# Patient Record
Sex: FEMALE | Race: WHITE | NOT HISPANIC OR LATINO | Employment: OTHER | URBAN - METROPOLITAN AREA
[De-identification: names, ages, dates, MRNs, and addresses within clinical notes are randomized per-mention and may not be internally consistent; named-entity substitution may affect disease eponyms.]

---

## 2017-12-18 ENCOUNTER — HOSPITAL ENCOUNTER (OUTPATIENT)
Facility: MEDICAL CENTER | Age: 67
End: 2017-12-18
Payer: COMMERCIAL

## 2017-12-19 NOTE — PROGRESS NOTES
Discussed with Dr. Cross after he spoke w/ Dr. Castillo at [facility]. He is not accepting the patient for a direct admit tonight. At this point the patient does not have a clear need for higher level of care. She needs an oncology consult that may be able to be done on an outpt basis. She is getting IV fluids and PRBCs today. Dr. Cross would like [facility] to repeat her labs in the AM, update her records and fax all of the updated info to us tomorrow. They will then need to consult w/the oncologist on call to discuss inpt vs outpt workup. Deirdre, the house supervisor at [facility] has been updated and she will f/u in the AM.

## 2018-01-30 ENCOUNTER — HOSPITAL ENCOUNTER (OUTPATIENT)
Facility: MEDICAL CENTER | Age: 68
End: 2018-01-30
Attending: INTERNAL MEDICINE
Payer: COMMERCIAL

## 2018-01-30 LAB
ALBUMIN SERPL BCP-MCNC: 2.8 G/DL (ref 3.2–4.9)
ALBUMIN/GLOB SERPL: 0.5 G/DL
ALP SERPL-CCNC: 117 U/L (ref 30–99)
ALT SERPL-CCNC: 7 U/L (ref 2–50)
ANION GAP SERPL CALC-SCNC: 10 MMOL/L (ref 0–11.9)
AST SERPL-CCNC: 15 U/L (ref 12–45)
BILIRUB SERPL-MCNC: 0.3 MG/DL (ref 0.1–1.5)
BUN SERPL-MCNC: 8 MG/DL (ref 8–22)
CALCIUM SERPL-MCNC: 8.9 MG/DL (ref 8.5–10.5)
CHLORIDE SERPL-SCNC: 100 MMOL/L (ref 96–112)
CO2 SERPL-SCNC: 24 MMOL/L (ref 20–33)
CREAT SERPL-MCNC: 0.51 MG/DL (ref 0.5–1.4)
GLOBULIN SER CALC-MCNC: 5.7 G/DL (ref 1.9–3.5)
GLUCOSE SERPL-MCNC: 111 MG/DL (ref 65–99)
IRON SATN MFR SERPL: 28 % (ref 15–55)
IRON SERPL-MCNC: 86 UG/DL (ref 40–170)
POTASSIUM SERPL-SCNC: 4 MMOL/L (ref 3.6–5.5)
PROT SERPL-MCNC: 8.5 G/DL (ref 6–8.2)
SODIUM SERPL-SCNC: 134 MMOL/L (ref 135–145)
TIBC SERPL-MCNC: 304 UG/DL (ref 250–450)

## 2018-01-30 PROCEDURE — 84165 PROTEIN E-PHORESIS SERUM: CPT

## 2018-01-30 PROCEDURE — 82746 ASSAY OF FOLIC ACID SERUM: CPT

## 2018-01-30 PROCEDURE — 83540 ASSAY OF IRON: CPT

## 2018-01-30 PROCEDURE — 82232 ASSAY OF BETA-2 PROTEIN: CPT

## 2018-01-30 PROCEDURE — 82784 ASSAY IGA/IGD/IGG/IGM EACH: CPT

## 2018-01-30 PROCEDURE — 83883 ASSAY NEPHELOMETRY NOT SPEC: CPT | Mod: 91

## 2018-01-30 PROCEDURE — 82728 ASSAY OF FERRITIN: CPT

## 2018-01-30 PROCEDURE — 83550 IRON BINDING TEST: CPT

## 2018-01-30 PROCEDURE — 84160 ASSAY OF PROTEIN ANY SOURCE: CPT

## 2018-01-30 PROCEDURE — 80053 COMPREHEN METABOLIC PANEL: CPT

## 2018-01-30 PROCEDURE — 82607 VITAMIN B-12: CPT

## 2018-01-31 LAB
FERRITIN SERPL-MCNC: 91.4 NG/ML (ref 10–291)
FOLATE SERPL-MCNC: 9.8 NG/ML
VIT B12 SERPL-MCNC: 141 PG/ML (ref 211–911)

## 2018-02-01 LAB
B2 MICROGLOB SERPL-MCNC: 9.9 MG/L (ref 1.1–2.4)
IGA SERPL-MCNC: 30 MG/DL (ref 68–408)
IGG SERPL-MCNC: 4730 MG/DL (ref 768–1632)
IGM SERPL-MCNC: 12 MG/DL (ref 35–263)
KAPPA LC FREE SER-MCNC: 8.26 MG/DL (ref 0.33–1.94)
KAPPA LC FREE/LAMBDA FREE SER NEPH: 22.94 {RATIO} (ref 0.26–1.65)
LAMBDA LC FREE SERPL-MCNC: 0.36 MG/DL (ref 0.57–2.63)

## 2018-02-02 ENCOUNTER — HOSPITAL ENCOUNTER (OUTPATIENT)
Facility: MEDICAL CENTER | Age: 68
End: 2018-02-02
Attending: HOSPITALIST | Admitting: HOSPITALIST
Payer: COMMERCIAL

## 2018-02-02 LAB
ALBUMIN SERPL-MCNC: 3.39 G/DL (ref 3.75–5.01)
ALPHA1 GLOB SERPL ELPH-MCNC: 0.45 G/DL (ref 0.19–0.46)
ALPHA2 GLOB SERPL ELPH-MCNC: 0.91 G/DL (ref 0.48–1.05)
B-GLOBULIN SERPL ELPH-MCNC: 0.75 G/DL (ref 0.48–1.1)
EER PROT ELECT SER Q1092: ABNORMAL
GAMMA GLOB SERPL ELPH-MCNC: 3.8 G/DL (ref 0.62–1.51)
INTERPRETATION SERPL IFE-IMP: ABNORMAL
PROT SERPL-MCNC: 9.3 G/DL (ref 6–8.3)

## 2018-03-06 ENCOUNTER — HOSPITAL ENCOUNTER (OUTPATIENT)
Facility: MEDICAL CENTER | Age: 68
End: 2018-03-06
Attending: INTERNAL MEDICINE
Payer: MEDICARE

## 2018-03-06 LAB
ALBUMIN SERPL BCP-MCNC: 2.7 G/DL (ref 3.2–4.9)
ALBUMIN/GLOB SERPL: 0.5 G/DL
ALP SERPL-CCNC: 90 U/L (ref 30–99)
ALT SERPL-CCNC: 9 U/L (ref 2–50)
ANION GAP SERPL CALC-SCNC: 10 MMOL/L (ref 0–11.9)
AST SERPL-CCNC: 12 U/L (ref 12–45)
BILIRUB SERPL-MCNC: 0.3 MG/DL (ref 0.1–1.5)
BUN SERPL-MCNC: 8 MG/DL (ref 8–22)
CALCIUM SERPL-MCNC: 8.6 MG/DL (ref 8.5–10.5)
CHLORIDE SERPL-SCNC: 103 MMOL/L (ref 96–112)
CO2 SERPL-SCNC: 21 MMOL/L (ref 20–33)
CREAT SERPL-MCNC: 0.69 MG/DL (ref 0.5–1.4)
GLOBULIN SER CALC-MCNC: 5.2 G/DL (ref 1.9–3.5)
GLUCOSE SERPL-MCNC: 203 MG/DL (ref 65–99)
HAV IGM SERPL QL IA: NEGATIVE
HBV CORE IGM SER QL: NEGATIVE
HBV SURFACE AG SER QL: NEGATIVE
HCV AB SER QL: NEGATIVE
POTASSIUM SERPL-SCNC: 3.9 MMOL/L (ref 3.6–5.5)
PROT SERPL-MCNC: 7.9 G/DL (ref 6–8.2)
SODIUM SERPL-SCNC: 134 MMOL/L (ref 135–145)

## 2018-03-06 PROCEDURE — 86255 FLUORESCENT ANTIBODY SCREEN: CPT

## 2018-03-06 PROCEDURE — 86645 CMV ANTIBODY IGM: CPT

## 2018-03-06 PROCEDURE — 86644 CMV ANTIBODY: CPT

## 2018-03-06 PROCEDURE — 86340 INTRINSIC FACTOR ANTIBODY: CPT

## 2018-03-06 PROCEDURE — 80074 ACUTE HEPATITIS PANEL: CPT

## 2018-03-06 PROCEDURE — 80053 COMPREHEN METABOLIC PANEL: CPT

## 2018-03-08 LAB
CMV IGG SERPL IA-ACNC: >10 U/ML
CMV IGM SERPL IA-ACNC: <8 AU/ML

## 2018-03-09 LAB — PCA IGG SER-ACNC: 1.6 UNITS (ref 0–24.9)

## 2018-03-10 LAB — IF BLOCK AB SER QL RIA: NEGATIVE

## 2018-03-12 ENCOUNTER — APPOINTMENT (OUTPATIENT)
Dept: ADMISSIONS | Facility: MEDICAL CENTER | Age: 68
End: 2018-03-12
Attending: ORTHOPAEDIC SURGERY
Payer: MEDICARE

## 2018-03-12 RX ORDER — ASPIRIN 325 MG
325 TABLET ORAL
Status: ON HOLD | COMMUNITY
End: 2018-05-26

## 2018-03-12 RX ORDER — ECHINACEA PURPUREA EXTRACT 125 MG
2 TABLET ORAL 3 TIMES DAILY PRN
COMMUNITY
End: 2018-04-10

## 2018-03-12 RX ORDER — CALCIUM CARBONATE 500 MG/1
500 TABLET, CHEWABLE ORAL PRN
COMMUNITY
End: 2018-04-10

## 2018-03-12 NOTE — OR NURSING
Pre-admit appointment completed. Pt instructed to continue regularly prescribed medications through the day before surgery. Pt instructed to take the following medications the day of surgery with a sip of water, per anesthesia protocol; metoprolol.    Pt instructed to notify her MD who manages insulin of procedure and NPO status.     Dr Gilmore notified of procedure due to pts estimate weight of 373 lbs giving her an approx BMI of 66.07, and other co morbidities.

## 2018-03-12 NOTE — OR NURSING
Dr Gilmore reviewed patients medical history. Based upon information available, Dr Gilmore indicates that the anesthesiologist will evaluate pt and determine if pt is appropriate to proceed with the procedure.

## 2018-03-13 ENCOUNTER — APPOINTMENT (OUTPATIENT)
Dept: RADIOLOGY | Facility: MEDICAL CENTER | Age: 68
End: 2018-03-13
Attending: ORTHOPAEDIC SURGERY
Payer: MEDICARE

## 2018-03-13 ENCOUNTER — HOSPITAL ENCOUNTER (OUTPATIENT)
Facility: MEDICAL CENTER | Age: 68
End: 2018-03-13
Attending: ORTHOPAEDIC SURGERY | Admitting: ORTHOPAEDIC SURGERY
Payer: MEDICARE

## 2018-03-13 VITALS
WEIGHT: 293 LBS | HEART RATE: 79 BPM | OXYGEN SATURATION: 95 % | TEMPERATURE: 97.7 F | BODY MASS INDEX: 64.87 KG/M2 | DIASTOLIC BLOOD PRESSURE: 65 MMHG | RESPIRATION RATE: 18 BRPM | SYSTOLIC BLOOD PRESSURE: 127 MMHG

## 2018-03-13 DIAGNOSIS — M84.522D PATHOLOGICAL FRACTURE OF LEFT HUMERUS DUE TO NEOPLASTIC DISEASE WITH ROUTINE HEALING, SUBSEQUENT ENCOUNTER: ICD-10-CM

## 2018-03-13 PROBLEM — M84.40XA PATHOLOGIC FRACTURE: Status: ACTIVE | Noted: 2018-03-13

## 2018-03-13 LAB
BASOPHILS # BLD AUTO: 0.4 % (ref 0–1.8)
BASOPHILS # BLD: 0.02 K/UL (ref 0–0.12)
EOSINOPHIL # BLD AUTO: 0.03 K/UL (ref 0–0.51)
EOSINOPHIL NFR BLD: 0.6 % (ref 0–6.9)
ERYTHROCYTE [DISTWIDTH] IN BLOOD BY AUTOMATED COUNT: 62.5 FL (ref 35.9–50)
EST. AVERAGE GLUCOSE BLD GHB EST-MCNC: 183 MG/DL
GLUCOSE BLD-MCNC: 196 MG/DL (ref 65–99)
HBA1C MFR BLD: 8 % (ref 0–5.6)
HCT VFR BLD AUTO: 24.6 % (ref 37–47)
HGB BLD-MCNC: 8 G/DL (ref 12–16)
IMM GRANULOCYTES # BLD AUTO: 0.02 K/UL (ref 0–0.11)
IMM GRANULOCYTES NFR BLD AUTO: 0.4 % (ref 0–0.9)
LYMPHOCYTES # BLD AUTO: 1.62 K/UL (ref 1–4.8)
LYMPHOCYTES NFR BLD: 31.5 % (ref 22–41)
MCH RBC QN AUTO: 31.7 PG (ref 27–33)
MCHC RBC AUTO-ENTMCNC: 32.5 G/DL (ref 33.6–35)
MCV RBC AUTO: 97.6 FL (ref 81.4–97.8)
MONOCYTES # BLD AUTO: 0.54 K/UL (ref 0–0.85)
MONOCYTES NFR BLD AUTO: 10.5 % (ref 0–13.4)
NEUTROPHILS # BLD AUTO: 2.91 K/UL (ref 2–7.15)
NEUTROPHILS NFR BLD: 56.6 % (ref 44–72)
NRBC # BLD AUTO: 0 K/UL
NRBC BLD-RTO: 0 /100 WBC
PLATELET # BLD AUTO: 252 K/UL (ref 164–446)
PMV BLD AUTO: 9.6 FL (ref 9–12.9)
RBC # BLD AUTO: 2.52 M/UL (ref 4.2–5.4)
WBC # BLD AUTO: 5.1 K/UL (ref 4.8–10.8)

## 2018-03-13 PROCEDURE — 501480 HCHG STOCKINETTE: Performed by: ORTHOPAEDIC SURGERY

## 2018-03-13 PROCEDURE — 160036 HCHG PACU - EA ADDL 30 MINS PHASE I: Performed by: ORTHOPAEDIC SURGERY

## 2018-03-13 PROCEDURE — 88237 TISSUE CULTURE BONE MARROW: CPT

## 2018-03-13 PROCEDURE — 88360 TUMOR IMMUNOHISTOCHEM/MANUAL: CPT

## 2018-03-13 PROCEDURE — 88342 IMHCHEM/IMCYTCHM 1ST ANTB: CPT | Mod: XU

## 2018-03-13 PROCEDURE — 160047 HCHG PACU  - EA ADDL 30 MINS PHASE II: Performed by: ORTHOPAEDIC SURGERY

## 2018-03-13 PROCEDURE — 93010 ELECTROCARDIOGRAM REPORT: CPT | Performed by: INTERNAL MEDICINE

## 2018-03-13 PROCEDURE — 700111 HCHG RX REV CODE 636 W/ 250 OVERRIDE (IP)

## 2018-03-13 PROCEDURE — 85025 COMPLETE CBC W/AUTO DIFF WBC: CPT

## 2018-03-13 PROCEDURE — 160022 HCHG BLOCK: Performed by: ORTHOPAEDIC SURGERY

## 2018-03-13 PROCEDURE — 93005 ELECTROCARDIOGRAM TRACING: CPT | Performed by: ORTHOPAEDIC SURGERY

## 2018-03-13 PROCEDURE — 82962 GLUCOSE BLOOD TEST: CPT

## 2018-03-13 PROCEDURE — 88305 TISSUE EXAM BY PATHOLOGIST: CPT

## 2018-03-13 PROCEDURE — 88313 SPECIAL STAINS GROUP 2: CPT

## 2018-03-13 PROCEDURE — 700101 HCHG RX REV CODE 250

## 2018-03-13 PROCEDURE — 160046 HCHG PACU - 1ST 60 MINS PHASE II: Performed by: ORTHOPAEDIC SURGERY

## 2018-03-13 PROCEDURE — 88264 CHROMOSOME ANALYSIS 20-25: CPT

## 2018-03-13 PROCEDURE — 700105 HCHG RX REV CODE 258: Performed by: ORTHOPAEDIC SURGERY

## 2018-03-13 PROCEDURE — A6454 SELF-ADHER BAND W>=3" <5"/YD: HCPCS | Performed by: ORTHOPAEDIC SURGERY

## 2018-03-13 PROCEDURE — 83036 HEMOGLOBIN GLYCOSYLATED A1C: CPT

## 2018-03-13 PROCEDURE — 160002 HCHG RECOVERY MINUTES (STAT): Performed by: ORTHOPAEDIC SURGERY

## 2018-03-13 PROCEDURE — 88311 DECALCIFY TISSUE: CPT

## 2018-03-13 PROCEDURE — 160009 HCHG ANES TIME/MIN: Performed by: ORTHOPAEDIC SURGERY

## 2018-03-13 PROCEDURE — 160035 HCHG PACU - 1ST 60 MINS PHASE I: Performed by: ORTHOPAEDIC SURGERY

## 2018-03-13 PROCEDURE — 73560 X-RAY EXAM OF KNEE 1 OR 2: CPT | Mod: RT

## 2018-03-13 PROCEDURE — 88185 FLOWCYTOMETRY/TC ADD-ON: CPT | Mod: 91

## 2018-03-13 PROCEDURE — 88184 FLOWCYTOMETRY/ TC 1 MARKER: CPT

## 2018-03-13 PROCEDURE — 160027 HCHG SURGERY MINUTES - 1ST 30 MINS LEVEL 2: Performed by: ORTHOPAEDIC SURGERY

## 2018-03-13 PROCEDURE — 501838 HCHG SUTURE GENERAL: Performed by: ORTHOPAEDIC SURGERY

## 2018-03-13 PROCEDURE — 500864 HCHG NEEDLE, SPINAL 18G: Performed by: ORTHOPAEDIC SURGERY

## 2018-03-13 PROCEDURE — 160025 RECOVERY II MINUTES (STATS): Performed by: ORTHOPAEDIC SURGERY

## 2018-03-13 PROCEDURE — 160038 HCHG SURGERY MINUTES - EA ADDL 1 MIN LEVEL 2: Performed by: ORTHOPAEDIC SURGERY

## 2018-03-13 PROCEDURE — 160048 HCHG OR STATISTICAL LEVEL 1-5: Performed by: ORTHOPAEDIC SURGERY

## 2018-03-13 RX ORDER — LIDOCAINE HYDROCHLORIDE 10 MG/ML
INJECTION, SOLUTION EPIDURAL; INFILTRATION; INTRACAUDAL; PERINEURAL
Status: DISCONTINUED | OUTPATIENT
Start: 2018-03-13 | End: 2018-03-13 | Stop reason: HOSPADM

## 2018-03-13 RX ORDER — ROPIVACAINE HYDROCHLORIDE 5 MG/ML
INJECTION, SOLUTION EPIDURAL; INFILTRATION; PERINEURAL
Status: DISCONTINUED
Start: 2018-03-13 | End: 2018-03-13 | Stop reason: HOSPADM

## 2018-03-13 RX ORDER — LIDOCAINE HYDROCHLORIDE 10 MG/ML
INJECTION, SOLUTION INFILTRATION; PERINEURAL
Status: COMPLETED
Start: 2018-03-13 | End: 2018-03-13

## 2018-03-13 RX ORDER — METHYLPREDNISOLONE ACETATE 40 MG/ML
INJECTION, SUSPENSION INTRA-ARTICULAR; INTRALESIONAL; INTRAMUSCULAR; SOFT TISSUE
Status: DISCONTINUED | OUTPATIENT
Start: 2018-03-13 | End: 2018-03-13 | Stop reason: HOSPADM

## 2018-03-13 RX ORDER — LIDOCAINE HYDROCHLORIDE 20 MG/ML
INJECTION, SOLUTION INFILTRATION; PERINEURAL
Status: DISCONTINUED
Start: 2018-03-13 | End: 2018-03-13 | Stop reason: HOSPADM

## 2018-03-13 RX ORDER — SODIUM CHLORIDE 9 MG/ML
1000 INJECTION, SOLUTION INTRAVENOUS
Status: DISCONTINUED | OUTPATIENT
Start: 2018-03-13 | End: 2018-03-13 | Stop reason: HOSPADM

## 2018-03-13 RX ORDER — OXYCODONE HYDROCHLORIDE AND ACETAMINOPHEN 5; 325 MG/1; MG/1
1 TABLET ORAL EVERY 6 HOURS PRN
Qty: 20 TAB | Refills: 0 | Status: SHIPPED | OUTPATIENT
Start: 2018-03-13 | End: 2018-03-20

## 2018-03-13 RX ORDER — IPRATROPIUM BROMIDE AND ALBUTEROL SULFATE 2.5; .5 MG/3ML; MG/3ML
SOLUTION RESPIRATORY (INHALATION)
Status: COMPLETED
Start: 2018-03-13 | End: 2018-03-13

## 2018-03-13 RX ADMIN — LIDOCAINE HYDROCHLORIDE 0.2 ML: 10 INJECTION, SOLUTION INFILTRATION; PERINEURAL at 07:25

## 2018-03-13 RX ADMIN — SODIUM CHLORIDE 1000 ML: 900 INJECTION, SOLUTION INTRAVENOUS at 07:25

## 2018-03-13 RX ADMIN — IPRATROPIUM BROMIDE AND ALBUTEROL SULFATE 3 ML: .5; 3 SOLUTION RESPIRATORY (INHALATION) at 09:55

## 2018-03-13 ASSESSMENT — PAIN SCALES - GENERAL
PAINLEVEL_OUTOF10: 0
PAINLEVEL_OUTOF10: 6
PAINLEVEL_OUTOF10: 0

## 2018-03-13 NOTE — OR NURSING
0625 PT TO PRE OP VIA W/C TO ASSUME CARE.  0630 SPO2 88% WITH AMBULATION 0635 SPO2 92% RA AT REST.  0638 SPO2 DROPS TO 90% PT PLACED ON 1L N/C FOR SPO2 94%. 0738 Patient allergies and NPO status verified, home medication reconciliation completed and belongings secured. Patient verbalizes understanding of pain scale, expected course of stay and plan of care. Surgical site verified with patient. IV access established.

## 2018-03-13 NOTE — OR NURSING
0826 Received report from Jada SWEET. Pts VSS. Plan to keep pt in PACU for full hour per [facility] protocol. Pt ok to be discharged at 88% or above on room air per anesthesia as that is where pt came in.   0845 trial  pt on room air at this time. VSS stable  0850 Pt unable to maintain oxygen saturation above 88% on room air.   0853 Instructed pt on use of IS. Pt goal is 1900, pt able to reach 500  0900 No change   0910 Transferring  pt to chair. Pt tolerating transfer.   0925 Pt dressed and in personal wheelchair for upright positioning. VSS   0928 Trial on room air at this time   0935 Pt unable to maintain oxygen saturation above 88% at this time. Pt desaturates into low 80's on room air   0950 Spoke with Dr. Arechiga regarding pts oxygen saturation. New orders received. See MAR   1010 Pt receiving neb tx at this time. VSS. Pt denies pain and nausea  1030 Spoke with Dr. Forbes regarding pts oxygen saturation. Pt not improving after breathing tx. Pt desaturates into low 80s on room air. Will contact [facility] in case management  to find oxygen com  1053 Tx pt to stage two at this time. VSS. Report given to KEE Limon

## 2018-03-13 NOTE — OR NURSING
0815 Pt to PACU from OR via gurtaisha, respirations spontaneous and non-labored via 6L mask. Left arm and right leg surgical sites clean, dry and intact, pt awake with no complaints of pain or nausea, VSS. Verbal order recived from Dr. Geni bush to D/C home at baseline spo2 sat 88% or higher.   0890 Report to KEE Mccray.

## 2018-03-13 NOTE — OR NURSING
I attempted patient on room air at 1110 after settling into PACU 2, pt very sleepy and sats into low-mid  80s. Back on 2 liters.  1210  OOB to BR to void QS. OOB tolerated well. Walked from bathroom on O2 at 2 L, sat remains high 90s upon checking, oxygen off.   Ate lunch.  Patient talkative, sats on room air 91- 95 percent. Dozed for ten minutes, sats not below 91 per cent.  1300- Denies pain and nausea.  Band-aid to left arm and right knee remain clean,dry,intact.  Patient and patient's sister verbalize good understanding of discharge instructions.  Patient using incentive spirometer to 1750 cc. 1315 Patient OOB to car.  Tolerated getting into her car well, mucous mocosa pink, patient only mildly short of breath. Discharged to sister and brother-in-law.

## 2018-03-13 NOTE — OP REPORT
Pre-operative Dx: Hematologic malignancy with left pathologic humerus fracture, Right knee symptomatic osteoarthritis, morbid obesity.  Post-operative Dx: same   Procedure:  Biopsy of left proximal humerus malignant bone lesion, Bone marrow biopsy left proximal humerus, Injection right knee with fluoro guidance  Surgeon:  Dr. Hany Forbes MD  Anesthesia:  Regional block  EBL: Minimal  Complications:  None    She is a 67 year old female with a pathologic left humerus fracture, felt to be multiple myeloma.  She's followed by Dr. Sim for hematology.  They were unable to perform standard biopsies due to her body habitus, so Dr. Sim asked if I'd be willing to assist.    She was brought back to the OR and a timeout was performed and site marking and consent confirmed.  She received a regional block.  The shoulder was prepped and draped.  A trocar was inserted through a percutaneous incision.  Bone marrow aspirate was aspirated and sent.  Additional marrow was aspirated into a heparin syringe and sent.  The trocar was then used to bore out a sample of bone tissue using multiple passes and sent.  Pathlogy tech was present for the procedure to assist with specimen prep and she was happy with the quality of the tissue.  A 3-0 nylon was placed in the procedure site and a bandaid applied.  I then turned my attention to the right knee.  It was very large due to body habitus, requiring fluoroscopic guidance to get the needle into the knee.  Once imaging confirmed correct placement, I injected 4cc of lidocaine and 80mg of Depomedrol.      She tolerated the procedure well.  F/U with Dr. Sim is scheduled for next week.

## 2018-03-13 NOTE — DISCHARGE INSTRUCTIONS
ACTIVITY: Rest and take it easy for the first 24 hours.  A responsible adult is recommended to remain with you during that time.  It is normal to feel sleepy.  We encourage you to not do anything that requires balance, judgment or coordination.    MILD FLU-LIKE SYMPTOMS ARE NORMAL. YOU MAY EXPERIENCE GENERALIZED MUSCLE ACHES, THROAT IRRITATION, HEADACHE AND/OR SOME NAUSEA.    FOR 24 HOURS DO NOT:  Drive, operate machinery or run household appliances.  Drink beer or alcoholic beverages.   Make important decisions or sign legal documents.    SPECIAL INSTRUCTIONS: Follow up with Dr. Sim     DIET: To avoid nausea, slowly advance diet as tolerated, avoiding spicy or greasy foods for the first day.  Add more substantial food to your diet according to your physician's instructions.  Babies can be fed formula or breast milk as soon as they are hungry.  INCREASE FLUIDS AND FIBER TO AVOID CONSTIPATION.      FOLLOW-UP APPOINTMENT:  A follow-up appointment should be arranged with your doctor ; call to schedule.    You should CALL YOUR PHYSICIAN if you develop:  Fever greater than 101 degrees F.  Pain not relieved by medication, or persistent nausea or vomiting.  Excessive bleeding (blood soaking through dressing) or unexpected drainage from the wound.  Extreme redness or swelling around the incision site, drainage of pus or foul smelling drainage.  Inability to urinate or empty your bladder within 8 hours.  Problems with breathing or chest pain.3    You should call 911 if you develop problems with breathing or chest pain.  If you are unable to contact your doctor or surgical center, you should go to the nearest emergency room or urgent care center.  Physician's telephone #: Dr. Forbes [phone]    If any questions arise, call your doctor.  If your doctor is not available, please feel free to call the Surgical Center at [phone].  The Center is open Monday through Friday from 7AM to 7PM.  You can also call the HEALTH  HOTLINE open 24 hours/day, 7 days/week and speak to a nurse at [phone], or toll free at [phone].    A registered nurse may call you a few days after your surgery to see how you are doing after your procedure.    MEDICATIONS: Resume taking daily medication.  Take prescribed pain medication with food.  If no medication is prescribed, you may take non-aspirin pain medication if needed.  PAIN MEDICATION CAN BE VERY CONSTIPATING.  Take a stool softener or laxative such as senokot, pericolace, or milk of magnesia if needed.    Prescription given for Percocet.  Last pain medication given at NA .    If your physician has prescribed pain medication that includes Acetaminophen (Tylenol), do not take additional Acetaminophen (Tylenol) while taking the prescribed medication.    Depression / Suicide Risk    As you are discharged from this [facility] facility, it is important to learn how to keep safe from harming yourself.    Recognize the warning signs:  · Abrupt changes in personality, positive or negative- including increase in energy   · Giving away possessions  · Change in eating patterns- significant weight changes-  positive or negative  · Change in sleeping patterns- unable to sleep or sleeping all the time   · Unwillingness or inability to communicate  · Depression  · Unusual sadness, discouragement and loneliness  · Talk of wanting to die  · Neglect of personal appearance   · Rebelliousness- reckless behavior  · Withdrawal from people/activities they love  · Confusion- inability to concentrate     If you or a loved one observes any of these behaviors or has concerns about self-harm, here's what you can do:  · Talk about it- your feelings and reasons for harming yourself  · Remove any means that you might use to hurt yourself (examples: pills, rope, extension cords, firearm)  · Get professional help from the community (Mental Health, Substance Abuse, psychological counseling)  · Do not be alone:Call  your Safe Contact- someone whom you trust who will be there for you.  · Call your local CRISIS HOTLINE 256-0519 or 023-191-5164  · Call your local Children's Mobile Crisis Response Team [place] (922) 225-1528 or www.BuzzFeed  · Call the toll free National Suicide Prevention Hotlines   · National Suicide Prevention Lifeline 334-669-XPYJ (1613)  [place] [facility] Group Line Network 800-SUICIDE (546-0597)    Peripheral Nerve Block Discharge Instructions from Same Day Surgery and Inpatient :    What to Expect - Lower Extremity  · The block may cause you to experience numbness and weakness in your hip and thigh, thigh and knee or calf and foot on the same side as your surgery  · Numbness, tingling and / or weakness are all normal. For some people, this may be an unpleasant sensation  · These issues will be resolved when the local anesthetic wears off   · You may experience numbness and tingling in your thigh on the same side as your surgery if the block medicine was injected at your groin area  · Numbness will make it difficult to walk  · You may have problems with balance and walking so be very careful   · Follow your surgeon's direction regarding weight bearing on your surgical limb  · Be very careful with your numb limb  Precautions  · The numbness may affect your balance  · Be careful when walking or moving around  · Your leg may be weak: be very careful putting weight on it  · If your surgeon did not specify a time, you should not bear weight for 24 hours  · Be sure to ask for help when you need it  · It is better to have help than to fall and hurt yourself  ·   Prevent Injury  · Protect the limb like a baby  · Beware of exposing your limb to extreme heat or cold or trauma  · The limb may be injured without you noticing because it is numb  · Keep the limb elevated whenever possible  · Do not sleep on the limb  · Change the position of the limb regularly  · Avoid putting pressure on your surgical limb  Pain  "Control  · The initial block on the day of surgery will make your extremity feel \"numb\"  · Any consecutive injection including prior to discharge from the hospital will make your extremity feel \"numb\"  · You may feel an aching or burning when the local anesthesia starts to wear off  · Take pain pills as prescribed by your surgeon  · Call your surgeon or anesthesiologist if you do not have adequate pain control  ·   "

## 2018-03-14 LAB — EKG IMPRESSION: NORMAL

## 2018-04-10 ENCOUNTER — APPOINTMENT (OUTPATIENT)
Dept: RADIOLOGY | Facility: MEDICAL CENTER | Age: 68
DRG: 840 | End: 2018-04-10
Attending: INTERNAL MEDICINE
Payer: MEDICARE

## 2018-04-10 ENCOUNTER — RESOLUTE PROFESSIONAL BILLING HOSPITAL PROF FEE (OUTPATIENT)
Dept: HOSPITALIST | Facility: MEDICAL CENTER | Age: 68
End: 2018-04-10
Payer: MEDICARE

## 2018-04-10 ENCOUNTER — HOSPITAL ENCOUNTER (INPATIENT)
Facility: MEDICAL CENTER | Age: 68
LOS: 46 days | DRG: 840 | End: 2018-05-26
Attending: EMERGENCY MEDICINE | Admitting: INTERNAL MEDICINE
Payer: MEDICARE

## 2018-04-10 DIAGNOSIS — M79.604 PAIN OF RIGHT LOWER EXTREMITY: ICD-10-CM

## 2018-04-10 DIAGNOSIS — M84.522D PATHOLOGICAL FRACTURE OF LEFT HUMERUS DUE TO NEOPLASTIC DISEASE WITH ROUTINE HEALING, SUBSEQUENT ENCOUNTER: ICD-10-CM

## 2018-04-10 DIAGNOSIS — C90.00 MULTIPLE MYELOMA NOT HAVING ACHIEVED REMISSION (HCC): ICD-10-CM

## 2018-04-10 DIAGNOSIS — C90.00 MULTIPLE MYELOMA, REMISSION STATUS UNSPECIFIED (HCC): ICD-10-CM

## 2018-04-10 DIAGNOSIS — F41.9 ANXIETY: ICD-10-CM

## 2018-04-10 PROBLEM — D64.9 ANEMIA: Status: ACTIVE | Noted: 2018-04-10

## 2018-04-10 PROBLEM — I87.2 CHRONIC VENOUS STASIS DERMATITIS OF BOTH LOWER EXTREMITIES: Status: ACTIVE | Noted: 2018-04-10

## 2018-04-10 PROBLEM — E87.1 HYPONATREMIA: Status: ACTIVE | Noted: 2018-04-10

## 2018-04-10 LAB
ALBUMIN SERPL BCP-MCNC: 3.2 G/DL (ref 3.2–4.9)
ALBUMIN/GLOB SERPL: 0.8 G/DL
ALP SERPL-CCNC: 87 U/L (ref 30–99)
ALT SERPL-CCNC: 8 U/L (ref 2–50)
ANION GAP SERPL CALC-SCNC: 6 MMOL/L (ref 0–11.9)
AST SERPL-CCNC: 8 U/L (ref 12–45)
BASOPHILS # BLD AUTO: 0.5 % (ref 0–1.8)
BASOPHILS # BLD: 0.03 K/UL (ref 0–0.12)
BILIRUB SERPL-MCNC: 0.6 MG/DL (ref 0.1–1.5)
BUN SERPL-MCNC: 21 MG/DL (ref 8–22)
CALCIUM SERPL-MCNC: 9.1 MG/DL (ref 8.5–10.5)
CHLORIDE SERPL-SCNC: 99 MMOL/L (ref 96–112)
CO2 SERPL-SCNC: 28 MMOL/L (ref 20–33)
CREAT SERPL-MCNC: 1.23 MG/DL (ref 0.5–1.4)
EOSINOPHIL # BLD AUTO: 0.08 K/UL (ref 0–0.51)
EOSINOPHIL NFR BLD: 1.2 % (ref 0–6.9)
ERYTHROCYTE [DISTWIDTH] IN BLOOD BY AUTOMATED COUNT: 53.9 FL (ref 35.9–50)
EST. AVERAGE GLUCOSE BLD GHB EST-MCNC: 197 MG/DL
GLOBULIN SER CALC-MCNC: 4.1 G/DL (ref 1.9–3.5)
GLUCOSE BLD-MCNC: 288 MG/DL (ref 65–99)
GLUCOSE SERPL-MCNC: 270 MG/DL (ref 65–99)
HBA1C MFR BLD: 8.5 % (ref 0–5.6)
HCT VFR BLD AUTO: 32.3 % (ref 37–47)
HGB BLD-MCNC: 10.6 G/DL (ref 12–16)
IMM GRANULOCYTES # BLD AUTO: 0.04 K/UL (ref 0–0.11)
IMM GRANULOCYTES NFR BLD AUTO: 0.6 % (ref 0–0.9)
LYMPHOCYTES # BLD AUTO: 1.21 K/UL (ref 1–4.8)
LYMPHOCYTES NFR BLD: 18.8 % (ref 22–41)
MCH RBC QN AUTO: 30.8 PG (ref 27–33)
MCHC RBC AUTO-ENTMCNC: 32.8 G/DL (ref 33.6–35)
MCV RBC AUTO: 93.9 FL (ref 81.4–97.8)
MONOCYTES # BLD AUTO: 0.48 K/UL (ref 0–0.85)
MONOCYTES NFR BLD AUTO: 7.5 % (ref 0–13.4)
NEUTROPHILS # BLD AUTO: 4.6 K/UL (ref 2–7.15)
NEUTROPHILS NFR BLD: 71.4 % (ref 44–72)
NRBC # BLD AUTO: 0 K/UL
NRBC BLD-RTO: 0 /100 WBC
PLATELET # BLD AUTO: 254 K/UL (ref 164–446)
PMV BLD AUTO: 10.6 FL (ref 9–12.9)
POTASSIUM SERPL-SCNC: 3.7 MMOL/L (ref 3.6–5.5)
PROT SERPL-MCNC: 7.3 G/DL (ref 6–8.2)
RBC # BLD AUTO: 3.44 M/UL (ref 4.2–5.4)
SODIUM SERPL-SCNC: 133 MMOL/L (ref 135–145)
WBC # BLD AUTO: 6.4 K/UL (ref 4.8–10.8)

## 2018-04-10 PROCEDURE — 94760 N-INVAS EAR/PLS OXIMETRY 1: CPT

## 2018-04-10 PROCEDURE — 99285 EMERGENCY DEPT VISIT HI MDM: CPT

## 2018-04-10 PROCEDURE — 303105 HCHG CATHETER EXTRA

## 2018-04-10 PROCEDURE — 99223 1ST HOSP IP/OBS HIGH 75: CPT | Mod: AI | Performed by: INTERNAL MEDICINE

## 2018-04-10 PROCEDURE — 96374 THER/PROPH/DIAG INJ IV PUSH: CPT | Mod: XU

## 2018-04-10 PROCEDURE — A9270 NON-COVERED ITEM OR SERVICE: HCPCS | Performed by: INTERNAL MEDICINE

## 2018-04-10 PROCEDURE — 82962 GLUCOSE BLOOD TEST: CPT

## 2018-04-10 PROCEDURE — 700102 HCHG RX REV CODE 250 W/ 637 OVERRIDE(OP): Performed by: INTERNAL MEDICINE

## 2018-04-10 PROCEDURE — 770004 HCHG ROOM/CARE - ONCOLOGY PRIVATE *

## 2018-04-10 PROCEDURE — 770009 HCHG ROOM/CARE - ONCOLOGY SEMI PRI*

## 2018-04-10 PROCEDURE — 73060 X-RAY EXAM OF HUMERUS: CPT | Mod: LT

## 2018-04-10 PROCEDURE — 96372 THER/PROPH/DIAG INJ SC/IM: CPT | Mod: XU

## 2018-04-10 PROCEDURE — 51702 INSERT TEMP BLADDER CATH: CPT

## 2018-04-10 PROCEDURE — 85025 COMPLETE CBC W/AUTO DIFF WBC: CPT

## 2018-04-10 PROCEDURE — 700105 HCHG RX REV CODE 258: Performed by: INTERNAL MEDICINE

## 2018-04-10 PROCEDURE — 80053 COMPREHEN METABOLIC PANEL: CPT

## 2018-04-10 PROCEDURE — 700102 HCHG RX REV CODE 250 W/ 637 OVERRIDE(OP): Performed by: EMERGENCY MEDICINE

## 2018-04-10 PROCEDURE — 83036 HEMOGLOBIN GLYCOSYLATED A1C: CPT

## 2018-04-10 PROCEDURE — 700111 HCHG RX REV CODE 636 W/ 250 OVERRIDE (IP): Performed by: INTERNAL MEDICINE

## 2018-04-10 PROCEDURE — A9270 NON-COVERED ITEM OR SERVICE: HCPCS | Performed by: EMERGENCY MEDICINE

## 2018-04-10 RX ORDER — POLYETHYLENE GLYCOL 3350 17 G/17G
1 POWDER, FOR SOLUTION ORAL
Status: DISCONTINUED | OUTPATIENT
Start: 2018-04-10 | End: 2018-05-10

## 2018-04-10 RX ORDER — OXYCODONE HYDROCHLORIDE AND ACETAMINOPHEN 5; 325 MG/1; MG/1
2 TABLET ORAL ONCE
Status: COMPLETED | OUTPATIENT
Start: 2018-04-10 | End: 2018-04-10

## 2018-04-10 RX ORDER — MORPHINE SULFATE 4 MG/ML
1 INJECTION, SOLUTION INTRAMUSCULAR; INTRAVENOUS EVERY 4 HOURS PRN
Status: DISCONTINUED | OUTPATIENT
Start: 2018-04-10 | End: 2018-05-10

## 2018-04-10 RX ORDER — PRAVASTATIN SODIUM 20 MG
40 TABLET ORAL
Status: DISCONTINUED | OUTPATIENT
Start: 2018-04-11 | End: 2018-05-10

## 2018-04-10 RX ORDER — DEXAMETHASONE SODIUM PHOSPHATE 4 MG/ML
4 INJECTION, SOLUTION INTRA-ARTICULAR; INTRALESIONAL; INTRAMUSCULAR; INTRAVENOUS; SOFT TISSUE EVERY 6 HOURS
Status: DISCONTINUED | OUTPATIENT
Start: 2018-04-10 | End: 2018-04-13

## 2018-04-10 RX ORDER — OXYCODONE HCL 10 MG/1
10 TABLET, FILM COATED, EXTENDED RELEASE ORAL EVERY 12 HOURS
Status: DISCONTINUED | OUTPATIENT
Start: 2018-04-10 | End: 2018-05-10

## 2018-04-10 RX ORDER — ONDANSETRON 2 MG/ML
4 INJECTION INTRAMUSCULAR; INTRAVENOUS EVERY 4 HOURS PRN
Status: DISCONTINUED | OUTPATIENT
Start: 2018-04-10 | End: 2018-05-10

## 2018-04-10 RX ORDER — AMOXICILLIN 250 MG
2 CAPSULE ORAL 2 TIMES DAILY
Status: DISCONTINUED | OUTPATIENT
Start: 2018-04-10 | End: 2018-05-07

## 2018-04-10 RX ORDER — OXYCODONE HYDROCHLORIDE 5 MG/1
5 TABLET ORAL EVERY 4 HOURS PRN
Status: DISCONTINUED | OUTPATIENT
Start: 2018-04-10 | End: 2018-05-10

## 2018-04-10 RX ORDER — TERAZOSIN 5 MG/1
10 CAPSULE ORAL
Status: DISCONTINUED | OUTPATIENT
Start: 2018-04-10 | End: 2018-05-02

## 2018-04-10 RX ORDER — DEXTROSE MONOHYDRATE 25 G/50ML
25 INJECTION, SOLUTION INTRAVENOUS
Status: DISCONTINUED | OUTPATIENT
Start: 2018-04-10 | End: 2018-04-19

## 2018-04-10 RX ORDER — CLONIDINE HYDROCHLORIDE 0.1 MG/1
0.2 TABLET ORAL 2 TIMES DAILY
Status: DISCONTINUED | OUTPATIENT
Start: 2018-04-10 | End: 2018-04-16

## 2018-04-10 RX ORDER — HEPARIN SODIUM 5000 [USP'U]/ML
5000 INJECTION, SOLUTION INTRAVENOUS; SUBCUTANEOUS EVERY 8 HOURS
Status: DISCONTINUED | OUTPATIENT
Start: 2018-04-10 | End: 2018-05-10

## 2018-04-10 RX ORDER — ACETAMINOPHEN 500 MG
1000 TABLET ORAL EVERY 6 HOURS
Status: DISCONTINUED | OUTPATIENT
Start: 2018-04-10 | End: 2018-04-22

## 2018-04-10 RX ORDER — BISACODYL 10 MG
10 SUPPOSITORY, RECTAL RECTAL
Status: DISCONTINUED | OUTPATIENT
Start: 2018-04-10 | End: 2018-05-10

## 2018-04-10 RX ORDER — INSULIN GLARGINE 100 [IU]/ML
40 INJECTION, SOLUTION SUBCUTANEOUS
Status: DISCONTINUED | OUTPATIENT
Start: 2018-04-11 | End: 2018-04-12

## 2018-04-10 RX ORDER — ONDANSETRON 4 MG/1
4 TABLET, ORALLY DISINTEGRATING ORAL EVERY 4 HOURS PRN
Status: DISCONTINUED | OUTPATIENT
Start: 2018-04-10 | End: 2018-05-10

## 2018-04-10 RX ORDER — METOPROLOL SUCCINATE 100 MG/1
200 TABLET, EXTENDED RELEASE ORAL
Status: DISCONTINUED | OUTPATIENT
Start: 2018-04-10 | End: 2018-04-14

## 2018-04-10 RX ORDER — CLONIDINE HYDROCHLORIDE 0.2 MG/1
0.2 TABLET ORAL EVERY EVENING
Status: ON HOLD | COMMUNITY
End: 2018-05-26

## 2018-04-10 RX ORDER — SODIUM CHLORIDE 9 MG/ML
INJECTION, SOLUTION INTRAVENOUS CONTINUOUS
Status: ACTIVE | OUTPATIENT
Start: 2018-04-10 | End: 2018-04-11

## 2018-04-10 RX ADMIN — ACETAMINOPHEN 1000 MG: 500 TABLET ORAL at 19:47

## 2018-04-10 RX ADMIN — INSULIN HUMAN 5 UNITS: 100 INJECTION, SOLUTION PARENTERAL at 22:14

## 2018-04-10 RX ADMIN — DEXAMETHASONE SODIUM PHOSPHATE 4 MG: 4 INJECTION, SOLUTION INTRAMUSCULAR; INTRAVENOUS at 19:47

## 2018-04-10 RX ADMIN — OXYCODONE HYDROCHLORIDE AND ACETAMINOPHEN 2 TABLET: 5; 325 TABLET ORAL at 17:51

## 2018-04-10 RX ADMIN — HEPARIN SODIUM 5000 UNITS: 5000 INJECTION, SOLUTION INTRAVENOUS; SUBCUTANEOUS at 22:11

## 2018-04-10 RX ADMIN — SODIUM CHLORIDE: 9 INJECTION, SOLUTION INTRAVENOUS at 19:47

## 2018-04-10 RX ADMIN — CLONIDINE HYDROCHLORIDE 0.2 MG: 0.1 TABLET ORAL at 22:11

## 2018-04-10 ASSESSMENT — ENCOUNTER SYMPTOMS
MYALGIAS: 1
NECK PAIN: 0
ROS GI COMMENTS: POOR APPETITE
COUGH: 0
WEAKNESS: 1
SHORTNESS OF BREATH: 0
DEPRESSION: 0
NAUSEA: 0
DIZZINESS: 0
FOCAL WEAKNESS: 0
PALPITATIONS: 0
FALLS: 1
LOSS OF CONSCIOUSNESS: 0
BACK PAIN: 1
FEVER: 0
HEADACHES: 0
VOMITING: 0
CHILLS: 0
ABDOMINAL PAIN: 0
BLURRED VISION: 0

## 2018-04-10 ASSESSMENT — COPD QUESTIONNAIRES
COPD SCREENING SCORE: 2
DO YOU EVER COUGH UP ANY MUCUS OR PHLEGM?: NO/ONLY WITH OCCASIONAL COLDS OR INFECTIONS
HAVE YOU SMOKED AT LEAST 100 CIGARETTES IN YOUR ENTIRE LIFE: NO/DON'T KNOW
DURING THE PAST 4 WEEKS HOW MUCH DID YOU FEEL SHORT OF BREATH: NONE/LITTLE OF THE TIME

## 2018-04-10 ASSESSMENT — LIFESTYLE VARIABLES
SUBSTANCE_ABUSE: 0
EVER_SMOKED: NEVER

## 2018-04-10 NOTE — ED TRIAGE NOTES
".  Chief Complaint   Patient presents with   • Sent by MD     Sent by Dr. Sim, Oncology, for admission for chemo and radiation     .BP (!) 163/63   Pulse 69   Temp 37.2 °C (99 °F)   Resp 18   Ht 1.6 m (5' 3\")   Wt (!) 164.7 kg (363 lb)   SpO2 94%   BMI 64.30 kg/m²     BIB EMS from Dr. Sim office, initially came from [facility].    "

## 2018-04-10 NOTE — ED PROVIDER NOTES
ED Provider Note    Scribed for Jada Matta M.D. by Felice Upton. 4/10/2018, 4:35 PM.    Primary care provider: Charli Worthington D.O.  Means of arrival: EMS  History obtained from: Patient  History limited by: None    CHIEF COMPLAINT  Chief Complaint   Patient presents with   • Sent by MD     Sent by Dr. Sim, Oncology, for admission for chemo and radiation       HPI  Aleida Pagan is a 67 y.o. female who presents to the Emergency Department sent by Dr. Sim (oncology) secondary to her multiple myeloma who recommends admission. The patient states her Myeloma is very aggressive and needs to start chemotherapy and radiation to her left arm and right leg. Aleida confirms she was at the hospital in [place] and received antibiotics there for cellulitis.  The patient presents complaints of bilateral arm and leg pain. She confirms she was given Percocet for pain management. The patient denies chest pain, shortness of breath, and headache.    The patient has a history of Monoclonal gammopathy and Hypertension. Additionally, the patient is a diabetic. She reports her blood sugar levels have been inconsistent recently.      REVIEW OF SYSTEMS  Review of Systems   Constitutional: Negative for chills and fever.   Respiratory: Negative for shortness of breath.    Cardiovascular: Negative for chest pain.   Genitourinary: Negative for dysuria.   Musculoskeletal:        Positive for bilateral arm and leg pain.   Neurological: Negative for headaches.   All other systems reviewed and are negative.    C.    PAST MEDICAL HISTORY   has a past medical history of Anemia (02/2018); Arthritis (03/12/2018); Bowel habit changes (03/12/2018); Bronchitis (12/21/17-3/6/18); Cancer (CMS-[facility]) (12/12/2017); Cataract (2017); Cellulitis (02/18/2018); Diabetes (CMS-[facility]) (03/12/2018); Essential hypertension, benign (7/26/2013); Heart burn (03/12/2018); High cholesterol; Other and unspecified hyperlipidemia (7/26/2013); Pain  "(03/12/2018); Pneumonia (2000); Urinary bladder disorder (12/2017-1/2017); and UTI (urinary tract infection) (02/2018).    SURGICAL HISTORY   has a past surgical history that includes cataract phaco with iol (Bilateral, 2017); colonoscopy (1990'S); bone biopsy (Left, 3/13/2018); and joint injection diagnostic (Right, 3/13/2018).    SOCIAL HISTORY  Social History   Substance Use Topics   • Smoking status: Never Smoker   • Smokeless tobacco: Never Used   • Alcohol use No      History   Drug Use No       FAMILY HISTORY  None Noted.    CURRENT MEDICATIONS  Home Medications    See med rec         ALLERGIES  Allergies   Allergen Reactions   • Actos [Pioglitazone Hydrochloride] Hives   • Advil [Ibuprofen Micronized] Hives   • Cephalosporins Hives   • Other Food Hives     WALNUTS       PHYSICAL EXAM  VITAL SIGNS: BP (!) 163/63   Pulse 69   Temp 37.2 °C (99 °F)   Resp 18   Ht 1.6 m (5' 3\")   Wt (!) 164.7 kg (363 lb)   SpO2 94%   BMI 64.30 kg/m²   Vitals reviewed by myself.  Physical Exam    Nursing note and vitals reviewed.  Constitutional: Obese female  HENT: Head is normocephalic and atraumatic.  Eyes: extra-ocular movements intact  Cardiovascular: Regular rate and regular rhythm. No murmur heard.  Pulmonary/Chest: Breath sounds normal. No wheezes or rales.   Abdominal: Soft and non-tender. No distention.    Musculoskeletal: Extremities exhibit normal range of motion. Patient is tender to palpation of her right shin  Neurological: Awake and alert  Skin: Skin is warm and dry. No rash.       DIAGNOSTIC STUDIES /  LABS  Labs Reviewed   CBC WITH DIFFERENTIAL - Abnormal; Notable for the following:        Result Value    RBC 3.44 (*)     Hemoglobin 10.6 (*)     Hematocrit 32.3 (*)     MCHC 32.8 (*)     RDW 53.9 (*)     Lymphocytes 18.80 (*)     All other components within normal limits   COMP METABOLIC PANEL - Abnormal; Notable for the following:     Sodium 133 (*)     Glucose 270 (*)     AST(SGOT) 8 (*)     Globulin 4.1 " (*)     All other components within normal limits   ESTIMATED GFR - Abnormal; Notable for the following:     GFR If  53 (*)     GFR If Non  44 (*)     All other components within normal limits   HEMOGLOBIN A1C - Abnormal; Notable for the following:     Glycohemoglobin 8.5 (*)     All other components within normal limits   ACCU-CHEK GLUCOSE - Abnormal; Notable for the following:     Glucose - Accu-Ck 288 (*)     All other components within normal limits   CBC WITH DIFFERENTIAL   BASIC METABOLIC PANEL   MAGNESIUM      All labs reviewed by me.    REASSESSMENT    4:44 PM The patient was evaluated at bedside. Patient will be treated with Percocet 325 mg. We discussed ordering lab work to further evaluate patients symptoms. The patient gave consent and agrees to plan of care.    5:10 PM I discussed the patient's case and the above findings with Dr. Sim (Oncology) who states Dr. Rob is aware of the patient's case. He requested me to admit the patient to the hospitalist on the oncology floor.    5:32 PM I discussed the patient's case and the above findings with Dr. Ramirez (Hospitalist) who agrees to admit the patient.      COURSE & MEDICAL DECISION MAKING  Nursing notes, VS, PMSFHx reviewed in chart.    Patient is a 67-year-old female who comes in for admission for chemo and radiation. Differential diagnosis includes malignancy, pathologic fracture, cellulitis. Diagnostic work up includes baseline labs.    On initial assessment patient is well-appearing with vitals are normal limits, except for slight hypertension. I treated her pain with Percocet after which she feels improved. I discussed the case with oncologist Dr. Sim advised patient to be admitted to start chemo and radiation. He has already discussed the case with on-call oncologist. I discussed the case with hospitalist who will admit the patient for further management. At time of admission patient is in stable  condition.    DISPOSITION:  Patient will be admitted to Dr. Ramirez in gaurded condition.      FINAL IMPRESSION  1. Multiple myeloma, remission status unspecified (CMS-HCC)    2. Pain of right lower extremity          IFelice (Scribe), am scribing for, and in the presence of, Jada Matta M.D..    Electronically signed by: Felice Upton (Scribe), 4/10/2018    IJada M.D. personally performed the services described in this documentation, as scribed by Felice Upton in my presence, and it is both accurate and complete.    The note accurately reflects work and decisions made by me.  Jada Matta  4/11/2018  1:23 AM

## 2018-04-11 LAB
ANION GAP SERPL CALC-SCNC: 4 MMOL/L (ref 0–11.9)
BASOPHILS # BLD AUTO: 0.3 % (ref 0–1.8)
BASOPHILS # BLD: 0.02 K/UL (ref 0–0.12)
BUN SERPL-MCNC: 19 MG/DL (ref 8–22)
CALCIUM SERPL-MCNC: 8.4 MG/DL (ref 8.5–10.5)
CHLORIDE SERPL-SCNC: 101 MMOL/L (ref 96–112)
CO2 SERPL-SCNC: 28 MMOL/L (ref 20–33)
CREAT SERPL-MCNC: 1.03 MG/DL (ref 0.5–1.4)
EOSINOPHIL # BLD AUTO: 0 K/UL (ref 0–0.51)
EOSINOPHIL NFR BLD: 0 % (ref 0–6.9)
ERYTHROCYTE [DISTWIDTH] IN BLOOD BY AUTOMATED COUNT: 55.9 FL (ref 35.9–50)
GLUCOSE BLD-MCNC: 326 MG/DL (ref 65–99)
GLUCOSE BLD-MCNC: 340 MG/DL (ref 65–99)
GLUCOSE BLD-MCNC: 341 MG/DL (ref 65–99)
GLUCOSE BLD-MCNC: 378 MG/DL (ref 65–99)
GLUCOSE SERPL-MCNC: 346 MG/DL (ref 65–99)
HCT VFR BLD AUTO: 29.8 % (ref 37–47)
HGB BLD-MCNC: 9.5 G/DL (ref 12–16)
IMM GRANULOCYTES # BLD AUTO: 0.14 K/UL (ref 0–0.11)
IMM GRANULOCYTES NFR BLD AUTO: 2.2 % (ref 0–0.9)
LYMPHOCYTES # BLD AUTO: 1.19 K/UL (ref 1–4.8)
LYMPHOCYTES NFR BLD: 18.5 % (ref 22–41)
MAGNESIUM SERPL-MCNC: 1.2 MG/DL (ref 1.5–2.5)
MCH RBC QN AUTO: 30.4 PG (ref 27–33)
MCHC RBC AUTO-ENTMCNC: 31.9 G/DL (ref 33.6–35)
MCV RBC AUTO: 95.2 FL (ref 81.4–97.8)
MONOCYTES # BLD AUTO: 0.21 K/UL (ref 0–0.85)
MONOCYTES NFR BLD AUTO: 3.3 % (ref 0–13.4)
NEUTROPHILS # BLD AUTO: 4.87 K/UL (ref 2–7.15)
NEUTROPHILS NFR BLD: 75.7 % (ref 44–72)
NRBC # BLD AUTO: 0 K/UL
NRBC BLD-RTO: 0 /100 WBC
PLATELET # BLD AUTO: 220 K/UL (ref 164–446)
PMV BLD AUTO: 10.2 FL (ref 9–12.9)
POTASSIUM SERPL-SCNC: 4.3 MMOL/L (ref 3.6–5.5)
RBC # BLD AUTO: 3.13 M/UL (ref 4.2–5.4)
SODIUM SERPL-SCNC: 133 MMOL/L (ref 135–145)
WBC # BLD AUTO: 6.4 K/UL (ref 4.8–10.8)

## 2018-04-11 PROCEDURE — 82962 GLUCOSE BLOOD TEST: CPT | Mod: 91

## 2018-04-11 PROCEDURE — 97163 PT EVAL HIGH COMPLEX 45 MIN: CPT

## 2018-04-11 PROCEDURE — 83735 ASSAY OF MAGNESIUM: CPT

## 2018-04-11 PROCEDURE — G8979 MOBILITY GOAL STATUS: HCPCS | Mod: CK

## 2018-04-11 PROCEDURE — 700111 HCHG RX REV CODE 636 W/ 250 OVERRIDE (IP): Performed by: INTERNAL MEDICINE

## 2018-04-11 PROCEDURE — G8978 MOBILITY CURRENT STATUS: HCPCS | Mod: CL

## 2018-04-11 PROCEDURE — 99232 SBSQ HOSP IP/OBS MODERATE 35: CPT | Performed by: INTERNAL MEDICINE

## 2018-04-11 PROCEDURE — 85025 COMPLETE CBC W/AUTO DIFF WBC: CPT

## 2018-04-11 PROCEDURE — 80048 BASIC METABOLIC PNL TOTAL CA: CPT

## 2018-04-11 PROCEDURE — A9270 NON-COVERED ITEM OR SERVICE: HCPCS | Performed by: INTERNAL MEDICINE

## 2018-04-11 PROCEDURE — 99223 1ST HOSP IP/OBS HIGH 75: CPT | Performed by: RADIOLOGY

## 2018-04-11 PROCEDURE — 700102 HCHG RX REV CODE 250 W/ 637 OVERRIDE(OP): Performed by: INTERNAL MEDICINE

## 2018-04-11 PROCEDURE — 770004 HCHG ROOM/CARE - ONCOLOGY PRIVATE *

## 2018-04-11 RX ADMIN — DEXAMETHASONE SODIUM PHOSPHATE 4 MG: 4 INJECTION, SOLUTION INTRAMUSCULAR; INTRAVENOUS at 13:20

## 2018-04-11 RX ADMIN — CLONIDINE HYDROCHLORIDE 0.2 MG: 0.1 TABLET ORAL at 20:13

## 2018-04-11 RX ADMIN — INSULIN GLARGINE 40 UNITS: 100 INJECTION, SOLUTION SUBCUTANEOUS at 06:45

## 2018-04-11 RX ADMIN — HEPARIN SODIUM 5000 UNITS: 5000 INJECTION, SOLUTION INTRAVENOUS; SUBCUTANEOUS at 06:30

## 2018-04-11 RX ADMIN — DEXAMETHASONE SODIUM PHOSPHATE 4 MG: 4 INJECTION, SOLUTION INTRAMUSCULAR; INTRAVENOUS at 18:24

## 2018-04-11 RX ADMIN — HEPARIN SODIUM 5000 UNITS: 5000 INJECTION, SOLUTION INTRAVENOUS; SUBCUTANEOUS at 23:07

## 2018-04-11 RX ADMIN — TERAZOSIN HYDROCHLORIDE ANHYDROUS 10 MG: 5 CAPSULE ORAL at 20:14

## 2018-04-11 RX ADMIN — PRAVASTATIN SODIUM 40 MG: 20 TABLET ORAL at 20:14

## 2018-04-11 RX ADMIN — DEXAMETHASONE SODIUM PHOSPHATE 4 MG: 4 INJECTION, SOLUTION INTRAMUSCULAR; INTRAVENOUS at 01:14

## 2018-04-11 RX ADMIN — HEPARIN SODIUM 5000 UNITS: 5000 INJECTION, SOLUTION INTRAVENOUS; SUBCUTANEOUS at 13:23

## 2018-04-11 RX ADMIN — OXYCODONE HYDROCHLORIDE 10 MG: 10 TABLET, FILM COATED, EXTENDED RELEASE ORAL at 20:13

## 2018-04-11 RX ADMIN — CLONIDINE HYDROCHLORIDE 0.2 MG: 0.1 TABLET ORAL at 08:11

## 2018-04-11 RX ADMIN — INSULIN HUMAN 6 UNITS: 100 INJECTION, SOLUTION PARENTERAL at 14:03

## 2018-04-11 RX ADMIN — OXYCODONE HYDROCHLORIDE 5 MG: 5 TABLET ORAL at 13:34

## 2018-04-11 RX ADMIN — INSULIN HUMAN 6 UNITS: 100 INJECTION, SOLUTION PARENTERAL at 06:44

## 2018-04-11 RX ADMIN — DEXAMETHASONE SODIUM PHOSPHATE 4 MG: 4 INJECTION, SOLUTION INTRAMUSCULAR; INTRAVENOUS at 23:08

## 2018-04-11 RX ADMIN — TERAZOSIN HYDROCHLORIDE ANHYDROUS 10 MG: 5 CAPSULE ORAL at 06:28

## 2018-04-11 RX ADMIN — ACETAMINOPHEN 1000 MG: 500 TABLET ORAL at 01:09

## 2018-04-11 RX ADMIN — ONDANSETRON 4 MG: 4 TABLET, ORALLY DISINTEGRATING ORAL at 19:51

## 2018-04-11 RX ADMIN — ACETAMINOPHEN 1000 MG: 500 TABLET ORAL at 23:08

## 2018-04-11 RX ADMIN — ACETAMINOPHEN 1000 MG: 500 TABLET ORAL at 06:29

## 2018-04-11 RX ADMIN — INSULIN HUMAN 8 UNITS: 100 INJECTION, SOLUTION PARENTERAL at 20:23

## 2018-04-11 RX ADMIN — DEXAMETHASONE SODIUM PHOSPHATE 4 MG: 4 INJECTION, SOLUTION INTRAMUSCULAR; INTRAVENOUS at 06:33

## 2018-04-11 RX ADMIN — OXYCODONE HYDROCHLORIDE 10 MG: 10 TABLET, FILM COATED, EXTENDED RELEASE ORAL at 08:11

## 2018-04-11 RX ADMIN — INSULIN HUMAN 6 UNITS: 100 INJECTION, SOLUTION PARENTERAL at 18:34

## 2018-04-11 RX ADMIN — METOPROLOL SUCCINATE 200 MG: 100 TABLET, EXTENDED RELEASE ORAL at 23:08

## 2018-04-11 RX ADMIN — ACETAMINOPHEN 1000 MG: 500 TABLET ORAL at 18:24

## 2018-04-11 ASSESSMENT — GAIT ASSESSMENTS
DEVIATION: BRADYKINETIC
ASSISTIVE DEVICE: FRONT WHEEL WALKER
GAIT LEVEL OF ASSIST: MINIMAL ASSIST

## 2018-04-11 ASSESSMENT — ENCOUNTER SYMPTOMS
HEARTBURN: 0
FEVER: 0
DEPRESSION: 0
PHOTOPHOBIA: 0
BACK PAIN: 1
SPEECH CHANGE: 0
WEAKNESS: 1
VOMITING: 0
SENSORY CHANGE: 0
CHILLS: 0
CONSTIPATION: 0
DIARRHEA: 0
HEADACHES: 0
NERVOUS/ANXIOUS: 0
CLAUDICATION: 0
COUGH: 0
ABDOMINAL PAIN: 0
MYALGIAS: 1
BLURRED VISION: 0
INSOMNIA: 0
SHORTNESS OF BREATH: 0
DIZZINESS: 0

## 2018-04-11 ASSESSMENT — PATIENT HEALTH QUESTIONNAIRE - PHQ9
4. FEELING TIRED OR HAVING LITTLE ENERGY: SEVERAL DAYS
2. FEELING DOWN, DEPRESSED, IRRITABLE, OR HOPELESS: NOT AT ALL
SUM OF ALL RESPONSES TO PHQ9 QUESTIONS 1 AND 2: 1
6. FEELING BAD ABOUT YOURSELF - OR THAT YOU ARE A FAILURE OR HAVE LET YOURSELF OR YOUR FAMILY DOWN: SEVERAL DAYS
5. POOR APPETITE OR OVEREATING: SEVERAL DAYS
3. TROUBLE FALLING OR STAYING ASLEEP OR SLEEPING TOO MUCH: NOT AT ALL
8. MOVING OR SPEAKING SO SLOWLY THAT OTHER PEOPLE COULD HAVE NOTICED. OR THE OPPOSITE, BEING SO FIGETY OR RESTLESS THAT YOU HAVE BEEN MOVING AROUND A LOT MORE THAN USUAL: NOT AT ALL
1. LITTLE INTEREST OR PLEASURE IN DOING THINGS: SEVERAL DAYS
SUM OF ALL RESPONSES TO PHQ QUESTIONS 1-9: 5
9. THOUGHTS THAT YOU WOULD BE BETTER OFF DEAD, OR OF HURTING YOURSELF: NOT AT ALL
7. TROUBLE CONCENTRATING ON THINGS, SUCH AS READING THE NEWSPAPER OR WATCHING TELEVISION: SEVERAL DAYS

## 2018-04-11 ASSESSMENT — COPD QUESTIONNAIRES
DURING THE PAST 4 WEEKS HOW MUCH DID YOU FEEL SHORT OF BREATH: NONE/LITTLE OF THE TIME
HAVE YOU SMOKED AT LEAST 100 CIGARETTES IN YOUR ENTIRE LIFE: NO/DON'T KNOW
COPD SCREENING SCORE: 2
DO YOU EVER COUGH UP ANY MUCUS OR PHLEGM?: NO/ONLY WITH OCCASIONAL COLDS OR INFECTIONS

## 2018-04-11 ASSESSMENT — COGNITIVE AND FUNCTIONAL STATUS - GENERAL
SUGGESTED CMS G CODE MODIFIER MOBILITY: CL
MOVING FROM LYING ON BACK TO SITTING ON SIDE OF FLAT BED: A LITTLE
CLIMB 3 TO 5 STEPS WITH RAILING: A LOT
STANDING UP FROM CHAIR USING ARMS: A LOT
WALKING IN HOSPITAL ROOM: A LOT
TURNING FROM BACK TO SIDE WHILE IN FLAT BAD: UNABLE
MOBILITY SCORE: 11
MOVING TO AND FROM BED TO CHAIR: UNABLE

## 2018-04-11 ASSESSMENT — PAIN SCALES - GENERAL
PAINLEVEL_OUTOF10: 4
PAINLEVEL_OUTOF10: 4

## 2018-04-11 ASSESSMENT — LIFESTYLE VARIABLES
DO YOU DRINK ALCOHOL: NO
EVER_SMOKED: NEVER
ALCOHOL_USE: NO

## 2018-04-11 NOTE — ASSESSMENT & PLAN NOTE
CyBorD chemo - cycle 1 started 4/19/18; cycle 2 started on 5/10 (5/17, due 5/24 but deferred to 5/25 d/t completion of antibiotics)  -recent diagnosis, appears aggressive, now with innumerable lytic lesions  -Oncology consulted  - IV dilaudid PRN, oxy prn. DC maintenance steroids as these were causing confusion  -oxycontin CR 10 mg BID  -skeletal survey done - spine obscured by body habitus, but multiple other lesions found  XRT to right and left femurs completed  -Palliative care consulted for advanced care planning  Coordination for oral chemo regimen underway through Dr Sim office for treatment after cycle 2.

## 2018-04-11 NOTE — PROGRESS NOTES
Assumed patient care @ 2330. Patient is A&Ox4 with VSS. Patient was admitted to ER with complaints of pain r/t multiple myeloma dx, but denying pain at this time. Pt transferred to larger bed and administered medications per MAR. Lab called to confirm draw. Call light and personal belongings at bedside, hourly rounding in place.

## 2018-04-11 NOTE — ED NOTES
Pt medicated per MAR. FSBS checked, resulted at 288. Pt given 5 units insulin, dual RN verification and signatures obtained.

## 2018-04-11 NOTE — CARE PLAN
Problem: Safety  Goal: Will remain free from injury    Intervention: Provide assistance with mobility  Patient educated to call for assistance when mobilization required    Goal: Will remain free from falls  Fall precautions in place.

## 2018-04-11 NOTE — ED NOTES
Med rec updated and complete.  Allergies reviewed.  Met with pt at bedside and dicussed   Her current home medications.  Last dose of home medications was on 03/22/18.  Pt has been inpt at [facility] since that time.  While inpt at [facility] pt received vanco 1 gm BID from 03/23/18  Thru 04/02/18  Zosyn 3.375 Q6h  From 03/23/18 -04/03/18  Metronidazole  500 mg BID (tablet) 03/23/18 thru 4/03/18

## 2018-04-11 NOTE — ED NOTES
Pt report called in to Mauri SWEET, questions and comments addressed. Pt now awaiting transport to [facility].

## 2018-04-11 NOTE — CONSULTS
RADIATION ONCOLOGY CONSULT    DATE OF SERVICE: 4/11/2018    IDENTIFICATION: A 67 y.o. female with newly diagnosed multiple myeloma and pathologic fracture involving the left humerus causing pain.  She is here at the kind request of Dr. Chen for palliative radiotherapy.    HISTORY OF PRESENT ILLNESS: Patient is a morbidly obese woman that lives by herself in [facility]. She states sustaining a fall and December 2017 and fracturing her left humerus. Imaging performed at that time showed an addition to pathologic fracture she had numerous lytic lesions throughout the visualized osseous structures. Raising the possibility of multiple myeloma, lymphoma or metastasis. She was also noted be profoundly anemic with hemoglobin of 6.5 and a hematocrit of 21. Myeloma workup was started she eventually underwent a bone marrow biopsy consistent with myeloma. Her serum electrophoresis showed a monoclonal spike of 3.1 in the gamma region.    She presented from  [place] to Dr. Sim office to discuss starting therapy. However, she was transferred to [facility] secondary to multiple medical problems including pain in her left humerus, cellulitis in her right foot and generalized failure to thrive.    She describes her pain as being approximately 7 on a scale of 0-10. It's fairly constant. There is no relief except through narcotics. There appears to be no radiation of pain.    PAST MEDICAL HISTORY:   Past Medical History:   Diagnosis Date   • Anemia 02/2018   • Arthritis 03/12/2018    To fingers and right knee.   • Bowel habit changes 03/12/2018    diarrhea related to diet.   • Bronchitis 12/21/17-3/6/18    x3. 3/12/18-States has productive cough that has been improving. CXR done @ [facility].  HX of chronic bronchitis.    • Cancer (CMS-[facility]) 12/12/2017    Myeloma in bone marrow after lesions found in right knee.   • Cataract 2017    Bilateral phaco with IOL   • Cellulitis 02/18/2018    Bilateral lower extremities   • Diabetes  (CMS-[facility]) 03/12/2018    States not good at checking glucose. Avg AM glucose .   • Essential hypertension, benign 7/26/2013   • Heart burn 03/12/2018    Related to diet. Tums PRN.   • High cholesterol    • Other and unspecified hyperlipidemia 7/26/2013   • Pain 03/12/2018    Right knee and shin. Left arm.   • Pneumonia 2000   • Urinary bladder disorder 12/2017-1/2017    Had nava catheter    • UTI (urinary tract infection) 02/2018    Took biaxin.       PAST SURGICAL HISTORY:  Past Surgical History:   Procedure Laterality Date   • BONE BIOPSY Left 3/13/2018    Procedure: BONE BIOPSY - PROXIMAL HUMERUS;  Surgeon: Hany Forbes M.D.;  Location: SURGERY [facility];  Service: Orthopedics   • JOINT INJECTION DIAGNOSTIC Right 3/13/2018    Procedure: JOINT INJECTION DIAGNOSTIC - STEROID, KNEE INTRA-ARTICULAR;  Surgeon: Hany Forbes M.D.;  Location: SURGERY [facility];  Service: Orthopedics   • CATARACT PHACO WITH IOL Bilateral 2017   • COLONOSCOPY  1990'S       CURRENT MEDICATIONS:  Current Facility-Administered Medications   Medication Dose Route Frequency Provider Last Rate Last Dose   • cloNIDine (CATAPRES) tablet 0.2 mg  0.2 mg Oral BID Armaan Ramirez M.D.   0.2 mg at 04/11/18 0811   • metoprolol SR (TOPROL XL) tablet 200 mg  200 mg Oral QDAY Armaan Ramirez M.D.       • pravastatin (PRAVACHOL) tablet 40 mg  40 mg Oral QHS Armaan Ramirez M.D.       • terazosin (HYTRIN) capsule 10 mg  10 mg Oral QHS Armaan Ramirez M.D.   10 mg at 04/11/18 0628   • senna-docusate (PERICOLACE or SENOKOT S) 8.6-50 MG per tablet 2 Tab  2 Tab Oral BID Armaan Ramirez M.D.        And   • polyethylene glycol/lytes (MIRALAX) PACKET 1 Packet  1 Packet Oral QDAY PRN Armaan Ramirez M.D.        And   • magnesium hydroxide (MILK OF MAGNESIA) suspension 30 mL  30 mL Oral QDAY LIZZY Ramirez M.D.        And   • bisacodyl (DULCOLAX) suppository 10 mg  10 mg Rectal QDAY PRN Armaan Ramirez M.D.       •  Respiratory Care per Protocol   Nebulization Continuous RT Armaan Ramirez M.D.       • NS infusion   Intravenous Continuous Armaan Ramirez M.D. 75 mL/hr at 04/10/18 1947     • heparin injection 5,000 Units  5,000 Units Subcutaneous Q8HRS Armaan Ramirez M.D.   5,000 Units at 04/11/18 1323   • ondansetron (ZOFRAN) syringe/vial injection 4 mg  4 mg Intravenous Q4HRS LIZZY Ramirez M.D.       • ondansetron (ZOFRAN ODT) dispertab 4 mg  4 mg Oral Q4HRS PREMILY Ramirez M.D.       • insulin regular (HUMULIN R) injection 2-9 Units  2-9 Units Subcutaneous 4X/DAY ACHSHAVONNE Ramirez M.D.   6 Units at 04/11/18 1403    And   • glucose 4 g chewable tablet 16 g  16 g Oral Q15 MIN PREMILY Ramirez M.D.        And   • dextrose 50% (D50W) injection 25 mL  25 mL Intravenous Q15 MIN LIZZY Ramirez M.D.       • dexamethasone (DECADRON) injection 4 mg  4 mg Intravenous Q6HRS Armaan Ramirez M.D.   4 mg at 04/11/18 1320   • morphine (pf) 4 mg/ml injection 1 mg  1 mg Intravenous Q4HRS PREMILY Ramirez M.D.       • oxyCODONE immediate-release (ROXICODONE) tablet 5 mg  5 mg Oral Q4HRS LIZZY Ramirez M.D.   5 mg at 04/11/18 1334   • oxyCODONE CR (OXYCONTIN) tablet 10 mg  10 mg Oral Q12HRS Armaan Ramirez M.D.   10 mg at 04/11/18 0811   • acetaminophen (TYLENOL) tablet 1,000 mg  1,000 mg Oral Q6HRS Armaan Ramirez M.D.   Stopped at 04/11/18 1200   • insulin glargine (LANTUS) injection 40 Units  40 Units Subcutaneous QAM INSULIN Armaan Ramirez M.D.   40 Units at 04/11/18 0645       ALLERGIES:    Actos [pioglitazone hydrochloride]; Advil [ibuprofen micronized]; Cephalosporins; and Lynch    FAMILY HISTORY:    family history is not on file.    SOCIAL HISTORY:     reports that she has never smoked. She has never used smokeless tobacco. She reports that she does not drink alcohol or use drugs.     PHYSICAL EXAM:    ECOG PERFORMANCE STATUS:  3 = Capable of only limited self care, confined to  "bed or chair more than 50% of waking hours  /64   Pulse 64   Temp 36.9 °C (98.4 °F)   Resp 17   Ht 1.6 m (5' 2.99\")   Wt (!) 164.7 kg (363 lb)   LMP 04/11/1994   SpO2 95%   Breastfeeding? No   BMI 64.32 kg/m²   GENERAL: Morbidly obese alert oriented in no acute distress.  HEENT:  Pupils are equal, round, and reactive to light.  Extraocular muscles   are intact. Sclerae nonicteric.  Conjunctivae pink.  Oral cavity, tongue   protrudes midline.   NODES:  No peripheral adenopathy of the neck, supraclavicular fossa or axillae   bilaterally.  LUNGS:  Clear to ascultation and resonant to percussion.  HEART:  Regular rate and rhythm.  No murmur appreciated  ABDOMEN: Obese, soft, no tenderness  EXTREMITIES: Bilateral lower extremity edema. Right lower extremity bandaged. Left upper extremity tender to palpation mid shaft of the humerus.  NEUROLOGIC:  Cranial nerves II through XII intact. No focal deficits.    LABORATORY DATA:   Lab Results   Component Value Date/Time    SODIUM 133 (L) 04/11/2018 03:44 AM    POTASSIUM 4.3 04/11/2018 03:44 AM    CHLORIDE 101 04/11/2018 03:44 AM    CO2 28 04/11/2018 03:44 AM    GLUCOSE 346 (H) 04/11/2018 03:44 AM    BUN 19 04/11/2018 03:44 AM    CREATININE 1.03 04/11/2018 03:44 AM    CREATININE 1.10 08/12/2014     Lab Results   Component Value Date/Time    ALKPHOSPHAT 87 04/10/2018 04:32 PM    ASTSGOT 8 (L) 04/10/2018 04:32 PM    ALTSGPT 8 04/10/2018 04:32 PM    TBILIRUBIN 0.6 04/10/2018 04:32 PM      Lab Results   Component Value Date/Time    WBC 6.4 04/11/2018 03:44 AM    RBC 3.13 (L) 04/11/2018 03:44 AM    HEMOGLOBIN 9.5 (L) 04/11/2018 03:44 AM    HEMATOCRIT 29.8 (L) 04/11/2018 03:44 AM    MCV 95.2 04/11/2018 03:44 AM    MCH 30.4 04/11/2018 03:44 AM    MCHC 31.9 (L) 04/11/2018 03:44 AM    MPV 10.2 04/11/2018 03:44 AM    NEUTSPOLYS 75.70 (H) 04/11/2018 03:44 AM    LYMPHOCYTES 18.50 (L) 04/11/2018 03:44 AM    MONOCYTES 3.30 04/11/2018 03:44 AM    EOSINOPHILS 0.00 04/11/2018 " 03:44 AM    BASOPHILS 0.30 04/11/2018 03:44 AM        RADIOLOGY DATA:  Dx-humerus 2+ Left    Result Date: 4/10/2018  4/10/2018 6:25 PM HISTORY/REASON FOR EXAM: Left arm pain. TECHNIQUE/EXAM DESCRIPTION AND NUMBER OF VIEWS: 2 views of the LEFT humerus. COMPARISON: FINDINGS: Bone mineralization is severely osteopenic. There are extensive multifocal lytic lesions seen involving the entire humerus, radius and ulna as well as the visualized portions of the scapula and clavicle. There is an old healed mid humeral diaphyseal fracture.     1.  Diffuse lytic lesions involving all of the visualized osseous structures to include the humerus, radius, ulna, scapula and clavicle. 2.  Old healed fracture of the left humerus.    Dx-knee 2- Right    Result Date: 3/13/2018  3/13/2018 7:45 AM HISTORY/REASON FOR EXAM: Right knee pain. TECHNIQUE/EXAM DESCRIPTION AND NUMBER OF VIEWS: 1 views of the RIGHT knee. COMPARISON: None FINDINGS: Intraoperative fluoroscopy spot images were obtained by MINDY FORTUNE. Cecal view of the right knee demonstrates a needle directed into the joint. The performing clinician used a total of 7 seconds fluoroscopy time.         IMPRESSION:    A 67 y.o. with newly diagnosed multiple myeloma and pathologic fracture involving the left humerus.    RECOMMENDATIONS:   Patient is symptomatic and did recommend palliative radiotherapy consisting of 2000 cGy in 5 fractions to the left humerus for pain control. I don't anticipate any significant adverse effects from radiotherapy to the site. The technical aspects benefits risks associated with therapy were reviewed and she understands and would like to proceed. She will undergo simulation tomorrow with treatment anticipated to start the following day.    Thank you for the opportunity to participate in her care.  If any questions or comments, please do not hesitate in calling.    60 was spent with patient in the hospital and more than half of that time was spent  counseling patient or coordinating care as described above.    Arun BENNETT M.D.  Electronically signed by: Arun Gandhi V, 4/11/2018 4:18 PM  [phone]

## 2018-04-11 NOTE — PROGRESS NOTES
Assumed care of pt at 0650. Rc'd report from KEE Dotson. Pt is Aox4. Pt is tearful at times. Pt is a two person assist to use bedpan.  C/o L arm pain, medicated per MAR with scheduled pain medication. Denies nausea.  PIV infiltrated, removed. Will replace. PT working with patient at this time. Singh in place to DD.  Hourly rounding in place. Call light within reach. Bed in lowest, locked position.

## 2018-04-11 NOTE — PROGRESS NOTES
2 RN skin-check performed. Pt exhibited no new signs of skin breakdown. Previously diagnosed cellulitis observed on RLE.

## 2018-04-11 NOTE — DIETARY
"Nutrition services:   Day 1 of admit.  Aleida Pagan is a 67 y.o. female with admitting DX of pathological fracture. Poor PO per MD notes; no other nutrition trigger per admit screen. High BMI noted.     Pt stated her appetite has been low for a couple of weeks prior to hospitalization; she believes the previous rehab center where she was treated lowered her PO since food wasn't appealing. Pt reports she felt hungry this morning and ate ~50-75% of breakfast. Pt states her UBW was around 370 lb and believes she experienced weight loss at the Rehab Center prior to current hospitalization but the weight bed scales of that place said otherwise. From current body weight, Pt didn't know exacty how many lb she lost/gained during that time. Nutrition Rep seen pt and snacks added three times daily. Pt with no further questions/concerns.     Assessment:  Height: 160 cm (5' 2.99\")  Weight: (!) 164.7 kg (363 lb)  Body mass index is 64.32 kg/m².  Diet: Diabetic.     Evaluation:   1. Pt with BMI >40. Weight loss counseling not appropriate in acute care setting.   2. FSBS: 288-326 (4/10-4/11), pt with hx of DM and on Lantus and SSI. Pt on decadron as well.       Recommendations/Plan:  1. Referral to outpatient nutrition services for weight management after D/C.  2. Encourage PO intake.  3. Document intake of all PO  as % taken in ADL's to provide interdisciplinary communication across all shifts.   4. Monitor weight.  5. Nutrition rep will continue to see patient for ongoing meal and snack preferences.   6. Adjust insulin PRN for tight glucose control.     RD following.           "

## 2018-04-11 NOTE — DISCHARGE PLANNING
Clinical Note per Chart Review:   CC: Intense pain throughout her body  PMH: Recently diagnosed multiple myeloma, followed by Dr. Sim,   Dm, HTN, Cellulitis    Decadron 4 mg IVP every 6 hrs  Morphine 1 mg IVP every 4 hrs PRN  IVF @ 75 ml/hr    Consults:  Wound Care  Heme/Onc    Discharge Plan: Pt recently discharged from [facility] on 2/20/18.    SW to continue with discharge plan as appropriate.

## 2018-04-11 NOTE — H&P
HOSPITAL MEDICINE HISTORY/ PHYSICAL    Date of Service:  4/10/2018   9:10 PM       Patient ID:   Name: Aleida Pagan. YOB: 1950. Age: 67 y.o. female. MRN: [id number]    Admitting Attending:  Armaan Ramirez     PCP : Charli Worthington D.O.          Chief Complaint:       Intense pain throughout her body    History of Present Illness:    Ej is a 67 y.o. female w/h/o recently diagnosed multiple myeloma who presents with above CC. Patient states she has been struggling immensely with her pain as she lives in [place]. She was recently in the [facility] as well as the hospital in [facility] where she received IV antibiotics over the Easter weekend for right lower extremity cellulitis which has apparently healed. Patient has been taking only APAP recently for her pain, which is especially prominent in the left arm. She then started taking percocet, up to 2 tablets daily and this somewhat relieved the pain, however it is still 8/10, sharp stabbing pain and radiates down her L arm as well. She can barely walk and feels that she has a very poor appetite as well. She was going to see Dr Sim, her oncologist in his office today, but when she got there she was told to the come to the ER to get admitted for expedited chemotherapy and radiation.     Review of Systems:    Has Review of Systems   Constitutional: Positive for malaise/fatigue. Negative for chills and fever.   HENT: Negative for hearing loss and tinnitus.    Eyes: Negative for blurred vision.   Respiratory: Negative for cough and shortness of breath.    Cardiovascular: Negative for chest pain, palpitations and leg swelling.   Gastrointestinal: Negative for abdominal pain, nausea and vomiting.        Poor appetite   Genitourinary: Negative for dysuria and urgency.   Musculoskeletal: Positive for back pain, falls, joint pain and myalgias. Negative for neck pain.   Skin: Negative for itching.   Neurological: Positive  for weakness. Negative for dizziness, focal weakness, loss of consciousness and headaches.   Psychiatric/Behavioral: Negative for depression and substance abuse.   All other systems reviewed and are negative.    Please see HPI, all other systems were reviewed and are negative (AMA/CMS criteria)              Past Medical/ Family / Social history (PFSH):   Past Medical History:   Diagnosis Date   • Diabetes (CMS-[facility]) 03/12/2018    States not good at checking glucose. Avg AM glucose .   • Arthritis 03/12/2018    To fingers and right knee.   • Bowel habit changes 03/12/2018    diarrhea related to diet.   • Heart burn 03/12/2018    Related to diet. Tums PRN.   • Pain 03/12/2018    Right knee and shin. Left arm.   • Cellulitis 02/18/2018    Bilateral lower extremities   • UTI (urinary tract infection) 02/2018    Took biaxin.   • Anemia 02/2018   • Cancer (CMS-[facility]) 12/12/2017    Myeloma in bone marrow after lesions found in right knee.   • Cataract 2017    Bilateral phaco with IOL   • Essential hypertension, benign 7/26/2013   • Other and unspecified hyperlipidemia 7/26/2013   • Pneumonia 2000   • Bronchitis 12/21/17-3/6/18    x3. 3/12/18-States has productive cough that has been improving. CXR done @ [facility].  HX of chronic bronchitis.    • High cholesterol    • Urinary bladder disorder 12/2017-1/2017    Had nava catheter        Past Surgical History:   Procedure Laterality Date   • BONE BIOPSY Left 3/13/2018    Procedure: BONE BIOPSY - PROXIMAL HUMERUS;  Surgeon: Hany Forbes M.D.;  Location: SURGERY [facility];  Service: Orthopedics   • JOINT INJECTION DIAGNOSTIC Right 3/13/2018    Procedure: JOINT INJECTION DIAGNOSTIC - STEROID, KNEE INTRA-ARTICULAR;  Surgeon: Hany Forbes M.D.;  Location: SURGERY [facility];  Service: Orthopedics   • CATARACT PHACO WITH IOL Bilateral 2017   • COLONOSCOPY  1990'S       Current Outpatient Medications:  No current facility-administered medications on file prior  "to encounter.      Current Outpatient Prescriptions on File Prior to Encounter   Medication Sig Dispense Refill   • Insulin Degludec (TRESIBA FLEXTOUCH) 200 UNIT/ML Solution Pen-injector Inject 52-56 Units as instructed every morning. 110-149 = 52 units  150 and >  56 units     • aspirin (ASA) 325 MG Tab Take 325 mg by mouth every 48 hours.     • terazosin (HYTRIN) 10 MG capsule Take 1 Cap by mouth every bedtime. 90 Cap 2   • metoprolol (TOPROL-XL) 200 MG XL tablet Take 1 Tab by mouth every day. 90 Tab 2   • Magnesium 250 MG TABS Take 250 Tabs by mouth every day.     • pravastatin (PRAVACHOL) 40 MG tablet Take 1 Tab by mouth every day. 90 Tab 3   • glyburide-metformin (GLUCOVANCE) 5-500 MG TABS Take 2 Tabs by mouth 2 times a day, with meals. 360 Tab 3   • amlodipine-benazepril (LOTREL) 10-40 MG per capsule Take 1 Cap by mouth every day. 90 Cap 3   • vitamin D (CHOLECALCIFEROL) 1000 UNIT TABS Take 2,000 Units by mouth every day. Unknown dose         Medication Allergy/Sensitivities:  Allergies   Allergen Reactions   • Actos [Pioglitazone Hydrochloride] Hives   • Advil [Ibuprofen Micronized] Hives   • Cephalosporins Hives   • Other Food Hives     WALNUTS       Family History:  Denies any family history of medical issues     Social History:  Social History   Substance Use Topics   • Smoking status: Never Smoker   • Smokeless tobacco: Never Used   • Alcohol use No     #################################################################  Physical Exam:   Vitals/ General Appearance:   Weight/BMI: Body mass index is 64.3 kg/m².  Blood pressure (!) 163/63, pulse 63, temperature 37.2 °C (99 °F), resp. rate 16, height 1.6 m (5' 3\"), weight (!) 164.7 kg (363 lb), SpO2 98 %.   Vitals:    04/10/18 1901 04/10/18 1931 04/10/18 2034 04/10/18 2101   BP:       Pulse: 65 62 65 63   Resp:       Temp:       SpO2: 97% 97% 98% 98%   Weight:       Height:        Oxygen Therapy:  Pulse Oximetry: 98 %, O2 (LPM): 2, O2 Delivery: Nasal " Cannula    Constitutional:  well developed, obese Pale appearing  HENMT: Normocephalic, atraumatic, b/l ears normal, nose normal  Eyes:  EOMI, conjunctiva normal, no discharge  Neck: no tracheal deviation, supple  Cardiovascular: normal heart rate, normal rhythm, no murmurs, no rubs or gallops; no cyanosis, clubbing or edema  Lungs: Respiratory effort is normal, normal breath sounds, breath sounds clear to auscultation b/l, no rales, rhonchi or wheezing  Abdomen: soft, non-tender, no guarding or rebound  Skin: warm, dry, no erythema, no rash, e/o chronic venous stasis B/L LE's. Worse on the R which is wrapped currently  MSK-pain in the L forearm and LUE, B/L LE's as well  Neurologic: Alert and oriented, strength 5/5 throughout, no focal deficits, CN II-XII normal  Psychiatric: Some anxiety or depression    #################################################################  Lab Data Review:    Objective   Recent Results (from the past 24 hour(s))   CBC WITH DIFFERENTIAL    Collection Time: 04/10/18  4:32 PM   Result Value Ref Range    WBC 6.4 4.8 - 10.8 K/uL    RBC 3.44 (L) 4.20 - 5.40 M/uL    Hemoglobin 10.6 (L) 12.0 - 16.0 g/dL    Hematocrit 32.3 (L) 37.0 - 47.0 %    MCV 93.9 81.4 - 97.8 fL    MCH 30.8 27.0 - 33.0 pg    MCHC 32.8 (L) 33.6 - 35.0 g/dL    RDW 53.9 (H) 35.9 - 50.0 fL    Platelet Count 254 164 - 446 K/uL    MPV 10.6 9.0 - 12.9 fL    Neutrophils-Polys 71.40 44.00 - 72.00 %    Lymphocytes 18.80 (L) 22.00 - 41.00 %    Monocytes 7.50 0.00 - 13.40 %    Eosinophils 1.20 0.00 - 6.90 %    Basophils 0.50 0.00 - 1.80 %    Immature Granulocytes 0.60 0.00 - 0.90 %    Nucleated RBC 0.00 /100 WBC    Neutrophils (Absolute) 4.60 2.00 - 7.15 K/uL    Lymphs (Absolute) 1.21 1.00 - 4.80 K/uL    Monos (Absolute) 0.48 0.00 - 0.85 K/uL    Eos (Absolute) 0.08 0.00 - 0.51 K/uL    Baso (Absolute) 0.03 0.00 - 0.12 K/uL    Immature Granulocytes (abs) 0.04 0.00 - 0.11 K/uL    NRBC (Absolute) 0.00 K/uL   COMP METABOLIC PANEL     Collection Time: 04/10/18  4:32 PM   Result Value Ref Range    Sodium 133 (L) 135 - 145 mmol/L    Potassium 3.7 3.6 - 5.5 mmol/L    Chloride 99 96 - 112 mmol/L    Co2 28 20 - 33 mmol/L    Anion Gap 6.0 0.0 - 11.9    Glucose 270 (H) 65 - 99 mg/dL    Bun 21 8 - 22 mg/dL    Creatinine 1.23 0.50 - 1.40 mg/dL    Calcium 9.1 8.5 - 10.5 mg/dL    AST(SGOT) 8 (L) 12 - 45 U/L    ALT(SGPT) 8 2 - 50 U/L    Alkaline Phosphatase 87 30 - 99 U/L    Total Bilirubin 0.6 0.1 - 1.5 mg/dL    Albumin 3.2 3.2 - 4.9 g/dL    Total Protein 7.3 6.0 - 8.2 g/dL    Globulin 4.1 (H) 1.9 - 3.5 g/dL    A-G Ratio 0.8 g/dL   ESTIMATED GFR    Collection Time: 04/10/18  4:32 PM   Result Value Ref Range    GFR If  53 (A) >60 mL/min/1.73 m 2    GFR If Non  44 (A) >60 mL/min/1.73 m 2       (click the triangle to expand results)    My interpretation of lab results:     Imaging/Procedures Review:    DX-HUMERUS 2+ LEFT   Final Result      1.  Diffuse lytic lesions involving all of the visualized osseous structures to include the humerus, radius, ulna, scapula and clavicle.      2.  Old healed fracture of the left humerus.          EKG:   per my independent read:  None performed    Assessment and Plan:      * Pathologic fracture- (present on admission)   Assessment & Plan    -widespread  -already received BM bx, confirms diagnosis  -will need radiation and chemotherapy  -pain control as outlined above        Chronic venous stasis dermatitis of both lower extremities- (present on admission)   Assessment & Plan    -wound care, no clear e/o active infection at this time, defer abx's        Multiple myeloma (CMS-HCC)- (present on admission)   Assessment & Plan    -recent diagnosis, appears aggressive, now with innumerable lytic lesions  -admit to the oncology floor  -ONCOLOGY will need to be consulted in the AM  -IV steroids, IV dilaudid PRN, scheduled APAP, multi-modal pain therapy, consider PCA  -start oxycontin CR 10 mg BID         Anemia- (present on admission)   Assessment & Plan    -related to her MM, appears near her baseline, no e/o overt bleeding at this time, monitor closely        Hyponatremia- (present on admission)   Assessment & Plan    -mild, 2/2 hypovolemia, gentle IVF's with NS, check levels in the AM        Morbid obesity with BMI of 60.0-69.9, adult (CMS-HCC)- (present on admission)   Assessment & Plan    -encourage weight loss        DM type 2 (diabetes mellitus, type 2) (CMS-HCC)- (present on admission)   Assessment & Plan    -continue outpatient insulin, ISS, adjust PRN to keep blood sugars below 140        Essential hypertension, benign- (present on admission)   Assessment & Plan    -somewhat high, continue outpatient medications, adjust PRN              1. Prophylaxis: sc heparin  2. Code: Full code per patient with family present  3. Dispo: She will be admitted to inpatient for management that is expected to take greater than 2 midnights

## 2018-04-12 ENCOUNTER — APPOINTMENT (OUTPATIENT)
Dept: RADIOLOGY | Facility: MEDICAL CENTER | Age: 68
DRG: 840 | End: 2018-04-12
Attending: INTERNAL MEDICINE
Payer: MEDICARE

## 2018-04-12 LAB
ALBUMIN SERPL BCP-MCNC: 2.7 G/DL (ref 3.2–4.9)
ALBUMIN/GLOB SERPL: 0.7 G/DL
ALP SERPL-CCNC: 73 U/L (ref 30–99)
ALT SERPL-CCNC: 6 U/L (ref 2–50)
ANION GAP SERPL CALC-SCNC: 5 MMOL/L (ref 0–11.9)
AST SERPL-CCNC: 5 U/L (ref 12–45)
BASOPHILS # BLD AUTO: 0 % (ref 0–1.8)
BASOPHILS # BLD: 0 K/UL (ref 0–0.12)
BILIRUB SERPL-MCNC: 0.3 MG/DL (ref 0.1–1.5)
BUN SERPL-MCNC: 19 MG/DL (ref 8–22)
CALCIUM SERPL-MCNC: 8.2 MG/DL (ref 8.5–10.5)
CHLORIDE SERPL-SCNC: 100 MMOL/L (ref 96–112)
CO2 SERPL-SCNC: 27 MMOL/L (ref 20–33)
CREAT SERPL-MCNC: 1.02 MG/DL (ref 0.5–1.4)
EOSINOPHIL # BLD AUTO: 0 K/UL (ref 0–0.51)
EOSINOPHIL NFR BLD: 0 % (ref 0–6.9)
ERYTHROCYTE [DISTWIDTH] IN BLOOD BY AUTOMATED COUNT: 53.9 FL (ref 35.9–50)
FOLATE SERPL-MCNC: 8.2 NG/ML
GLOBULIN SER CALC-MCNC: 3.8 G/DL (ref 1.9–3.5)
GLUCOSE BLD-MCNC: 324 MG/DL (ref 65–99)
GLUCOSE BLD-MCNC: 349 MG/DL (ref 65–99)
GLUCOSE BLD-MCNC: 371 MG/DL (ref 65–99)
GLUCOSE BLD-MCNC: 382 MG/DL (ref 65–99)
GLUCOSE SERPL-MCNC: 343 MG/DL (ref 65–99)
HCT VFR BLD AUTO: 27.3 % (ref 37–47)
HGB BLD-MCNC: 8.8 G/DL (ref 12–16)
IMM GRANULOCYTES # BLD AUTO: 0.07 K/UL (ref 0–0.11)
IMM GRANULOCYTES NFR BLD AUTO: 1.3 % (ref 0–0.9)
LDH SERPL L TO P-CCNC: 156 U/L (ref 107–266)
LYMPHOCYTES # BLD AUTO: 0.93 K/UL (ref 1–4.8)
LYMPHOCYTES NFR BLD: 17.2 % (ref 22–41)
MCH RBC QN AUTO: 30.2 PG (ref 27–33)
MCHC RBC AUTO-ENTMCNC: 32.2 G/DL (ref 33.6–35)
MCV RBC AUTO: 93.8 FL (ref 81.4–97.8)
MONOCYTES # BLD AUTO: 0.34 K/UL (ref 0–0.85)
MONOCYTES NFR BLD AUTO: 6.3 % (ref 0–13.4)
NEUTROPHILS # BLD AUTO: 4.07 K/UL (ref 2–7.15)
NEUTROPHILS NFR BLD: 75.2 % (ref 44–72)
NRBC # BLD AUTO: 0 K/UL
NRBC BLD-RTO: 0 /100 WBC
PLATELET # BLD AUTO: 205 K/UL (ref 164–446)
PMV BLD AUTO: 10.5 FL (ref 9–12.9)
POTASSIUM SERPL-SCNC: 4 MMOL/L (ref 3.6–5.5)
PROT SERPL-MCNC: 6.5 G/DL (ref 6–8.2)
RBC # BLD AUTO: 2.91 M/UL (ref 4.2–5.4)
SODIUM SERPL-SCNC: 132 MMOL/L (ref 135–145)
URATE SERPL-MCNC: 5.7 MG/DL (ref 1.9–8.2)
VIT B12 SERPL-MCNC: 240 PG/ML (ref 211–911)
WBC # BLD AUTO: 5.4 K/UL (ref 4.8–10.8)

## 2018-04-12 PROCEDURE — 82232 ASSAY OF BETA-2 PROTEIN: CPT

## 2018-04-12 PROCEDURE — 77263 THER RADIOLOGY TX PLNG CPLX: CPT | Performed by: RADIOLOGY

## 2018-04-12 PROCEDURE — 86334 IMMUNOFIX E-PHORESIS SERUM: CPT

## 2018-04-12 PROCEDURE — 85025 COMPLETE CBC W/AUTO DIFF WBC: CPT

## 2018-04-12 PROCEDURE — 77290 THER RAD SIMULAJ FIELD CPLX: CPT | Mod: 26 | Performed by: RADIOLOGY

## 2018-04-12 PROCEDURE — 82784 ASSAY IGA/IGD/IGG/IGM EACH: CPT

## 2018-04-12 PROCEDURE — 36415 COLL VENOUS BLD VENIPUNCTURE: CPT

## 2018-04-12 PROCEDURE — 99233 SBSQ HOSP IP/OBS HIGH 50: CPT | Performed by: INTERNAL MEDICINE

## 2018-04-12 PROCEDURE — 77290 THER RAD SIMULAJ FIELD CPLX: CPT | Performed by: RADIOLOGY

## 2018-04-12 PROCEDURE — DPY67ZZ CONTACT RADIATION OF HUMERUS: ICD-10-PCS | Performed by: RADIOLOGY

## 2018-04-12 PROCEDURE — 77334 RADIATION TREATMENT AID(S): CPT | Performed by: RADIOLOGY

## 2018-04-12 PROCEDURE — 83615 LACTATE (LD) (LDH) ENZYME: CPT

## 2018-04-12 PROCEDURE — 77334 RADIATION TREATMENT AID(S): CPT | Mod: 26 | Performed by: RADIOLOGY

## 2018-04-12 PROCEDURE — 700102 HCHG RX REV CODE 250 W/ 637 OVERRIDE(OP): Performed by: INTERNAL MEDICINE

## 2018-04-12 PROCEDURE — A9270 NON-COVERED ITEM OR SERVICE: HCPCS | Performed by: INTERNAL MEDICINE

## 2018-04-12 PROCEDURE — 82962 GLUCOSE BLOOD TEST: CPT | Mod: 91

## 2018-04-12 PROCEDURE — 700111 HCHG RX REV CODE 636 W/ 250 OVERRIDE (IP): Performed by: STUDENT IN AN ORGANIZED HEALTH CARE EDUCATION/TRAINING PROGRAM

## 2018-04-12 PROCEDURE — 700102 HCHG RX REV CODE 250 W/ 637 OVERRIDE(OP)

## 2018-04-12 PROCEDURE — 84550 ASSAY OF BLOOD/URIC ACID: CPT

## 2018-04-12 PROCEDURE — 84160 ASSAY OF PROTEIN ANY SOURCE: CPT

## 2018-04-12 PROCEDURE — A9270 NON-COVERED ITEM OR SERVICE: HCPCS

## 2018-04-12 PROCEDURE — 84165 PROTEIN E-PHORESIS SERUM: CPT

## 2018-04-12 PROCEDURE — 77285 THER RAD SIMULAJ FIELD INTRM: CPT | Performed by: RADIOLOGY

## 2018-04-12 PROCEDURE — 82607 VITAMIN B-12: CPT

## 2018-04-12 PROCEDURE — 77074 RADEX OSSEOUS SURVEY LMTD: CPT

## 2018-04-12 PROCEDURE — 700111 HCHG RX REV CODE 636 W/ 250 OVERRIDE (IP): Performed by: INTERNAL MEDICINE

## 2018-04-12 PROCEDURE — 80053 COMPREHEN METABOLIC PANEL: CPT

## 2018-04-12 PROCEDURE — 770004 HCHG ROOM/CARE - ONCOLOGY PRIVATE *

## 2018-04-12 PROCEDURE — 82746 ASSAY OF FOLIC ACID SERUM: CPT

## 2018-04-12 RX ORDER — AMMONIUM LACTATE 12 G/100G
LOTION TOPICAL 2 TIMES DAILY
Status: DISCONTINUED | OUTPATIENT
Start: 2018-04-12 | End: 2018-05-10

## 2018-04-12 RX ORDER — INSULIN GLARGINE 100 [IU]/ML
40 INJECTION, SOLUTION SUBCUTANEOUS
Status: DISCONTINUED | OUTPATIENT
Start: 2018-04-12 | End: 2018-04-16

## 2018-04-12 RX ORDER — HYDRALAZINE HYDROCHLORIDE 20 MG/ML
10 INJECTION INTRAMUSCULAR; INTRAVENOUS EVERY 4 HOURS PRN
Status: DISCONTINUED | OUTPATIENT
Start: 2018-04-12 | End: 2018-05-10

## 2018-04-12 RX ADMIN — INSULIN HUMAN 6 UNITS: 100 INJECTION, SOLUTION PARENTERAL at 17:07

## 2018-04-12 RX ADMIN — ACETAMINOPHEN 1000 MG: 500 TABLET ORAL at 05:45

## 2018-04-12 RX ADMIN — PRAVASTATIN SODIUM 40 MG: 20 TABLET ORAL at 21:10

## 2018-04-12 RX ADMIN — ACETAMINOPHEN 1000 MG: 500 TABLET ORAL at 13:26

## 2018-04-12 RX ADMIN — HYDRALAZINE HYDROCHLORIDE 10 MG: 20 INJECTION INTRAMUSCULAR; INTRAVENOUS at 07:34

## 2018-04-12 RX ADMIN — DEXAMETHASONE SODIUM PHOSPHATE 4 MG: 4 INJECTION, SOLUTION INTRAMUSCULAR; INTRAVENOUS at 17:04

## 2018-04-12 RX ADMIN — HEPARIN SODIUM 5000 UNITS: 5000 INJECTION, SOLUTION INTRAVENOUS; SUBCUTANEOUS at 13:26

## 2018-04-12 RX ADMIN — INSULIN HUMAN 8 UNITS: 100 INJECTION, SOLUTION PARENTERAL at 11:13

## 2018-04-12 RX ADMIN — HEPARIN SODIUM 5000 UNITS: 5000 INJECTION, SOLUTION INTRAVENOUS; SUBCUTANEOUS at 05:46

## 2018-04-12 RX ADMIN — CLONIDINE HYDROCHLORIDE 0.2 MG: 0.1 TABLET ORAL at 10:09

## 2018-04-12 RX ADMIN — INSULIN GLARGINE 40 UNITS: 100 INJECTION, SOLUTION SUBCUTANEOUS at 17:07

## 2018-04-12 RX ADMIN — OXYCODONE HYDROCHLORIDE 5 MG: 5 TABLET ORAL at 05:45

## 2018-04-12 RX ADMIN — INSULIN HUMAN 6 UNITS: 100 INJECTION, SOLUTION PARENTERAL at 07:45

## 2018-04-12 RX ADMIN — TERAZOSIN HYDROCHLORIDE ANHYDROUS 10 MG: 5 CAPSULE ORAL at 21:12

## 2018-04-12 RX ADMIN — CLONIDINE HYDROCHLORIDE 0.2 MG: 0.1 TABLET ORAL at 21:09

## 2018-04-12 RX ADMIN — DEXAMETHASONE SODIUM PHOSPHATE 4 MG: 4 INJECTION, SOLUTION INTRAMUSCULAR; INTRAVENOUS at 13:27

## 2018-04-12 RX ADMIN — INSULIN HUMAN 8 UNITS: 100 INJECTION, SOLUTION PARENTERAL at 21:28

## 2018-04-12 RX ADMIN — HYDRALAZINE HYDROCHLORIDE 10 MG: 20 INJECTION INTRAMUSCULAR; INTRAVENOUS at 23:04

## 2018-04-12 RX ADMIN — HEPARIN SODIUM 5000 UNITS: 5000 INJECTION, SOLUTION INTRAVENOUS; SUBCUTANEOUS at 21:08

## 2018-04-12 RX ADMIN — OXYCODONE HYDROCHLORIDE 10 MG: 10 TABLET, FILM COATED, EXTENDED RELEASE ORAL at 21:09

## 2018-04-12 RX ADMIN — OXYCODONE HYDROCHLORIDE 10 MG: 10 TABLET, FILM COATED, EXTENDED RELEASE ORAL at 10:09

## 2018-04-12 RX ADMIN — DEXAMETHASONE SODIUM PHOSPHATE 4 MG: 4 INJECTION, SOLUTION INTRAMUSCULAR; INTRAVENOUS at 05:46

## 2018-04-12 RX ADMIN — Medication: at 21:10

## 2018-04-12 RX ADMIN — INSULIN GLARGINE 40 UNITS: 100 INJECTION, SOLUTION SUBCUTANEOUS at 07:44

## 2018-04-12 ASSESSMENT — PATIENT HEALTH QUESTIONNAIRE - PHQ9
SUM OF ALL RESPONSES TO PHQ QUESTIONS 1-9: 5
8. MOVING OR SPEAKING SO SLOWLY THAT OTHER PEOPLE COULD HAVE NOTICED. OR THE OPPOSITE, BEING SO FIGETY OR RESTLESS THAT YOU HAVE BEEN MOVING AROUND A LOT MORE THAN USUAL: NOT AT ALL
4. FEELING TIRED OR HAVING LITTLE ENERGY: SEVERAL DAYS
SUM OF ALL RESPONSES TO PHQ9 QUESTIONS 1 AND 2: 1
6. FEELING BAD ABOUT YOURSELF - OR THAT YOU ARE A FAILURE OR HAVE LET YOURSELF OR YOUR FAMILY DOWN: SEVERAL DAYS
9. THOUGHTS THAT YOU WOULD BE BETTER OFF DEAD, OR OF HURTING YOURSELF: NOT AT ALL
SUM OF ALL RESPONSES TO PHQ QUESTIONS 1-9: 5
7. TROUBLE CONCENTRATING ON THINGS, SUCH AS READING THE NEWSPAPER OR WATCHING TELEVISION: SEVERAL DAYS
5. POOR APPETITE OR OVEREATING: SEVERAL DAYS
6. FEELING BAD ABOUT YOURSELF - OR THAT YOU ARE A FAILURE OR HAVE LET YOURSELF OR YOUR FAMILY DOWN: SEVERAL DAYS
7. TROUBLE CONCENTRATING ON THINGS, SUCH AS READING THE NEWSPAPER OR WATCHING TELEVISION: SEVERAL DAYS
1. LITTLE INTEREST OR PLEASURE IN DOING THINGS: SEVERAL DAYS
5. POOR APPETITE OR OVEREATING: SEVERAL DAYS
SUM OF ALL RESPONSES TO PHQ9 QUESTIONS 1 AND 2: 1
2. FEELING DOWN, DEPRESSED, IRRITABLE, OR HOPELESS: NOT AT ALL
9. THOUGHTS THAT YOU WOULD BE BETTER OFF DEAD, OR OF HURTING YOURSELF: NOT AT ALL
8. MOVING OR SPEAKING SO SLOWLY THAT OTHER PEOPLE COULD HAVE NOTICED. OR THE OPPOSITE, BEING SO FIGETY OR RESTLESS THAT YOU HAVE BEEN MOVING AROUND A LOT MORE THAN USUAL: NOT AT ALL
3. TROUBLE FALLING OR STAYING ASLEEP OR SLEEPING TOO MUCH: NOT AT ALL
2. FEELING DOWN, DEPRESSED, IRRITABLE, OR HOPELESS: NOT AT ALL
3. TROUBLE FALLING OR STAYING ASLEEP OR SLEEPING TOO MUCH: NOT AT ALL
4. FEELING TIRED OR HAVING LITTLE ENERGY: SEVERAL DAYS
1. LITTLE INTEREST OR PLEASURE IN DOING THINGS: SEVERAL DAYS

## 2018-04-12 ASSESSMENT — LIFESTYLE VARIABLES
DO YOU DRINK ALCOHOL: NO

## 2018-04-12 ASSESSMENT — ENCOUNTER SYMPTOMS
CONSTIPATION: 0
PHOTOPHOBIA: 0
COUGH: 0
SPEECH CHANGE: 0
INSOMNIA: 0
DEPRESSION: 0
HEADACHES: 0
CHILLS: 0
WEAKNESS: 1
BACK PAIN: 1
HEARTBURN: 0
ABDOMINAL PAIN: 0
BLURRED VISION: 0
VOMITING: 0
NERVOUS/ANXIOUS: 0
SENSORY CHANGE: 0
CLAUDICATION: 0
SHORTNESS OF BREATH: 0
MYALGIAS: 1
DIARRHEA: 0
FEVER: 0
DIZZINESS: 0

## 2018-04-12 ASSESSMENT — COPD QUESTIONNAIRES
DURING THE PAST 4 WEEKS HOW MUCH DID YOU FEEL SHORT OF BREATH: NONE/LITTLE OF THE TIME
COPD SCREENING SCORE: 2
DURING THE PAST 4 WEEKS HOW MUCH DID YOU FEEL SHORT OF BREATH: NONE/LITTLE OF THE TIME
HAVE YOU SMOKED AT LEAST 100 CIGARETTES IN YOUR ENTIRE LIFE: NO/DON'T KNOW
COPD SCREENING SCORE: 2
HAVE YOU SMOKED AT LEAST 100 CIGARETTES IN YOUR ENTIRE LIFE: NO/DON'T KNOW
DO YOU EVER COUGH UP ANY MUCUS OR PHLEGM?: NO/ONLY WITH OCCASIONAL COLDS OR INFECTIONS
DO YOU EVER COUGH UP ANY MUCUS OR PHLEGM?: NO/ONLY WITH OCCASIONAL COLDS OR INFECTIONS

## 2018-04-12 ASSESSMENT — PAIN SCALES - GENERAL
PAINLEVEL_OUTOF10: 4
PAINLEVEL_OUTOF10: 3
PAINLEVEL_OUTOF10: 3

## 2018-04-12 NOTE — WOUND TEAM
"[facility]  Inpatient Services  Initial Wound and Skin Care Evaluation    Admission Date:   04/10/2018  HPI, PMH, SH: Reviewed  Unit where seen by Wound Team: R3    WOUND CONSULT RELATED TO: Right shin.    SUBJECTIVE:  \"It hurts so bad that I can't walk.\"    Self Report / Pain Level: 6/10    OBJECTIVE: Agreeable.    WOUND TYPE, LOCATION, CHARACTERISTICS (Pressure ulcers: location, stage, POA or date identified)    Location and type of wound: right lower shin: Cellulitis with resolving bulla.        Periwound:     Erythema.  Drainage:     None.  Tissue Type and %:    100% red/pink.  Wound Edges:    Attached.  Odor:      None.  Exposed structure(s):  None.  S&S of Infection:   Erythema.      Measurements:   04/12/2018  Length:     1.3 cm  Width:      0.8 cm  Depth:     0 cm      INTERVENTIONS BY WOUND TEAM: Dressing removed from right shin. Patient states that the cellulitis is looking better. Dressed it with an adhesive silicone foam. Patient mentioned the skin on the left leg, so assessed this. Very dry with thick flakes of skin. Ordered ammonium lactate lotion.      Interdisciplinary consultation: Patient and nursing.    EVALUATION: adhesive silicone foam for protection and absorption of drainage if the last bulla ruptures.    Factors affecting wound healing: Cancer, infection.  Goals: Cellulitis will resolve in 1 week.    NURSING PLAN OF CARE ORDERS (X):    Dressing changes: See Dressing Maintenance orders: X  Skin care: See Skin Care orders:   Rectal tube care: See Rectal Tube Care orders:   Other orders:    RSKIN: CURRENT (X) ORDERED (O)  Q shift Mike:  X  Q shift pressure point assessments:  X  Pressure redistribution mattress        ALESSANDRO      Bariatric ALESSANDRO      Bariatric foam      X  Heel float boots       Heels floated on pillows      Barrier wipes      Barrier Cream      Barrier paste      Sacral silicone dressing      Silicone O2 tubing   X   Anchorfast      Trach with Optifoam split foam   "     Waffle cushion      Rectal tube or BMS      Antifungal tx    Turn q 2 hours   X  Up to chair     Ambulate   PT/OT     Dietician      PO  X   TF   TPN     PVN    NPO   # days   Other       WOUND TEAM PLAN OF CARE (X):   NPWT change 3 x week:        Dressing changes by wound team:       Follow up as needed:    X   Other (explain):    Anticipated discharge plans (X): to be determined.  SNF:           Home Care:           Outpatient Wound Center:            Self Care:            Other:

## 2018-04-12 NOTE — CARE PLAN
Problem: Safety  Goal: Will remain free from injury    Intervention: Provide assistance with mobility  Patient educated of calling for assistance when wishing to mobilize.      Problem: Pain Management  Goal: Pain level will decrease to patient's comfort goal    Intervention: Follow pain managment plan developed in collaboration with patient and Interdisciplinary Team  Patient medicated per MAR for pain.

## 2018-04-12 NOTE — PROGRESS NOTES
[facility]ist Progress Note    Date of Service: 2018    Chief Complaint  67 y.o. female admitted 4/10/2018 with intractible pain secondary to multiple myeloma, known fracture left humerus    Interval Problem Update  Patient feeling much less pain since admission and starting medications, she is still having significant pain and swelling left humerus fracture site and likely had pathologic fracture.  Dr Núñez and Oksana consulted for assistance.  She already has significant calculus surrounding the fracture site but also pain.      Consultants/Specialty  onc - [place]  XRT - Oksana      Disposition  Tbd, lives in [place]        Review of Systems   Constitutional: Positive for malaise/fatigue. Negative for chills and fever.   HENT: Negative for congestion.    Eyes: Negative for blurred vision and photophobia.   Respiratory: Negative for cough and shortness of breath.    Cardiovascular: Negative for chest pain, claudication and leg swelling.   Gastrointestinal: Negative for abdominal pain, constipation, diarrhea, heartburn and vomiting.   Genitourinary: Negative for dysuria and hematuria.   Musculoskeletal: Positive for back pain, joint pain and myalgias.   Skin: Negative for itching and rash.   Neurological: Positive for weakness. Negative for dizziness, sensory change, speech change and headaches.   Psychiatric/Behavioral: Negative for depression. The patient is not nervous/anxious and does not have insomnia.       Physical Exam  Laboratory/Imaging   Hemodynamics  Temp (24hrs), Av.8 °C (98.2 °F), Min:36.5 °C (97.7 °F), Max:37.1 °C (98.7 °F)   Temperature: 36.7 °C (98.1 °F)  Pulse  Av.4  Min: 62  Max: 69    Blood Pressure : (!) 176/80      Respiratory      Respiration: 18, Pulse Oximetry: 96 %     Work Of Breathing / Effort: Mild  RUL Breath Sounds: Clear, RML Breath Sounds: Clear, RLL Breath Sounds: Clear, CHARLOTTE Breath Sounds: Clear, LLL Breath Sounds: Clear    Fluids    Intake/Output Summary (Last  24 hours) at 04/11/18 2152  Last data filed at 04/11/18 1230   Gross per 24 hour   Intake                0 ml   Output             3100 ml   Net            -3100 ml       Nutrition  Orders Placed This Encounter   Procedures   • Diet Order     Standing Status:   Standing     Number of Occurrences:   1     Order Specific Question:   Diet:     Answer:   Diabetic [3]     Physical Exam   Constitutional: She is oriented to person, place, and time. She appears well-developed and well-nourished. No distress.   HENT:   Head: Normocephalic and atraumatic.   Eyes: Conjunctivae are normal. No scleral icterus.   Neck: Neck supple. No JVD present.   Cardiovascular: Normal rate, regular rhythm and normal heart sounds.  Exam reveals no gallop and no friction rub.    No murmur heard.  Pulmonary/Chest: Effort normal and breath sounds normal. No respiratory distress. She exhibits no tenderness.   Abdominal: Soft. Bowel sounds are normal. She exhibits no distension. There is no guarding.   Musculoskeletal: She exhibits no edema or tenderness.   Neurological: She is alert and oriented to person, place, and time. No cranial nerve deficit.   Skin: Skin is warm and dry. She is not diaphoretic. No erythema. No pallor.   icthyosis lle, healing cellulitis rle     Psychiatric: She has a normal mood and affect. Her behavior is normal.   Nursing note and vitals reviewed.      Recent Labs      04/10/18   1632  04/11/18   0344   WBC  6.4  6.4   RBC  3.44*  3.13*   HEMOGLOBIN  10.6*  9.5*   HEMATOCRIT  32.3*  29.8*   MCV  93.9  95.2   MCH  30.8  30.4   MCHC  32.8*  31.9*   RDW  53.9*  55.9*   PLATELETCT  254  220   MPV  10.6  10.2     Recent Labs      04/10/18   1632  04/11/18   0344   SODIUM  133*  133*   POTASSIUM  3.7  4.3   CHLORIDE  99  101   CO2  28  28   GLUCOSE  270*  346*   BUN  21  19   CREATININE  1.23  1.03   CALCIUM  9.1  8.4*                      Assessment/Plan     * Pathologic fracture- (present on admission)   Assessment & Plan     -widespread  -already received BM bx, confirms diagnosis  -will need radiation and chemotherapy  -pain control as outlined above  -Dr Gandhi to perform 5 fractions to left humerus fracture  -Had been seen by Dr Forbes in the past. - ortho        Chronic venous stasis dermatitis of both lower extremities- (present on admission)   Assessment & Plan    -wound care, no clear e/o active infection at this time, defer abx's        Multiple myeloma (CMS-HCC)- (present on admission)   Assessment & Plan    -recent diagnosis, appears aggressive, now with innumerable lytic lesions  -admit to the oncology floor  -Dr powers and Oksana consulted for assistance in management  -IV steroids, IV dilaudid PRN, oxy prn  -start oxycontin CR 10 mg BID        Anemia- (present on admission)   Assessment & Plan    -related to her MM, appears near her baseline, no e/o overt bleeding at this time, monitor closely        Hyponatremia- (present on admission)   Assessment & Plan    -mild, 2/2 hypovolemia, gentle IVF's with NS, check levels in the AM        Morbid obesity with BMI of 60.0-69.9, adult (CMS-HCC)- (present on admission)   Assessment & Plan    -encourage weight loss        DM type 2 (diabetes mellitus, type 2) (CMS-HCC)- (present on admission)   Assessment & Plan    -continue outpatient insulin, ISS        Essential hypertension, benign- (present on admission)   Assessment & Plan    -somewhat high, continue outpatient medications, adjust PRN          Quality-Core Measures   Reviewed items::  Labs reviewed, Medications reviewed and Radiology images reviewed  Singh catheter::  No Singh  DVT prophylaxis pharmacological::  Heparin

## 2018-04-12 NOTE — PROGRESS NOTES
Bedside report received from night shift RN. Assumed care. Pt is A&Ox4. Pt is in bed. Pt denies pain at this time. Pt was updated on the plan of care for the day. All questions answered.   Pt has call light within reach and bed is in lowest position.  All fall precautions in place. Pt had no other needs at this time, hourly rounding in place.

## 2018-04-12 NOTE — CONSULTS
DATE OF SERVICE:  04/11/2018    REASON FOR CONSULTATION:  Multiple myeloma.    HISTORY OF PRESENT ILLNESS:  This is a 67-year-old female who was sent to the   hospital by Dr. Lavell Sim, her primary oncologist for admission.  She is 67   years old from [place].  History is such that she had fallen, had a fracture   of this left humerus.  She had x-rays at [place].  This revealed a pathologic   fracture.  She also had some pain in her right knee and other areas and x-rays   were concerning for possibly other disease.  She also had some blood work   done, which showed some abnormalities and concern for monoclonal protein.  She   was eventually found to have a monoclonal spike of 3.5 in the gamma region.    She was referred for further evaluation.  She was seen by Dr. Sim on   04/10/2018.  They had reviewed a lot of her testing.  She was found to have a   deletion 1p and elevated light chains and a beta-2 microglobulin was 9.9   reportedly.  She just came to the office essentially in a wheelchair.  Prior   to all this a year ago, she was walking.  She tells me more recently she has   also had complications of cellulitis in both legs.  She still has some   bandages on the right leg.  She had just finished some antibiotics.    She is here in the room with her sister and her .  She is in no other   acute distress, no real changes since she saw Dr. Sim on the 10th.    PAST MEDICAL HISTORY:  Significant for hypertension, morbid obesity,   hyperlipidemia, osteoarthritis, diabetes.    SOCIAL HISTORY:  Nonsmoker, nondrinker.  She is single.  She lives alone.  She   has no children.    FAMILY HISTORY:  No other family history that is significant except for heart   disease.    REVIEW OF SYSTEMS:  Really, she has got fatigue, generalized weakness.  She   has no fevers or chills.  She has got no headaches.  She has got no hesitancy   or frequency.  No enlarged lymph nodes.  She does have pain in that left    shoulder, knee and hip.  Otherwise, review of systems negative.    PHYSICAL EXAMINATION:  GENERAL:  She is alert and oriented x3.  NEUROLOGIC:  She is intact.  Her ECOG performance status is at least a 3 at   this point.  LUNGS:  Clear bilaterally.  CARDIOVASCULAR:  Regular rate and rhythm.  Normal S1 and S2.  ABDOMEN:  Soft, nontender.  EXTREMITIES:  She has got swelling symmetrical in both legs with bandages on   the right leg, which she has had cellulitis.  The left leg has got a lot of   dry skin and healed area.  Her left arm was minimally swollen in the upper   part.    IMPRESSION AND PLAN:  1.  A 67-year-old, ECOG performance status of 3, on 03/14/2018 had a left   humerus pathologic fracture showing plasma cells and FISH studies on that   showing 1p.  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  She has had no systemic therapy   as of yet.  She clearly understands that the 1p deletion is considered a   high-risk prognostic marker with adverse prognosis and shorter survival.  Her   international staging system, she had an beta-2 microglobulin 9.9, an albumin   about 2.7 back in March, should be considered a stage III just based on that   and meeting obvious overall survival associated with this stage disease   sometimes 29 months.    I discussed all this with her and her family that treatment would be to   hopefully prolong survival.  We talked about Velcade based therapy.  There was   discussion as an outpatient for VRD, but because she is inpatient, we   discussed even potentially CyBorD as Dr. Sim has discussed as well.    We discussed that we probably need to get radiation oncology to see the   patient and get [facility] back to see the patient.  I reviewed the   x-rays with her.  It does not seem like she has had any surgical management in   that left shoulder and it looks like there is some fusion starting of the   bones, so I am not sure there is more that can be done, but we can consider   the role  of radiation.  I told her she will need a skeletal survey to look at   rest of her long bones and she may need palliative radiation to her femurs.    We will go ahead and repeat all her myeloma laboratories as well.  We also   talked about bisphosphonate therapy.  We will go ahead and repeat her B12   levels because they were low back in January.  She was worked up and had   negative parietal cell antibody, intrinsic factor antibody.  She did get some   B12 and she has been on it now, but she did not get the loading doses in the   beginning.  So, we will go ahead and recheck some of this as well.  Extensive   discussion with her and her family, guarded prognosis.  We will continue to   follow along.  I will relate everything to the primary hospitalist service as   well, Dr. Chen.  Greater than an hour discussing everything with her reviewing   her imaging and the discussions with consultants.       ____________________________________     Vodur MD XAVI Hansen / DALIA    DD:  04/11/2018 23:03:44  DT:  04/11/2018 23:49:45    D#:  [id number]  Job#:  [id number]    cc: Jada Chen DO, JOON SILVERIO MD, LAINA CLAUDIO MD, [facility]

## 2018-04-12 NOTE — PROGRESS NOTES
Assumed care of pt at 0650. Rc'd report from KEE Mcgraw. Pt is Aox4. Pt is tearful at times. Pt is a two person assist to use bedpan.  C/o L arm pain, medicated per MAR with scheduled pain medication. Patient is C/O nausea, Zofran administered per MAR. Will replace. Singh in place to DD.  Hourly rounding in place. Call light within reach. Bed in lowest, locked position.

## 2018-04-13 PROBLEM — F41.9 ANXIETY: Status: ACTIVE | Noted: 2018-04-13

## 2018-04-13 LAB
B2 MICROGLOB SERPL-MCNC: 7.2 MG/L (ref 1.1–2.4)
GLUCOSE BLD-MCNC: 184 MG/DL (ref 65–99)
GLUCOSE BLD-MCNC: 221 MG/DL (ref 65–99)
GLUCOSE BLD-MCNC: 310 MG/DL (ref 65–99)
GLUCOSE BLD-MCNC: 318 MG/DL (ref 65–99)

## 2018-04-13 PROCEDURE — 97535 SELF CARE MNGMENT TRAINING: CPT

## 2018-04-13 PROCEDURE — 700102 HCHG RX REV CODE 250 W/ 637 OVERRIDE(OP): Performed by: INTERNAL MEDICINE

## 2018-04-13 PROCEDURE — 77334 RADIATION TREATMENT AID(S): CPT | Mod: 26 | Performed by: RADIOLOGY

## 2018-04-13 PROCEDURE — 77280 THER RAD SIMULAJ FIELD SMPL: CPT | Performed by: RADIOLOGY

## 2018-04-13 PROCEDURE — 77307 TELETHX ISODOSE PLAN CPLX: CPT | Mod: 26 | Performed by: RADIOLOGY

## 2018-04-13 PROCEDURE — G8988 SELF CARE GOAL STATUS: HCPCS | Mod: CI

## 2018-04-13 PROCEDURE — 77412 RADIATION TX DELIVERY LVL 3: CPT | Performed by: RADIOLOGY

## 2018-04-13 PROCEDURE — G8987 SELF CARE CURRENT STATUS: HCPCS | Mod: CL

## 2018-04-13 PROCEDURE — 770004 HCHG ROOM/CARE - ONCOLOGY PRIVATE *

## 2018-04-13 PROCEDURE — 77307 TELETHX ISODOSE PLAN CPLX: CPT | Performed by: RADIOLOGY

## 2018-04-13 PROCEDURE — 99232 SBSQ HOSP IP/OBS MODERATE 35: CPT | Performed by: INTERNAL MEDICINE

## 2018-04-13 PROCEDURE — 77334 RADIATION TREATMENT AID(S): CPT | Performed by: RADIOLOGY

## 2018-04-13 PROCEDURE — 82962 GLUCOSE BLOOD TEST: CPT | Mod: 91

## 2018-04-13 PROCEDURE — A9270 NON-COVERED ITEM OR SERVICE: HCPCS | Performed by: INTERNAL MEDICINE

## 2018-04-13 PROCEDURE — 700111 HCHG RX REV CODE 636 W/ 250 OVERRIDE (IP): Performed by: INTERNAL MEDICINE

## 2018-04-13 PROCEDURE — 97166 OT EVAL MOD COMPLEX 45 MIN: CPT

## 2018-04-13 RX ORDER — DEXAMETHASONE 1 MG
2 TABLET ORAL 2 TIMES DAILY
Status: DISCONTINUED | OUTPATIENT
Start: 2018-04-13 | End: 2018-04-19

## 2018-04-13 RX ORDER — ALPRAZOLAM 0.5 MG/1
0.5 TABLET ORAL EVERY 6 HOURS PRN
Status: DISCONTINUED | OUTPATIENT
Start: 2018-04-13 | End: 2018-05-10

## 2018-04-13 RX ADMIN — TERAZOSIN HYDROCHLORIDE ANHYDROUS 10 MG: 5 CAPSULE ORAL at 22:22

## 2018-04-13 RX ADMIN — INSULIN HUMAN 6 UNITS: 100 INJECTION, SOLUTION PARENTERAL at 06:42

## 2018-04-13 RX ADMIN — OXYCODONE HYDROCHLORIDE 10 MG: 10 TABLET, FILM COATED, EXTENDED RELEASE ORAL at 22:22

## 2018-04-13 RX ADMIN — ONDANSETRON 4 MG: 4 TABLET, ORALLY DISINTEGRATING ORAL at 05:29

## 2018-04-13 RX ADMIN — INSULIN GLARGINE 40 UNITS: 100 INJECTION, SOLUTION SUBCUTANEOUS at 17:55

## 2018-04-13 RX ADMIN — INSULIN HUMAN 6 UNITS: 100 INJECTION, SOLUTION PARENTERAL at 11:05

## 2018-04-13 RX ADMIN — Medication: at 22:42

## 2018-04-13 RX ADMIN — DEXAMETHASONE SODIUM PHOSPHATE 4 MG: 4 INJECTION, SOLUTION INTRAMUSCULAR; INTRAVENOUS at 13:23

## 2018-04-13 RX ADMIN — INSULIN GLARGINE 40 UNITS: 100 INJECTION, SOLUTION SUBCUTANEOUS at 06:44

## 2018-04-13 RX ADMIN — OXYCODONE HYDROCHLORIDE 10 MG: 10 TABLET, FILM COATED, EXTENDED RELEASE ORAL at 11:01

## 2018-04-13 RX ADMIN — METOPROLOL SUCCINATE 200 MG: 100 TABLET, EXTENDED RELEASE ORAL at 22:34

## 2018-04-13 RX ADMIN — HEPARIN SODIUM 5000 UNITS: 5000 INJECTION, SOLUTION INTRAVENOUS; SUBCUTANEOUS at 22:23

## 2018-04-13 RX ADMIN — METOPROLOL SUCCINATE 200 MG: 100 TABLET, EXTENDED RELEASE ORAL at 00:12

## 2018-04-13 RX ADMIN — INSULIN HUMAN 2 UNITS: 100 INJECTION, SOLUTION PARENTERAL at 22:36

## 2018-04-13 RX ADMIN — ACETAMINOPHEN 1000 MG: 500 TABLET ORAL at 00:12

## 2018-04-13 RX ADMIN — DEXAMETHASONE SODIUM PHOSPHATE 4 MG: 4 INJECTION, SOLUTION INTRAMUSCULAR; INTRAVENOUS at 05:29

## 2018-04-13 RX ADMIN — Medication: at 11:04

## 2018-04-13 RX ADMIN — ACETAMINOPHEN 1000 MG: 500 TABLET ORAL at 05:28

## 2018-04-13 RX ADMIN — PRAVASTATIN SODIUM 40 MG: 20 TABLET ORAL at 22:22

## 2018-04-13 RX ADMIN — DEXAMETHASONE SODIUM PHOSPHATE 4 MG: 4 INJECTION, SOLUTION INTRAMUSCULAR; INTRAVENOUS at 00:12

## 2018-04-13 RX ADMIN — ACETAMINOPHEN 1000 MG: 500 TABLET ORAL at 13:25

## 2018-04-13 RX ADMIN — HEPARIN SODIUM 5000 UNITS: 5000 INJECTION, SOLUTION INTRAVENOUS; SUBCUTANEOUS at 13:23

## 2018-04-13 RX ADMIN — DEXAMETHASONE 2 MG: 1 TABLET ORAL at 23:58

## 2018-04-13 RX ADMIN — CLONIDINE HYDROCHLORIDE 0.2 MG: 0.1 TABLET ORAL at 11:03

## 2018-04-13 RX ADMIN — CLONIDINE HYDROCHLORIDE 0.2 MG: 0.1 TABLET ORAL at 22:22

## 2018-04-13 RX ADMIN — INSULIN HUMAN 3 UNITS: 100 INJECTION, SOLUTION PARENTERAL at 17:54

## 2018-04-13 RX ADMIN — ACETAMINOPHEN 1000 MG: 500 TABLET ORAL at 17:50

## 2018-04-13 RX ADMIN — HEPARIN SODIUM 5000 UNITS: 5000 INJECTION, SOLUTION INTRAVENOUS; SUBCUTANEOUS at 05:29

## 2018-04-13 RX ADMIN — ACETAMINOPHEN 1000 MG: 500 TABLET ORAL at 23:58

## 2018-04-13 ASSESSMENT — COGNITIVE AND FUNCTIONAL STATUS - GENERAL
PERSONAL GROOMING: A LITTLE
DRESSING REGULAR UPPER BODY CLOTHING: A LITTLE
SUGGESTED CMS G CODE MODIFIER DAILY ACTIVITY: CK
HELP NEEDED FOR BATHING: A LOT
DAILY ACTIVITIY SCORE: 16
TOILETING: A LOT
DRESSING REGULAR LOWER BODY CLOTHING: A LOT

## 2018-04-13 ASSESSMENT — ENCOUNTER SYMPTOMS
BACK PAIN: 1
HEARTBURN: 0
DIARRHEA: 0
INSOMNIA: 0
ABDOMINAL PAIN: 0
CLAUDICATION: 0
WEAKNESS: 1
FEVER: 0
CARDIOVASCULAR NEGATIVE: 1
CHILLS: 0
SENSORY CHANGE: 0
RESPIRATORY NEGATIVE: 1
EYES NEGATIVE: 1
CONSTIPATION: 0
DEPRESSION: 0
VOMITING: 0
MYALGIAS: 1
COUGH: 0
SHORTNESS OF BREATH: 0
SPEECH CHANGE: 0
PHOTOPHOBIA: 0
GASTROINTESTINAL NEGATIVE: 1
NERVOUS/ANXIOUS: 1
HEADACHES: 0
BLURRED VISION: 0
DIZZINESS: 0

## 2018-04-13 ASSESSMENT — LIFESTYLE VARIABLES: DO YOU DRINK ALCOHOL: NO

## 2018-04-13 ASSESSMENT — PAIN SCALES - GENERAL
PAINLEVEL_OUTOF10: 3
PAINLEVEL_OUTOF10: 3

## 2018-04-13 ASSESSMENT — ACTIVITIES OF DAILY LIVING (ADL): TOILETING: INDEPENDENT

## 2018-04-13 NOTE — THERAPY
"Occupational Therapy Evaluation completed.   Functional Status:  Pt presents to skilled OT services following intractible pain 2/2 multiple myeloma and L humerus fx(Dec), per pt, on last f/u in March was told, no active resistive exercises yet. Pt demonstrates significant decline with ADLs at this time due to LUE restrictions, c/o intermittent N/T of L hand, decreased  strength of L hand compare to Rt. Performed bed mobility with mod a for BLE's positioning, STS from eob with cga/min a for FWW stabilization, eob->w/c with cga/min a using FWW, h/g seated with supervision. Pt pleasant, motivated to participate in therapy, at times can be tangential and requires cues to re-direct to task on hand. Will benefit from acute  Skilled services while in house and possibly may need post acute therapy prior to d/c home pending progress with therapy and support at home.   Plan of Care: Will benefit from Occupational Therapy 3 times per week  Discharge Recommendations:  Equipment: Will Continue to Assess for Equipment Needs.    See \"Rehab Therapy-Acute\" Patient Summary Report for complete documentation.    "

## 2018-04-13 NOTE — PROGRESS NOTES
[facility] Hospitalist Progress Note    Date of Service: 2018    Chief Complaint  67 y.o. female admitted 4/10/2018 with intractible pain secondary to multiple myeloma, known fracture left humerus    Interval Problem Update   Patient feeling much less pain since admission and starting medications, she is still having significant pain and swelling left humerus fracture site and likely had pathologic fracture.  Dr Núñez and Oksana consulted for assistance.  She already has significant calculus surrounding the fracture site but also pain.     Patient had mapping done on humerus today and bony survey.  Symptoms doing about same, mediation helping.  Plan is to medically maximize patient in preparation to start chemotherapy on Monday.    Consultants/Specialty  onc - [place]  XRT - Oksana      Disposition  Tbd, lives in [place]        Review of Systems   Constitutional: Positive for malaise/fatigue. Negative for chills and fever.   HENT: Negative for congestion.    Eyes: Negative for blurred vision and photophobia.   Respiratory: Negative for cough and shortness of breath.    Cardiovascular: Negative for chest pain, claudication and leg swelling.   Gastrointestinal: Negative for abdominal pain, constipation, diarrhea, heartburn and vomiting.   Genitourinary: Negative for dysuria and hematuria.   Musculoskeletal: Positive for back pain, joint pain and myalgias.   Skin: Negative for itching and rash.   Neurological: Positive for weakness. Negative for dizziness, sensory change, speech change and headaches.   Psychiatric/Behavioral: Negative for depression. The patient is not nervous/anxious and does not have insomnia.       Physical Exam  Laboratory/Imaging   Hemodynamics  Temp (24hrs), Av.7 °C (98.1 °F), Min:36.4 °C (97.5 °F), Max:37 °C (98.6 °F)   Temperature: 36.4 °C (97.5 °F)  Pulse  Av.3  Min: 51  Max: 74    Blood Pressure : (!) 186/75      Respiratory      Respiration: 18, Pulse Oximetry: 97 %      Work Of Breathing / Effort: Moderate  RUL Breath Sounds: Clear, RML Breath Sounds: Clear, RLL Breath Sounds: Clear, CHARLOTTE Breath Sounds: Clear, LLL Breath Sounds: Clear    Fluids    Intake/Output Summary (Last 24 hours) at 04/12/18 2007  Last data filed at 04/12/18 1837   Gross per 24 hour   Intake              760 ml   Output             2000 ml   Net            -1240 ml       Nutrition  Orders Placed This Encounter   Procedures   • Diet Order     Standing Status:   Standing     Number of Occurrences:   1     Order Specific Question:   Diet:     Answer:   Diabetic [3]     Physical Exam   Constitutional: She is oriented to person, place, and time. She appears well-developed and well-nourished. No distress.   HENT:   Head: Normocephalic and atraumatic.   Eyes: Conjunctivae are normal. No scleral icterus.   Neck: Neck supple. No JVD present.   Cardiovascular: Normal rate, regular rhythm and normal heart sounds.  Exam reveals no gallop and no friction rub.    No murmur heard.  Pulmonary/Chest: Effort normal and breath sounds normal. No respiratory distress. She exhibits no tenderness.   Abdominal: Soft. Bowel sounds are normal. She exhibits no distension. There is no guarding.   Musculoskeletal: She exhibits no edema or tenderness.   Neurological: She is alert and oriented to person, place, and time. No cranial nerve deficit.   Skin: Skin is warm and dry. She is not diaphoretic. No erythema. No pallor.   icthyosis lle, healing cellulitis rle     Psychiatric: She has a normal mood and affect. Her behavior is normal.   Nursing note and vitals reviewed.      Recent Labs      04/10/18   1632  04/11/18   0344  04/12/18   0045   WBC  6.4  6.4  5.4   RBC  3.44*  3.13*  2.91*   HEMOGLOBIN  10.6*  9.5*  8.8*   HEMATOCRIT  32.3*  29.8*  27.3*   MCV  93.9  95.2  93.8   MCH  30.8  30.4  30.2   MCHC  32.8*  31.9*  32.2*   RDW  53.9*  55.9*  53.9*   PLATELETCT  254  220  205   MPV  10.6  10.2  10.5     Recent Labs      04/10/18    1632  04/11/18   0344  04/12/18   0045   SODIUM  133*  133*  132*   POTASSIUM  3.7  4.3  4.0   CHLORIDE  99  101  100   CO2  28  28  27   GLUCOSE  270*  346*  343*   BUN  21  19  19   CREATININE  1.23  1.03  1.02   CALCIUM  9.1  8.4*  8.2*                      Assessment/Plan     * Pathologic fracture- (present on admission)   Assessment & Plan    -widespread  -already received BM bx, confirms diagnosis  -will need radiation and chemotherapy  -pain control as outlined above  -Dr Gandhi to perform 5 fractions to left humerus fracture  -Had been seen by Dr Forbes in the past. - ortho        Chronic venous stasis dermatitis of both lower extremities- (present on admission)   Assessment & Plan    -wound care, no clear e/o active infection at this time, defer abx's        Multiple myeloma (CMS-HCC)- (present on admission)   Assessment & Plan    -recent diagnosis, appears aggressive, now with innumerable lytic lesions  -admit to the oncology floor  -Dr powers and Oksana consulted for assistance in management  -IV steroids, IV dilaudid PRN, oxy prn  -start oxycontin CR 10 mg BID  -skeletal survey done - spine obscured by body habitus        Anemia- (present on admission)   Assessment & Plan    -related to her MM, appears near her baseline, no e/o overt bleeding at this time, monitor closely        Hyponatremia- (present on admission)   Assessment & Plan    -mild, 2/2 hypovolemia, gentle IVF's with NS, check levels in the AM        Morbid obesity with BMI of 60.0-69.9, adult (CMS-HCC)- (present on admission)   Assessment & Plan    -encourage weight loss        DM type 2 (diabetes mellitus, type 2) (CMS-HCC)- (present on admission)   Assessment & Plan    -continue outpatient insulin, ISS        Essential hypertension, benign- (present on admission)   Assessment & Plan    -somewhat high, continue outpatient medications, adjust PRN          Quality-Core Measures   Reviewed items::  Labs reviewed, Medications reviewed and  Radiology images reviewed  Singh catheter::  No Singh  DVT prophylaxis pharmacological::  Heparin

## 2018-04-13 NOTE — CARE PLAN
Problem: Safety  Goal: Will remain free from falls    Intervention: Implement fall precautions  Fall precautions in place.      Problem: Skin Integrity  Goal: Risk for impaired skin integrity will decrease    Intervention: Assess and monitor skin integrity, appearance and/or temperature  Skin integrity accessed and treated per physicians oreders

## 2018-04-13 NOTE — PROGRESS NOTES
Assumed care of pt at 0650. Rc'd report from KEE Pardo. Pt is Aox4. Patient had MRI and full body xray today and is tired.  Pt is tearful at times. Pt is a two person assist to use bedpan.  C/o L arm pain, medicated per MAR with scheduled pain medication. Singh in place to DD.  Hourly rounding in place. Call light within reach. Bed in lowest, locked position.

## 2018-04-13 NOTE — PROGRESS NOTES
Oncology/Hematology Progress Note               Author: Darron Núñez Date & Time created: 4/13/2018  11:24 AM     CC:   Multiple myeloma    Interval History:  She is doing okay. They were able to try to get her to the bathroom assisted today. She still has pain in the shoulder on the left. She still has a very poor performance status. Her legs are better. She is getting her radiation.    Review of Systems:  Review of Systems   Constitutional: Positive for malaise/fatigue. Negative for chills and fever.   HENT: Negative.    Eyes: Negative.    Respiratory: Negative.    Cardiovascular: Negative.    Gastrointestinal: Negative.    Genitourinary: Negative.    Musculoskeletal: Positive for joint pain and myalgias.   Skin: Negative.    Neurological: Positive for weakness.   Endo/Heme/Allergies: Negative.    Psychiatric/Behavioral: The patient is nervous/anxious.        Physical Exam:  Physical Exam   Constitutional: She is oriented to person, place, and time.   Morbidly obese   Eyes: Conjunctivae are normal. Pupils are equal, round, and reactive to light.   Cardiovascular: Normal rate and regular rhythm.    Pulmonary/Chest: Effort normal and breath sounds normal.   Abdominal: Soft. Bowel sounds are normal. She exhibits no distension.   Musculoskeletal: She exhibits edema.   She's had chronic edema symmetrical, cellulitis in both legs which is resolved with extreme dry skin. She has a bandage on the right lower extremity.   Neurological: She is alert and oriented to person, place, and time.   Psychiatric: She has a normal mood and affect. Her behavior is normal. Judgment normal.       Labs:        Invalid input(s): [id number]      Recent Labs      04/10/18   1632  04/11/18   0344  04/12/18   0045   SODIUM  133*  133*  132*   POTASSIUM  3.7  4.3  4.0   CHLORIDE  99  101  100   CO2  28  28  27   BUN  21  19  19   CREATININE  1.23  1.03  1.02   MAGNESIUM   --   1.2*   --    CALCIUM  9.1  8.4*  8.2*     Recent Labs       04/10/18   16318   0344  18   0045   ALTSGPT  8   --   6   ASTSGOT  8*   --   5*   ALKPHOSPHAT  87   --   73   TBILIRUBIN  0.6   --   0.3   GLUCOSE  270*  346*  343*     Recent Labs      04/10/18   1632  04/11/18   0344  18   0045   RBC  3.44*  3.13*  2.91*   HEMOGLOBIN  10.6*  9.5*  8.8*   HEMATOCRIT  32.3*  29.8*  27.3*   PLATELETCT  254  220  205     Recent Labs      04/10/18   1632  04/11/18   0344  18   0045   WBC  6.4  6.4  5.4   NEUTSPOLYS  71.40  75.70*  75.20*   LYMPHOCYTES  18.80*  18.50*  17.20*   MONOCYTES  7.50  3.30  6.30   EOSINOPHILS  1.20  0.00  0.00   BASOPHILS  0.50  0.30  0.00   ASTSGOT  8*   --   5*   ALTSGPT  8   --   6   ALKPHOSPHAT  87   --   73   TBILIRUBIN  0.6   --   0.3     Recent Labs      04/10/18   1632  04/11/18   0344  18   0045   SODIUM  133*  133*  132*   POTASSIUM  3.7  4.3  4.0   CHLORIDE  99  101  100   CO2  28  28  27   GLUCOSE  270*  346*  343*   BUN  21  19  19   CREATININE  1.23  1.03  1.02   CALCIUM  9.1  8.4*  8.2*     Hemodynamics:  Temp (24hrs), Av.5 °C (97.7 °F), Min:36.4 °C (97.5 °F), Max:36.7 °C (98.1 °F)  Temperature: 36.6 °C (97.8 °F)  Pulse  Av.6  Min: 50  Max: 74   Blood Pressure : (!) 190/84     Respiratory:    Respiration: 18, Pulse Oximetry: 99 %     Work Of Breathing / Effort: Moderate  RUL Breath Sounds: Clear, RML Breath Sounds: Clear, RLL Breath Sounds: Clear, CHARLOTTE Breath Sounds: Clear, LLL Breath Sounds: Clear  Fluids:    Intake/Output Summary (Last 24 hours) at 18 1124  Last data filed at 18 0300   Gross per 24 hour   Intake                0 ml   Output             1000 ml   Net            -1000 ml       GI/Nutrition:  Orders Placed This Encounter   Procedures   • Diet Order     Standing Status:   Standing     Number of Occurrences:   1     Order Specific Question:   Diet:     Answer:   Diabetic [3]     Medical Decision Making, by Problem:  Active Hospital Problems    Diagnosis   • *Pathologic fracture  [M84.40XA]   • Hyponatremia [E87.1]   • Anemia [D64.9]   • Multiple myeloma (CMS-HCC) [C90.00]   • Chronic venous stasis dermatitis of both lower extremities [I87.2]   • Morbid obesity with BMI of 60.0-69.9, adult (CMS-HCC) [E66.01, Z68.44]   • DM type 2 (diabetes mellitus, type 2) (CMS-HCC) [E11.9]   • Essential hypertension, benign [I10]     Past medical history, past surgical history, past social history unchanged reviewed    Plan:  1.  Multiple myeloma: 1P deletion intermediate risk, stage III based on ISS staging (beta 2 9.9 and albumin 2.7 before): She is currently on Decadron by the hospitalist. She understands the Decadron is a treatment that we use in most combination therapies for myeloma. I think it's reasonable to continue for now while she is getting radiation. She just needs to be on GI prophylaxis. We discussed that Dr. Sim will be coming on Monday and most likely transitioning her to treatment with Velcade based therapy in combination with Cytoxan and Decadron i.e. CyborD since inpatient.    I have ordered all her myeloma laboratories again to get a better assessment. We have done a skeletal survey that we'll need to be reviewed as well to make sure she does not need any other prophylactic radiation to her weightbearing areas.    Will also consider bisphosphonate therapy along with her chemotherapy.    2.  Anemia: Her hemoglobin is stable at 8.8. We'll does need to follow this. I think this is most likely related to her myeloma.    3.  History of B12 deficiency: Her levels look good. She can provide continue with once monthly B12 injections.    4.  Extremities: She's had chronic lower extremity edema. Seems like her cellulitis has resolved. We'll just need to be followed closely. If she has not had a prior Doppler ultrasound of her lower extremities may be reasonable to get a baseline    Moderate complexity/reviewed with primary team  Quality-Core Measures

## 2018-04-14 LAB
ALBUMIN SERPL-MCNC: 2.79 G/DL (ref 3.75–5.01)
ALPHA1 GLOB SERPL ELPH-MCNC: 0.41 G/DL (ref 0.19–0.46)
ALPHA2 GLOB SERPL ELPH-MCNC: 0.91 G/DL (ref 0.48–1.05)
B-GLOBULIN SERPL ELPH-MCNC: 0.61 G/DL (ref 0.48–1.1)
GAMMA GLOB SERPL ELPH-MCNC: 2.17 G/DL (ref 0.62–1.51)
GLUCOSE BLD-MCNC: 137 MG/DL (ref 65–99)
GLUCOSE BLD-MCNC: 201 MG/DL (ref 65–99)
GLUCOSE BLD-MCNC: 203 MG/DL (ref 65–99)
GLUCOSE BLD-MCNC: 240 MG/DL (ref 65–99)
IGA SERPL-MCNC: 28 MG/DL (ref 68–408)
IGG SERPL-MCNC: 2640 MG/DL (ref 768–1632)
IGM SERPL-MCNC: 12 MG/DL (ref 35–263)
INTERPRETATION SERPL IFE-IMP: ABNORMAL
MONOCLON BAND OBS SERPL: ABNORMAL
PATHOLOGY STUDY: ABNORMAL
PROT SERPL-MCNC: 6.9 G/DL (ref 6–8.3)

## 2018-04-14 PROCEDURE — 700111 HCHG RX REV CODE 636 W/ 250 OVERRIDE (IP): Performed by: STUDENT IN AN ORGANIZED HEALTH CARE EDUCATION/TRAINING PROGRAM

## 2018-04-14 PROCEDURE — 82962 GLUCOSE BLOOD TEST: CPT | Mod: 91

## 2018-04-14 PROCEDURE — 700111 HCHG RX REV CODE 636 W/ 250 OVERRIDE (IP): Performed by: INTERNAL MEDICINE

## 2018-04-14 PROCEDURE — 700102 HCHG RX REV CODE 250 W/ 637 OVERRIDE(OP): Performed by: INTERNAL MEDICINE

## 2018-04-14 PROCEDURE — A9270 NON-COVERED ITEM OR SERVICE: HCPCS | Performed by: INTERNAL MEDICINE

## 2018-04-14 PROCEDURE — 770004 HCHG ROOM/CARE - ONCOLOGY PRIVATE *

## 2018-04-14 PROCEDURE — 99232 SBSQ HOSP IP/OBS MODERATE 35: CPT | Performed by: INTERNAL MEDICINE

## 2018-04-14 RX ORDER — HYDRALAZINE HYDROCHLORIDE 10 MG/1
25 TABLET, FILM COATED ORAL EVERY 8 HOURS
Status: DISCONTINUED | OUTPATIENT
Start: 2018-04-14 | End: 2018-04-15

## 2018-04-14 RX ORDER — METOPROLOL SUCCINATE 50 MG/1
100 TABLET, EXTENDED RELEASE ORAL
Status: DISCONTINUED | OUTPATIENT
Start: 2018-04-15 | End: 2018-04-24

## 2018-04-14 RX ORDER — ACYCLOVIR 200 MG/1
400 CAPSULE ORAL 2 TIMES DAILY
Status: DISCONTINUED | OUTPATIENT
Start: 2018-04-14 | End: 2018-05-10

## 2018-04-14 RX ADMIN — ACETAMINOPHEN 1000 MG: 500 TABLET ORAL at 06:01

## 2018-04-14 RX ADMIN — ACETAMINOPHEN 1000 MG: 500 TABLET ORAL at 18:24

## 2018-04-14 RX ADMIN — INSULIN GLARGINE 40 UNITS: 100 INJECTION, SOLUTION SUBCUTANEOUS at 09:26

## 2018-04-14 RX ADMIN — INSULIN HUMAN 3 UNITS: 100 INJECTION, SOLUTION PARENTERAL at 20:46

## 2018-04-14 RX ADMIN — INSULIN GLARGINE 40 UNITS: 100 INJECTION, SOLUTION SUBCUTANEOUS at 18:25

## 2018-04-14 RX ADMIN — HYDRALAZINE HYDROCHLORIDE 25 MG: 10 TABLET, FILM COATED ORAL at 20:51

## 2018-04-14 RX ADMIN — TERAZOSIN HYDROCHLORIDE ANHYDROUS 10 MG: 5 CAPSULE ORAL at 20:42

## 2018-04-14 RX ADMIN — HYDRALAZINE HYDROCHLORIDE 10 MG: 20 INJECTION INTRAMUSCULAR; INTRAVENOUS at 04:26

## 2018-04-14 RX ADMIN — OXYCODONE HYDROCHLORIDE 10 MG: 10 TABLET, FILM COATED, EXTENDED RELEASE ORAL at 20:40

## 2018-04-14 RX ADMIN — HEPARIN SODIUM 5000 UNITS: 5000 INJECTION, SOLUTION INTRAVENOUS; SUBCUTANEOUS at 13:02

## 2018-04-14 RX ADMIN — HEPARIN SODIUM 5000 UNITS: 5000 INJECTION, SOLUTION INTRAVENOUS; SUBCUTANEOUS at 20:52

## 2018-04-14 RX ADMIN — PRAVASTATIN SODIUM 40 MG: 20 TABLET ORAL at 20:40

## 2018-04-14 RX ADMIN — ACETAMINOPHEN 1000 MG: 500 TABLET ORAL at 13:01

## 2018-04-14 RX ADMIN — HYDRALAZINE HYDROCHLORIDE 25 MG: 10 TABLET, FILM COATED ORAL at 13:01

## 2018-04-14 RX ADMIN — OXYCODONE HYDROCHLORIDE 10 MG: 10 TABLET, FILM COATED, EXTENDED RELEASE ORAL at 09:25

## 2018-04-14 RX ADMIN — CLONIDINE HYDROCHLORIDE 0.2 MG: 0.1 TABLET ORAL at 09:25

## 2018-04-14 RX ADMIN — Medication: at 20:50

## 2018-04-14 RX ADMIN — HEPARIN SODIUM 5000 UNITS: 5000 INJECTION, SOLUTION INTRAVENOUS; SUBCUTANEOUS at 06:01

## 2018-04-14 RX ADMIN — ACYCLOVIR 400 MG: 200 CAPSULE ORAL at 20:42

## 2018-04-14 RX ADMIN — INSULIN HUMAN 3 UNITS: 100 INJECTION, SOLUTION PARENTERAL at 13:02

## 2018-04-14 RX ADMIN — DEXAMETHASONE 2 MG: 1 TABLET ORAL at 20:40

## 2018-04-14 RX ADMIN — CLONIDINE HYDROCHLORIDE 0.2 MG: 0.1 TABLET ORAL at 20:42

## 2018-04-14 RX ADMIN — Medication: at 09:25

## 2018-04-14 RX ADMIN — ONDANSETRON 4 MG: 4 TABLET, ORALLY DISINTEGRATING ORAL at 19:55

## 2018-04-14 RX ADMIN — DEXAMETHASONE 2 MG: 1 TABLET ORAL at 09:25

## 2018-04-14 RX ADMIN — INSULIN HUMAN 3 UNITS: 100 INJECTION, SOLUTION PARENTERAL at 18:24

## 2018-04-14 ASSESSMENT — ENCOUNTER SYMPTOMS
CHILLS: 0
HEADACHES: 0
ABDOMINAL PAIN: 0
DIZZINESS: 0
HEARTBURN: 0
COUGH: 0
WEAKNESS: 1
VOMITING: 0
PHOTOPHOBIA: 0
SPEECH CHANGE: 0
FEVER: 0
CLAUDICATION: 0
MYALGIAS: 1
DIARRHEA: 0
NERVOUS/ANXIOUS: 1
SHORTNESS OF BREATH: 0
SENSORY CHANGE: 0
CONSTIPATION: 0
BLURRED VISION: 0
INSOMNIA: 0
DEPRESSION: 0
BACK PAIN: 1

## 2018-04-14 ASSESSMENT — PAIN SCALES - GENERAL
PAINLEVEL_OUTOF10: 6
PAINLEVEL_OUTOF10: 6
PAINLEVEL_OUTOF10: 4
PAINLEVEL_OUTOF10: 7

## 2018-04-14 ASSESSMENT — LIFESTYLE VARIABLES: DO YOU DRINK ALCOHOL: NO

## 2018-04-14 NOTE — PROGRESS NOTES
[facility]    Date of Service: 2018    Chief Complaint  67 y.o. female admitted 4/10/2018 with intractible pain secondary to multiple myeloma, known fracture left humerus    Interval Problem Update   Patient feeling much less pain since admission and starting medications, she is still having significant pain and swelling left humerus fracture site and likely had pathologic fracture.  Dr Núñez and Oksana consulted for assistance.  She already has significant calculus surrounding the fracture site but also pain.     Patient had mapping done on humerus today and bony survey.  Symptoms doing about same, mediation helping.  Plan is to medically maximize patient in preparation to start chemotherapy on Monday.   Patient feeling okay but having significant anxiety - states normally she is calm but with this situation she is having difficulty.  Xanax low dose ordered PRN.  Radiation to resume Monday, first dose given today.    Consultants/Specialty  onc - [place]  XRT - Oksana      Disposition  Tbd, lives in [place]        Review of Systems   Constitutional: Positive for malaise/fatigue. Negative for chills and fever.   HENT: Negative for congestion.    Eyes: Negative for blurred vision and photophobia.   Respiratory: Negative for cough and shortness of breath.    Cardiovascular: Negative for chest pain, claudication and leg swelling.   Gastrointestinal: Negative for abdominal pain, constipation, diarrhea, heartburn and vomiting.   Genitourinary: Negative for dysuria and hematuria.   Musculoskeletal: Positive for back pain, joint pain and myalgias.   Skin: Negative for itching and rash.   Neurological: Positive for weakness. Negative for dizziness, sensory change, speech change and headaches.   Psychiatric/Behavioral: Negative for depression. The patient is nervous/anxious. The patient does not have insomnia.       Physical Exam  Laboratory/Imaging   Hemodynamics  Temp (24hrs), Av.4  °C (97.5 °F), Min:36.1 °C (96.9 °F), Max:36.7 °C (98.1 °F)   Temperature: 36.1 °C (96.9 °F)  Pulse  Av.4  Min: 48  Max: 74    Blood Pressure : (!) 190/84      Respiratory      Respiration: 20, Pulse Oximetry: 97 %     Work Of Breathing / Effort: Moderate  RUL Breath Sounds: Clear, RML Breath Sounds: Clear, RLL Breath Sounds: Clear, CHARLOTTE Breath Sounds: Clear, LLL Breath Sounds: Clear    Fluids    Intake/Output Summary (Last 24 hours) at 18  Last data filed at 18 1837   Gross per 24 hour   Intake                0 ml   Output             1575 ml   Net            -1575 ml       Nutrition  Orders Placed This Encounter   Procedures   • Diet Order     Standing Status:   Standing     Number of Occurrences:   1     Order Specific Question:   Diet:     Answer:   Diabetic [3]     Physical Exam   Constitutional: She is oriented to person, place, and time. She appears well-developed and well-nourished. No distress.   HENT:   Head: Normocephalic and atraumatic.   Eyes: Conjunctivae are normal. No scleral icterus.   Neck: Neck supple. No JVD present.   Cardiovascular: Normal rate, regular rhythm and normal heart sounds.  Exam reveals no gallop and no friction rub.    No murmur heard.  Pulmonary/Chest: Effort normal and breath sounds normal. No respiratory distress. She exhibits no tenderness.   Abdominal: Soft. Bowel sounds are normal. She exhibits no distension. There is no guarding.   Musculoskeletal: She exhibits no edema or tenderness.   Neurological: She is alert and oriented to person, place, and time. No cranial nerve deficit.   Skin: Skin is warm and dry. She is not diaphoretic. No erythema. No pallor.   icthyosis lle, healing cellulitis rle     Psychiatric: She has a normal mood and affect. Her behavior is normal.   Nursing note and vitals reviewed.      Recent Labs      18   0344  18   0045   WBC  6.4  5.4   RBC  3.13*  2.91*   HEMOGLOBIN  9.5*  8.8*   HEMATOCRIT  29.8*  27.3*   MCV  95.2   93.8   MCH  30.4  30.2   MCHC  31.9*  32.2*   RDW  55.9*  53.9*   PLATELETCT  220  205   MPV  10.2  10.5     Recent Labs      04/11/18   0344  04/12/18   0045   SODIUM  133*  132*   POTASSIUM  4.3  4.0   CHLORIDE  101  100   CO2  28  27   GLUCOSE  346*  343*   BUN  19  19   CREATININE  1.03  1.02   CALCIUM  8.4*  8.2*                      Assessment/Plan     * Pathologic fracture- (present on admission)   Assessment & Plan    -widespread  -already received BM bx, confirms diagnosis  -will need radiation and chemotherapy  -pain control as outlined above  -Dr Gandhi to perform 5 fractions to left humerus fracture  -Had been seen by Dr Forbes in the past. - no surgical intervention          Anxiety   Assessment & Plan    Exacerbated by situation and steroids  Decrease steroid dose  Xanax prn          Chronic venous stasis dermatitis of both lower extremities- (present on admission)   Assessment & Plan    -wound care, no clear e/o active infection at this time, defer abx's        Multiple myeloma (CMS-HCC)- (present on admission)   Assessment & Plan    -recent diagnosis, appears aggressive, now with innumerable lytic lesions  -admit to the oncology floor  -Dr powers and Oksana consulted for assistance in management  -IV steroids, IV dilaudid PRN, oxy prn  -start oxycontin CR 10 mg BID  -skeletal survey done - spine obscured by body habitus        Anemia- (present on admission)   Assessment & Plan    -related to her MM, appears near her baseline, no e/o overt bleeding at this time, monitor closely        Hyponatremia- (present on admission)   Assessment & Plan    -mild, 2/2 hypovolemia, gentle IVF's with NS, check levels in the AM        Morbid obesity with BMI of 60.0-69.9, adult (CMS-HCC)- (present on admission)   Assessment & Plan    -encourage weight loss        DM type 2 (diabetes mellitus, type 2) (CMS-HCC)- (present on admission)   Assessment & Plan    -continue outpatient insulin, ISS        Essential  hypertension, benign- (present on admission)   Assessment & Plan    -somewhat high, continue outpatient medications, adjust PRN          Quality-Core Measures   Reviewed items::  Labs reviewed, Medications reviewed and Radiology images reviewed  Singh catheter::  No Singh  DVT prophylaxis pharmacological::  Heparin

## 2018-04-14 NOTE — PROGRESS NOTES
Pt HR 37-39 while sleeping, asymptomatic, all other VSS. Paged Dr. Bourne. Will continue to monitor.

## 2018-04-14 NOTE — PROGRESS NOTES
Assumed care of pt at 1900. Bedside report received. POC discussed, call light in reach, pt calls for assistance, all needs met at this time.

## 2018-04-14 NOTE — PROGRESS NOTES
"/58   Pulse (!) 52   Temp 36.6 °C (97.9 °F)   Resp 20   Ht 1.6 m (5' 2.99\")   Wt (!) 164.7 kg (363 lb)   LMP 04/11/1994   SpO2 98%   Breastfeeding? No   BMI 64.32 kg/m²   Pt is AOx4. Sitting up in bed. Generalized anterior 6/10 pain. Denies nausea.   "

## 2018-04-14 NOTE — PROGRESS NOTES
[facility]    Date of Service: 4/14/2018    Chief Complaint  67 y.o. female admitted 4/10/2018 with intractible pain secondary to multiple myeloma, known fracture left humerus    Interval Problem Update  4/11 Patient feeling much less pain since admission and starting medications, she is still having significant pain and swelling left humerus fracture site and likely had pathologic fracture.  Dr Núñez and Oksana consulted for assistance.  She already has significant calculus surrounding the fracture site but also pain.    4/12 Patient had mapping done on humerus today and bony survey.  Symptoms doing about same, mediation helping.  Plan is to medically maximize patient in preparation to start chemotherapy on Monday.  4/13 Patient feeling okay but having significant anxiety - states normally she is calm but with this situation she is having difficulty.  Xanax low dose ordered PRN.  Radiation to resume Monday, first dose given today.  4/14 Patient feeling well, nava placed in facility prior to transfer d/t urinary incontinence and body habitus, she is a high risk for skin breakdown and nava to remain in place at this time.  No acute complaints.  BP high and heart rate low, reponded well to IV hydralazine overnight - scheduled PO today, decrease toprol xl to 100 mg daily.    Consultants/Specialty  onc - [place]  XRT - Oksana      Disposition  Tbd, lives in [place]        Review of Systems   Constitutional: Positive for malaise/fatigue. Negative for chills and fever.   HENT: Negative for congestion.    Eyes: Negative for blurred vision and photophobia.   Respiratory: Negative for cough and shortness of breath.    Cardiovascular: Negative for chest pain, claudication and leg swelling.   Gastrointestinal: Negative for abdominal pain, constipation, diarrhea, heartburn and vomiting.   Genitourinary: Negative for dysuria and hematuria.   Musculoskeletal: Positive for back pain, joint pain and  myalgias.   Skin: Negative for itching and rash.   Neurological: Positive for weakness. Negative for dizziness, sensory change, speech change and headaches.   Psychiatric/Behavioral: Negative for depression. The patient is nervous/anxious. The patient does not have insomnia.       Physical Exam  Laboratory/Imaging   Hemodynamics  Temp (24hrs), Av.4 °C (97.6 °F), Min:36.1 °C (96.9 °F), Max:36.8 °C (98.2 °F)   Temperature: 36.6 °C (97.8 °F)  Pulse  Av  Min: 38  Max: 74    Blood Pressure : (!) 161/52      Respiratory      Respiration: 20, Pulse Oximetry: 97 %     Work Of Breathing / Effort: Moderate  RUL Breath Sounds: Diminished, RML Breath Sounds: Diminished, RLL Breath Sounds: Diminished, CHARLOTTE Breath Sounds: Diminished, LLL Breath Sounds: Diminished    Fluids    Intake/Output Summary (Last 24 hours) at 18 1638  Last data filed at 18 0400   Gross per 24 hour   Intake              240 ml   Output             1850 ml   Net            -1610 ml       Nutrition  Orders Placed This Encounter   Procedures   • Diet Order     Standing Status:   Standing     Number of Occurrences:   1     Order Specific Question:   Diet:     Answer:   Diabetic [3]     Physical Exam   Constitutional: She is oriented to person, place, and time. She appears well-developed and well-nourished. No distress.   HENT:   Head: Normocephalic and atraumatic.   Eyes: Conjunctivae are normal. No scleral icterus.   Neck: Neck supple. No JVD present.   Cardiovascular: Normal rate, regular rhythm and normal heart sounds.  Exam reveals no gallop and no friction rub.    No murmur heard.  Pulmonary/Chest: Effort normal and breath sounds normal. No respiratory distress. She exhibits no tenderness.   Abdominal: Soft. Bowel sounds are normal. She exhibits no distension. There is no guarding.   Musculoskeletal: She exhibits no edema or tenderness.   Neurological: She is alert and oriented to person, place, and time. No cranial nerve deficit.    Skin: Skin is warm and dry. She is not diaphoretic. No erythema. No pallor.   icthyosis lle, healing cellulitis rle     Psychiatric: She has a normal mood and affect. Her behavior is normal.   Nursing note and vitals reviewed.      Recent Labs      04/12/18   0045   WBC  5.4   RBC  2.91*   HEMOGLOBIN  8.8*   HEMATOCRIT  27.3*   MCV  93.8   MCH  30.2   MCHC  32.2*   RDW  53.9*   PLATELETCT  205   MPV  10.5     Recent Labs      04/12/18   0045   SODIUM  132*   POTASSIUM  4.0   CHLORIDE  100   CO2  27   GLUCOSE  343*   BUN  19   CREATININE  1.02   CALCIUM  8.2*                      Assessment/Plan     * Pathologic fracture- (present on admission)   Assessment & Plan    -widespread  -already received BM bx, confirms diagnosis  -will need radiation and chemotherapy  -pain control as outlined above  -Dr Gandhi to perform 5 fractions to left humerus fracture (started tx 4/13/18)  -Had been seen by Dr Forbes in the past. - no surgical intervention          Anxiety   Assessment & Plan    Exacerbated by situation and steroids  Decrease steroid dose  Xanax prn          Chronic venous stasis dermatitis of both lower extremities- (present on admission)   Assessment & Plan    -wound care, no clear e/o active infection at this time, defer abx's        Multiple myeloma (CMS-HCC)- (present on admission)   Assessment & Plan    -recent diagnosis, appears aggressive, now with innumerable lytic lesions  -admit to the oncology floor  -Dr powers and Oksana consulted for assistance in management  -IV steroids, IV dilaudid PRN, oxy prn  -start oxycontin CR 10 mg BID  -skeletal survey done - spine obscured by body habitus        Anemia- (present on admission)   Assessment & Plan    -related to her MM, appears near her baseline, no e/o overt bleeding at this time, monitor closely        Hyponatremia- (present on admission)   Assessment & Plan    -mild, 2/2 hypovolemia, gentle IVF's with NS, check levels in the AM        Morbid obesity with  BMI of 60.0-69.9, adult (CMS-HCC)- (present on admission)   Assessment & Plan    -encourage weight loss        DM type 2 (diabetes mellitus, type 2) (CMS-HCC)- (present on admission)   Assessment & Plan    -continue outpatient insulin, ISS        Essential hypertension, benign- (present on admission)   Assessment & Plan    High with bradycardia  Schedule po hydralazine, decrease toprol xl to 100mg daily  Clonidine bid  Terazosin qhs          Quality-Core Measures   Reviewed items::  Labs reviewed, Medications reviewed and Radiology images reviewed  Singh catheter::  Urinary Tract Retention or Urinary Tract Obstruction  DVT prophylaxis pharmacological::  Heparin

## 2018-04-14 NOTE — PROGRESS NOTES
Patient sat up in chair for longer period of time today. Mood seems depressed and cries with friends who visit. Feels helpless. Xanax offered for patient and on MAR as prn.

## 2018-04-15 LAB
GLUCOSE BLD-MCNC: 117 MG/DL (ref 65–99)
GLUCOSE BLD-MCNC: 136 MG/DL (ref 65–99)
GLUCOSE BLD-MCNC: 148 MG/DL (ref 65–99)
GLUCOSE BLD-MCNC: 81 MG/DL (ref 65–99)

## 2018-04-15 PROCEDURE — 99232 SBSQ HOSP IP/OBS MODERATE 35: CPT | Performed by: INTERNAL MEDICINE

## 2018-04-15 PROCEDURE — 700102 HCHG RX REV CODE 250 W/ 637 OVERRIDE(OP): Performed by: INTERNAL MEDICINE

## 2018-04-15 PROCEDURE — 700111 HCHG RX REV CODE 636 W/ 250 OVERRIDE (IP): Performed by: INTERNAL MEDICINE

## 2018-04-15 PROCEDURE — 82962 GLUCOSE BLOOD TEST: CPT | Mod: 91

## 2018-04-15 PROCEDURE — 770004 HCHG ROOM/CARE - ONCOLOGY PRIVATE *

## 2018-04-15 PROCEDURE — 700111 HCHG RX REV CODE 636 W/ 250 OVERRIDE (IP): Performed by: STUDENT IN AN ORGANIZED HEALTH CARE EDUCATION/TRAINING PROGRAM

## 2018-04-15 PROCEDURE — A9270 NON-COVERED ITEM OR SERVICE: HCPCS | Performed by: INTERNAL MEDICINE

## 2018-04-15 RX ORDER — HYDRALAZINE HYDROCHLORIDE 10 MG/1
50 TABLET, FILM COATED ORAL EVERY 8 HOURS
Status: DISCONTINUED | OUTPATIENT
Start: 2018-04-15 | End: 2018-05-02

## 2018-04-15 RX ADMIN — ACETAMINOPHEN 1000 MG: 500 TABLET ORAL at 06:11

## 2018-04-15 RX ADMIN — HYDRALAZINE HYDROCHLORIDE 50 MG: 10 TABLET, FILM COATED ORAL at 13:10

## 2018-04-15 RX ADMIN — ACYCLOVIR 400 MG: 200 CAPSULE ORAL at 21:40

## 2018-04-15 RX ADMIN — ACETAMINOPHEN 1000 MG: 500 TABLET ORAL at 13:10

## 2018-04-15 RX ADMIN — CLONIDINE HYDROCHLORIDE 0.2 MG: 0.1 TABLET ORAL at 21:22

## 2018-04-15 RX ADMIN — HEPARIN SODIUM 5000 UNITS: 5000 INJECTION, SOLUTION INTRAVENOUS; SUBCUTANEOUS at 06:11

## 2018-04-15 RX ADMIN — OXYCODONE HYDROCHLORIDE 10 MG: 10 TABLET, FILM COATED, EXTENDED RELEASE ORAL at 21:40

## 2018-04-15 RX ADMIN — INSULIN GLARGINE 40 UNITS: 100 INJECTION, SOLUTION SUBCUTANEOUS at 18:27

## 2018-04-15 RX ADMIN — TERAZOSIN HYDROCHLORIDE ANHYDROUS 10 MG: 5 CAPSULE ORAL at 21:42

## 2018-04-15 RX ADMIN — HEPARIN SODIUM 5000 UNITS: 5000 INJECTION, SOLUTION INTRAVENOUS; SUBCUTANEOUS at 13:09

## 2018-04-15 RX ADMIN — METOPROLOL SUCCINATE 100 MG: 50 TABLET, EXTENDED RELEASE ORAL at 09:01

## 2018-04-15 RX ADMIN — HEPARIN SODIUM 5000 UNITS: 5000 INJECTION, SOLUTION INTRAVENOUS; SUBCUTANEOUS at 21:39

## 2018-04-15 RX ADMIN — ACETAMINOPHEN 1000 MG: 500 TABLET ORAL at 21:39

## 2018-04-15 RX ADMIN — Medication: at 21:41

## 2018-04-15 RX ADMIN — DEXAMETHASONE 2 MG: 1 TABLET ORAL at 21:42

## 2018-04-15 RX ADMIN — HYDRALAZINE HYDROCHLORIDE 50 MG: 10 TABLET, FILM COATED ORAL at 21:22

## 2018-04-15 RX ADMIN — INSULIN GLARGINE 40 UNITS: 100 INJECTION, SOLUTION SUBCUTANEOUS at 09:02

## 2018-04-15 RX ADMIN — DEXAMETHASONE 2 MG: 1 TABLET ORAL at 09:00

## 2018-04-15 RX ADMIN — OXYCODONE HYDROCHLORIDE 10 MG: 10 TABLET, FILM COATED, EXTENDED RELEASE ORAL at 09:01

## 2018-04-15 RX ADMIN — ACYCLOVIR 400 MG: 200 CAPSULE ORAL at 09:00

## 2018-04-15 RX ADMIN — HYDRALAZINE HYDROCHLORIDE 10 MG: 20 INJECTION INTRAMUSCULAR; INTRAVENOUS at 21:30

## 2018-04-15 RX ADMIN — Medication: at 08:59

## 2018-04-15 RX ADMIN — ONDANSETRON 4 MG: 4 TABLET, ORALLY DISINTEGRATING ORAL at 06:05

## 2018-04-15 RX ADMIN — ONDANSETRON 4 MG: 4 TABLET, ORALLY DISINTEGRATING ORAL at 21:16

## 2018-04-15 RX ADMIN — CLONIDINE HYDROCHLORIDE 0.2 MG: 0.1 TABLET ORAL at 09:00

## 2018-04-15 RX ADMIN — PRAVASTATIN SODIUM 40 MG: 20 TABLET ORAL at 21:39

## 2018-04-15 ASSESSMENT — ENCOUNTER SYMPTOMS
ABDOMINAL PAIN: 0
HEADACHES: 0
BLURRED VISION: 0
NERVOUS/ANXIOUS: 1
CHILLS: 0
MYALGIAS: 1
VOMITING: 0
WEAKNESS: 1
RESPIRATORY NEGATIVE: 1
BACK PAIN: 1
GASTROINTESTINAL NEGATIVE: 1
PHOTOPHOBIA: 0
COUGH: 0
CONSTIPATION: 0
CLAUDICATION: 0
SENSORY CHANGE: 0
DIZZINESS: 0
DIARRHEA: 0
CARDIOVASCULAR NEGATIVE: 1
SPEECH CHANGE: 0
DEPRESSION: 0
SHORTNESS OF BREATH: 0
HEARTBURN: 0
EYES NEGATIVE: 1
FEVER: 0
INSOMNIA: 0

## 2018-04-15 ASSESSMENT — LIFESTYLE VARIABLES
DO YOU DRINK ALCOHOL: NO
DO YOU DRINK ALCOHOL: NO

## 2018-04-15 ASSESSMENT — PAIN SCALES - GENERAL
PAINLEVEL_OUTOF10: 6
PAINLEVEL_OUTOF10: 6
PAINLEVEL_OUTOF10: 0
PAINLEVEL_OUTOF10: 7

## 2018-04-15 NOTE — PROGRESS NOTES
Received report from day RN, assumed care of PT at 1900. PT A&Ox 4, sitting up in wheelchair eating dinner, nava to gravity,  on, NC @2L discussed plan of care for this shift.  Pain managed with meds per MAR. PT up Max x2-3, safety precautions in place. Call light and personal possessions within reach.

## 2018-04-15 NOTE — CARE PLAN
Problem: Infection  Goal: Will remain free from infection  Outcome: PROGRESSING AS EXPECTED  Assess pt for signs and symptoms of infection. Patient has nava in place.     Problem: Skin Integrity  Goal: Risk for impaired skin integrity will decrease  Outcome: PROGRESSING AS EXPECTED  Pt has difficulty ambulating. Lower extremities are dry, swollen, and flaky. Ammonium lactate lotion is administered per MAR.

## 2018-04-15 NOTE — PROGRESS NOTES
Oncology/Hematology Progress Note               Author: Darron Núñez Date & Time created: 4/15/2018  2:19 PM     CC:   Multiple myeloma    Interval History:      She does report anything new overnight. She is doing okay. She tells me they had to re-map her left arm for radiation.    Review of Systems:  Review of Systems   Constitutional: Positive for malaise/fatigue. Negative for chills and fever.   HENT: Negative.    Eyes: Negative.    Respiratory: Negative.    Cardiovascular: Negative.    Gastrointestinal: Negative.    Genitourinary: Negative.    Musculoskeletal: Positive for joint pain and myalgias.   Skin: Negative.    Neurological: Positive for weakness.   Endo/Heme/Allergies: Negative.        Physical Exam:  Physical Exam   Constitutional: She is oriented to person, place, and time.   Morbidly obese   HENT:   Head: Normocephalic.   Eyes: Conjunctivae are normal. Pupils are equal, round, and reactive to light.   Cardiovascular: Normal rate, regular rhythm and normal heart sounds.    Pulmonary/Chest: Effort normal and breath sounds normal.   Abdominal: Soft. Bowel sounds are normal.   Musculoskeletal: She exhibits edema.   She's had chronic edema symmetrical   Neurological: She is alert and oriented to person, place, and time.   Psychiatric: She has a normal mood and affect. Her behavior is normal. Judgment and thought content normal.       Labs:        Invalid input(s): [id number]      No results for input(s): SODIUM, POTASSIUM, CHLORIDE, CO2, BUN, CREATININE, MAGNESIUM, PHOSPHORUS, CALCIUM in the last 72 hours.  No results for input(s): ALTSGPT, ASTSGOT, ALKPHOSPHAT, TBILIRUBIN, DBILIRUBIN, GAMMAGT, AMYLASE, LIPASE, ALB, PREALBUMIN, GLUCOSE in the last 72 hours.  No results for input(s): RBC, HEMOGLOBIN, HEMATOCRIT, PLATELETCT, PROTHROMBTM, APTT, INR, IRON, FERRITIN, TOTIRONBC in the last 72 hours.          Hemodynamics:  Temp (24hrs), Av.7 °C (98 °F), Min:36.6 °C (97.8 °F), Max:36.9 °C (98.5  °F)  Temperature: 36.9 °C (98.5 °F)  Pulse  Av.7  Min: 38  Max: 74   Blood Pressure : (!) 169/63     Respiratory:    Respiration: 16, Pulse Oximetry: 98 %     Work Of Breathing / Effort: Moderate  RUL Breath Sounds: Diminished, RML Breath Sounds: Diminished, RLL Breath Sounds: Diminished, CHARLOTTE Breath Sounds: Diminished, LLL Breath Sounds: Diminished  Fluids:    Intake/Output Summary (Last 24 hours) at 18 1124  Last data filed at 18 0300   Gross per 24 hour   Intake                0 ml   Output             1000 ml   Net            -1000 ml       GI/Nutrition:  Orders Placed This Encounter   Procedures   • Diet Order     Standing Status:   Standing     Number of Occurrences:   1     Order Specific Question:   Diet:     Answer:   Diabetic [3]     Medical Decision Making, by Problem:  Active Hospital Problems    Diagnosis   • *Pathologic fracture [M84.40XA]   • Hyponatremia [E87.1]   • Anemia [D64.9]   • Multiple myeloma (CMS-HCC) [C90.00]   • Chronic venous stasis dermatitis of both lower extremities [I87.2]   • Morbid obesity with BMI of 60.0-69.9, adult (CMS-HCC) [E66.01, Z68.44]   • DM type 2 (diabetes mellitus, type 2) (CMS-HCC) [E11.9]   • Essential hypertension, benign [I10]     Past medical history, past surgical history, past social history unchanged reviewed    Plan:  1.  Multiple myeloma: 1P deletion intermediate risk, stage III based on ISS staging (beta 2 9.9 and albumin 2.7 before):     I discussed with her that some of her repeat myeloma lab work so far that's been coming in is better. Serum protein shows a M spike a little over 2 g and it was 3.6 g in January. She's been on Decadron. I told her definitely Decadron can treat myeloma with response rates of single agent. I told her overall think she is stable. She understands that Dr. Sim will be coming on Monday and can formalize her final treatment plan which most likely will include Velcade based therapy when he feels comfortable  since she is on radiation right now.    We went ahead and started on acyclovir prophylaxis in anticipation for Velcade based therapy. I discussed all this with her today. She also understands that she will get bisphosphonate therapy while she is here in the hospital. She understands that we typically would do Zometa or XGEVA as an outpatient. She understands in the hospital Dr. Sim chooses then we would do Aredia if she states her long enough.    We discussed her skeletal survey as well. She understands that  in radiation oncology will most likely give her prophylactic radiation to her femurs as well. I told her he most likely will go over this with her next week.    2.  Anemia:  She should have a follow-up CBC. She had a hemoglobin 8.8 back on the 12.    3.  History of B12 deficiency: Her levels look good. She can provide continue with once monthly B12 injections.    4.  Extremities: She's had chronic lower extremity edema. Seems like her cellulitis has resolved. We'll just need to be followed closely.       High complexity/scheduled with her primary team/radiation oncology  Quality-Core Measures

## 2018-04-15 NOTE — PROGRESS NOTES
"BP (!) 169/63   Pulse (!) 58   Temp 36.9 °C (98.5 °F)   Resp 16   Ht 1.6 m (5' 2.99\")   Wt (!) 164.7 kg (363 lb)   LMP 04/11/1994   SpO2 98%   Breastfeeding? No   BMI 64.32 kg/m² Pt is AOx4. Sitting up in bed. Denies pain. Denies nausea. Family is at bedside.  "

## 2018-04-15 NOTE — CARE PLAN
"Problem: Pain Management  Goal: Pain level will decrease to patient's comfort goal  Outcome: PROGRESSING AS EXPECTED  Pt states pain in right shin and left arm. Medications administered per MAR.    Problem: Psychosocial Needs:  Goal: Level of anxiety will decrease  Outcome: PROGRESSING AS EXPECTED  Pt states, \"I feel weird today.\" She states, \"I used to the helper, not the one being helped.\" One- on one discussion. Family is visiting and blinds were open to help with anxiety/ depressions. Medications are administered per MAR.      "

## 2018-04-15 NOTE — CARE PLAN
Problem: Safety  Goal: Will remain free from injury  Outcome: PROGRESSING AS EXPECTED  Educated PT on use of call light   PT oriented to room, all possessions within reach, call light within reach   Slip resistant socks on PT (yellow if fall risk)        Problem: Mobility  Goal: Risk for activity intolerance will decrease  Outcome: PROGRESSING SLOWER THAN EXPECTED  Pt ambulated back to bed requiring maximum assistance  Educated pt on how to assist with ambulation

## 2018-04-16 ENCOUNTER — HOSPITAL ENCOUNTER (OUTPATIENT)
Dept: RADIATION ONCOLOGY | Facility: MEDICAL CENTER | Age: 68
End: 2018-04-16

## 2018-04-16 LAB
ALBUMIN SERPL BCP-MCNC: 2.5 G/DL (ref 3.2–4.9)
ALBUMIN/GLOB SERPL: 0.7 G/DL
ALP SERPL-CCNC: 73 U/L (ref 30–99)
ALT SERPL-CCNC: 8 U/L (ref 2–50)
ANION GAP SERPL CALC-SCNC: 4 MMOL/L (ref 0–11.9)
AST SERPL-CCNC: 9 U/L (ref 12–45)
BASOPHILS # BLD AUTO: 0.1 % (ref 0–1.8)
BASOPHILS # BLD: 0.01 K/UL (ref 0–0.12)
BILIRUB SERPL-MCNC: 0.5 MG/DL (ref 0.1–1.5)
BUN SERPL-MCNC: 27 MG/DL (ref 8–22)
CALCIUM SERPL-MCNC: 7.4 MG/DL (ref 8.5–10.5)
CHLORIDE SERPL-SCNC: 100 MMOL/L (ref 96–112)
CO2 SERPL-SCNC: 32 MMOL/L (ref 20–33)
CREAT SERPL-MCNC: 1 MG/DL (ref 0.5–1.4)
EOSINOPHIL # BLD AUTO: 0.02 K/UL (ref 0–0.51)
EOSINOPHIL NFR BLD: 0.3 % (ref 0–6.9)
ERYTHROCYTE [DISTWIDTH] IN BLOOD BY AUTOMATED COUNT: 55.1 FL (ref 35.9–50)
GLOBULIN SER CALC-MCNC: 3.5 G/DL (ref 1.9–3.5)
GLUCOSE BLD-MCNC: 152 MG/DL (ref 65–99)
GLUCOSE BLD-MCNC: 196 MG/DL (ref 65–99)
GLUCOSE BLD-MCNC: 209 MG/DL (ref 65–99)
GLUCOSE BLD-MCNC: 60 MG/DL (ref 65–99)
GLUCOSE BLD-MCNC: 99 MG/DL (ref 65–99)
GLUCOSE SERPL-MCNC: 90 MG/DL (ref 65–99)
HCT VFR BLD AUTO: 29.5 % (ref 37–47)
HGB BLD-MCNC: 9.6 G/DL (ref 12–16)
IMM GRANULOCYTES # BLD AUTO: 0.04 K/UL (ref 0–0.11)
IMM GRANULOCYTES NFR BLD AUTO: 0.6 % (ref 0–0.9)
LYMPHOCYTES # BLD AUTO: 1.07 K/UL (ref 1–4.8)
LYMPHOCYTES NFR BLD: 15.4 % (ref 22–41)
MCH RBC QN AUTO: 31.1 PG (ref 27–33)
MCHC RBC AUTO-ENTMCNC: 32.5 G/DL (ref 33.6–35)
MCV RBC AUTO: 95.5 FL (ref 81.4–97.8)
MONOCYTES # BLD AUTO: 0.54 K/UL (ref 0–0.85)
MONOCYTES NFR BLD AUTO: 7.8 % (ref 0–13.4)
NEUTROPHILS # BLD AUTO: 5.28 K/UL (ref 2–7.15)
NEUTROPHILS NFR BLD: 75.8 % (ref 44–72)
NRBC # BLD AUTO: 0 K/UL
NRBC BLD-RTO: 0 /100 WBC
PLATELET # BLD AUTO: 171 K/UL (ref 164–446)
PMV BLD AUTO: 10.6 FL (ref 9–12.9)
POTASSIUM SERPL-SCNC: 3.4 MMOL/L (ref 3.6–5.5)
PROT SERPL-MCNC: 6 G/DL (ref 6–8.2)
RBC # BLD AUTO: 3.09 M/UL (ref 4.2–5.4)
SODIUM SERPL-SCNC: 136 MMOL/L (ref 135–145)
WBC # BLD AUTO: 7 K/UL (ref 4.8–10.8)

## 2018-04-16 PROCEDURE — A9270 NON-COVERED ITEM OR SERVICE: HCPCS | Performed by: INTERNAL MEDICINE

## 2018-04-16 PROCEDURE — 77412 RADIATION TX DELIVERY LVL 3: CPT | Performed by: RADIOLOGY

## 2018-04-16 PROCEDURE — 77417 THER RADIOLOGY PORT IMAGE(S): CPT | Performed by: RADIOLOGY

## 2018-04-16 PROCEDURE — 700102 HCHG RX REV CODE 250 W/ 637 OVERRIDE(OP): Performed by: INTERNAL MEDICINE

## 2018-04-16 PROCEDURE — 85025 COMPLETE CBC W/AUTO DIFF WBC: CPT

## 2018-04-16 PROCEDURE — 82962 GLUCOSE BLOOD TEST: CPT | Mod: 91

## 2018-04-16 PROCEDURE — 80053 COMPREHEN METABOLIC PANEL: CPT

## 2018-04-16 PROCEDURE — 36415 COLL VENOUS BLD VENIPUNCTURE: CPT

## 2018-04-16 PROCEDURE — 770004 HCHG ROOM/CARE - ONCOLOGY PRIVATE *

## 2018-04-16 PROCEDURE — 700111 HCHG RX REV CODE 636 W/ 250 OVERRIDE (IP): Performed by: INTERNAL MEDICINE

## 2018-04-16 PROCEDURE — 99232 SBSQ HOSP IP/OBS MODERATE 35: CPT | Performed by: INTERNAL MEDICINE

## 2018-04-16 RX ORDER — CLONIDINE HYDROCHLORIDE 0.1 MG/1
0.2 TABLET ORAL 3 TIMES DAILY
Status: DISCONTINUED | OUTPATIENT
Start: 2018-04-17 | End: 2018-05-02

## 2018-04-16 RX ORDER — INSULIN GLARGINE 100 [IU]/ML
40 INJECTION, SOLUTION SUBCUTANEOUS EVERY EVENING
Status: DISCONTINUED | OUTPATIENT
Start: 2018-04-16 | End: 2018-04-19

## 2018-04-16 RX ORDER — LISINOPRIL 20 MG/1
20 TABLET ORAL
Status: DISCONTINUED | OUTPATIENT
Start: 2018-04-17 | End: 2018-05-02

## 2018-04-16 RX ADMIN — INSULIN HUMAN 2 UNITS: 100 INJECTION, SOLUTION PARENTERAL at 22:34

## 2018-04-16 RX ADMIN — ONDANSETRON 4 MG: 2 INJECTION INTRAMUSCULAR; INTRAVENOUS at 14:11

## 2018-04-16 RX ADMIN — OXYCODONE HYDROCHLORIDE 10 MG: 10 TABLET, FILM COATED, EXTENDED RELEASE ORAL at 22:28

## 2018-04-16 RX ADMIN — HYDRALAZINE HYDROCHLORIDE 50 MG: 10 TABLET, FILM COATED ORAL at 14:05

## 2018-04-16 RX ADMIN — ONDANSETRON 4 MG: 4 TABLET, ORALLY DISINTEGRATING ORAL at 06:05

## 2018-04-16 RX ADMIN — INSULIN GLARGINE 40 UNITS: 100 INJECTION, SOLUTION SUBCUTANEOUS at 22:37

## 2018-04-16 RX ADMIN — PRAVASTATIN SODIUM 40 MG: 20 TABLET ORAL at 22:27

## 2018-04-16 RX ADMIN — ACYCLOVIR 400 MG: 200 CAPSULE ORAL at 22:29

## 2018-04-16 RX ADMIN — HEPARIN SODIUM 5000 UNITS: 5000 INJECTION, SOLUTION INTRAVENOUS; SUBCUTANEOUS at 22:28

## 2018-04-16 RX ADMIN — METOPROLOL SUCCINATE 100 MG: 50 TABLET, EXTENDED RELEASE ORAL at 08:33

## 2018-04-16 RX ADMIN — ACETAMINOPHEN 1000 MG: 500 TABLET ORAL at 12:42

## 2018-04-16 RX ADMIN — HYDRALAZINE HYDROCHLORIDE 50 MG: 10 TABLET, FILM COATED ORAL at 23:51

## 2018-04-16 RX ADMIN — HYDRALAZINE HYDROCHLORIDE 50 MG: 10 TABLET, FILM COATED ORAL at 06:14

## 2018-04-16 RX ADMIN — INSULIN HUMAN 2 UNITS: 100 INJECTION, SOLUTION PARENTERAL at 12:40

## 2018-04-16 RX ADMIN — Medication: at 22:28

## 2018-04-16 RX ADMIN — OXYCODONE HYDROCHLORIDE 10 MG: 10 TABLET, FILM COATED, EXTENDED RELEASE ORAL at 08:25

## 2018-04-16 RX ADMIN — ACETAMINOPHEN 1000 MG: 500 TABLET ORAL at 06:14

## 2018-04-16 RX ADMIN — CLONIDINE HYDROCHLORIDE 0.2 MG: 0.1 TABLET ORAL at 08:25

## 2018-04-16 RX ADMIN — TERAZOSIN HYDROCHLORIDE ANHYDROUS 10 MG: 5 CAPSULE ORAL at 22:26

## 2018-04-16 RX ADMIN — DEXAMETHASONE 2 MG: 1 TABLET ORAL at 22:26

## 2018-04-16 RX ADMIN — HEPARIN SODIUM 5000 UNITS: 5000 INJECTION, SOLUTION INTRAVENOUS; SUBCUTANEOUS at 06:16

## 2018-04-16 RX ADMIN — Medication: at 08:24

## 2018-04-16 RX ADMIN — HEPARIN SODIUM 5000 UNITS: 5000 INJECTION, SOLUTION INTRAVENOUS; SUBCUTANEOUS at 14:05

## 2018-04-16 RX ADMIN — INSULIN HUMAN 3 UNITS: 100 INJECTION, SOLUTION PARENTERAL at 17:58

## 2018-04-16 RX ADMIN — DEXAMETHASONE 2 MG: 1 TABLET ORAL at 08:25

## 2018-04-16 RX ADMIN — ACYCLOVIR 400 MG: 200 CAPSULE ORAL at 08:25

## 2018-04-16 RX ADMIN — ACETAMINOPHEN 1000 MG: 500 TABLET ORAL at 23:51

## 2018-04-16 RX ADMIN — ACETAMINOPHEN 1000 MG: 500 TABLET ORAL at 17:57

## 2018-04-16 ASSESSMENT — ENCOUNTER SYMPTOMS
WEAKNESS: 1
ABDOMINAL PAIN: 0
CONSTIPATION: 0
SPEECH CHANGE: 0
HEADACHES: 0
FEVER: 0
BLURRED VISION: 0
COUGH: 0
PHOTOPHOBIA: 0
CLAUDICATION: 0
SHORTNESS OF BREATH: 0
DIZZINESS: 0
BACK PAIN: 0
SENSORY CHANGE: 0
DIARRHEA: 0
DEPRESSION: 0
MYALGIAS: 1
CHILLS: 0
VOMITING: 0
NERVOUS/ANXIOUS: 1
INSOMNIA: 0
HEARTBURN: 0

## 2018-04-16 ASSESSMENT — LIFESTYLE VARIABLES: DO YOU DRINK ALCOHOL: NO

## 2018-04-16 ASSESSMENT — PAIN SCALES - GENERAL
PAINLEVEL_OUTOF10: 2
PAINLEVEL_OUTOF10: 1
PAINLEVEL_OUTOF10: 5
PAINLEVEL_OUTOF10: 2
PAINLEVEL_OUTOF10: 3

## 2018-04-16 NOTE — CARE PLAN
Problem: Urinary Elimination:  Goal: Ability to reestablish a normal urinary elimination pattern will improve  Outcome: NOT MET  Nava in place  Educated pt on infection risk of nava and the benefits of having it removed.

## 2018-04-16 NOTE — PROGRESS NOTES
"Received report from day RN, assumed care of PT at 1900. PT is on the phone and tearful, I told her I would return when she got off the phone.     Once off the phone pt is A&Ox 4, sitting up in bed working on her dinner, still a bit tearful but we were able to have a conversation about why she feels \"sad\". She says she feels very out of control with her current situation and it's frustrating to her. Offered medication for anxiety, she declined at this time. Pt was able to be re-directed into self guided activity and her mood improved. Further discussed plan of care for this shift.  Pain managed with meds per MAR. PT up Max x2-3, safety precautions in place. Call light and personal possessions within reach hourly rounding in place.    "

## 2018-04-16 NOTE — CARE PLAN
Problem: Pain Management  Goal: Pain level will decrease to patient's comfort goal  Outcome: PROGRESSING AS EXPECTED  Pt complains about pain. Med per MAR.     Problem: Skin Integrity  Goal: Risk for impaired skin integrity will decrease  Outcome: PROGRESSING AS EXPECTED  Bariatric bed in place. Towel under pannus and between legs. Pt refuses intradry.    Problem: Urinary Elimination:  Goal: Ability to reestablish a normal urinary elimination pattern will improve  Outcome: PROGRESSING AS EXPECTED  Singh in place draining to gravity.

## 2018-04-16 NOTE — PROGRESS NOTES
Assumed care of pt @0700. Bedside report received. Pt AOX 4. Pt complains about pain. Scheduled Oxycodone CR given. Singh in place draining to gravity. Towel under pannus. Pt refuses intradry. Pt 3-4 pt assist. Radiation this afternoon.   Fall precaution in place. POC discussed with pt, all questions answered at this time. Pt makes needs known, call light within reach, hourly rounding in place.

## 2018-04-17 ENCOUNTER — HOSPITAL ENCOUNTER (OUTPATIENT)
Dept: RADIATION ONCOLOGY | Facility: MEDICAL CENTER | Age: 68
End: 2018-04-17

## 2018-04-17 LAB
ALBUMIN SERPL BCP-MCNC: 2.3 G/DL (ref 3.2–4.9)
ALBUMIN/GLOB SERPL: 0.7 G/DL
ALP SERPL-CCNC: 61 U/L (ref 30–99)
ALT SERPL-CCNC: 8 U/L (ref 2–50)
ANION GAP SERPL CALC-SCNC: 5 MMOL/L (ref 0–11.9)
AST SERPL-CCNC: 8 U/L (ref 12–45)
BASOPHILS # BLD AUTO: 0 % (ref 0–1.8)
BASOPHILS # BLD: 0 K/UL (ref 0–0.12)
BILIRUB SERPL-MCNC: 0.4 MG/DL (ref 0.1–1.5)
BUN SERPL-MCNC: 24 MG/DL (ref 8–22)
CALCIUM SERPL-MCNC: 7.4 MG/DL (ref 8.5–10.5)
CHLORIDE SERPL-SCNC: 100 MMOL/L (ref 96–112)
CO2 SERPL-SCNC: 33 MMOL/L (ref 20–33)
CREAT SERPL-MCNC: 1.06 MG/DL (ref 0.5–1.4)
EOSINOPHIL # BLD AUTO: 0.03 K/UL (ref 0–0.51)
EOSINOPHIL NFR BLD: 0.5 % (ref 0–6.9)
ERYTHROCYTE [DISTWIDTH] IN BLOOD BY AUTOMATED COUNT: 55.1 FL (ref 35.9–50)
GLOBULIN SER CALC-MCNC: 3.5 G/DL (ref 1.9–3.5)
GLUCOSE BLD-MCNC: 102 MG/DL (ref 65–99)
GLUCOSE BLD-MCNC: 230 MG/DL (ref 65–99)
GLUCOSE BLD-MCNC: 231 MG/DL (ref 65–99)
GLUCOSE BLD-MCNC: 259 MG/DL (ref 65–99)
GLUCOSE SERPL-MCNC: 131 MG/DL (ref 65–99)
HCT VFR BLD AUTO: 29 % (ref 37–47)
HGB BLD-MCNC: 9.3 G/DL (ref 12–16)
IMM GRANULOCYTES # BLD AUTO: 0.03 K/UL (ref 0–0.11)
IMM GRANULOCYTES NFR BLD AUTO: 0.5 % (ref 0–0.9)
LYMPHOCYTES # BLD AUTO: 1.04 K/UL (ref 1–4.8)
LYMPHOCYTES NFR BLD: 18.5 % (ref 22–41)
MCH RBC QN AUTO: 30.7 PG (ref 27–33)
MCHC RBC AUTO-ENTMCNC: 32.1 G/DL (ref 33.6–35)
MCV RBC AUTO: 95.7 FL (ref 81.4–97.8)
MONOCYTES # BLD AUTO: 0.42 K/UL (ref 0–0.85)
MONOCYTES NFR BLD AUTO: 7.5 % (ref 0–13.4)
NEUTROPHILS # BLD AUTO: 4.11 K/UL (ref 2–7.15)
NEUTROPHILS NFR BLD: 73 % (ref 44–72)
NRBC # BLD AUTO: 0 K/UL
NRBC BLD-RTO: 0 /100 WBC
PLATELET # BLD AUTO: 163 K/UL (ref 164–446)
PMV BLD AUTO: 10.5 FL (ref 9–12.9)
POTASSIUM SERPL-SCNC: 3.5 MMOL/L (ref 3.6–5.5)
PROT SERPL-MCNC: 5.8 G/DL (ref 6–8.2)
RBC # BLD AUTO: 3.03 M/UL (ref 4.2–5.4)
SODIUM SERPL-SCNC: 138 MMOL/L (ref 135–145)
WBC # BLD AUTO: 5.6 K/UL (ref 4.8–10.8)

## 2018-04-17 PROCEDURE — A9270 NON-COVERED ITEM OR SERVICE: HCPCS | Performed by: HOSPITALIST

## 2018-04-17 PROCEDURE — 82962 GLUCOSE BLOOD TEST: CPT

## 2018-04-17 PROCEDURE — 77334 RADIATION TREATMENT AID(S): CPT | Performed by: RADIOLOGY

## 2018-04-17 PROCEDURE — 700111 HCHG RX REV CODE 636 W/ 250 OVERRIDE (IP): Performed by: INTERNAL MEDICINE

## 2018-04-17 PROCEDURE — 77412 RADIATION TX DELIVERY LVL 3: CPT | Performed by: RADIOLOGY

## 2018-04-17 PROCEDURE — 97530 THERAPEUTIC ACTIVITIES: CPT

## 2018-04-17 PROCEDURE — 36415 COLL VENOUS BLD VENIPUNCTURE: CPT

## 2018-04-17 PROCEDURE — 700102 HCHG RX REV CODE 250 W/ 637 OVERRIDE(OP): Performed by: INTERNAL MEDICINE

## 2018-04-17 PROCEDURE — A9270 NON-COVERED ITEM OR SERVICE: HCPCS | Performed by: INTERNAL MEDICINE

## 2018-04-17 PROCEDURE — 77290 THER RAD SIMULAJ FIELD CPLX: CPT | Mod: 26 | Performed by: RADIOLOGY

## 2018-04-17 PROCEDURE — 80053 COMPREHEN METABOLIC PANEL: CPT

## 2018-04-17 PROCEDURE — 77417 THER RADIOLOGY PORT IMAGE(S): CPT | Performed by: RADIOLOGY

## 2018-04-17 PROCEDURE — 770004 HCHG ROOM/CARE - ONCOLOGY PRIVATE *

## 2018-04-17 PROCEDURE — 77336 RADIATION PHYSICS CONSULT: CPT | Performed by: RADIOLOGY

## 2018-04-17 PROCEDURE — 700105 HCHG RX REV CODE 258: Performed by: INTERNAL MEDICINE

## 2018-04-17 PROCEDURE — DPY97ZZ CONTACT RADIATION OF FEMUR: ICD-10-PCS | Performed by: RADIOLOGY

## 2018-04-17 PROCEDURE — 77290 THER RAD SIMULAJ FIELD CPLX: CPT | Performed by: RADIOLOGY

## 2018-04-17 PROCEDURE — 85025 COMPLETE CBC W/AUTO DIFF WBC: CPT

## 2018-04-17 PROCEDURE — 77334 RADIATION TREATMENT AID(S): CPT | Mod: 26 | Performed by: RADIOLOGY

## 2018-04-17 PROCEDURE — 700102 HCHG RX REV CODE 250 W/ 637 OVERRIDE(OP): Performed by: HOSPITALIST

## 2018-04-17 PROCEDURE — 97535 SELF CARE MNGMENT TRAINING: CPT | Mod: XE

## 2018-04-17 PROCEDURE — 99232 SBSQ HOSP IP/OBS MODERATE 35: CPT | Performed by: HOSPITALIST

## 2018-04-17 RX ORDER — IBUPROFEN 200 MG
950 CAPSULE ORAL DAILY
Status: DISCONTINUED | OUTPATIENT
Start: 2018-04-17 | End: 2018-04-25

## 2018-04-17 RX ORDER — POTASSIUM CHLORIDE 20 MEQ/1
40 TABLET, EXTENDED RELEASE ORAL ONCE
Status: DISCONTINUED | OUTPATIENT
Start: 2018-04-17 | End: 2018-04-17

## 2018-04-17 RX ORDER — POTASSIUM CHLORIDE 20 MEQ/1
40 TABLET, EXTENDED RELEASE ORAL ONCE
Status: COMPLETED | OUTPATIENT
Start: 2018-04-17 | End: 2018-04-17

## 2018-04-17 RX ADMIN — POTASSIUM CHLORIDE 40 MEQ: 1500 TABLET, EXTENDED RELEASE ORAL at 15:36

## 2018-04-17 RX ADMIN — CALCIUM CITRATE 200 MG (950 MG) TABLET 950 MG: at 18:32

## 2018-04-17 RX ADMIN — Medication: at 08:49

## 2018-04-17 RX ADMIN — ACYCLOVIR 400 MG: 200 CAPSULE ORAL at 20:46

## 2018-04-17 RX ADMIN — METOPROLOL SUCCINATE 100 MG: 50 TABLET, EXTENDED RELEASE ORAL at 08:44

## 2018-04-17 RX ADMIN — ACYCLOVIR 400 MG: 200 CAPSULE ORAL at 08:44

## 2018-04-17 RX ADMIN — HEPARIN SODIUM 5000 UNITS: 5000 INJECTION, SOLUTION INTRAVENOUS; SUBCUTANEOUS at 05:39

## 2018-04-17 RX ADMIN — ACETAMINOPHEN 1000 MG: 500 TABLET ORAL at 11:38

## 2018-04-17 RX ADMIN — INSULIN HUMAN 3 UNITS: 100 INJECTION, SOLUTION PARENTERAL at 15:44

## 2018-04-17 RX ADMIN — HYDRALAZINE HYDROCHLORIDE 50 MG: 10 TABLET, FILM COATED ORAL at 15:36

## 2018-04-17 RX ADMIN — ALPRAZOLAM 0.5 MG: 0.5 TABLET ORAL at 11:39

## 2018-04-17 RX ADMIN — INSULIN HUMAN 3 UNITS: 100 INJECTION, SOLUTION PARENTERAL at 18:22

## 2018-04-17 RX ADMIN — HEPARIN SODIUM 5000 UNITS: 5000 INJECTION, SOLUTION INTRAVENOUS; SUBCUTANEOUS at 20:47

## 2018-04-17 RX ADMIN — INSULIN HUMAN 5 UNITS: 100 INJECTION, SOLUTION PARENTERAL at 20:48

## 2018-04-17 RX ADMIN — ACETAMINOPHEN 1000 MG: 500 TABLET ORAL at 05:40

## 2018-04-17 RX ADMIN — DEXAMETHASONE 2 MG: 1 TABLET ORAL at 08:49

## 2018-04-17 RX ADMIN — ACETAMINOPHEN 1000 MG: 500 TABLET ORAL at 23:50

## 2018-04-17 RX ADMIN — TERAZOSIN HYDROCHLORIDE ANHYDROUS 10 MG: 5 CAPSULE ORAL at 20:55

## 2018-04-17 RX ADMIN — INSULIN GLARGINE 40 UNITS: 100 INJECTION, SOLUTION SUBCUTANEOUS at 20:48

## 2018-04-17 RX ADMIN — LISINOPRIL 20 MG: 20 TABLET ORAL at 08:44

## 2018-04-17 RX ADMIN — Medication: at 20:53

## 2018-04-17 RX ADMIN — PAMIDRONATE DISODIUM 60 MG: 9 INJECTION, SOLUTION INTRAVENOUS at 18:34

## 2018-04-17 RX ADMIN — CLONIDINE HYDROCHLORIDE 0.2 MG: 0.1 TABLET ORAL at 08:44

## 2018-04-17 RX ADMIN — CLONIDINE HYDROCHLORIDE 0.2 MG: 0.1 TABLET ORAL at 15:36

## 2018-04-17 RX ADMIN — HYDRALAZINE HYDROCHLORIDE 50 MG: 10 TABLET, FILM COATED ORAL at 05:40

## 2018-04-17 RX ADMIN — HYDRALAZINE HYDROCHLORIDE 50 MG: 10 TABLET, FILM COATED ORAL at 20:47

## 2018-04-17 RX ADMIN — PRAVASTATIN SODIUM 40 MG: 20 TABLET ORAL at 20:47

## 2018-04-17 RX ADMIN — CLONIDINE HYDROCHLORIDE 0.2 MG: 0.1 TABLET ORAL at 20:46

## 2018-04-17 RX ADMIN — OXYCODONE HYDROCHLORIDE 10 MG: 10 TABLET, FILM COATED, EXTENDED RELEASE ORAL at 08:44

## 2018-04-17 RX ADMIN — DEXAMETHASONE 2 MG: 1 TABLET ORAL at 20:54

## 2018-04-17 RX ADMIN — HEPARIN SODIUM 5000 UNITS: 5000 INJECTION, SOLUTION INTRAVENOUS; SUBCUTANEOUS at 15:35

## 2018-04-17 RX ADMIN — ACETAMINOPHEN 1000 MG: 500 TABLET ORAL at 19:12

## 2018-04-17 RX ADMIN — OXYCODONE HYDROCHLORIDE 10 MG: 10 TABLET, FILM COATED, EXTENDED RELEASE ORAL at 20:46

## 2018-04-17 ASSESSMENT — ENCOUNTER SYMPTOMS
GASTROINTESTINAL NEGATIVE: 1
WEAKNESS: 1
CHILLS: 0
PHOTOPHOBIA: 0
DIZZINESS: 0
SPEECH CHANGE: 0
SHORTNESS OF BREATH: 1
INSOMNIA: 0
SENSORY CHANGE: 0
BACK PAIN: 0
HEARTBURN: 0
CONSTIPATION: 0
MYALGIAS: 1
BLURRED VISION: 0
DIARRHEA: 0
DEPRESSION: 0
EYES NEGATIVE: 1
NERVOUS/ANXIOUS: 1
FEVER: 0
VOMITING: 0
ABDOMINAL PAIN: 0
CARDIOVASCULAR NEGATIVE: 1
COUGH: 0
RESPIRATORY NEGATIVE: 1
HEADACHES: 0

## 2018-04-17 ASSESSMENT — GAIT ASSESSMENTS
GAIT LEVEL OF ASSIST: MINIMAL ASSIST
ASSISTIVE DEVICE: FRONT WHEEL WALKER
DEVIATION: BRADYKINETIC;SHUFFLED GAIT
DISTANCE (FEET): 2

## 2018-04-17 ASSESSMENT — COGNITIVE AND FUNCTIONAL STATUS - GENERAL
HELP NEEDED FOR BATHING: A LOT
TURNING FROM BACK TO SIDE WHILE IN FLAT BAD: UNABLE
MOBILITY SCORE: 10
SUGGESTED CMS G CODE MODIFIER DAILY ACTIVITY: CK
TOILETING: A LOT
SUGGESTED CMS G CODE MODIFIER MOBILITY: CL
WALKING IN HOSPITAL ROOM: A LOT
PERSONAL GROOMING: A LITTLE
MOVING TO AND FROM BED TO CHAIR: UNABLE
DAILY ACTIVITIY SCORE: 16
DRESSING REGULAR UPPER BODY CLOTHING: A LITTLE
STANDING UP FROM CHAIR USING ARMS: A LITTLE
CLIMB 3 TO 5 STEPS WITH RAILING: TOTAL
DRESSING REGULAR LOWER BODY CLOTHING: A LOT
MOVING FROM LYING ON BACK TO SITTING ON SIDE OF FLAT BED: A LOT

## 2018-04-17 ASSESSMENT — PAIN SCALES - GENERAL: PAINLEVEL_OUTOF10: 0

## 2018-04-17 NOTE — PROGRESS NOTES
Oncology/Hematology Progress Note               Author: Darron Núñez Date & Time created: 4/17/2018  4:25 PM     CC:   Multiple myeloma    Interval History:    Starting radiation therapy today to left humerus      Review of Systems:  Review of Systems   Constitutional: Positive for malaise/fatigue. Negative for chills and fever.   HENT: Negative.    Eyes: Negative.    Respiratory: Negative.    Cardiovascular: Negative.    Gastrointestinal: Negative.    Genitourinary: Negative.    Musculoskeletal: Positive for joint pain and myalgias.   Skin: Negative.    Neurological: Positive for weakness.   Endo/Heme/Allergies: Negative.        Physical Exam:  Physical Exam   Constitutional: She is oriented to person, place, and time.   Morbidly obese   HENT:   Head: Normocephalic.   Eyes: Conjunctivae are normal. Pupils are equal, round, and reactive to light.   Cardiovascular: Normal rate, regular rhythm and normal heart sounds.    Pulmonary/Chest: Effort normal and breath sounds normal.   Abdominal: Soft. Bowel sounds are normal.   Musculoskeletal: She exhibits edema.   She's had chronic edema symmetrical   Neurological: She is alert and oriented to person, place, and time.   Psychiatric: She has a normal mood and affect. Her behavior is normal. Judgment and thought content normal.       Labs:        Invalid input(s): [id number]      Recent Labs      04/16/18   0011  04/17/18   0004   SODIUM  136  138   POTASSIUM  3.4*  3.5*   CHLORIDE  100  100   CO2  32  33   BUN  27*  24*   CREATININE  1.00  1.06   CALCIUM  7.4*  7.4*     Recent Labs      04/16/18   0011  04/17/18   0004   ALTSGPT  8  8   ASTSGOT  9*  8*   ALKPHOSPHAT  73  61   TBILIRUBIN  0.5  0.4   GLUCOSE  90  131*     Recent Labs      04/16/18   0011  04/17/18   0004   RBC  3.09*  3.03*   HEMOGLOBIN  9.6*  9.3*   HEMATOCRIT  29.5*  29.0*   PLATELETCT  171  163*     Recent Labs      04/16/18   0011  04/17/18   0004   WBC  7.0  5.6   NEUTSPOLYS  75.80*  73.00*    LYMPHOCYTES  15.40*  18.50*   MONOCYTES  7.80  7.50   EOSINOPHILS  0.30  0.50   BASOPHILS  0.10  0.00   ASTSGOT  9*  8*   ALTSGPT  8  8   ALKPHOSPHAT  73  61   TBILIRUBIN  0.5  0.4     Recent Labs      18   0011  18   0004   SODIUM  136  138   POTASSIUM  3.4*  3.5*   CHLORIDE  100  100   CO2  32  33   GLUCOSE  90  131*   BUN  27*  24*   CREATININE  1.00  1.06   CALCIUM  7.4*  7.4*     Hemodynamics:  Temp (24hrs), Av.6 °C (97.9 °F), Min:36.1 °C (97 °F), Max:37 °C (98.6 °F)  Temperature: 36.1 °C (97 °F)  Pulse  Av.3  Min: 38  Max: 74   Blood Pressure : 153/66     Respiratory:    Respiration: 18, Pulse Oximetry: 95 %     Work Of Breathing / Effort: Moderate  RUL Breath Sounds: Diminished, RML Breath Sounds: Diminished, RLL Breath Sounds: Diminished, CHARLOTTE Breath Sounds: Diminished, LLL Breath Sounds: Diminished  Fluids:    Intake/Output Summary (Last 24 hours) at 18 1124  Last data filed at 18 0300   Gross per 24 hour   Intake                0 ml   Output             1000 ml   Net            -1000 ml     Weight: (!) 172.5 kg (380 lb 4.7 oz)  GI/Nutrition:  Orders Placed This Encounter   Procedures   • Diet Order     Standing Status:   Standing     Number of Occurrences:   1     Order Specific Question:   Diet:     Answer:   Diabetic [3]     Medical Decision Making, by Problem:  Active Hospital Problems    Diagnosis   • *Pathologic fracture [M84.40XA]   • Hyponatremia [E87.1]   • Anemia [D64.9]   • Multiple myeloma (CMS-[facility]) [C90.00]   • Chronic venous stasis dermatitis of both lower extremities [I87.2]   • Morbid obesity with BMI of 60.0-69.9, adult (CMS-HCC) [E66.01, Z68.44]   • DM type 2 (diabetes mellitus, type 2) (CMS-HCC) [E11.9]   • Essential hypertension, benign [I10]     Past medical history, past surgical history, past social history unchanged reviewed    Plan:  1.  Multiple myeloma: 1P deletion intermediate risk, stage III based on ISS staging (beta 2 9.9 and albumin 2.7  before):     I discussed with her that some of her repeat myeloma lab work so far that's been coming in is better. Serum protein shows a M spike a little over 2 g and it was 3.6 g in January. She's been on Decadron. She will start radiation therapy today. She certainly needs systemic treatment and CyBorD weekly would be a good option while she is in the hospital. This could certainly be started during her radiation. The difficulty will be one option to treat her with when she goes home to [place].    We went ahead and started on acyclovir prophylaxis in anticipation for Velcade based therapy. I discussed all this with her today. She also understands that she will get bisphosphonate therapy while she is here in the hospital. She understands that we typically would do Zometa or XGEVA as an outpatient. She should be started with Aredia since this is what is available in the hospital. We will check her ionized calcium since she has a low serum calcium 7.4 also a low albumin of 2.3    We discussed her skeletal survey as well. She understands that  in radiation oncology will most likely give her prophylactic radiation to her femurs as well. I told her he most likely will go over this with her next week.    2.  Anemia:  She should have a follow-up CBC. She had a hemoglobin 8.8 back on the 12.    3.  History of B12 deficiency: Her levels look good. She can provide continue with once monthly B12 injections.    4.  Extremities: She's had chronic lower extremity edema. Seems like her cellulitis has resolved. We'll just need to be followed closely.       High complexity/scheduled with her primary team/radiation oncology  Quality-Core Measures

## 2018-04-17 NOTE — PROGRESS NOTES
[facility] Hospitalist Progress Note    Date of Service: 4/16/2018    Chief Complaint  67 y.o. female admitted 4/10/2018 with intractible pain secondary to multiple myeloma, known fracture left humerus    Interval Problem Update  4/11 Patient feeling much less pain since admission and starting medications, she is still having significant pain and swelling left humerus fracture site and likely had pathologic fracture.  Dr Núñez and Oksana consulted for assistance.  She already has significant calculus surrounding the fracture site but also pain.    4/12 Patient had mapping done on humerus today and bony survey.  Symptoms doing about same, mediation helping.  Plan is to medically maximize patient in preparation to start chemotherapy on Monday.  4/13 Patient feeling okay but having significant anxiety - states normally she is calm but with this situation she is having difficulty.  Xanax low dose ordered PRN.  Radiation to resume Monday, first dose given today.  4/14 Patient feeling well, nava placed in facility prior to transfer d/t urinary incontinence and body habitus, she is a high risk for skin breakdown and nava to remain in place at this time.  No acute complaints.  BP high and heart rate low, reponded well to IV hydralazine overnight - scheduled PO today, decrease toprol xl to 100 mg daily.  4/15 Patient with friends visiting today, states she is feeling a bit tearful today but otherwise is feeling physically okay.  Her legs are healing well from recent cellulitis and she will resume radiation therapy tomorrow.  4/16 Patient doing about same, blood pressure has improved slightly with changes but still significantly elevated, will change her baseline clonidine to tid and add lisinopril qday.  Dr Sim to see patient today and decide when to proceed with chemo.    Consultants/Specialty  onc - [facility]    XRT - Oksana      Disposition  Tbd, lives in [place]        Review of Systems   Constitutional: Positive  for malaise/fatigue. Negative for chills and fever.   HENT: Negative for congestion.    Eyes: Negative for blurred vision and photophobia.   Respiratory: Negative for cough and shortness of breath.    Cardiovascular: Negative for chest pain, claudication and leg swelling.   Gastrointestinal: Negative for abdominal pain, constipation, diarrhea, heartburn and vomiting.   Genitourinary: Negative for dysuria and hematuria.   Musculoskeletal: Positive for myalgias. Negative for back pain and joint pain.   Skin: Negative for itching and rash.   Neurological: Positive for weakness. Negative for dizziness, sensory change, speech change and headaches.   Psychiatric/Behavioral: Negative for depression (tearful today). The patient is nervous/anxious. The patient does not have insomnia.       Physical Exam  Laboratory/Imaging   Hemodynamics  Temp (24hrs), Av.7 °C (98 °F), Min:36.3 °C (97.4 °F), Max:37 °C (98.6 °F)   Temperature: 37 °C (98.6 °F)  Pulse  Av.3  Min: 38  Max: 74    Blood Pressure : 159/63      Respiratory      Respiration: 18, Pulse Oximetry: 98 %     Work Of Breathing / Effort: Moderate  RUL Breath Sounds: Diminished, RML Breath Sounds: Diminished, RLL Breath Sounds: Diminished, CHARLOTTE Breath Sounds: Diminished, LLL Breath Sounds: Diminished    Fluids    Intake/Output Summary (Last 24 hours) at 18 2147  Last data filed at 18 1400   Gross per 24 hour   Intake             1180 ml   Output             1750 ml   Net             -570 ml       Nutrition  Orders Placed This Encounter   Procedures   • Diet Order     Standing Status:   Standing     Number of Occurrences:   1     Order Specific Question:   Diet:     Answer:   Diabetic [3]     Physical Exam   Constitutional: She is oriented to person, place, and time. She appears well-developed and well-nourished. No distress.   HENT:   Head: Normocephalic and atraumatic.   Eyes: Conjunctivae are normal. No scleral icterus.   Neck: Neck supple. No JVD  present.   Cardiovascular: Normal rate, regular rhythm and normal heart sounds.  Exam reveals no gallop and no friction rub.    No murmur heard.  Pulmonary/Chest: Effort normal and breath sounds normal. No respiratory distress. She exhibits no tenderness.   Abdominal: Soft. Bowel sounds are normal. She exhibits no distension. There is no guarding.   Musculoskeletal: She exhibits no edema or tenderness.   Neurological: She is alert and oriented to person, place, and time. No cranial nerve deficit.   Skin: Skin is warm and dry. She is not diaphoretic. No erythema. No pallor.   icthyosis lle, healing cellulitis rle     Psychiatric: She has a normal mood and affect. Her behavior is normal.   Nursing note and vitals reviewed.      Recent Labs      04/16/18   0011   WBC  7.0   RBC  3.09*   HEMOGLOBIN  9.6*   HEMATOCRIT  29.5*   MCV  95.5   MCH  31.1   MCHC  32.5*   RDW  55.1*   PLATELETCT  171   MPV  10.6     Recent Labs      04/16/18   0011   SODIUM  136   POTASSIUM  3.4*   CHLORIDE  100   CO2  32   GLUCOSE  90   BUN  27*   CREATININE  1.00   CALCIUM  7.4*                      Assessment/Plan     * Pathologic fracture- (present on admission)   Assessment & Plan    -widespread lytic disease  -already received BM bx, confirms diagnosis  -will need radiation and chemotherapy  -pain control as outlined above  -Dr Gandhi to perform 5 fractions to left humerus fracture (started tx 4/13/18)  -Had been seen by Dr Forbes in the past. - no surgical intervention          Anxiety   Assessment & Plan    Exacerbated by situation and steroids  Decrease steroid dose  Xanax prn          Chronic venous stasis dermatitis of both lower extremities- (present on admission)   Assessment & Plan    -wound care, no clear e/o active infection at this time, defer abx's        Multiple myeloma (CMS-[facility])- (present on admission)   Assessment & Plan    -recent diagnosis, appears aggressive, now with innumerable lytic lesions  -admit to the oncology  floor  -Dr Sim and Oksana consulted for assistance in management  -IV steroids, IV dilaudid PRN, oxy prn  -start oxycontin CR 10 mg BID  -skeletal survey done - spine obscured by body habitus        Anemia- (present on admission)   Assessment & Plan    -related to her MM, appears near her baseline, no e/o overt bleeding at this time, monitor closely        Hyponatremia- (present on admission)   Assessment & Plan    -mild, 2/2 hypovolemia, gentle IVF's with NS, check levels in the AM        Morbid obesity with BMI of 60.0-69.9, adult (CMS-HCC)- (present on admission)   Assessment & Plan    -encourage weight loss        DM type 2 (diabetes mellitus, type 2) (CMS-HCC)- (present on admission)   Assessment & Plan    -continue outpatient insulin, ISS        Essential hypertension, benign- (present on admission)   Assessment & Plan    High with bradycardia  Increase dose po hydralazine, decreased toprol xl to 100mg daily  Clonidine bid to tid, add lisinopril 20 q day  Terazosin qhs          Quality-Core Measures   Reviewed items::  Labs reviewed, Medications reviewed and Radiology images reviewed  Singh catheter::  Urinary Tract Retention or Urinary Tract Obstruction (high risk for skin breakdown given body habitus and urinary incontinence )  DVT prophylaxis pharmacological::  Heparin

## 2018-04-17 NOTE — PROGRESS NOTES
Assumed care of pt at 1845. Received report from KEE Mays. Pt A/O x 4, sitting up in bed, watching TV.  PIV in place - NS locked, site is [facility]. Singh in place. Discussed POC with pt at bedside. No complaints of pain or nausea. Pt up 3-4 assist. Pt is getting radiation, received treatment 2 of 5 today. Tolerated well. Bed in lowest, locked position. Call light and personal belongings within reach. Pt has no further questions or concerns at this time.

## 2018-04-17 NOTE — THERAPY
"Pt seen for PT tx to address bed mobility, strength, activity tolerance, and ambulation. Pt continues to be very motivated to work with PT. Pt requires Mod - Max A x2 people for all bed mobility, Min A to stabilize FWW to stand and CGA to take small steps from EOB -> chair. Unable to tolerate considerable ambulation due to pain and fatigue. Pt will continue to benefit from acute PT interventions. Recommend post acute placement prior to D/C home to optimize return of function, endurance, and overall safety.     Physical Therapy Treatment completed.   Bed Mobility:  Supine to Sit: Moderate Assist (x2 people)  Transfers: Sit to Stand: Minimal Assist  Gait: Level Of Assist: Minimal Assist with Front-Wheel Walker x2-3 ft    Plan of Care: Will benefit from Physical Therapy 3 times per week  Discharge Recommendations: Equipment: Will Continue to Assess for Equipment Needs. Post-acute therapy Discharge to a transitional care facility for continued therapy services.     See \"Rehab Therapy-Acute\" Patient Summary Report for complete documentation.       "

## 2018-04-17 NOTE — PROGRESS NOTES
Patient was transported to our department for radiation therapy treatment number 2 of 5 to her lt humerus. We will transport her again tomorrow.

## 2018-04-17 NOTE — THERAPY
"Occupational Therapy Treatment completed with focus on ADLs, ADL transfers and patient education.  Functional Status:  Pt was seen for Occupational Therapy treatment today, see Therapy Kardex for details.  Treatment included education in breath control with activity and at rest, self pacing techs and energy conservation for pain management. Educated pt in safety awareness techs as well. Reviewed fall prevention techs with pt prior to activity. Pt on 5L of O2, O2 sats WNL .Pt required 3 person Max A for supine to sit at EOB with HOB elevated. Pt has edema throughout and pt's large girth and legs are \"very heavy for me to move; I need help\". Pt demo Mod A X2 for sit to stand with FWW, Min/Mod A for ADL transfers to BS with FWW and extended time. Pt had a small BM soft and formed. RN informed. Pt required Total A for full toilet hygiene standing. Pt unable to reach around girth to clean buttocks. Oral hygiene and grooming done seated base post set up with supervision and occasional SBA.  Pt demonstrated Min A for UB dressing, Max A for LB dressing seated base with use of AE. Pt's legs too swollen for soc kaid to work appropriately; pt stated, \"It's cutting into my leg and it hurts\". Unable to complete sock donning with use of AE. Pt Total A for sock don and doff. Max A for clothing management up over hips in a standing position with FWW for support. Pt also demonstrated Min A for Ambulating ADL's taking 5-7 steps this OT session  with FWW. Pt was assisted BTB 3 person assist due to heavy legs and for bed mobility, call light in reach bedside table in front of her and nursing is aware. RN/MD/SW/CM informed of OT treatment findings and recommendations . Pt gave good effort and is motivated in therapy. Continue Occupational Therapy services as per plan.    Plan of Care: Will benefit from Occupational Therapy 3 times per week  Discharge Recommendations:  Equipment Will Continue to Assess for Equipment Needs. Post-acute " "therapy Discharge to a transitional care facility for continued skilled therapy services.    See \"Rehab Therapy-Acute\" Patient Summary Report for complete documentation.   "

## 2018-04-18 ENCOUNTER — HOSPITAL ENCOUNTER (OUTPATIENT)
Dept: RADIATION ONCOLOGY | Facility: MEDICAL CENTER | Age: 68
End: 2018-04-18

## 2018-04-18 PROBLEM — E83.42 HYPOMAGNESEMIA: Status: ACTIVE | Noted: 2018-04-18

## 2018-04-18 PROBLEM — E83.51 HYPOCALCEMIA: Status: ACTIVE | Noted: 2018-04-18

## 2018-04-18 LAB
ALBUMIN SERPL BCP-MCNC: 2.4 G/DL (ref 3.2–4.9)
ANION GAP SERPL CALC-SCNC: 4 MMOL/L (ref 0–11.9)
BASOPHILS # BLD AUTO: 0 % (ref 0–1.8)
BASOPHILS # BLD: 0 K/UL (ref 0–0.12)
BUN SERPL-MCNC: 25 MG/DL (ref 8–22)
CA-I SERPL-SCNC: 1 MMOL/L (ref 1.1–1.3)
CALCIUM SERPL-MCNC: 7.5 MG/DL (ref 8.5–10.5)
CHLORIDE SERPL-SCNC: 102 MMOL/L (ref 96–112)
CO2 SERPL-SCNC: 32 MMOL/L (ref 20–33)
CREAT SERPL-MCNC: 1 MG/DL (ref 0.5–1.4)
EOSINOPHIL # BLD AUTO: 0.03 K/UL (ref 0–0.51)
EOSINOPHIL NFR BLD: 0.5 % (ref 0–6.9)
ERYTHROCYTE [DISTWIDTH] IN BLOOD BY AUTOMATED COUNT: 55.9 FL (ref 35.9–50)
GLUCOSE BLD-MCNC: 176 MG/DL (ref 65–99)
GLUCOSE BLD-MCNC: 251 MG/DL (ref 65–99)
GLUCOSE BLD-MCNC: 251 MG/DL (ref 65–99)
GLUCOSE BLD-MCNC: 272 MG/DL (ref 65–99)
GLUCOSE SERPL-MCNC: 213 MG/DL (ref 65–99)
HCT VFR BLD AUTO: 27.5 % (ref 37–47)
HGB BLD-MCNC: 8.7 G/DL (ref 12–16)
IMM GRANULOCYTES # BLD AUTO: 0.03 K/UL (ref 0–0.11)
IMM GRANULOCYTES NFR BLD AUTO: 0.5 % (ref 0–0.9)
LYMPHOCYTES # BLD AUTO: 1.13 K/UL (ref 1–4.8)
LYMPHOCYTES NFR BLD: 17.9 % (ref 22–41)
MAGNESIUM SERPL-MCNC: 1.3 MG/DL (ref 1.5–2.5)
MCH RBC QN AUTO: 30.7 PG (ref 27–33)
MCHC RBC AUTO-ENTMCNC: 31.6 G/DL (ref 33.6–35)
MCV RBC AUTO: 97.2 FL (ref 81.4–97.8)
MONOCYTES # BLD AUTO: 0.4 K/UL (ref 0–0.85)
MONOCYTES NFR BLD AUTO: 6.3 % (ref 0–13.4)
NEUTROPHILS # BLD AUTO: 4.72 K/UL (ref 2–7.15)
NEUTROPHILS NFR BLD: 74.8 % (ref 44–72)
NRBC # BLD AUTO: 0 K/UL
NRBC BLD-RTO: 0 /100 WBC
PHOSPHATE SERPL-MCNC: 3.3 MG/DL (ref 2.5–4.5)
PLATELET # BLD AUTO: 148 K/UL (ref 164–446)
PMV BLD AUTO: 10.8 FL (ref 9–12.9)
POTASSIUM SERPL-SCNC: 4.1 MMOL/L (ref 3.6–5.5)
RBC # BLD AUTO: 2.83 M/UL (ref 4.2–5.4)
SODIUM SERPL-SCNC: 138 MMOL/L (ref 135–145)
WBC # BLD AUTO: 6.3 K/UL (ref 4.8–10.8)

## 2018-04-18 PROCEDURE — 77417 THER RADIOLOGY PORT IMAGE(S): CPT | Performed by: RADIOLOGY

## 2018-04-18 PROCEDURE — 700105 HCHG RX REV CODE 258: Performed by: HOSPITALIST

## 2018-04-18 PROCEDURE — 85025 COMPLETE CBC W/AUTO DIFF WBC: CPT

## 2018-04-18 PROCEDURE — 36415 COLL VENOUS BLD VENIPUNCTURE: CPT

## 2018-04-18 PROCEDURE — 80069 RENAL FUNCTION PANEL: CPT

## 2018-04-18 PROCEDURE — 700111 HCHG RX REV CODE 636 W/ 250 OVERRIDE (IP): Performed by: HOSPITALIST

## 2018-04-18 PROCEDURE — A9270 NON-COVERED ITEM OR SERVICE: HCPCS | Performed by: HOSPITALIST

## 2018-04-18 PROCEDURE — 700102 HCHG RX REV CODE 250 W/ 637 OVERRIDE(OP): Performed by: INTERNAL MEDICINE

## 2018-04-18 PROCEDURE — 82962 GLUCOSE BLOOD TEST: CPT

## 2018-04-18 PROCEDURE — 83735 ASSAY OF MAGNESIUM: CPT

## 2018-04-18 PROCEDURE — 82330 ASSAY OF CALCIUM: CPT

## 2018-04-18 PROCEDURE — A9270 NON-COVERED ITEM OR SERVICE: HCPCS | Performed by: INTERNAL MEDICINE

## 2018-04-18 PROCEDURE — 99232 SBSQ HOSP IP/OBS MODERATE 35: CPT | Performed by: HOSPITALIST

## 2018-04-18 PROCEDURE — 700111 HCHG RX REV CODE 636 W/ 250 OVERRIDE (IP): Performed by: INTERNAL MEDICINE

## 2018-04-18 PROCEDURE — 700102 HCHG RX REV CODE 250 W/ 637 OVERRIDE(OP): Performed by: HOSPITALIST

## 2018-04-18 PROCEDURE — 770004 HCHG ROOM/CARE - ONCOLOGY PRIVATE *

## 2018-04-18 PROCEDURE — 77412 RADIATION TX DELIVERY LVL 3: CPT | Performed by: RADIOLOGY

## 2018-04-18 RX ORDER — MAGNESIUM SULFATE 1 G/100ML
1 INJECTION INTRAVENOUS ONCE
Status: COMPLETED | OUTPATIENT
Start: 2018-04-18 | End: 2018-04-18

## 2018-04-18 RX ORDER — MAGNESIUM SULFATE HEPTAHYDRATE 40 MG/ML
2 INJECTION, SOLUTION INTRAVENOUS ONCE
Status: COMPLETED | OUTPATIENT
Start: 2018-04-18 | End: 2018-04-18

## 2018-04-18 RX ADMIN — CALCIUM GLUCONATE 1 G: 94 INJECTION, SOLUTION INTRAVENOUS at 11:08

## 2018-04-18 RX ADMIN — CLONIDINE HYDROCHLORIDE 0.2 MG: 0.1 TABLET ORAL at 08:04

## 2018-04-18 RX ADMIN — ACETAMINOPHEN 1000 MG: 500 TABLET ORAL at 17:31

## 2018-04-18 RX ADMIN — ACETAMINOPHEN 1000 MG: 500 TABLET ORAL at 23:54

## 2018-04-18 RX ADMIN — HEPARIN SODIUM 5000 UNITS: 5000 INJECTION, SOLUTION INTRAVENOUS; SUBCUTANEOUS at 21:37

## 2018-04-18 RX ADMIN — HEPARIN SODIUM 5000 UNITS: 5000 INJECTION, SOLUTION INTRAVENOUS; SUBCUTANEOUS at 15:06

## 2018-04-18 RX ADMIN — OXYCODONE HYDROCHLORIDE 10 MG: 10 TABLET, FILM COATED, EXTENDED RELEASE ORAL at 08:04

## 2018-04-18 RX ADMIN — INSULIN HUMAN 2 UNITS: 100 INJECTION, SOLUTION PARENTERAL at 08:09

## 2018-04-18 RX ADMIN — HEPARIN SODIUM 5000 UNITS: 5000 INJECTION, SOLUTION INTRAVENOUS; SUBCUTANEOUS at 06:10

## 2018-04-18 RX ADMIN — INSULIN HUMAN 5 UNITS: 100 INJECTION, SOLUTION PARENTERAL at 21:41

## 2018-04-18 RX ADMIN — MAGNESIUM SULFATE IN WATER 2 G: 40 INJECTION, SOLUTION INTRAVENOUS at 13:59

## 2018-04-18 RX ADMIN — CALCIUM CITRATE 200 MG (950 MG) TABLET 950 MG: at 08:04

## 2018-04-18 RX ADMIN — MAGNESIUM SULFATE IN DEXTROSE 1 G: 10 INJECTION, SOLUTION INTRAVENOUS at 11:07

## 2018-04-18 RX ADMIN — INSULIN HUMAN 5 UNITS: 100 INJECTION, SOLUTION PARENTERAL at 17:24

## 2018-04-18 RX ADMIN — PRAVASTATIN SODIUM 40 MG: 20 TABLET ORAL at 21:37

## 2018-04-18 RX ADMIN — OXYCODONE HYDROCHLORIDE 10 MG: 10 TABLET, FILM COATED, EXTENDED RELEASE ORAL at 21:37

## 2018-04-18 RX ADMIN — ACYCLOVIR 400 MG: 200 CAPSULE ORAL at 08:04

## 2018-04-18 RX ADMIN — ACETAMINOPHEN 1000 MG: 500 TABLET ORAL at 06:11

## 2018-04-18 RX ADMIN — ONDANSETRON 4 MG: 4 TABLET, ORALLY DISINTEGRATING ORAL at 19:23

## 2018-04-18 RX ADMIN — INSULIN GLARGINE 40 UNITS: 100 INJECTION, SOLUTION SUBCUTANEOUS at 21:40

## 2018-04-18 RX ADMIN — HYDRALAZINE HYDROCHLORIDE 50 MG: 10 TABLET, FILM COATED ORAL at 06:11

## 2018-04-18 RX ADMIN — DEXAMETHASONE 2 MG: 1 TABLET ORAL at 08:05

## 2018-04-18 RX ADMIN — Medication: at 21:37

## 2018-04-18 RX ADMIN — TERAZOSIN HYDROCHLORIDE ANHYDROUS 10 MG: 5 CAPSULE ORAL at 21:37

## 2018-04-18 RX ADMIN — ACETAMINOPHEN 1000 MG: 500 TABLET ORAL at 11:40

## 2018-04-18 RX ADMIN — LISINOPRIL 20 MG: 20 TABLET ORAL at 08:04

## 2018-04-18 RX ADMIN — ACYCLOVIR 400 MG: 200 CAPSULE ORAL at 21:36

## 2018-04-18 RX ADMIN — DEXAMETHASONE 2 MG: 1 TABLET ORAL at 21:36

## 2018-04-18 RX ADMIN — Medication: at 08:12

## 2018-04-18 RX ADMIN — ALPRAZOLAM 0.5 MG: 0.5 TABLET ORAL at 11:40

## 2018-04-18 RX ADMIN — HYDRALAZINE HYDROCHLORIDE 50 MG: 10 TABLET, FILM COATED ORAL at 15:04

## 2018-04-18 RX ADMIN — HYDRALAZINE HYDROCHLORIDE 50 MG: 10 TABLET, FILM COATED ORAL at 21:36

## 2018-04-18 RX ADMIN — INSULIN HUMAN 5 UNITS: 100 INJECTION, SOLUTION PARENTERAL at 13:07

## 2018-04-18 ASSESSMENT — ENCOUNTER SYMPTOMS
RESPIRATORY NEGATIVE: 1
MEMORY LOSS: 1
HEARTBURN: 0
DEPRESSION: 0
SHORTNESS OF BREATH: 1
FEVER: 0
ABDOMINAL PAIN: 0
WEAKNESS: 1
EYES NEGATIVE: 1
NERVOUS/ANXIOUS: 1
GASTROINTESTINAL NEGATIVE: 1
INSOMNIA: 0
CHILLS: 0
CARDIOVASCULAR NEGATIVE: 1
COUGH: 0
DIZZINESS: 0
CONSTIPATION: 0
DIARRHEA: 0
MYALGIAS: 1
HEADACHES: 0
VOMITING: 0
BACK PAIN: 0

## 2018-04-18 ASSESSMENT — PAIN SCALES - GENERAL
PAINLEVEL_OUTOF10: 6
PAINLEVEL_OUTOF10: 6

## 2018-04-18 NOTE — PROGRESS NOTES
Assumed care of pt at 1845. Received report from KEE Haddad. Pt A/O x 4, sitting up in bed, watching TV.  PIV in place - NS locked, site is [facility]. Singh in place. Discussed POC with pt at bedside. No complaints of pain or nausea at this time. Pt up 3-4 assist. Pt is getting radiation, received treatment 3 of 5 today. Tolerated well. Bed in lowest, locked position. Call light and personal belongings within reach. Pt has no further questions or concerns at this time.

## 2018-04-18 NOTE — CARE PLAN
Problem: Mobility  Goal: Risk for activity intolerance will decrease    Intervention: Encourage patient to increase activity level in collaboration with Interdisciplinary Team  Patient up with 3 person assist physical therapy involved in patients care      Problem: Pain Management  Goal: Pain level will decrease to patient's comfort goal    Intervention: Follow pain managment plan developed in collaboration with patient and Interdisciplinary Team  Patient has pain with movement patient on scheduled tylenol .

## 2018-04-18 NOTE — CARE PLAN
Problem: Mobility  Goal: Risk for activity intolerance will decrease    Intervention: Assess and monitor signs of activity intolerance  Patient is non weight bearing at this time because of strength, weight and radiation therapy      Problem: Psychosocial Needs:  Goal: Level of anxiety will decrease    Intervention: Identify and develop with patient strategies to cope with anxiety triggers  Emotional support provided encourage patient to talk about her feelings

## 2018-04-18 NOTE — PROGRESS NOTES
Oncology/Hematology Progress Note               Author: Darron Núñez Date & Time created: 4/18/2018  1:02 PM     CC:   Multiple myeloma    Interval History:    Completing radiation to her left humerus  Received Aredia yesterday      Review of Systems:  Review of Systems   Constitutional: Positive for malaise/fatigue. Negative for chills and fever.   HENT: Negative.    Eyes: Negative.    Respiratory: Negative.    Cardiovascular: Negative.    Gastrointestinal: Negative.    Genitourinary: Negative.    Musculoskeletal: Positive for joint pain and myalgias.   Skin: Negative.    Neurological: Positive for weakness.   Endo/Heme/Allergies: Negative.        Physical Exam:  Physical Exam   Constitutional: She is oriented to person, place, and time.   Morbidly obese   HENT:   Head: Normocephalic.   Eyes: Conjunctivae are normal. Pupils are equal, round, and reactive to light.   Cardiovascular: Normal rate, regular rhythm and normal heart sounds.    Pulmonary/Chest: Effort normal and breath sounds normal.   Abdominal: Soft. Bowel sounds are normal.   Musculoskeletal: She exhibits edema.   She's had chronic edema symmetrical   Neurological: She is alert and oriented to person, place, and time.   Psychiatric: She has a normal mood and affect. Her behavior is normal. Judgment and thought content normal.       Labs:        Invalid input(s): [id number]      Recent Labs      04/16/18   0011  04/17/18   0004  04/18/18   0033   SODIUM  136  138  138   POTASSIUM  3.4*  3.5*  4.1   CHLORIDE  100  100  102   CO2  32  33  32   BUN  27*  24*  25*   CREATININE  1.00  1.06  1.00   MAGNESIUM   --    --   1.3*   PHOSPHORUS   --    --   3.3   CALCIUM  7.4*  7.4*  7.5*     Recent Labs      04/16/18   0011  04/17/18   0004  04/18/18   0033   ALTSGPT  8  8   --    ASTSGOT  9*  8*   --    ALKPHOSPHAT  73  61   --    TBILIRUBIN  0.5  0.4   --    GLUCOSE  90  131*  213*     Recent Labs      04/16/18   0011  04/17/18   0004  04/18/18   0033    RBC  3.09*  3.03*  2.83*   HEMOGLOBIN  9.6*  9.3*  8.7*   HEMATOCRIT  29.5*  29.0*  27.5*   PLATELETCT  171  163*  148*     Recent Labs      18   0011  18   0004  18   0033   WBC  7.0  5.6  6.3   NEUTSPOLYS  75.80*  73.00*  74.80*   LYMPHOCYTES  15.40*  18.50*  17.90*   MONOCYTES  7.80  7.50  6.30   EOSINOPHILS  0.30  0.50  0.50   BASOPHILS  0.10  0.00  0.00   ASTSGOT  9*  8*   --    ALTSGPT  8  8   --    ALKPHOSPHAT  73  61   --    TBILIRUBIN  0.5  0.4   --      Recent Labs      18   0011  18   0004  18   0033   SODIUM  136  138  138   POTASSIUM  3.4*  3.5*  4.1   CHLORIDE  100  100  102   CO2  32  33  32   GLUCOSE  90  131*  213*   BUN  27*  24*  25*   CREATININE  1.00  1.06  1.00   CALCIUM  7.4*  7.4*  7.5*     Hemodynamics:  Temp (24hrs), Av.6 °C (97.8 °F), Min:36.1 °C (97 °F), Max:36.8 °C (98.2 °F)  Temperature: 36.5 °C (97.7 °F)  Pulse  Av.3  Min: 38  Max: 74   Blood Pressure : 151/82     Respiratory:    Respiration: 20, Pulse Oximetry: 99 %     Work Of Breathing / Effort: Moderate  RUL Breath Sounds: Diminished, RML Breath Sounds: Diminished, RLL Breath Sounds: Diminished, CHAR[place]OTTE Breath Sounds: Diminished, LLL Breath Sounds: Diminished  Fluids:    Intake/Output Summary (Last 24 hours) at 18 1124  Last data filed at 18 0300   Gross per 24 hour   Intake                0 ml   Output             1000 ml   Net            -1000 ml       GI/Nutrition:  Orders Placed This Encounter   Procedures   • Diet Order     Standing Status:   Standing     Number of Occurrences:   1     Order Specific Question:   Diet:     Answer:   Diabetic [3]     Medical Decision Making, by Problem:  Active Hospital Problems    Diagnosis   • *Pathologic fracture [M84.40XA]   • Hyponatremia [E87.1]   • Anemia [D64.9]   • Multiple myeloma (CMS-HCC) [C90.00]   • Chronic venous stasis dermatitis of both lower extremities [I87.2]   • Morbid obesity with BMI of 60.0-69.9, adult (CMS-HCC)  [E66.01, Z68.44]   • DM type 2 (diabetes mellitus, type 2) (CMS-HCC) [E11.9]   • Essential hypertension, benign [I10]     Past medical history, past surgical history, past social history unchanged reviewed    Plan:  1.  Multiple myeloma: 1P deletion intermediate risk, stage III based on ISS staging (beta 2 9.9 and albumin 2.7 before):      -She will completed radiation to her left humerus today and then there is plans for radiation to her right hip region since her bone survey shows extensive lytic lesions throughout especially the bilateral acetabula. She did receive Aredia without any adverse side effects but is mildly hypocalcemic and hypomagnesemia which will be replaced. She is to start on systemic therapy with CyBorD which will be planned for tomorrow and hopefully her calcium and magnesium have normalized         2.  Anemia:  Hemoglobin stable at 8.7    3.  History of B12 deficiency: Her levels look good. She can provide continue with once monthly B12 injections.    4.  Extremities: She's had chronic lower extremity edema. Seems like her cellulitis has resolved. We'll just need to be followed closely.       High complexity/scheduled with her primary team/radiation oncology  Quality-Core Measures

## 2018-04-18 NOTE — PROGRESS NOTES
[facility] Hospitalist Progress Note    Date of Service: 4/17/2018    Chief Complaint  67 y.o. female admitted 4/10/2018 with intractible pain secondary to multiple myeloma, known fracture left humerus    Interval Problem Update  4/11 Patient feeling much less pain since admission and starting medications, she is still having significant pain and swelling left humerus fracture site and likely had pathologic fracture.  Dr Núñez and Oksana consulted for assistance.  She already has significant calculus surrounding the fracture site but also pain.    4/12 Patient had mapping done on humerus today and bony survey.  Symptoms doing about same, mediation helping.  Plan is to medically maximize patient in preparation to start chemotherapy on Monday.  4/13 Patient feeling okay but having significant anxiety - states normally she is calm but with this situation she is having difficulty.  Xanax low dose ordered PRN.  Radiation to resume Monday, first dose given today.  4/14 Patient feeling well, nava placed in facility prior to transfer d/t urinary incontinence and body habitus, she is a high risk for skin breakdown and nava to remain in place at this time.  No acute complaints.  BP high and heart rate low, reponded well to IV hydralazine overnight - scheduled PO today, decrease toprol xl to 100 mg daily.  4/15 Patient with friends visiting today, states she is feeling a bit tearful today but otherwise is feeling physically okay.  Her legs are healing well from recent cellulitis and she will resume radiation therapy tomorrow.  4/16 Patient doing about same, blood pressure has improved slightly with changes but still significantly elevated, will change her baseline clonidine to tid and add lisinopril qday.  Dr Sim to see patient today and decide when to proceed with chemo.  4/17 started radiation to left humerus today. Referred to SNF. Onc thinking about weekly Jeni    Consultants/Specialty  onc - [place]/[facility]    XRT -  [facility]      Disposition  Tbd, lives in [place]        Review of Systems   Constitutional: Positive for malaise/fatigue. Negative for chills and fever.   HENT: Negative for congestion.    Eyes: Negative for blurred vision and photophobia.   Respiratory: Positive for shortness of breath (orthopnea). Negative for cough.    Cardiovascular: Negative for chest pain and leg swelling.   Gastrointestinal: Negative for abdominal pain, constipation, diarrhea, heartburn and vomiting.   Genitourinary: Negative for dysuria and hematuria.   Musculoskeletal: Positive for myalgias. Negative for back pain and joint pain.   Skin: Negative for itching and rash.   Neurological: Positive for weakness. Negative for dizziness, sensory change, speech change and headaches.   Psychiatric/Behavioral: Negative for depression (tearful today). The patient is nervous/anxious. The patient does not have insomnia.       Physical Exam  Laboratory/Imaging   Hemodynamics  Temp (24hrs), Av.6 °C (97.9 °F), Min:36.1 °C (97 °F), Max:37 °C (98.6 °F)   Temperature: 36.1 °C (97 °F)  Pulse  Av.3  Min: 38  Max: 74    Blood Pressure : 153/66      Respiratory      Respiration: 18, Pulse Oximetry: 95 %     Work Of Breathing / Effort: Moderate  RUL Breath Sounds: Diminished, RML Breath Sounds: Diminished, RLL Breath Sounds: Diminished, [place]TE Breath Sounds: Diminished, LLL Breath Sounds: Diminished    Fluids    Intake/Output Summary (Last 24 hours) at 18 1718  Last data filed at 18 1000   Gross per 24 hour   Intake                0 ml   Output             2000 ml   Net            -2000 ml       Nutrition  Orders Placed This Encounter   Procedures   • Diet Order     Standing Status:   Standing     Number of Occurrences:   1     Order Specific Question:   Diet:     Answer:   Diabetic [3]     Physical Exam   Constitutional: She is oriented to person, place, and time. She appears well-developed and well-nourished. No distress.   Morbidly obese   HENT:    Head: Normocephalic and atraumatic.   Eyes: Conjunctivae are normal. No scleral icterus.   Neck: Neck supple. No JVD present.   Cardiovascular: Normal rate and regular rhythm.  Exam reveals no gallop and no friction rub.    Murmur (Systolic) heard.  Pulmonary/Chest: Effort normal and breath sounds normal. No respiratory distress. She exhibits no tenderness.   Abdominal: Soft. Bowel sounds are normal. She exhibits no distension. There is no guarding.   Musculoskeletal: She exhibits no edema or tenderness.   Neurological: She is alert and oriented to person, place, and time. No cranial nerve deficit.   Skin: Skin is warm and dry. She is not diaphoretic. No erythema. No pallor.   icthyosis lle, healing cellulitis rle     Psychiatric: She exhibits abnormal recent memory.   Nursing note and vitals reviewed.      Recent Labs      04/16/18   0011  04/17/18   0004   WBC  7.0  5.6   RBC  3.09*  3.03*   HEMOGLOBIN  9.6*  9.3*   HEMATOCRIT  29.5*  29.0*   MCV  95.5  95.7   MCH  31.1  30.7   MCHC  32.5*  32.1*   RDW  55.1*  55.1*   PLATELETCT  171  163*   MPV  10.6  10.5     Recent Labs      04/16/18   0011  04/17/18   0004   SODIUM  136  138   POTASSIUM  3.4*  3.5*   CHLORIDE  100  100   CO2  32  33   GLUCOSE  90  131*   BUN  27*  24*   CREATININE  1.00  1.06   CALCIUM  7.4*  7.4*                      Assessment/Plan     * Pathologic fracture- (present on admission)   Assessment & Plan    -widespread lytic disease  -already received BM bx, confirms diagnosis  -will need radiation and chemotherapy  -pain control as outlined above  -Dr Gandhi coordinating radiation left humerus fracture  -Had been seen by Dr Forbes in the past. - no surgical intervention          Anxiety   Assessment & Plan    Exacerbated by situation and steroids  Decrease steroid dose  Xanax prn          Chronic venous stasis dermatitis of both lower extremities- (present on admission)   Assessment & Plan    -wound care, no clear e/o active infection at this  time, defer abx's        Multiple myeloma (CMS-HCC)- (present on admission)   Assessment & Plan    -recent diagnosis, appears aggressive, now with innumerable lytic lesions  -admit to the oncology floor  -Dr Sim and Oksana consulted for assistance in management  -IV steroids, IV dilaudid PRN, oxy prn  -start oxycontin CR 10 mg BID  -skeletal survey done - spine obscured by body habitus        Anemia- (present on admission)   Assessment & Plan    -related to her MM, appears near her baseline, no e/o overt bleeding at this time, monitor closely        Hyponatremia- (present on admission)   Assessment & Plan    -mild, 2/2 hypovolemia, gentle IVF's with NS, check levels in the AM        Morbid obesity with BMI of 60.0-69.9, adult (CMS-HCC)- (present on admission)   Assessment & Plan    -encourage weight loss  Body mass index is 67.38 kg/m².        DM type 2 (diabetes mellitus, type 2) (CMS-HCC)- (present on admission)   Assessment & Plan    -continue outpatient insulin, ISS        Essential hypertension, benign- (present on admission)   Assessment & Plan    High with bradycardia  Increase dose po hydralazine, decreased toprol xl to 100mg daily  Clonidine bid to tid, add lisinopril 20 q day  Terazosin qhs          Quality-Core Measures   Reviewed items::  Labs reviewed and Medications reviewed  Singh catheter::  Urinary Tract Retention or Urinary Tract Obstruction (high risk for skin breakdown given body habitus and urinary incontinence )  DVT prophylaxis pharmacological::  Heparin

## 2018-04-18 NOTE — PROGRESS NOTES
Patient brought down to radiation oncology and received treatment 4 of 5. Patient will return tomorrow for further treatment.

## 2018-04-18 NOTE — PROGRESS NOTES
Patient alert and oriented. Patient is non weight bearing. Patient did have 5/5 radiation therapy today. Patient is eating well. patient denies pain but she is uncomfortable at times. Reviewed POC with patient call light within reach hourly rounding in practice.

## 2018-04-18 NOTE — PROGRESS NOTES
Patient alert and oriented up with 3 person max assist. Patient did have day 3 of radiation therapy today and mapping of hip area for radiation therapy. Reviewed POC with patient call light within reach hourly rounding in practice.

## 2018-04-19 ENCOUNTER — HOSPITAL ENCOUNTER (OUTPATIENT)
Dept: RADIATION ONCOLOGY | Facility: MEDICAL CENTER | Age: 68
End: 2018-04-19

## 2018-04-19 LAB
ALBUMIN SERPL BCP-MCNC: 2.4 G/DL (ref 3.2–4.9)
BASOPHILS # BLD AUTO: 0 % (ref 0–1.8)
BASOPHILS # BLD: 0 K/UL (ref 0–0.12)
BUN SERPL-MCNC: 26 MG/DL (ref 8–22)
CALCIUM SERPL-MCNC: 7.6 MG/DL (ref 8.5–10.5)
CHLORIDE SERPL-SCNC: 100 MMOL/L (ref 96–112)
CO2 SERPL-SCNC: 31 MMOL/L (ref 20–33)
CREAT SERPL-MCNC: 0.93 MG/DL (ref 0.5–1.4)
EOSINOPHIL # BLD AUTO: 0.03 K/UL (ref 0–0.51)
EOSINOPHIL NFR BLD: 0.6 % (ref 0–6.9)
ERYTHROCYTE [DISTWIDTH] IN BLOOD BY AUTOMATED COUNT: 54.6 FL (ref 35.9–50)
GLUCOSE BLD-MCNC: 190 MG/DL (ref 65–99)
GLUCOSE BLD-MCNC: 229 MG/DL (ref 65–99)
GLUCOSE BLD-MCNC: 309 MG/DL (ref 65–99)
GLUCOSE BLD-MCNC: 333 MG/DL (ref 65–99)
GLUCOSE SERPL-MCNC: 271 MG/DL (ref 65–99)
HCT VFR BLD AUTO: 27.7 % (ref 37–47)
HGB BLD-MCNC: 8.8 G/DL (ref 12–16)
IMM GRANULOCYTES # BLD AUTO: 0.02 K/UL (ref 0–0.11)
IMM GRANULOCYTES NFR BLD AUTO: 0.4 % (ref 0–0.9)
LYMPHOCYTES # BLD AUTO: 0.55 K/UL (ref 1–4.8)
LYMPHOCYTES NFR BLD: 10.3 % (ref 22–41)
MAGNESIUM SERPL-MCNC: 1.6 MG/DL (ref 1.5–2.5)
MCH RBC QN AUTO: 30.7 PG (ref 27–33)
MCHC RBC AUTO-ENTMCNC: 31.8 G/DL (ref 33.6–35)
MCV RBC AUTO: 96.5 FL (ref 81.4–97.8)
MONOCYTES # BLD AUTO: 0.3 K/UL (ref 0–0.85)
MONOCYTES NFR BLD AUTO: 5.6 % (ref 0–13.4)
NEUTROPHILS # BLD AUTO: 4.43 K/UL (ref 2–7.15)
NEUTROPHILS NFR BLD: 83.1 % (ref 44–72)
NRBC # BLD AUTO: 0 K/UL
NRBC BLD-RTO: 0 /100 WBC
PHOSPHATE SERPL-MCNC: 3 MG/DL (ref 2.5–4.5)
PLATELET # BLD AUTO: 152 K/UL (ref 164–446)
PMV BLD AUTO: 10.9 FL (ref 9–12.9)
POTASSIUM SERPL-SCNC: 3.8 MMOL/L (ref 3.6–5.5)
RBC # BLD AUTO: 2.87 M/UL (ref 4.2–5.4)
SODIUM SERPL-SCNC: 135 MMOL/L (ref 135–145)
WBC # BLD AUTO: 5.3 K/UL (ref 4.8–10.8)

## 2018-04-19 PROCEDURE — A9270 NON-COVERED ITEM OR SERVICE: HCPCS | Performed by: INTERNAL MEDICINE

## 2018-04-19 PROCEDURE — 700105 HCHG RX REV CODE 258: Performed by: HOSPITALIST

## 2018-04-19 PROCEDURE — 700105 HCHG RX REV CODE 258: Performed by: INTERNAL MEDICINE

## 2018-04-19 PROCEDURE — 77412 RADIATION TX DELIVERY LVL 3: CPT | Performed by: RADIOLOGY

## 2018-04-19 PROCEDURE — 82962 GLUCOSE BLOOD TEST: CPT | Mod: 91

## 2018-04-19 PROCEDURE — 700111 HCHG RX REV CODE 636 W/ 250 OVERRIDE (IP): Performed by: INTERNAL MEDICINE

## 2018-04-19 PROCEDURE — 700102 HCHG RX REV CODE 250 W/ 637 OVERRIDE(OP): Performed by: HOSPITALIST

## 2018-04-19 PROCEDURE — 700102 HCHG RX REV CODE 250 W/ 637 OVERRIDE(OP): Performed by: INTERNAL MEDICINE

## 2018-04-19 PROCEDURE — 83735 ASSAY OF MAGNESIUM: CPT

## 2018-04-19 PROCEDURE — 770004 HCHG ROOM/CARE - ONCOLOGY PRIVATE *

## 2018-04-19 PROCEDURE — 77427 RADIATION TX MANAGEMENT X5: CPT | Performed by: RADIOLOGY

## 2018-04-19 PROCEDURE — 36415 COLL VENOUS BLD VENIPUNCTURE: CPT

## 2018-04-19 PROCEDURE — A9270 NON-COVERED ITEM OR SERVICE: HCPCS | Performed by: HOSPITALIST

## 2018-04-19 PROCEDURE — 77417 THER RADIOLOGY PORT IMAGE(S): CPT | Performed by: RADIOLOGY

## 2018-04-19 PROCEDURE — 700111 HCHG RX REV CODE 636 W/ 250 OVERRIDE (IP): Performed by: HOSPITALIST

## 2018-04-19 PROCEDURE — 3E03305 INTRODUCTION OF OTHER ANTINEOPLASTIC INTO PERIPHERAL VEIN, PERCUTANEOUS APPROACH: ICD-10-PCS | Performed by: INTERNAL MEDICINE

## 2018-04-19 PROCEDURE — 80069 RENAL FUNCTION PANEL: CPT

## 2018-04-19 PROCEDURE — 85025 COMPLETE CBC W/AUTO DIFF WBC: CPT

## 2018-04-19 PROCEDURE — 99232 SBSQ HOSP IP/OBS MODERATE 35: CPT | Performed by: HOSPITALIST

## 2018-04-19 RX ORDER — 0.9 % SODIUM CHLORIDE 0.9 %
VIAL (ML) INJECTION PRN
Status: CANCELLED | OUTPATIENT
Start: 2018-04-19

## 2018-04-19 RX ORDER — DEXAMETHASONE 4 MG/1
40 TABLET ORAL DAILY
Status: CANCELLED | OUTPATIENT
Start: 2018-04-26

## 2018-04-19 RX ORDER — PROCHLORPERAZINE MALEATE 10 MG
10 TABLET ORAL EVERY 6 HOURS PRN
Status: CANCELLED | OUTPATIENT
Start: 2018-04-19

## 2018-04-19 RX ORDER — ONDANSETRON 8 MG/1
8 TABLET, ORALLY DISINTEGRATING ORAL
Status: CANCELLED | OUTPATIENT
Start: 2018-04-19

## 2018-04-19 RX ORDER — 0.9 % SODIUM CHLORIDE 0.9 %
VIAL (ML) INJECTION PRN
Status: CANCELLED | OUTPATIENT
Start: 2018-04-29

## 2018-04-19 RX ORDER — DEXAMETHASONE 4 MG/1
40 TABLET ORAL DAILY
Status: CANCELLED | OUTPATIENT
Start: 2018-04-29

## 2018-04-19 RX ORDER — 0.9 % SODIUM CHLORIDE 0.9 %
VIAL (ML) INJECTION PRN
Status: CANCELLED | OUTPATIENT
Start: 2018-04-22

## 2018-04-19 RX ORDER — 0.9 % SODIUM CHLORIDE 0.9 %
5 VIAL (ML) INJECTION PRN
Status: CANCELLED | OUTPATIENT
Start: 2018-04-26

## 2018-04-19 RX ORDER — ONDANSETRON 2 MG/ML
4 INJECTION INTRAMUSCULAR; INTRAVENOUS
Status: CANCELLED | OUTPATIENT
Start: 2018-04-19

## 2018-04-19 RX ORDER — 0.9 % SODIUM CHLORIDE 0.9 %
VIAL (ML) INJECTION PRN
Status: CANCELLED | OUTPATIENT
Start: 2018-04-26

## 2018-04-19 RX ORDER — 0.9 % SODIUM CHLORIDE 0.9 %
5 VIAL (ML) INJECTION PRN
Status: CANCELLED | OUTPATIENT
Start: 2018-04-22

## 2018-04-19 RX ORDER — MAGNESIUM SULFATE HEPTAHYDRATE 40 MG/ML
2 INJECTION, SOLUTION INTRAVENOUS ONCE
Status: COMPLETED | OUTPATIENT
Start: 2018-04-19 | End: 2018-04-19

## 2018-04-19 RX ORDER — PROCHLORPERAZINE MALEATE 10 MG
10 TABLET ORAL EVERY 6 HOURS PRN
Status: CANCELLED | OUTPATIENT
Start: 2018-04-22

## 2018-04-19 RX ORDER — DEXAMETHASONE 4 MG/1
40 TABLET ORAL DAILY
Status: COMPLETED | OUTPATIENT
Start: 2018-04-19 | End: 2018-04-20

## 2018-04-19 RX ORDER — 0.9 % SODIUM CHLORIDE 0.9 %
5 VIAL (ML) INJECTION PRN
Status: CANCELLED | OUTPATIENT
Start: 2018-04-19

## 2018-04-19 RX ORDER — ONDANSETRON 8 MG/1
8 TABLET, ORALLY DISINTEGRATING ORAL
Status: CANCELLED | OUTPATIENT
Start: 2018-04-29

## 2018-04-19 RX ORDER — ONDANSETRON 8 MG/1
8 TABLET, ORALLY DISINTEGRATING ORAL
Status: CANCELLED | OUTPATIENT
Start: 2018-04-22

## 2018-04-19 RX ORDER — ONDANSETRON 2 MG/ML
4 INJECTION INTRAMUSCULAR; INTRAVENOUS
Status: CANCELLED | OUTPATIENT
Start: 2018-04-26

## 2018-04-19 RX ORDER — ONDANSETRON 2 MG/ML
4 INJECTION INTRAMUSCULAR; INTRAVENOUS
Status: CANCELLED | OUTPATIENT
Start: 2018-04-22

## 2018-04-19 RX ORDER — 0.9 % SODIUM CHLORIDE 0.9 %
5 VIAL (ML) INJECTION PRN
Status: CANCELLED | OUTPATIENT
Start: 2018-04-29

## 2018-04-19 RX ORDER — SODIUM CHLORIDE 9 MG/ML
INJECTION, SOLUTION INTRAVENOUS CONTINUOUS
Status: CANCELLED | OUTPATIENT
Start: 2018-04-19

## 2018-04-19 RX ORDER — SODIUM CHLORIDE 9 MG/ML
INJECTION, SOLUTION INTRAVENOUS CONTINUOUS
Status: CANCELLED | OUTPATIENT
Start: 2018-04-29

## 2018-04-19 RX ORDER — SODIUM CHLORIDE 9 MG/ML
INJECTION, SOLUTION INTRAVENOUS CONTINUOUS
Status: DISCONTINUED | OUTPATIENT
Start: 2018-04-19 | End: 2018-04-21

## 2018-04-19 RX ORDER — SODIUM CHLORIDE 9 MG/ML
INJECTION, SOLUTION INTRAVENOUS CONTINUOUS
Status: CANCELLED | OUTPATIENT
Start: 2018-04-26

## 2018-04-19 RX ORDER — ONDANSETRON 8 MG/1
8 TABLET, ORALLY DISINTEGRATING ORAL
Status: CANCELLED | OUTPATIENT
Start: 2018-04-26

## 2018-04-19 RX ORDER — PROCHLORPERAZINE MALEATE 10 MG
10 TABLET ORAL EVERY 6 HOURS PRN
Status: CANCELLED | OUTPATIENT
Start: 2018-04-29

## 2018-04-19 RX ORDER — PROCHLORPERAZINE MALEATE 10 MG
10 TABLET ORAL EVERY 6 HOURS PRN
Status: CANCELLED | OUTPATIENT
Start: 2018-04-26

## 2018-04-19 RX ORDER — ONDANSETRON 2 MG/ML
4 INJECTION INTRAMUSCULAR; INTRAVENOUS
Status: CANCELLED | OUTPATIENT
Start: 2018-04-29

## 2018-04-19 RX ORDER — INSULIN GLARGINE 100 [IU]/ML
60 INJECTION, SOLUTION SUBCUTANEOUS EVERY EVENING
Status: DISCONTINUED | OUTPATIENT
Start: 2018-04-19 | End: 2018-04-20

## 2018-04-19 RX ORDER — DEXTROSE MONOHYDRATE 25 G/50ML
25 INJECTION, SOLUTION INTRAVENOUS
Status: DISCONTINUED | OUTPATIENT
Start: 2018-04-19 | End: 2018-05-10

## 2018-04-19 RX ORDER — SODIUM CHLORIDE 9 MG/ML
INJECTION, SOLUTION INTRAVENOUS CONTINUOUS
Status: CANCELLED | OUTPATIENT
Start: 2018-04-22

## 2018-04-19 RX ADMIN — OXYCODONE HYDROCHLORIDE 10 MG: 10 TABLET, FILM COATED, EXTENDED RELEASE ORAL at 20:55

## 2018-04-19 RX ADMIN — DEXAMETHASONE 40 MG: 4 TABLET ORAL at 15:09

## 2018-04-19 RX ADMIN — OXYCODONE HYDROCHLORIDE 5 MG: 5 TABLET ORAL at 04:58

## 2018-04-19 RX ADMIN — ACYCLOVIR 400 MG: 200 CAPSULE ORAL at 20:55

## 2018-04-19 RX ADMIN — ONDANSETRON 4 MG: 4 TABLET, ORALLY DISINTEGRATING ORAL at 20:55

## 2018-04-19 RX ADMIN — INSULIN HUMAN 3 UNITS: 100 INJECTION, SOLUTION PARENTERAL at 13:38

## 2018-04-19 RX ADMIN — ACETAMINOPHEN 1000 MG: 500 TABLET ORAL at 11:34

## 2018-04-19 RX ADMIN — CALCIUM GLUCONATE 1 G: 94 INJECTION, SOLUTION INTRAVENOUS at 10:43

## 2018-04-19 RX ADMIN — Medication: at 20:58

## 2018-04-19 RX ADMIN — SODIUM CHLORIDE: 9 INJECTION, SOLUTION INTRAVENOUS at 14:39

## 2018-04-19 RX ADMIN — INSULIN HUMAN 2 UNITS: 100 INJECTION, SOLUTION PARENTERAL at 09:10

## 2018-04-19 RX ADMIN — HEPARIN SODIUM 5000 UNITS: 5000 INJECTION, SOLUTION INTRAVENOUS; SUBCUTANEOUS at 13:46

## 2018-04-19 RX ADMIN — BORTEZOMIB 2.6 MG: 3.5 INJECTION, POWDER, LYOPHILIZED, FOR SOLUTION INTRAVENOUS; SUBCUTANEOUS at 16:03

## 2018-04-19 RX ADMIN — ACETAMINOPHEN 1000 MG: 500 TABLET ORAL at 06:31

## 2018-04-19 RX ADMIN — HEPARIN SODIUM 5000 UNITS: 5000 INJECTION, SOLUTION INTRAVENOUS; SUBCUTANEOUS at 04:59

## 2018-04-19 RX ADMIN — INSULIN GLARGINE 60 UNITS: 100 INJECTION, SOLUTION SUBCUTANEOUS at 21:00

## 2018-04-19 RX ADMIN — OXYCODONE HYDROCHLORIDE 10 MG: 10 TABLET, FILM COATED, EXTENDED RELEASE ORAL at 09:04

## 2018-04-19 RX ADMIN — METOPROLOL SUCCINATE 100 MG: 50 TABLET, EXTENDED RELEASE ORAL at 09:05

## 2018-04-19 RX ADMIN — DEXAMETHASONE 2 MG: 1 TABLET ORAL at 09:07

## 2018-04-19 RX ADMIN — CALCIUM CITRATE 200 MG (950 MG) TABLET 950 MG: at 09:06

## 2018-04-19 RX ADMIN — ALPRAZOLAM 0.5 MG: 0.5 TABLET ORAL at 11:34

## 2018-04-19 RX ADMIN — ACETAMINOPHEN 1000 MG: 500 TABLET ORAL at 23:09

## 2018-04-19 RX ADMIN — HYDRALAZINE HYDROCHLORIDE 50 MG: 10 TABLET, FILM COATED ORAL at 13:46

## 2018-04-19 RX ADMIN — ONDANSETRON HYDROCHLORIDE 16 MG: 2 INJECTION, SOLUTION INTRAMUSCULAR; INTRAVENOUS at 15:22

## 2018-04-19 RX ADMIN — INSULIN HUMAN 10 UNITS: 100 INJECTION, SOLUTION PARENTERAL at 17:54

## 2018-04-19 RX ADMIN — TERAZOSIN HYDROCHLORIDE ANHYDROUS 10 MG: 5 CAPSULE ORAL at 20:55

## 2018-04-19 RX ADMIN — ACETAMINOPHEN 1000 MG: 500 TABLET ORAL at 17:51

## 2018-04-19 RX ADMIN — INSULIN HUMAN 10 UNITS: 100 INJECTION, SOLUTION PARENTERAL at 20:59

## 2018-04-19 RX ADMIN — CYCLOPHOSPHAMIDE 1800 MG: 2 INJECTION, POWDER, FOR SOLUTION INTRAVENOUS; ORAL at 16:03

## 2018-04-19 RX ADMIN — LISINOPRIL 20 MG: 20 TABLET ORAL at 09:05

## 2018-04-19 RX ADMIN — HEPARIN SODIUM 5000 UNITS: 5000 INJECTION, SOLUTION INTRAVENOUS; SUBCUTANEOUS at 20:56

## 2018-04-19 RX ADMIN — Medication: at 09:15

## 2018-04-19 RX ADMIN — ACYCLOVIR 400 MG: 200 CAPSULE ORAL at 09:04

## 2018-04-19 RX ADMIN — ALPRAZOLAM 0.5 MG: 0.5 TABLET ORAL at 23:17

## 2018-04-19 RX ADMIN — PRAVASTATIN SODIUM 40 MG: 20 TABLET ORAL at 20:55

## 2018-04-19 RX ADMIN — MAGNESIUM SULFATE IN WATER 2 G: 40 INJECTION, SOLUTION INTRAVENOUS at 10:43

## 2018-04-19 ASSESSMENT — ENCOUNTER SYMPTOMS
MYALGIAS: 1
CHILLS: 0
DIARRHEA: 0
NERVOUS/ANXIOUS: 0
CARDIOVASCULAR NEGATIVE: 1
BACK PAIN: 0
RESPIRATORY NEGATIVE: 1
VOMITING: 0
ABDOMINAL PAIN: 0
DIZZINESS: 0
COUGH: 0
FEVER: 0
GASTROINTESTINAL NEGATIVE: 1
CONSTIPATION: 0
SHORTNESS OF BREATH: 1
EYES NEGATIVE: 1
DEPRESSION: 0
WEAKNESS: 1

## 2018-04-19 ASSESSMENT — PAIN SCALES - GENERAL
PAINLEVEL_OUTOF10: 0
PAINLEVEL_OUTOF10: 7
PAINLEVEL_OUTOF10: 8

## 2018-04-19 NOTE — PROGRESS NOTES
Chemotherapy Verification - PRIMARY RN  Cycle 1 Day 1    Height = 160 cm  Weight = 172 kg  BSA = 2.7 m2, Order BSA 2.0 m2       Medication: Velcade  Dose: 1.3 mg / m2  Calculated Dose: 2.6 mg, Order dose 2.6 mg                             (In mg/m2, AUC, mg/kg)     Medication: Cytoxan  Dose: 900 mg / m2  Calculated Dose: 1800 mg, Order dose 1800 mg                             (In mg/m2, AUC, mg/kg)                             I confirm this process was performed independently with the BSA and all final chemotherapy dosing calculations congruent.  Any discrepancies of 5% or greater have been addressed with the chemotherapy pharmacist. The resolution of the discrepancy has been documented in the EPIC progress notes.

## 2018-04-19 NOTE — PROGRESS NOTES
[facility] Hospitalist Progress Note    Date of Service: 4/19/2018    Chief Complaint  67 y.o. female admitted 4/10/2018 with intractible pain secondary to multiple myeloma, known fracture left humerus    Interval Problem Update  4/11 Patient feeling much less pain since admission and starting medications, she is still having significant pain and swelling left humerus fracture site and likely had pathologic fracture.  Dr Núñez and Oksana consulted for assistance.  She already has significant calculus surrounding the fracture site but also pain.    4/12 Patient had mapping done on humerus today and bony survey.  Symptoms doing about same, mediation helping.  Plan is to medically maximize patient in preparation to start chemotherapy on Monday.  4/13 Patient feeling okay but having significant anxiety - states normally she is calm but with this situation she is having difficulty.  Xanax low dose ordered PRN.  Radiation to resume Monday, first dose given today.  4/14 Patient feeling well, nava placed in facility prior to transfer d/t urinary incontinence and body habitus, she is a high risk for skin breakdown and nava to remain in place at this time.  No acute complaints.  BP high and heart rate low, reponded well to IV hydralazine overnight - scheduled PO today, decrease toprol xl to 100 mg daily.  4/15 Patient with friends visiting today, states she is feeling a bit tearful today but otherwise is feeling physically okay.  Her legs are healing well from recent cellulitis and she will resume radiation therapy tomorrow.  4/16 Patient doing about same, blood pressure has improved slightly with changes but still significantly elevated, will change her baseline clonidine to tid and add lisinopril qday.  Dr Sim to see patient today and decide when to proceed with chemo.  4/17 started radiation to left humerus today. Referred to SNF. Onc thinking about weekly CyBorD  4/18 repleting magnesium and calcium. Likely starting  CyBorD tomorrow   DC dexamethasone two and increased sliding scale insulin and Lantus. Repleting magnesium and calcium. Started CyBorD today.    Consultants/Specialty  onc - [place]/Shields  XRT - Peddada      Disposition  Tbd, lives in [place]        Review of Systems   Constitutional: Positive for malaise/fatigue. Negative for chills and fever.   HENT: Negative for congestion.    Respiratory: Positive for shortness of breath (orthopnea). Negative for cough.    Cardiovascular: Negative for chest pain and leg swelling.   Gastrointestinal: Negative for abdominal pain, constipation, diarrhea and vomiting.   Genitourinary: Negative for dysuria.   Musculoskeletal: Positive for myalgias. Negative for back pain and joint pain.   Skin: Negative for itching and rash.   Neurological: Positive for weakness. Negative for dizziness.   Psychiatric/Behavioral: Negative for depression. The patient is not nervous/anxious.       Physical Exam  Laboratory/Imaging   Hemodynamics  Temp (24hrs), Av.6 °C (97.8 °F), Min:36.3 °C (97.4 °F), Max:37.2 °C (98.9 °F)   Temperature: 36.4 °C (97.6 °F)  Pulse  Av.9  Min: 38  Max: 74    Blood Pressure : 128/65      Respiratory      Respiration: 16, Pulse Oximetry: 97 %     Work Of Breathing / Effort: Moderate  RUL Breath Sounds: Diminished, RML Breath Sounds: Diminished, RLL Breath Sounds: Diminished, CHARLOTTE Breath Sounds: Diminished, LLL Breath Sounds: Diminished    Fluids    Intake/Output Summary (Last 24 hours) at 18 1445  Last data filed at 18 0740   Gross per 24 hour   Intake                0 ml   Output             2250 ml   Net            -2250 ml       Nutrition  Orders Placed This Encounter   Procedures   • Diet Order     Standing Status:   Standing     Number of Occurrences:   1     Order Specific Question:   Diet:     Answer:   Diabetic [3]     Physical Exam   Constitutional: She is oriented to person, place, and time. She appears well-developed and well-nourished. No  distress.   Morbidly obese   HENT:   Head: Normocephalic and atraumatic.   Eyes: Conjunctivae are normal.   Cardiovascular: Normal rate and regular rhythm.  Exam reveals no gallop and no friction rub.    Murmur (Systolic) heard.  Pulmonary/Chest: Effort normal and breath sounds normal. No respiratory distress.   Abdominal: Soft. Bowel sounds are normal. She exhibits no distension. There is no tenderness. There is no guarding.   Musculoskeletal: She exhibits no edema.   Neurological: She is alert and oriented to person, place, and time. No cranial nerve deficit.   Skin: Skin is warm and dry. She is not diaphoretic. No erythema. No pallor.   icthyosis lle, healing cellulitis rle     Psychiatric: She exhibits abnormal recent memory.   Nursing note and vitals reviewed.      Recent Labs      04/17/18   0004  04/18/18   0033  04/19/18   0000   WBC  5.6  6.3  5.3   RBC  3.03*  2.83*  2.87*   HEMOGLOBIN  9.3*  8.7*  8.8*   HEMATOCRIT  29.0*  27.5*  27.7*   MCV  95.7  97.2  96.5   MCH  30.7  30.7  30.7   MCHC  32.1*  31.6*  31.8*   RDW  55.1*  55.9*  54.6*   PLATELETCT  163*  148*  152*   MPV  10.5  10.8  10.9     Recent Labs      04/17/18   0004  04/18/18   0033  04/19/18   0000   SODIUM  138  138  135   POTASSIUM  3.5*  4.1  3.8   CHLORIDE  100  102  100   CO2  33  32  31   GLUCOSE  131*  213*  271*   BUN  24*  25*  26*   CREATININE  1.06  1.00  0.93   CALCIUM  7.4*  7.5*  7.6*                      Assessment/Plan     * Pathologic fracture- (present on admission)   Assessment & Plan    -widespread lytic disease  -already received BM bx, confirms diagnosis  -will need radiation and chemotherapy  -pain control as outlined above  -Dr Gandhi coordinating radiation left humerus fracture  -Had been seen by Dr Forbes in the past. - no surgical intervention          Hypocalcemia   Assessment & Plan    - Repleting as needed        Hypomagnesemia   Assessment & Plan    - Repleting as needed        Anxiety   Assessment & Plan     Exacerbated by situation and steroids  Use steroids only for chemo regimen  Xanax prn          Chronic venous stasis dermatitis of both lower extremities- (present on admission)   Assessment & Plan    -wound care, no clear e/o active infection at this time, defer abx's        Multiple myeloma (CMS-HCC)- (present on admission)   Assessment & Plan    -recent diagnosis, appears aggressive, now with innumerable lytic lesions  -admit to the oncology floor  -Dr Sim and Oksana consulted for assistance in management  - IV dilaudid PRN, oxy prn. DC maintenance steroids as these were causing confusion  -start oxycontin CR 10 mg BID  -skeletal survey done - spine obscured by body habitus, but multiple other lesions found        Anemia- (present on admission)   Assessment & Plan    -related to her MM, appears near her baseline, no e/o overt bleeding at this time, monitor closely        Hyponatremia- (present on admission)   Assessment & Plan    -mild, 2/2 hypovolemia, gentle IVF's with NS, check levels in the AM        Morbid obesity with BMI of 60.0-69.9, adult (CMS-HCC)- (present on admission)   Assessment & Plan    -encourage weight loss  Body mass index is 67.38 kg/m².        DM type 2 (diabetes mellitus, type 2) (CMS-HCC)- (present on admission)   Assessment & Plan    - With hyperglycemia  - Decreased Lantus from outpatient dosing of 40 twice a day to 60 daily for now but will continue to adjust        Essential hypertension, benign- (present on admission)   Assessment & Plan    High with bradycardia  Increase dose po hydralazine, decreased toprol xl to 100mg daily  Clonidine bid to tid, add lisinopril 20 q day  Terazosin qhs          Quality-Core Measures   Reviewed items::  Labs reviewed and Medications reviewed  Singh catheter::  Urinary Tract Retention or Urinary Tract Obstruction (high risk for skin breakdown given body habitus and urinary incontinence )  DVT prophylaxis pharmacological::  Heparin

## 2018-04-19 NOTE — DISCHARGE PLANNING
Medical Social Work    Pt discussed during IDT rounds. Pt started on chemo medication today.     Met with pt at bedside to see if pt would be agreeable to PFA screening to assist with coverage for post acute needs. Pt stated he income is $2400/month, which makes her over income for Medicaid. Pt states her goal is to return home with assistance from sister. Pt says she was at [facility] and was doing well at home after her d/c. Pt states she got cellulitis from facility which contributed to the weakness she was experiencing. Pt states she is determined to d/c home. States she is trying to figure out transportation to and from appointments and says her sister is assisting with finding a van to make is easier for pt to get into vehicle.

## 2018-04-19 NOTE — PROGRESS NOTES
"Pharmacy Chemotherapy Note    Second Check    Patient Name: YESSY SINGER  MRN: [id number]    Oncologist: Chiquis    Protocol: CyBorD       Bortezomib 1.3 mg/m2 IV push over 3-5 seconds or SubQ on days 1, 4, 8, and 11  Cyclophosphamide 900 mg/m2 IV over 30 min on day 1  MD dosing over 60 min  Dexamethasone 40 mg PO Daily on days 1-2, 4-5, 8-9, and 11-12     21 day cycle for 3-4 cycles  (transplant) or maximal response, disease progression or unacceptable toxicity (previously untreated or non-transplant candidates)    *Dosing Reference*  NCCN Guidelines for Multiple Myeloma V.3.2017  EMANUEL Russell et al. Cyclophosphamide, bortezomib and dexamethasone (CyBorD) induction for newly diagnosed multiple myeloma: High response rates in a phase II clinical trial. Leukemia. 2009 ; 23(7): 3341-4227.  Drew CASTELLANOS, et al. Once- versus twice-weekly bortezomib induction therapy with CyBorD in newly diagnosed multiple myeloma. BLOOD, 22APRIL 2010VOLUME 115, NUMBER 16  Juan S et al. Randomized, multicenter, phase 2 study (EVOLUTION) of combinations of bortezomib, dexamethasone, cyclophosphamide, and lenalidomide in previously untreated multiple myeloma. BLOOD, 2012 119: 8658-0655      PREVIOUS CHEMO:  NONE    SIGNIFICANT EVENTS:  Admitted for CC of pain 4/10/2018-present    Dx: Multiple Myeloma         /65   Pulse 62   Temp 36.4 °C (97.6 °F)   Resp 16   Ht 1.6 m (5' 2.99\")   Wt (!) 172.5 kg (380 lb 4.7 oz)   LMP 1994   SpO2 97%   Breastfeeding? No   BMI 67.38 kg/m²  Body surface area is 2.77 meters squared.    **Per MD max BSA of 2.0 for morbid obesity**    LABS 2018:   ANC: 4430      WBC: 5.3     Plt: 152k   Hgb/Hct: 8.8/27.7     SCr: 0.93mg/dL CrCl: >125 mL/min    Ma.6 PHOS: 3.0   K: 3.8  Na: 135          Glu: 271 Ca: 7.6  ALB: 2.4  CorrCa: 8.88    LABS 2018:  LFT's: 61 TBili = 0.4      Drug Order   (Drug name, dose, route, IV Fluid & volume, frequency, number of doses) Cycle: 1 Day 1    "   Previous treatment: No previous treatment (radiation)     Medication = Cyclophosphamide  Base Dose= 900 mg/m2  Calc Dose: Base Dose x 2 m2 = 1800 mg  Final Dose = 1800 mg  Route = IV  Fluid & Volume =  mL  Admin Duration = Over 60 min          <5% difference, OK to treat with final dose    Medication = Bortezomib (Velcade)  Base Dose = 1.3 mg/m²  Calc Dose: Base Dose x 2 m² = 2.6 mg  Final Dose = 2.6 mg  Route = subcutaneous  Fluid & Volume = 1.04 mL in syringe  Conc = 2.5 mg/mL  Admin Duration = to be given subcutaneously          <5% difference, OK to treat with final dose      By my signature below, I confirm this process was performed independently with the BSA and all final chemotherapy dosing calculations congruent. I have reviewed the above chemotherapy order and that my calculation of the final dose and BSA (when applicable) corroborate those calculations of the  pharmacist. Discrepancies of 5% or greater in the written dose have been addressed and documented within the EPIC Progress notes.    LESLIE Lugo, PharmD

## 2018-04-19 NOTE — PROGRESS NOTES
Received shift report from day shift RN. Pt is AO4 and reports pain in L arm, rest, repositioned, and medicated per MAR. Discussed POC. Assessed and administered evening medications. Bed in lowest position, call light and personal belongings within reach, educated to call if in need of assistance, non skid socks, room near nurses station, all needs met at this time.

## 2018-04-19 NOTE — DISCHARGE PLANNING
Medical Social Work    Received phone call from Selene with [facility]  stating pt is out of SNF days. Pt was recently at [facility], and was approved for an extra 20 days after running out of days. Unsure if insurance with authorize additional days at this time. Per Selene, it appears pt's sister is unable to provide care for her. Pt may possibly be more appropriate for long term care.     Per previous SW note, pt wishes to return home with support of sister and community, however prior to admission pt was having difficulty caring for herself. PT/OT recommending SNF placement.

## 2018-04-19 NOTE — CARE PLAN
Problem: Safety  Goal: Will remain free from falls    Intervention: Assess risk factors for falls  Patient is non weight bearing at this time. Patient having radiation therapy today for bone lesion.      Problem: Pain Management  Goal: Pain level will decrease to patient's comfort goal    Intervention: Follow pain managment plan developed in collaboration with patient and Interdisciplinary Team  Pain medication given as needed will continue to monitor

## 2018-04-19 NOTE — PROGRESS NOTES
Patient transported to radiation oncology and received treatment 5 of 5. Patient will return Monday for bilateral femur/pelvis treatment.

## 2018-04-19 NOTE — PROGRESS NOTES
Patient alert and oriented up with max assist. Patient did go to radiation therapy this am,  tolerated well. Reviewed POC with patient. PICC line ordered for chemo therapy. Waiting for PICC placement. Call light within reach hourly rounding in practice.

## 2018-04-19 NOTE — PROGRESS NOTES
[facility] Hospitalist Progress Note    Date of Service: 4/18/2018    Chief Complaint  67 y.o. female admitted 4/10/2018 with intractible pain secondary to multiple myeloma, known fracture left humerus    Interval Problem Update  4/11 Patient feeling much less pain since admission and starting medications, she is still having significant pain and swelling left humerus fracture site and likely had pathologic fracture.  Dr Núñez and Oksana consulted for assistance.  She already has significant calculus surrounding the fracture site but also pain.    4/12 Patient had mapping done on humerus today and bony survey.  Symptoms doing about same, mediation helping.  Plan is to medically maximize patient in preparation to start chemotherapy on Monday.  4/13 Patient feeling okay but having significant anxiety - states normally she is calm but with this situation she is having difficulty.  Xanax low dose ordered PRN.  Radiation to resume Monday, first dose given today.  4/14 Patient feeling well, nava placed in facility prior to transfer d/t urinary incontinence and body habitus, she is a high risk for skin breakdown and nava to remain in place at this time.  No acute complaints.  BP high and heart rate low, reponded well to IV hydralazine overnight - scheduled PO today, decrease toprol xl to 100 mg daily.  4/15 Patient with friends visiting today, states she is feeling a bit tearful today but otherwise is feeling physically okay.  Her legs are healing well from recent cellulitis and she will resume radiation therapy tomorrow.  4/16 Patient doing about same, blood pressure has improved slightly with changes but still significantly elevated, will change her baseline clonidine to tid and add lisinopril qday.  Dr Sim to see patient today and decide when to proceed with chemo.  4/17 started radiation to left humerus today. Referred to SNF. Onc thinking about weekly CyBorD  4/18 repleting magnesium and calcium. Likely starting  Jeni tomorrow    Consultants/Specialty  onc - Núñez/Shields    XRT - Peddada      Disposition  Tbd, lives in [place]        Review of Systems   Constitutional: Positive for malaise/fatigue. Negative for chills and fever.   HENT: Negative for congestion.    Respiratory: Positive for shortness of breath (orthopnea). Negative for cough.    Cardiovascular: Negative for chest pain and leg swelling.   Gastrointestinal: Negative for abdominal pain, constipation, diarrhea, heartburn and vomiting.   Genitourinary: Negative for dysuria and hematuria.   Musculoskeletal: Positive for myalgias. Negative for back pain and joint pain.   Skin: Negative for itching and rash.   Neurological: Positive for weakness. Negative for dizziness and headaches.   Psychiatric/Behavioral: Positive for memory loss. Negative for depression (tearful today). The patient is nervous/anxious. The patient does not have insomnia.       Physical Exam  Laboratory/Imaging   Hemodynamics  Temp (24hrs), Av.8 °C (98.2 °F), Min:36.5 °C (97.7 °F), Max:37.2 °C (98.9 °F)   Temperature: 37.2 °C (98.9 °F)  Pulse  Av.2  Min: 38  Max: 74    Blood Pressure : 159/57      Respiratory      Respiration: 20, Pulse Oximetry: 97 %     Work Of Breathing / Effort: Moderate  RUL Breath Sounds: Diminished, RML Breath Sounds: Diminished, RLL Breath Sounds: Diminished, [place]TE Breath Sounds: Diminished, LLL Breath Sounds: Diminished    Fluids    Intake/Output Summary (Last 24 hours) at 18 1736  Last data filed at 18 0612   Gross per 24 hour   Intake                0 ml   Output             1600 ml   Net            -1600 ml       Nutrition  Orders Placed This Encounter   Procedures   • Diet Order     Standing Status:   Standing     Number of Occurrences:   1     Order Specific Question:   Diet:     Answer:   Diabetic [3]     Physical Exam   Constitutional: She is oriented to person, place, and time. She appears well-developed and well-nourished. No distress.    Morbidly obese   HENT:   Head: Normocephalic and atraumatic.   Eyes: Conjunctivae are normal.   Cardiovascular: Normal rate and regular rhythm.  Exam reveals no gallop and no friction rub.    Murmur (Systolic) heard.  Pulmonary/Chest: Effort normal and breath sounds normal. No respiratory distress.   Abdominal: Soft. Bowel sounds are normal. She exhibits no distension. There is no guarding.   Musculoskeletal: She exhibits no edema.   Neurological: She is alert and oriented to person, place, and time. No cranial nerve deficit.   Skin: Skin is warm and dry. She is not diaphoretic. No erythema. No pallor.   icthyosis lle, healing cellulitis rle     Psychiatric: She exhibits abnormal recent memory.   Nursing note and vitals reviewed.      Recent Labs      04/16/18   0011  04/17/18   0004  04/18/18   0033   WBC  7.0  5.6  6.3   RBC  3.09*  3.03*  2.83*   HEMOGLOBIN  9.6*  9.3*  8.7*   HEMATOCRIT  29.5*  29.0*  27.5*   MCV  95.5  95.7  97.2   MCH  31.1  30.7  30.7   MCHC  32.5*  32.1*  31.6*   RDW  55.1*  55.1*  55.9*   PLATELETCT  171  163*  148*   MPV  10.6  10.5  10.8     Recent Labs      04/16/18   0011  04/17/18   0004  04/18/18   0033   SODIUM  136  138  138   POTASSIUM  3.4*  3.5*  4.1   CHLORIDE  100  100  102   CO2  32  33  32   GLUCOSE  90  131*  213*   BUN  27*  24*  25*   CREATININE  1.00  1.06  1.00   CALCIUM  7.4*  7.4*  7.5*                      Assessment/Plan     * Pathologic fracture- (present on admission)   Assessment & Plan    -widespread lytic disease  -already received BM bx, confirms diagnosis  -will need radiation and chemotherapy  -pain control as outlined above  -Dr Gandhi coordinating radiation left humerus fracture  -Had been seen by Dr Forbes in the past. - no surgical intervention          Hypocalcemia   Assessment & Plan    - Repleting as needed        Hypomagnesemia   Assessment & Plan    - Repleting as needed        Anxiety   Assessment & Plan    Exacerbated by situation and  steroids  Decrease steroid dose  Xanax prn          Chronic venous stasis dermatitis of both lower extremities- (present on admission)   Assessment & Plan    -wound care, no clear e/o active infection at this time, defer abx's        Multiple myeloma (CMS-HCC)- (present on admission)   Assessment & Plan    -recent diagnosis, appears aggressive, now with innumerable lytic lesions  -admit to the oncology floor  -Dr Sim and Oksana consulted for assistance in management  -IV steroids, IV dilaudid PRN, oxy prn  -start oxycontin CR 10 mg BID  -skeletal survey done - spine obscured by body habitus        Anemia- (present on admission)   Assessment & Plan    -related to her MM, appears near her baseline, no e/o overt bleeding at this time, monitor closely        Hyponatremia- (present on admission)   Assessment & Plan    -mild, 2/2 hypovolemia, gentle IVF's with NS, check levels in the AM        Morbid obesity with BMI of 60.0-69.9, adult (CMS-HCC)- (present on admission)   Assessment & Plan    -encourage weight loss  Body mass index is 67.38 kg/m².        DM type 2 (diabetes mellitus, type 2) (CMS-HCC)- (present on admission)   Assessment & Plan    -continue outpatient insulin, ISS        Essential hypertension, benign- (present on admission)   Assessment & Plan    High with bradycardia  Increase dose po hydralazine, decreased toprol xl to 100mg daily  Clonidine bid to tid, add lisinopril 20 q day  Terazosin qhs          Quality-Core Measures   Reviewed items::  Labs reviewed and Medications reviewed  Singh catheter::  Urinary Tract Retention or Urinary Tract Obstruction (high risk for skin breakdown given body habitus and urinary incontinence )  DVT prophylaxis pharmacological::  Heparin

## 2018-04-19 NOTE — PROGRESS NOTES
Chemotherapy Verification - SECONDARY RN   Cycle 1 day 1      Height = 160 cm  Weight = 172.5 kg   BSA = 2 m2       Medication: bortezomib VELCADE  Dose: 1.3 mg/m2  Calculated Dose: 2.6 mg (ordered dose= 2.6 mg, <5% difference)                              (In mg/m2, AUC, mg/kg)     Medication: cyclophosphamide  Dose: 900 mg/m2  Calculated Dose: 1800 mg (ordered dose= 1800 mg, <5% difference)                             (In mg/m2, AUC, mg/kg)        I confirm that this process was performed independently.

## 2018-04-19 NOTE — CARE PLAN
Problem: Communication  Goal: The ability to communicate needs accurately and effectively will improve  Outcome: PROGRESSING AS EXPECTED  Pt involved in POC. Addressed questions/concerns    Problem: Venous Thromboembolism (VTW)/Deep Vein Thrombosis (DVT) Prevention:  Goal: Patient will participate in Venous Thrombosis (VTE)/Deep Vein Thrombosis (DVT)Prevention Measures  Outcome: PROGRESSING AS EXPECTED  Medicated per MAR

## 2018-04-19 NOTE — PROGRESS NOTES
Oncology/Hematology Progress Note               Author: Darron Núñez Date & Time created: 4/19/2018  1:05 PM     CC:   Multiple myeloma    Interval History:    Completed radiation therapy to the left humerus  Start chemotherapy with CyBorD - D1      Review of Systems:  Review of Systems   Constitutional: Positive for malaise/fatigue. Negative for chills and fever.   HENT: Negative.    Eyes: Negative.    Respiratory: Negative.    Cardiovascular: Negative.    Gastrointestinal: Negative.    Genitourinary: Negative.    Musculoskeletal: Positive for joint pain and myalgias.   Skin: Negative.    Neurological: Positive for weakness.   Endo/Heme/Allergies: Negative.        Physical Exam:  Physical Exam   Constitutional: She is oriented to person, place, and time.   Morbidly obese   HENT:   Head: Normocephalic.   Eyes: Conjunctivae are normal. Pupils are equal, round, and reactive to light.   Cardiovascular: Normal rate, regular rhythm and normal heart sounds.    Pulmonary/Chest: Effort normal and breath sounds normal.   Abdominal: Soft. Bowel sounds are normal.   Musculoskeletal: She exhibits edema.   She's had chronic edema symmetrical   Neurological: She is alert and oriented to person, place, and time.   Psychiatric: She has a normal mood and affect. Her behavior is normal. Judgment and thought content normal.       Labs:        Invalid input(s): [id number]      Recent Labs      04/17/18   0004  04/18/18   0033  04/19/18   0000   SODIUM  138  138  135   POTASSIUM  3.5*  4.1  3.8   CHLORIDE  100  102  100   CO2  33  32  31   BUN  24*  25*  26*   CREATININE  1.06  1.00  0.93   MAGNESIUM   --   1.3*  1.6   PHOSPHORUS   --   3.3  3.0   CALCIUM  7.4*  7.5*  7.6*     Recent Labs      04/17/18   0004  04/18/18   0033  04/19/18   0000   ALTSGPT  8   --    --    ASTSGOT  8*   --    --    ALKPHOSPHAT  61   --    --    TBILIRUBIN  0.4   --    --    GLUCOSE  131*  213*  271*     Recent Labs      04/17/18   0004  04/18/18    0033  18   0000   RBC  3.03*  2.83*  2.87*   HEMOGLOBIN  9.3*  8.7*  8.8*   HEMATOCRIT  29.0*  27.5*  27.7*   PLATELETCT  163*  148*  152*     Recent Labs      18   0004  18   0033  18   0000   WBC  5.6  6.3  5.3   NEUTSPOLYS  73.00*  74.80*  83.10*   LYMPHOCYTES  18.50*  17.90*  10.30*   MONOCYTES  7.50  6.30  5.60   EOSINOPHILS  0.50  0.50  0.60   BASOPHILS  0.00  0.00  0.00   ASTSGOT  8*   --    --    ALTSGPT  8   --    --    ALKPHOSPHAT  61   --    --    TBILIRUBIN  0.4   --    --      Recent Labs      18   0004  18   0033  18   0000   SODIUM  138  138  135   POTASSIUM  3.5*  4.1  3.8   CHLORIDE  100  102  100   CO2  33  32  31   GLUCOSE  131*  213*  271*   BUN  24*  25*  26*   CREATININE  1.06  1.00  0.93   CALCIUM  7.4*  7.5*  7.6*     Hemodynamics:  Temp (24hrs), Av.6 °C (97.8 °F), Min:36.3 °C (97.4 °F), Max:37.2 °C (98.9 °F)  Temperature: 36.4 °C (97.6 °F)  Pulse  Av.9  Min: 38  Max: 74   Blood Pressure : 128/65     Respiratory:    Respiration: 16, Pulse Oximetry: 97 %     Work Of Breathing / Effort: Moderate  RML Breath Sounds: Diminished, RLL Breath Sounds: Diminished, LLL Breath Sounds: Diminished  Fluids:    Intake/Output Summary (Last 24 hours) at 18 1124  Last data filed at 18 0300   Gross per 24 hour   Intake                0 ml   Output             1000 ml   Net            -1000 ml       GI/Nutrition:  Orders Placed This Encounter   Procedures   • Diet Order     Standing Status:   Standing     Number of Occurrences:   1     Order Specific Question:   Diet:     Answer:   Diabetic [3]     Medical Decision Making, by Problem:  Active Hospital Problems    Diagnosis   • *Pathologic fracture [M84.40XA]   • Hyponatremia [E87.1]   • Anemia [D64.9]   • Multiple myeloma (CMS-HCC) [C90.00]   • Chronic venous stasis dermatitis of both lower extremities [I87.2]   • Morbid obesity with BMI of 60.0-69.9, adult (CMS-HCC) [E66.01, Z68.44]   • DM type 2  (diabetes mellitus, type 2) (CMS-HCC) [E11.9]   • Essential hypertension, benign [I10]     Past medical history, past surgical history, past social history unchanged reviewed    Plan:  1.  Multiple myeloma: 1P deletion intermediate risk, stage III based on ISS staging (beta 2 9.9 and albumin 2.7 before):      -She has completed radiation to her left humerus and will not start radiation to the right hip area until 4 more days next week. She will start on chemotherapy with CyBorD today. Her dose is adjusted for more ideal weight which turns out to be close to 2.0 BSA. Her dose will still be quite elevated for Velcade which is given subcutaneously and this will be repeated on day 4, 8, and 11. She will receive Cytoxan today at a dose of 1800 mg over one hour. She will be well hydrated before and after the infusion along with receiving appropriate antiemetics. The Cytoxan does not repeat for 2 weeks. She'll also be placed on oral dexamethasone 40 mg today and tomorrow which will also be repeated on day 4, 8, and 11         2.  Anemia:  Hemoglobin stable at 8.7    3.  History of B12 deficiency: Her levels look good. She can provide continue with once monthly B12 injections.    4.  Extremities: She's had chronic lower extremity edema. Seems like her cellulitis has resolved. We'll just need to be followed closely.       High complexity/scheduled with her primary team/radiation oncology  Quality-Core Measures

## 2018-04-19 NOTE — PROGRESS NOTES
"Pharmacy Chemotherapy calculation:    DX: Multiple Myeloma  Cycle 1    Day 1, 4, 8, 11  Previous treatment = radiation     Regimen: CyBorD  Bortezomib 1.3 mg/m2 IVP or subQ on Days 1, 4, 8, and 11  Cyclophosphamide 900 mg/m2 IV over 30 min on Day 1  -MD dosing over 60 min   Dexamethasone 40 mg po no days 1-2, 4-5, 8-9, and 11-12  Repeat every 21 days for 3-4 cycles (transplant candidates) OR until maximal response, disease progression or unacceptable toxicity (previously treated or non-transplant candidates)   NCCN Guidelines for Multiple Myeloma V.3.2017.  Yina H, et al. Blood. 2009; 114(22): abstr 131     Allergies:  Actos [pioglitazone hydrochloride]; Advil [ibuprofen micronized]; Cephalosporins; and [place]    /65   Pulse 62   Temp 36.4 °C (97.6 °F)   Resp 16   Ht 1.6 m (5' 2.99\")   Wt (!) 172.5 kg (380 lb 4.7 oz)   LMP 04/11/1994   SpO2 97%   Breastfeeding? No   BMI 67.38 kg/m²   Body surface area is 2.77 meters squared.   **MAX BSA = 2 m² per Dr. Sim for morbid obesity     Labs:  4/19/18:  ANC~ 4430 Plt = 152k   Hgb = 8.8 (w/in parameters)   SCr = 0.93mg/dL CrCl > 125 mL/min   Mag = 1.6 (Mag 2g IV x1) K+ = 3.8  4/17/18:  AST/ALT/AP = 8/8/61 TBili = 0.4         Bortezomib 1.3 mg/m2 x 2 m² = 2.6 mg    <5% difference, ok to treat with final dose = 2.6 mg subQ on Days 1, 4, 8, and 11    Cyclophosphamide 900 mg/m2 x 2 m² = 1800 mg    <5% difference, ok to treat with final dose =  1800 mg IV on Day 1    Bere Lawson, PharmD, BCOP     "

## 2018-04-20 ENCOUNTER — APPOINTMENT (OUTPATIENT)
Dept: RADIOLOGY | Facility: MEDICAL CENTER | Age: 68
DRG: 840 | End: 2018-04-20
Attending: INTERNAL MEDICINE
Payer: MEDICARE

## 2018-04-20 LAB
ALBUMIN SERPL BCP-MCNC: 2.5 G/DL (ref 3.2–4.9)
BASOPHILS # BLD AUTO: 0 % (ref 0–1.8)
BASOPHILS # BLD: 0 K/UL (ref 0–0.12)
BUN SERPL-MCNC: 25 MG/DL (ref 8–22)
CALCIUM SERPL-MCNC: 6.8 MG/DL (ref 8.5–10.5)
CHLORIDE SERPL-SCNC: 100 MMOL/L (ref 96–112)
CO2 SERPL-SCNC: 28 MMOL/L (ref 20–33)
CREAT SERPL-MCNC: 0.91 MG/DL (ref 0.5–1.4)
EOSINOPHIL # BLD AUTO: 0 K/UL (ref 0–0.51)
EOSINOPHIL NFR BLD: 0 % (ref 0–6.9)
ERYTHROCYTE [DISTWIDTH] IN BLOOD BY AUTOMATED COUNT: 54.5 FL (ref 35.9–50)
GLUCOSE BLD-MCNC: 302 MG/DL (ref 65–99)
GLUCOSE BLD-MCNC: 313 MG/DL (ref 65–99)
GLUCOSE BLD-MCNC: 328 MG/DL (ref 65–99)
GLUCOSE BLD-MCNC: 331 MG/DL (ref 65–99)
GLUCOSE SERPL-MCNC: 358 MG/DL (ref 65–99)
HCT VFR BLD AUTO: 26.4 % (ref 37–47)
HGB BLD-MCNC: 8.3 G/DL (ref 12–16)
IMM GRANULOCYTES # BLD AUTO: 0.02 K/UL (ref 0–0.11)
IMM GRANULOCYTES NFR BLD AUTO: 0.5 % (ref 0–0.9)
LYMPHOCYTES # BLD AUTO: 0.6 K/UL (ref 1–4.8)
LYMPHOCYTES NFR BLD: 16.4 % (ref 22–41)
MAGNESIUM SERPL-MCNC: 1.8 MG/DL (ref 1.5–2.5)
MCH RBC QN AUTO: 30.3 PG (ref 27–33)
MCHC RBC AUTO-ENTMCNC: 31.4 G/DL (ref 33.6–35)
MCV RBC AUTO: 96.4 FL (ref 81.4–97.8)
MONOCYTES # BLD AUTO: 0.05 K/UL (ref 0–0.85)
MONOCYTES NFR BLD AUTO: 1.4 % (ref 0–13.4)
NEUTROPHILS # BLD AUTO: 2.99 K/UL (ref 2–7.15)
NEUTROPHILS NFR BLD: 81.7 % (ref 44–72)
NRBC # BLD AUTO: 0 K/UL
NRBC BLD-RTO: 0 /100 WBC
PHOSPHATE SERPL-MCNC: 2.9 MG/DL (ref 2.5–4.5)
PLATELET # BLD AUTO: 136 K/UL (ref 164–446)
PMV BLD AUTO: 10.7 FL (ref 9–12.9)
POTASSIUM SERPL-SCNC: 4.6 MMOL/L (ref 3.6–5.5)
RBC # BLD AUTO: 2.74 M/UL (ref 4.2–5.4)
SODIUM SERPL-SCNC: 133 MMOL/L (ref 135–145)
WBC # BLD AUTO: 3.7 K/UL (ref 4.8–10.8)

## 2018-04-20 PROCEDURE — A9270 NON-COVERED ITEM OR SERVICE: HCPCS | Performed by: INTERNAL MEDICINE

## 2018-04-20 PROCEDURE — 36415 COLL VENOUS BLD VENIPUNCTURE: CPT

## 2018-04-20 PROCEDURE — 77295 3-D RADIOTHERAPY PLAN: CPT | Performed by: RADIOLOGY

## 2018-04-20 PROCEDURE — 85025 COMPLETE CBC W/AUTO DIFF WBC: CPT

## 2018-04-20 PROCEDURE — 77334 RADIATION TREATMENT AID(S): CPT | Performed by: RADIOLOGY

## 2018-04-20 PROCEDURE — 83735 ASSAY OF MAGNESIUM: CPT

## 2018-04-20 PROCEDURE — 700102 HCHG RX REV CODE 250 W/ 637 OVERRIDE(OP): Performed by: HOSPITALIST

## 2018-04-20 PROCEDURE — 700111 HCHG RX REV CODE 636 W/ 250 OVERRIDE (IP): Performed by: HOSPITALIST

## 2018-04-20 PROCEDURE — 80069 RENAL FUNCTION PANEL: CPT

## 2018-04-20 PROCEDURE — 700105 HCHG RX REV CODE 258: Performed by: HOSPITALIST

## 2018-04-20 PROCEDURE — A9270 NON-COVERED ITEM OR SERVICE: HCPCS | Performed by: HOSPITALIST

## 2018-04-20 PROCEDURE — 77295 3-D RADIOTHERAPY PLAN: CPT | Mod: 26 | Performed by: RADIOLOGY

## 2018-04-20 PROCEDURE — 36569 INSJ PICC 5 YR+ W/O IMAGING: CPT

## 2018-04-20 PROCEDURE — 700102 HCHG RX REV CODE 250 W/ 637 OVERRIDE(OP): Performed by: INTERNAL MEDICINE

## 2018-04-20 PROCEDURE — 77334 RADIATION TREATMENT AID(S): CPT | Mod: 26 | Performed by: RADIOLOGY

## 2018-04-20 PROCEDURE — 02HV33Z INSERTION OF INFUSION DEVICE INTO SUPERIOR VENA CAVA, PERCUTANEOUS APPROACH: ICD-10-PCS | Performed by: INTERNAL MEDICINE

## 2018-04-20 PROCEDURE — 770004 HCHG ROOM/CARE - ONCOLOGY PRIVATE *

## 2018-04-20 PROCEDURE — 82962 GLUCOSE BLOOD TEST: CPT | Mod: 91

## 2018-04-20 PROCEDURE — 77300 RADIATION THERAPY DOSE PLAN: CPT | Performed by: RADIOLOGY

## 2018-04-20 PROCEDURE — 99232 SBSQ HOSP IP/OBS MODERATE 35: CPT | Performed by: HOSPITALIST

## 2018-04-20 PROCEDURE — 77300 RADIATION THERAPY DOSE PLAN: CPT | Mod: 26 | Performed by: RADIOLOGY

## 2018-04-20 PROCEDURE — 700105 HCHG RX REV CODE 258: Performed by: INTERNAL MEDICINE

## 2018-04-20 PROCEDURE — 700111 HCHG RX REV CODE 636 W/ 250 OVERRIDE (IP): Performed by: INTERNAL MEDICINE

## 2018-04-20 PROCEDURE — B548ZZA ULTRASONOGRAPHY OF SUPERIOR VENA CAVA, GUIDANCE: ICD-10-PCS | Performed by: INTERNAL MEDICINE

## 2018-04-20 RX ORDER — INSULIN GLARGINE 100 [IU]/ML
80 INJECTION, SOLUTION SUBCUTANEOUS EVERY EVENING
Status: COMPLETED | OUTPATIENT
Start: 2018-04-20 | End: 2018-04-20

## 2018-04-20 RX ORDER — INSULIN GLARGINE 100 [IU]/ML
60 INJECTION, SOLUTION SUBCUTANEOUS EVERY EVENING
Status: DISCONTINUED | OUTPATIENT
Start: 2018-04-21 | End: 2018-04-20

## 2018-04-20 RX ORDER — INSULIN GLARGINE 100 [IU]/ML
60 INJECTION, SOLUTION SUBCUTANEOUS EVERY EVENING
Status: DISCONTINUED | OUTPATIENT
Start: 2018-04-21 | End: 2018-04-22

## 2018-04-20 RX ORDER — MAGNESIUM SULFATE 1 G/100ML
1 INJECTION INTRAVENOUS ONCE
Status: COMPLETED | OUTPATIENT
Start: 2018-04-20 | End: 2018-04-20

## 2018-04-20 RX ADMIN — HYDRALAZINE HYDROCHLORIDE 50 MG: 10 TABLET, FILM COATED ORAL at 21:21

## 2018-04-20 RX ADMIN — SENNOSIDES AND DOCUSATE SODIUM 2 TABLET: 8.6; 5 TABLET ORAL at 21:21

## 2018-04-20 RX ADMIN — CALCIUM CITRATE 200 MG (950 MG) TABLET 950 MG: at 10:00

## 2018-04-20 RX ADMIN — INSULIN HUMAN 10 UNITS: 100 INJECTION, SOLUTION PARENTERAL at 12:44

## 2018-04-20 RX ADMIN — ACETAMINOPHEN 1000 MG: 500 TABLET ORAL at 11:57

## 2018-04-20 RX ADMIN — LISINOPRIL 20 MG: 20 TABLET ORAL at 10:00

## 2018-04-20 RX ADMIN — INSULIN HUMAN 10 UNITS: 100 INJECTION, SOLUTION PARENTERAL at 21:22

## 2018-04-20 RX ADMIN — ACYCLOVIR 400 MG: 200 CAPSULE ORAL at 10:01

## 2018-04-20 RX ADMIN — METOPROLOL SUCCINATE 100 MG: 50 TABLET, EXTENDED RELEASE ORAL at 10:00

## 2018-04-20 RX ADMIN — DEXAMETHASONE 40 MG: 4 TABLET ORAL at 10:47

## 2018-04-20 RX ADMIN — SODIUM CHLORIDE: 9 INJECTION, SOLUTION INTRAVENOUS at 10:52

## 2018-04-20 RX ADMIN — INSULIN HUMAN 10 UNITS: 100 INJECTION, SOLUTION PARENTERAL at 17:57

## 2018-04-20 RX ADMIN — PRAVASTATIN SODIUM 40 MG: 20 TABLET ORAL at 21:21

## 2018-04-20 RX ADMIN — CLONIDINE HYDROCHLORIDE 0.2 MG: 0.1 TABLET ORAL at 10:00

## 2018-04-20 RX ADMIN — TERAZOSIN HYDROCHLORIDE ANHYDROUS 10 MG: 5 CAPSULE ORAL at 21:21

## 2018-04-20 RX ADMIN — CALCIUM GLUCONATE 3 G: 94 INJECTION, SOLUTION INTRAVENOUS at 10:51

## 2018-04-20 RX ADMIN — HEPARIN SODIUM 5000 UNITS: 5000 INJECTION, SOLUTION INTRAVENOUS; SUBCUTANEOUS at 14:34

## 2018-04-20 RX ADMIN — INSULIN HUMAN 10 UNITS: 100 INJECTION, SOLUTION PARENTERAL at 10:01

## 2018-04-20 RX ADMIN — CLONIDINE HYDROCHLORIDE 0.2 MG: 0.1 TABLET ORAL at 14:35

## 2018-04-20 RX ADMIN — MAGNESIUM SULFATE IN DEXTROSE 1 G: 10 INJECTION, SOLUTION INTRAVENOUS at 10:51

## 2018-04-20 RX ADMIN — HYDRALAZINE HYDROCHLORIDE 50 MG: 10 TABLET, FILM COATED ORAL at 14:35

## 2018-04-20 RX ADMIN — HEPARIN SODIUM 5000 UNITS: 5000 INJECTION, SOLUTION INTRAVENOUS; SUBCUTANEOUS at 06:15

## 2018-04-20 RX ADMIN — OXYCODONE HYDROCHLORIDE 10 MG: 10 TABLET, FILM COATED, EXTENDED RELEASE ORAL at 21:21

## 2018-04-20 RX ADMIN — HEPARIN SODIUM 5000 UNITS: 5000 INJECTION, SOLUTION INTRAVENOUS; SUBCUTANEOUS at 21:21

## 2018-04-20 RX ADMIN — Medication: at 21:00

## 2018-04-20 RX ADMIN — INSULIN GLARGINE 80 UNITS: 100 INJECTION, SOLUTION SUBCUTANEOUS at 21:20

## 2018-04-20 RX ADMIN — ACETAMINOPHEN 1000 MG: 500 TABLET ORAL at 06:15

## 2018-04-20 RX ADMIN — CLONIDINE HYDROCHLORIDE 0.2 MG: 0.1 TABLET ORAL at 21:21

## 2018-04-20 RX ADMIN — Medication: at 10:04

## 2018-04-20 RX ADMIN — ACYCLOVIR 400 MG: 200 CAPSULE ORAL at 21:21

## 2018-04-20 RX ADMIN — ACETAMINOPHEN 1000 MG: 500 TABLET ORAL at 17:55

## 2018-04-20 RX ADMIN — OXYCODONE HYDROCHLORIDE 10 MG: 10 TABLET, FILM COATED, EXTENDED RELEASE ORAL at 10:00

## 2018-04-20 ASSESSMENT — ENCOUNTER SYMPTOMS
MYALGIAS: 1
COUGH: 0
VOMITING: 0
WEAKNESS: 1
EYES NEGATIVE: 1
DEPRESSION: 0
NERVOUS/ANXIOUS: 0
BACK PAIN: 0
FEVER: 0
CONSTIPATION: 0
CARDIOVASCULAR NEGATIVE: 1
ABDOMINAL PAIN: 0
DIARRHEA: 0
CHILLS: 0
SHORTNESS OF BREATH: 1
RESPIRATORY NEGATIVE: 1
GASTROINTESTINAL NEGATIVE: 1
DIZZINESS: 0

## 2018-04-20 ASSESSMENT — PAIN SCALES - GENERAL
PAINLEVEL_OUTOF10: 0
PAINLEVEL_OUTOF10: 0
PAINLEVEL_OUTOF10: 6

## 2018-04-20 ASSESSMENT — LIFESTYLE VARIABLES: DO YOU DRINK ALCOHOL: NO

## 2018-04-20 NOTE — PROCEDURES
PICC Insertion Procedure Note    Consents confirmed, vessel patency confirmed with ultrasound, real time ultrasound image printed and placed in patient chart. Risks and benefits of procedure explained to patient/family and education regarding central line associated bloodstream infections provided. Questions answered.     PICC placed in RUE per MD order with ultrasound guidance. 5 Fr, double lumen PICC placed in basilic vein after 1 attempt(s). 4 cc's of 1% lidocaine injected intradermally, 21 gauge microintroducer needle and modified Seldinger technique used. 49 cm total catheter length, 0 cm external measurement inserted with good blood return. Each lumen flushed without resistance with 10 mL 0.9% normal saline. PICC line secured with Biopatch and Tegaderm.    PICC tip location in the SVC confirmed by ECG technology. Pt tolerated procedure well.  Patient condition relayed to unit RN or ordering physician via this post procedure note in the EMR.     [employer] Power PICC ref # [id number], Lot # [id number]

## 2018-04-20 NOTE — PROGRESS NOTES
"BP (!) 170/90   Pulse 65   Temp 35.9 °C (96.7 °F)   Resp 20   Ht 1.6 m (5' 2.99\")   Wt (!) 172.5 kg (380 lb 4.7 oz)   LMP 04/11/1994   SpO2 98%   Breastfeeding? No   BMI 67.38 kg/m²   Pt is AOx4. Denies pain. Denies nausea.   "

## 2018-04-20 NOTE — CARE PLAN
Problem: Communication  Goal: The ability to communicate needs accurately and effectively will improve  Outcome: PROGRESSING AS EXPECTED  Pt involved in POC. Addressed questions/concerns    Problem: Urinary Elimination:  Goal: Ability to reestablish a normal urinary elimination pattern will improve  Outcome: PROGRESSING AS EXPECTED  Mointoring I/Os through nava

## 2018-04-20 NOTE — PROGRESS NOTES
[facility] Hospitalist Progress Note    Date of Service: 4/20/2018    Chief Complaint  67 y.o. female admitted 4/10/2018 with intractible pain secondary to multiple myeloma, known fracture left humerus    Interval Problem Update  4/11 Patient feeling much less pain since admission and starting medications, she is still having significant pain and swelling left humerus fracture site and likely had pathologic fracture.  Dr Núñez and Oksana consulted for assistance.  She already has significant calculus surrounding the fracture site but also pain.    4/12 Patient had mapping done on humerus today and bony survey.  Symptoms doing about same, mediation helping.  Plan is to medically maximize patient in preparation to start chemotherapy on Monday.  4/13 Patient feeling okay but having significant anxiety - states normally she is calm but with this situation she is having difficulty.  Xanax low dose ordered PRN.  Radiation to resume Monday, first dose given today.  4/14 Patient feeling well, nava placed in facility prior to transfer d/t urinary incontinence and body habitus, she is a high risk for skin breakdown and nava to remain in place at this time.  No acute complaints.  BP high and heart rate low, reponded well to IV hydralazine overnight - scheduled PO today, decrease toprol xl to 100 mg daily.  4/15 Patient with friends visiting today, states she is feeling a bit tearful today but otherwise is feeling physically okay.  Her legs are healing well from recent cellulitis and she will resume radiation therapy tomorrow.  4/16 Patient doing about same, blood pressure has improved slightly with changes but still significantly elevated, will change her baseline clonidine to tid and add lisinopril qday.  Dr Sim to see patient today and decide when to proceed with chemo.  4/17 started radiation to left humerus today. Referred to SNF. Onc thinking about weekly CyBorD  4/18 repleting magnesium and calcium. Likely starting  CyBorD tomorrow   DC dexamethasone two and increased sliding scale insulin and Lantus. Repleting magnesium and calcium. Started CyBorD today.   repleting magnesium and calcium. Increased Lantus for tonight for one day given elevated dose of prednisone. PICC placed.    Consultants/Specialty  onc - [place]/[facility]  XRT - Peddada      Disposition  Tbd, lives in [place],   (out of SNF days and does not want to go SNF anyhow)      Review of Systems   Constitutional: Positive for malaise/fatigue. Negative for chills and fever.   HENT: Negative for congestion.    Respiratory: Positive for shortness of breath (orthopnea). Negative for cough.    Cardiovascular: Negative for chest pain.   Gastrointestinal: Negative for abdominal pain, constipation, diarrhea and vomiting.   Genitourinary: Negative for dysuria.   Musculoskeletal: Positive for myalgias. Negative for back pain and joint pain.   Skin: Negative for rash.   Neurological: Positive for weakness. Negative for dizziness.   Psychiatric/Behavioral: Negative for depression. The patient is not nervous/anxious.       Physical Exam  Laboratory/Imaging   Hemodynamics  Temp (24hrs), Av.6 °C (97.8 °F), Min:35.9 °C (96.7 °F), Max:37.1 °C (98.8 °F)   Temperature: 36.7 °C (98.1 °F)  Pulse  Av.2  Min: 38  Max: 82    Blood Pressure : 151/54      Respiratory      Respiration: 20, Pulse Oximetry: 95 %     Work Of Breathing / Effort: Mild  RUL Breath Sounds: Clear, RML Breath Sounds: Diminished, RLL Breath Sounds: Diminished, [place] Breath Sounds: Clear, LLL Breath Sounds: Diminished    Fluids    Intake/Output Summary (Last 24 hours) at 18 1532  Last data filed at 18 0500   Gross per 24 hour   Intake                0 ml   Output             1650 ml   Net            -1650 ml       Nutrition  Orders Placed This Encounter   Procedures   • Diet Order     Standing Status:   Standing     Number of Occurrences:   1     Order Specific Question:   Diet:     Answer:    Diabetic [3]     Physical Exam   Constitutional: She is oriented to person, place, and time. She appears well-developed and well-nourished. No distress.   Morbidly obese   HENT:   Head: Normocephalic.   Eyes: Conjunctivae are normal.   Cardiovascular: Normal rate and regular rhythm.  Exam reveals no gallop and no friction rub.    Murmur (Systolic) heard.  Pulmonary/Chest: Effort normal and breath sounds normal. No respiratory distress. She has no wheezes. She has no rales.   Abdominal: Soft. Bowel sounds are normal. She exhibits no distension. There is no tenderness. There is no guarding.   Musculoskeletal: She exhibits no edema.   Neurological: She is alert and oriented to person, place, and time.   Skin: Skin is warm and dry. She is not diaphoretic. No erythema. No pallor.   icthyosis lle, healing cellulitis rle     Psychiatric: She exhibits abnormal recent memory.   Nursing note and vitals reviewed.      Recent Labs      04/18/18   0033  04/19/18   0000  04/20/18   0043   WBC  6.3  5.3  3.7*   RBC  2.83*  2.87*  2.74*   HEMOGLOBIN  8.7*  8.8*  8.3*   HEMATOCRIT  27.5*  27.7*  26.4*   MCV  97.2  96.5  96.4   MCH  30.7  30.7  30.3   MCHC  31.6*  31.8*  31.4*   RDW  55.9*  54.6*  54.5*   PLATELETCT  148*  152*  136*   MPV  10.8  10.9  10.7     Recent Labs      04/18/18   0033  04/19/18   0000  04/20/18   0043   SODIUM  138  135  133*   POTASSIUM  4.1  3.8  4.6   CHLORIDE  102  100  100   CO2  32  31  28   GLUCOSE  213*  271*  358*   BUN  25*  26*  25*   CREATININE  1.00  0.93  0.91   CALCIUM  7.5*  7.6*  6.8*                      Assessment/Plan     * Pathologic fracture- (present on admission)   Assessment & Plan    -widespread lytic disease  -already received BM bx, confirms diagnosis  -will need radiation and chemotherapy  -pain control as outlined above  -Dr Gandhi coordinating radiation left humerus fracture  -Had been seen by Dr Forbes in the past. - no surgical intervention          Hypocalcemia   Assessment  & Plan    - Repleting as needed        Hypomagnesemia   Assessment & Plan    - Repleting as needed        Anxiety   Assessment & Plan    Exacerbated by situation and steroids  Use steroids only for chemo regimen  Xanax prn          Chronic venous stasis dermatitis of both lower extremities- (present on admission)   Assessment & Plan    -wound care, no clear e/o active infection at this time, defer abx's        Multiple myeloma (CMS-HCC)- (present on admission)   Assessment & Plan    -recent diagnosis, appears aggressive, now with innumerable lytic lesions  -admit to the oncology floor  -Dr Sim and Oksana consulted for assistance in management  - IV dilaudid PRN, oxy prn. DC maintenance steroids as these were causing confusion  -start oxycontin CR 10 mg BID  -skeletal survey done - spine obscured by body habitus, but multiple other lesions found        Anemia- (present on admission)   Assessment & Plan    -related to her MM, appears near her baseline, no e/o overt bleeding at this time, monitor closely        Hyponatremia- (present on admission)   Assessment & Plan    -mild, 2/2 hypovolemia  - Continue to monitor        Morbid obesity with BMI of 60.0-69.9, adult (CMS-HCC)- (present on admission)   Assessment & Plan    -encourage weight loss  Body mass index is 67.38 kg/m².        DM type 2 (diabetes mellitus, type 2) (CMS-HCC)- (present on admission)   Assessment & Plan    - With hyperglycemia  - Decreased Lantus from outpatient dosing of 40 twice a day to 60 daily for now but will continue to adjust        Essential hypertension, benign- (present on admission)   Assessment & Plan    High with bradycardia  Increase dose po hydralazine, decreased toprol xl to 100mg daily  Clonidine bid to tid, add lisinopril 20 q day  Terazosin qhs          Quality-Core Measures   Reviewed items::  Labs reviewed and Medications reviewed  Singh catheter::  Urinary Tract Retention or Urinary Tract Obstruction (high risk for skin  breakdown given body habitus and urinary incontinence )  DVT prophylaxis pharmacological::  Heparin

## 2018-04-20 NOTE — PROGRESS NOTES
Oncology/Hematology Progress Note               Author: Darron Núñez Date & Time created: 4/20/2018  11:55 AM     CC:   Multiple myeloma    Interval History:    Completed radiation therapy to the left humerus  4/19/18- chemotherapy with CyBorD - D1  4/20/18-no adverse side effects, dexamethasone 40 mg today      Review of Systems:  Review of Systems   Constitutional: Positive for malaise/fatigue. Negative for chills and fever.   HENT: Negative.    Eyes: Negative.    Respiratory: Negative.    Cardiovascular: Negative.    Gastrointestinal: Negative.    Genitourinary: Negative.    Musculoskeletal: Positive for joint pain and myalgias.   Skin: Negative.    Neurological: Positive for weakness.   Endo/Heme/Allergies: Negative.        Physical Exam:  Physical Exam   Constitutional: She is oriented to person, place, and time.   Morbidly obese   HENT:   Head: Normocephalic.   Eyes: Conjunctivae are normal. Pupils are equal, round, and reactive to light.   Cardiovascular: Normal rate, regular rhythm and normal heart sounds.    Pulmonary/Chest: Effort normal and breath sounds normal.   Abdominal: Soft. Bowel sounds are normal.   Musculoskeletal: She exhibits edema.   She's had chronic edema symmetrical   Neurological: She is alert and oriented to person, place, and time.   Psychiatric: She has a normal mood and affect. Her behavior is normal. Judgment and thought content normal.       Labs:        Invalid input(s): [id number]      Recent Labs      04/18/18   0033  04/19/18   0000  04/20/18   0043   SODIUM  138  135  133*   POTASSIUM  4.1  3.8  4.6   CHLORIDE  102  100  100   CO2  32  31  28   BUN  25*  26*  25*   CREATININE  1.00  0.93  0.91   MAGNESIUM  1.3*  1.6  1.8   PHOSPHORUS  3.3  3.0  2.9   CALCIUM  7.5*  7.6*  6.8*     Recent Labs      04/18/18   0033  04/19/18   0000  04/20/18   0043   GLUCOSE  213*  271*  358*     Recent Labs      04/18/18   0033  04/19/18   0000  04/20/18   0043   RBC  2.83*  2.87*  2.74*    HEMOGLOBIN  8.7*  8.8*  8.3*   HEMATOCRIT  27.5*  27.7*  26.4*   PLATELETCT  148*  152*  136*     Recent Labs      18   0033  18   0000  18   0043   WBC  6.3  5.3  3.7*   NEUTSPOLYS  74.80*  83.10*  81.70*   LYMPHOCYTES  17.90*  10.30*  16.40*   MONOCYTES  6.30  5.60  1.40   EOSINOPHILS  0.50  0.60  0.00   BASOPHILS  0.00  0.00  0.00     Recent Labs      18   0033  18   0000  18   0043   SODIUM  138  135  133*   POTASSIUM  4.1  3.8  4.6   CHLORIDE  102  100  100   CO2  32  31  28   GLUCOSE  213*  271*  358*   BUN  25*  26*  25*   CREATININE  1.00  0.93  0.91   CALCIUM  7.5*  7.6*  6.8*     Hemodynamics:  Temp (24hrs), Av.5 °C (97.7 °F), Min:35.9 °C (96.7 °F), Max:37.1 °C (98.8 °F)  Temperature: 35.9 °C (96.7 °F)  Pulse  Av.1  Min: 38  Max: 82   Blood Pressure : (!) 170/90     Respiratory:    Respiration: 20, Pulse Oximetry: 98 %     Work Of Breathing / Effort: Moderate  RUL Breath Sounds: Clear, RML Breath Sounds: Diminished, RLL Breath Sounds: Diminished, [place] Breath Sounds: Clear, LLL Breath Sounds: Diminished  Fluids:    Intake/Output Summary (Last 24 hours) at 18 1124  Last data filed at 18 0300   Gross per 24 hour   Intake                0 ml   Output             1000 ml   Net            -1000 ml       GI/Nutrition:  Orders Placed This Encounter   Procedures   • Diet Order     Standing Status:   Standing     Number of Occurrences:   1     Order Specific Question:   Diet:     Answer:   Diabetic [3]     Medical Decision Making, by Problem:  Active Hospital Problems    Diagnosis   • *Pathologic fracture [M84.40XA]   • Hyponatremia [E87.1]   • Anemia [D64.9]   • Multiple myeloma (CMS-HCC) [C90.00]   • Chronic venous stasis dermatitis of both lower extremities [I87.2]   • Morbid obesity with BMI of 60.0-69.9, adult (CMS-HCC) [E66.01, Z68.44]   • DM type 2 (diabetes mellitus, type 2) (CMS-HCC) [E11.9]   • Essential hypertension, benign [I10]     Past medical  history, past surgical history, past social history unchanged reviewed    Plan:  1.  Multiple myeloma: 1P deletion intermediate risk, stage III based on ISS staging (beta 2 9.9 and albumin 2.7 before):      -She has completed radiation to her left humerus and will not start radiation to the right hip area until 4 more days next week. She will start on chemotherapy with CyBorD today. Her dose is adjusted for more ideal weight which turns out to be close to 2.0 BSA. Her dose will still be quite elevated for Velcade which is given subcutaneously and this will be repeated on day 4, 8, and 11. She will receive Cytoxan today at a dose of 1800 mg over one hour. She will be well hydrated before and after the infusion along with receiving appropriate antiemetics. The Cytoxan does not repeat for 2 weeks. She'll also be placed on oral dexamethasone 40 mg today and tomorrow which will also be repeated on day 4, 8, and 11  -4/19/18- CyBorD - C1D1 -no adverse side effects  -4/20/18-dexamethasone 40 mg, ANC 2.99, platelets 136,000, creatinine 0.91 calcium 6.8, magnesium 1.8, albumin 2.5       2.  Anemia:  Hemoglobin stable 8.3    3.  History of B12 deficiency: Her levels look good. She can provide continue with once monthly B12 injections.    4.  Extremities: She's had chronic lower extremity edema. Seems like her cellulitis has resolved. We'll just need to be followed closely.       High complexity/scheduled with her primary team/radiation oncology  Quality-Core Measures

## 2018-04-20 NOTE — PROGRESS NOTES
Edmond from Lab called with critical result of Ca 6.8 at 0119. Critical lab result read back to EDMOND.   This critical lab result is within parameters established by [facility] policy for this patient    Albumin 2.5  Corrected Ca 8.0

## 2018-04-20 NOTE — PROGRESS NOTES
Rec'd report & assumed care of pt at 1900. Assessment completed. Pt is A&Ox4, reports fatigue, numbness in left leg, pain is at her baseline. Medications provided per MAR. Plan of care discussed & questions answered. Bed locked and in lowest position, call light within reach, non-skid socks in place, hourly rounding. Pt reports no further needs at this time.

## 2018-04-21 PROBLEM — R60.0 LEG EDEMA: Status: ACTIVE | Noted: 2018-04-21

## 2018-04-21 LAB
ALBUMIN SERPL BCP-MCNC: 2.5 G/DL (ref 3.2–4.9)
BASOPHILS # BLD AUTO: 0 % (ref 0–1.8)
BASOPHILS # BLD: 0 K/UL (ref 0–0.12)
BUN SERPL-MCNC: 24 MG/DL (ref 8–22)
CA-I SERPL-SCNC: 0.9 MMOL/L (ref 1.1–1.3)
CALCIUM SERPL-MCNC: 7 MG/DL (ref 8.5–10.5)
CHLORIDE SERPL-SCNC: 100 MMOL/L (ref 96–112)
CO2 SERPL-SCNC: 29 MMOL/L (ref 20–33)
CREAT SERPL-MCNC: 0.66 MG/DL (ref 0.5–1.4)
EOSINOPHIL # BLD AUTO: 0 K/UL (ref 0–0.51)
EOSINOPHIL NFR BLD: 0 % (ref 0–6.9)
ERYTHROCYTE [DISTWIDTH] IN BLOOD BY AUTOMATED COUNT: 55.7 FL (ref 35.9–50)
GLUCOSE BLD-MCNC: 123 MG/DL (ref 65–99)
GLUCOSE BLD-MCNC: 137 MG/DL (ref 65–99)
GLUCOSE BLD-MCNC: 157 MG/DL (ref 65–99)
GLUCOSE BLD-MCNC: 176 MG/DL (ref 65–99)
GLUCOSE SERPL-MCNC: 231 MG/DL (ref 65–99)
HCT VFR BLD AUTO: 26.2 % (ref 37–47)
HGB BLD-MCNC: 8.3 G/DL (ref 12–16)
IMM GRANULOCYTES # BLD AUTO: 0.02 K/UL (ref 0–0.11)
IMM GRANULOCYTES NFR BLD AUTO: 0.5 % (ref 0–0.9)
LYMPHOCYTES # BLD AUTO: 0.51 K/UL (ref 1–4.8)
LYMPHOCYTES NFR BLD: 13.8 % (ref 22–41)
MAGNESIUM SERPL-MCNC: 1.8 MG/DL (ref 1.5–2.5)
MCH RBC QN AUTO: 30.7 PG (ref 27–33)
MCHC RBC AUTO-ENTMCNC: 31.7 G/DL (ref 33.6–35)
MCV RBC AUTO: 97 FL (ref 81.4–97.8)
MONOCYTES # BLD AUTO: 0.39 K/UL (ref 0–0.85)
MONOCYTES NFR BLD AUTO: 10.5 % (ref 0–13.4)
NEUTROPHILS # BLD AUTO: 2.78 K/UL (ref 2–7.15)
NEUTROPHILS NFR BLD: 75.2 % (ref 44–72)
NRBC # BLD AUTO: 0 K/UL
NRBC BLD-RTO: 0 /100 WBC
PHOSPHATE SERPL-MCNC: 2.3 MG/DL (ref 2.5–4.5)
PLATELET # BLD AUTO: 126 K/UL (ref 164–446)
PMV BLD AUTO: 10.9 FL (ref 9–12.9)
POTASSIUM SERPL-SCNC: 4.3 MMOL/L (ref 3.6–5.5)
RBC # BLD AUTO: 2.7 M/UL (ref 4.2–5.4)
SODIUM SERPL-SCNC: 134 MMOL/L (ref 135–145)
WBC # BLD AUTO: 3.7 K/UL (ref 4.8–10.8)

## 2018-04-21 PROCEDURE — 36415 COLL VENOUS BLD VENIPUNCTURE: CPT

## 2018-04-21 PROCEDURE — 80069 RENAL FUNCTION PANEL: CPT

## 2018-04-21 PROCEDURE — 700102 HCHG RX REV CODE 250 W/ 637 OVERRIDE(OP): Performed by: INTERNAL MEDICINE

## 2018-04-21 PROCEDURE — A9270 NON-COVERED ITEM OR SERVICE: HCPCS | Performed by: INTERNAL MEDICINE

## 2018-04-21 PROCEDURE — A9270 NON-COVERED ITEM OR SERVICE: HCPCS | Performed by: HOSPITALIST

## 2018-04-21 PROCEDURE — 770004 HCHG ROOM/CARE - ONCOLOGY PRIVATE *

## 2018-04-21 PROCEDURE — 85025 COMPLETE CBC W/AUTO DIFF WBC: CPT

## 2018-04-21 PROCEDURE — 700105 HCHG RX REV CODE 258: Performed by: HOSPITALIST

## 2018-04-21 PROCEDURE — 82962 GLUCOSE BLOOD TEST: CPT | Mod: 91

## 2018-04-21 PROCEDURE — 83735 ASSAY OF MAGNESIUM: CPT

## 2018-04-21 PROCEDURE — 700111 HCHG RX REV CODE 636 W/ 250 OVERRIDE (IP): Performed by: HOSPITALIST

## 2018-04-21 PROCEDURE — 82330 ASSAY OF CALCIUM: CPT

## 2018-04-21 PROCEDURE — 700111 HCHG RX REV CODE 636 W/ 250 OVERRIDE (IP): Performed by: INTERNAL MEDICINE

## 2018-04-21 PROCEDURE — 700102 HCHG RX REV CODE 250 W/ 637 OVERRIDE(OP): Performed by: HOSPITALIST

## 2018-04-21 PROCEDURE — 99233 SBSQ HOSP IP/OBS HIGH 50: CPT | Performed by: HOSPITALIST

## 2018-04-21 RX ORDER — MAGNESIUM SULFATE 1 G/100ML
1 INJECTION INTRAVENOUS ONCE
Status: COMPLETED | OUTPATIENT
Start: 2018-04-21 | End: 2018-04-21

## 2018-04-21 RX ORDER — FUROSEMIDE 10 MG/ML
20 INJECTION INTRAMUSCULAR; INTRAVENOUS
Status: DISCONTINUED | OUTPATIENT
Start: 2018-04-21 | End: 2018-04-23

## 2018-04-21 RX ADMIN — HEPARIN SODIUM 5000 UNITS: 5000 INJECTION, SOLUTION INTRAVENOUS; SUBCUTANEOUS at 13:42

## 2018-04-21 RX ADMIN — CALCIUM CITRATE 200 MG (950 MG) TABLET 950 MG: at 08:32

## 2018-04-21 RX ADMIN — INSULIN GLARGINE 60 UNITS: 100 INJECTION, SOLUTION SUBCUTANEOUS at 22:05

## 2018-04-21 RX ADMIN — ACETAMINOPHEN 1000 MG: 500 TABLET ORAL at 06:18

## 2018-04-21 RX ADMIN — MAGNESIUM SULFATE IN DEXTROSE 1 G: 10 INJECTION, SOLUTION INTRAVENOUS at 10:50

## 2018-04-21 RX ADMIN — FUROSEMIDE 20 MG: 10 INJECTION, SOLUTION INTRAMUSCULAR; INTRAVENOUS at 13:43

## 2018-04-21 RX ADMIN — CLONIDINE HYDROCHLORIDE 0.2 MG: 0.1 TABLET ORAL at 13:42

## 2018-04-21 RX ADMIN — HYDRALAZINE HYDROCHLORIDE 50 MG: 10 TABLET, FILM COATED ORAL at 13:42

## 2018-04-21 RX ADMIN — ACYCLOVIR 400 MG: 200 CAPSULE ORAL at 08:32

## 2018-04-21 RX ADMIN — SENNOSIDES AND DOCUSATE SODIUM 2 TABLET: 8.6; 5 TABLET ORAL at 22:04

## 2018-04-21 RX ADMIN — ONDANSETRON 4 MG: 4 TABLET, ORALLY DISINTEGRATING ORAL at 02:09

## 2018-04-21 RX ADMIN — OXYCODONE HYDROCHLORIDE 10 MG: 10 TABLET, FILM COATED, EXTENDED RELEASE ORAL at 22:04

## 2018-04-21 RX ADMIN — TERAZOSIN HYDROCHLORIDE ANHYDROUS 10 MG: 5 CAPSULE ORAL at 22:04

## 2018-04-21 RX ADMIN — OXYCODONE HYDROCHLORIDE 10 MG: 10 TABLET, FILM COATED, EXTENDED RELEASE ORAL at 08:33

## 2018-04-21 RX ADMIN — ACETAMINOPHEN 1000 MG: 500 TABLET ORAL at 00:50

## 2018-04-21 RX ADMIN — PRAVASTATIN SODIUM 40 MG: 20 TABLET ORAL at 22:04

## 2018-04-21 RX ADMIN — CLONIDINE HYDROCHLORIDE 0.2 MG: 0.1 TABLET ORAL at 08:32

## 2018-04-21 RX ADMIN — ACYCLOVIR 400 MG: 200 CAPSULE ORAL at 22:05

## 2018-04-21 RX ADMIN — INSULIN HUMAN 3 UNITS: 100 INJECTION, SOLUTION PARENTERAL at 08:59

## 2018-04-21 RX ADMIN — ACETAMINOPHEN 1000 MG: 500 TABLET ORAL at 13:42

## 2018-04-21 RX ADMIN — HYDRALAZINE HYDROCHLORIDE 50 MG: 10 TABLET, FILM COATED ORAL at 06:18

## 2018-04-21 RX ADMIN — CALCIUM GLUCONATE 3 G: 94 INJECTION, SOLUTION INTRAVENOUS at 10:50

## 2018-04-21 RX ADMIN — HEPARIN SODIUM 5000 UNITS: 5000 INJECTION, SOLUTION INTRAVENOUS; SUBCUTANEOUS at 06:19

## 2018-04-21 RX ADMIN — CLONIDINE HYDROCHLORIDE 0.2 MG: 0.1 TABLET ORAL at 22:04

## 2018-04-21 RX ADMIN — METOPROLOL SUCCINATE 100 MG: 50 TABLET, EXTENDED RELEASE ORAL at 08:33

## 2018-04-21 RX ADMIN — HYDRALAZINE HYDROCHLORIDE 50 MG: 10 TABLET, FILM COATED ORAL at 22:04

## 2018-04-21 RX ADMIN — Medication: at 08:34

## 2018-04-21 RX ADMIN — LISINOPRIL 20 MG: 20 TABLET ORAL at 08:33

## 2018-04-21 RX ADMIN — ACETAMINOPHEN 1000 MG: 500 TABLET ORAL at 17:34

## 2018-04-21 RX ADMIN — INSULIN HUMAN 3 UNITS: 100 INJECTION, SOLUTION PARENTERAL at 13:44

## 2018-04-21 RX ADMIN — HEPARIN SODIUM 5000 UNITS: 5000 INJECTION, SOLUTION INTRAVENOUS; SUBCUTANEOUS at 22:05

## 2018-04-21 RX ADMIN — Medication: at 22:05

## 2018-04-21 ASSESSMENT — ENCOUNTER SYMPTOMS
NAUSEA: 1
DIARRHEA: 0
MYALGIAS: 1
DEPRESSION: 0
NERVOUS/ANXIOUS: 0
WEAKNESS: 1
SHORTNESS OF BREATH: 1
CONSTIPATION: 0
BACK PAIN: 0
DIZZINESS: 0
VOMITING: 0
ABDOMINAL PAIN: 0
COUGH: 0
FEVER: 0
CHILLS: 0

## 2018-04-21 ASSESSMENT — LIFESTYLE VARIABLES: DO YOU DRINK ALCOHOL: NO

## 2018-04-21 ASSESSMENT — PAIN SCALES - GENERAL
PAINLEVEL_OUTOF10: 7
PAINLEVEL_OUTOF10: 0
PAINLEVEL_OUTOF10: 7

## 2018-04-21 NOTE — PROGRESS NOTES
[facility] Hospitalist Progress Note    Date of Service: 4/21/2018    Chief Complaint  67 y.o. female admitted 4/10/2018 with intractible pain secondary to multiple myeloma, known fracture left humerus    Interval Problem Update  4/11 Patient feeling much less pain since admission and starting medications, she is still having significant pain and swelling left humerus fracture site and likely had pathologic fracture.  Dr Núñez and Oksana consulted for assistance.  She already has significant calculus surrounding the fracture site but also pain.    4/12 Patient had mapping done on humerus today and bony survey.  Symptoms doing about same, mediation helping.  Plan is to medically maximize patient in preparation to start chemotherapy on Monday.  4/13 Patient feeling okay but having significant anxiety - states normally she is calm but with this situation she is having difficulty.  Xanax low dose ordered PRN.  Radiation to resume Monday, first dose given today.  4/14 Patient feeling well, nava placed in facility prior to transfer d/t urinary incontinence and body habitus, she is a high risk for skin breakdown and nava to remain in place at this time.  No acute complaints.  BP high and heart rate low, reponded well to IV hydralazine overnight - scheduled PO today, decrease toprol xl to 100 mg daily.  4/15 Patient with friends visiting today, states she is feeling a bit tearful today but otherwise is feeling physically okay.  Her legs are healing well from recent cellulitis and she will resume radiation therapy tomorrow.  4/16 Patient doing about same, blood pressure has improved slightly with changes but still significantly elevated, will change her baseline clonidine to tid and add lisinopril qday.  Dr Sim to see patient today and decide when to proceed with chemo.  4/17 started radiation to left humerus today. Referred to SNF. Onc thinking about weekly CyBorD  4/18 repleting magnesium and calcium. Likely starting  CyBorD tomorrow   DC dexamethasone two and increased sliding scale insulin and Lantus. Repleting magnesium and calcium. Started CyBorD today.   repleting magnesium and calcium. Increased Lantus for tonight for one day given elevated dose of prednisone. PICC placed.   DC IV fluids and started on low-dose Lasix. Repleting magnesium and calcium.    Consultants/Specialty  onc - [facility]/Shields  XRT - Peddada      Disposition  Tbd, lives in [place],   (out of SNF days and does not want to go SNF anyhow)      Review of Systems   Constitutional: Positive for malaise/fatigue. Negative for chills and fever.   HENT: Negative for congestion.    Respiratory: Positive for shortness of breath (orthopnea). Negative for cough.    Cardiovascular: Positive for leg swelling. Negative for chest pain.   Gastrointestinal: Positive for nausea. Negative for abdominal pain, constipation, diarrhea and vomiting.   Genitourinary: Negative for dysuria.   Musculoskeletal: Positive for myalgias. Negative for back pain and joint pain.   Skin: Negative for rash.   Neurological: Positive for weakness. Negative for dizziness.   Psychiatric/Behavioral: Negative for depression. The patient is not nervous/anxious.       Physical Exam  Laboratory/Imaging   Hemodynamics  Temp (24hrs), Av.4 °C (97.5 °F), Min:36.1 °C (97 °F), Max:36.8 °C (98.3 °F)   Temperature: 36.1 °C (97 °F)  Pulse  Av.3  Min: 38  Max: 82    Blood Pressure : 152/58      Respiratory      Respiration: 20, Pulse Oximetry: 98 %     Work Of Breathing / Effort: Mild  RUL Breath Sounds: Clear, RML Breath Sounds: Diminished, RLL Breath Sounds: Diminished, CHARLOTTE Breath Sounds: Clear, LLL Breath Sounds: Diminished    Fluids    Intake/Output Summary (Last 24 hours) at 18 1516  Last data filed at 18 0408   Gross per 24 hour   Intake             5185 ml   Output             1700 ml   Net             3485 ml       Nutrition  Orders Placed This Encounter   Procedures   •  Diet Order     Standing Status:   Standing     Number of Occurrences:   1     Order Specific Question:   Diet:     Answer:   Diabetic [3]     Physical Exam   Constitutional: She is oriented to person, place, and time. She appears well-developed and well-nourished. No distress.   Morbidly obese   HENT:   Head: Normocephalic.   Eyes: Conjunctivae are normal.   Cardiovascular: Normal rate and regular rhythm.  Exam reveals no gallop and no friction rub.    Murmur (Systolic) heard.  Pulmonary/Chest: Effort normal and breath sounds normal. No respiratory distress. She has no wheezes. She has no rales.   Abdominal: Soft. Bowel sounds are normal. She exhibits no distension. There is no tenderness. There is no rebound and no guarding.   Musculoskeletal: She exhibits edema.   Neurological: She is alert and oriented to person, place, and time.   Skin: Skin is warm and dry. She is not diaphoretic. No erythema.   icthyosis lle, healing cellulitis rle     Psychiatric: She exhibits abnormal recent memory.   Nursing note and vitals reviewed.      Recent Labs      04/19/18   0000  04/20/18   0043  04/21/18   0401   WBC  5.3  3.7*  3.7*   RBC  2.87*  2.74*  2.70*   HEMOGLOBIN  8.8*  8.3*  8.3*   HEMATOCRIT  27.7*  26.4*  26.2*   MCV  96.5  96.4  97.0   MCH  30.7  30.3  30.7   MCHC  31.8*  31.4*  31.7*   RDW  54.6*  54.5*  55.7*   PLATELETCT  152*  136*  126*   MPV  10.9  10.7  10.9     Recent Labs      04/19/18   0000  04/20/18   0043  04/21/18   0401   SODIUM  135  133*  134*   POTASSIUM  3.8  4.6  4.3   CHLORIDE  100  100  100   CO2  31  28  29   GLUCOSE  271*  358*  231*   BUN  26*  25*  24*   CREATININE  0.93  0.91  0.66   CALCIUM  7.6*  6.8*  7.0*                      Assessment/Plan     * Pathologic fracture- (present on admission)   Assessment & Plan    -widespread lytic disease  -already received BM bx, confirms diagnosis  -will need radiation and chemotherapy  -pain control as outlined above  -Dr Gandhi coordinating  radiation left humerus fracture  -Had been seen by Dr Forbes in the past. - no surgical intervention          Leg edema   Assessment & Plan    - Left greater than right  - DC IV fluids  - Low-dose Lasix scheduled        Hypocalcemia   Assessment & Plan    - Repleting as needed        Hypomagnesemia   Assessment & Plan    - Repleting as needed        Anxiety   Assessment & Plan    Exacerbated by situation and steroids  Use steroids only for chemo regimen  Xanax prn          Chronic venous stasis dermatitis of both lower extremities- (present on admission)   Assessment & Plan    -wound care, no clear e/o active infection at this time, defer abx's        Multiple myeloma (CMS-HCC)- (present on admission)   Assessment & Plan    -recent diagnosis, appears aggressive, now with innumerable lytic lesions  -admit to the oncology floor  -Dr Sim and Oksana consulted for assistance in management  - IV dilaudid PRN, oxy prn. DC maintenance steroids as these were causing confusion  -start oxycontin CR 10 mg BID  -skeletal survey done - spine obscured by body habitus, but multiple other lesions found        Anemia- (present on admission)   Assessment & Plan    -related to her MM, appears near her baseline, no e/o overt bleeding at this time, monitor closely        Hyponatremia- (present on admission)   Assessment & Plan    -mild        Morbid obesity with BMI of 60.0-69.9, adult (CMS-HCC)- (present on admission)   Assessment & Plan    -encourage weight loss  Body mass index is 67.38 kg/m².        DM type 2 (diabetes mellitus, type 2) (CMS-HCC)- (present on admission)   Assessment & Plan    - With hyperglycemia  - Decreased Lantus from outpatient dosing of 40 twice a day to 60 daily for now but will continue to adjust        Essential hypertension, benign- (present on admission)   Assessment & Plan    High with bradycardia  Increase dose po hydralazine, decreased toprol xl to 100mg daily  Clonidine bid to tid, add lisinopril 20  q day  Terazosin qhs          Quality-Core Measures   Reviewed items::  Labs reviewed and Medications reviewed  Singh catheter::  Urinary Tract Retention or Urinary Tract Obstruction (high risk for skin breakdown given body habitus and urinary incontinence )  DVT prophylaxis pharmacological::  Heparin

## 2018-04-21 NOTE — CARE PLAN
Problem: Infection  Goal: Will remain free from infection  Outcome: PROGRESSING AS EXPECTED  Pt has nava in place. Assessed for signs and symptoms of infection.    Problem: Mobility  Goal: Risk for activity intolerance will decrease  Outcome: PROGRESSING SLOWER THAN EXPECTED  Pt has not been out of bed for 2 days. Discussed pt to get up to chair today.

## 2018-04-21 NOTE — CARE PLAN
Problem: Safety  Goal: Will remain free from injury  Hourly rounding in effect, pt instructed to call for assistance, bed locked and in lowest position. Bed alarm off, with Suri as second RN. Pt calls appropriately for assistance.      Problem: Knowledge Deficit  Goal: Knowledge of disease process/condition, treatment plan, diagnostic tests, and medications will improve  Pt updated and educated on nursing interventions, medications and POC

## 2018-04-21 NOTE — PROGRESS NOTES
"/58   Pulse 62   Temp 36.1 °C (97 °F)   Resp 20   Ht 1.6 m (5' 2.99\")   Wt (!) 172.5 kg (380 lb 4.7 oz)   LMP 04/11/1994   SpO2 98%   Breastfeeding? No   BMI 67.38 kg/m²   Pt is Aox4. Pain is 6/10. Scheduled pain medication administered per MAR. Denies nausea.   "

## 2018-04-21 NOTE — CARE PLAN
Problem: Skin Integrity  Goal: Risk for impaired skin integrity will decrease    Intervention: Implement precautions to protect skin integrity in collaboration with the interdisciplinary team  Pt has redness in sacral area. Pt encouraged to turn q 2 hours. Pt verbalized understanding.

## 2018-04-21 NOTE — PROGRESS NOTES
Received report and assumed patient care at change of shift. Patient is resting in bed, A&O x4. Patient reports 5/10 pain at this time, medications provided per MAR.     Plan of care discussed, questions answered. Bed is in the lowest position and locked, call light within reach, non-skid socks in place, hourly rounding. Patient reports no further needs and this time.

## 2018-04-22 LAB
ALBUMIN SERPL BCP-MCNC: 2.5 G/DL (ref 3.2–4.9)
BASOPHILS # BLD AUTO: 0.3 % (ref 0–1.8)
BASOPHILS # BLD: 0.01 K/UL (ref 0–0.12)
BUN SERPL-MCNC: 24 MG/DL (ref 8–22)
CA-I SERPL-SCNC: 1 MMOL/L (ref 1.1–1.3)
CALCIUM SERPL-MCNC: 6.8 MG/DL (ref 8.5–10.5)
CHLORIDE SERPL-SCNC: 104 MMOL/L (ref 96–112)
CO2 SERPL-SCNC: 28 MMOL/L (ref 20–33)
CREAT SERPL-MCNC: 0.89 MG/DL (ref 0.5–1.4)
EOSINOPHIL # BLD AUTO: 0.01 K/UL (ref 0–0.51)
EOSINOPHIL NFR BLD: 0.3 % (ref 0–6.9)
ERYTHROCYTE [DISTWIDTH] IN BLOOD BY AUTOMATED COUNT: 56.4 FL (ref 35.9–50)
GLUCOSE BLD-MCNC: 117 MG/DL (ref 65–99)
GLUCOSE BLD-MCNC: 128 MG/DL (ref 65–99)
GLUCOSE BLD-MCNC: 222 MG/DL (ref 65–99)
GLUCOSE BLD-MCNC: 42 MG/DL (ref 65–99)
GLUCOSE BLD-MCNC: 67 MG/DL (ref 65–99)
GLUCOSE BLD-MCNC: 71 MG/DL (ref 65–99)
GLUCOSE BLD-MCNC: 87 MG/DL (ref 65–99)
GLUCOSE BLD-MCNC: 92 MG/DL (ref 65–99)
GLUCOSE SERPL-MCNC: 55 MG/DL (ref 65–99)
HCT VFR BLD AUTO: 29.2 % (ref 37–47)
HGB BLD-MCNC: 9 G/DL (ref 12–16)
IMM GRANULOCYTES # BLD AUTO: 0.01 K/UL (ref 0–0.11)
IMM GRANULOCYTES NFR BLD AUTO: 0.3 % (ref 0–0.9)
LYMPHOCYTES # BLD AUTO: 0.51 K/UL (ref 1–4.8)
LYMPHOCYTES NFR BLD: 17.8 % (ref 22–41)
MAGNESIUM SERPL-MCNC: 1.8 MG/DL (ref 1.5–2.5)
MCH RBC QN AUTO: 30.1 PG (ref 27–33)
MCHC RBC AUTO-ENTMCNC: 30.8 G/DL (ref 33.6–35)
MCV RBC AUTO: 97.7 FL (ref 81.4–97.8)
MONOCYTES # BLD AUTO: 0.16 K/UL (ref 0–0.85)
MONOCYTES NFR BLD AUTO: 5.6 % (ref 0–13.4)
NEUTROPHILS # BLD AUTO: 2.17 K/UL (ref 2–7.15)
NEUTROPHILS NFR BLD: 75.7 % (ref 44–72)
NRBC # BLD AUTO: 0 K/UL
NRBC BLD-RTO: 0 /100 WBC
PHOSPHATE SERPL-MCNC: 2.1 MG/DL (ref 2.5–4.5)
PLATELET # BLD AUTO: 120 K/UL (ref 164–446)
PMV BLD AUTO: 11 FL (ref 9–12.9)
POTASSIUM SERPL-SCNC: 4.1 MMOL/L (ref 3.6–5.5)
RBC # BLD AUTO: 2.99 M/UL (ref 4.2–5.4)
SODIUM SERPL-SCNC: 136 MMOL/L (ref 135–145)
WBC # BLD AUTO: 2.9 K/UL (ref 4.8–10.8)

## 2018-04-22 PROCEDURE — 700111 HCHG RX REV CODE 636 W/ 250 OVERRIDE (IP): Performed by: INTERNAL MEDICINE

## 2018-04-22 PROCEDURE — 80069 RENAL FUNCTION PANEL: CPT

## 2018-04-22 PROCEDURE — 700101 HCHG RX REV CODE 250: Performed by: HOSPITALIST

## 2018-04-22 PROCEDURE — 700102 HCHG RX REV CODE 250 W/ 637 OVERRIDE(OP): Performed by: HOSPITALIST

## 2018-04-22 PROCEDURE — A9270 NON-COVERED ITEM OR SERVICE: HCPCS | Performed by: INTERNAL MEDICINE

## 2018-04-22 PROCEDURE — 700102 HCHG RX REV CODE 250 W/ 637 OVERRIDE(OP): Performed by: INTERNAL MEDICINE

## 2018-04-22 PROCEDURE — 99291 CRITICAL CARE FIRST HOUR: CPT | Performed by: HOSPITALIST

## 2018-04-22 PROCEDURE — 82962 GLUCOSE BLOOD TEST: CPT | Mod: 91

## 2018-04-22 PROCEDURE — A9270 NON-COVERED ITEM OR SERVICE: HCPCS | Performed by: HOSPITALIST

## 2018-04-22 PROCEDURE — 700111 HCHG RX REV CODE 636 W/ 250 OVERRIDE (IP): Mod: JG | Performed by: INTERNAL MEDICINE

## 2018-04-22 PROCEDURE — 83735 ASSAY OF MAGNESIUM: CPT

## 2018-04-22 PROCEDURE — 700105 HCHG RX REV CODE 258: Performed by: HOSPITALIST

## 2018-04-22 PROCEDURE — 700111 HCHG RX REV CODE 636 W/ 250 OVERRIDE (IP): Performed by: HOSPITALIST

## 2018-04-22 PROCEDURE — 85025 COMPLETE CBC W/AUTO DIFF WBC: CPT

## 2018-04-22 PROCEDURE — 770004 HCHG ROOM/CARE - ONCOLOGY PRIVATE *

## 2018-04-22 PROCEDURE — 82330 ASSAY OF CALCIUM: CPT

## 2018-04-22 RX ORDER — MAGNESIUM SULFATE 1 G/100ML
1 INJECTION INTRAVENOUS ONCE
Status: COMPLETED | OUTPATIENT
Start: 2018-04-22 | End: 2018-04-22

## 2018-04-22 RX ORDER — DEXTROSE MONOHYDRATE 25 G/50ML
50 INJECTION, SOLUTION INTRAVENOUS ONCE
Status: DISPENSED | OUTPATIENT
Start: 2018-04-22 | End: 2018-04-23

## 2018-04-22 RX ORDER — DEXAMETHASONE 4 MG/1
40 TABLET ORAL DAILY
Status: COMPLETED | OUTPATIENT
Start: 2018-04-22 | End: 2018-04-23

## 2018-04-22 RX ORDER — ACETAMINOPHEN 160 MG/5ML
1000 SUSPENSION ORAL EVERY 6 HOURS
Status: DISCONTINUED | OUTPATIENT
Start: 2018-04-22 | End: 2018-04-23

## 2018-04-22 RX ORDER — INSULIN GLARGINE 100 [IU]/ML
40 INJECTION, SOLUTION SUBCUTANEOUS EVERY EVENING
Status: DISCONTINUED | OUTPATIENT
Start: 2018-04-22 | End: 2018-04-24

## 2018-04-22 RX ORDER — DEXTROSE MONOHYDRATE 25 G/50ML
50 INJECTION, SOLUTION INTRAVENOUS ONCE
Status: COMPLETED | OUTPATIENT
Start: 2018-04-22 | End: 2018-04-22

## 2018-04-22 RX ADMIN — FUROSEMIDE 20 MG: 10 INJECTION, SOLUTION INTRAMUSCULAR; INTRAVENOUS at 10:37

## 2018-04-22 RX ADMIN — HYDRALAZINE HYDROCHLORIDE 50 MG: 10 TABLET, FILM COATED ORAL at 14:52

## 2018-04-22 RX ADMIN — METOPROLOL SUCCINATE 100 MG: 50 TABLET, EXTENDED RELEASE ORAL at 10:37

## 2018-04-22 RX ADMIN — DEXTROSE MONOHYDRATE 50 ML: 25 INJECTION, SOLUTION INTRAVENOUS at 09:35

## 2018-04-22 RX ADMIN — HYDRALAZINE HYDROCHLORIDE 50 MG: 10 TABLET, FILM COATED ORAL at 20:24

## 2018-04-22 RX ADMIN — DEXTROSE MONOHYDRATE 50 ML: 25 INJECTION, SOLUTION INTRAVENOUS at 10:40

## 2018-04-22 RX ADMIN — CLONIDINE HYDROCHLORIDE 0.2 MG: 0.1 TABLET ORAL at 10:37

## 2018-04-22 RX ADMIN — OXYCODONE HYDROCHLORIDE 5 MG: 5 TABLET ORAL at 18:29

## 2018-04-22 RX ADMIN — ACETAMINOPHEN 1000 MG: 160 SUSPENSION ORAL at 12:00

## 2018-04-22 RX ADMIN — OXYCODONE HYDROCHLORIDE 10 MG: 10 TABLET, FILM COATED, EXTENDED RELEASE ORAL at 20:25

## 2018-04-22 RX ADMIN — MAGNESIUM SULFATE IN DEXTROSE 1 G: 10 INJECTION, SOLUTION INTRAVENOUS at 10:44

## 2018-04-22 RX ADMIN — CALCIUM CITRATE 200 MG (950 MG) TABLET 950 MG: at 10:39

## 2018-04-22 RX ADMIN — BORTEZOMIB 2.6 MG: 3.5 INJECTION, POWDER, LYOPHILIZED, FOR SOLUTION INTRAVENOUS; SUBCUTANEOUS at 17:06

## 2018-04-22 RX ADMIN — ACETAMINOPHEN 1000 MG: 160 SUSPENSION ORAL at 06:11

## 2018-04-22 RX ADMIN — INSULIN HUMAN 4 UNITS: 100 INJECTION, SOLUTION PARENTERAL at 20:39

## 2018-04-22 RX ADMIN — ACYCLOVIR 400 MG: 200 CAPSULE ORAL at 20:25

## 2018-04-22 RX ADMIN — HYDRALAZINE HYDROCHLORIDE 50 MG: 10 TABLET, FILM COATED ORAL at 06:10

## 2018-04-22 RX ADMIN — CALCIUM GLUCONATE 3 G: 94 INJECTION, SOLUTION INTRAVENOUS at 10:44

## 2018-04-22 RX ADMIN — DEXAMETHASONE 40 MG: 4 TABLET ORAL at 14:52

## 2018-04-22 RX ADMIN — OXYCODONE HYDROCHLORIDE 10 MG: 10 TABLET, FILM COATED, EXTENDED RELEASE ORAL at 10:37

## 2018-04-22 RX ADMIN — Medication: at 10:39

## 2018-04-22 RX ADMIN — HEPARIN SODIUM 5000 UNITS: 5000 INJECTION, SOLUTION INTRAVENOUS; SUBCUTANEOUS at 06:10

## 2018-04-22 RX ADMIN — PRAVASTATIN SODIUM 40 MG: 20 TABLET ORAL at 20:25

## 2018-04-22 RX ADMIN — Medication: at 20:25

## 2018-04-22 RX ADMIN — CLONIDINE HYDROCHLORIDE 0.2 MG: 0.1 TABLET ORAL at 14:52

## 2018-04-22 RX ADMIN — HEPARIN SODIUM 5000 UNITS: 5000 INJECTION, SOLUTION INTRAVENOUS; SUBCUTANEOUS at 20:25

## 2018-04-22 RX ADMIN — SENNOSIDES AND DOCUSATE SODIUM 2 TABLET: 8.6; 5 TABLET ORAL at 10:37

## 2018-04-22 RX ADMIN — HEPARIN SODIUM 5000 UNITS: 5000 INJECTION, SOLUTION INTRAVENOUS; SUBCUTANEOUS at 14:52

## 2018-04-22 RX ADMIN — TERAZOSIN HYDROCHLORIDE ANHYDROUS 10 MG: 5 CAPSULE ORAL at 20:31

## 2018-04-22 RX ADMIN — LISINOPRIL 20 MG: 20 TABLET ORAL at 10:37

## 2018-04-22 RX ADMIN — CLONIDINE HYDROCHLORIDE 0.2 MG: 0.1 TABLET ORAL at 20:25

## 2018-04-22 RX ADMIN — ACYCLOVIR 400 MG: 200 CAPSULE ORAL at 10:37

## 2018-04-22 RX ADMIN — INSULIN GLARGINE 40 UNITS: 100 INJECTION, SOLUTION SUBCUTANEOUS at 20:40

## 2018-04-22 ASSESSMENT — PATIENT HEALTH QUESTIONNAIRE - PHQ9
7. TROUBLE CONCENTRATING ON THINGS, SUCH AS READING THE NEWSPAPER OR WATCHING TELEVISION: SEVERAL DAYS
1. LITTLE INTEREST OR PLEASURE IN DOING THINGS: SEVERAL DAYS
4. FEELING TIRED OR HAVING LITTLE ENERGY: SEVERAL DAYS
2. FEELING DOWN, DEPRESSED, IRRITABLE, OR HOPELESS: NOT AT ALL
8. MOVING OR SPEAKING SO SLOWLY THAT OTHER PEOPLE COULD HAVE NOTICED. OR THE OPPOSITE, BEING SO FIGETY OR RESTLESS THAT YOU HAVE BEEN MOVING AROUND A LOT MORE THAN USUAL: NOT AT ALL
6. FEELING BAD ABOUT YOURSELF - OR THAT YOU ARE A FAILURE OR HAVE LET YOURSELF OR YOUR FAMILY DOWN: SEVERAL DAYS
SUM OF ALL RESPONSES TO PHQ9 QUESTIONS 1 AND 2: 1
SUM OF ALL RESPONSES TO PHQ9 QUESTIONS 1 AND 2: 1
SUM OF ALL RESPONSES TO PHQ QUESTIONS 1-9: 5
3. TROUBLE FALLING OR STAYING ASLEEP OR SLEEPING TOO MUCH: NOT AT ALL
5. POOR APPETITE OR OVEREATING: SEVERAL DAYS
1. LITTLE INTEREST OR PLEASURE IN DOING THINGS: SEVERAL DAYS
9. THOUGHTS THAT YOU WOULD BE BETTER OFF DEAD, OR OF HURTING YOURSELF: NOT AT ALL
2. FEELING DOWN, DEPRESSED, IRRITABLE, OR HOPELESS: NOT AT ALL

## 2018-04-22 ASSESSMENT — COPD QUESTIONNAIRES
DO YOU EVER COUGH UP ANY MUCUS OR PHLEGM?: NO/ONLY WITH OCCASIONAL COLDS OR INFECTIONS
DURING THE PAST 4 WEEKS HOW MUCH DID YOU FEEL SHORT OF BREATH: NONE/LITTLE OF THE TIME
HAVE YOU SMOKED AT LEAST 100 CIGARETTES IN YOUR ENTIRE LIFE: NO/DON'T KNOW
COPD SCREENING SCORE: 2

## 2018-04-22 ASSESSMENT — ENCOUNTER SYMPTOMS
CARDIOVASCULAR NEGATIVE: 1
SHORTNESS OF BREATH: 1
RESPIRATORY NEGATIVE: 1
NAUSEA: 1
MYALGIAS: 1
DIARRHEA: 0
VOMITING: 0
WEAKNESS: 1
CONSTIPATION: 0
NERVOUS/ANXIOUS: 1
DIZZINESS: 0
CHILLS: 0
CHILLS: 1
ABDOMINAL PAIN: 0
GASTROINTESTINAL NEGATIVE: 1
DEPRESSION: 0
FEVER: 0
EYES NEGATIVE: 1
COUGH: 0

## 2018-04-22 ASSESSMENT — PAIN SCALES - GENERAL
PAINLEVEL_OUTOF10: 10
PAINLEVEL_OUTOF10: 10

## 2018-04-22 ASSESSMENT — LIFESTYLE VARIABLES
DO YOU DRINK ALCOHOL: NO
DO YOU DRINK ALCOHOL: NO

## 2018-04-22 NOTE — PROGRESS NOTES
"Pharmacy Chemotherapy Note    Patient Name: YESSY SINGER  Dx: Multiple Myeloma    Protocol: CyBorD     Bortezomib 1.3 mg/m2 IV push over 3-5 seconds or SubQ on days 1, 4, 8, and 11  Cyclophosphamide 900 mg/m2 IV over 30 min on day 1  Dexamethasone 40 mg PO Daily on days 1-2, 4-5, 8-9, and 11-12   21 day cycle for 3-4 cycles  (transplant) or maximal response, disease progression or unacceptable toxicity (previously untreated or non-transplant candidates)  NCCN Guidelines for Multiple Myeloma V.3.2017  EMANUEL Russell et al. Cyclophosphamide, bortezomib and dexamethasone (CyBorD) induction for newly diagnosed multiple myeloma: High response rates in a phase II clinical trial. Leukemia. 2009 July ; 23(7): 7073-6106.  Drew CASTELLANOS, et al. Once- versus twice-weekly bortezomib induction therapy with CyBorD in newly diagnosed multiple myeloma. BLOOD, 22APRIL 2010VOLUME 115, NUMBER 16  Juan S et al. Randomized, multicenter, phase 2 study (EVOLUTION) of combinations of bortezomib, dexamethasone, cyclophosphamide, and lenalidomide in previously untreated multiple myeloma. BLOOD, 2012 119: 8807-6574    /54   Pulse 100   Temp 37.1 °C (98.7 °F)   Resp 20   Ht 1.6 m (5' 2.99\")   Wt (!) 172.5 kg (380 lb 4.7 oz)   LMP 04/11/1994   SpO2 90%   Breastfeeding? No   BMI 67.38 kg/m²  Body surface area is 2.77 meters squared.  **Per MD max BSA of 2.0 for morbid obesity**    4/22/18: ANC~ 2170 Plt = 120k   Hgb = 9   SCr = 0.89mg/dL CrCl >125 mL/min   4/17/18: LFT's = 8/8/61 TBili = 0.4      Drug Order   (Drug name, dose, route, IV Fluid & volume, frequency, number of doses) Cycle: 1 Day 4      Previous treatment: C1D1 on 4/19/18     Medication = Bortezomib (Velcade)  Base Dose = 1.3 mg/m²  Calc Dose: Base Dose x 2 m² = 2.6 mg  Final Dose = 2.6 mg  Route = subcutaneous  Fluid & Volume = 1.04 mL in syringe  Conc = 2.5 mg/mL  Admin Duration = to be given subcutaneously          <5% difference, OK to treat with final dose  "     By my signature below, I confirm this process was performed independently with the BSA and all final chemotherapy dosing calculations congruent. I have reviewed the above chemotherapy order and that my calculation of the final dose and BSA (when applicable) corroborate those calculations of the  pharmacist. Discrepancies of 5% or greater in the written dose have been addressed and documented within the EPIC Progress notes.    Malorie Santoro, SandraD

## 2018-04-22 NOTE — PROGRESS NOTES
Maame from Lab called with critical result of Ca 6.8 at 0544.  Albumin is 2.5. After calculation corrected Ca is 8.0. Critical lab result read back to Maame.   This critical lab result is within parameters established by Dr.[facility] policy for this patient

## 2018-04-22 NOTE — PROGRESS NOTES
[facility] Hospitalist Progress Note    Date of Service: 4/22/2018    Chief Complaint  67 y.o. female admitted 4/10/2018 with intractible pain secondary to multiple myeloma, known fracture left humerus    Interval Problem Update  4/11 Patient feeling much less pain since admission and starting medications, she is still having significant pain and swelling left humerus fracture site and likely had pathologic fracture.  Dr Núñez and Oksana consulted for assistance.  She already has significant calculus surrounding the fracture site but also pain.    4/12 Patient had mapping done on humerus today and bony survey.  Symptoms doing about same, mediation helping.  Plan is to medically maximize patient in preparation to start chemotherapy on Monday.  4/13 Patient feeling okay but having significant anxiety - states normally she is calm but with this situation she is having difficulty.  Xanax low dose ordered PRN.  Radiation to resume Monday, first dose given today.  4/14 Patient feeling well, nava placed in facility prior to transfer d/t urinary incontinence and body habitus, she is a high risk for skin breakdown and nava to remain in place at this time.  No acute complaints.  BP high and heart rate low, reponded well to IV hydralazine overnight - scheduled PO today, decrease toprol xl to 100 mg daily.  4/15 Patient with friends visiting today, states she is feeling a bit tearful today but otherwise is feeling physically okay.  Her legs are healing well from recent cellulitis and she will resume radiation therapy tomorrow.  4/16 Patient doing about same, blood pressure has improved slightly with changes but still significantly elevated, will change her baseline clonidine to tid and add lisinopril qday.  Dr Sim to see patient today and decide when to proceed with chemo.  4/17 started radiation to left humerus today. Referred to SNF. Onc thinking about weekly CyBorD  4/18 repleting magnesium and calcium. Likely starting  CyBorD tomorrow   DC dexamethasone two and increased sliding scale insulin and Lantus. Repleting magnesium and calcium. Started CyBorD today.   repleting magnesium and calcium. Increased Lantus for tonight for one day given elevated dose of prednisone. PICC placed.   DC IV fluids and started on low-dose Lasix. Repleting magnesium and calcium.   became hypoglycemic today with 60 Lantus overnight without steroids. Required 2 A of D50. Decreasing Lantus for tonight and also getting dexamethasone with Velcade. Repleting magnesium and calcium.    Patient is critically ill.   The patient continues to have: Hypoglycemia, severe hypocalcemia  The vital organ system that is affected is the: Brain, circulation  If untreated there is a high chance of deterioration into: shock  And eventually death.   The critical care that I am providing today is: Exam, medication  The critical that has been undertaken is medically complex.   There has been no overlap in critical care time.   Critical Care Time not including procedures: 44 mins      Consultants/Specialty  onc - [facility]/Shields  XRT - Peddada      Disposition  Tbd, lives in [place],   (out of SNF days and does not want to go SNF anyhow)      Review of Systems   Constitutional: Positive for chills and malaise/fatigue. Negative for fever.   HENT: Negative for congestion.    Respiratory: Positive for shortness of breath (orthopnea). Negative for cough.    Cardiovascular: Positive for leg swelling. Negative for chest pain.   Gastrointestinal: Positive for nausea. Negative for abdominal pain, constipation, diarrhea and vomiting.   Genitourinary: Negative for dysuria.   Musculoskeletal: Positive for myalgias.   Skin: Negative for rash.   Neurological: Positive for weakness. Negative for dizziness.   Psychiatric/Behavioral: Negative for depression. The patient is nervous/anxious.       Physical Exam  Laboratory/Imaging   Hemodynamics  Temp (24hrs), Av.7 °C (98 °F),  Min:36.3 °C (97.4 °F), Max:37.1 °C (98.7 °F)   Temperature: 37.1 °C (98.7 °F)  Pulse  Av  Min: 38  Max: 100    Blood Pressure : 143/54      Respiratory      Respiration: 20, Pulse Oximetry: 90 %     Work Of Breathing / Effort: Mild  RUL Breath Sounds: Clear, RML Breath Sounds: Diminished, RLL Breath Sounds: Diminished, [place]LOTTE Breath Sounds: Clear, LLL Breath Sounds: Diminished    Fluids    Intake/Output Summary (Last 24 hours) at 18 1415  Last data filed at 18 0615   Gross per 24 hour   Intake             1367 ml   Output             1800 ml   Net             -433 ml       Nutrition  Orders Placed This Encounter   Procedures   • Diet Order     Standing Status:   Standing     Number of Occurrences:   1     Order Specific Question:   Diet:     Answer:   Diabetic [3]     Physical Exam   Constitutional: She appears well-developed. She appears distressed.   Morbidly obese   HENT:   Head: Normocephalic.   Eyes: Conjunctivae are normal.   Cardiovascular: Normal rate and regular rhythm.  Exam reveals no gallop and no friction rub.    Murmur (Systolic) heard.  Pulmonary/Chest: Effort normal and breath sounds normal. No respiratory distress. She has no wheezes. She has no rales.   Abdominal: Soft. Bowel sounds are normal. She exhibits no distension. There is no tenderness. There is no rebound and no guarding.   Musculoskeletal: She exhibits edema.   Neurological: She is alert.   Skin: Skin is warm. She is diaphoretic. No erythema.   icthyosis lle, healing cellulitis rle     Psychiatric: She exhibits abnormal recent memory.   Nursing note and vitals reviewed.      Recent Labs      18   0043  18   0401  18   0456   WBC  3.7*  3.7*  2.9*   RBC  2.74*  2.70*  2.99*   HEMOGLOBIN  8.3*  8.3*  9.0*   HEMATOCRIT  26.4*  26.2*  29.2*   MCV  96.4  97.0  97.7   MCH  30.3  30.7  30.1   MCHC  31.4*  31.7*  30.8*   RDW  54.5*  55.7*  56.4*   PLATELETCT  136*  126*  120*   MPV  10.7  10.9  11.0     Recent  Labs      04/20/18   0043  04/21/18   0401  04/22/18   0456   SODIUM  133*  134*  136   POTASSIUM  4.6  4.3  4.1   CHLORIDE  100  100  104   CO2  28  29  28   GLUCOSE  358*  231*  55*   BUN  25*  24*  24*   CREATININE  0.91  0.66  0.89   CALCIUM  6.8*  7.0*  6.8*                      Assessment/Plan     * Pathologic fracture- (present on admission)   Assessment & Plan    -widespread lytic disease  -already received BM bx, confirms diagnosis  -will need radiation and chemotherapy  -pain control as outlined above  -Dr Gandhi coordinating radiation left humerus fracture  -Had been seen by Dr Forbes in the past. - no surgical intervention          Leg edema   Assessment & Plan    - Left greater than right  - DC IV fluids  - Low-dose Lasix scheduled        Hypocalcemia   Assessment & Plan    - Repleting as needed        Hypomagnesemia   Assessment & Plan    - Repleting as needed        Anxiety   Assessment & Plan    Exacerbated by situation and steroids  Use steroids only for chemo regimen  Xanax prn          Chronic venous stasis dermatitis of both lower extremities- (present on admission)   Assessment & Plan    -wound care, no clear e/o active infection at this time, defer abx's        Multiple myeloma (CMS-HCC)- (present on admission)   Assessment & Plan    -recent diagnosis, appears aggressive, now with innumerable lytic lesions  -admit to the oncology floor  -Dr Sim and Oksana consulted for assistance in management  - IV dilaudid PRN, oxy prn. DC maintenance steroids as these were causing confusion  -start oxycontin CR 10 mg BID  -skeletal survey done - spine obscured by body habitus, but multiple other lesions found        Anemia- (present on admission)   Assessment & Plan    -related to her MM, appears near her baseline, no e/o overt bleeding at this time, monitor closely        Hyponatremia- (present on admission)   Assessment & Plan    -mild        Morbid obesity with BMI of 60.0-69.9, adult (CMS-HCC)-  (present on admission)   Assessment & Plan    -encourage weight loss  Body mass index is 67.38 kg/m².        DM type 2 (diabetes mellitus, type 2) (CMS-HCC)- (present on admission)   Assessment & Plan    - With hyperglycemia  - Home dose of Lantus is 40 twice a day  - However this was causing some hypoglycemia  - Decreased to 40 daily but this caused some hyperglycemia  - Intermittent steroids for Velcade treatment also complicates glucose control  - Continue adjusting Lantus dosing        Essential hypertension, benign- (present on admission)   Assessment & Plan    High with bradycardia  Increase dose po hydralazine, decreased toprol xl to 100mg daily  Clonidine bid to tid, add lisinopril 20 q day  Terazosin qhs          Quality-Core Measures   Reviewed items::  Labs reviewed and Medications reviewed  Singh catheter::  Urinary Tract Retention or Urinary Tract Obstruction (high risk for skin breakdown given body habitus and urinary incontinence )  DVT prophylaxis pharmacological::  Heparin

## 2018-04-22 NOTE — PROGRESS NOTES
Chemotherapy Verification - PRIMARY RN      Height = 1.6m  Weight = 172.5kg  BSA = used 2m2       Medication: Velcade  Dose: 1.3mg/m2  Calculated Dose: 2.6mg = ordered dose                             (In mg/m2, AUC, mg/kg)           I confirm this process was performed independently with the BSA and all final chemotherapy dosing calculations congruent.  Any discrepancies of 5% or greater have been addressed with the chemotherapy pharmacist. The resolution of the discrepancy has been documented in the EPIC progress notes.

## 2018-04-22 NOTE — PROGRESS NOTES
"Pharmacy Chemotherapy calculation:    DX: Multiple Myeloma  Cycle 1    Day 4  Previous treatment = C1D1 = 4/19/18    Regimen: CyBorD  Bortezomib 1.3 mg/m2 IVP or subQ on Days 1, 4, 8, and 11  Cyclophosphamide 900 mg/m2 IV over 30 min on Day 1  -MD dosing over 60 min   Dexamethasone 40 mg po no days 1-2, 4-5, 8-9, and 11-12  Repeat every 21 days for 3-4 cycles (transplant candidates) OR until maximal response, disease progression or unacceptable toxicity (previously treated or non-transplant candidates)   NCCN Guidelines for Multiple Myeloma V.3.2017.  Yina H, et al. Blood. 2009; 114(22): abstr 131     Allergies:  Actos [pioglitazone hydrochloride]; Advil [ibuprofen micronized]; Cephalosporins; and Hodgen    /54   Pulse 100   Temp 37.1 °C (98.7 °F)   Resp 20   Ht 1.6 m (5' 2.99\")   Wt (!) 172.5 kg (380 lb 4.7 oz)   LMP 04/11/1994   SpO2 90%   Breastfeeding? No   BMI 67.38 kg/m²   Body surface area is 2.77 meters squared.   **MAX BSA = 2 m² per Dr. Sim for morbid obesity     4/22/18-  ANC~ 2170 Plt = 120k   Hgb = 9     SCr = 0.89mg/dL CrCl  >125mL/min     4/17/18:    AST/ALT/AP = 8/8/61 TBili = 0.4         Bortezomib 1.3 mg/m2 x 2 m² = 2.6 mg    <5% difference, ok to treat with final dose = 2.6 mg subQ    MC Pabon, Pharm.D.      "

## 2018-04-22 NOTE — PROGRESS NOTES
Oncology/Hematology Progress Note               Author: Darron Núñez Date & Time created: 4/22/2018  11:43 AM     CC:   Multiple myeloma    Interval History:    Completed radiation therapy to the left humerus  4/19/18- chemotherapy with CyBorD - D1  4/20/18-no adverse side effects, dexamethasone 40 mg today  4/22/18- hypoglycemia episode      Review of Systems:  Review of Systems   Constitutional: Positive for malaise/fatigue. Negative for chills and fever.   HENT: Negative.    Eyes: Negative.    Respiratory: Negative.    Cardiovascular: Negative.    Gastrointestinal: Negative.    Genitourinary: Negative.    Musculoskeletal: Positive for joint pain and myalgias.   Skin: Negative.    Neurological: Positive for weakness.   Endo/Heme/Allergies: Negative.        Physical Exam:  Physical Exam   Constitutional: She is oriented to person, place, and time.   Morbidly obese   HENT:   Head: Normocephalic.   Eyes: Conjunctivae are normal. Pupils are equal, round, and reactive to light.   Cardiovascular: Normal rate, regular rhythm and normal heart sounds.    Pulmonary/Chest: Effort normal and breath sounds normal.   Abdominal: Soft. Bowel sounds are normal.   Musculoskeletal: She exhibits edema.   She's had chronic edema symmetrical   Neurological: She is alert and oriented to person, place, and time.   Psychiatric: She has a normal mood and affect. Her behavior is normal. Judgment and thought content normal.       Labs:        Invalid input(s): [id number]      Recent Labs      04/20/18   0043  04/21/18   0401  04/22/18   0456   SODIUM  133*  134*  136   POTASSIUM  4.6  4.3  4.1   CHLORIDE  100  100  104   CO2  28  29  28   BUN  25*  24*  24*   CREATININE  0.91  0.66  0.89   MAGNESIUM  1.8  1.8  1.8   PHOSPHORUS  2.9  2.3*  2.1*   CALCIUM  6.8*  7.0*  6.8*     Recent Labs      04/20/18   0043  04/21/18   0401  04/22/18   0456   GLUCOSE  358*  231*  55*     Recent Labs      04/20/18   0043  04/21/18   0401  04/22/18    0456   RBC  2.74*  2.70*  2.99*   HEMOGLOBIN  8.3*  8.3*  9.0*   HEMATOCRIT  26.4*  26.2*  29.2*   PLATELETCT  136*  126*  120*     Recent Labs      18   0043  18   0401  18   0456   WBC  3.7*  3.7*  2.9*   NEUTSPOLYS  81.70*  75.20*  75.70*   LYMPHOCYTES  16.40*  13.80*  17.80*   MONOCYTES  1.40  10.50  5.60   EOSINOPHILS  0.00  0.00  0.30   BASOPHILS  0.00  0.00  0.30     Recent Labs      18   0043  18   0401  18   0456   SODIUM  133*  134*  136   POTASSIUM  4.6  4.3  4.1   CHLORIDE  100  100  104   CO2  28  29  28   GLUCOSE  358*  231*  55*   BUN  25*  24*  24*   CREATININE  0.91  0.66  0.89   CALCIUM  6.8*  7.0*  6.8*     Hemodynamics:  Temp (24hrs), Av.7 °C (98 °F), Min:36.3 °C (97.4 °F), Max:37.1 °C (98.7 °F)  Temperature: 37.1 °C (98.7 °F)  Pulse  Av  Min: 38  Max: 100   Blood Pressure : 143/54     Respiratory:    Respiration: 20, Pulse Oximetry: 90 %     Work Of Breathing / Effort: Mild  RUL Breath Sounds: Clear, RML Breath Sounds: Diminished, RLL Breath Sounds: Diminished, CHARLOTTE Breath Sounds: Clear, LLL Breath Sounds: Diminished  Fluids:    Intake/Output Summary (Last 24 hours) at 18 1124  Last data filed at 18 0300   Gross per 24 hour   Intake                0 ml   Output             1000 ml   Net            -1000 ml       GI/Nutrition:  Orders Placed This Encounter   Procedures   • Diet Order     Standing Status:   Standing     Number of Occurrences:   1     Order Specific Question:   Diet:     Answer:   Diabetic [3]     Medical Decision Making, by Problem:  Active Hospital Problems    Diagnosis   • *Pathologic fracture [M84.40XA]   • Hyponatremia [E87.1]   • Anemia [D64.9]   • Multiple myeloma (CMS-HCC) [C90.00]   • Chronic venous stasis dermatitis of both lower extremities [I87.2]   • Morbid obesity with BMI of 60.0-69.9, adult (CMS-[facility]) [E66.01, Z68.44]   • DM type 2 (diabetes mellitus, type 2) (CMS-[facility]) [E11.9]   • Essential hypertension, benign  [I10]     Past medical history, past surgical history, past social history unchanged reviewed    Plan:  1.  Multiple myeloma: 1P deletion intermediate risk, stage III based on ISS staging (beta 2 9.9 and albumin 2.7 before):      -She has completed radiation to her left humerus and will not start radiation to the right hip area until 4 more days next week. She will start on chemotherapy with CyBorD today. Her dose is adjusted for more ideal weight which turns out to be close to 2.0 BSA. Her dose will still be quite elevated for Velcade which is given subcutaneously and this will be repeated on day 4, 8, and 11. She will receive Cytoxan today at a dose of 1800 mg over one hour. She will be well hydrated before and after the infusion along with receiving appropriate antiemetics. The Cytoxan does not repeat for 2 weeks. She'll also be placed on oral dexamethasone 40 mg today and tomorrow which will also be repeated on day 4, 8, and 11  -4/19/18- CyBorD - C1D1 -no adverse side effects  -4/20/18-dexamethasone 40 mg, ANC 2.99, platelets 136,000, creatinine 0.91 calcium 6.8, magnesium 1.8, albumin 2.5  -4/22/18-D4 Velcade and dexamethasone 40 mg daily ×2 days     2.  Anemia:  Hemoglobin stable 9.0    3.  History of B12 deficiency: Her levels look good. She can provide continue with once monthly B12 injections.    4.  Extremities: She's had chronic lower extremity edema. Seems like her cellulitis has resolved. We'll just need to be followed closely.     5. Type 2 diabetes mellitus-has problems with hypoglycemia related to steroids and insulin which will be managed by the hospitalist    Complex/complicating medical diseases-discussed with hospitalist      High complexity/scheduled with her primary team/radiation oncology  Quality-Core Measures

## 2018-04-22 NOTE — CARE PLAN
Problem: Infection  Goal: Will remain free from infection    Intervention: Implement standard precautions and perform hand washing before and after patient contact  Hand hygiene performed before and after pt care. Pt educated on hand hygiene. Pt verbalized understanding.       Problem: Skin Integrity  Goal: Risk for impaired skin integrity will decrease    Intervention: Implement precautions to protect skin integrity in collaboration with the interdisciplinary team  Pt has not ambulated. Educated pt on turning and using pillows for support.

## 2018-04-22 NOTE — PROGRESS NOTES
Received report and assumed patient care at change of shift. Patient is resting in bed, A&Ox4. Patient reports no pain at this time.       Plan of care discussed, questions answered. Bed is in the lowest position and locked, call light within reach, non-skid socks in place, hourly rounding. Patient reports no further needs and this time. Family at bedside.

## 2018-04-22 NOTE — PROGRESS NOTES
Chemotherapy Verification - SECONDARY RN       Height = 160 cm  Weight = 172.5 kg  BSA = 2.77m2 (dosing BSA of 2 m2 per pharmacy and Dr. Sim)      Medication: Bortezomib  Dose: 1.3 mg/m2  Calculated Dose: 2.6 mg (ordered dose: 2.6 mg)                             (In mg/m2, AUC, mg/kg)         I confirm that this process was performed independently.

## 2018-04-23 LAB
ALBUMIN SERPL BCP-MCNC: 2.3 G/DL (ref 3.2–4.9)
BASOPHILS # BLD AUTO: 0 % (ref 0–1.8)
BASOPHILS # BLD: 0 K/UL (ref 0–0.12)
BUN SERPL-MCNC: 28 MG/DL (ref 8–22)
CA-I SERPL-SCNC: 1 MMOL/L (ref 1.1–1.3)
CALCIUM SERPL-MCNC: 6.8 MG/DL (ref 8.5–10.5)
CHLORIDE SERPL-SCNC: 103 MMOL/L (ref 96–112)
CO2 SERPL-SCNC: 28 MMOL/L (ref 20–33)
CREAT SERPL-MCNC: 0.86 MG/DL (ref 0.5–1.4)
EOSINOPHIL # BLD AUTO: 0 K/UL (ref 0–0.51)
EOSINOPHIL NFR BLD: 0 % (ref 0–6.9)
ERYTHROCYTE [DISTWIDTH] IN BLOOD BY AUTOMATED COUNT: 56.6 FL (ref 35.9–50)
GLUCOSE BLD-MCNC: 235 MG/DL (ref 65–99)
GLUCOSE BLD-MCNC: 285 MG/DL (ref 65–99)
GLUCOSE BLD-MCNC: 293 MG/DL (ref 65–99)
GLUCOSE BLD-MCNC: 353 MG/DL (ref 65–99)
GLUCOSE BLD-MCNC: 422 MG/DL (ref 65–99)
GLUCOSE SERPL-MCNC: 232 MG/DL (ref 65–99)
HCT VFR BLD AUTO: 24.3 % (ref 37–47)
HGB BLD-MCNC: 7.7 G/DL (ref 12–16)
IMM GRANULOCYTES # BLD AUTO: 0.01 K/UL (ref 0–0.11)
IMM GRANULOCYTES NFR BLD AUTO: 0.4 % (ref 0–0.9)
LYMPHOCYTES # BLD AUTO: 0.51 K/UL (ref 1–4.8)
LYMPHOCYTES NFR BLD: 19.2 % (ref 22–41)
MAGNESIUM SERPL-MCNC: 1.9 MG/DL (ref 1.5–2.5)
MCH RBC QN AUTO: 30.9 PG (ref 27–33)
MCHC RBC AUTO-ENTMCNC: 31.7 G/DL (ref 33.6–35)
MCV RBC AUTO: 97.6 FL (ref 81.4–97.8)
MONOCYTES # BLD AUTO: 0.06 K/UL (ref 0–0.85)
MONOCYTES NFR BLD AUTO: 2.3 % (ref 0–13.4)
NEUTROPHILS # BLD AUTO: 2.08 K/UL (ref 2–7.15)
NEUTROPHILS NFR BLD: 78.1 % (ref 44–72)
NRBC # BLD AUTO: 0 K/UL
NRBC BLD-RTO: 0 /100 WBC
PHOSPHATE SERPL-MCNC: 3.1 MG/DL (ref 2.5–4.5)
PLATELET # BLD AUTO: 102 K/UL (ref 164–446)
PMV BLD AUTO: 11.1 FL (ref 9–12.9)
POTASSIUM SERPL-SCNC: 4.9 MMOL/L (ref 3.6–5.5)
RBC # BLD AUTO: 2.49 M/UL (ref 4.2–5.4)
SODIUM SERPL-SCNC: 134 MMOL/L (ref 135–145)
WBC # BLD AUTO: 2.7 K/UL (ref 4.8–10.8)

## 2018-04-23 PROCEDURE — A9270 NON-COVERED ITEM OR SERVICE: HCPCS | Performed by: HOSPITALIST

## 2018-04-23 PROCEDURE — 700102 HCHG RX REV CODE 250 W/ 637 OVERRIDE(OP): Performed by: INTERNAL MEDICINE

## 2018-04-23 PROCEDURE — 85025 COMPLETE CBC W/AUTO DIFF WBC: CPT

## 2018-04-23 PROCEDURE — 700105 HCHG RX REV CODE 258: Performed by: HOSPITALIST

## 2018-04-23 PROCEDURE — DPY97ZZ CONTACT RADIATION OF FEMUR: ICD-10-PCS | Performed by: RADIOLOGY

## 2018-04-23 PROCEDURE — 77280 THER RAD SIMULAJ FIELD SMPL: CPT | Performed by: RADIOLOGY

## 2018-04-23 PROCEDURE — 99233 SBSQ HOSP IP/OBS HIGH 50: CPT | Performed by: HOSPITALIST

## 2018-04-23 PROCEDURE — 83735 ASSAY OF MAGNESIUM: CPT

## 2018-04-23 PROCEDURE — 700111 HCHG RX REV CODE 636 W/ 250 OVERRIDE (IP): Performed by: HOSPITALIST

## 2018-04-23 PROCEDURE — 700111 HCHG RX REV CODE 636 W/ 250 OVERRIDE (IP): Performed by: INTERNAL MEDICINE

## 2018-04-23 PROCEDURE — 770004 HCHG ROOM/CARE - ONCOLOGY PRIVATE *

## 2018-04-23 PROCEDURE — 77412 RADIATION TX DELIVERY LVL 3: CPT | Performed by: RADIOLOGY

## 2018-04-23 PROCEDURE — 82330 ASSAY OF CALCIUM: CPT

## 2018-04-23 PROCEDURE — 77280 THER RAD SIMULAJ FIELD SMPL: CPT | Mod: 26 | Performed by: RADIOLOGY

## 2018-04-23 PROCEDURE — A9270 NON-COVERED ITEM OR SERVICE: HCPCS | Performed by: INTERNAL MEDICINE

## 2018-04-23 PROCEDURE — 700102 HCHG RX REV CODE 250 W/ 637 OVERRIDE(OP): Performed by: HOSPITALIST

## 2018-04-23 PROCEDURE — 82962 GLUCOSE BLOOD TEST: CPT | Mod: 91

## 2018-04-23 PROCEDURE — 80069 RENAL FUNCTION PANEL: CPT

## 2018-04-23 RX ORDER — ACETAMINOPHEN 500 MG
1000 TABLET ORAL EVERY 6 HOURS
Status: DISCONTINUED | OUTPATIENT
Start: 2018-04-23 | End: 2018-04-24

## 2018-04-23 RX ORDER — LORAZEPAM 2 MG/ML
1 INJECTION INTRAMUSCULAR EVERY 4 HOURS PRN
Status: DISCONTINUED | OUTPATIENT
Start: 2018-04-23 | End: 2018-05-10

## 2018-04-23 RX ADMIN — OXYCODONE HYDROCHLORIDE 10 MG: 10 TABLET, FILM COATED, EXTENDED RELEASE ORAL at 21:29

## 2018-04-23 RX ADMIN — CLONIDINE HYDROCHLORIDE 0.2 MG: 0.1 TABLET ORAL at 07:59

## 2018-04-23 RX ADMIN — INSULIN HUMAN 12 UNITS: 100 INJECTION, SOLUTION PARENTERAL at 21:35

## 2018-04-23 RX ADMIN — HYDRALAZINE HYDROCHLORIDE 50 MG: 10 TABLET, FILM COATED ORAL at 06:22

## 2018-04-23 RX ADMIN — LISINOPRIL 20 MG: 20 TABLET ORAL at 07:58

## 2018-04-23 RX ADMIN — INSULIN HUMAN 7 UNITS: 100 INJECTION, SOLUTION PARENTERAL at 13:47

## 2018-04-23 RX ADMIN — HEPARIN SODIUM 5000 UNITS: 5000 INJECTION, SOLUTION INTRAVENOUS; SUBCUTANEOUS at 13:58

## 2018-04-23 RX ADMIN — METOPROLOL SUCCINATE 100 MG: 50 TABLET, EXTENDED RELEASE ORAL at 08:00

## 2018-04-23 RX ADMIN — CALCIUM CITRATE 200 MG (950 MG) TABLET 950 MG: at 07:58

## 2018-04-23 RX ADMIN — ACETAMINOPHEN 1000 MG: 160 SUSPENSION ORAL at 06:23

## 2018-04-23 RX ADMIN — Medication: at 21:30

## 2018-04-23 RX ADMIN — CALCIUM GLUCONATE 3 G: 94 INJECTION, SOLUTION INTRAVENOUS at 09:45

## 2018-04-23 RX ADMIN — HEPARIN SODIUM 5000 UNITS: 5000 INJECTION, SOLUTION INTRAVENOUS; SUBCUTANEOUS at 21:30

## 2018-04-23 RX ADMIN — DEXAMETHASONE 40 MG: 4 TABLET ORAL at 13:55

## 2018-04-23 RX ADMIN — ACYCLOVIR 400 MG: 200 CAPSULE ORAL at 07:58

## 2018-04-23 RX ADMIN — ACETAMINOPHEN 1000 MG: 160 SUSPENSION ORAL at 00:20

## 2018-04-23 RX ADMIN — ALPRAZOLAM 0.5 MG: 0.5 TABLET ORAL at 10:41

## 2018-04-23 RX ADMIN — ACETAMINOPHEN 1000 MG: 500 TABLET ORAL at 18:07

## 2018-04-23 RX ADMIN — HYDRALAZINE HYDROCHLORIDE 50 MG: 10 TABLET, FILM COATED ORAL at 13:57

## 2018-04-23 RX ADMIN — INSULIN HUMAN 14 UNITS: 100 INJECTION, SOLUTION PARENTERAL at 18:08

## 2018-04-23 RX ADMIN — FUROSEMIDE 20 MG: 10 INJECTION, SOLUTION INTRAMUSCULAR; INTRAVENOUS at 08:00

## 2018-04-23 RX ADMIN — Medication: at 07:59

## 2018-04-23 RX ADMIN — OXYCODONE HYDROCHLORIDE 10 MG: 10 TABLET, FILM COATED, EXTENDED RELEASE ORAL at 07:59

## 2018-04-23 RX ADMIN — ACYCLOVIR 400 MG: 200 CAPSULE ORAL at 21:29

## 2018-04-23 RX ADMIN — INSULIN HUMAN 7 UNITS: 100 INJECTION, SOLUTION PARENTERAL at 08:06

## 2018-04-23 RX ADMIN — CLONIDINE HYDROCHLORIDE 0.2 MG: 0.1 TABLET ORAL at 21:29

## 2018-04-23 RX ADMIN — TERAZOSIN HYDROCHLORIDE ANHYDROUS 10 MG: 5 CAPSULE ORAL at 21:30

## 2018-04-23 RX ADMIN — ACETAMINOPHEN 1000 MG: 160 SUSPENSION ORAL at 13:58

## 2018-04-23 RX ADMIN — CLONIDINE HYDROCHLORIDE 0.2 MG: 0.1 TABLET ORAL at 13:57

## 2018-04-23 RX ADMIN — HYDRALAZINE HYDROCHLORIDE 50 MG: 10 TABLET, FILM COATED ORAL at 21:29

## 2018-04-23 RX ADMIN — PRAVASTATIN SODIUM 40 MG: 20 TABLET ORAL at 21:29

## 2018-04-23 RX ADMIN — HEPARIN SODIUM 5000 UNITS: 5000 INJECTION, SOLUTION INTRAVENOUS; SUBCUTANEOUS at 06:22

## 2018-04-23 RX ADMIN — INSULIN GLARGINE 40 UNITS: 100 INJECTION, SOLUTION SUBCUTANEOUS at 21:36

## 2018-04-23 ASSESSMENT — PAIN SCALES - GENERAL
PAINLEVEL_OUTOF10: 4
PAINLEVEL_OUTOF10: 3
PAINLEVEL_OUTOF10: 2
PAINLEVEL_OUTOF10: 3
PAINLEVEL_OUTOF10: 2
PAINLEVEL_OUTOF10: 3
PAINLEVEL_OUTOF10: 2
PAINLEVEL_OUTOF10: 4

## 2018-04-23 ASSESSMENT — ENCOUNTER SYMPTOMS
NERVOUS/ANXIOUS: 1
CARDIOVASCULAR NEGATIVE: 1
COUGH: 0
MYALGIAS: 1
EYES NEGATIVE: 1
VOMITING: 0
DEPRESSION: 0
ABDOMINAL PAIN: 0
DIZZINESS: 0
GASTROINTESTINAL NEGATIVE: 1
DIARRHEA: 0
SHORTNESS OF BREATH: 1
CONSTIPATION: 0
RESPIRATORY NEGATIVE: 1
CHILLS: 0
WEAKNESS: 1
NAUSEA: 0
FEVER: 0

## 2018-04-23 NOTE — CARE PLAN
Problem: Communication  Goal: The ability to communicate needs accurately and effectively will improve  Outcome: PROGRESSING AS EXPECTED  Pt updated about POC- radiation today at 11:30.     Problem: Pain Management  Goal: Pain level will decrease to patient's comfort goal  Outcome: PROGRESSING AS EXPECTED  Pt complains about pain. Med per MAR.     Problem: Urinary Elimination:  Goal: Ability to reestablish a normal urinary elimination pattern will improve  Outcome: PROGRESSING AS EXPECTED  Singh draining to gravity.

## 2018-04-23 NOTE — PROGRESS NOTES
[facility] Hospitalist Progress Note    Date of Service: 4/23/2018    Chief Complaint  67 y.o. female admitted 4/10/2018 with intractible pain secondary to multiple myeloma, known fracture left humerus    Interval Problem Update  4/11 Patient feeling much less pain since admission and starting medications, she is still having significant pain and swelling left humerus fracture site and likely had pathologic fracture.  Dr Núñez and Oksana consulted for assistance.  She already has significant calculus surrounding the fracture site but also pain.    4/12 Patient had mapping done on humerus today and bony survey.  Symptoms doing about same, mediation helping.  Plan is to medically maximize patient in preparation to start chemotherapy on Monday.  4/13 Patient feeling okay but having significant anxiety - states normally she is calm but with this situation she is having difficulty.  Xanax low dose ordered PRN.  Radiation to resume Monday, first dose given today.  4/14 Patient feeling well, nava placed in facility prior to transfer d/t urinary incontinence and body habitus, she is a high risk for skin breakdown and nava to remain in place at this time.  No acute complaints.  BP high and heart rate low, reponded well to IV hydralazine overnight - scheduled PO today, decrease toprol xl to 100 mg daily.  4/15 Patient with friends visiting today, states she is feeling a bit tearful today but otherwise is feeling physically okay.  Her legs are healing well from recent cellulitis and she will resume radiation therapy tomorrow.  4/16 Patient doing about same, blood pressure has improved slightly with changes but still significantly elevated, will change her baseline clonidine to tid and add lisinopril qday.  Dr Sim to see patient today and decide when to proceed with chemo.  4/17 started radiation to left humerus today. Referred to SNF. Onc thinking about weekly CyBorD  4/18 repleting magnesium and calcium. Likely starting  CyBorD tomorrow   DC dexamethasone two and increased sliding scale insulin and Lantus. Repleting magnesium and calcium. Started CyBorD today.   repleting magnesium and calcium. Increased Lantus for tonight for one day given elevated dose of prednisone. PICC placed.   DC IV fluids and started on low-dose Lasix. Repleting magnesium and calcium.   became hypoglycemic today with 60 Lantus overnight without steroids. Required 2 A of D50. Decreasing Lantus for tonight and also getting dexamethasone with Velcade. Repleting magnesium and calcium.   no further hypoglycemia. Repleting calcium. Added when necessary Xanax prior to radiation for anxiety. DC Lasix for now.      Consultants/Specialty  onc - Núñez/Shields  XRT - Peddada      Disposition  Tbd, lives in [place],   (out of SNF days and does not want to go SNF anyhow)      Review of Systems   Constitutional: Negative for chills, fever and malaise/fatigue.   HENT: Negative for congestion.    Respiratory: Positive for shortness of breath (orthopnea). Negative for cough.    Cardiovascular: Positive for leg swelling. Negative for chest pain.   Gastrointestinal: Negative for abdominal pain, constipation, diarrhea, nausea and vomiting.   Genitourinary: Negative for dysuria.   Musculoskeletal: Positive for myalgias.   Skin: Negative for rash.   Neurological: Positive for weakness. Negative for dizziness.   Psychiatric/Behavioral: Negative for depression. The patient is nervous/anxious.       Physical Exam  Laboratory/Imaging   Hemodynamics  Temp (24hrs), Av.7 °C (98 °F), Min:36.4 °C (97.5 °F), Max:36.9 °C (98.5 °F)   Temperature: 36.4 °C (97.5 °F)  Pulse  Av.5  Min: 38  Max: 100    Blood Pressure : 150/71      Respiratory      Respiration: 20, Pulse Oximetry: 99 %     Work Of Breathing / Effort: Mild  RUL Breath Sounds: Clear, RML Breath Sounds: Diminished, RLL Breath Sounds: Diminished, CHARLOTTE Breath Sounds: Clear, LLL Breath Sounds:  Diminished    Fluids    Intake/Output Summary (Last 24 hours) at 04/23/18 1422  Last data filed at 04/23/18 1000   Gross per 24 hour   Intake             1382 ml   Output             2600 ml   Net            -1218 ml       Nutrition  Orders Placed This Encounter   Procedures   • Diet Order     Standing Status:   Standing     Number of Occurrences:   1     Order Specific Question:   Diet:     Answer:   Diabetic [3]     Physical Exam   Constitutional: She appears well-developed.   Morbidly obese   HENT:   Head: Normocephalic.   Cardiovascular: Normal rate and regular rhythm.  Exam reveals no gallop and no friction rub.    Murmur (Systolic) heard.  Pulmonary/Chest: Effort normal and breath sounds normal. No respiratory distress. She has no wheezes. She has no rales.   Abdominal: Soft. Bowel sounds are normal. She exhibits no distension. There is no tenderness. There is no rebound and no guarding.   Musculoskeletal: She exhibits edema.   Neurological: She is alert.   Skin: Skin is warm. She is not diaphoretic. No erythema.   icthyosis lle, healing cellulitis rle     Psychiatric: She exhibits abnormal recent memory.   Nursing note and vitals reviewed.      Recent Labs      04/21/18   0401  04/22/18   0456  04/23/18   0132   WBC  3.7*  2.9*  2.7*   RBC  2.70*  2.99*  2.49*   HEMOGLOBIN  8.3*  9.0*  7.7*   HEMATOCRIT  26.2*  29.2*  24.3*   MCV  97.0  97.7  97.6   MCH  30.7  30.1  30.9   MCHC  31.7*  30.8*  31.7*   RDW  55.7*  56.4*  56.6*   PLATELETCT  126*  120*  102*   MPV  10.9  11.0  11.1     Recent Labs      04/21/18   0401  04/22/18   0456  04/23/18   0132   SODIUM  134*  136  134*   POTASSIUM  4.3  4.1  4.9   CHLORIDE  100  104  103   CO2  29  28  28   GLUCOSE  231*  55*  232*   BUN  24*  24*  28*   CREATININE  0.66  0.89  0.86   CALCIUM  7.0*  6.8*  6.8*                      Assessment/Plan     * Pathologic fracture- (present on admission)   Assessment & Plan    -widespread lytic disease  -already received BM bx,  confirms diagnosis  -will need radiation and chemotherapy  -pain control as outlined above  -Dr Gandhi coordinating radiation left humerus fracture  -Had been seen by Dr Forbes in the past. - no surgical intervention          Leg edema   Assessment & Plan    - Left greater than right  - DC IV fluids  - Low-dose Lasix scheduled        Hypocalcemia   Assessment & Plan    - Repleting as needed        Hypomagnesemia   Assessment & Plan    - Repleting as needed        Anxiety   Assessment & Plan    Exacerbated by situation and steroids  Use steroids only for chemo regimen  Xanax prn, IV Ativan prior to radiation if needed        Chronic venous stasis dermatitis of both lower extremities- (present on admission)   Assessment & Plan    -wound care, no clear e/o active infection at this time, defer abx's        Multiple myeloma (CMS-HCC)- (present on admission)   Assessment & Plan    -recent diagnosis, appears aggressive, now with innumerable lytic lesions  -admit to the oncology floor  -Dr Sim and Oksana consulted for assistance in management  - IV dilaudid PRN, oxy prn. DC maintenance steroids as these were causing confusion  -start oxycontin CR 10 mg BID  -skeletal survey done - spine obscured by body habitus, but multiple other lesions found        Anemia- (present on admission)   Assessment & Plan    -related to her MM, appears near her baseline, no e/o overt bleeding at this time, monitor closely        Hyponatremia- (present on admission)   Assessment & Plan    -mild        Morbid obesity with BMI of 60.0-69.9, adult (CMS-HCC)- (present on admission)   Assessment & Plan    -encourage weight loss  Body mass index is 67.38 kg/m².        DM type 2 (diabetes mellitus, type 2) (CMS-HCC)- (present on admission)   Assessment & Plan    - With hyperglycemia  - Home dose of Lantus is 40 twice a day  - However this was causing some hypoglycemia  - Decreased to 40 daily but this caused some hyperglycemia  - Intermittent  steroids for Velcade treatment also complicates glucose control  - Continue adjusting Lantus dosing        Essential hypertension, benign- (present on admission)   Assessment & Plan    High with bradycardia  Increase dose po hydralazine, decreased toprol xl to 100mg daily  Clonidine bid to tid, add lisinopril 20 q day  Terazosin qhs          Quality-Core Measures   Reviewed items::  Labs reviewed and Medications reviewed  Singh catheter::  Urinary Tract Retention or Urinary Tract Obstruction (high risk for skin breakdown given body habitus and urinary incontinence )  DVT prophylaxis pharmacological::  Heparin

## 2018-04-23 NOTE — PROGRESS NOTES
Maame from Lab called with critical result of Calcium at 02:30. Critical lab result read back to Maame.   This critical lab result is within parameters established by [facility] for this patient    Calcium 6.8, Albumin 2.3 = corrected calcium 8.16

## 2018-04-23 NOTE — PROGRESS NOTES
Assumed care of pt @0700. Bedside report received. Pt AOX 4. Pt complains about pain. Scheduled Oxycodone CR 10 mg given. Radiation today. Premedicated with Xanax. PICC patent with positive blood return. Singh in place draining to gravity. Last BM 04/21. Edema to lower extremities. Pt up with 3-4 p assist. Fall precaution in place. POC discussed with pt, all questions answered at this time. Pt makes needs known, call light within reach, hourly rounding in place.

## 2018-04-23 NOTE — PROGRESS NOTES
Assumed care from AM shift. AAOx4. Patient 2 person turn in bed. Unable to ambulate.  PICC flushes with + blood return.  States left LE more swollen and painful.  Medicated per MAR.  , administered Lantus and will recheck BGM at midnight due to previous hypoglycemic episode on day shift.  No further needs at this time.  Will continue to monitor.

## 2018-04-23 NOTE — PROGRESS NOTES
Oncology/Hematology Progress Note               Author: Emre Barraza Date & Time created: 4/23/2018  7:42 AM     CC:   Multiple myeloma    Interval History:    Completed radiation therapy to the left humerus  4/19/18- chemotherapy with CyBorD - D1  4/20/18-no adverse side effects, dexamethasone 40 mg today  4/22/18- hypoglycemia episode  4/23- feels about the same. Morbidly obese. Joint pain, stable      Review of Systems:  Review of Systems   Constitutional: Positive for malaise/fatigue. Negative for chills and fever.   HENT: Negative.    Eyes: Negative.    Respiratory: Negative.    Cardiovascular: Negative.    Gastrointestinal: Negative.    Genitourinary: Negative.    Musculoskeletal: Positive for joint pain and myalgias.   Skin: Negative.    Neurological: Positive for weakness.   Endo/Heme/Allergies: Negative.        Physical Exam:  Physical Exam   Constitutional: She is oriented to person, place, and time.   Morbidly obese   HENT:   Head: Normocephalic.   Eyes: Conjunctivae are normal. Pupils are equal, round, and reactive to light.   Cardiovascular: Normal rate, regular rhythm and normal heart sounds.    Pulmonary/Chest: Effort normal and breath sounds normal.   Abdominal: Soft. Bowel sounds are normal.   Musculoskeletal: She exhibits edema.   She's had chronic edema symmetrical   Neurological: She is alert and oriented to person, place, and time.   Psychiatric: She has a normal mood and affect. Her behavior is normal. Judgment and thought content normal.       Labs:        Invalid input(s): [id number]      Recent Labs      04/21/18   0401  04/22/18   0456  04/23/18   0132   SODIUM  134*  136  134*   POTASSIUM  4.3  4.1  4.9   CHLORIDE  100  104  103   CO2  29  28  28   BUN  24*  24*  28*   CREATININE  0.66  0.89  0.86   MAGNESIUM  1.8  1.8  1.9   PHOSPHORUS  2.3*  2.1*  3.1   CALCIUM  7.0*  6.8*  6.8*     Recent Labs      04/21/18   0401  04/22/18   0456  04/23/18   0132   GLUCOSE  231*  55*  232*      Recent Labs      18   0401  18   0456  18   0132   RBC  2.70*  2.99*  2.49*   HEMOGLOBIN  8.3*  9.0*  7.7*   HEMATOCRIT  26.2*  29.2*  24.3*   PLATELETCT  126*  120*  102*     Recent Labs      18   0401  18   0456  18   0132   WBC  3.7*  2.9*  2.7*   NEUTSPOLYS  75.20*  75.70*  78.10*   LYMPHOCYTES  13.80*  17.80*  19.20*   MONOCYTES  10.50  5.60  2.30   EOSINOPHILS  0.00  0.30  0.00   BASOPHILS  0.00  0.30  0.00     Recent Labs      18   04018   0456  18   0132   SODIUM  134*  136  134*   POTASSIUM  4.3  4.1  4.9   CHLORIDE  100  104  103   CO2  29  28  28   GLUCOSE  231*  55*  232*   BUN  24*  24*  28*   CREATININE  0.66  0.89  0.86   CALCIUM  7.0*  6.8*  6.8*     Hemodynamics:  Temp (24hrs), Av.8 °C (98.2 °F), Min:36.4 °C (97.6 °F), Max:36.9 °C (98.5 °F)  Temperature: 36.4 °C (97.6 °F)  Pulse  Av.6  Min: 38  Max: 100   Blood Pressure : 132/60     Respiratory:    Respiration: 18, Pulse Oximetry: 95 %     Work Of Breathing / Effort: Mild  RUL Breath Sounds: Clear, RML Breath Sounds: Diminished, RLL Breath Sounds: Diminished, [place] Breath Sounds: Clear, LLL Breath Sounds: Diminished  Fluids:    Intake/Output Summary (Last 24 hours) at 18 1124  Last data filed at 18 0300   Gross per 24 hour   Intake                0 ml   Output             1000 ml   Net            -1000 ml       GI/Nutrition:  Orders Placed This Encounter   Procedures   • Diet Order     Standing Status:   Standing     Number of Occurrences:   1     Order Specific Question:   Diet:     Answer:   Diabetic [3]     Medical Decision Making, by Problem:  Active Hospital Problems    Diagnosis   • *Pathologic fracture [M84.40XA]   • Hyponatremia [E87.1]   • Anemia [D64.9]   • Multiple myeloma (CMS-HCC) [C90.00]   • Chronic venous stasis dermatitis of both lower extremities [I87.2]   • Morbid obesity with BMI of 60.0-69.9, adult (CMS-HCC) [E66.01, Z68.44]   • DM type 2 (diabetes  mellitus, type 2) (CMS-HCC) [E11.9]   • Essential hypertension, benign [I10]     Past medical history, past surgical history, past social history unchanged reviewed    Plan:  1.  Multiple myeloma: 1P deletion intermediate risk, stage III based on ISS staging (beta 2 9.9 and albumin 2.7 before):      -, Status post radiation left humerus and will not start radiation to the right hip area until 4 more days next week.     -4/19/18- CyBorD - C1D1 -no adverse side effects  -4/20/18-dexamethasone 40 mg, ANC 2.99, platelets 136,000, creatinine 0.91 calcium 6.8, magnesium 1.8, albumin 2.5  -4/22/18-D4 Velcade and dexamethasone 40 mg daily ×2 days  -4/23- no new events. She continues to have trouble with hyper/hypoglycemia     2.  Anemia:  Hemoglobin trending down. Transfuse if less than 7      4.  Extremities: She's had chronic lower extremity edema.  Cellulitis, resolved    5. Type 2 diabetes mellitus-has problems with early morning hypoglycemia related to steroids and insulin which will be managed by the hospitalist    Complex/complicating medical diseases-discussed with hospitalist      High complexity/scheduled with her primary team/radiation oncology  Quality-Core Measures

## 2018-04-23 NOTE — PROGRESS NOTES
Patient brought down to Radiation Therapy and received treatment 1 out of 10 to rt femur and 0 of 10 to left femur. Patient tolerated treatment to right femur only and we did not get to treat the left due to patient being in pain.

## 2018-04-24 ENCOUNTER — HOSPITAL ENCOUNTER (OUTPATIENT)
Dept: RADIATION ONCOLOGY | Facility: MEDICAL CENTER | Age: 68
End: 2018-04-24

## 2018-04-24 LAB
ALBUMIN SERPL BCP-MCNC: 2.6 G/DL (ref 3.2–4.9)
BASOPHILS # BLD AUTO: 0 % (ref 0–1.8)
BASOPHILS # BLD: 0 K/UL (ref 0–0.12)
BUN SERPL-MCNC: 29 MG/DL (ref 8–22)
CA-I SERPL-SCNC: 1 MMOL/L (ref 1.1–1.3)
CALCIUM SERPL-MCNC: 7 MG/DL (ref 8.5–10.5)
CHLORIDE SERPL-SCNC: 100 MMOL/L (ref 96–112)
CO2 SERPL-SCNC: 27 MMOL/L (ref 20–33)
CREAT SERPL-MCNC: 0.86 MG/DL (ref 0.5–1.4)
EOSINOPHIL # BLD AUTO: 0 K/UL (ref 0–0.51)
EOSINOPHIL NFR BLD: 0 % (ref 0–6.9)
ERYTHROCYTE [DISTWIDTH] IN BLOOD BY AUTOMATED COUNT: 55.8 FL (ref 35.9–50)
GLUCOSE BLD-MCNC: 273 MG/DL (ref 65–99)
GLUCOSE BLD-MCNC: 284 MG/DL (ref 65–99)
GLUCOSE BLD-MCNC: 310 MG/DL (ref 65–99)
GLUCOSE BLD-MCNC: 310 MG/DL (ref 65–99)
GLUCOSE BLD-MCNC: 326 MG/DL (ref 65–99)
GLUCOSE SERPL-MCNC: 318 MG/DL (ref 65–99)
HCT VFR BLD AUTO: 24.5 % (ref 37–47)
HGB BLD-MCNC: 7.8 G/DL (ref 12–16)
IMM GRANULOCYTES # BLD AUTO: 0 K/UL (ref 0–0.11)
IMM GRANULOCYTES NFR BLD AUTO: 0 % (ref 0–0.9)
LYMPHOCYTES # BLD AUTO: 0.29 K/UL (ref 1–4.8)
LYMPHOCYTES NFR BLD: 12.4 % (ref 22–41)
MCH RBC QN AUTO: 30.8 PG (ref 27–33)
MCHC RBC AUTO-ENTMCNC: 31.8 G/DL (ref 33.6–35)
MCV RBC AUTO: 96.8 FL (ref 81.4–97.8)
MONOCYTES # BLD AUTO: 0.07 K/UL (ref 0–0.85)
MONOCYTES NFR BLD AUTO: 3 % (ref 0–13.4)
NEUTROPHILS # BLD AUTO: 1.98 K/UL (ref 2–7.15)
NEUTROPHILS NFR BLD: 84.6 % (ref 44–72)
NRBC # BLD AUTO: 0 K/UL
NRBC BLD-RTO: 0 /100 WBC
PHOSPHATE SERPL-MCNC: 3 MG/DL (ref 2.5–4.5)
PLATELET # BLD AUTO: 97 K/UL (ref 164–446)
PMV BLD AUTO: 11.3 FL (ref 9–12.9)
POTASSIUM SERPL-SCNC: 4.8 MMOL/L (ref 3.6–5.5)
RBC # BLD AUTO: 2.53 M/UL (ref 4.2–5.4)
SODIUM SERPL-SCNC: 132 MMOL/L (ref 135–145)
WBC # BLD AUTO: 2.3 K/UL (ref 4.8–10.8)

## 2018-04-24 PROCEDURE — 77417 THER RADIOLOGY PORT IMAGE(S): CPT | Performed by: RADIOLOGY

## 2018-04-24 PROCEDURE — A9270 NON-COVERED ITEM OR SERVICE: HCPCS | Performed by: HOSPITALIST

## 2018-04-24 PROCEDURE — 700111 HCHG RX REV CODE 636 W/ 250 OVERRIDE (IP): Performed by: HOSPITALIST

## 2018-04-24 PROCEDURE — 700102 HCHG RX REV CODE 250 W/ 637 OVERRIDE(OP): Performed by: HOSPITALIST

## 2018-04-24 PROCEDURE — 97530 THERAPEUTIC ACTIVITIES: CPT

## 2018-04-24 PROCEDURE — 700111 HCHG RX REV CODE 636 W/ 250 OVERRIDE (IP): Performed by: INTERNAL MEDICINE

## 2018-04-24 PROCEDURE — 700102 HCHG RX REV CODE 250 W/ 637 OVERRIDE(OP): Performed by: INTERNAL MEDICINE

## 2018-04-24 PROCEDURE — A9270 NON-COVERED ITEM OR SERVICE: HCPCS | Performed by: INTERNAL MEDICINE

## 2018-04-24 PROCEDURE — 80069 RENAL FUNCTION PANEL: CPT

## 2018-04-24 PROCEDURE — 770004 HCHG ROOM/CARE - ONCOLOGY PRIVATE *

## 2018-04-24 PROCEDURE — 97535 SELF CARE MNGMENT TRAINING: CPT | Mod: XE

## 2018-04-24 PROCEDURE — 77412 RADIATION TX DELIVERY LVL 3: CPT | Performed by: RADIOLOGY

## 2018-04-24 PROCEDURE — 85025 COMPLETE CBC W/AUTO DIFF WBC: CPT

## 2018-04-24 PROCEDURE — 82330 ASSAY OF CALCIUM: CPT

## 2018-04-24 PROCEDURE — 82962 GLUCOSE BLOOD TEST: CPT | Mod: 91

## 2018-04-24 PROCEDURE — 99233 SBSQ HOSP IP/OBS HIGH 50: CPT | Performed by: INTERNAL MEDICINE

## 2018-04-24 PROCEDURE — 97112 NEUROMUSCULAR REEDUCATION: CPT

## 2018-04-24 RX ORDER — FUROSEMIDE 20 MG/1
20 TABLET ORAL
Status: DISCONTINUED | OUTPATIENT
Start: 2018-04-24 | End: 2018-04-25

## 2018-04-24 RX ORDER — ACETAMINOPHEN 325 MG/1
975 TABLET ORAL EVERY 6 HOURS PRN
Status: DISCONTINUED | OUTPATIENT
Start: 2018-04-24 | End: 2018-05-10

## 2018-04-24 RX ORDER — INSULIN GLARGINE 100 [IU]/ML
50 INJECTION, SOLUTION SUBCUTANEOUS EVERY EVENING
Status: DISCONTINUED | OUTPATIENT
Start: 2018-04-24 | End: 2018-04-25

## 2018-04-24 RX ADMIN — ACYCLOVIR 400 MG: 200 CAPSULE ORAL at 21:04

## 2018-04-24 RX ADMIN — FUROSEMIDE 20 MG: 20 TABLET ORAL at 21:04

## 2018-04-24 RX ADMIN — ACYCLOVIR 400 MG: 200 CAPSULE ORAL at 09:53

## 2018-04-24 RX ADMIN — OXYCODONE HYDROCHLORIDE 10 MG: 10 TABLET, FILM COATED, EXTENDED RELEASE ORAL at 21:04

## 2018-04-24 RX ADMIN — CLONIDINE HYDROCHLORIDE 0.2 MG: 0.1 TABLET ORAL at 09:52

## 2018-04-24 RX ADMIN — LORAZEPAM 1 MG: 2 INJECTION INTRAMUSCULAR; INTRAVENOUS at 07:06

## 2018-04-24 RX ADMIN — TERAZOSIN HYDROCHLORIDE ANHYDROUS 10 MG: 5 CAPSULE ORAL at 21:03

## 2018-04-24 RX ADMIN — OXYCODONE HYDROCHLORIDE 5 MG: 5 TABLET ORAL at 15:20

## 2018-04-24 RX ADMIN — CLONIDINE HYDROCHLORIDE 0.2 MG: 0.1 TABLET ORAL at 15:15

## 2018-04-24 RX ADMIN — Medication: at 10:01

## 2018-04-24 RX ADMIN — OXYCODONE HYDROCHLORIDE 5 MG: 5 TABLET ORAL at 19:31

## 2018-04-24 RX ADMIN — INSULIN HUMAN 10 UNITS: 100 INJECTION, SOLUTION PARENTERAL at 20:59

## 2018-04-24 RX ADMIN — HYDRALAZINE HYDROCHLORIDE 50 MG: 10 TABLET, FILM COATED ORAL at 15:16

## 2018-04-24 RX ADMIN — HYDRALAZINE HYDROCHLORIDE 50 MG: 10 TABLET, FILM COATED ORAL at 04:51

## 2018-04-24 RX ADMIN — ACETAMINOPHEN 1000 MG: 500 TABLET ORAL at 06:00

## 2018-04-24 RX ADMIN — PRAVASTATIN SODIUM 40 MG: 20 TABLET ORAL at 21:04

## 2018-04-24 RX ADMIN — INSULIN HUMAN 7 UNITS: 100 INJECTION, SOLUTION PARENTERAL at 18:36

## 2018-04-24 RX ADMIN — Medication: at 21:05

## 2018-04-24 RX ADMIN — HEPARIN SODIUM 5000 UNITS: 5000 INJECTION, SOLUTION INTRAVENOUS; SUBCUTANEOUS at 21:04

## 2018-04-24 RX ADMIN — OXYCODONE HYDROCHLORIDE 10 MG: 10 TABLET, FILM COATED, EXTENDED RELEASE ORAL at 09:52

## 2018-04-24 RX ADMIN — CALCIUM CITRATE 200 MG (950 MG) TABLET 950 MG: at 09:52

## 2018-04-24 RX ADMIN — INSULIN GLARGINE 50 UNITS: 100 INJECTION, SOLUTION SUBCUTANEOUS at 21:02

## 2018-04-24 RX ADMIN — HEPARIN SODIUM 5000 UNITS: 5000 INJECTION, SOLUTION INTRAVENOUS; SUBCUTANEOUS at 04:52

## 2018-04-24 RX ADMIN — CLONIDINE HYDROCHLORIDE 0.2 MG: 0.1 TABLET ORAL at 21:04

## 2018-04-24 RX ADMIN — HEPARIN SODIUM 5000 UNITS: 5000 INJECTION, SOLUTION INTRAVENOUS; SUBCUTANEOUS at 15:16

## 2018-04-24 RX ADMIN — INSULIN HUMAN 7 UNITS: 100 INJECTION, SOLUTION PARENTERAL at 10:01

## 2018-04-24 RX ADMIN — HYDRALAZINE HYDROCHLORIDE 50 MG: 10 TABLET, FILM COATED ORAL at 21:03

## 2018-04-24 RX ADMIN — LISINOPRIL 20 MG: 20 TABLET ORAL at 09:52

## 2018-04-24 RX ADMIN — INSULIN HUMAN 10 UNITS: 100 INJECTION, SOLUTION PARENTERAL at 12:35

## 2018-04-24 ASSESSMENT — ENCOUNTER SYMPTOMS
HEADACHES: 0
HEARTBURN: 0
WEAKNESS: 1
DIARRHEA: 0
FOCAL WEAKNESS: 0
COUGH: 0
DEPRESSION: 0
BLURRED VISION: 0
ABDOMINAL PAIN: 0
FEVER: 0
DIZZINESS: 0
INSOMNIA: 0
NERVOUS/ANXIOUS: 1
VOMITING: 0
SHORTNESS OF BREATH: 0
CONSTIPATION: 0
SPEECH CHANGE: 0
MYALGIAS: 1
PHOTOPHOBIA: 0
SENSORY CHANGE: 0
CHILLS: 0
CLAUDICATION: 0

## 2018-04-24 ASSESSMENT — COGNITIVE AND FUNCTIONAL STATUS - GENERAL
TOILETING: TOTAL
DRESSING REGULAR UPPER BODY CLOTHING: A LITTLE
STANDING UP FROM CHAIR USING ARMS: A LITTLE
PERSONAL GROOMING: A LITTLE
MOBILITY SCORE: 9
DAILY ACTIVITIY SCORE: 14
SUGGESTED CMS G CODE MODIFIER DAILY ACTIVITY: CK
MOVING TO AND FROM BED TO CHAIR: UNABLE
DRESSING REGULAR LOWER BODY CLOTHING: TOTAL
SUGGESTED CMS G CODE MODIFIER MOBILITY: CM
CLIMB 3 TO 5 STEPS WITH RAILING: TOTAL
MOVING FROM LYING ON BACK TO SITTING ON SIDE OF FLAT BED: UNABLE
WALKING IN HOSPITAL ROOM: A LOT
TURNING FROM BACK TO SIDE WHILE IN FLAT BAD: UNABLE
HELP NEEDED FOR BATHING: A LOT

## 2018-04-24 ASSESSMENT — PAIN SCALES - GENERAL
PAINLEVEL_OUTOF10: 4
PAINLEVEL_OUTOF10: 7
PAINLEVEL_OUTOF10: 0
PAINLEVEL_OUTOF10: 0
PAINLEVEL_OUTOF10: 7
PAINLEVEL_OUTOF10: 8
PAINLEVEL_OUTOF10: 0

## 2018-04-24 ASSESSMENT — PATIENT HEALTH QUESTIONNAIRE - PHQ9
SUM OF ALL RESPONSES TO PHQ9 QUESTIONS 1 AND 2: 1
1. LITTLE INTEREST OR PLEASURE IN DOING THINGS: SEVERAL DAYS
2. FEELING DOWN, DEPRESSED, IRRITABLE, OR HOPELESS: NOT AT ALL

## 2018-04-24 ASSESSMENT — LIFESTYLE VARIABLES
DO YOU DRINK ALCOHOL: NO
DO YOU DRINK ALCOHOL: NO

## 2018-04-24 ASSESSMENT — GAIT ASSESSMENTS
DISTANCE (FEET): 1
GAIT LEVEL OF ASSIST: MINIMAL ASSIST
ASSISTIVE DEVICE: FRONT WHEEL WALKER
DEVIATION: BRADYKINETIC;SHUFFLED GAIT

## 2018-04-24 NOTE — DISCHARGE PLANNING
Updated Clinical Chart Review-    Consults:  Heme/Onc-4/19/18 chemotherapy with CyBorD- Day 1   Radiation Onc- 2 of 5 radiation treatments completed to Right Femur and 1 of 5 to Left Femur.      04/23/18- Calcium gluconate 3 gm IVPB Once, to replete Ca-6.8    Updated Discharge Planning:  PT recommending SNF at discharge.  Pt continues to deny this LOC.  Family at bedside, pt reports needed support to safely discharge back to her home in [place]. SW/CM will continue to monitor for safe discharge plan.

## 2018-04-24 NOTE — THERAPY
"Pt more emotional today. Feeling much weaker and deconditioned. Pt more fearful of falling today and reported B LE felt \"numb\" which is a new sensation for her. Pt recently started B LE radiation to femurs. Pt required greater assist for all activity and greater encouragement / reassurance today. Pt will continue to benefit from acute PT interventions. Consider use of cardiac chair or leigh to W/C or BSC for greater time OOB as possible.     Physical Therapy Treatment completed.   Bed Mobility:  Supine to Sit: Maximal Assist  Transfers: Sit to Stand: Minimal Assist (x2 people for safety)  Gait: Level Of Assist: Minimal Assist with Front-Wheel Walker       Plan of Care: Will benefit from Physical Therapy 3 times per week  Discharge Recommendations: Equipment: Will Continue to Assess for Equipment Needs. Post-acute therapy:   Continue to recommend placement prior to D/C home as pt will not be able to manage home alone    See \"Rehab Therapy-Acute\" Patient Summary Report for complete documentation.       "

## 2018-04-24 NOTE — PROGRESS NOTES
WARREN from Lab called with critical result of WBC of 2.3 at 0226. Critical lab result read back to WARREN.   This critical lab result is within parameters established by [facility] for this patient

## 2018-04-24 NOTE — PROGRESS NOTES
Patient back from radiation, transferred into bed with 5 RN assist, sitting up in bed comfortably. Assessment completed, meal tray delivered, personal belongings in reach, no further needs at this time.

## 2018-04-24 NOTE — PROGRESS NOTES
Assumed care of patient at 0700, rounded with night shift RN for report. Patient up with 3-4 assist, diabetic diet, labs trending down due to chem and radiation. Given in report blood glucose in 300's. Patient doesn't have further needs at this time.

## 2018-04-24 NOTE — PROGRESS NOTES
Pt was transported to the department for radiation treatment 2 of 5 to RT Femur and treatment 1 of 5 to LT femur. Pt will be transported back to her room after treatment,

## 2018-04-24 NOTE — CARE PLAN
Problem: Pain Management  Goal: Pain level will decrease to patient's comfort goal  Outcome: PROGRESSING AS EXPECTED  Pt complains about pain. Med per MAR.     Problem: Urinary Elimination:  Goal: Ability to reestablish a normal urinary elimination pattern will improve  Outcome: PROGRESSING AS EXPECTED  Snigh in place draining to gravity.

## 2018-04-24 NOTE — PROGRESS NOTES
Assumed care from AM shift. AAOx4. Up 2-3 person assist with a fww.  PICC flushes with + blood return.  Denies pain and nausea currently. Right his still leaking from previous blister. No further needs at this time.  Will continue to monitor.

## 2018-04-24 NOTE — PROGRESS NOTES
Assumed care of pt @0700. Bedside report received. Pt AOX 4. Pt complains about pain. Med per MAR. Singh in place draining to gravity. PICC patent with positive blood return. R side of pannus leaking from blister. MD aware. Fall precaution in place. POC discussed with pt, all questions answered at this time. Pt makes needs known, call light within reach, hourly rounding in place.

## 2018-04-25 ENCOUNTER — HOSPITAL ENCOUNTER (OUTPATIENT)
Dept: RADIATION ONCOLOGY | Facility: MEDICAL CENTER | Age: 68
End: 2018-04-25

## 2018-04-25 LAB
ALBUMIN SERPL BCP-MCNC: 2.5 G/DL (ref 3.2–4.9)
BASOPHILS # BLD AUTO: 0 % (ref 0–1.8)
BASOPHILS # BLD: 0 K/UL (ref 0–0.12)
BUN SERPL-MCNC: 28 MG/DL (ref 8–22)
CALCIUM SERPL-MCNC: 6.8 MG/DL (ref 8.5–10.5)
CHLORIDE SERPL-SCNC: 101 MMOL/L (ref 96–112)
CO2 SERPL-SCNC: 29 MMOL/L (ref 20–33)
CREAT SERPL-MCNC: 0.83 MG/DL (ref 0.5–1.4)
EOSINOPHIL # BLD AUTO: 0.01 K/UL (ref 0–0.51)
EOSINOPHIL NFR BLD: 0.4 % (ref 0–6.9)
ERYTHROCYTE [DISTWIDTH] IN BLOOD BY AUTOMATED COUNT: 54.8 FL (ref 35.9–50)
GLUCOSE BLD-MCNC: 110 MG/DL (ref 65–99)
GLUCOSE BLD-MCNC: 126 MG/DL (ref 65–99)
GLUCOSE BLD-MCNC: 65 MG/DL (ref 65–99)
GLUCOSE BLD-MCNC: 66 MG/DL (ref 65–99)
GLUCOSE BLD-MCNC: 81 MG/DL (ref 65–99)
GLUCOSE BLD-MCNC: 87 MG/DL (ref 65–99)
GLUCOSE SERPL-MCNC: 197 MG/DL (ref 65–99)
HCT VFR BLD AUTO: 24.5 % (ref 37–47)
HGB BLD-MCNC: 7.6 G/DL (ref 12–16)
IMM GRANULOCYTES # BLD AUTO: 0.01 K/UL (ref 0–0.11)
IMM GRANULOCYTES NFR BLD AUTO: 0.4 % (ref 0–0.9)
LYMPHOCYTES # BLD AUTO: 0.27 K/UL (ref 1–4.8)
LYMPHOCYTES NFR BLD: 9.9 % (ref 22–41)
MCH RBC QN AUTO: 30.2 PG (ref 27–33)
MCHC RBC AUTO-ENTMCNC: 31 G/DL (ref 33.6–35)
MCV RBC AUTO: 97.2 FL (ref 81.4–97.8)
MONOCYTES # BLD AUTO: 0.13 K/UL (ref 0–0.85)
MONOCYTES NFR BLD AUTO: 4.7 % (ref 0–13.4)
NEUTROPHILS # BLD AUTO: 2.32 K/UL (ref 2–7.15)
NEUTROPHILS NFR BLD: 84.6 % (ref 44–72)
NRBC # BLD AUTO: 0 K/UL
NRBC BLD-RTO: 0 /100 WBC
PHOSPHATE SERPL-MCNC: 2.3 MG/DL (ref 2.5–4.5)
PLATELET # BLD AUTO: 103 K/UL (ref 164–446)
PMV BLD AUTO: 11.7 FL (ref 9–12.9)
POTASSIUM SERPL-SCNC: 4.2 MMOL/L (ref 3.6–5.5)
RBC # BLD AUTO: 2.52 M/UL (ref 4.2–5.4)
SODIUM SERPL-SCNC: 133 MMOL/L (ref 135–145)
WBC # BLD AUTO: 2.7 K/UL (ref 4.8–10.8)

## 2018-04-25 PROCEDURE — A9270 NON-COVERED ITEM OR SERVICE: HCPCS | Performed by: INTERNAL MEDICINE

## 2018-04-25 PROCEDURE — 82962 GLUCOSE BLOOD TEST: CPT | Mod: 91

## 2018-04-25 PROCEDURE — 700102 HCHG RX REV CODE 250 W/ 637 OVERRIDE(OP): Performed by: INTERNAL MEDICINE

## 2018-04-25 PROCEDURE — 770004 HCHG ROOM/CARE - ONCOLOGY PRIVATE *

## 2018-04-25 PROCEDURE — 99232 SBSQ HOSP IP/OBS MODERATE 35: CPT | Performed by: INTERNAL MEDICINE

## 2018-04-25 PROCEDURE — 77412 RADIATION TX DELIVERY LVL 3: CPT | Performed by: RADIOLOGY

## 2018-04-25 PROCEDURE — 77417 THER RADIOLOGY PORT IMAGE(S): CPT | Performed by: RADIOLOGY

## 2018-04-25 PROCEDURE — A6250 SKIN SEAL PROTECT MOISTURIZR: HCPCS | Performed by: INTERNAL MEDICINE

## 2018-04-25 PROCEDURE — 700111 HCHG RX REV CODE 636 W/ 250 OVERRIDE (IP): Performed by: HOSPITALIST

## 2018-04-25 PROCEDURE — 700111 HCHG RX REV CODE 636 W/ 250 OVERRIDE (IP): Performed by: INTERNAL MEDICINE

## 2018-04-25 PROCEDURE — 77336 RADIATION PHYSICS CONSULT: CPT | Performed by: RADIOLOGY

## 2018-04-25 PROCEDURE — 80069 RENAL FUNCTION PANEL: CPT

## 2018-04-25 PROCEDURE — 85025 COMPLETE CBC W/AUTO DIFF WBC: CPT

## 2018-04-25 RX ORDER — INSULIN GLARGINE 100 [IU]/ML
40 INJECTION, SOLUTION SUBCUTANEOUS EVERY EVENING
Status: DISCONTINUED | OUTPATIENT
Start: 2018-04-25 | End: 2018-04-28

## 2018-04-25 RX ORDER — FUROSEMIDE 40 MG/1
40 TABLET ORAL EVERY 8 HOURS
Status: DISCONTINUED | OUTPATIENT
Start: 2018-04-25 | End: 2018-05-02

## 2018-04-25 RX ORDER — IBUPROFEN 200 MG
1900 CAPSULE ORAL DAILY
Status: DISCONTINUED | OUTPATIENT
Start: 2018-04-26 | End: 2018-05-10

## 2018-04-25 RX ADMIN — OXYCODONE HYDROCHLORIDE 10 MG: 10 TABLET, FILM COATED, EXTENDED RELEASE ORAL at 11:37

## 2018-04-25 RX ADMIN — CLONIDINE HYDROCHLORIDE 0.2 MG: 0.1 TABLET ORAL at 21:28

## 2018-04-25 RX ADMIN — HEPARIN SODIUM 5000 UNITS: 5000 INJECTION, SOLUTION INTRAVENOUS; SUBCUTANEOUS at 15:17

## 2018-04-25 RX ADMIN — PRAVASTATIN SODIUM 40 MG: 20 TABLET ORAL at 21:27

## 2018-04-25 RX ADMIN — HEPARIN SODIUM 5000 UNITS: 5000 INJECTION, SOLUTION INTRAVENOUS; SUBCUTANEOUS at 05:38

## 2018-04-25 RX ADMIN — LORAZEPAM 1 MG: 2 INJECTION INTRAMUSCULAR; INTRAVENOUS at 07:26

## 2018-04-25 RX ADMIN — Medication: at 21:31

## 2018-04-25 RX ADMIN — CLONIDINE HYDROCHLORIDE 0.2 MG: 0.1 TABLET ORAL at 15:17

## 2018-04-25 RX ADMIN — LISINOPRIL 20 MG: 20 TABLET ORAL at 11:37

## 2018-04-25 RX ADMIN — Medication: at 11:36

## 2018-04-25 RX ADMIN — HEPARIN SODIUM 5000 UNITS: 5000 INJECTION, SOLUTION INTRAVENOUS; SUBCUTANEOUS at 21:27

## 2018-04-25 RX ADMIN — OXYCODONE HYDROCHLORIDE 10 MG: 10 TABLET, FILM COATED, EXTENDED RELEASE ORAL at 21:28

## 2018-04-25 RX ADMIN — CLONIDINE HYDROCHLORIDE 0.2 MG: 0.1 TABLET ORAL at 11:37

## 2018-04-25 RX ADMIN — HYDRALAZINE HYDROCHLORIDE 50 MG: 10 TABLET, FILM COATED ORAL at 21:27

## 2018-04-25 RX ADMIN — FUROSEMIDE 40 MG: 40 TABLET ORAL at 21:28

## 2018-04-25 RX ADMIN — FUROSEMIDE 40 MG: 40 TABLET ORAL at 15:17

## 2018-04-25 RX ADMIN — FUROSEMIDE 20 MG: 20 TABLET ORAL at 05:38

## 2018-04-25 RX ADMIN — ACYCLOVIR 400 MG: 200 CAPSULE ORAL at 11:36

## 2018-04-25 RX ADMIN — TERAZOSIN HYDROCHLORIDE ANHYDROUS 10 MG: 5 CAPSULE ORAL at 21:27

## 2018-04-25 RX ADMIN — ACYCLOVIR 400 MG: 200 CAPSULE ORAL at 21:26

## 2018-04-25 RX ADMIN — HYDRALAZINE HYDROCHLORIDE 50 MG: 10 TABLET, FILM COATED ORAL at 15:16

## 2018-04-25 RX ADMIN — HYDRALAZINE HYDROCHLORIDE 50 MG: 10 TABLET, FILM COATED ORAL at 05:42

## 2018-04-25 RX ADMIN — DIBASIC SODIUM PHOSPHATE, MONOBASIC POTASSIUM PHOSPHATE AND MONOBASIC SODIUM PHOSPHATE 2 TABLET: 852; 155; 130 TABLET ORAL at 21:27

## 2018-04-25 RX ADMIN — SENNOSIDES AND DOCUSATE SODIUM 2 TABLET: 8.6; 5 TABLET ORAL at 21:28

## 2018-04-25 RX ADMIN — ONDANSETRON 4 MG: 2 INJECTION INTRAMUSCULAR; INTRAVENOUS at 10:33

## 2018-04-25 RX ADMIN — DIBASIC SODIUM PHOSPHATE, MONOBASIC POTASSIUM PHOSPHATE AND MONOBASIC SODIUM PHOSPHATE 2 TABLET: 852; 155; 130 TABLET ORAL at 11:38

## 2018-04-25 ASSESSMENT — PATIENT HEALTH QUESTIONNAIRE - PHQ9
7. TROUBLE CONCENTRATING ON THINGS, SUCH AS READING THE NEWSPAPER OR WATCHING TELEVISION: SEVERAL DAYS
SUM OF ALL RESPONSES TO PHQ9 QUESTIONS 1 AND 2: 2
5. POOR APPETITE OR OVEREATING: SEVERAL DAYS
8. MOVING OR SPEAKING SO SLOWLY THAT OTHER PEOPLE COULD HAVE NOTICED. OR THE OPPOSITE, BEING SO FIGETY OR RESTLESS THAT YOU HAVE BEEN MOVING AROUND A LOT MORE THAN USUAL: NOT AT ALL
3. TROUBLE FALLING OR STAYING ASLEEP OR SLEEPING TOO MUCH: NOT AT ALL
1. LITTLE INTEREST OR PLEASURE IN DOING THINGS: SEVERAL DAYS
6. FEELING BAD ABOUT YOURSELF - OR THAT YOU ARE A FAILURE OR HAVE LET YOURSELF OR YOUR FAMILY DOWN: SEVERAL DAYS
SUM OF ALL RESPONSES TO PHQ QUESTIONS 1-9: 6
4. FEELING TIRED OR HAVING LITTLE ENERGY: SEVERAL DAYS
1. LITTLE INTEREST OR PLEASURE IN DOING THINGS: SEVERAL DAYS
2. FEELING DOWN, DEPRESSED, IRRITABLE, OR HOPELESS: SEVERAL DAYS
9. THOUGHTS THAT YOU WOULD BE BETTER OFF DEAD, OR OF HURTING YOURSELF: NOT AT ALL
SUM OF ALL RESPONSES TO PHQ9 QUESTIONS 1 AND 2: 2
2. FEELING DOWN, DEPRESSED, IRRITABLE, OR HOPELESS: SEVERAL DAYS

## 2018-04-25 ASSESSMENT — ENCOUNTER SYMPTOMS
MYALGIAS: 1
INSOMNIA: 0
COUGH: 0
SPEECH CHANGE: 0
PHOTOPHOBIA: 0
VOMITING: 0
CLAUDICATION: 0
DIZZINESS: 0
DEPRESSION: 0
SENSORY CHANGE: 0
HEADACHES: 0
CONSTIPATION: 0
FEVER: 0
FOCAL WEAKNESS: 0
DIARRHEA: 0
HEARTBURN: 0
ABDOMINAL PAIN: 0
WEAKNESS: 1
NERVOUS/ANXIOUS: 1
SHORTNESS OF BREATH: 0
CHILLS: 0
BLURRED VISION: 0

## 2018-04-25 ASSESSMENT — PAIN SCALES - GENERAL
PAINLEVEL_OUTOF10: 5
PAINLEVEL_OUTOF10: 7
PAINLEVEL_OUTOF10: 5

## 2018-04-25 NOTE — THERAPY
"Occupational Therapy Treatment completed with focus on ADLs and ADL transfers.  Functional Status:  Pt emotional & tearful today but motivated to work.  Pt required Max A for supine to sit EOB.  Pt transferred to [facility] with Min A x 2 for safety.  Pt reported that her legs felt numb today.  Pt transferred back to bed with Max A x 2.  Pt very anxious prior to being scooted up into bed due to feeling SOB.  Plan of Care: Will benefit from Occupational Therapy 3 times per week  Discharge Recommendations:  Equipment Will Continue to Assess for Equipment Needs. Post-acute therapy Discharge to a transitional care facility for continued skilled therapy services.    See \"Rehab Therapy-Acute\" Patient Summary Report for complete documentation.   "

## 2018-04-25 NOTE — PROGRESS NOTES
Pt was transported to the department for radiation treatment 3 of 5 to Rt Femur and 2 of 5 to Lt Femur. Pt will visit with  then will be transported back to her room.

## 2018-04-25 NOTE — DIETARY
Nutrition Services: Pt seen for weekly rescreen. Pt sleeping, so no interview done today. RN reports PO intake variable, pt usually does well with dinner meals (eats ~ 80% of dinner) but decreased this am s/p radiation treatment today. Per ADL, pt ate >50% of four out of six documented meals. Diet= Diabetic with snack TID. Pt with slightly low Phos this am, phos repletion added today. Pt with hx of DM, FSBS:  (4/24-4/25), on SSI and Lantus. Per wound care (4/12), pt with cellulitis with resolving bulla to right lower shin, no pressure ulcers noted.     Plan/Rec:   1) RD to monitor for consistently adequate intake.   2)  Nutrition Representative to see pt daily for snacks/meal preferences as appropriate with diet order.   3) Adjusted snacks to have more protein-Nutrition Representative to follow up to review with pt.   4) Continue to monitor and replete Phos PRN.  5) Adjust insulin PRN for tight glucose control.     RD following.

## 2018-04-25 NOTE — PROGRESS NOTES
[facility] Hospitalist Progress Note    Date of Service: 2018    Chief Complaint  67 y.o. female admitted 4/10/2018 with multiple myeloma, slow healing fracture left humerus and recent BLE cellulitis.  Patient has since completed radiation L humerus, started on radiation B femurs and had single cycle of chemotherapy.    Interval Problem Update  Patient states anxiety has improved with stopping of steroids, she is still nervous but it is under good control. She could only tolerate one femur treated yesterday but was able to tolerate both today.  Lantus dose decreased with steroids but patient still with fasting glucose >300, will increase lantus dose to 50 units qhs.  Heart rate still <60, dc toprol.    Consultants/Specialty  Oncology - Christi  XRT - Peddada    Disposition  Tbd, patient does not want to dc to SNF, feels she has enough support at home.        Review of Systems   Constitutional: Negative for chills and fever.   HENT: Negative for congestion.    Eyes: Negative for blurred vision and photophobia.   Respiratory: Negative for cough and shortness of breath.    Cardiovascular: Positive for leg swelling. Negative for chest pain and claudication.   Gastrointestinal: Negative for abdominal pain, constipation, diarrhea, heartburn and vomiting.   Genitourinary: Negative for dysuria and hematuria.   Musculoskeletal: Positive for myalgias. Negative for joint pain.   Skin: Negative for itching and rash.   Neurological: Positive for weakness. Negative for dizziness, sensory change, speech change, focal weakness and headaches.   Psychiatric/Behavioral: Negative for depression. The patient is nervous/anxious. The patient does not have insomnia.       Physical Exam  Laboratory/Imaging   Hemodynamics  Temp (24hrs), Av.6 °C (97.8 °F), Min:36.5 °C (97.7 °F), Max:36.7 °C (98 °F)   Temperature: 36.7 °C (98 °F)  Pulse  Av.3  Min: 38  Max: 100    Blood Pressure : 140/58      Respiratory      Respiration: 18, Pulse  Oximetry: 91 %     Work Of Breathing / Effort: Mild  RUL Breath Sounds: Clear, RML Breath Sounds: Diminished, RLL Breath Sounds: Diminished, CHARLOTTE Breath Sounds: Clear, LLL Breath Sounds: Diminished    Fluids    Intake/Output Summary (Last 24 hours) at 04/24/18 2030  Last data filed at 04/24/18 1900   Gross per 24 hour   Intake              880 ml   Output             1900 ml   Net            -1020 ml       Nutrition  Orders Placed This Encounter   Procedures   • Diet Order     Standing Status:   Standing     Number of Occurrences:   1     Order Specific Question:   Diet:     Answer:   Diabetic [3]     Physical Exam   Constitutional: She is oriented to person, place, and time. She appears well-developed and well-nourished. No distress.   HENT:   Head: Normocephalic and atraumatic.   Eyes: Conjunctivae are normal. No scleral icterus.   Neck: Neck supple. No JVD present.   Cardiovascular: Normal rate, regular rhythm and normal heart sounds.  Exam reveals no gallop and no friction rub.    No murmur heard.  Pulmonary/Chest: Effort normal and breath sounds normal. No respiratory distress. She has no wheezes. She exhibits no tenderness.   Abdominal: Soft. Bowel sounds are normal. She exhibits no distension. There is no tenderness. There is no guarding.   Weeping from abdominal pannus d/t edema/anasarca   Musculoskeletal: She exhibits edema (BLE). She exhibits no tenderness.   Neurological: She is alert and oriented to person, place, and time. No cranial nerve deficit.   Skin: Skin is warm and dry. She is not diaphoretic. No erythema. No pallor.   Psychiatric: She has a normal mood and affect. Her behavior is normal.   Nursing note and vitals reviewed.      Recent Labs      04/22/18   0456  04/23/18   0132  04/24/18   0210   WBC  2.9*  2.7*  2.3*   RBC  2.99*  2.49*  2.53*   HEMOGLOBIN  9.0*  7.7*  7.8*   HEMATOCRIT  29.2*  24.3*  24.5*   MCV  97.7  97.6  96.8   MCH  30.1  30.9  30.8   MCHC  30.8*  31.7*  31.8*   RDW  56.4*   56.6*  55.8*   PLATELETCT  120*  102*  97*   MPV  11.0  11.1  11.3     Recent Labs      04/22/18   0456  04/23/18   0132  04/24/18   0210   SODIUM  136  134*  132*   POTASSIUM  4.1  4.9  4.8   CHLORIDE  104  103  100   CO2  28  28  27   GLUCOSE  55*  232*  318*   BUN  24*  28*  29*   CREATININE  0.89  0.86  0.86   CALCIUM  6.8*  6.8*  7.0*                      Assessment/Plan     * Pathologic fracture- (present on admission)   Assessment & Plan    -Left humerus  -widespread lytic disease  -completed radiation to humerus  -pain control             Leg edema   Assessment & Plan    - Left greater than right  - DC IV fluids  -PO Lasix scheduled bid  -Abdominal anasarca also          Hypocalcemia   Assessment & Plan    - Repleting as needed        Hypomagnesemia   Assessment & Plan    - Repleting as needed        Anxiety   Assessment & Plan    Exacerbated by situation and steroids  Use steroids only for chemo regimen  Xanax prn, IV Ativan prior to radiation if needed        Chronic venous stasis dermatitis of both lower extremities- (present on admission)   Assessment & Plan    -wound care, no clear e/o active infection at this time, defer abx's        Multiple myeloma (CMS-HCC)- (present on admission)   Assessment & Plan    -recent diagnosis, appears aggressive, now with innumerable lytic lesions  -Dr Sim and Oksana consulted for assistance in management  - IV dilaudid PRN, oxy prn. DC maintenance steroids as these were causing confusion  -start oxycontin CR 10 mg BID  -skeletal survey done - spine obscured by body habitus, but multiple other lesions found  XRT to right and left femurs          Anemia- (present on admission)   Assessment & Plan    -related to her MM, appears near her baseline, no e/o overt bleeding at this time, monitor closely        Hyponatremia- (present on admission)   Assessment & Plan    -mild        Morbid obesity with BMI of 60.0-69.9, adult (CMS-HCC)- (present on admission)   Assessment &  Plan    -encourage weight loss  Body mass index is 67.38 kg/m².        DM type 2 (diabetes mellitus, type 2) (CMS-HCC)- (present on admission)   Assessment & Plan    - With hyperglycemia  - Lantus increase from 40 to 50 units qhs  - Intermittent steroids for Velcade treatment also complicates glucose control  - Continue adjusting Lantus dosing        Essential hypertension, benign- (present on admission)   Assessment & Plan    High with bradycardia  Increase dose po hydralazine  Clonidine bid to tid, add lisinopril 20 q day  Terazosin qhs  DC Toprol d/t bradycardia          Quality-Core Measures   Reviewed items::  Medications reviewed, Labs reviewed and Radiology images reviewed  Singh catheter::  Urinary Tract Retention or Urinary Tract Obstruction (high risk skin breakdown d/t urinary incontinence and body habitus.)  DVT prophylaxis pharmacological::  Heparin  DVT prophylaxis - mechanical:  SCDs  Assessed for rehabilitation services:  Patient was assess for and/or received rehabilitation services during this hospitalization

## 2018-04-25 NOTE — PROGRESS NOTES
Assumed care of patient at 0700. Received bedside report from night shift RN. Patient medicated prior to radiation per MAR by night RN for anxiety prior to leaving the floor. R side weeping and bruised MD aware. No further needs at this time. Continue to monitor.

## 2018-04-25 NOTE — PROGRESS NOTES
Assumed care from AM shift. AAOx4. Up 2-3 person assist. PICC flushes with + blood return.  Patient right side still weeping and bruised, MD aware.  No further needs at this time.  Will continue to monitor.

## 2018-04-25 NOTE — CARE PLAN
Problem: Pain Management  Goal: Pain level will decrease to patient's comfort goal    Intervention: Follow pain managment plan developed in collaboration with patient and Interdisciplinary Team  Pain medcation as scheduled and as needed will continue to monitor      Problem: Skin Integrity  Goal: Risk for impaired skin integrity will decrease    Intervention: Assess risk factors for impaired skin integrity and/or pressure ulcers  Patient has weeping skin on her abdomen right side. Pad in place

## 2018-04-26 ENCOUNTER — HOSPITAL ENCOUNTER (OUTPATIENT)
Dept: RADIATION ONCOLOGY | Facility: MEDICAL CENTER | Age: 68
End: 2018-04-26

## 2018-04-26 LAB
ALBUMIN SERPL BCP-MCNC: 2.4 G/DL (ref 3.2–4.9)
BASOPHILS # BLD AUTO: 0 % (ref 0–1.8)
BASOPHILS # BLD: 0 K/UL (ref 0–0.12)
BUN SERPL-MCNC: 25 MG/DL (ref 8–22)
CALCIUM SERPL-MCNC: 6.4 MG/DL (ref 8.5–10.5)
CHLORIDE SERPL-SCNC: 103 MMOL/L (ref 96–112)
CO2 SERPL-SCNC: 32 MMOL/L (ref 20–33)
CREAT SERPL-MCNC: 0.97 MG/DL (ref 0.5–1.4)
EOSINOPHIL # BLD AUTO: 0.04 K/UL (ref 0–0.51)
EOSINOPHIL NFR BLD: 1.8 % (ref 0–6.9)
ERYTHROCYTE [DISTWIDTH] IN BLOOD BY AUTOMATED COUNT: 55.6 FL (ref 35.9–50)
GLUCOSE BLD-MCNC: 116 MG/DL (ref 65–99)
GLUCOSE BLD-MCNC: 126 MG/DL (ref 65–99)
GLUCOSE BLD-MCNC: 130 MG/DL (ref 65–99)
GLUCOSE BLD-MCNC: 155 MG/DL (ref 65–99)
GLUCOSE SERPL-MCNC: 79 MG/DL (ref 65–99)
HCT VFR BLD AUTO: 26.3 % (ref 37–47)
HGB BLD-MCNC: 8.1 G/DL (ref 12–16)
IMM GRANULOCYTES # BLD AUTO: 0.01 K/UL (ref 0–0.11)
IMM GRANULOCYTES NFR BLD AUTO: 0.4 % (ref 0–0.9)
LYMPHOCYTES # BLD AUTO: 0.22 K/UL (ref 1–4.8)
LYMPHOCYTES NFR BLD: 9.7 % (ref 22–41)
MCH RBC QN AUTO: 30.1 PG (ref 27–33)
MCHC RBC AUTO-ENTMCNC: 30.8 G/DL (ref 33.6–35)
MCV RBC AUTO: 97.8 FL (ref 81.4–97.8)
MONOCYTES # BLD AUTO: 0.1 K/UL (ref 0–0.85)
MONOCYTES NFR BLD AUTO: 4.4 % (ref 0–13.4)
NEUTROPHILS # BLD AUTO: 1.89 K/UL (ref 2–7.15)
NEUTROPHILS NFR BLD: 83.7 % (ref 44–72)
NRBC # BLD AUTO: 0 K/UL
NRBC BLD-RTO: 0 /100 WBC
PHOSPHATE SERPL-MCNC: 3.2 MG/DL (ref 2.5–4.5)
PLATELET # BLD AUTO: 109 K/UL (ref 164–446)
PMV BLD AUTO: 11.2 FL (ref 9–12.9)
POTASSIUM SERPL-SCNC: 4.4 MMOL/L (ref 3.6–5.5)
RBC # BLD AUTO: 2.69 M/UL (ref 4.2–5.4)
SODIUM SERPL-SCNC: 139 MMOL/L (ref 135–145)
WBC # BLD AUTO: 2.4 K/UL (ref 4.8–10.8)

## 2018-04-26 PROCEDURE — 77417 THER RADIOLOGY PORT IMAGE(S): CPT | Performed by: RADIOLOGY

## 2018-04-26 PROCEDURE — 770004 HCHG ROOM/CARE - ONCOLOGY PRIVATE *

## 2018-04-26 PROCEDURE — 700111 HCHG RX REV CODE 636 W/ 250 OVERRIDE (IP): Performed by: INTERNAL MEDICINE

## 2018-04-26 PROCEDURE — 700105 HCHG RX REV CODE 258: Performed by: INTERNAL MEDICINE

## 2018-04-26 PROCEDURE — 700102 HCHG RX REV CODE 250 W/ 637 OVERRIDE(OP): Performed by: INTERNAL MEDICINE

## 2018-04-26 PROCEDURE — A9270 NON-COVERED ITEM OR SERVICE: HCPCS | Performed by: INTERNAL MEDICINE

## 2018-04-26 PROCEDURE — 700111 HCHG RX REV CODE 636 W/ 250 OVERRIDE (IP): Performed by: HOSPITALIST

## 2018-04-26 PROCEDURE — 82962 GLUCOSE BLOOD TEST: CPT | Mod: 91

## 2018-04-26 PROCEDURE — 85025 COMPLETE CBC W/AUTO DIFF WBC: CPT

## 2018-04-26 PROCEDURE — 99232 SBSQ HOSP IP/OBS MODERATE 35: CPT | Performed by: INTERNAL MEDICINE

## 2018-04-26 PROCEDURE — 77412 RADIATION TX DELIVERY LVL 3: CPT | Performed by: RADIOLOGY

## 2018-04-26 PROCEDURE — 700102 HCHG RX REV CODE 250 W/ 637 OVERRIDE(OP): Performed by: HOSPITALIST

## 2018-04-26 PROCEDURE — 80069 RENAL FUNCTION PANEL: CPT

## 2018-04-26 RX ADMIN — Medication: at 20:39

## 2018-04-26 RX ADMIN — ACYCLOVIR 400 MG: 200 CAPSULE ORAL at 10:15

## 2018-04-26 RX ADMIN — HYDRALAZINE HYDROCHLORIDE 50 MG: 10 TABLET, FILM COATED ORAL at 15:19

## 2018-04-26 RX ADMIN — INSULIN HUMAN 3 UNITS: 100 INJECTION, SOLUTION PARENTERAL at 12:52

## 2018-04-26 RX ADMIN — PRAVASTATIN SODIUM 40 MG: 20 TABLET ORAL at 20:26

## 2018-04-26 RX ADMIN — HEPARIN SODIUM 5000 UNITS: 5000 INJECTION, SOLUTION INTRAVENOUS; SUBCUTANEOUS at 22:17

## 2018-04-26 RX ADMIN — BORTEZOMIB 2.6 MG: 3.5 INJECTION, POWDER, LYOPHILIZED, FOR SOLUTION INTRAVENOUS; SUBCUTANEOUS at 16:38

## 2018-04-26 RX ADMIN — ACYCLOVIR 400 MG: 200 CAPSULE ORAL at 20:26

## 2018-04-26 RX ADMIN — OXYCODONE HYDROCHLORIDE 10 MG: 10 TABLET, FILM COATED, EXTENDED RELEASE ORAL at 10:16

## 2018-04-26 RX ADMIN — FUROSEMIDE 40 MG: 40 TABLET ORAL at 15:19

## 2018-04-26 RX ADMIN — CLONIDINE HYDROCHLORIDE 0.2 MG: 0.1 TABLET ORAL at 15:19

## 2018-04-26 RX ADMIN — DIBASIC SODIUM PHOSPHATE, MONOBASIC POTASSIUM PHOSPHATE AND MONOBASIC SODIUM PHOSPHATE 2 TABLET: 852; 155; 130 TABLET ORAL at 20:44

## 2018-04-26 RX ADMIN — HEPARIN SODIUM 5000 UNITS: 5000 INJECTION, SOLUTION INTRAVENOUS; SUBCUTANEOUS at 15:19

## 2018-04-26 RX ADMIN — OXYCODONE HYDROCHLORIDE 10 MG: 10 TABLET, FILM COATED, EXTENDED RELEASE ORAL at 20:27

## 2018-04-26 RX ADMIN — TERAZOSIN HYDROCHLORIDE ANHYDROUS 10 MG: 5 CAPSULE ORAL at 22:18

## 2018-04-26 RX ADMIN — HEPARIN SODIUM 5000 UNITS: 5000 INJECTION, SOLUTION INTRAVENOUS; SUBCUTANEOUS at 06:32

## 2018-04-26 RX ADMIN — CALCIUM GLUCONATE 2 G: 94 INJECTION, SOLUTION INTRAVENOUS at 11:49

## 2018-04-26 RX ADMIN — HYDRALAZINE HYDROCHLORIDE 50 MG: 10 TABLET, FILM COATED ORAL at 22:17

## 2018-04-26 RX ADMIN — FUROSEMIDE 40 MG: 40 TABLET ORAL at 06:33

## 2018-04-26 RX ADMIN — CLONIDINE HYDROCHLORIDE 0.2 MG: 0.1 TABLET ORAL at 20:26

## 2018-04-26 RX ADMIN — CALCIUM CITRATE 200 MG (950 MG) TABLET 1900 MG: at 10:17

## 2018-04-26 RX ADMIN — INSULIN GLARGINE 40 UNITS: 100 INJECTION, SOLUTION SUBCUTANEOUS at 20:37

## 2018-04-26 RX ADMIN — LORAZEPAM 1 MG: 2 INJECTION INTRAMUSCULAR; INTRAVENOUS at 07:27

## 2018-04-26 RX ADMIN — LISINOPRIL 20 MG: 20 TABLET ORAL at 10:16

## 2018-04-26 RX ADMIN — Medication: at 10:17

## 2018-04-26 RX ADMIN — FUROSEMIDE 40 MG: 40 TABLET ORAL at 22:21

## 2018-04-26 RX ADMIN — HYDRALAZINE HYDROCHLORIDE 50 MG: 10 TABLET, FILM COATED ORAL at 06:33

## 2018-04-26 RX ADMIN — CLONIDINE HYDROCHLORIDE 0.2 MG: 0.1 TABLET ORAL at 10:16

## 2018-04-26 RX ADMIN — SENNOSIDES AND DOCUSATE SODIUM 2 TABLET: 8.6; 5 TABLET ORAL at 10:16

## 2018-04-26 RX ADMIN — DIBASIC SODIUM PHOSPHATE, MONOBASIC POTASSIUM PHOSPHATE AND MONOBASIC SODIUM PHOSPHATE 2 TABLET: 852; 155; 130 TABLET ORAL at 10:17

## 2018-04-26 ASSESSMENT — ENCOUNTER SYMPTOMS
INSOMNIA: 0
DIZZINESS: 0
NAUSEA: 0
HEARTBURN: 0
GASTROINTESTINAL NEGATIVE: 1
VOMITING: 0
FEVER: 0
DIARRHEA: 0
FOCAL WEAKNESS: 0
WEAKNESS: 1
PHOTOPHOBIA: 0
SPEECH CHANGE: 0
CONSTIPATION: 0
RESPIRATORY NEGATIVE: 1
DEPRESSION: 1
HEADACHES: 0
CHILLS: 0
CARDIOVASCULAR NEGATIVE: 1
ABDOMINAL PAIN: 0
COUGH: 0
SHORTNESS OF BREATH: 0
CLAUDICATION: 0
BLURRED VISION: 0
MYALGIAS: 1
EYES NEGATIVE: 1
SENSORY CHANGE: 0
NERVOUS/ANXIOUS: 1

## 2018-04-26 ASSESSMENT — PATIENT HEALTH QUESTIONNAIRE - PHQ9
2. FEELING DOWN, DEPRESSED, IRRITABLE, OR HOPELESS: SEVERAL DAYS
9. THOUGHTS THAT YOU WOULD BE BETTER OFF DEAD, OR OF HURTING YOURSELF: NOT AT ALL
6. FEELING BAD ABOUT YOURSELF - OR THAT YOU ARE A FAILURE OR HAVE LET YOURSELF OR YOUR FAMILY DOWN: SEVERAL DAYS
8. MOVING OR SPEAKING SO SLOWLY THAT OTHER PEOPLE COULD HAVE NOTICED. OR THE OPPOSITE, BEING SO FIGETY OR RESTLESS THAT YOU HAVE BEEN MOVING AROUND A LOT MORE THAN USUAL: NOT AT ALL
4. FEELING TIRED OR HAVING LITTLE ENERGY: SEVERAL DAYS
5. POOR APPETITE OR OVEREATING: SEVERAL DAYS
3. TROUBLE FALLING OR STAYING ASLEEP OR SLEEPING TOO MUCH: NOT AT ALL
SUM OF ALL RESPONSES TO PHQ QUESTIONS 1-9: 6
SUM OF ALL RESPONSES TO PHQ9 QUESTIONS 1 AND 2: 2
1. LITTLE INTEREST OR PLEASURE IN DOING THINGS: SEVERAL DAYS
7. TROUBLE CONCENTRATING ON THINGS, SUCH AS READING THE NEWSPAPER OR WATCHING TELEVISION: SEVERAL DAYS

## 2018-04-26 ASSESSMENT — PAIN SCALES - GENERAL
PAINLEVEL_OUTOF10: 3
PAINLEVEL_OUTOF10: 7
PAINLEVEL_OUTOF10: 7
PAINLEVEL_OUTOF10: 5

## 2018-04-26 NOTE — PROGRESS NOTES
Oncology/Hematology Progress Note               Author: Emre Barraza Date & Time created: 4/26/2018  4:29 PM     CC:   Multiple myeloma    Interval History:    Completed radiation therapy to the left humerus  4/19/18- chemotherapy with CyBorD - D1  4/20/18-no adverse side effects, dexamethasone 40 mg today  4/22/18- hypoglycemia episode  4/23- feels about the same. Morbidly obese. Joint pain, stable  4/26- getting xrt . Generally frustrated about her predicament     Review of Systems:  Review of Systems   Constitutional: Positive for malaise/fatigue. Negative for chills and fever.   HENT: Negative.    Eyes: Negative.    Respiratory: Negative.    Cardiovascular: Negative.    Gastrointestinal: Negative.    Genitourinary: Negative.    Musculoskeletal: Positive for joint pain and myalgias.   Skin: Negative.    Neurological: Positive for weakness.   Endo/Heme/Allergies: Negative.        Physical Exam:  Physical Exam   Constitutional: She is oriented to person, place, and time.   Morbidly obese   HENT:   Head: Normocephalic.   Eyes: Conjunctivae are normal. Pupils are equal, round, and reactive to light.   Cardiovascular: Normal rate, regular rhythm and normal heart sounds.    Pulmonary/Chest: Effort normal and breath sounds normal.   Abdominal: Soft. Bowel sounds are normal.   Musculoskeletal: She exhibits edema.   She's had chronic edema symmetrical   Neurological: She is alert and oriented to person, place, and time.   Psychiatric: She has a normal mood and affect. Her behavior is normal. Judgment and thought content normal.       Labs:        Invalid input(s): [id number]      Recent Labs      04/24/18   0210  04/25/18   0044  04/26/18   0133   SODIUM  132*  133*  139   POTASSIUM  4.8  4.2  4.4   CHLORIDE  100  101  103   CO2  27  29  32   BUN  29*  28*  25*   CREATININE  0.86  0.83  0.97   PHOSPHORUS  3.0  2.3*  3.2   CALCIUM  7.0*  6.8*  6.4*     Recent Labs      04/24/18   0210  04/25/18   0044  04/26/18    0133   GLUCOSE  318*  197*  79     Recent Labs      18   0210  18   0044  18   0133   RBC  2.53*  2.52*  2.69*   HEMOGLOBIN  7.8*  7.6*  8.1*   HEMATOCRIT  24.5*  24.5*  26.3*   PLATELETCT  97*  103*  109*     Recent Labs      18   0210  18   0044  18   0133   WBC  2.3*  2.7*  2.4*   NEUTSPOLYS  84.60*  84.60*  83.70*   LYMPHOCYTES  12.40*  9.90*  9.70*   MONOCYTES  3.00  4.70  4.40   EOSINOPHILS  0.00  0.40  1.80   BASOPHILS  0.00  0.00  0.00     Recent Labs      18   02118   0044  18   0133   SODIUM  132*  133*  139   POTASSIUM  4.8  4.2  4.4   CHLORIDE  100  101  103   CO2  27  29  32   GLUCOSE  318*  197*  79   BUN  29*  28*  25*   CREATININE  0.86  0.83  0.97   CALCIUM  7.0*  6.8*  6.4*     Hemodynamics:  Temp (24hrs), Av.5 °C (97.7 °F), Min:36.2 °C (97.2 °F), Max:36.7 °C (98 °F)  Temperature: 36.7 °C (98 °F)  Pulse  Av.9  Min: 38  Max: 100   Blood Pressure : 138/54     Respiratory:    Respiration: 18, Pulse Oximetry: 94 %     Work Of Breathing / Effort: Mild  RUL Breath Sounds: Clear, RML Breath Sounds: Diminished, RLL Breath Sounds: Diminished, CHARLOTTE Breath Sounds: Clear, LLL Breath Sounds: Diminished  Fluids:    Intake/Output Summary (Last 24 hours) at 18 1124  Last data filed at 18 0300   Gross per 24 hour   Intake                0 ml   Output             1000 ml   Net            -1000 ml       GI/Nutrition:  Orders Placed This Encounter   Procedures   • Diet Order     Standing Status:   Standing     Number of Occurrences:   1     Order Specific Question:   Diet:     Answer:   Diabetic [3]     Medical Decision Making, by Problem:  Active Hospital Problems    Diagnosis   • *Pathologic fracture [M84.40XA]   • Hyponatremia [E87.1]   • Anemia [D64.9]   • Multiple myeloma (CMS-HCC) [C90.00]   • Chronic venous stasis dermatitis of both lower extremities [I87.2]   • Morbid obesity with BMI of 60.0-69.9, adult (CMS-HCC) [E66.01, Z68.44]   •  DM type 2 (diabetes mellitus, type 2) (CMS-HCC) [E11.9]   • Essential hypertension, benign [I10]     Past medical history, past surgical history, past social history unchanged reviewed    Plan:  1.  Multiple myeloma: 1P deletion intermediate risk, stage III based on ISS staging (beta 2 9.9 and albumin 2.7 before):      -, Status post radiation left humerus and will not start radiation to the right hip area until 4 more days next week.     -4/19/18- CyBorD - C1D1 -no adverse side effects  -4/20/18-dexamethasone 40 mg, ANC 2.99, platelets 136,000, creatinine 0.91 calcium 6.8, magnesium 1.8, albumin 2.5  -4/22/18-D4 Velcade and dexamethasone 40 mg daily ×2 days  -4/23- no new events. She continues to have trouble with hyper/hypoglycemia  -4/26- she will receive day #8 of Velcade. I will omit Decadron from her regimen given her labile blood sugars and significant fluid retention.     2.  Anemia/ mild thrombocytopenia :  Overall stable  Transfuse if less than 7      4.  Extremities: She's had chronic lower extremity edema.  Cellulitis, resolved, she is sleeping secondary to fluid overload    5. Type 2 diabetes mellitus-has problems with early morning hypoglycemia related to steroids and insulin which will be managed by the hospitalist    Complex/complicating medical diseases-      High complexity/scheduled with her primary team/radiation oncology  Quality-Core Measures

## 2018-04-26 NOTE — CARE PLAN
Problem: Pain Management  Goal: Pain level will decrease to patient's comfort goal    Intervention: Follow pain managment plan developed in collaboration with patient and Interdisciplinary Team  Patient stated she had pain this am but declined pain medication will continue to monitor      Problem: Psychosocial Needs:  Goal: Level of anxiety will decrease    Intervention: Identify and develop with patient strategies to cope with anxiety triggers  Emotional support provided with listening and talking with the patient.

## 2018-04-26 NOTE — PROGRESS NOTES
Chemotherapy Verification - PRIMARY RN      Height = 160 cm  Weight =172.5 kg   BSA = 2.76 m2, ( dosing BSA is 2.0 m2       Medication: Bortezomib ( Velcade)  Dose:1.3 mg / m2  Calculated Dose: 2.6 mg, Order dose 2.6 mg                             (In mg/m2, AUC, mg/kg)                I confirm this process was performed independently with the BSA and all final chemotherapy dosing calculations congruent.  Any discrepancies of 5% or greater have been addressed with the chemotherapy pharmacist. The resolution of the discrepancy has been documented in the EPIC progress notes.

## 2018-04-26 NOTE — PROGRESS NOTES
Chemotherapy Verification - SECONDARY RN   Cycle 8 day 1      Height = 160 cm  Weight = 172.5 kg  BSA = 2.77 m2  (BSA used for treatment= 2 m2)      Medication: bortezomib (VELCADE)  Dose: 1.3 mg/m2  Calculated Dose: 2.6 mg (ordered dose= 2.6 mg, <5% difference)                             (In mg/m2, AUC, mg/kg)       I confirm that this process was performed independently.

## 2018-04-26 NOTE — PROGRESS NOTES
[facility]ist Progress Note    Date of Service: 4/25/2018    Chief Complaint  67 y.o. female admitted 4/10/2018 with multiple myeloma, slow healing fracture left humerus and recent BLE cellulitis.  Patient has since completed radiation L humerus, started on radiation B femurs and had single cycle of chemotherapy.    Interval Problem Update  4/24 Patient states anxiety has improved with stopping of steroids, she is still nervous but it is under good control. She could only tolerate one femur treated yesterday but was able to tolerate both today.  Lantus dose decreased with steroids but patient still with fasting glucose >300, will increase lantus dose to 50 units qhs.  Heart rate still <60, dc toprol.  4/25 Patient hypoglycemic again this am prior to breakfast, resolved with PO intake. Will change lantus back to 40 units qhs.  She is still having significant swelling from pannus and legs with weeping.  Will start more aggressive diuresis 40 tid PO.  Renal function doing well.  She is tolerating both femurs for radiation well.      Consultants/Specialty  Oncology - Christi  XRT - Peddada    Disposition  Tbd, patient does not want to dc to SNF, feels she has enough support at home.        Review of Systems   Constitutional: Negative for chills and fever.   HENT: Negative for congestion.    Eyes: Negative for blurred vision and photophobia.   Respiratory: Negative for cough and shortness of breath.    Cardiovascular: Positive for leg swelling. Negative for chest pain and claudication.   Gastrointestinal: Negative for abdominal pain, constipation, diarrhea, heartburn and vomiting.   Genitourinary: Negative for dysuria and hematuria.   Musculoskeletal: Positive for myalgias. Negative for joint pain.   Skin: Negative for itching and rash.   Neurological: Positive for weakness. Negative for dizziness, sensory change, speech change, focal weakness and headaches.   Psychiatric/Behavioral: Negative for depression. The  patient is nervous/anxious. The patient does not have insomnia.       Physical Exam  Laboratory/Imaging   Hemodynamics  Temp (24hrs), Av.5 °C (97.7 °F), Min:36.4 °C (97.6 °F), Max:36.6 °C (97.9 °F)   Temperature: 36.6 °C (97.8 °F)  Pulse  Av.4  Min: 38  Max: 100    Blood Pressure : 134/57      Respiratory      Respiration: 18, Pulse Oximetry: 95 %     Work Of Breathing / Effort: Mild  RUL Breath Sounds: Clear, RML Breath Sounds: Diminished, RLL Breath Sounds: Diminished, CHARLOTTE Breath Sounds: Clear, LLL Breath Sounds: Diminished    Fluids    Intake/Output Summary (Last 24 hours) at 18  Last data filed at 18 1855   Gross per 24 hour   Intake                0 ml   Output             3530 ml   Net            -3530 ml       Nutrition  Orders Placed This Encounter   Procedures   • Diet Order     Standing Status:   Standing     Number of Occurrences:   1     Order Specific Question:   Diet:     Answer:   Diabetic [3]     Physical Exam   Constitutional: She is oriented to person, place, and time. She appears well-developed and well-nourished. No distress.   HENT:   Head: Normocephalic and atraumatic.   Eyes: Conjunctivae are normal. No scleral icterus.   Neck: Neck supple. No JVD present.   Cardiovascular: Normal rate, regular rhythm and normal heart sounds.  Exam reveals no gallop and no friction rub.    No murmur heard.  Pulmonary/Chest: Effort normal and breath sounds normal. No respiratory distress. She has no wheezes. She exhibits no tenderness.   Abdominal: Soft. Bowel sounds are normal. She exhibits no distension. There is no tenderness. There is no guarding.   Weeping from abdominal pannus d/t edema/anasarca   Musculoskeletal: She exhibits edema (BLE). She exhibits no tenderness.   Neurological: She is alert and oriented to person, place, and time. No cranial nerve deficit.   Skin: Skin is warm and dry. She is not diaphoretic. No erythema. No pallor.   Psychiatric: She has a normal mood and  affect. Her behavior is normal.   Nursing note and vitals reviewed.      Recent Labs      04/23/18   0132  04/24/18 0210  04/25/18   0044   WBC  2.7*  2.3*  2.7*   RBC  2.49*  2.53*  2.52*   HEMOGLOBIN  7.7*  7.8*  7.6*   HEMATOCRIT  24.3*  24.5*  24.5*   MCV  97.6  96.8  97.2   MCH  30.9  30.8  30.2   MCHC  31.7*  31.8*  31.0*   RDW  56.6*  55.8*  54.8*   PLATELETCT  102*  97*  103*   MPV  11.1  11.3  11.7     Recent Labs      04/23/18 0132 04/24/18 0210 04/25/18   0044   SODIUM  134*  132*  133*   POTASSIUM  4.9  4.8  4.2   CHLORIDE  103  100  101   CO2  28  27  29   GLUCOSE  232*  318*  197*   BUN  28*  29*  28*   CREATININE  0.86  0.86  0.83   CALCIUM  6.8*  7.0*  6.8*                      Assessment/Plan     * Pathologic fracture- (present on admission)   Assessment & Plan    -Left humerus  -widespread lytic disease  -completed radiation to humerus  -pain control             Leg edema   Assessment & Plan    - Left greater than right  - DC IV fluids  -PO Lasix scheduled bid  -Abdominal anasarca also          Hypocalcemia   Assessment & Plan    - Repleting as needed        Hypomagnesemia   Assessment & Plan    - Repleting as needed        Anxiety   Assessment & Plan    Exacerbated by situation and steroids  Use steroids only for chemo regimen  Xanax prn, IV Ativan prior to radiation if needed        Chronic venous stasis dermatitis of both lower extremities- (present on admission)   Assessment & Plan    -wound care, no clear e/o active infection at this time, defer abx's        Multiple myeloma (CMS-HCC)- (present on admission)   Assessment & Plan    -recent diagnosis, appears aggressive, now with innumerable lytic lesions  -Dr Sim and Oksana consulted for assistance in management  - IV dilaudid PRN, oxy prn. DC maintenance steroids as these were causing confusion  -start oxycontin CR 10 mg BID  -skeletal survey done - spine obscured by body habitus, but multiple other lesions found  XRT to right and  left femurs          Anemia- (present on admission)   Assessment & Plan    -related to her MM, appears near her baseline, no e/o overt bleeding at this time, monitor closely        Hyponatremia- (present on admission)   Assessment & Plan    -mild        Morbid obesity with BMI of 60.0-69.9, adult (CMS-HCC)- (present on admission)   Assessment & Plan    -encourage weight loss  Body mass index is 67.38 kg/m².        DM type 2 (diabetes mellitus, type 2) (CMS-HCC)- (present on admission)   Assessment & Plan    - With hyperglycemia  - Lantus increase from 40 to 50 units qhs and hypoglycemic again prior to breakfast - change back to 40 and leave it there, will adjust with short acting only  - Intermittent steroids for Velcade treatment also complicates glucose control        Essential hypertension, benign- (present on admission)   Assessment & Plan    High with bradycardia  Increase dose po hydralazine  Clonidine bid to tid, add lisinopril 20 q day  Terazosin qhs  DC Toprol d/t bradycardia          Quality-Core Measures   Reviewed items::  Medications reviewed, Labs reviewed and Radiology images reviewed  Singh catheter::  Urinary Tract Retention or Urinary Tract Obstruction (high risk skin breakdown d/t urinary incontinence and body habitus.)  DVT prophylaxis pharmacological::  Heparin  DVT prophylaxis - mechanical:  SCDs  Assessed for rehabilitation services:  Patient was assess for and/or received rehabilitation services during this hospitalization

## 2018-04-26 NOTE — PROGRESS NOTES
"Pharmacy Chemotherapy calculation:    DX: Multiple Myeloma  Cycle 1    Day 8  Previous treatment = C1D4 = 4/22/18    Regimen: CyBorD  Bortezomib 1.3 mg/m2 IVP or subQ on Days 1, 4, 8, and 11  Cyclophosphamide 900 mg/m2 IV over 30 min on Day 1  -MD dosing over 60 min   Dexamethasone 40 mg po no days 1-2, 4-5, 8-9, and 11-12--Held cycle 1 day 8 and 11 per Dr. Barraza for elevated BS.  Repeat every 21 days for 3-4 cycles (transplant candidates) OR until maximal response, disease progression or unacceptable toxicity (previously treated or non-transplant candidates)   NCCN Guidelines for Multiple Myeloma V.3.2017.  Einsele H, et al. Blood. 2009; 114(22): abstr 131     Allergies:  Actos [pioglitazone hydrochloride]; Advil [ibuprofen micronized]; Cephalosporins; and Coello    /55   Pulse 70   Temp 36.2 °C (97.2 °F)   Resp 18   Ht 1.6 m (5' 2.99\")   Wt (!) 172.5 kg (380 lb 4.7 oz)   LMP 04/11/1994   SpO2 94%   Breastfeeding? No   BMI 67.38 kg/m²   Body surface area is 2.77 meters squared.   **MAX BSA = 2 m² per Dr. Sim for morbid obesity     4/26/18-  ANC~ 1890 Plt = 109 k   Hgb = 8.1     SCr = 0.97 mg/dL CrCl  >125mL/min     4/17/18:    AST/ALT/AP = 8/8/61 TBili = 0.4         Bortezomib 1.3 mg/m2 x 2 m² = 2.6 mg    <5% difference, ok to treat with final dose = 2.6 mg subQ    Kemi Julian, PharmD      "

## 2018-04-26 NOTE — PROGRESS NOTES
"Pharmacy Chemotherapy Note    Patient Name: YESSY SINGER  Dx: Multiple Myeloma    Protocol: CyBorD     Bortezomib 1.3 mg/m2 IV push over 3-5 seconds or SubQ on days 1, 4, 8, and 11  Cyclophosphamide 900 mg/m2 IV over 30 min on day 1  Dexamethasone 40 mg PO Daily on days 1-2, 4-5, 8-9, and 11-12 Held cycle 1 day 8 and 11 per Dr. Barraza for elevated BS  21 day cycle for 3-4 cycles  (transplant) or maximal response, disease progression or unacceptable toxicity (previously untreated or non-transplant candidates)  NCCN Guidelines for Multiple Myeloma V.3.2017  EMANUEL Russell et al. Cyclophosphamide, bortezomib and dexamethasone (CyBorD) induction for newly diagnosed multiple myeloma: High response rates in a phase II clinical trial. Leukemia. 2009 July ; 23(7): 2660-7243.  Drew CASTELLANOS, et al. Once- versus twice-weekly bortezomib induction therapy with CyBorD in newly diagnosed multiple myeloma. BLOOD, 22APRIL 2010VOLUME 115, NUMBER 16  Juan S et al. Randomized, multicenter, phase 2 study (EVOLUTION) of combinations of bortezomib, dexamethasone, cyclophosphamide, and lenalidomide in previously untreated multiple myeloma. BLOOD, 2012 119: 9078-3181    /55   Pulse 70   Temp 36.2 °C (97.2 °F)   Resp 18   Ht 1.6 m (5' 2.99\")   Wt (!) 172.5 kg (380 lb 4.7 oz)   LMP 04/11/1994   SpO2 94%   Breastfeeding? No   BMI 67.38 kg/m²  Body surface area is 2.77 meters squared.  **Per MD max BSA of 2.0 for morbid obesity**    LABS 4/26/2018:  ANC~ 1890 Plt = 109k   Hgb/Hct = 8.1/26.3   SCr = 0.97mg/dL CrCl >125 mL/min     LABS 4/17/2018:  LFT's = 8/8/61 TBili = 0.4      Drug Order   (Drug name, dose, route, IV Fluid & volume, frequency, number of doses) Cycle: 1 Day 8      Previous treatment: C1D4 on 4/22/18     Medication = Bortezomib (Velcade)  Base Dose = 1.3 mg/m²  Calc Dose: Base Dose x 2 m² = 2.6 mg  Final Dose = 2.6 mg  Route = subcutaneous  Fluid & Volume = 1.04 mL in syringe  Conc = 2.5 mg/mL  Admin Duration = " to be given subcutaneously          <5% difference, OK to treat with final dose      By my signature below, I confirm this process was performed independently with the BSA and all final chemotherapy dosing calculations congruent. I have reviewed the above chemotherapy order and that my calculation of the final dose and BSA (when applicable) corroborate those calculations of the  pharmacist. Discrepancies of 5% or greater in the written dose have been addressed and documented within the EPIC Progress notes.    LESLIE Lugo, PharmD

## 2018-04-26 NOTE — PROGRESS NOTES
Assumed care from AM shift. AAOx4. Assist of 2-4. PICC flushes with + blood return.  Denies pain and nausea. Right side continues to weep.  No further needs at this time.  Will continue to monitor.

## 2018-04-26 NOTE — PROGRESS NOTES
Pt was transported to the department for radiation treatment 4 of 5 to Rt Femur and 3 of 5 to Lt Femur.

## 2018-04-26 NOTE — PROGRESS NOTES
Patient is alert and oriented up with max assist. Patient did have radiation therapy today tolerated well. Patient also has chemo therapy today Velcade tolerated well. Reviewed POC with patient. Call light within reach hourly rounding in practice.

## 2018-04-27 ENCOUNTER — HOSPITAL ENCOUNTER (OUTPATIENT)
Dept: RADIATION ONCOLOGY | Facility: MEDICAL CENTER | Age: 68
End: 2018-04-27

## 2018-04-27 PROBLEM — D69.59 CHEMOTHERAPY-INDUCED THROMBOCYTOPENIA: Status: ACTIVE | Noted: 2018-04-27

## 2018-04-27 PROBLEM — T45.1X5A CHEMOTHERAPY-INDUCED THROMBOCYTOPENIA: Status: ACTIVE | Noted: 2018-04-27

## 2018-04-27 LAB
ALBUMIN SERPL BCP-MCNC: 2.3 G/DL (ref 3.2–4.9)
BASOPHILS # BLD AUTO: 0.8 % (ref 0–1.8)
BASOPHILS # BLD: 0.01 K/UL (ref 0–0.12)
BUN SERPL-MCNC: 22 MG/DL (ref 8–22)
CALCIUM SERPL-MCNC: 6.7 MG/DL (ref 8.5–10.5)
CHLORIDE SERPL-SCNC: 101 MMOL/L (ref 96–112)
CO2 SERPL-SCNC: 32 MMOL/L (ref 20–33)
CREAT SERPL-MCNC: 0.68 MG/DL (ref 0.5–1.4)
EOSINOPHIL # BLD AUTO: 0.01 K/UL (ref 0–0.51)
EOSINOPHIL NFR BLD: 0.8 % (ref 0–6.9)
ERYTHROCYTE [DISTWIDTH] IN BLOOD BY AUTOMATED COUNT: 55 FL (ref 35.9–50)
GLUCOSE BLD-MCNC: 111 MG/DL (ref 65–99)
GLUCOSE BLD-MCNC: 63 MG/DL (ref 65–99)
GLUCOSE BLD-MCNC: 72 MG/DL (ref 65–99)
GLUCOSE BLD-MCNC: 88 MG/DL (ref 65–99)
GLUCOSE BLD-MCNC: 99 MG/DL (ref 65–99)
GLUCOSE SERPL-MCNC: 100 MG/DL (ref 65–99)
HCT VFR BLD AUTO: 29.2 % (ref 37–47)
HGB BLD-MCNC: 9.3 G/DL (ref 12–16)
IMM GRANULOCYTES # BLD AUTO: 0.01 K/UL (ref 0–0.11)
IMM GRANULOCYTES NFR BLD AUTO: 0.8 % (ref 0–0.9)
LYMPHOCYTES # BLD AUTO: 0.26 K/UL (ref 1–4.8)
LYMPHOCYTES NFR BLD: 20.6 % (ref 22–41)
MCH RBC QN AUTO: 30.5 PG (ref 27–33)
MCHC RBC AUTO-ENTMCNC: 31.8 G/DL (ref 33.6–35)
MCV RBC AUTO: 95.7 FL (ref 81.4–97.8)
MONOCYTES # BLD AUTO: 0.08 K/UL (ref 0–0.85)
MONOCYTES NFR BLD AUTO: 6.3 % (ref 0–13.4)
NEUTROPHILS # BLD AUTO: 0.89 K/UL (ref 2–7.15)
NEUTROPHILS NFR BLD: 70.7 % (ref 44–72)
NRBC # BLD AUTO: 0 K/UL
NRBC BLD-RTO: 0 /100 WBC
PHOSPHATE SERPL-MCNC: 4 MG/DL (ref 2.5–4.5)
PLATELET # BLD AUTO: 94 K/UL (ref 164–446)
PMV BLD AUTO: 10.6 FL (ref 9–12.9)
POTASSIUM SERPL-SCNC: 4.1 MMOL/L (ref 3.6–5.5)
RBC # BLD AUTO: 3.05 M/UL (ref 4.2–5.4)
SODIUM SERPL-SCNC: 137 MMOL/L (ref 135–145)
WBC # BLD AUTO: 1.3 K/UL (ref 4.8–10.8)

## 2018-04-27 PROCEDURE — 77427 RADIATION TX MANAGEMENT X5: CPT | Performed by: RADIOLOGY

## 2018-04-27 PROCEDURE — 85025 COMPLETE CBC W/AUTO DIFF WBC: CPT

## 2018-04-27 PROCEDURE — A9270 NON-COVERED ITEM OR SERVICE: HCPCS | Performed by: INTERNAL MEDICINE

## 2018-04-27 PROCEDURE — 700105 HCHG RX REV CODE 258: Performed by: INTERNAL MEDICINE

## 2018-04-27 PROCEDURE — 700102 HCHG RX REV CODE 250 W/ 637 OVERRIDE(OP): Performed by: INTERNAL MEDICINE

## 2018-04-27 PROCEDURE — 700111 HCHG RX REV CODE 636 W/ 250 OVERRIDE (IP): Performed by: INTERNAL MEDICINE

## 2018-04-27 PROCEDURE — 99232 SBSQ HOSP IP/OBS MODERATE 35: CPT | Performed by: INTERNAL MEDICINE

## 2018-04-27 PROCEDURE — 700111 HCHG RX REV CODE 636 W/ 250 OVERRIDE (IP): Performed by: HOSPITALIST

## 2018-04-27 PROCEDURE — 770004 HCHG ROOM/CARE - ONCOLOGY PRIVATE *

## 2018-04-27 PROCEDURE — 82962 GLUCOSE BLOOD TEST: CPT

## 2018-04-27 PROCEDURE — 80069 RENAL FUNCTION PANEL: CPT

## 2018-04-27 PROCEDURE — 77417 THER RADIOLOGY PORT IMAGE(S): CPT | Performed by: RADIOLOGY

## 2018-04-27 PROCEDURE — 77412 RADIATION TX DELIVERY LVL 3: CPT | Performed by: RADIOLOGY

## 2018-04-27 RX ADMIN — TERAZOSIN HYDROCHLORIDE ANHYDROUS 10 MG: 5 CAPSULE ORAL at 21:47

## 2018-04-27 RX ADMIN — HEPARIN SODIUM 5000 UNITS: 5000 INJECTION, SOLUTION INTRAVENOUS; SUBCUTANEOUS at 21:46

## 2018-04-27 RX ADMIN — OXYCODONE HYDROCHLORIDE 10 MG: 10 TABLET, FILM COATED, EXTENDED RELEASE ORAL at 21:48

## 2018-04-27 RX ADMIN — Medication: at 21:00

## 2018-04-27 RX ADMIN — DIBASIC SODIUM PHOSPHATE, MONOBASIC POTASSIUM PHOSPHATE AND MONOBASIC SODIUM PHOSPHATE 2 TABLET: 852; 155; 130 TABLET ORAL at 09:51

## 2018-04-27 RX ADMIN — HYDRALAZINE HYDROCHLORIDE 50 MG: 10 TABLET, FILM COATED ORAL at 05:31

## 2018-04-27 RX ADMIN — CALCIUM CITRATE 200 MG (950 MG) TABLET 1900 MG: at 09:51

## 2018-04-27 RX ADMIN — FUROSEMIDE 40 MG: 40 TABLET ORAL at 14:26

## 2018-04-27 RX ADMIN — LORAZEPAM 1 MG: 2 INJECTION INTRAMUSCULAR; INTRAVENOUS at 07:26

## 2018-04-27 RX ADMIN — INSULIN GLARGINE 40 UNITS: 100 INJECTION, SOLUTION SUBCUTANEOUS at 21:47

## 2018-04-27 RX ADMIN — ONDANSETRON 4 MG: 4 TABLET, ORALLY DISINTEGRATING ORAL at 20:55

## 2018-04-27 RX ADMIN — PRAVASTATIN SODIUM 40 MG: 20 TABLET ORAL at 21:46

## 2018-04-27 RX ADMIN — OXYCODONE HYDROCHLORIDE 5 MG: 5 TABLET ORAL at 20:55

## 2018-04-27 RX ADMIN — OXYCODONE HYDROCHLORIDE 10 MG: 10 TABLET, FILM COATED, EXTENDED RELEASE ORAL at 09:48

## 2018-04-27 RX ADMIN — ACYCLOVIR 400 MG: 200 CAPSULE ORAL at 21:47

## 2018-04-27 RX ADMIN — ACYCLOVIR 400 MG: 200 CAPSULE ORAL at 09:49

## 2018-04-27 RX ADMIN — HYDRALAZINE HYDROCHLORIDE 50 MG: 10 TABLET, FILM COATED ORAL at 21:47

## 2018-04-27 RX ADMIN — CLONIDINE HYDROCHLORIDE 0.2 MG: 0.1 TABLET ORAL at 21:48

## 2018-04-27 RX ADMIN — CALCIUM GLUCONATE 1 G: 94 INJECTION, SOLUTION INTRAVENOUS at 13:02

## 2018-04-27 RX ADMIN — DIBASIC SODIUM PHOSPHATE, MONOBASIC POTASSIUM PHOSPHATE AND MONOBASIC SODIUM PHOSPHATE 2 TABLET: 852; 155; 130 TABLET ORAL at 21:00

## 2018-04-27 RX ADMIN — LISINOPRIL 20 MG: 20 TABLET ORAL at 09:48

## 2018-04-27 RX ADMIN — FUROSEMIDE 40 MG: 40 TABLET ORAL at 05:31

## 2018-04-27 RX ADMIN — HEPARIN SODIUM 5000 UNITS: 5000 INJECTION, SOLUTION INTRAVENOUS; SUBCUTANEOUS at 05:31

## 2018-04-27 RX ADMIN — HEPARIN SODIUM 5000 UNITS: 5000 INJECTION, SOLUTION INTRAVENOUS; SUBCUTANEOUS at 14:28

## 2018-04-27 RX ADMIN — Medication: at 09:53

## 2018-04-27 RX ADMIN — FUROSEMIDE 40 MG: 40 TABLET ORAL at 21:48

## 2018-04-27 RX ADMIN — SENNOSIDES AND DOCUSATE SODIUM 2 TABLET: 8.6; 5 TABLET ORAL at 13:01

## 2018-04-27 RX ADMIN — SENNOSIDES AND DOCUSATE SODIUM 2 TABLET: 8.6; 5 TABLET ORAL at 21:48

## 2018-04-27 RX ADMIN — CLONIDINE HYDROCHLORIDE 0.2 MG: 0.1 TABLET ORAL at 09:49

## 2018-04-27 RX ADMIN — ONDANSETRON 4 MG: 4 TABLET, ORALLY DISINTEGRATING ORAL at 05:31

## 2018-04-27 ASSESSMENT — ENCOUNTER SYMPTOMS
CHILLS: 0
HEADACHES: 0
MYALGIAS: 1
SPEECH CHANGE: 0
PHOTOPHOBIA: 0
DEPRESSION: 1
EYES NEGATIVE: 1
NERVOUS/ANXIOUS: 1
WEAKNESS: 1
BLURRED VISION: 0
CONSTIPATION: 0
NAUSEA: 0
ABDOMINAL PAIN: 0
FOCAL WEAKNESS: 0
HEARTBURN: 0
DIZZINESS: 0
FEVER: 0
SHORTNESS OF BREATH: 0
DIARRHEA: 0
COUGH: 0
GASTROINTESTINAL NEGATIVE: 1
CARDIOVASCULAR NEGATIVE: 1
SENSORY CHANGE: 0
RESPIRATORY NEGATIVE: 1
CLAUDICATION: 0
VOMITING: 0
INSOMNIA: 0

## 2018-04-27 ASSESSMENT — PAIN SCALES - GENERAL
PAINLEVEL_OUTOF10: 2
PAINLEVEL_OUTOF10: 4
PAINLEVEL_OUTOF10: 2
PAINLEVEL_OUTOF10: 2
PAINLEVEL_OUTOF10: 5

## 2018-04-27 NOTE — CARE PLAN
Problem: Mobility  Goal: Risk for activity intolerance will decrease    Intervention: Assess and monitor signs of activity intolerance  Patient refuses to try and ambulate today encourage patient to try      Problem: Pain Management  Goal: Pain level will decrease to patient's comfort goal    Intervention: Follow pain managment plan developed in collaboration with patient and Interdisciplinary Team  Pt. Declines pain medication at this time will continue to monitor

## 2018-04-27 NOTE — PROGRESS NOTES
Patient alert and oriented. Offered to help patient to get up to the BSC patient declined. Patient seems depressed. Patient is post chemo . Reviewed POC with patient call light within reach hourly rounding in practice.

## 2018-04-27 NOTE — PROGRESS NOTES
Pt was transported to the department for radiation treatment 5 of 5 to Rt Femur and 4 of 5 to Lt Femur. Pt will have her last radiation treatment to her Lt Femur on Monday 4/30/18. After treatment pt will be transported back to her room.

## 2018-04-27 NOTE — PROGRESS NOTES
Received report and assumed patient care at change of shift. Patient is resting in bed, A&O x4. Patient reports some right leg discomfort at this time. Pt is refusing medication. Pt was repositioned.      Plan of care discussed, questions answered. Bed is in the lowest position and locked, call light within reach, non-skid socks in place, hourly rounding. Patient reports no further needs and this time.

## 2018-04-27 NOTE — PROGRESS NOTES
Hallie from Lab called with critical result of wbc 1.0 at 0114. Critical lab result read back to Hallie.   This critical lab result is within parameters established by Dr.[facility] policy for this patient

## 2018-04-27 NOTE — PROGRESS NOTES
[facility]ist Progress Note    Date of Service: 4/26/2018    Chief Complaint  67 y.o. female admitted 4/10/2018 with multiple myeloma, slow healing fracture left humerus and recent BLE cellulitis.  Patient has since completed radiation L humerus, started on radiation B femurs and had single cycle of chemotherapy.    Interval Problem Update  4/24 Patient states anxiety has improved with stopping of steroids, she is still nervous but it is under good control. She could only tolerate one femur treated yesterday but was able to tolerate both today.  Lantus dose decreased with steroids but patient still with fasting glucose >300, will increase lantus dose to 50 units qhs.  Heart rate still <60, dc toprol.  4/25 Patient hypoglycemic again this am prior to breakfast, resolved with PO intake. Will change lantus back to 40 units qhs.  She is still having significant swelling from pannus and legs with weeping.  Will start more aggressive diuresis 40 tid PO.  Renal function doing well.  She is tolerating both femurs for radiation well.    4/26 Patient in depressed mood today, states she misses her family and friends and they cannot visit her right now as they all have busy lives.  She still has same amount drainage from pannus and legs but is having good output with diuresis and renal function is stable - will continue aggressive tid diuresis.    Consultants/Specialty  Oncology - Christi MORRISSEY - Peddada    Disposition  Tbd, patient does not want to dc to SNF, feels she has enough support at home.        Review of Systems   Constitutional: Negative for chills and fever.   HENT: Negative for congestion.    Eyes: Negative for blurred vision and photophobia.   Respiratory: Negative for cough and shortness of breath.    Cardiovascular: Positive for leg swelling. Negative for chest pain and claudication.   Gastrointestinal: Negative for abdominal pain, constipation, diarrhea, heartburn, nausea and vomiting.   Genitourinary:  Negative for dysuria and hematuria.   Musculoskeletal: Positive for myalgias. Negative for joint pain.   Skin: Negative for itching and rash.   Neurological: Positive for weakness. Negative for dizziness, sensory change, speech change, focal weakness and headaches.   Psychiatric/Behavioral: Positive for depression. The patient is nervous/anxious. The patient does not have insomnia.       Physical Exam  Laboratory/Imaging   Hemodynamics  Temp (24hrs), Av.5 °C (97.7 °F), Min:36.2 °C (97.2 °F), Max:36.7 °C (98 °F)   Temperature: 36.7 °C (98 °F)  Pulse  Av.9  Min: 38  Max: 100    Blood Pressure : 138/54      Respiratory      Respiration: 18, Pulse Oximetry: 94 %     Work Of Breathing / Effort: Mild  RUL Breath Sounds: Clear, RML Breath Sounds: Diminished, RLL Breath Sounds: Diminished, CHARLOTTE Breath Sounds: Clear, LLL Breath Sounds: Diminished    Fluids    Intake/Output Summary (Last 24 hours) at 18 1933  Last data filed at 18 1524   Gross per 24 hour   Intake                0 ml   Output             6000 ml   Net            -6000 ml       Nutrition  Orders Placed This Encounter   Procedures   • Diet Order     Standing Status:   Standing     Number of Occurrences:   1     Order Specific Question:   Diet:     Answer:   Diabetic [3]     Physical Exam   Constitutional: She is oriented to person, place, and time. She appears well-developed and well-nourished. No distress.   HENT:   Head: Normocephalic and atraumatic.   Eyes: Conjunctivae are normal. No scleral icterus.   Neck: Neck supple. No JVD present.   Cardiovascular: Normal rate, regular rhythm and normal heart sounds.  Exam reveals no gallop and no friction rub.    No murmur heard.  Pulmonary/Chest: Effort normal and breath sounds normal. No respiratory distress. She has no wheezes. She exhibits no tenderness.   Abdominal: Soft. Bowel sounds are normal. She exhibits no distension. There is no tenderness. There is no guarding.   Weeping from  abdominal pannus d/t edema/anasarca   Musculoskeletal: She exhibits edema (BLE). She exhibits no tenderness.   Neurological: She is alert and oriented to person, place, and time. No cranial nerve deficit.   Skin: Skin is warm and dry. She is not diaphoretic. No erythema. No pallor.   Psychiatric: She has a normal mood and affect. Her behavior is normal.   Nursing note and vitals reviewed.      Recent Labs      04/24/18 0210 04/25/18   0044  04/26/18   0133   WBC  2.3*  2.7*  2.4*   RBC  2.53*  2.52*  2.69*   HEMOGLOBIN  7.8*  7.6*  8.1*   HEMATOCRIT  24.5*  24.5*  26.3*   MCV  96.8  97.2  97.8   MCH  30.8  30.2  30.1   MCHC  31.8*  31.0*  30.8*   RDW  55.8*  54.8*  55.6*   PLATELETCT  97*  103*  109*   MPV  11.3  11.7  11.2     Recent Labs      04/24/18 0210 04/25/18 0044  04/26/18   0133   SODIUM  132*  133*  139   POTASSIUM  4.8  4.2  4.4   CHLORIDE  100  101  103   CO2  27  29  32   GLUCOSE  318*  197*  79   BUN  29*  28*  25*   CREATININE  0.86  0.83  0.97   CALCIUM  7.0*  6.8*  6.4*                      Assessment/Plan     * Pathologic fracture- (present on admission)   Assessment & Plan    -Left humerus  -widespread lytic disease  -completed radiation to humerus  -pain control             Leg edema   Assessment & Plan    - Left greater than right  - DC IV fluids  -PO Lasix scheduled bid  -Abdominal anasarca also          Hypocalcemia   Assessment & Plan    - Repleting as needed  Corrected 7.68 - I g Calcium gluconate today.          Hypomagnesemia   Assessment & Plan    - Repleting as needed        Anxiety   Assessment & Plan    Exacerbated by situation and steroids  Use steroids only for chemo regimen  Xanax prn, IV Ativan prior to radiation if needed        Chronic venous stasis dermatitis of both lower extremities- (present on admission)   Assessment & Plan    -wound care, no clear e/o active infection at this time, defer abx's        Multiple myeloma (CMS-HCC)- (present on admission)   Assessment &  Plan    -recent diagnosis, appears aggressive, now with innumerable lytic lesions  -Dr Sim and Oksana consulted for assistance in management  - IV dilaudid PRN, oxy prn. DC maintenance steroids as these were causing confusion  -start oxycontin CR 10 mg BID  -skeletal survey done - spine obscured by body habitus, but multiple other lesions found  XRT to right and left femurs          Anemia- (present on admission)   Assessment & Plan    -related to her MM, appears near her baseline, no e/o overt bleeding at this time, monitor closely        Hyponatremia- (present on admission)   Assessment & Plan    -mild        Morbid obesity with BMI of 60.0-69.9, adult (CMS-HCC)- (present on admission)   Assessment & Plan    -encourage weight loss  Body mass index is 67.38 kg/m².        DM type 2 (diabetes mellitus, type 2) (CMS-HCC)- (present on admission)   Assessment & Plan    - With hyperglycemia  - Lantus increase from 40 to 50 units qhs and hypoglycemic again prior to breakfast - change back to 40 and leave it there, will adjust with short acting only  - Intermittent steroids for Velcade treatment also stopped per oncology.        Essential hypertension, benign- (present on admission)   Assessment & Plan    High with bradycardia  Increase dose po hydralazine  Clonidine bid to tid, add lisinopril 20 q day  Terazosin qhs  DC Toprol d/t bradycardia - blood pressure better with d/c of this medication.            Quality-Core Measures   Reviewed items::  Medications reviewed, Labs reviewed and Radiology images reviewed  Singh catheter::  Urinary Tract Retention or Urinary Tract Obstruction (high risk skin breakdown d/t urinary incontinence and body habitus.)  DVT prophylaxis pharmacological::  Heparin  DVT prophylaxis - mechanical:  SCDs  Assessed for rehabilitation services:  Patient was assess for and/or received rehabilitation services during this hospitalization

## 2018-04-27 NOTE — PROGRESS NOTES
Oncology/Hematology Progress Note               Author: Emre Barraza Date & Time created: 4/27/2018  2:56 PM     CC:   Multiple myeloma    Interval History:    Completed radiation therapy to the left humerus  4/19/18- chemotherapy with CyBorD - D1  4/20/18-no adverse side effects, dexamethasone 40 mg today  4/22/18- hypoglycemia episode  4/23- feels about the same. Morbidly obese. Joint pain, stable  4/26- getting xrt . Generally frustrated about her predicament d 8 . Velcade given  4/27- stable, no acute complaints other than her chronic issues    Review of Systems:  Review of Systems   Constitutional: Positive for malaise/fatigue. Negative for chills and fever.   HENT: Negative.    Eyes: Negative.    Respiratory: Negative.    Cardiovascular: Negative.    Gastrointestinal: Negative.    Genitourinary: Negative.    Musculoskeletal: Positive for joint pain and myalgias.   Skin: Negative.    Neurological: Positive for weakness.   Endo/Heme/Allergies: Negative.        Physical Exam:  Physical Exam   Constitutional: She is oriented to person, place, and time.   Morbidly obese   HENT:   Head: Normocephalic.   Eyes: Conjunctivae are normal. Pupils are equal, round, and reactive to light.   Cardiovascular: Normal rate, regular rhythm and normal heart sounds.    Pulmonary/Chest: Effort normal and breath sounds normal.   Abdominal: Soft. Bowel sounds are normal.   Musculoskeletal: She exhibits edema.   She's had chronic edema symmetrical   Neurological: She is alert and oriented to person, place, and time.   Psychiatric: She has a normal mood and affect. Her behavior is normal. Judgment and thought content normal.       Labs:        Invalid input(s): [id number]      Recent Labs      04/25/18   0044  04/26/18   0133  04/27/18   0038   SODIUM  133*  139  137   POTASSIUM  4.2  4.4  4.1   CHLORIDE  101  103  101   CO2  29  32  32   BUN  28*  25*  22   CREATININE  0.83  0.97  0.68   PHOSPHORUS  2.3*  3.2  4.0   CALCIUM   6.8*  6.4*  6.7*     Recent Labs      18   0044  183  18   0038   GLUCOSE  197*  79  100*     Recent Labs      18   0038   RBC  2.52*  2.69*  3.05*   HEMOGLOBIN  7.6*  8.1*  9.3*   HEMATOCRIT  24.5*  26.3*  29.2*   PLATELETCT  103*  109*  94*     Recent Labs      18   0038   WBC  2.7*  2.4*  1.3*   NEUTSPOLYS  84.60*  83.70*  70.70   LYMPHOCYTES  9.90*  9.70*  20.60*   MONOCYTES  4.70  4.40  6.30   EOSINOPHILS  0.40  1.80  0.80   BASOPHILS  0.00  0.00  0.80     Recent Labs      18   0038   SODIUM  133*  139  137   POTASSIUM  4.2  4.4  4.1   CHLORIDE  101  103  101   CO2  29  32  32   GLUCOSE  197*  79  100*   BUN  28*  25*  22   CREATININE  0.83  0.97  0.68   CALCIUM  6.8*  6.4*  6.7*     Hemodynamics:  Temp (24hrs), Av.9 °C (98.4 °F), Min:36.2 °C (97.2 °F), Max:37.4 °C (99.4 °F)  Temperature: 37.2 °C (99 °F)  Pulse  Av.3  Min: 38  Max: 101   Blood Pressure : 110/50     Respiratory:    Respiration: 20, Pulse Oximetry: 97 %     Work Of Breathing / Effort: Mild  RUL Breath Sounds: Clear, RML Breath Sounds: Diminished, RLL Breath Sounds: Diminished, CHARLOTTE Breath Sounds: Clear, LLL Breath Sounds: Diminished  Fluids:    Intake/Output Summary (Last 24 hours) at 18 1124  Last data filed at 18 0300   Gross per 24 hour   Intake                0 ml   Output             1000 ml   Net            -1000 ml       GI/Nutrition:  Orders Placed This Encounter   Procedures   • Diet Order     Standing Status:   Standing     Number of Occurrences:   1     Order Specific Question:   Diet:     Answer:   Diabetic [3]     Medical Decision Making, by Problem:  Active Hospital Problems    Diagnosis   • *Pathologic fracture [M84.40XA]   • Hyponatremia [E87.1]   • Anemia [D64.9]   • Multiple myeloma (CMS-HCC) [C90.00]   • Chronic venous stasis dermatitis of both lower extremities  [I87.2]   • Morbid obesity with BMI of 60.0-69.9, adult (CMS-[facility]) [E66.01, Z68.44]   • DM type 2 (diabetes mellitus, type 2) (CMS-HCC) [E11.9]   • Essential hypertension, benign [I10]     Past medical history, past surgical history, past social history unchanged reviewed    Plan:  1.  Multiple myeloma: 1P deletion intermediate risk, stage III based on ISS staging (beta 2 9.9 and albumin 2.7 before):      -, Status post radiation left humerus and will not start radiation to the right hip area until 4 more days next week.     -4/19/18- CyBorD - C1D1 -no adverse side effects  -4/20/18-dexamethasone 40 mg, ANC 2.99, platelets 136,000, creatinine 0.91 calcium 6.8, magnesium 1.8, albumin 2.5  -4/22/18-D4 Velcade and dexamethasone 40 mg daily ×2 days  -4/23- no new events. She continues to have trouble with hyper/hypoglycemia  -4/26- she will receive day #8 of Velcade. I will omit Decadron from her regimen given her labile blood sugars and significant fluid retention.  -4/27-overall, clinically stable. Undergoing radiation.     2.  Pancytopenia-secondary to chemotherapy and concomitant radiation. WBC trending down, may need Granix for ANC,500., Hemoglobin stable to improved. Platelets stable      4.  Extremities: She's had chronic lower extremity edema.  Cellulitis, resolved,   5. Type 2 diabetes mellitus-has problems with early morning hypoglycemia related to steroids and insulin which will be managed by the hospitalist    Complex/complicating medical diseases-      High complexity/scheduled with her primary team/radiation oncology  Quality-Core Measures

## 2018-04-27 NOTE — PROGRESS NOTES
WARREN from Lab called with critical result of Ca 6.7 at 0120. Critical lab result read back to WARREN. Albumin is 2.3. Corrected Ca is 8.1.  This critical lab result is within parameters established by Dr.[facility] policy for this patient

## 2018-04-28 PROBLEM — Z66 DNR (DO NOT RESUSCITATE): Status: ACTIVE | Noted: 2018-04-28

## 2018-04-28 LAB
ABO GROUP BLD: NORMAL
ABO GROUP BLD: NORMAL
ALBUMIN SERPL BCP-MCNC: 2.2 G/DL (ref 3.2–4.9)
BARCODED ABORH UBTYP: 6200
BARCODED PRD CODE UBPRD: NORMAL
BARCODED UNIT NUM UBUNT: NORMAL
BASOPHILS # BLD AUTO: 0 % (ref 0–1.8)
BASOPHILS # BLD: 0 K/UL (ref 0–0.12)
BLD GP AB SCN SERPL QL: NORMAL
BUN SERPL-MCNC: 18 MG/DL (ref 8–22)
CALCIUM SERPL-MCNC: 6.3 MG/DL (ref 8.5–10.5)
CHLORIDE SERPL-SCNC: 98 MMOL/L (ref 96–112)
CO2 SERPL-SCNC: 34 MMOL/L (ref 20–33)
COMPONENT R 8504R: NORMAL
CREAT SERPL-MCNC: 0.8 MG/DL (ref 0.5–1.4)
EOSINOPHIL # BLD AUTO: 0.04 K/UL (ref 0–0.51)
EOSINOPHIL NFR BLD: 2.3 % (ref 0–6.9)
ERYTHROCYTE [DISTWIDTH] IN BLOOD BY AUTOMATED COUNT: 54.9 FL (ref 35.9–50)
GLUCOSE BLD-MCNC: 114 MG/DL (ref 65–99)
GLUCOSE BLD-MCNC: 124 MG/DL (ref 65–99)
GLUCOSE BLD-MCNC: 142 MG/DL (ref 65–99)
GLUCOSE BLD-MCNC: 142 MG/DL (ref 65–99)
GLUCOSE BLD-MCNC: 208 MG/DL (ref 65–99)
GLUCOSE BLD-MCNC: 210 MG/DL (ref 65–99)
GLUCOSE BLD-MCNC: 59 MG/DL (ref 65–99)
GLUCOSE BLD-MCNC: 82 MG/DL (ref 65–99)
GLUCOSE SERPL-MCNC: 52 MG/DL (ref 65–99)
HCT VFR BLD AUTO: 22.9 % (ref 37–47)
HGB BLD-MCNC: 7.3 G/DL (ref 12–16)
IMM GRANULOCYTES # BLD AUTO: 0.01 K/UL (ref 0–0.11)
IMM GRANULOCYTES NFR BLD AUTO: 0.6 % (ref 0–0.9)
LYMPHOCYTES # BLD AUTO: 0.56 K/UL (ref 1–4.8)
LYMPHOCYTES NFR BLD: 32.4 % (ref 22–41)
MCH RBC QN AUTO: 30.9 PG (ref 27–33)
MCHC RBC AUTO-ENTMCNC: 32.4 G/DL (ref 33.6–35)
MCV RBC AUTO: 95.3 FL (ref 81.4–97.8)
MONOCYTES # BLD AUTO: 0.1 K/UL (ref 0–0.85)
MONOCYTES NFR BLD AUTO: 5.8 % (ref 0–13.4)
NEUTROPHILS # BLD AUTO: 1.02 K/UL (ref 2–7.15)
NEUTROPHILS NFR BLD: 58.9 % (ref 44–72)
NRBC # BLD AUTO: 0 K/UL
NRBC BLD-RTO: 0 /100 WBC
PHOSPHATE SERPL-MCNC: 3.9 MG/DL (ref 2.5–4.5)
PLATELET # BLD AUTO: 88 K/UL (ref 164–446)
PMV BLD AUTO: 10.7 FL (ref 9–12.9)
POTASSIUM SERPL-SCNC: 3.6 MMOL/L (ref 3.6–5.5)
PRODUCT TYPE UPROD: NORMAL
RBC # BLD AUTO: 2.36 M/UL (ref 4.2–5.4)
RH BLD: NORMAL
RH BLD: NORMAL
SODIUM SERPL-SCNC: 135 MMOL/L (ref 135–145)
UNIT STATUS USTAT: NORMAL
WBC # BLD AUTO: 1.7 K/UL (ref 4.8–10.8)

## 2018-04-28 PROCEDURE — 85025 COMPLETE CBC W/AUTO DIFF WBC: CPT

## 2018-04-28 PROCEDURE — A9270 NON-COVERED ITEM OR SERVICE: HCPCS | Performed by: HOSPITALIST

## 2018-04-28 PROCEDURE — 36430 TRANSFUSION BLD/BLD COMPNT: CPT

## 2018-04-28 PROCEDURE — 700105 HCHG RX REV CODE 258: Performed by: INTERNAL MEDICINE

## 2018-04-28 PROCEDURE — 86923 COMPATIBILITY TEST ELECTRIC: CPT

## 2018-04-28 PROCEDURE — P9016 RBC LEUKOCYTES REDUCED: HCPCS

## 2018-04-28 PROCEDURE — A9270 NON-COVERED ITEM OR SERVICE: HCPCS | Performed by: INTERNAL MEDICINE

## 2018-04-28 PROCEDURE — 86901 BLOOD TYPING SEROLOGIC RH(D): CPT

## 2018-04-28 PROCEDURE — 99232 SBSQ HOSP IP/OBS MODERATE 35: CPT | Performed by: INTERNAL MEDICINE

## 2018-04-28 PROCEDURE — 86850 RBC ANTIBODY SCREEN: CPT

## 2018-04-28 PROCEDURE — 700102 HCHG RX REV CODE 250 W/ 637 OVERRIDE(OP): Performed by: HOSPITALIST

## 2018-04-28 PROCEDURE — 30233N1 TRANSFUSION OF NONAUTOLOGOUS RED BLOOD CELLS INTO PERIPHERAL VEIN, PERCUTANEOUS APPROACH: ICD-10-PCS | Performed by: INTERNAL MEDICINE

## 2018-04-28 PROCEDURE — 700102 HCHG RX REV CODE 250 W/ 637 OVERRIDE(OP): Performed by: INTERNAL MEDICINE

## 2018-04-28 PROCEDURE — 86900 BLOOD TYPING SEROLOGIC ABO: CPT

## 2018-04-28 PROCEDURE — 80069 RENAL FUNCTION PANEL: CPT

## 2018-04-28 PROCEDURE — 700111 HCHG RX REV CODE 636 W/ 250 OVERRIDE (IP): Performed by: INTERNAL MEDICINE

## 2018-04-28 PROCEDURE — 770004 HCHG ROOM/CARE - ONCOLOGY PRIVATE *

## 2018-04-28 PROCEDURE — 82962 GLUCOSE BLOOD TEST: CPT

## 2018-04-28 RX ADMIN — AMPICILLIN SODIUM AND SULBACTAM SODIUM 3 G: 2; 1 INJECTION, POWDER, FOR SOLUTION INTRAMUSCULAR; INTRAVENOUS at 17:39

## 2018-04-28 RX ADMIN — OXYCODONE HYDROCHLORIDE 10 MG: 10 TABLET, FILM COATED, EXTENDED RELEASE ORAL at 08:19

## 2018-04-28 RX ADMIN — AMPICILLIN SODIUM AND SULBACTAM SODIUM 3 G: 2; 1 INJECTION, POWDER, FOR SOLUTION INTRAMUSCULAR; INTRAVENOUS at 14:49

## 2018-04-28 RX ADMIN — DIBASIC SODIUM PHOSPHATE, MONOBASIC POTASSIUM PHOSPHATE AND MONOBASIC SODIUM PHOSPHATE 2 TABLET: 852; 155; 130 TABLET ORAL at 08:20

## 2018-04-28 RX ADMIN — ACYCLOVIR 400 MG: 200 CAPSULE ORAL at 08:19

## 2018-04-28 RX ADMIN — CLONIDINE HYDROCHLORIDE 0.2 MG: 0.1 TABLET ORAL at 21:24

## 2018-04-28 RX ADMIN — LISINOPRIL 20 MG: 20 TABLET ORAL at 08:20

## 2018-04-28 RX ADMIN — OXYCODONE HYDROCHLORIDE 10 MG: 10 TABLET, FILM COATED, EXTENDED RELEASE ORAL at 21:23

## 2018-04-28 RX ADMIN — CLONIDINE HYDROCHLORIDE 0.2 MG: 0.1 TABLET ORAL at 14:50

## 2018-04-28 RX ADMIN — HEPARIN SODIUM 5000 UNITS: 5000 INJECTION, SOLUTION INTRAVENOUS; SUBCUTANEOUS at 14:50

## 2018-04-28 RX ADMIN — TERAZOSIN HYDROCHLORIDE ANHYDROUS 10 MG: 5 CAPSULE ORAL at 21:25

## 2018-04-28 RX ADMIN — CALCIUM CITRATE 200 MG (950 MG) TABLET 1900 MG: at 08:21

## 2018-04-28 RX ADMIN — Medication 16 G: at 06:39

## 2018-04-28 RX ADMIN — HYDRALAZINE HYDROCHLORIDE 50 MG: 10 TABLET, FILM COATED ORAL at 21:22

## 2018-04-28 RX ADMIN — ALPRAZOLAM 0.5 MG: 0.5 TABLET ORAL at 12:47

## 2018-04-28 RX ADMIN — CLONIDINE HYDROCHLORIDE 0.2 MG: 0.1 TABLET ORAL at 08:19

## 2018-04-28 RX ADMIN — DIBASIC SODIUM PHOSPHATE, MONOBASIC POTASSIUM PHOSPHATE AND MONOBASIC SODIUM PHOSPHATE 2 TABLET: 852; 155; 130 TABLET ORAL at 21:20

## 2018-04-28 RX ADMIN — TERAZOSIN HYDROCHLORIDE ANHYDROUS 10 MG: 5 CAPSULE ORAL at 21:24

## 2018-04-28 RX ADMIN — PRAVASTATIN SODIUM 40 MG: 20 TABLET ORAL at 21:24

## 2018-04-28 RX ADMIN — CALCIUM GLUCONATE 2 G: 94 INJECTION, SOLUTION INTRAVENOUS at 10:07

## 2018-04-28 RX ADMIN — HEPARIN SODIUM 5000 UNITS: 5000 INJECTION, SOLUTION INTRAVENOUS; SUBCUTANEOUS at 06:07

## 2018-04-28 RX ADMIN — OXYCODONE HYDROCHLORIDE 5 MG: 5 TABLET ORAL at 14:55

## 2018-04-28 RX ADMIN — FUROSEMIDE 40 MG: 40 TABLET ORAL at 06:06

## 2018-04-28 RX ADMIN — INSULIN HUMAN 4 UNITS: 100 INJECTION, SOLUTION PARENTERAL at 17:41

## 2018-04-28 RX ADMIN — HYDRALAZINE HYDROCHLORIDE 50 MG: 10 TABLET, FILM COATED ORAL at 06:07

## 2018-04-28 RX ADMIN — HYDRALAZINE HYDROCHLORIDE 50 MG: 10 TABLET, FILM COATED ORAL at 14:50

## 2018-04-28 RX ADMIN — Medication: at 08:23

## 2018-04-28 RX ADMIN — OXYCODONE HYDROCHLORIDE 10 MG: 10 TABLET, FILM COATED, EXTENDED RELEASE ORAL at 21:21

## 2018-04-28 RX ADMIN — FUROSEMIDE 40 MG: 40 TABLET ORAL at 14:50

## 2018-04-28 RX ADMIN — INSULIN HUMAN 4 UNITS: 100 INJECTION, SOLUTION PARENTERAL at 21:14

## 2018-04-28 RX ADMIN — HEPARIN SODIUM 5000 UNITS: 5000 INJECTION, SOLUTION INTRAVENOUS; SUBCUTANEOUS at 21:19

## 2018-04-28 RX ADMIN — FUROSEMIDE 40 MG: 40 TABLET ORAL at 21:24

## 2018-04-28 RX ADMIN — OXYCODONE HYDROCHLORIDE 5 MG: 5 TABLET ORAL at 10:06

## 2018-04-28 RX ADMIN — ACYCLOVIR 400 MG: 200 CAPSULE ORAL at 21:25

## 2018-04-28 RX ADMIN — Medication: at 21:19

## 2018-04-28 ASSESSMENT — ENCOUNTER SYMPTOMS
FOCAL WEAKNESS: 0
DIARRHEA: 0
CHILLS: 0
SHORTNESS OF BREATH: 0
CONSTIPATION: 0
ABDOMINAL PAIN: 0
NERVOUS/ANXIOUS: 1
DEPRESSION: 1
FEVER: 0
CLAUDICATION: 0
NAUSEA: 0
HEARTBURN: 0
COUGH: 0
HEADACHES: 0
INSOMNIA: 0
WEAKNESS: 1
PHOTOPHOBIA: 0
VOMITING: 0
DIZZINESS: 0
SPEECH CHANGE: 0
MYALGIAS: 1
SENSORY CHANGE: 0
BLURRED VISION: 0

## 2018-04-28 ASSESSMENT — PAIN SCALES - GENERAL
PAINLEVEL_OUTOF10: 2
PAINLEVEL_OUTOF10: 4
PAINLEVEL_OUTOF10: 3
PAINLEVEL_OUTOF10: 3
PAINLEVEL_OUTOF10: 8
PAINLEVEL_OUTOF10: 4
PAINLEVEL_OUTOF10: 8

## 2018-04-28 ASSESSMENT — PAIN SCALES - WONG BAKER: WONGBAKER_NUMERICALRESPONSE: HURTS A LITTLE MORE

## 2018-04-28 NOTE — PROGRESS NOTES
[facility] Hospitalist Progress Note    Date of Service: 4/27/2018    Chief Complaint  67 y.o. female admitted 4/10/2018 with multiple myeloma, slow healing fracture left humerus and recent BLE cellulitis.  Patient has since completed radiation L humerus, started on radiation B femurs and had single cycle of chemotherapy.    Interval Problem Update  4/24 Patient states anxiety has improved with stopping of steroids, she is still nervous but it is under good control. She could only tolerate one femur treated yesterday but was able to tolerate both today.  Lantus dose decreased with steroids but patient still with fasting glucose >300, will increase lantus dose to 50 units qhs.  Heart rate still <60, dc toprol.  4/25 Patient hypoglycemic again this am prior to breakfast, resolved with PO intake. Will change lantus back to 40 units qhs.  She is still having significant swelling from pannus and legs with weeping.  Will start more aggressive diuresis 40 tid PO.  Renal function doing well.  She is tolerating both femurs for radiation well.    4/26 Patient in depressed mood today, states she misses her family and friends and they cannot visit her right now as they all have busy lives.  She still has same amount drainage from pannus and legs but is having good output with diuresis and renal function is stable - will continue aggressive tid diuresis.  4/27 Patient sleeping much of the day, still with weeping from skin despite aggressive diuresis.  Her platelet count is dropping with chemo regimen, she is now neutropenic with  and protective precautions in place.  She does not have a good prognosis and is still full code without a good disposition to return to [place] and lives alone.  Palliative care consultation requested.  She has one more radiation treatment planned for Monday.    Consultants/Specialty  Oncology - Christi  XRT - Peddada    Disposition  Tbd, patient does not want to dc to  SNF, feels she has enough support at home.        Review of Systems   Constitutional: Negative for chills and fever.   HENT: Negative for congestion.    Eyes: Negative for blurred vision and photophobia.   Respiratory: Negative for cough and shortness of breath.    Cardiovascular: Positive for leg swelling. Negative for chest pain and claudication.   Gastrointestinal: Negative for abdominal pain, constipation, diarrhea, heartburn, nausea and vomiting.   Genitourinary: Negative for dysuria and hematuria.   Musculoskeletal: Positive for myalgias. Negative for joint pain.   Skin: Negative for itching and rash.   Neurological: Positive for weakness. Negative for dizziness, sensory change, speech change, focal weakness and headaches.   Psychiatric/Behavioral: Positive for depression. The patient is nervous/anxious. The patient does not have insomnia.       Physical Exam  Laboratory/Imaging   Hemodynamics  Temp (24hrs), Av.3 °C (99.2 °F), Min:37.2 °C (99 °F), Max:37.4 °C (99.4 °F)   Temperature: 37.3 °C (99.1 °F)  Pulse  Av.7  Min: 38  Max: 101    Blood Pressure : 149/60      Respiratory      Respiration: 18, Pulse Oximetry: 96 %     Work Of Breathing / Effort: Mild  RUL Breath Sounds: Clear, RML Breath Sounds: Diminished, RLL Breath Sounds: Diminished, CHARLOTTE Breath Sounds: Clear, LLL Breath Sounds: Diminished    Fluids    Intake/Output Summary (Last 24 hours) at 18 2100  Last data filed at 18 1826   Gross per 24 hour   Intake                0 ml   Output             2930 ml   Net            -2930 ml       Nutrition  Orders Placed This Encounter   Procedures   • Diet Order     Standing Status:   Standing     Number of Occurrences:   1     Order Specific Question:   Diet:     Answer:   Diabetic [3]     Physical Exam   Constitutional: She is oriented to person, place, and time. She appears well-developed and well-nourished. No distress.   HENT:   Head: Normocephalic and atraumatic.   Eyes: Conjunctivae  are normal. No scleral icterus.   Neck: Neck supple. No JVD present.   Cardiovascular: Normal rate, regular rhythm and normal heart sounds.  Exam reveals no gallop and no friction rub.    No murmur heard.  Pulmonary/Chest: Effort normal and breath sounds normal. No respiratory distress. She has no wheezes. She exhibits no tenderness.   Abdominal: Soft. Bowel sounds are normal. She exhibits no distension. There is no tenderness. There is no guarding.   Weeping from abdominal pannus d/t edema/anasarca   Musculoskeletal: She exhibits edema (BLE). She exhibits no tenderness.   Neurological: She is alert and oriented to person, place, and time. No cranial nerve deficit.   Skin: Skin is warm and dry. She is not diaphoretic. No erythema. No pallor.   Psychiatric: Her behavior is normal.   Depressed mood, flat affect     Nursing note and vitals reviewed.      Recent Labs      04/25/18   0044  04/26/18   0133  04/27/18   0038   WBC  2.7*  2.4*  1.3*   RBC  2.52*  2.69*  3.05*   HEMOGLOBIN  7.6*  8.1*  9.3*   HEMATOCRIT  24.5*  26.3*  29.2*   MCV  97.2  97.8  95.7   MCH  30.2  30.1  30.5   MCHC  31.0*  30.8*  31.8*   RDW  54.8*  55.6*  55.0*   PLATELETCT  103*  109*  94*   MPV  11.7  11.2  10.6     Recent Labs      04/25/18   0044  04/26/18   0133  04/27/18   0038   SODIUM  133*  139  137   POTASSIUM  4.2  4.4  4.1   CHLORIDE  101  103  101   CO2  29  32  32   GLUCOSE  197*  79  100*   BUN  28*  25*  22   CREATININE  0.83  0.97  0.68   CALCIUM  6.8*  6.4*  6.7*                      Assessment/Plan     * Pathologic fracture- (present on admission)   Assessment & Plan    -Left humerus  -widespread lytic disease  -completed radiation to humerus  -pain control             Chemotherapy-induced thrombocytopenia   Assessment & Plan    Monitor for bleeding  Dropping daily           Leg edema   Assessment & Plan    - Left greater than right  - DC IV fluids  -PO Lasix scheduled bid  -Abdominal anasarca also          Hypocalcemia    Assessment & Plan    - Repleting as needed  Repeat 1 g Calcium gluconate again today.          Hypomagnesemia   Assessment & Plan    - Repleting as needed        Anxiety   Assessment & Plan    Exacerbated by situation  Xanax prn, IV Ativan prior to radiation if needed        Chronic venous stasis dermatitis of both lower extremities- (present on admission)   Assessment & Plan    -wound care, no clear e/o active infection at this time, defer abx's        Multiple myeloma (HCC)- (present on admission)   Assessment & Plan    CyBorD chemo - cycle 1 started 4/19/18  -recent diagnosis, appears aggressive, now with innumerable lytic lesions  -Dr Sim and Oksana consulted for assistance in management  - IV dilaudid PRN, oxy prn. DC maintenance steroids as these were causing confusion  -start oxycontin CR 10 mg BID  -skeletal survey done - spine obscured by body habitus, but multiple other lesions found  XRT to right and left femurs  -Palliative care consult for advanced care planning            Anemia- (present on admission)   Assessment & Plan    -related to her MM, appears near her baseline, no e/o overt bleeding at this time, monitor closely        Hyponatremia- (present on admission)   Assessment & Plan    -mild        Morbid obesity with BMI of 60.0-69.9, adult (CMS-HCC)- (present on admission)   Assessment & Plan    -encourage weight loss  Body mass index is 67.38 kg/m².        DM type 2 (diabetes mellitus, type 2) (CMS-HCC)- (present on admission)   Assessment & Plan    - With hyperglycemia  - Lantus increase from 40 to 50 units qhs and hypoglycemic again prior to breakfast - change back to 40 and leave it there, will adjust with short acting only  - Intermittent steroids for Velcade treatment also stopped per oncology.        Essential hypertension, benign- (present on admission)   Assessment & Plan    High with bradycardia  Increase dose po hydralazine  Clonidine bid to tid, add lisinopril 20 q day  Terazosin  qhs  DC Toprol d/t bradycardia - blood pressure better with d/c of this medication.            Quality-Core Measures   Reviewed items::  Medications reviewed, Labs reviewed and Radiology images reviewed  Singh catheter::  Urinary Tract Retention or Urinary Tract Obstruction (high risk skin breakdown d/t urinary incontinence and body habitus.)  DVT prophylaxis pharmacological::  Heparin  DVT prophylaxis - mechanical:  SCDs  Assessed for rehabilitation services:  Patient was assess for and/or received rehabilitation services during this hospitalization

## 2018-04-28 NOTE — PROGRESS NOTES
Assumed care of pt @0700. Bedside report received. Pt AOX 4. Pt complains about pain. Roxicodone 5 mg given. Pt anxious. Singh draining to gravity. PICC patent with positive blood return running RBC. R side pannus weeping. Last BM 04/26. Fall precaution in place. POC discussed with pt, all questions answered at this time. Pt makes needs known, call light within reach, hourly rounding in place.

## 2018-04-28 NOTE — CARE PLAN
Problem: Communication  Goal: The ability to communicate needs accurately and effectively will improve    Intervention: Educate patient and significant other/support system about the plan of care, procedures, treatments, medications and allow for questions  Educated pt on plan of care and meds for NOC shift.  Allowed time for questions.       Problem: Infection  Goal: Will remain free from infection    Intervention: Assess signs and symptoms of infection  Pt is neutropenic. Pt educated on hand hygiene.  Protective precautions in place.

## 2018-04-28 NOTE — CONSULTS
Reason for Palliative Care Consult: Advance Care Planning    Consulted by: Dr. chen    General: Aleida Pagan is a 67 year old female admitted 4/10/18 for intense pain throughout her body due to multiple myeloma. She was sent from her oncologist's (Dr. Sim) office. She has received CyBorD chemotherapy (Day 1 on 4/19/18) and is receiving radiation therapy (4/27/18 was treatment 5/5 of right femur and 4/5 of left femur). She has completed radiation to her left humerus. She has chemotherapy induced pancytopenia and is now neutropenic.    She resided alone in a mobile home in [facility]. She has a slow healing fracture of her left humerus caused by pathological fracture during a fall 12/12/17. After the fall she was at [facility] in [facility] where imaging revealed lytic lesions. She did not require higher level of care transfer and followed-up with Dr. Sim as an outpatient. At some point the patient went to [facility] and reports developing bilateral lower extremity cellulitis that was later treated at [facility] in [facility].     Comorbidities include morbid obesity with a BMI of 67 kg/m2, essential HTN, DM type 2, and chronic venous stasis dermatitis of bilateral lower extremities.     Consults: oncology and radiation oncology    Assessment:  Dyspnea: Yes; present at rest. Worsens with exertion moving around in bed and with anxiety. 95% on 4 LPM via nasal cannula.  Last BM: 04/26/18.    Pain: Yes; generalized bone pain. Dr. Chen managing with OxyContin and oxycodone. Patient also has IV morphine if needed. She did not tolerate steroids and they were discontinued.   Depression: Yes; patient does not have a history of depression but has situational depression.  She is very tearful.     Dementia: No.       Spiritual:  Is Rastafari or spirituality important for coping with this illness? Yes. LDS; patient declined arrangement for spiritual care visit as her [place] has been sending  "people to visit. She expressed appreciation for offering a spiritual care visit.  Has a  or spiritual provider visit been requested? No    Palliative Performance Scale: 40%    Advance Directive: None on file. Assisted with completion of DPOA-HC, statement of desires, and declaration. Patient chose # 2, 3, and 5. She did chose to complete the Declaration - Directive to Physician portion.    DPOA: None on file. Patient chose her sister Shazia Garcia to act as DPOA--[phone]; sister Rosette Diaz to act as first alternate [phone].   POLST: None on file.    Code Status: Full; after lengthy discussion patient wishes to have her code status changed to DNR; transfer to ICU is okay.       Outcome:  Met with patient at her bedside. Introduced myself and explained the role of palliative care. She is awake, alert, and oriented sitting up in bed doing a crossword puzzle. Asked for the patient's understanding of her disease process. She provided a lengthy and accurate history from the time of her fall 12/12/17. She reports that everything has changed for her since then. She has a good understanding of her disease process. She is tearful and becomes anxious. This required frequent therapeutic silence/touch throughout our encounter. Patient becomes short of breath with anxiety.     She reports she is very shocked by her disease as she \"felt fine\" before her fall. 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 She made many literary analogies regarding her life. She reports her \"moyer and shining armor\" never turned the corner. She worked very hard her whole life and has paid her home and car off. She reports that while at [facility], they discussed the patient transitioning into their care. She is struggling with giving up her independence but understands that for her " "safety, this may be a reality that she needs to face.     We completed her Advance Directive. She had read it in the past but could not bring herself to completing it stating \"I don't like the language.\" She is fearful of \"being left in a corner.\" Extensive education and discussion surrounding advance directives and code status provided. The patient chose to complete the directive section to her physicians as she does not want to be a burden on her family. She reports that she is not ready to die. She stated \"what do I do? Do I get cremated? Buried? I don't want any of it.\" She asked if she was dying right now. I explained the difference between what someone looks like when they are actively dying versus how she is currently doing (talking, eating, reading). I did discuss that her disease is terminal and her comorbid conditions put her at high risk for complications that could take her life at any time.     We discussed code status. I explained that a DNR does not mean do not treat. We explored many thoughts and feelings of the patient's. She is not ready to die, but she does not want to endure CPR/intubation as she understands her low likely hinton of survival and even lower likely hinton to return to current state of health. She agreed to a DNR status but is still seeking active treatment. Explained I would discuss with her hospitalist Dr. Chen. Also explained that AD will likely get notarized on Monday. All questions answered.    Provided therapeutic communication including active listening, therapeutic silence/touch, normalization, validation of patient's thoughts and feelings, and statements of support throughout encounter. Provided business card with palliative care contact information  and encouraged patient to call with any questions or needs or for support.     Patient joked about needing See's candy after our difficult conversation or any chocolate. Assisted RN/CNA in repositioning patient.     Plan: DNR/full " treat. AD to be notarized Monday. Patient may consider transfer to [facility] for post acute care but no definitive answer obtained at this encounter.     Recommendations: I do not recommend an ethics or hospice consult at this time because patient is recieving radiation therapy and would like continued care at this time. No ethica dilema exists at this time.    Updated: Dr. Chen; requested if I could give the patient a chocolate bar, which Dr. Chen agreed. Updated bedside RN as well.    Brought patient a chocolate bar. Requested anxiolytic from bedside RN on behalf of patient.    Thank you for allowing Palliative Care to participate in this patient's care. Please call our team with questions and/or additional needs.    Total visit time was 90 minutes discussing advance care planning.     Mariya ZELAYA  Palliative Care Nurse Practitioner  [phone]

## 2018-04-28 NOTE — PROGRESS NOTES
Monserrat from Lab called with critical result of wbc 1.7 at 0608. Critical lab result read back to Monserrat.   This critical lab result is within parameters established by Dr.[facility] policy for this patient

## 2018-04-28 NOTE — PROGRESS NOTES
Received report and assumed patient care at change of shift. Patient is resting in bed, A&Ox4. Patient reports 5/10 pain at this time, medications provided per MAR.Pt is very tearful.       Plan of care discussed, questions answered. Bed is in the lowest position and locked, call light within reach, non-skid socks in place, hourly rounding. Patient reports no further needs and this time.

## 2018-04-29 PROBLEM — L03.90 CELLULITIS: Status: ACTIVE | Noted: 2018-02-18

## 2018-04-29 LAB
ALBUMIN SERPL BCP-MCNC: 2.2 G/DL (ref 3.2–4.9)
BASOPHILS # BLD AUTO: 0 % (ref 0–1.8)
BASOPHILS # BLD: 0 K/UL (ref 0–0.12)
BUN SERPL-MCNC: 16 MG/DL (ref 8–22)
CA-I SERPL-SCNC: 0.9 MMOL/L (ref 1.1–1.3)
CALCIUM SERPL-MCNC: 6.3 MG/DL (ref 8.5–10.5)
CHLORIDE SERPL-SCNC: 98 MMOL/L (ref 96–112)
CO2 SERPL-SCNC: 34 MMOL/L (ref 20–33)
CREAT SERPL-MCNC: 0.8 MG/DL (ref 0.5–1.4)
EOSINOPHIL # BLD AUTO: 0.07 K/UL (ref 0–0.51)
EOSINOPHIL NFR BLD: 4.6 % (ref 0–6.9)
ERYTHROCYTE [DISTWIDTH] IN BLOOD BY AUTOMATED COUNT: 55.3 FL (ref 35.9–50)
GLUCOSE BLD-MCNC: 154 MG/DL (ref 65–99)
GLUCOSE BLD-MCNC: 162 MG/DL (ref 65–99)
GLUCOSE BLD-MCNC: 165 MG/DL (ref 65–99)
GLUCOSE BLD-MCNC: 219 MG/DL (ref 65–99)
GLUCOSE BLD-MCNC: 257 MG/DL (ref 65–99)
GLUCOSE SERPL-MCNC: 166 MG/DL (ref 65–99)
HCT VFR BLD AUTO: 24.8 % (ref 37–47)
HGB BLD-MCNC: 7.8 G/DL (ref 12–16)
LYMPHOCYTES # BLD AUTO: 0.42 K/UL (ref 1–4.8)
LYMPHOCYTES NFR BLD: 28.2 % (ref 22–41)
MANUAL DIFF BLD: NORMAL
MCH RBC QN AUTO: 30.4 PG (ref 27–33)
MCHC RBC AUTO-ENTMCNC: 31.5 G/DL (ref 33.6–35)
MCV RBC AUTO: 96.5 FL (ref 81.4–97.8)
MONOCYTES # BLD AUTO: 0.01 K/UL (ref 0–0.85)
MONOCYTES NFR BLD AUTO: 0.9 % (ref 0–13.4)
MORPHOLOGY BLD-IMP: NORMAL
NEUTROPHILS # BLD AUTO: 0.99 K/UL (ref 2–7.15)
NEUTROPHILS NFR BLD: 62.7 % (ref 44–72)
NEUTS BAND NFR BLD MANUAL: 3.6 % (ref 0–10)
NRBC # BLD AUTO: 0 K/UL
NRBC BLD-RTO: 0 /100 WBC
PHOSPHATE SERPL-MCNC: 4.4 MG/DL (ref 2.5–4.5)
PLATELET # BLD AUTO: 80 K/UL (ref 164–446)
PLATELET BLD QL SMEAR: NORMAL
PMV BLD AUTO: 10.2 FL (ref 9–12.9)
POTASSIUM SERPL-SCNC: 3.8 MMOL/L (ref 3.6–5.5)
RBC # BLD AUTO: 2.57 M/UL (ref 4.2–5.4)
RBC BLD AUTO: PRESENT
SODIUM SERPL-SCNC: 137 MMOL/L (ref 135–145)
VARIANT LYMPHS BLD QL SMEAR: NORMAL
WBC # BLD AUTO: 1.5 K/UL (ref 4.8–10.8)

## 2018-04-29 PROCEDURE — 85027 COMPLETE CBC AUTOMATED: CPT

## 2018-04-29 PROCEDURE — 85007 BL SMEAR W/DIFF WBC COUNT: CPT

## 2018-04-29 PROCEDURE — 700105 HCHG RX REV CODE 258: Performed by: INTERNAL MEDICINE

## 2018-04-29 PROCEDURE — 99232 SBSQ HOSP IP/OBS MODERATE 35: CPT | Performed by: INTERNAL MEDICINE

## 2018-04-29 PROCEDURE — 700102 HCHG RX REV CODE 250 W/ 637 OVERRIDE(OP): Performed by: INTERNAL MEDICINE

## 2018-04-29 PROCEDURE — A9270 NON-COVERED ITEM OR SERVICE: HCPCS | Performed by: INTERNAL MEDICINE

## 2018-04-29 PROCEDURE — 770004 HCHG ROOM/CARE - ONCOLOGY PRIVATE *

## 2018-04-29 PROCEDURE — 700111 HCHG RX REV CODE 636 W/ 250 OVERRIDE (IP): Mod: JW,JG | Performed by: INTERNAL MEDICINE

## 2018-04-29 PROCEDURE — 80069 RENAL FUNCTION PANEL: CPT

## 2018-04-29 PROCEDURE — 82962 GLUCOSE BLOOD TEST: CPT | Mod: 91

## 2018-04-29 PROCEDURE — 700111 HCHG RX REV CODE 636 W/ 250 OVERRIDE (IP): Performed by: INTERNAL MEDICINE

## 2018-04-29 PROCEDURE — 82330 ASSAY OF CALCIUM: CPT

## 2018-04-29 RX ORDER — LINEZOLID 600 MG/1
600 TABLET, FILM COATED ORAL EVERY 12 HOURS
Status: DISCONTINUED | OUTPATIENT
Start: 2018-04-29 | End: 2018-05-01

## 2018-04-29 RX ADMIN — ALPRAZOLAM 0.5 MG: 0.5 TABLET ORAL at 22:27

## 2018-04-29 RX ADMIN — CLONIDINE HYDROCHLORIDE 0.2 MG: 0.1 TABLET ORAL at 09:25

## 2018-04-29 RX ADMIN — ACYCLOVIR 400 MG: 200 CAPSULE ORAL at 09:25

## 2018-04-29 RX ADMIN — CALCIUM CITRATE 200 MG (950 MG) TABLET 1900 MG: at 09:25

## 2018-04-29 RX ADMIN — AMPICILLIN SODIUM AND SULBACTAM SODIUM 3 G: 2; 1 INJECTION, POWDER, FOR SOLUTION INTRAMUSCULAR; INTRAVENOUS at 06:00

## 2018-04-29 RX ADMIN — CLONIDINE HYDROCHLORIDE 0.2 MG: 0.1 TABLET ORAL at 15:01

## 2018-04-29 RX ADMIN — CALCIUM GLUCONATE 2 G: 94 INJECTION, SOLUTION INTRAVENOUS at 10:25

## 2018-04-29 RX ADMIN — FUROSEMIDE 40 MG: 40 TABLET ORAL at 21:35

## 2018-04-29 RX ADMIN — INSULIN HUMAN 3 UNITS: 100 INJECTION, SOLUTION PARENTERAL at 18:16

## 2018-04-29 RX ADMIN — FUROSEMIDE 40 MG: 40 TABLET ORAL at 06:18

## 2018-04-29 RX ADMIN — OXYCODONE HYDROCHLORIDE 5 MG: 5 TABLET ORAL at 01:24

## 2018-04-29 RX ADMIN — Medication: at 09:27

## 2018-04-29 RX ADMIN — OXYCODONE HYDROCHLORIDE 10 MG: 10 TABLET, FILM COATED, EXTENDED RELEASE ORAL at 19:37

## 2018-04-29 RX ADMIN — Medication: at 21:43

## 2018-04-29 RX ADMIN — INSULIN HUMAN 7 UNITS: 100 INJECTION, SOLUTION PARENTERAL at 21:21

## 2018-04-29 RX ADMIN — DIBASIC SODIUM PHOSPHATE, MONOBASIC POTASSIUM PHOSPHATE AND MONOBASIC SODIUM PHOSPHATE 2 TABLET: 852; 155; 130 TABLET ORAL at 09:25

## 2018-04-29 RX ADMIN — OXYCODONE HYDROCHLORIDE 10 MG: 10 TABLET, FILM COATED, EXTENDED RELEASE ORAL at 09:25

## 2018-04-29 RX ADMIN — HEPARIN SODIUM 5000 UNITS: 5000 INJECTION, SOLUTION INTRAVENOUS; SUBCUTANEOUS at 21:39

## 2018-04-29 RX ADMIN — ACYCLOVIR 400 MG: 200 CAPSULE ORAL at 21:34

## 2018-04-29 RX ADMIN — DIBASIC SODIUM PHOSPHATE, MONOBASIC POTASSIUM PHOSPHATE AND MONOBASIC SODIUM PHOSPHATE 2 TABLET: 852; 155; 130 TABLET ORAL at 21:36

## 2018-04-29 RX ADMIN — PRAVASTATIN SODIUM 40 MG: 20 TABLET ORAL at 21:35

## 2018-04-29 RX ADMIN — LISINOPRIL 20 MG: 20 TABLET ORAL at 09:25

## 2018-04-29 RX ADMIN — AMPICILLIN SODIUM AND SULBACTAM SODIUM 3 G: 2; 1 INJECTION, POWDER, FOR SOLUTION INTRAMUSCULAR; INTRAVENOUS at 18:13

## 2018-04-29 RX ADMIN — AMPICILLIN SODIUM AND SULBACTAM SODIUM 3 G: 2; 1 INJECTION, POWDER, FOR SOLUTION INTRAMUSCULAR; INTRAVENOUS at 14:04

## 2018-04-29 RX ADMIN — CLONIDINE HYDROCHLORIDE 0.2 MG: 0.1 TABLET ORAL at 21:34

## 2018-04-29 RX ADMIN — HYDRALAZINE HYDROCHLORIDE 50 MG: 10 TABLET, FILM COATED ORAL at 14:04

## 2018-04-29 RX ADMIN — AMPICILLIN SODIUM AND SULBACTAM SODIUM 3 G: 2; 1 INJECTION, POWDER, FOR SOLUTION INTRAMUSCULAR; INTRAVENOUS at 00:00

## 2018-04-29 RX ADMIN — HEPARIN SODIUM 5000 UNITS: 5000 INJECTION, SOLUTION INTRAVENOUS; SUBCUTANEOUS at 06:18

## 2018-04-29 RX ADMIN — FUROSEMIDE 40 MG: 40 TABLET ORAL at 14:04

## 2018-04-29 RX ADMIN — OXYCODONE HYDROCHLORIDE 5 MG: 5 TABLET ORAL at 06:17

## 2018-04-29 RX ADMIN — HEPARIN SODIUM 5000 UNITS: 5000 INJECTION, SOLUTION INTRAVENOUS; SUBCUTANEOUS at 14:04

## 2018-04-29 RX ADMIN — OXYCODONE HYDROCHLORIDE 5 MG: 5 TABLET ORAL at 22:27

## 2018-04-29 RX ADMIN — HYDRALAZINE HYDROCHLORIDE 50 MG: 10 TABLET, FILM COATED ORAL at 06:23

## 2018-04-29 RX ADMIN — TERAZOSIN HYDROCHLORIDE ANHYDROUS 10 MG: 5 CAPSULE ORAL at 21:37

## 2018-04-29 RX ADMIN — INSULIN HUMAN 4 UNITS: 100 INJECTION, SOLUTION PARENTERAL at 14:12

## 2018-04-29 RX ADMIN — BORTEZOMIB 2.6 MG: 3.5 INJECTION, POWDER, LYOPHILIZED, FOR SOLUTION INTRAVENOUS; SUBCUTANEOUS at 16:09

## 2018-04-29 RX ADMIN — INSULIN HUMAN 3 UNITS: 100 INJECTION, SOLUTION PARENTERAL at 09:31

## 2018-04-29 ASSESSMENT — ENCOUNTER SYMPTOMS
VOMITING: 0
EYES NEGATIVE: 1
RESPIRATORY NEGATIVE: 1
ABDOMINAL PAIN: 0
SPEECH CHANGE: 0
COUGH: 0
PHOTOPHOBIA: 0
CLAUDICATION: 0
INSOMNIA: 0
SHORTNESS OF BREATH: 0
BLURRED VISION: 0
CHILLS: 0
NAUSEA: 0
HEARTBURN: 0
HEADACHES: 0
MYALGIAS: 1
SENSORY CHANGE: 0
WEAKNESS: 1
CARDIOVASCULAR NEGATIVE: 1
DEPRESSION: 1
FEVER: 0
GASTROINTESTINAL NEGATIVE: 1
NERVOUS/ANXIOUS: 1
FOCAL WEAKNESS: 0
DIARRHEA: 0
CONSTIPATION: 0
DIZZINESS: 0

## 2018-04-29 ASSESSMENT — PAIN SCALES - GENERAL
PAINLEVEL_OUTOF10: 8
PAINLEVEL_OUTOF10: 5
PAINLEVEL_OUTOF10: 0
PAINLEVEL_OUTOF10: 4
PAINLEVEL_OUTOF10: 8
PAINLEVEL_OUTOF10: 4
PAINLEVEL_OUTOF10: 3

## 2018-04-29 ASSESSMENT — LIFESTYLE VARIABLES: DO YOU DRINK ALCOHOL: NO

## 2018-04-29 ASSESSMENT — PAIN SCALES - WONG BAKER
WONGBAKER_NUMERICALRESPONSE: HURTS A LITTLE MORE
WONGBAKER_NUMERICALRESPONSE: HURTS A LITTLE MORE
WONGBAKER_NUMERICALRESPONSE: HURTS JUST A LITTLE BIT

## 2018-04-29 NOTE — PROGRESS NOTES
Received bedside report from day shift RN. Assumed care at 1900. A/Ox4. Patient denies n/v, however, complains of 8/10 pain on left humerus. Awaiting radiation treatment 4/5 on left femur. Cellulitis of BLE observed. Medicated per MAR. DIVINE PICC dressing CDI, with IVF TKO. Singh draining to gravity. Accuchecks monitor for hypoglycemia. Plan of care discussed with patient. All questions answered at this time. Safety precautions in place, bed locked in low call light within reach. Rounding every 2 hours during night shift.

## 2018-04-29 NOTE — PROGRESS NOTES
Chemotherapy Verification - SECONDARY RN   Cycle 1 Day 11      Height = 160 cm  Weight = 172.5 kg  BSA = 2.77 m2  (2 m2 order-specific BSA)      Medication: Bortezomib  Dose: 1.3 mg/m2  Calculated Dose: 2.6 mg (ordered dose: 2.6 mg)                             (In mg/m2, AUC, mg/kg)       I confirm that this process was performed independently.

## 2018-04-29 NOTE — PROGRESS NOTES
Received critical lab value from Selene  at 0125 with a critical result of WBC 1.5. Critical results read back to  and noted. This critical lab result is within [facility] parameters and has followed daily trends. MD not notified.

## 2018-04-29 NOTE — PROGRESS NOTES
Oncology/Hematology Progress Note               Author: Emre Barraza Date & Time created: 4/29/2018  3:53 PM     CC:   Multiple myeloma    Interval History:    Completed radiation therapy to the left humerus  4/19/18- chemotherapy with CyBorD - D1  4/20/18-no adverse side effects, dexamethasone 40 mg today  4/22/18- hypoglycemia episode  4/23- feels about the same. Morbidly obese. Joint pain, stable  4/26- getting xrt . Generally frustrated about her predicament d 8 . Velcade given  4/27- stable, no acute complaints other than her chronic issues  4/29-new new complaints. Pain stable. She is frustrated about her inability to recover faster  Review of Systems:  Review of Systems   Constitutional: Positive for malaise/fatigue. Negative for chills and fever.   HENT: Negative.    Eyes: Negative.    Respiratory: Negative.    Cardiovascular: Negative.    Gastrointestinal: Negative.    Genitourinary: Negative.    Musculoskeletal: Positive for joint pain and myalgias.   Skin: Negative.    Neurological: Positive for weakness.   Endo/Heme/Allergies: Negative.        Physical Exam:  Physical Exam   Constitutional: She is oriented to person, place, and time.   Morbidly obese   HENT:   Head: Normocephalic.   Eyes: Conjunctivae are normal. Pupils are equal, round, and reactive to light.   Cardiovascular: Normal rate, regular rhythm and normal heart sounds.    Pulmonary/Chest: Effort normal and breath sounds normal.   Abdominal: Soft. Bowel sounds are normal.   Musculoskeletal: She exhibits edema.   She's had chronic edema symmetrical   Neurological: She is alert and oriented to person, place, and time.   Psychiatric: She has a normal mood and affect. Her behavior is normal. Judgment and thought content normal.       Labs:        Invalid input(s): [id number]      Recent Labs      04/27/18   0038  04/28/18   0415  04/29/18   0108   SODIUM  137  135  137   POTASSIUM  4.1  3.6  3.8   CHLORIDE  101  98  98   CO2  32  34*  34*    BUN    16   CREATININE  0.68  0.80  0.80   PHOSPHORUS  4.0  3.9  4.4   CALCIUM  6.7*  6.3*  6.3*     Recent Labs      188  18   0108   GLUCOSE  100*  52*  166*     Recent Labs      188  18   0108   RBC  3.05*  2.36*  2.57*   HEMOGLOBIN  9.3*  7.3*  7.8*   HEMATOCRIT  29.2*  22.9*  24.8*   PLATELETCT  94*  88*  80*     Recent Labs      188  18   0108   WBC  1.3*  1.7*  1.5*   NEUTSPOLYS  70.70  58.90  62.70   LYMPHOCYTES  20.60*  32.40  28.20   MONOCYTES  6.30  5.80  0.90   EOSINOPHILS  0.80  2.30  4.60   BASOPHILS  0.80  0.00  0.00     Recent Labs      18   0108   SODIUM  137  135  137   POTASSIUM  4.1  3.6  3.8   CHLORIDE  101  98  98   CO2  32  34*  34*   GLUCOSE  100*  52*  166*   BUN    16   CREATININE  0.68  0.80  0.80   CALCIUM  6.7*  6.3*  6.3*     Hemodynamics:  Temp (24hrs), Av °C (98.6 °F), Min:36.8 °C (98.3 °F), Max:37.3 °C (99.2 °F)  Temperature: 36.9 °C (98.4 °F)  Pulse  Av  Min: 38  Max: 108   Blood Pressure : 121/52     Respiratory:    Respiration: 16, Pulse Oximetry: 96 %     Work Of Breathing / Effort: Mild  RUL Breath Sounds: Clear, RML Breath Sounds: Diminished, RLL Breath Sounds: Diminished, CHARL[place]TE Breath Sounds: Clear, LLL Breath Sounds: Diminished  Fluids:    Intake/Output Summary (Last 24 hours) at 18 1124  Last data filed at 18 0300   Gross per 24 hour   Intake                0 ml   Output             1000 ml   Net            -1000 ml       GI/Nutrition:  Orders Placed This Encounter   Procedures   • Diet Order     Standing Status:   Standing     Number of Occurrences:   1     Order Specific Question:   Diet:     Answer:   Diabetic [3]     Medical Decision Making, by Problem:  Active Hospital Problems    Diagnosis   • *Pathologic fracture [M84.40XA]   • Hyponatremia [E87.1]   • Anemia [D64.9]   • Multiple myeloma  (CMS-HCC) [C90.00]   • Chronic venous stasis dermatitis of both lower extremities [I87.2]   • Morbid obesity with BMI of 60.0-69.9, adult (CMS-HCC) [E66.01, Z68.44]   • DM type 2 (diabetes mellitus, type 2) (CMS-HCC) [E11.9]   • Essential hypertension, benign [I10]     Past medical history, past surgical history, past social history unchanged reviewed    Plan:  1.  Multiple myeloma: 1P deletion intermediate risk, stage III based on ISS staging (beta 2 9.9 and albumin 2.7 before):      -, Status post radiation left humerus and will not start radiation to the right hip area until 4 more days next week.     -4/19/18- CyBorD - C1D1 -no adverse side effects  -4/20/18-dexamethasone 40 mg, ANC 2.99, platelets 136,000, creatinine 0.91 calcium 6.8, magnesium 1.8, albumin 2.5  -4/22/18-D4 Velcade and dexamethasone 40 mg daily ×2 days  -4/23- no new events. She continues to have trouble with hyper/hypoglycemia  -4/26- she will receive day #8 of Velcade. I will omit Decadron from her regimen given her labile blood sugars and significant fluid retention.  -4/27-overall, clinically stable. Undergoing radiation.  4/29- will receive day 11 Velcade. ANC on the lower side.  Will not receive Decadron due to labile blood sugars, fluid overload  2.  Pancytopenia-secondary to chemotherapy and concomitant radiation. WBC trending down, may need Granix in the next few days.   Hemoglobin stable to improved. Platelets stable      4.  Extremities: She's had chronic lower extremity edema.  Cellulitis + primary team. Plan to switch her to Zosyn,   5. Type 2 diabetes mellitus-has problems with early morning hypoglycemia related to steroids and insulin which will be managed by the hospitalist. Overall improved after stopping Decadron.        High complexity/scheduled with her primary team/radiation oncology  Quality-Core Measures

## 2018-04-29 NOTE — PROGRESS NOTES
Chemotherapy Verification - PRIMARY RN      Height = 1.6m  Weight = 172.5kg  BSA = Capped at 2m2       Medication: Velcade  Dose: 1.3mg/m2  Calculated Dose: 2.6mg=ordered dose                             (In mg/m2, AUC, mg/kg)           I confirm this process was performed independently with the BSA and all final chemotherapy dosing calculations congruent.  Any discrepancies of 5% or greater have been addressed with the chemotherapy pharmacist. The resolution of the discrepancy has been documented in the EPIC progress notes.

## 2018-04-29 NOTE — PROGRESS NOTES
"Pharmacy Chemotherapy Note    Patient Name: YESSY SINGER  Dx: Multiple Myeloma    Protocol: CyBorD     Bortezomib 1.3 mg/m2 IV push over 3-5 seconds or SubQ on days 1, 4, 8, and 11  Cyclophosphamide 900 mg/m2 IV over 30 min on day 1  Dexamethasone 40 mg PO Daily on days 1-2, 4-5, 8-9, and 11-12 Held cycle 1 day 8 and 11 per Dr. Barraza for elevated BS  21 day cycle for 3-4 cycles  (transplant) or maximal response, disease progression or unacceptable toxicity (previously untreated or non-transplant candidates)  NCCN Guidelines for Multiple Myeloma V.3.2017  EMANUEL Russell et al. Cyclophosphamide, bortezomib and dexamethasone (CyBorD) induction for newly diagnosed multiple myeloma: High response rates in a phase II clinical trial. Leukemia. 2009 July ; 23(7): 3578-2472.  Drew CASTELLANOS, et al. Once- versus twice-weekly bortezomib induction therapy with CyBorD in newly diagnosed multiple myeloma. BLOOD, 22APRIL 2010VOLUME 115, NUMBER 16  Juan S et al. Randomized, multicenter, phase 2 study (EVOLUTION) of combinations of bortezomib, dexamethasone, cyclophosphamide, and lenalidomide in previously untreated multiple myeloma. BLOOD, 2012 119: 8036-5514    /52   Pulse 88   Temp 36.9 °C (98.4 °F)   Resp 16   Ht 1.6 m (5' 2.99\")   Wt (!) 172.5 kg (380 lb 4.7 oz)   LMP 04/11/1994   SpO2 96%   Breastfeeding? No   BMI 67.38 kg/m²  Body surface area is 2.77 meters squared.  **Per MD max BSA of 2.0 for morbid obesity**    LABS 4/29/2018:  ANC~ 990 Plt = 80k   Hgb/Hct = 7.8/24.8   SCr = 0.8 mg/dL CrCl >125 mL/min     LABS 4/17/2018:  LFT's = 8/8/61 TBili = 0.4     **MD aware of lab results, OK to proceed with day 11**     Drug Order   (Drug name, dose, route, IV Fluid & volume, frequency, number of doses) Cycle: 1 Day 11      Previous treatment: C1D8 on 4/26/18     Medication = Bortezomib (Velcade)  Base Dose = 1.3 mg/m²  Calc Dose: Base Dose x 2 m² = 2.6 mg  Final Dose = 2.6 mg  Route = subcutaneous  Fluid & Volume = " 1.04 mL in syringe  Conc = 2.5 mg/mL  Admin Duration = to be given subcutaneously          <5% difference, OK to treat with final dose      By my signature below, I confirm this process was performed independently with the BSA and all final chemotherapy dosing calculations congruent. I have reviewed the above chemotherapy order and that my calculation of the final dose and BSA (when applicable) corroborate those calculations of the  pharmacist. Discrepancies of 5% or greater in the written dose have been addressed and documented within the EPIC Progress notes.    LESLIE Lugo, PharmD

## 2018-04-29 NOTE — ASSESSMENT & PLAN NOTE
Palliative care consulted, patient wants to change code status to DNR - done, completed Polst with APN.

## 2018-04-29 NOTE — PROGRESS NOTES
"Pharmacy Chemotherapy calculation:    Patient Name: Aleida Pagan  DX: Multiple Myeloma    Cycle 1, Day 11 Previous treatment: C1D8 = 4/26/18    Protocol: CyBorD    *Dosing Reference*  Bortezomib 1.3 mg/m2 IVP or subQ on Days 1, 4, 8, and 11  Cyclophosphamide 900 mg/m2 IV over 30 min on Day 1   -MD dosing over 60 min   Dexamethasone 40 mg po no days 1-2, 4-5, 8-9, and 11-12   --Hold Cycle 1 days 8-9 and days 11-12 per Dr. Barraza for elevated BS.  Repeat every 21 days for 3-4 cycles (transplant candidates) OR until maximal response, disease progression or unacceptable toxicity (previously treated or non-transplant candidates)   NCCN Guidelines for Multiple Myeloma V.3.2017.  Einsele H, et al. Blood. 2009; 114(22): abstr 131     Allergies:  Actos [pioglitazone hydrochloride]; Advil [ibuprofen micronized]; Cephalosporins; and [place]    /52   Pulse 88   Temp 36.9 °C (98.4 °F)   Resp 16   Ht 1.6 m (5' 2.99\")   Wt (!) 172.5 kg (380 lb 4.7 oz)   LMP 04/11/1994   SpO2 96%   Breastfeeding? No   BMI 67.38 kg/m²  Body surface area is 2.77 meters squared.     **MAX BSA = 2 m² per Dr. Sim for morbid obesity**    Labs 04/29/18:  ANC~ 1000     Plt = 80k   Hgb = 7.8   SCr = 0.8 mg/dL CrCl  >125mL/min   Corrected Ca = 7.7 (Calcium gluconate 2 gm IVPB once)  Labs discussed with Dr. Barraza, ok to proceed with Day 11 today.     Labs 04/17/18:    AST/ALT/AP = 8/8/61 TBili = 0.4         Bortezomib (Velcade) 1.3 mg/m2 x 2 m² = 2.6 mg    <5% difference, ok to treat with final dose = 2.6 mg subQ      Jack Ratliff, PharmD, BCOP        "

## 2018-04-29 NOTE — PROGRESS NOTES
[facility] Hospitalist Progress Note    Date of Service: 4/28/2018    Chief Complaint  67 y.o. female admitted 4/10/2018 with multiple myeloma, slow healing fracture left humerus and recent BLE cellulitis.  Patient has since completed radiation L humerus, started on radiation B femurs and had single cycle of chemotherapy.    Interval Problem Update  4/24 Patient states anxiety has improved with stopping of steroids, she is still nervous but it is under good control. She could only tolerate one femur treated yesterday but was able to tolerate both today.  Lantus dose decreased with steroids but patient still with fasting glucose >300, will increase lantus dose to 50 units qhs.  Heart rate still <60, dc toprol.  4/25 Patient hypoglycemic again this am prior to breakfast, resolved with PO intake. Will change lantus back to 40 units qhs.  She is still having significant swelling from pannus and legs with weeping.  Will start more aggressive diuresis 40 tid PO.  Renal function doing well.  She is tolerating both femurs for radiation well.    4/26 Patient in depressed mood today, states she misses her family and friends and they cannot visit her right now as they all have busy lives.  She still has same amount drainage from pannus and legs but is having good output with diuresis and renal function is stable - will continue aggressive tid diuresis.  4/27 Patient sleeping much of the day, still with weeping from skin despite aggressive diuresis.  Her platelet count is dropping with chemo regimen, she is now neutropenic with  and protective precautions in place.  She does not have a good prognosis and is still full code without a good disposition to return to [place] and lives alone.  Palliative care consultation requested.  She has one more radiation treatment planned for Monday.  4/28 Patient very sad today, she met with palliative care APN and does want to have code status changed to DNR.   She is more introverted today and trying to keep a positive outlook despite poor prognosis.  No acute complaints.    Consultants/Specialty  Oncology - Christi  XRT - Peddada    Disposition  Tbd, patient does not want to dc to SNF, feels she has enough support at home.        Review of Systems   Constitutional: Negative for chills and fever.   HENT: Negative for congestion.    Eyes: Negative for blurred vision and photophobia.   Respiratory: Negative for cough and shortness of breath.    Cardiovascular: Positive for leg swelling. Negative for chest pain and claudication.   Gastrointestinal: Negative for abdominal pain, constipation, diarrhea, heartburn, nausea and vomiting.   Genitourinary: Negative for dysuria and hematuria.   Musculoskeletal: Positive for myalgias. Negative for joint pain.   Skin: Negative for itching and rash.   Neurological: Positive for weakness. Negative for dizziness, sensory change, speech change, focal weakness and headaches.   Psychiatric/Behavioral: Positive for depression. The patient is nervous/anxious. The patient does not have insomnia.       Physical Exam  Laboratory/Imaging   Hemodynamics  Temp (24hrs), Av.9 °C (98.4 °F), Min:36.6 °C (97.8 °F), Max:37.1 °C (98.7 °F)   Temperature: 37.1 °C (98.7 °F)  Pulse  Av  Min: 38  Max: 108    Blood Pressure : 124/53      Respiratory      Respiration: 20, Pulse Oximetry: 95 %     Work Of Breathing / Effort: Mild  RUL Breath Sounds: Clear, RML Breath Sounds: Diminished, RLL Breath Sounds: Diminished, [place]TE Breath Sounds: Clear, LLL Breath Sounds: Diminished    Fluids    Intake/Output Summary (Last 24 hours) at 18 1468  Last data filed at 18 1800   Gross per 24 hour   Intake             3082 ml   Output             3700 ml   Net             -618 ml       Nutrition  Orders Placed This Encounter   Procedures   • Diet Order     Standing Status:   Standing     Number of Occurrences:   1     Order Specific Question:   Diet:      Answer:   Diabetic [3]     Physical Exam   Constitutional: She is oriented to person, place, and time. She appears well-developed and well-nourished. No distress.   HENT:   Head: Normocephalic and atraumatic.   Eyes: Conjunctivae are normal. No scleral icterus.   Neck: Neck supple. No JVD present.   Cardiovascular: Normal rate, regular rhythm and normal heart sounds.  Exam reveals no gallop and no friction rub.    No murmur heard.  Pulmonary/Chest: Effort normal and breath sounds normal. No respiratory distress. She has no wheezes. She exhibits no tenderness.   Abdominal: Soft. Bowel sounds are normal. She exhibits no distension. There is no tenderness. There is no guarding.   Weeping from abdominal pannus d/t edema/anasarca   Musculoskeletal: She exhibits edema (BLE). She exhibits no tenderness.   Neurological: She is alert and oriented to person, place, and time. No cranial nerve deficit.   Skin: Skin is warm and dry. She is not diaphoretic. No erythema. No pallor.   Psychiatric: Her behavior is normal.   Depressed mood, flat affect     Nursing note and vitals reviewed.      Recent Labs      04/26/18   0133  04/27/18   0038  04/28/18   0415   WBC  2.4*  1.3*  1.7*   RBC  2.69*  3.05*  2.36*   HEMOGLOBIN  8.1*  9.3*  7.3*   HEMATOCRIT  26.3*  29.2*  22.9*   MCV  97.8  95.7  95.3   MCH  30.1  30.5  30.9   MCHC  30.8*  31.8*  32.4*   RDW  55.6*  55.0*  54.9*   PLATELETCT  109*  94*  88*   MPV  11.2  10.6  10.7     Recent Labs      04/26/18   0133  04/27/18   0038  04/28/18   0415   SODIUM  139  137  135   POTASSIUM  4.4  4.1  3.6   CHLORIDE  103  101  98   CO2  32  32  34*   GLUCOSE  79  100*  52*   BUN  25*  22  18   CREATININE  0.97  0.68  0.80   CALCIUM  6.4*  6.7*  6.3*                      Assessment/Plan     * Pathologic fracture- (present on admission)   Assessment & Plan    -Left humerus  -widespread lytic disease  -completed radiation to humerus  -pain control             DNR (do not resuscitate)    Assessment & Plan    Palliative care meeting today, patient wants to change code status to DNR - done, completed Polst with APN.          Chemotherapy-induced thrombocytopenia   Assessment & Plan    Monitor for bleeding  Dropping daily           Leg edema   Assessment & Plan    - Left greater than right  - DC IV fluids  -PO Lasix scheduled bid, doing well and renal function stable.  -Abdominal anasarca also          Hypocalcemia   Assessment & Plan    - Repleting as needed  Repeat 1 g Calcium gluconate again today.          Hypomagnesemia   Assessment & Plan    - Repleting as needed        Anxiety   Assessment & Plan    Exacerbated by situation  Xanax prn, IV Ativan prior to radiation if needed        Chronic venous stasis dermatitis of both lower extremities- (present on admission)   Assessment & Plan    -wound care, no clear e/o active infection at this time, defer abx's        Multiple myeloma (HCC)- (present on admission)   Assessment & Plan    CyBorD chemo - cycle 1 started 4/19/18  -recent diagnosis, appears aggressive, now with innumerable lytic lesions  -Dr Sim and Oksana consulted for assistance in management  - IV dilaudid PRN, oxy prn. DC maintenance steroids as these were causing confusion  -start oxycontin CR 10 mg BID  -skeletal survey done - spine obscured by body habitus, but multiple other lesions found  XRT to right and left femurs  -Palliative care consult for advanced care planning            Anemia- (present on admission)   Assessment & Plan    -related to her MM, appears near her baseline, no e/o overt bleeding at this time, monitor closely        Hyponatremia- (present on admission)   Assessment & Plan    -mild        Morbid obesity with BMI of 60.0-69.9, adult (CMS-HCC)- (present on admission)   Assessment & Plan    -encourage weight loss  Body mass index is 67.38 kg/m².        DM type 2 (diabetes mellitus, type 2) (CMS-HCC)- (present on admission)   Assessment & Plan    - With  hyperglycemia  - Lantus increase from 40 to 50 units qhs and hypoglycemic again prior to breakfast - change back to 40 and leave it there, will adjust with short acting only  - Intermittent steroids for Velcade treatment also stopped per oncology.        Essential hypertension, benign- (present on admission)   Assessment & Plan    High with bradycardia  Increase dose po hydralazine  Clonidine bid to tid, add lisinopril 20 q day  Terazosin qhs  DC Toprol d/t bradycardia - blood pressure better with d/c of this medication.            Quality-Core Measures   Reviewed items::  Medications reviewed, Labs reviewed and Radiology images reviewed  Singh catheter::  Urinary Tract Retention or Urinary Tract Obstruction (high risk skin breakdown d/t urinary incontinence and body habitus.)  DVT prophylaxis pharmacological::  Heparin  DVT prophylaxis - mechanical:  SCDs  Assessed for rehabilitation services:  Patient was assess for and/or received rehabilitation services during this hospitalization

## 2018-04-30 ENCOUNTER — APPOINTMENT (OUTPATIENT)
Dept: RADIOLOGY | Facility: MEDICAL CENTER | Age: 68
DRG: 840 | End: 2018-04-30
Attending: INTERNAL MEDICINE
Payer: MEDICARE

## 2018-04-30 PROBLEM — R50.9 FEVER: Status: ACTIVE | Noted: 2018-04-30

## 2018-04-30 LAB
ALBUMIN SERPL BCP-MCNC: 2.2 G/DL (ref 3.2–4.9)
AMORPH CRY #/AREA URNS HPF: PRESENT /HPF
APPEARANCE UR: ABNORMAL
BACTERIA #/AREA URNS HPF: NEGATIVE /HPF
BASOPHILS # BLD AUTO: 0 % (ref 0–1.8)
BASOPHILS # BLD: 0 K/UL (ref 0–0.12)
BILIRUB UR QL STRIP.AUTO: NEGATIVE
BUN SERPL-MCNC: 17 MG/DL (ref 8–22)
CA-I SERPL-SCNC: 0.8 MMOL/L (ref 1.1–1.3)
CALCIUM SERPL-MCNC: 6.3 MG/DL (ref 8.5–10.5)
CHLORIDE SERPL-SCNC: 97 MMOL/L (ref 96–112)
CO2 SERPL-SCNC: 30 MMOL/L (ref 20–33)
COLOR UR: YELLOW
CREAT SERPL-MCNC: 0.7 MG/DL (ref 0.5–1.4)
EOSINOPHIL # BLD AUTO: 0.02 K/UL (ref 0–0.51)
EOSINOPHIL NFR BLD: 1.2 % (ref 0–6.9)
EPI CELLS #/AREA URNS HPF: ABNORMAL /HPF
ERYTHROCYTE [DISTWIDTH] IN BLOOD BY AUTOMATED COUNT: 57.1 FL (ref 35.9–50)
GLUCOSE BLD-MCNC: 159 MG/DL (ref 65–99)
GLUCOSE BLD-MCNC: 183 MG/DL (ref 65–99)
GLUCOSE BLD-MCNC: 217 MG/DL (ref 65–99)
GLUCOSE BLD-MCNC: 222 MG/DL (ref 65–99)
GLUCOSE SERPL-MCNC: 133 MG/DL (ref 65–99)
GLUCOSE UR STRIP.AUTO-MCNC: NEGATIVE MG/DL
HCT VFR BLD AUTO: 24.7 % (ref 37–47)
HGB BLD-MCNC: 7.9 G/DL (ref 12–16)
HYALINE CASTS #/AREA URNS LPF: ABNORMAL /LPF
KETONES UR STRIP.AUTO-MCNC: NEGATIVE MG/DL
LACTATE BLD-SCNC: 1.1 MMOL/L (ref 0.5–2)
LEUKOCYTE ESTERASE UR QL STRIP.AUTO: ABNORMAL
LYMPHOCYTES # BLD AUTO: 0.38 K/UL (ref 1–4.8)
LYMPHOCYTES NFR BLD: 18.8 % (ref 22–41)
MANUAL DIFF BLD: NORMAL
MCH RBC QN AUTO: 31 PG (ref 27–33)
MCHC RBC AUTO-ENTMCNC: 32 G/DL (ref 33.6–35)
MCV RBC AUTO: 96.9 FL (ref 81.4–97.8)
MICRO URNS: ABNORMAL
MONOCYTES # BLD AUTO: 0.1 K/UL (ref 0–0.85)
MONOCYTES NFR BLD AUTO: 5 % (ref 0–13.4)
MORPHOLOGY BLD-IMP: NORMAL
MYELOCYTES NFR BLD MANUAL: 1.3 %
NEUTROPHILS # BLD AUTO: 1.47 K/UL (ref 2–7.15)
NEUTROPHILS NFR BLD: 63.7 % (ref 44–72)
NEUTS BAND NFR BLD MANUAL: 10 % (ref 0–10)
NITRITE UR QL STRIP.AUTO: NEGATIVE
NRBC # BLD AUTO: 0 K/UL
NRBC BLD-RTO: 0 /100 WBC
PH UR STRIP.AUTO: 5 [PH]
PHOSPHATE SERPL-MCNC: 4.1 MG/DL (ref 2.5–4.5)
PLATELET # BLD AUTO: 79 K/UL (ref 164–446)
PLATELET BLD QL SMEAR: NORMAL
PMV BLD AUTO: 11.3 FL (ref 9–12.9)
POTASSIUM SERPL-SCNC: 3.9 MMOL/L (ref 3.6–5.5)
PROT UR QL STRIP: NEGATIVE MG/DL
RBC # BLD AUTO: 2.55 M/UL (ref 4.2–5.4)
RBC # URNS HPF: ABNORMAL /HPF
RBC BLD AUTO: NORMAL
RBC UR QL AUTO: ABNORMAL
RENAL EPI CELLS #/AREA URNS HPF: ABNORMAL /HPF
SODIUM SERPL-SCNC: 137 MMOL/L (ref 135–145)
SP GR UR STRIP.AUTO: 1.01
UROBILINOGEN UR STRIP.AUTO-MCNC: 0.2 MG/DL
WBC # BLD AUTO: 2 K/UL (ref 4.8–10.8)
WBC #/AREA URNS HPF: ABNORMAL /HPF

## 2018-04-30 PROCEDURE — 700105 HCHG RX REV CODE 258: Performed by: INTERNAL MEDICINE

## 2018-04-30 PROCEDURE — 99232 SBSQ HOSP IP/OBS MODERATE 35: CPT | Performed by: INTERNAL MEDICINE

## 2018-04-30 PROCEDURE — 82962 GLUCOSE BLOOD TEST: CPT | Mod: 91

## 2018-04-30 PROCEDURE — 71045 X-RAY EXAM CHEST 1 VIEW: CPT

## 2018-04-30 PROCEDURE — 700102 HCHG RX REV CODE 250 W/ 637 OVERRIDE(OP): Performed by: INTERNAL MEDICINE

## 2018-04-30 PROCEDURE — 302255 BARRIER CREAM MOISTURE BAZA PROTECT (ZINC) 5OZ: Performed by: INTERNAL MEDICINE

## 2018-04-30 PROCEDURE — 80069 RENAL FUNCTION PANEL: CPT

## 2018-04-30 PROCEDURE — 87040 BLOOD CULTURE FOR BACTERIA: CPT | Mod: 91

## 2018-04-30 PROCEDURE — 83605 ASSAY OF LACTIC ACID: CPT

## 2018-04-30 PROCEDURE — 81001 URINALYSIS AUTO W/SCOPE: CPT

## 2018-04-30 PROCEDURE — 85007 BL SMEAR W/DIFF WBC COUNT: CPT

## 2018-04-30 PROCEDURE — 700111 HCHG RX REV CODE 636 W/ 250 OVERRIDE (IP): Performed by: INTERNAL MEDICINE

## 2018-04-30 PROCEDURE — 85027 COMPLETE CBC AUTOMATED: CPT

## 2018-04-30 PROCEDURE — A9270 NON-COVERED ITEM OR SERVICE: HCPCS | Performed by: INTERNAL MEDICINE

## 2018-04-30 PROCEDURE — 82330 ASSAY OF CALCIUM: CPT

## 2018-04-30 PROCEDURE — A6250 SKIN SEAL PROTECT MOISTURIZR: HCPCS | Performed by: INTERNAL MEDICINE

## 2018-04-30 PROCEDURE — 36415 COLL VENOUS BLD VENIPUNCTURE: CPT

## 2018-04-30 PROCEDURE — 770004 HCHG ROOM/CARE - ONCOLOGY PRIVATE *

## 2018-04-30 RX ORDER — NYSTATIN 100000 [USP'U]/G
POWDER TOPICAL 2 TIMES DAILY
Status: DISCONTINUED | OUTPATIENT
Start: 2018-04-30 | End: 2018-05-10

## 2018-04-30 RX ORDER — SODIUM CHLORIDE 9 MG/ML
500 INJECTION, SOLUTION INTRAVENOUS ONCE
Status: COMPLETED | OUTPATIENT
Start: 2018-04-30 | End: 2018-04-30

## 2018-04-30 RX ADMIN — ONDANSETRON 4 MG: 2 INJECTION INTRAMUSCULAR; INTRAVENOUS at 09:43

## 2018-04-30 RX ADMIN — MORPHINE SULFATE 1 MG: 4 INJECTION INTRAVENOUS at 09:49

## 2018-04-30 RX ADMIN — LINEZOLID 600 MG: 600 TABLET, FILM COATED ORAL at 21:49

## 2018-04-30 RX ADMIN — FUROSEMIDE 40 MG: 40 TABLET ORAL at 14:25

## 2018-04-30 RX ADMIN — AMPICILLIN SODIUM AND SULBACTAM SODIUM 3 G: 2; 1 INJECTION, POWDER, FOR SOLUTION INTRAMUSCULAR; INTRAVENOUS at 00:55

## 2018-04-30 RX ADMIN — OXYCODONE HYDROCHLORIDE 5 MG: 5 TABLET ORAL at 15:35

## 2018-04-30 RX ADMIN — HEPARIN SODIUM 5000 UNITS: 5000 INJECTION, SOLUTION INTRAVENOUS; SUBCUTANEOUS at 05:32

## 2018-04-30 RX ADMIN — CALCIUM CITRATE 200 MG (950 MG) TABLET 1900 MG: at 11:09

## 2018-04-30 RX ADMIN — DIBASIC SODIUM PHOSPHATE, MONOBASIC POTASSIUM PHOSPHATE AND MONOBASIC SODIUM PHOSPHATE 2 TABLET: 852; 155; 130 TABLET ORAL at 11:10

## 2018-04-30 RX ADMIN — MEROPENEM 500 MG: 500 INJECTION, POWDER, FOR SOLUTION INTRAVENOUS at 23:24

## 2018-04-30 RX ADMIN — SODIUM CHLORIDE 500 ML: 9 INJECTION, SOLUTION INTRAVENOUS at 06:32

## 2018-04-30 RX ADMIN — Medication: at 21:53

## 2018-04-30 RX ADMIN — AMPICILLIN SODIUM AND SULBACTAM SODIUM 3 G: 2; 1 INJECTION, POWDER, FOR SOLUTION INTRAMUSCULAR; INTRAVENOUS at 05:32

## 2018-04-30 RX ADMIN — ACYCLOVIR 400 MG: 200 CAPSULE ORAL at 11:08

## 2018-04-30 RX ADMIN — NYSTATIN: 100000 POWDER TOPICAL at 21:53

## 2018-04-30 RX ADMIN — CALCIUM GLUCONATE 3 G: 94 INJECTION, SOLUTION INTRAVENOUS at 09:43

## 2018-04-30 RX ADMIN — INSULIN HUMAN 3 UNITS: 100 INJECTION, SOLUTION PARENTERAL at 13:15

## 2018-04-30 RX ADMIN — ONDANSETRON 4 MG: 4 TABLET, ORALLY DISINTEGRATING ORAL at 08:06

## 2018-04-30 RX ADMIN — MEROPENEM 500 MG: 500 INJECTION, POWDER, FOR SOLUTION INTRAVENOUS at 17:47

## 2018-04-30 RX ADMIN — DIBASIC SODIUM PHOSPHATE, MONOBASIC POTASSIUM PHOSPHATE AND MONOBASIC SODIUM PHOSPHATE 2 TABLET: 852; 155; 130 TABLET ORAL at 21:51

## 2018-04-30 RX ADMIN — INSULIN HUMAN 4 UNITS: 100 INJECTION, SOLUTION PARENTERAL at 18:34

## 2018-04-30 RX ADMIN — CLONIDINE HYDROCHLORIDE 0.2 MG: 0.1 TABLET ORAL at 21:50

## 2018-04-30 RX ADMIN — MEROPENEM 500 MG: 500 INJECTION, POWDER, FOR SOLUTION INTRAVENOUS at 11:06

## 2018-04-30 RX ADMIN — LINEZOLID 600 MG: 600 TABLET, FILM COATED ORAL at 00:55

## 2018-04-30 RX ADMIN — INSULIN HUMAN 4 UNITS: 100 INJECTION, SOLUTION PARENTERAL at 22:00

## 2018-04-30 RX ADMIN — PRAVASTATIN SODIUM 40 MG: 20 TABLET ORAL at 21:50

## 2018-04-30 RX ADMIN — TERAZOSIN HYDROCHLORIDE ANHYDROUS 10 MG: 5 CAPSULE ORAL at 21:52

## 2018-04-30 RX ADMIN — HYDRALAZINE HYDROCHLORIDE 50 MG: 10 TABLET, FILM COATED ORAL at 21:51

## 2018-04-30 RX ADMIN — FUROSEMIDE 40 MG: 40 TABLET ORAL at 05:32

## 2018-04-30 RX ADMIN — OXYCODONE HYDROCHLORIDE 5 MG: 5 TABLET ORAL at 20:52

## 2018-04-30 RX ADMIN — OXYCODONE HYDROCHLORIDE 5 MG: 5 TABLET ORAL at 11:14

## 2018-04-30 RX ADMIN — HEPARIN SODIUM 5000 UNITS: 5000 INJECTION, SOLUTION INTRAVENOUS; SUBCUTANEOUS at 21:52

## 2018-04-30 RX ADMIN — LINEZOLID 600 MG: 600 TABLET, FILM COATED ORAL at 11:09

## 2018-04-30 RX ADMIN — OXYCODONE HYDROCHLORIDE 10 MG: 10 TABLET, FILM COATED, EXTENDED RELEASE ORAL at 21:50

## 2018-04-30 RX ADMIN — Medication: at 08:06

## 2018-04-30 RX ADMIN — OXYCODONE HYDROCHLORIDE 10 MG: 10 TABLET, FILM COATED, EXTENDED RELEASE ORAL at 07:32

## 2018-04-30 RX ADMIN — FUROSEMIDE 40 MG: 40 TABLET ORAL at 21:51

## 2018-04-30 RX ADMIN — ALPRAZOLAM 0.5 MG: 0.5 TABLET ORAL at 15:51

## 2018-04-30 RX ADMIN — HEPARIN SODIUM 5000 UNITS: 5000 INJECTION, SOLUTION INTRAVENOUS; SUBCUTANEOUS at 14:25

## 2018-04-30 RX ADMIN — MORPHINE SULFATE 1 MG: 4 INJECTION INTRAVENOUS at 23:24

## 2018-04-30 RX ADMIN — ACETAMINOPHEN 975 MG: 325 TABLET, FILM COATED ORAL at 05:32

## 2018-04-30 RX ADMIN — NYSTATIN: 100000 POWDER TOPICAL at 14:25

## 2018-04-30 RX ADMIN — ONDANSETRON 4 MG: 2 INJECTION INTRAMUSCULAR; INTRAVENOUS at 23:24

## 2018-04-30 RX ADMIN — HYDRALAZINE HYDROCHLORIDE 50 MG: 10 TABLET, FILM COATED ORAL at 14:25

## 2018-04-30 RX ADMIN — ACYCLOVIR 400 MG: 200 CAPSULE ORAL at 21:49

## 2018-04-30 ASSESSMENT — PAIN SCALES - GENERAL
PAINLEVEL_OUTOF10: 8
PAINLEVEL_OUTOF10: 6
PAINLEVEL_OUTOF10: 7
PAINLEVEL_OUTOF10: 7
PAINLEVEL_OUTOF10: 8
PAINLEVEL_OUTOF10: 4
PAINLEVEL_OUTOF10: 7
PAINLEVEL_OUTOF10: 3
PAINLEVEL_OUTOF10: 8

## 2018-04-30 ASSESSMENT — ENCOUNTER SYMPTOMS
CONSTIPATION: 0
NAUSEA: 1
BLURRED VISION: 0
FOCAL WEAKNESS: 0
MYALGIAS: 1
SHORTNESS OF BREATH: 1
COUGH: 0
NAUSEA: 0
HEMOPTYSIS: 0
SPEECH CHANGE: 0
NERVOUS/ANXIOUS: 1
SENSORY CHANGE: 0
SORE THROAT: 0
DIZZINESS: 0
CHILLS: 0
INSOMNIA: 0
DOUBLE VISION: 0
EYE DISCHARGE: 0
ABDOMINAL PAIN: 0
FEVER: 1
PALPITATIONS: 0
SHORTNESS OF BREATH: 0
PHOTOPHOBIA: 0
VOMITING: 0
DIARRHEA: 0
CLAUDICATION: 0
FEVER: 0
SPUTUM PRODUCTION: 0
HEARTBURN: 0
SINUS PAIN: 0
DEPRESSION: 1
HEADACHES: 0
WEAKNESS: 1
ORTHOPNEA: 0
VOMITING: 1

## 2018-04-30 NOTE — PROGRESS NOTES
Patient required position change. Feels pressure and pinching on her backside. After positioning patient still feels uncomfortable. RN educated patient that sitting in the same position for long periods of time will cause discomfort and pt should try to have sit back more or elevate legs, patient refused.

## 2018-04-30 NOTE — CARE PLAN
Problem: Safety  Goal: Will remain free from injury  Outcome: PROGRESSING AS EXPECTED  Patient educated about the importance of calling for assistance. Patient verbalizes understanding and calls for assistance appropriately. Safety precautions in place including patient call light within reach, bed in low position and locked, personal possessions close by, patient rounding [id number], bed alarm on, frequent toileting offered, and non-skid socks in place. Patient remains free of injury since start of shift.         Problem: Knowledge Deficit  Goal: Knowledge of disease process/condition, treatment plan, diagnostic tests, and medications will improve  Outcome: PROGRESSING AS EXPECTED  Patient informed on plan of care and educated on medications ordered per MAR. Patient verbalizes understanding and states all of her questions/concerns have been answered appropriately.

## 2018-04-30 NOTE — PROGRESS NOTES
Oncology/Hematology Progress Note               Author: Anselmo Flores    Cc:  Multiple myeloma  Date & Time created: 4/30/2018  10:57 AM     Interval History:  She is feeling very weak and fatigued today. She generally just does not feel well. She does have shortness of breath but no cough. She is having trouble with continued nausea. She continues to have pain in the legs primarily.      PMHx, PSurgHx, SocHx, FAMHx:  All reviewed and no changes    Review of Systems:  Review of Systems   Constitutional: Positive for fever and malaise/fatigue. Negative for chills.   HENT: Negative for nosebleeds, sinus pain and sore throat.    Eyes: Negative for blurred vision, double vision and discharge.   Respiratory: Positive for shortness of breath. Negative for cough, hemoptysis and sputum production.    Cardiovascular: Positive for leg swelling. Negative for chest pain, palpitations and orthopnea.   Gastrointestinal: Positive for nausea and vomiting. Negative for abdominal pain, constipation, diarrhea and heartburn.   Genitourinary: Negative for dysuria, frequency and urgency.   Skin: Negative for itching and rash.   Neurological: Positive for weakness.       Physical Exam:  Physical Exam   Constitutional: She is oriented to person, place, and time. She appears well-developed and well-nourished. No distress.   ECOG=3, chronically ill appearing   HENT:   Head: Normocephalic and atraumatic.   Mouth/Throat: Oropharynx is clear and moist. No oropharyngeal exudate.   Eyes: Conjunctivae and EOM are normal. Right eye exhibits no discharge. Left eye exhibits no discharge. No scleral icterus.   Neck: Normal range of motion. Neck supple. No thyromegaly present.   Cardiovascular: Normal rate, regular rhythm and normal heart sounds.  Exam reveals no gallop and no friction rub.    No murmur heard.  Pulmonary/Chest: Effort normal and breath sounds normal. No respiratory distress. She has no wheezes. She has no rales.   Decreased BS  globally   Abdominal: Soft. Bowel sounds are normal. She exhibits no distension. There is no tenderness. There is no rebound and no guarding.   Morbidly obese   Musculoskeletal: She exhibits edema and tenderness.   Limited ROM of legs, 2+ edema of legs   Lymphadenopathy:     She has no cervical adenopathy.   Neurological: She is alert and oriented to person, place, and time.   Skin: Skin is warm and dry. Rash noted. She is not diaphoretic. There is erythema.   Bilateral erythema on shins   Psychiatric: Her behavior is normal. Thought content normal.   Depressed appearing       Labs:        Invalid input(s): [id number]      Recent Labs      18   SODIUM  135  137  137   POTASSIUM  3.6  3.8  3.9   CHLORIDE  98  98  97   CO2  34*  34*  30   BUN  18  16  17   CREATININE  0.80  0.80  0.70   PHOSPHORUS  3.9  4.4  4.1   CALCIUM  6.3*  6.3*  6.3*     Recent Labs      18   GLUCOSE  52*  166*  133*     Recent Labs      18   RBC  2.36*  2.57*  2.55*   HEMOGLOBIN  7.3*  7.8*  7.9*   HEMATOCRIT  22.9*  24.8*  24.7*   PLATELETCT  88*  80*  79*     Recent Labs      18   WBC  1.7*  1.5*  2.0*   NEUTSPOLYS  58.90  62.70  63.70   LYMPHOCYTES  32.40  28.20  18.80*   MONOCYTES  5.80  0.90  5.00   EOSINOPHILS  2.30  4.60  1.20   BASOPHILS  0.00  0.00  0.00     Recent Labs      18   SODIUM  135  137  137   POTASSIUM  3.6  3.8  3.9   CHLORIDE  98  98  97   CO2  34*  34*  30   GLUCOSE  52*  166*  133*   BUN  18  16  17   CREATININE  0.80  0.80  0.70   CALCIUM  6.3*  6.3*  6.3*     Hemodynamics:  Temp (24hrs), Av.3 °C (99.2 °F), Min:36.7 °C (98 °F), Max:38.4 °C (101.2 °F)  Temperature: 36.8 °C (98.2 °F)  Pulse  Av.7  Min: 38  Max: 108   Blood Pressure : 122/43     Respiratory:    Respiration: 18,  Pulse Oximetry: 95 %     Work Of Breathing / Effort: Mild  RUL Breath Sounds: Clear, RML Breath Sounds: Diminished, RLL Breath Sounds: Diminished, CHARLOTTE Breath Sounds: Clear, LLL Breath Sounds: Diminished  Fluids:    Intake/Output Summary (Last 24 hours) at 04/30/18 1057  Last data filed at 04/30/18 1000   Gross per 24 hour   Intake                0 ml   Output             2275 ml   Net            -2275 ml       GI/Nutrition:  Orders Placed This Encounter   Procedures   • Diet Order     Standing Status:   Standing     Number of Occurrences:   1     Order Specific Question:   Diet:     Answer:   Diabetic [3]     Medical Decision Making, by Problem:  Active Hospital Problems    Diagnosis   • *Pathologic fracture [M84.40XA]   • DNR (do not resuscitate) [Z66]   • Chemotherapy-induced thrombocytopenia [D69.59, T45.1X5A]   • Leg edema [R60.0]   • Hypomagnesemia [E83.42]   • Hypocalcemia [E83.51]   • Anxiety [F41.9]   • Hyponatremia [E87.1]   • Anemia [D64.9]   • Multiple myeloma (HCC) [C90.00]   • Chronic venous stasis dermatitis of both lower extremities [I87.2]   • Cellulitis [L03.90]   • Morbid obesity with BMI of 60.0-69.9, adult (CMS-HCC) [E66.01, Z68.44]   • DM type 2 (diabetes mellitus, type 2) (CMS-HCC) [E11.9]   • Essential hypertension, benign [I10]       Plan:  1.  Multiple myeloma-- 1P deletion intermediate risk, IgG kappa.  Stage III based on ISS staging (beta 2 9.9 and albumin 2.7 before).  Admitted with pathologic fracture of left humerus. Received palliative radiation to the left humerus during this admission. Poor performance status. Lives alone in [facility].       Started chemotherapy here as an inpatient. Received cycle #1 day #1 of CyBorD on April 19. She has completed CYCLE #1.  --Due to start cycle #2 on May 10  --In hospital now with difficult discharge disposition.  --Also continues on radiation to the femurs.   --Dexamethasone, as part of the chemotherapy, has been held on a few occasions  related to poorly controlled diabetes.       2.  Anemia--  related to multiple myeloma as well as chemotherapy.  We will transfuse if hemoglobin less than 7.0.    3.  Thrombocytopenia--  related to multiple myeloma and chemotherapy. Continue to monitor. Transfuse if platelets less than 10.    4.  Neutropenia--  she has mild neutropenia related to the chemotherapy. We will continue to monitor. If ANC drops below 1.0, we will start Granix.    5.  Cellulitis--  she is having fevers, and this is felt likely to be to the cellulitis on her legs. She is now on meropenem and Zyvox  --Continue antibiotics per medicine  --Continue to monitor    6. Type 2 diabetes mellitus--  medicine team managing diabetes. Has problems with early morning hypoglycemia related to steroids and insulin which will be managed by the hospitalist. Overall improved after stopping Decadron.    7.  Hypocalcemia--  not a candidate currently for osteoclast therapy or a bisphosphonate.  Continue to monitor and replace calcium as needed  --Medicine team replacing    8.  Nausea--  likely related to medications.  --Use aggressive antiemesis medications.    9.  Bone pain--  seems to be related to her multiple myeloma and lytic lesions.  Continue ongoing pain management.  Hopefully we'll get relief with the use of radiation as well.           High complexity given that she is at high risk for morbidity and mortality. She has multiple medical problems.      Quality-Core Measures   Reviewed items::  Radiology images reviewed, Labs reviewed and Medications reviewed

## 2018-04-30 NOTE — PROGRESS NOTES
Patient has been alert and oriented x4 today.  She states she is feeling miserable, fever this morning reduced after tylenol.  Pain aching all over, given oxycodone scheduled and prn oxycodone and morphine IV.  Patient having nausea as well, prn zofran given.  PICC dressing changed today.  Nava removed, and new nava placed, urine sent.  New skin break down noted to bottom and pannus, wound consult placed.  Some redness and yeast smell to pannus, notified Dr. Chen, will start nystatin.  Bilateral lower legs remain reddened. Patient states she feels more swollen than yesterday in legs.  She is very tearful and anxious today.

## 2018-04-30 NOTE — PROGRESS NOTES
Pt with a temperature of 101.2, BP of 116/38, HR of 104, and rigors. Paged Dr Ortiz. Ordered Blood cultures x2, Lactic Acid and 500 mL bolus.

## 2018-04-30 NOTE — PROGRESS NOTES
"Palliative Care Advance Care Planning Progress Note  Aleida Pagan was admitted 4/10/18. She is being treated for multiple myeloma with chemotherapy (cycle # 2 CyBorD due 5/10) and radiation (5/5 to left femur tomorrow). Completed Advance Directive with patient on 4/28/18. Document was notarized and scanned into [employer] today. Patient requested notary e-mail copy of AD to her DPOA-HC. Patient reports feeling better than this morning. She developed a fever of 101.2 early this morning.  Around the same time she asked to have her blood sugar checked at an off time as she \"did not feel good. I was shaky.\"  Her blood sugar was normal. Infectious disease has been consulted.    Discussed discharge planning with patient.  Explained that physical therapy has recommended SNF placement for continued therapy as patient will not be able to manage.  I asked the patient where she would prefer SNF placement, at [facility] versus local SNF. She reports she is unsure at this time and would like to talk with her sister more about this. She did express that she would not want to go to [facility] in [place] again.  She expressed she started a new book in her series. She unfortunately has been having trouble with Wi-Fi access to download new books.  Encouraged her to discuss this with nursing who could contact IT to assist.  Patient encouraged to consider sniff location for discharge planning.  She would like to continue with current plan of more chemotherapy at this time.      Provided therapeutic communication including active listening and normalization of thoughts and feelings throughout encounter. Provided business card with palliative care contact information  and encouraged patient to call with any questions or needs.     Outcome: Continue with DNR/full treat.     Plan: Patient considering SNF location for post acute care.    Updated: Bedside nurse.    Thank you for allowing Palliative Care to participate in this patient's care. " Please call our team with questions and/or additional needs.    Total visit time was 35 minutes discussing advance care planning.    Mariya ZELAYA  Palliative Care Nurse Practitioner  [phone]

## 2018-04-30 NOTE — WOUND TEAM
Patient seen by wound team per consult order to assess bruising to right lateral abdomen and right posterior thigh. Bruising to abdomen present with edema to area, appears to be related to heparin sc injections, not pressure related. Bruising to posterior thigh appears to be related to pinching when transferring on and off bed pan or on and off gurney, does not appear pressure related. Patient has some partial thickness wounds beneath pannus and nystatin powder has already been ordered. Interdry cloths ordered to help with management of moisture and drainage. Nursing may apply cloths to bilateral groin and other skin folds as needed. Due to patient's size, excess moisture in skin folds is problematic. Patient previously seen by wound team on 04/12 and orders written at that time. Discussed POC with patient and staff RN. No further involvement by wound team unless consulted.

## 2018-04-30 NOTE — CARE PLAN
Problem: Pain Management  Goal: Pain level will decrease to patient's comfort goal  Outcome: PROGRESSING AS EXPECTED  Patient has not voiced having pain when assessed or asked for pain medications when offerred for movement.     Problem: Psychosocial Needs:  Goal: Level of anxiety will decrease  Outcome: PROGRESSING SLOWER THAN EXPECTED  Patient is quite tearful of situation and feels like she is not improving. RN had talk with patient and assured her of her progress. Family visited patient and provided encouragement and support.

## 2018-04-30 NOTE — PROGRESS NOTES
[facility] Hospitalist Progress Note    Date of Service: 4/29/2018    Chief Complaint  67 y.o. female admitted 4/10/2018 with multiple myeloma, slow healing fracture left humerus and recent BLE cellulitis.  Patient has since completed radiation L humerus, started on radiation B femurs and had single cycle of chemotherapy.    Interval Problem Update  4/24 Patient states anxiety has improved with stopping of steroids, she is still nervous but it is under good control. She could only tolerate one femur treated yesterday but was able to tolerate both today.  Lantus dose decreased with steroids but patient still with fasting glucose >300, will increase lantus dose to 50 units qhs.  Heart rate still <60, dc toprol.  4/25 Patient hypoglycemic again this am prior to breakfast, resolved with PO intake. Will change lantus back to 40 units qhs.  She is still having significant swelling from pannus and legs with weeping.  Will start more aggressive diuresis 40 tid PO.  Renal function doing well.  She is tolerating both femurs for radiation well.    4/26 Patient in depressed mood today, states she misses her family and friends and they cannot visit her right now as they all have busy lives.  She still has same amount drainage from pannus and legs but is having good output with diuresis and renal function is stable - will continue aggressive tid diuresis.  4/27 Patient sleeping much of the day, still with weeping from skin despite aggressive diuresis.  Her platelet count is dropping with chemo regimen, she is now neutropenic with  and protective precautions in place.  She does not have a good prognosis and is still full code without a good disposition to return to [place] and lives alone.  Palliative care consultation requested.  She has one more radiation treatment planned for Monday.  4/28 Patient very sad today, she met with palliative care APN and does want to have code status changed to DNR.   She is more introverted today and trying to keep a positive outlook despite poor prognosis.  No acute complaints.   Patient still in very somber mood, she is having more redness on her shins despite initiation of unasyn yesterday.  Her abdomen is no longer weeping fluids but legs are still significantly edematous.  I will add MRSA empiric coverage with zyvox and not vanco as I'm already stressing her kidney function with frequent diuresis and do not want to stress they any further.  She also received day 11 chemotherapy today with velcade.    Consultants/Specialty  Oncology - Christi STEELET - [place]    Disposition  Tbd, patient does not want to dc to SNF, feels she has enough support at home.        Review of Systems   Constitutional: Negative for chills and fever.   HENT: Negative for congestion.    Eyes: Negative for blurred vision and photophobia.   Respiratory: Negative for cough and shortness of breath.    Cardiovascular: Positive for leg swelling. Negative for chest pain and claudication.   Gastrointestinal: Negative for abdominal pain, constipation, diarrhea, heartburn, nausea and vomiting.   Genitourinary: Negative for dysuria and hematuria.   Musculoskeletal: Positive for myalgias. Negative for joint pain.   Skin: Negative for itching and rash.   Neurological: Positive for weakness. Negative for dizziness, sensory change, speech change, focal weakness and headaches.   Psychiatric/Behavioral: Positive for depression. The patient is nervous/anxious. The patient does not have insomnia.       Physical Exam  Laboratory/Imaging   Hemodynamics  Temp (24hrs), Av.9 °C (98.4 °F), Min:36.7 °C (98 °F), Max:37.3 °C (99.2 °F)   Temperature: 36.7 °C (98 °F)  Pulse  Av.2  Min: 38  Max: 108    Blood Pressure : 106/43      Respiratory      Respiration: 20, Pulse Oximetry: 93 %     Work Of Breathing / Effort: Mild  RUL Breath Sounds: Clear, RML Breath Sounds: Diminished, RLL Breath Sounds: Diminished, CHARLOTTE Breath  Sounds: Clear, LLL Breath Sounds: Diminished    Fluids    Intake/Output Summary (Last 24 hours) at 04/29/18 1739  Last data filed at 04/29/18 1200   Gross per 24 hour   Intake              500 ml   Output             2950 ml   Net            -2450 ml       Nutrition  Orders Placed This Encounter   Procedures   • Diet Order     Standing Status:   Standing     Number of Occurrences:   1     Order Specific Question:   Diet:     Answer:   Diabetic [3]     Physical Exam   Constitutional: She is oriented to person, place, and time. She appears well-developed and well-nourished. No distress.   HENT:   Head: Normocephalic and atraumatic.   Eyes: Conjunctivae are normal. No scleral icterus.   Neck: Neck supple. No JVD present.   Cardiovascular: Normal rate, regular rhythm and normal heart sounds.  Exam reveals no gallop and no friction rub.    No murmur heard.  Pulmonary/Chest: Effort normal and breath sounds normal. No respiratory distress. She has no wheezes. She exhibits no tenderness.   Abdominal: Soft. Bowel sounds are normal. She exhibits no distension. There is no tenderness. There is no guarding.   No weeping from abdomen      Musculoskeletal: She exhibits edema (BLE). She exhibits no tenderness.   Neurological: She is alert and oriented to person, place, and time. No cranial nerve deficit.   Skin: Skin is warm and dry. She is not diaphoretic. There is erythema (bilateral shins r>l). No pallor.   Psychiatric: Her behavior is normal.   Depressed mood, flat affect     Nursing note and vitals reviewed.      Recent Labs      04/27/18   0038  04/28/18 0415  04/29/18   0108   WBC  1.3*  1.7*  1.5*   RBC  3.05*  2.36*  2.57*   HEMOGLOBIN  9.3*  7.3*  7.8*   HEMATOCRIT  29.2*  22.9*  24.8*   MCV  95.7  95.3  96.5   MCH  30.5  30.9  30.4   MCHC  31.8*  32.4*  31.5*   RDW  55.0*  54.9*  55.3*   PLATELETCT  94*  88*  80*   MPV  10.6  10.7  10.2     Recent Labs      04/27/18   0038  04/28/18   0415  04/29/18   0108   SODIUM   137  135  137   POTASSIUM  4.1  3.6  3.8   CHLORIDE  101  98  98   CO2  32  34*  34*   GLUCOSE  100*  52*  166*   BUN  22  18  16   CREATININE  0.68  0.80  0.80   CALCIUM  6.7*  6.3*  6.3*                      Assessment/Plan     * Pathologic fracture- (present on admission)   Assessment & Plan    -Left humerus  -widespread lytic disease  -completed radiation to humerus  -pain control             Cellulitis   Assessment & Plan    Bilateral LE  Unasyn started 4/28  Add PO Zyvox 4/29 x 3 days          DNR (do not resuscitate)   Assessment & Plan    Palliative care meeting today, patient wants to change code status to DNR - done, completed Polst with APN.          Chemotherapy-induced thrombocytopenia   Assessment & Plan    Monitor for bleeding  Dropping daily           Leg edema   Assessment & Plan    - Left greater than right  - DC IV fluids  -PO Lasix scheduled bid, doing well and renal function stable.  -Abdominal anasarca also          Hypocalcemia   Assessment & Plan    - Repleting as needed  Repeat 1 g Calcium gluconate again today.          Hypomagnesemia   Assessment & Plan    - Repleting as needed        Anxiety   Assessment & Plan    Exacerbated by situation  Xanax prn, IV Ativan prior to radiation if needed        Chronic venous stasis dermatitis of both lower extremities- (present on admission)   Assessment & Plan    -wound care, no clear e/o active infection at this time, defer abx's        Multiple myeloma (HCC)- (present on admission)   Assessment & Plan    CyBorD chemo - cycle 1 started 4/19/18  -recent diagnosis, appears aggressive, now with innumerable lytic lesions  -Dr Sim and Oksana consulted for assistance in management  - IV dilaudid PRN, oxy prn. DC maintenance steroids as these were causing confusion  -start oxycontin CR 10 mg BID  -skeletal survey done - spine obscured by body habitus, but multiple other lesions found  XRT to right and left femurs  -Palliative care consult for advanced  care planning            Anemia- (present on admission)   Assessment & Plan    -related to her MM, appears near her baseline, no e/o overt bleeding at this time, monitor closely        Hyponatremia- (present on admission)   Assessment & Plan    -mild        Morbid obesity with BMI of 60.0-69.9, adult (CMS-HCC)- (present on admission)   Assessment & Plan    -encourage weight loss  Body mass index is 67.38 kg/m².        DM type 2 (diabetes mellitus, type 2) (CMS-HCC)- (present on admission)   Assessment & Plan    - With hyperglycemia  - Lantus increase from 40 to 50 units qhs and hypoglycemic again prior to breakfast - change back to 40 and leave it there, will adjust with short acting only  - Intermittent steroids for Velcade treatment also stopped per oncology.        Essential hypertension, benign- (present on admission)   Assessment & Plan    High with bradycardia  Increase dose po hydralazine  Clonidine bid to tid, add lisinopril 20 q day  Terazosin qhs  DC Toprol d/t bradycardia - blood pressure better with d/c of this medication.            Quality-Core Measures   Reviewed items::  Medications reviewed, Labs reviewed and Radiology images reviewed  Singh catheter::  Urinary Tract Retention or Urinary Tract Obstruction (high risk skin breakdown d/t urinary incontinence and body habitus.)  DVT prophylaxis pharmacological::  Heparin  DVT prophylaxis - mechanical:  SCDs  Assessed for rehabilitation services:  Patient was assess for and/or received rehabilitation services during this hospitalization

## 2018-04-30 NOTE — PROGRESS NOTES
Jason from Lab called with critical result of WBC 2.0 at 0400. Critical lab result read back to Jason.   This critical lab result is within parameters established by [facility] policy for this patient.

## 2018-04-30 NOTE — PROGRESS NOTES
Discussed neutropenic precautions with nicole. Call placed to bedside RN. RN reports she will be replacing urinary catheter. Requested she call #[phone] with any questions needs.

## 2018-04-30 NOTE — ASSESSMENT & PLAN NOTE
Bilateral LE  improving; venous stasis component  Changed to po Augmentin per ID, completed 5/5/18

## 2018-04-30 NOTE — CONSULTS
ADULT INFECTIOUS DISEASE CONSULT     Date of Service: 4/30/2018    Consult Requested By: Jada Chen D.O.    Reason for Consultation: Cellulitis/fevers    History of Present Illness:   Aleida Pagan is a 67 y.o. white female with morbid obesity with BMI of 67, diabetes mellitus, recently diagnosed multiple myeloma receiving chemotherapy and radiation has been in the hospital since 4/10/2018. She has been receiving radiation to her left arm and right leg. She apparently got antibiotics in [facility] for cellulitis. She has recently been neutropenic. Also has chronic cellulitis of her leg. She had fevers up to 101.2. In view of all these issues infectious disease consult has been called.    Review Of Systems:  Gen.-Complains of fevers. Denies any chills. Imprints of malaise  HEENT- denies any sore throat, headache or vision changes  Pulmonary- denies any cough, shortness of breath  Cardiovascular- denies any chest pain, planes of leg swelling  GI- denies any nausea vomiting diarrhea or abdominal pain  Musculoskeletal- complains of erythema of the lower extremity and pain  Neuro- denies any weakness or sensory change  Psych- denies any depression or suicidal ideation  Genitourinary- complains of not being able to hold her urine        PMH:   Past Medical History:   Diagnosis Date   • Anemia 02/2018   • Arthritis 03/12/2018    To fingers and right knee.   • Bowel habit changes 03/12/2018    diarrhea related to diet.   • Bronchitis 12/21/17-3/6/18    x3. 3/12/18-States has productive cough that has been improving. CXR done @ [facility].  HX of chronic bronchitis.    • Cancer (CMS-[facility]) ([facility]) 12/12/2017    Myeloma in bone marrow after lesions found in right knee.   • Cataract 2017    Bilateral phaco with IOL   • Cellulitis 02/18/2018    Bilateral lower extremities   • Diabetes (CMS-[facility]) ([facility]) 03/12/2018    States not good at checking glucose. Avg AM glucose .   • Essential hypertension, benign  7/26/2013   • Heart burn 03/12/2018    Related to diet. Tums PRN.   • High cholesterol    • Other and unspecified hyperlipidemia 7/26/2013   • Pain 03/12/2018    Right knee and shin. Left arm.   • Pneumonia 2000   • Urinary bladder disorder 12/2017-1/2017    Had nava catheter    • UTI (urinary tract infection) 02/2018    Took biaxin.         PSH:  Past Surgical History:   Procedure Laterality Date   • BONE BIOPSY Left 3/13/2018    Procedure: BONE BIOPSY - PROXIMAL HUMERUS;  Surgeon: Hany Forbes M.D.;  Location: SURGERY [facility];  Service: Orthopedics   • JOINT INJECTION DIAGNOSTIC Right 3/13/2018    Procedure: JOINT INJECTION DIAGNOSTIC - STEROID, KNEE INTRA-ARTICULAR;  Surgeon: Hany Forbes M.D.;  Location: SURGERY [facility];  Service: Orthopedics   • CATARACT PHACO WITH IOL Bilateral 2017   • COLONOSCOPY  1990'S       FAMILY HX:  No family history on file.    SOCIAL HX:  Social History     Social History   • Marital status: Single     Spouse name: N/A   • Number of children: N/A   • Years of education: N/A     Occupational History   • Not on file.     Social History Main Topics   • Smoking status: Never Smoker   • Smokeless tobacco: Never Used   • Alcohol use No   • Drug use: No   • Sexual activity: Not on file     Other Topics Concern   • Not on file     Social History Narrative   • No narrative on file     History   Smoking Status   • Never Smoker   Smokeless Tobacco   • Never Used     History   Alcohol Use No       Allergies/Intolerances:  Allergies   Allergen Reactions   • Actos [Pioglitazone Hydrochloride] Hives   • Advil [Ibuprofen Micronized] Hives   • Cephalosporins Hives   • [place] Hives       History reviewed with the patient    Other Current Medications:    Current Facility-Administered Medications:   •  meropenem (MERREM) 500 mg in  mL IVPB, 500 mg, Intravenous, Q6HRS, Jada Chen D.O., Stopped at 04/30/18 1136  •  prochlorperazine (COMPAZINE) injection 10 mg, 10 mg,  Intravenous, Q6HRS PRN, Jada Chen, D.O.  •  linezolid (ZYVOX) tablet 600 mg, 600 mg, Oral, Q12HRS, Jada Chen, D.O., 600 mg at 18 1109  •  calcium citrate (CALCITRATE) tablet 1,900 mg, 1,900 mg, Oral, DAILY, Jada Chen D.O., 1,900 mg at 18 1109  •  phosphorus (K-PHOS-NEUTRAL, PHOSPHA 250 NEUTRAL) per tablet 2 Tab, 2 Tab, Oral, BID, Jada Chen D.O., 2 Tab at 18 1110  •  furosemide (LASIX) tablet 40 mg, 40 mg, Oral, Q8HRS, Jada Chen, D.O., 40 mg at 18 0532  •  acetaminophen (TYLENOL) tablet 975 mg, 975 mg, Oral, Q6HRS PRN, Jada Chen D.O., 975 mg at 18 0532  •  LORazepam (ATIVAN) injection 1 mg, 1 mg, Intravenous, Q4HRS PRN, Kumar Snow M.D., 1 mg at 18 0726  •  PICC Line Insertion has been implemented, , , Once **AND** May use Lidocaine 1% not to exceed 3 mls for local at insertion site, , , CONTINUOUS **AND** NOTIFY MD, , , Once **AND** Tip to dwell in the superior vena cava, , , CONTINUOUS **AND** Do not use PICC Line until placement verified by Chest X Ray, , , CONTINUOUS **AND** [CANCELED] DX-CHEST-FOR PICC LINE Perform procedure in: Other(comment f6 below):, , , Once **AND** If radiologist reading of chest X-ray states any of the following the PICC should be used, , , CONTINUOUS **AND** Further evaluation of the PICC placement can be retrieved from X-Ray and Imaging, , , CONTINUOUS **AND** Blood draws through PICC line; draws by RN only, , , CONTINUOUS **AND** FLUSHING GUIDELINES WHEN IN USE, , , CONTINUOUS **AND** normal saline PF 10-20 mL, 10-20 mL, Intravenous, PRN **AND** FLUSHING GUIDELINES WHEN NOT IN USE, , , CONTINUOUS **AND** DRESSING MAINTENANCE, , , Once **AND** Change needleless pressure ports and IV tubing every 72 hours per hospital policy, , , CONTINUOUS **AND** TUBING, , , CONTINUOUS **AND** If there is an MD order to remove the PICC line, any RN may remove the PICC line, , , CONTINUOUS **AND** [] PATIENT EDUCATION MATERIALS, , ,  Prior to discharge **AND** NURSING COMMUNICATION, , , CONTINUOUS, Lavell Sim M.D.  •  insulin regular (HUMULIN R) injection 3-14 Units, 3-14 Units, Subcutaneous, 4X/DAY ACHS, Stopped at 04/30/18 0930 **AND** Accu-Chek ACHS, , , Q AC AND BEDTIME(S) **AND** NOTIFY MD and PharmD, , , Once **AND** glucose 4 g chewable tablet 16 g, 16 g, Oral, Q15 MIN PRN, 16 g at 04/28/18 0639 **AND** dextrose 50% (D50W) injection 25 mL, 25 mL, Intravenous, Q15 MIN PRN, Kumar Snow M.D., 50 mL at 04/22/18 0935  •  cloNIDine (CATAPRES) tablet 0.2 mg, 0.2 mg, Oral, TID, Jada Chen D.O., Stopped at 04/30/18 0900  •  lisinopril (PRINIVIL) tablet 20 mg, 20 mg, Oral, Q DAY, Jada Chen D.O., Stopped at 04/30/18 0900  •  hydrALAZINE (APRESOLINE) tablet 50 mg, 50 mg, Oral, Q8HRS, Jada Chen D.O., Stopped at 04/29/18 2200  •  acyclovir (ZOVIRAX) capsule 400 mg, 400 mg, Oral, BID, Jada Chen D.O., 400 mg at 04/30/18 1108  •  ALPRAZolam (XANAX) tablet 0.5 mg, 0.5 mg, Oral, Q6HRS PRN, Jada Chen D.O., 0.5 mg at 04/29/18 2227  •  hydrALAZINE (APRESOLINE) injection 10 mg, 10 mg, Intravenous, Q4HRS PRN, Elida Bourne M.D., 10 mg at 04/15/18 2130  •  ammonium lactate (LAC-HYDRIN) 12 % lotion, , Topical, BID, Wound Care Nurse, R.N.  •  pravastatin (PRAVACHOL) tablet 40 mg, 40 mg, Oral, QHS, Armaan Ramirez M.D., 40 mg at 04/29/18 2135  •  terazosin (HYTRIN) capsule 10 mg, 10 mg, Oral, QHS, Armaan Ramirez M.D., 10 mg at 04/29/18 2137  •  senna-docusate (PERICOLACE or SENOKOT S) 8.6-50 MG per tablet 2 Tab, 2 Tab, Oral, BID, 2 Tab at 04/27/18 2148 **AND** polyethylene glycol/lytes (MIRALAX) PACKET 1 Packet, 1 Packet, Oral, QDAY PRN **AND** magnesium hydroxide (MILK OF MAGNESIA) suspension 30 mL, 30 mL, Oral, QDAY PRN **AND** bisacodyl (DULCOLAX) suppository 10 mg, 10 mg, Rectal, QDAY PRN, Armaan Ramirez M.D.  •  Respiratory Care per Protocol, , Nebulization, Continuous RT, Armaan Ramirez M.D.  •  heparin injection  "5,000 Units, 5,000 Units, Subcutaneous, Q8HRS, Armaan Ramirez M.D., 5,000 Units at 18 0532  •  ondansetron (ZOFRAN) syringe/vial injection 4 mg, 4 mg, Intravenous, Q4HRS PRN, Armaan Ramirez M.D., 4 mg at 18 0943  •  ondansetron (ZOFRAN ODT) dispertab 4 mg, 4 mg, Oral, Q4HRS PRN, Armaan Ramirez M.D., 4 mg at 18 0806  •  morphine (pf) 4 mg/ml injection 1 mg, 1 mg, Intravenous, Q4HRS PRN, Armaan Ramirez M.D., 1 mg at 18 0949  •  oxyCODONE immediate-release (ROXICODONE) tablet 5 mg, 5 mg, Oral, Q4HRS PRN, Armaan Ramirez M.D., 5 mg at 18 1114  •  oxyCODONE CR (OXYCONTIN) tablet 10 mg, 10 mg, Oral, Q12HRS, Armaan Ramirez M.D., 10 mg at 18 0732  [unfilled]    Most Recent Vital Signs:  /43   Pulse 96   Temp 36.8 °C (98.2 °F)   Resp 18   Ht 1.6 m (5' 2.99\")   Wt (!) 172.5 kg (380 lb 4.7 oz)   LMP 1994   SpO2 95%   Breastfeeding? No   BMI 67.38 kg/m²   Temp  Av.7 °C (98.1 °F)  Min: 35.9 °C (96.7 °F)  Max: 38.4 °C (101.2 °F)    Physical Exam:  General: morbidly obese looks chronically ill  HEENT: sclera anicteric, PERRL, EOMI, MMM, no oral lesions  Neck: supple, no lymphadenopathy  Chest: CTAB, no r/r/w, normal work of breathing.  Cardiac: Regular, no murmurs no gallops heard  Abdomen: + bowel sounds, soft, non-tender, huge pannus present  Extremities: No edema. No joint swelling.  Skin: Erythema of the right lower extremity. Chronic changes of the left lower extremity. Of the skin on the plantar surface with significant callus. Has excoriations under her pannus with looks likes yeast infection. Has skin breakdown on the right side of her abdomen as well  Neuro: Alert and oriented times 3, non-focal exam    Pertinent Lab Results:  Recent Labs      18   0415  18   0108  18   0327   WBC  1.7*  1.5*  2.0*      Recent Labs      18   0415  18   0108  18   0327   HEMOGLOBIN  7.3*  7.8*  7.9*   HEMATOCRIT  " "22.9*  24.8*  24.7*   MCV  95.3  96.5  96.9   MCH  30.9  30.4  31.0   PLATELETCT  88*  80*  79*         Recent Labs      04/28/18 0415  04/29/18 0108 04/30/18   0327   SODIUM  135  137  137   POTASSIUM  3.6  3.8  3.9   CHLORIDE  98  98  97   CO2  34*  34*  30   CREATININE  0.80  0.80  0.70        Recent Labs      04/28/18 0415 04/29/18 0108 04/30/18 0327   ALBUMIN  2.2*  2.2*  2.2*        Pertinent Micro:  Results     Procedure Component Value Units Date/Time    URINALYSIS [[id number]]  (Abnormal) Collected:  04/30/18 1053    Order Status:  Completed Specimen:  Urine from Urine, Singh Cath Updated:  04/30/18 1124     Color Yellow     Character Turbid (A)     Specific Gravity 1.012     Ph 5.0     Glucose Negative mg/dL      Ketones Negative mg/dL      Protein Negative mg/dL      Bilirubin Negative     Urobilinogen, Urine 0.2     Nitrite Negative     Leukocyte Esterase Moderate (A)     Occult Blood Small (A)     Micro Urine Req Microscopic    Narrative:       [id number] DIXON FLOOD    BLOOD CULTURE [[id number]] Collected:  04/30/18 0620    Order Status:  Completed Specimen:  Blood from Peripheral Updated:  04/30/18 0629    Narrative:       Protective  Per Hospital Policy: Only change Specimen Src: to \"Line\" if  specified by physician order.    BLOOD CULTURE [[id number]] Collected:  04/30/18 0621    Order Status:  Completed Specimen:  Blood from Peripheral Updated:  04/30/18 0629    Narrative:       Protective  Per Hospital Policy: Only change Specimen Src: to \"Line\" if  specified by physician order.        No results found for: BLOODCULTU, BLDCULT, BCHOLD     Studies:  Dx-bone [employer]    Result Date: 4/12/2018 4/12/2018 3:29 PM HISTORY/REASON FOR EXAM:  myeloma. Pt with hx of cancer. ?States that she knows she has cancer in her bones. ?Pt has broken right femur and left humerus in the past. TECHNIQUE/EXAM DESCRIPTION: 14 radiographs including of the spine, ribs, long bones, " calvarium, pelvis and chest. COMPARISON:  None. FINDINGS: Skull: Innumerable lucent lesions throughout the calvarium. CHEST: Innumerable lesions throughout the clavicles. Cardiomegaly. Small bilateral pleural effusions. Spine: Most of the spine is completely obscured due to body habitus and cannot be evaluated. Pelvic: Multiple lucent lesion is seen in the bilateral acetabul8, left more than right Bilateral upper extremity: Innumerable lucent lesions throughout. Deformity of the left distal humerus, in keeping with healed prior fracture. Bilateral lower extremity: Innumerable lucent lesions throughout     Innumerable lytic lesions throughout, in keeping with multiple myeloma. Old healed deformity of the left distal humerus. Most of the spine is completely obscured due to body habitus and cannot be evaluated. Cardiomegaly. Small bilateral pleural effusions.    Dx-humerus 2+ Left    Result Date: 4/10/2018  4/10/2018 6:25 PM HISTORY/REASON FOR EXAM: Left arm pain. TECHNIQUE/EXAM DESCRIPTION AND NUMBER OF VIEWS: 2 views of the LEFT humerus. COMPARISON: FINDINGS: Bone mineralization is severely osteopenic. There are extensive multifocal lytic lesions seen involving the entire humerus, radius and ulna as well as the visualized portions of the scapula and clavicle. There is an old healed mid humeral diaphyseal fracture.     1.  Diffuse lytic lesions involving all of the visualized osseous structures to include the humerus, radius, ulna, scapula and clavicle. 2.  Old healed fracture of the left humerus.    Ir-picc Line Placement > Age 5    Result Date: 4/20/2018  HISTORY/REASON FOR EXAM:   PICC placement. TECHNIQUE/EXAM DESCRIPTION AND NUMBER OF VIEWS:   PICC line insertion with ultrasound guidance.  The procedure was performed using maximal sterile barrier technique including sterile gown, mask, cap, and donning of sterile gloves following appropriate hand hygiene and/or sterile scrub. Patient skin site was prepped with 2%  Chlorhexidine solution. FINDINGS:  PICC line insertion with Ultrasound Guidance was performed by qualified nursing staff without the assistance of a Radiologist.  A follow up chest x-ray will be performed to determine appropriateness of PICC positioning.              Ultrasound-guided PICC placement performed by qualified nursing staff as above.   IMPRESSION:     Fevers  Neutropenia resolved  Cellulitis  Multiple myeloma  Morbid obesity  Diabetes mellitus  ? UTI    PLAN:   Aleida Pagan is a 67 y.o. female with recently diagnosed multiple myeloma and is now receiving chemoradiation.  Also having fevers.  Check chest x-ray  Continue broad-spectrum antibiotics  I doubt the cellulitis is causing the fevers since it looks more like chronic changes. But the patient seems to have improved.  Follow the urine culture  Will de-escalate the antibiotics soon since patient is no longer neutropenic  Prognosis is guarded   I Have reviewed all the records    Discussed with IM. Will continue to follow    Jana Coronado M.D.

## 2018-04-30 NOTE — CARE PLAN
Problem: Nutritional:  Goal: Achieve adequate nutritional intake  Patient will consume >50% of meals   Outcome: PROGRESSING AS EXPECTED  Per chart pt PO < 25-75%. Pt is receiving snacks TID. RD will continue to follow.

## 2018-05-01 ENCOUNTER — HOSPITAL ENCOUNTER (OUTPATIENT)
Dept: RADIATION ONCOLOGY | Facility: MEDICAL CENTER | Age: 68
End: 2018-05-01

## 2018-05-01 PROBLEM — E87.1 HYPONATREMIA: Status: RESOLVED | Noted: 2018-04-10 | Resolved: 2018-05-01

## 2018-05-01 PROBLEM — F41.9 ANXIETY: Status: RESOLVED | Noted: 2018-04-13 | Resolved: 2018-05-01

## 2018-05-01 PROBLEM — R50.9 FEVER: Status: RESOLVED | Noted: 2018-04-30 | Resolved: 2018-05-01

## 2018-05-01 LAB
ALBUMIN SERPL BCP-MCNC: 2 G/DL (ref 3.2–4.9)
BASOPHILS # BLD AUTO: 0.4 % (ref 0–1.8)
BASOPHILS # BLD: 0.01 K/UL (ref 0–0.12)
BUN SERPL-MCNC: 16 MG/DL (ref 8–22)
CA-I SERPL-SCNC: 0.8 MMOL/L (ref 1.1–1.3)
CALCIUM SERPL-MCNC: 6.1 MG/DL (ref 8.5–10.5)
CHLORIDE SERPL-SCNC: 97 MMOL/L (ref 96–112)
CO2 SERPL-SCNC: 33 MMOL/L (ref 20–33)
CREAT SERPL-MCNC: 0.74 MG/DL (ref 0.5–1.4)
EOSINOPHIL # BLD AUTO: 0.05 K/UL (ref 0–0.51)
EOSINOPHIL NFR BLD: 2.2 % (ref 0–6.9)
ERYTHROCYTE [DISTWIDTH] IN BLOOD BY AUTOMATED COUNT: 56.9 FL (ref 35.9–50)
GLUCOSE BLD-MCNC: 183 MG/DL (ref 65–99)
GLUCOSE BLD-MCNC: 185 MG/DL (ref 65–99)
GLUCOSE BLD-MCNC: 196 MG/DL (ref 65–99)
GLUCOSE BLD-MCNC: 223 MG/DL (ref 65–99)
GLUCOSE SERPL-MCNC: 173 MG/DL (ref 65–99)
HCT VFR BLD AUTO: 23.7 % (ref 37–47)
HGB BLD-MCNC: 7.4 G/DL (ref 12–16)
IMM GRANULOCYTES # BLD AUTO: 0.01 K/UL (ref 0–0.11)
IMM GRANULOCYTES NFR BLD AUTO: 0.4 % (ref 0–0.9)
LYMPHOCYTES # BLD AUTO: 0.72 K/UL (ref 1–4.8)
LYMPHOCYTES NFR BLD: 31.2 % (ref 22–41)
MAGNESIUM SERPL-MCNC: 1 MG/DL (ref 1.5–2.5)
MCH RBC QN AUTO: 30.7 PG (ref 27–33)
MCHC RBC AUTO-ENTMCNC: 31.2 G/DL (ref 33.6–35)
MCV RBC AUTO: 98.3 FL (ref 81.4–97.8)
MONOCYTES # BLD AUTO: 0.18 K/UL (ref 0–0.85)
MONOCYTES NFR BLD AUTO: 7.8 % (ref 0–13.4)
NEUTROPHILS # BLD AUTO: 1.34 K/UL (ref 2–7.15)
NEUTROPHILS NFR BLD: 58 % (ref 44–72)
NRBC # BLD AUTO: 0 K/UL
NRBC BLD-RTO: 0 /100 WBC
PHOSPHATE SERPL-MCNC: 4.2 MG/DL (ref 2.5–4.5)
PLATELET # BLD AUTO: 74 K/UL (ref 164–446)
PMV BLD AUTO: 12.3 FL (ref 9–12.9)
POTASSIUM SERPL-SCNC: 3.4 MMOL/L (ref 3.6–5.5)
RBC # BLD AUTO: 2.41 M/UL (ref 4.2–5.4)
SODIUM SERPL-SCNC: 136 MMOL/L (ref 135–145)
WBC # BLD AUTO: 2.3 K/UL (ref 4.8–10.8)

## 2018-05-01 PROCEDURE — 700111 HCHG RX REV CODE 636 W/ 250 OVERRIDE (IP): Performed by: HOSPITALIST

## 2018-05-01 PROCEDURE — 700111 HCHG RX REV CODE 636 W/ 250 OVERRIDE (IP): Performed by: INTERNAL MEDICINE

## 2018-05-01 PROCEDURE — 97112 NEUROMUSCULAR REEDUCATION: CPT

## 2018-05-01 PROCEDURE — A9270 NON-COVERED ITEM OR SERVICE: HCPCS | Performed by: INTERNAL MEDICINE

## 2018-05-01 PROCEDURE — 97530 THERAPEUTIC ACTIVITIES: CPT

## 2018-05-01 PROCEDURE — 85025 COMPLETE CBC W/AUTO DIFF WBC: CPT

## 2018-05-01 PROCEDURE — A9270 NON-COVERED ITEM OR SERVICE: HCPCS

## 2018-05-01 PROCEDURE — 700105 HCHG RX REV CODE 258: Performed by: INTERNAL MEDICINE

## 2018-05-01 PROCEDURE — 700102 HCHG RX REV CODE 250 W/ 637 OVERRIDE(OP): Performed by: INTERNAL MEDICINE

## 2018-05-01 PROCEDURE — 77417 THER RADIOLOGY PORT IMAGE(S): CPT | Performed by: RADIOLOGY

## 2018-05-01 PROCEDURE — 82330 ASSAY OF CALCIUM: CPT

## 2018-05-01 PROCEDURE — 99233 SBSQ HOSP IP/OBS HIGH 50: CPT | Performed by: INTERNAL MEDICINE

## 2018-05-01 PROCEDURE — 80069 RENAL FUNCTION PANEL: CPT

## 2018-05-01 PROCEDURE — 83735 ASSAY OF MAGNESIUM: CPT

## 2018-05-01 PROCEDURE — 700102 HCHG RX REV CODE 250 W/ 637 OVERRIDE(OP)

## 2018-05-01 PROCEDURE — 82962 GLUCOSE BLOOD TEST: CPT | Mod: 91

## 2018-05-01 PROCEDURE — 77412 RADIATION TX DELIVERY LVL 3: CPT | Performed by: RADIOLOGY

## 2018-05-01 PROCEDURE — 770004 HCHG ROOM/CARE - ONCOLOGY PRIVATE *

## 2018-05-01 RX ORDER — AMOXICILLIN AND CLAVULANATE POTASSIUM 875; 125 MG/1; MG/1
1 TABLET, FILM COATED ORAL EVERY 12 HOURS
Status: COMPLETED | OUTPATIENT
Start: 2018-05-01 | End: 2018-05-05

## 2018-05-01 RX ORDER — MAGNESIUM SULFATE HEPTAHYDRATE 40 MG/ML
4 INJECTION, SOLUTION INTRAVENOUS ONCE
Status: COMPLETED | OUTPATIENT
Start: 2018-05-01 | End: 2018-05-01

## 2018-05-01 RX ADMIN — NYSTATIN: 100000 POWDER TOPICAL at 21:37

## 2018-05-01 RX ADMIN — NYSTATIN: 100000 POWDER TOPICAL at 08:13

## 2018-05-01 RX ADMIN — INSULIN HUMAN 3 UNITS: 100 INJECTION, SOLUTION PARENTERAL at 08:11

## 2018-05-01 RX ADMIN — SENNOSIDES AND DOCUSATE SODIUM 0.5 TABLET: 8.6; 5 TABLET ORAL at 08:06

## 2018-05-01 RX ADMIN — HEPARIN SODIUM 5000 UNITS: 5000 INJECTION, SOLUTION INTRAVENOUS; SUBCUTANEOUS at 06:02

## 2018-05-01 RX ADMIN — ACYCLOVIR 400 MG: 200 CAPSULE ORAL at 21:35

## 2018-05-01 RX ADMIN — AMOXICILLIN AND CLAVULANATE POTASSIUM 1 TABLET: 875; 125 TABLET, FILM COATED ORAL at 21:31

## 2018-05-01 RX ADMIN — TERAZOSIN HYDROCHLORIDE ANHYDROUS 10 MG: 5 CAPSULE ORAL at 21:31

## 2018-05-01 RX ADMIN — INSULIN HUMAN 3 UNITS: 100 INJECTION, SOLUTION PARENTERAL at 21:42

## 2018-05-01 RX ADMIN — OXYCODONE HYDROCHLORIDE 10 MG: 10 TABLET, FILM COATED, EXTENDED RELEASE ORAL at 21:35

## 2018-05-01 RX ADMIN — LISINOPRIL 20 MG: 20 TABLET ORAL at 08:06

## 2018-05-01 RX ADMIN — CALCIUM GLUCONATE 2 G: 94 INJECTION, SOLUTION INTRAVENOUS at 12:37

## 2018-05-01 RX ADMIN — OXYCODONE HYDROCHLORIDE 5 MG: 5 TABLET ORAL at 15:15

## 2018-05-01 RX ADMIN — SENNOSIDES AND DOCUSATE SODIUM 2 TABLET: 8.6; 5 TABLET ORAL at 21:36

## 2018-05-01 RX ADMIN — CLONIDINE HYDROCHLORIDE 0.2 MG: 0.1 TABLET ORAL at 14:19

## 2018-05-01 RX ADMIN — HEPARIN SODIUM 5000 UNITS: 5000 INJECTION, SOLUTION INTRAVENOUS; SUBCUTANEOUS at 14:19

## 2018-05-01 RX ADMIN — FUROSEMIDE 40 MG: 40 TABLET ORAL at 14:19

## 2018-05-01 RX ADMIN — Medication: at 08:13

## 2018-05-01 RX ADMIN — CLONIDINE HYDROCHLORIDE 0.2 MG: 0.1 TABLET ORAL at 08:06

## 2018-05-01 RX ADMIN — DIBASIC SODIUM PHOSPHATE, MONOBASIC POTASSIUM PHOSPHATE AND MONOBASIC SODIUM PHOSPHATE 2 TABLET: 852; 155; 130 TABLET ORAL at 08:04

## 2018-05-01 RX ADMIN — CALCIUM CITRATE 200 MG (950 MG) TABLET 1900 MG: at 08:05

## 2018-05-01 RX ADMIN — ONDANSETRON 4 MG: 2 INJECTION INTRAMUSCULAR; INTRAVENOUS at 05:01

## 2018-05-01 RX ADMIN — DIBASIC SODIUM PHOSPHATE, MONOBASIC POTASSIUM PHOSPHATE AND MONOBASIC SODIUM PHOSPHATE 2 TABLET: 852; 155; 130 TABLET ORAL at 21:31

## 2018-05-01 RX ADMIN — CALCIUM GLUCONATE 2 G: 94 INJECTION, SOLUTION INTRAVENOUS at 17:54

## 2018-05-01 RX ADMIN — Medication: at 21:37

## 2018-05-01 RX ADMIN — MORPHINE SULFATE 1 MG: 4 INJECTION INTRAVENOUS at 23:18

## 2018-05-01 RX ADMIN — FUROSEMIDE 40 MG: 40 TABLET ORAL at 21:36

## 2018-05-01 RX ADMIN — INSULIN HUMAN 4 UNITS: 100 INJECTION, SOLUTION PARENTERAL at 17:54

## 2018-05-01 RX ADMIN — MEROPENEM 500 MG: 500 INJECTION, POWDER, FOR SOLUTION INTRAVENOUS at 06:03

## 2018-05-01 RX ADMIN — MAGNESIUM SULFATE IN WATER 4 G: 40 INJECTION, SOLUTION INTRAVENOUS at 19:15

## 2018-05-01 RX ADMIN — ACYCLOVIR 400 MG: 200 CAPSULE ORAL at 08:04

## 2018-05-01 RX ADMIN — AMOXICILLIN AND CLAVULANATE POTASSIUM 1 TABLET: 875; 125 TABLET, FILM COATED ORAL at 12:38

## 2018-05-01 RX ADMIN — MORPHINE SULFATE 1 MG: 4 INJECTION INTRAVENOUS at 05:01

## 2018-05-01 RX ADMIN — PRAVASTATIN SODIUM 40 MG: 20 TABLET ORAL at 21:36

## 2018-05-01 RX ADMIN — LINEZOLID 600 MG: 600 TABLET, FILM COATED ORAL at 08:07

## 2018-05-01 RX ADMIN — LORAZEPAM 1 MG: 2 INJECTION INTRAMUSCULAR; INTRAVENOUS at 08:06

## 2018-05-01 RX ADMIN — HEPARIN SODIUM 5000 UNITS: 5000 INJECTION, SOLUTION INTRAVENOUS; SUBCUTANEOUS at 21:36

## 2018-05-01 RX ADMIN — FUROSEMIDE 40 MG: 40 TABLET ORAL at 06:01

## 2018-05-01 RX ADMIN — OXYCODONE HYDROCHLORIDE 10 MG: 10 TABLET, FILM COATED, EXTENDED RELEASE ORAL at 08:06

## 2018-05-01 RX ADMIN — OXYCODONE HYDROCHLORIDE 5 MG: 5 TABLET ORAL at 22:30

## 2018-05-01 RX ADMIN — HYDRALAZINE HYDROCHLORIDE 50 MG: 10 TABLET, FILM COATED ORAL at 14:19

## 2018-05-01 RX ADMIN — HYDRALAZINE HYDROCHLORIDE 50 MG: 10 TABLET, FILM COATED ORAL at 06:40

## 2018-05-01 RX ADMIN — INSULIN HUMAN 3 UNITS: 100 INJECTION, SOLUTION PARENTERAL at 12:39

## 2018-05-01 ASSESSMENT — PAIN SCALES - GENERAL
PAINLEVEL_OUTOF10: 8
PAINLEVEL_OUTOF10: 9
PAINLEVEL_OUTOF10: ASSUMED PAIN PRESENT
PAINLEVEL_OUTOF10: 8
PAINLEVEL_OUTOF10: 9
PAINLEVEL_OUTOF10: 6
PAINLEVEL_OUTOF10: 8
PAINLEVEL_OUTOF10: ASSUMED PAIN PRESENT
PAINLEVEL_OUTOF10: 3

## 2018-05-01 ASSESSMENT — ENCOUNTER SYMPTOMS
DIARRHEA: 0
HEARTBURN: 0
SHORTNESS OF BREATH: 1
NERVOUS/ANXIOUS: 1
DEPRESSION: 1
SENSORY CHANGE: 0
COUGH: 0
CHILLS: 0
WEAKNESS: 1
NAUSEA: 0
FOCAL WEAKNESS: 0
PHOTOPHOBIA: 0
FEVER: 1
CLAUDICATION: 0
CONSTIPATION: 0
DIZZINESS: 0
ABDOMINAL PAIN: 0
SPEECH CHANGE: 0
MYALGIAS: 1
FEVER: 0
SHORTNESS OF BREATH: 0
BLURRED VISION: 0
VOMITING: 0
INSOMNIA: 0
HEADACHES: 0

## 2018-05-01 ASSESSMENT — COGNITIVE AND FUNCTIONAL STATUS - GENERAL
TOILETING: TOTAL
DAILY ACTIVITIY SCORE: 12
PERSONAL GROOMING: A LOT
DRESSING REGULAR UPPER BODY CLOTHING: A LOT
DRESSING REGULAR LOWER BODY CLOTHING: TOTAL
SUGGESTED CMS G CODE MODIFIER DAILY ACTIVITY: CL
HELP NEEDED FOR BATHING: A LOT

## 2018-05-01 NOTE — THERAPY
"Occupational Therapy Treatment completed with focus on ADLs and patient education.  Functional Status:  Pt was seen for Occupational Therapy treatment today, see Therapy Kardex for details.  Treatment included education in breath control with activity and at rest, self pacing techs and energy conservation for pain management. Educated pt in safety awareness techs as well. Psychosocial intervention addressed. Pt refused to attempt any self care tasks today stating her pain level \"was a 9 and needed pain meds\". RN stated pt is very fatigued today. CNA stated pt has been c/o pain and fatigue all day stating she \"could not attempt self care tasks needing nursing to help\" her. Pt refused to come to EOB but did agree to work on bed mobility. Pt required Max A X2 for all bed mobility. Pt agreed to attempt self care tasks \"tomorrow, hoping I get a better night's sleep\".  Pt was left up long sitting in bed , call light in reach, bedside table in reach  and nursing is aware. RN updated on OT treatment findings and recommendations. Continue Occupational Therapy services as per plan.    Plan of Care: Will benefit from Occupational Therapy 3 times per week  Discharge Recommendations:  Equipment Will Continue to Assess for Equipment Needs. Post-acute therapy Discharge to a transitional care facility for continued skilled therapy services.    See \"Rehab Therapy-Acute\" Patient Summary Report for complete documentation.   "

## 2018-05-01 NOTE — CARE PLAN
Problem: Safety  Goal: Will remain free from injury  Hourly rounding in effect, pt instructed to call for assistance, bed locked and in lowest position. Bed alarm off, with Elida as second RN. Pt calls for assistance.      Problem: Knowledge Deficit  Goal: Knowledge of disease process/condition, treatment plan, diagnostic tests, and medications will improve  Pt updated and educated on nursing interventions, medications and POC

## 2018-05-01 NOTE — PROGRESS NOTES
Kel from Lab called with critical result of Calcium of 6.1 at 0110. Critical lab result read back to Kel.   This critical lab result is within parameters established by [facility] for this patient

## 2018-05-01 NOTE — PROGRESS NOTES
Infectious Disease Progress Note    Author: Jana Coronado M.D. Date & Time of service: 2018  10:05 AM    Chief Complaint:  Fevers/cellulitis    Interval History:  2018-MAXIMUM TEMPERATURE 100.3 just back from radiation. Feeling winded. WBC 2.3 platelets 74 ANC 1340 creatinine 0.74  Labs Reviewed, Medications Reviewed, Radiology Reviewed and Wound Reviewed.    Review of Systems:  Review of Systems   Constitutional: Positive for fever and malaise/fatigue.   Respiratory: Positive for shortness of breath.    Cardiovascular: Positive for leg swelling. Negative for chest pain.   Gastrointestinal: Negative for abdominal pain, nausea and vomiting.   Genitourinary: Negative for dysuria.   Musculoskeletal: Positive for myalgias.   Skin: Positive for rash.   Neurological: Negative for sensory change and speech change.       Hemodynamics:  Temp (24hrs), Av.3 °C (99.1 °F), Min:36.8 °C (98.3 °F), Max:37.9 °C (100.3 °F)  Temperature: 36.8 °C (98.3 °F)  Pulse  Av.6  Min: 38  Max: 117   Blood Pressure : 140/55       Physical Exam:  Physical Exam   Constitutional: She is oriented to person, place, and time. No distress.   Morbidly obese  Looking ill   HENT:   Mouth/Throat: No oropharyngeal exudate.   Eyes: No scleral icterus.   Cardiovascular: Regular rhythm.    No murmur heard.  Pulmonary/Chest: She has no wheezes. She has no rales.   Abdominal: Soft. There is no tenderness. There is no rebound.   Large pannus present   Musculoskeletal: She exhibits edema.   Neurological: She is alert and oriented to person, place, and time.   Skin: There is erythema.   Right lower extremity has erythema and chronic changes  Less pronounced on the left side  Has some skin breakdown and Candida rash in the groin   Vitals reviewed.      Meds:    Current Facility-Administered Medications:   •  calcium GLUCONATE **AND** calcium GLUCONATE  •  meropenem (MERREM) IV  •  prochlorperazine  •  nystatin  •  linezolid  •  calcium citrate  •   phosphorus  •  furosemide  •  acetaminophen  •  LORazepam  •  PICC Line Insertion has been implemented **AND** May use Lidocaine 1% not to exceed 3 mls for local at insertion site **AND** NOTIFY MD **AND** Tip to dwell in the superior vena cava **AND** Do not use PICC Line until placement verified by Chest X Ray **AND** [CANCELED] DX-CHEST-FOR PICC LINE Perform procedure in: Other(comment f6 below): **AND** If radiologist reading of chest X-ray states any of the following the PICC should be used **AND** Further evaluation of the PICC placement can be retrieved from X-Ray and Imaging **AND** Blood draws through PICC line; draws by RN only **AND** FLUSHING GUIDELINES WHEN IN USE **AND** normal saline PF **AND** FLUSHING GUIDELINES WHEN NOT IN USE **AND** DRESSING MAINTENANCE **AND** Change needleless pressure ports and IV tubing every 72 hours per hospital policy **AND** TUBING **AND** If there is an MD order to remove the PICC line, any RN may remove the PICC line **AND** [] PATIENT EDUCATION MATERIALS **AND** NURSING COMMUNICATION  •  insulin regular **AND** Accu-Chek ACHS **AND** NOTIFY MD and PharmD **AND** glucose 4 g **AND** dextrose 50%  •  cloNIDine  •  lisinopril  •  hydrALAZINE  •  acyclovir  •  ALPRAZolam  •  hydrALAZINE  •  ammonium lactate  •  pravastatin  •  terazosin  •  senna-docusate **AND** polyethylene glycol/lytes **AND** magnesium hydroxide **AND** bisacodyl  •  Respiratory Care per Protocol  •  heparin  •  ondansetron  •  ondansetron  •  morphine injection  •  oxyCODONE immediate-release  •  oxyCODONE CR    Labs:  Recent Labs      18   0108  18   0327  18   0001   WBC  1.5*  2.0*  2.3*   RBC  2.57*  2.55*  2.41*   HEMOGLOBIN  7.8*  7.9*  7.4*   HEMATOCRIT  24.8*  24.7*  23.7*   MCV  96.5  96.9  98.3*   MCH  30.4  31.0  30.7   RDW  55.3*  57.1*  56.9*   PLATELETCT  80*  79*  74*   MPV  10.2  11.3  12.3   NEUTSPOLYS  62.70  63.70  58.00   LYMPHOCYTES  28.20  18.80*  31.20    MONOCYTES  0.90  5.00  7.80   EOSINOPHILS  4.60  1.20  2.20   BASOPHILS  0.00  0.00  0.40   RBCMORPHOLO  Present  Normal   --      Recent Labs      04/29/18   0108 04/30/18   0327  05/01/18   0001   SODIUM  137  137  136   POTASSIUM  3.8  3.9  3.4*   CHLORIDE  98  97  97   CO2  34*  30  33   GLUCOSE  166*  133*  173*   BUN  16  17  16     Recent Labs      04/29/18 0108 04/30/18 0327  05/01/18   0001   ALBUMIN  2.2*  2.2*  2.0*   CREATININE  0.80  0.70  0.74       Imaging:  Dx-bone [employer]    Result Date: 4/12/2018 4/12/2018 3:29 PM HISTORY/REASON FOR EXAM:  myeloma. Pt with hx of cancer. ?States that she knows she has cancer in her bones. ?Pt has broken right femur and left humerus in the past. TECHNIQUE/EXAM DESCRIPTION: 14 radiographs including of the spine, ribs, long bones, calvarium, pelvis and chest. COMPARISON:  None. FINDINGS: Skull: Innumerable lucent lesions throughout the calvarium. CHEST: Innumerable lesions throughout the clavicles. Cardiomegaly. Small bilateral pleural effusions. Spine: Most of the spine is completely obscured due to body habitus and cannot be evaluated. Pelvic: Multiple lucent lesion is seen in the bilateral acetabul8, left more than right Bilateral upper extremity: Innumerable lucent lesions throughout. Deformity of the left distal humerus, in keeping with healed prior fracture. Bilateral lower extremity: Innumerable lucent lesions throughout     Innumerable lytic lesions throughout, in keeping with multiple myeloma. Old healed deformity of the left distal humerus. Most of the spine is completely obscured due to body habitus and cannot be evaluated. Cardiomegaly. Small bilateral pleural effusions.    Dx-chest-portable (1 View)    Result Date: 4/30/2018 4/30/2018 12:35 PM HISTORY/REASON FOR EXAM: Fever TECHNIQUE/EXAM DESCRIPTION AND NUMBER OF VIEWS: Single portable view of the chest. COMPARISON: None FINDINGS: There is pulmonary edema and bibasilar atelectasis,  right greater than left. There is a right pleural effusion. The heart is enlarged. There is a right PICC line with the tip located at the cavoatrial junction.     1.  Cardiomegaly with pulmonary edema. 2.  Bibasilar atelectasis, right greater than left. 3.  Right pleural effusion.    Dx-humerus 2+ Left    Result Date: 4/10/2018  4/10/2018 6:25 PM HISTORY/REASON FOR EXAM: Left arm pain. TECHNIQUE/EXAM DESCRIPTION AND NUMBER OF VIEWS: 2 views of the LEFT humerus. COMPARISON: FINDINGS: Bone mineralization is severely osteopenic. There are extensive multifocal lytic lesions seen involving the entire humerus, radius and ulna as well as the visualized portions of the scapula and clavicle. There is an old healed mid humeral diaphyseal fracture.     1.  Diffuse lytic lesions involving all of the visualized osseous structures to include the humerus, radius, ulna, scapula and clavicle. 2.  Old healed fracture of the left humerus.    Ir-picc Line Placement > Age 5    Result Date: 4/20/2018  HISTORY/REASON FOR EXAM:   PICC placement. TECHNIQUE/EXAM DESCRIPTION AND NUMBER OF VIEWS:   PICC line insertion with ultrasound guidance.  The procedure was performed using maximal sterile barrier technique including sterile gown, mask, cap, and donning of sterile gloves following appropriate hand hygiene and/or sterile scrub. Patient skin site was prepped with 2% Chlorhexidine solution. FINDINGS:  PICC line insertion with Ultrasound Guidance was performed by qualified nursing staff without the assistance of a Radiologist.  A follow up chest x-ray will be performed to determine appropriateness of PICC positioning.              Ultrasound-guided PICC placement performed by qualified nursing staff as above.       Micro:  Results     Procedure Component Value Units Date/Time    BLOOD CULTURE [[id number]] Collected:  04/30/18 0620    Order Status:  Completed Specimen:  Blood from Peripheral Updated:  05/01/18 0948     Significant Indicator NEG  "    Source BLD     Site PERIPHERAL     Blood Culture No Growth    Note: Blood cultures are incubated for 5 days and  are monitored continuously.Positive blood cultures  are called to the RN and reported as soon as  they are identified.      Narrative:       Protective  Per Hospital Policy: Only change Specimen Src: to \"Line\" if  specified by physician order.    BLOOD CULTURE [[id number]] Collected:  04/30/18 0621    Order Status:  Completed Specimen:  Blood from Peripheral Updated:  05/01/18 0948     Significant Indicator NEG     Source BLD     Site PERIPHERAL     Blood Culture No Growth    Note: Blood cultures are incubated for 5 days and  are monitored continuously.Positive blood cultures  are called to the RN and reported as soon as  they are identified.      Narrative:       Protective  Per Hospital Policy: Only change Specimen Src: to \"Line\" if  specified by physician order.    URINALYSIS [[id number]]  (Abnormal) Collected:  04/30/18 1053    Order Status:  Completed Specimen:  Urine from Urine, Signh Cath Updated:  04/30/18 1124     Color Yellow     Character Turbid (A)     Specific Gravity 1.012     Ph 5.0     Glucose Negative mg/dL      Ketones Negative mg/dL      Protein Negative mg/dL      Bilirubin Negative     Urobilinogen, Urine 0.2     Nitrite Negative     Leukocyte Esterase Moderate (A)     Occult Blood Small (A)     Micro Urine Req Microscopic    Narrative:       [id number] DIXON FLOOD          Assessment:  Active Hospital Problems    Diagnosis   • *Pathologic fracture [M84.40XA]   • Fever [R50.9]   • DNR (do not resuscitate) [Z66]   • Chemotherapy-induced thrombocytopenia [D69.59, T45.1X5A]   • Leg edema [R60.0]   • Hypomagnesemia [E83.42]   • Hypocalcemia [E83.51]   • Anxiety [F41.9]   • Hyponatremia [E87.1]   • Anemia [D64.9]   • Multiple myeloma (HCC) [C90.00]   • Chronic venous stasis dermatitis of both lower extremities [I87.2]   • Cellulitis [L03.90]   • Morbid obesity with BMI of " 60.0-69.9, adult (CMS-[facility]) [E66.01, Z68.44]   • DM type 2 (diabetes mellitus, type 2) (CMS-[facility]) [E11.9]   • Essential hypertension, benign [I10]       Plan:  Neutropenic fevers  Neutropenia has resolved  Discontinue meropenem and Zyvox    Cellulitis  Likely chronic changes/ postradiation  Start Augmentin and observe    Candidal rash  Continue nystatin powder    Multiple myeloma  On treatment    Morbid obesity    Prognosis  Guarded  Discussed with internal medicine.

## 2018-05-01 NOTE — PROGRESS NOTES
Assumed care of pt at 0700, pt AOx4, max assist. Morning medications given and morning assessment complete. Pt back from radiation, will continue to monitor.  Bed in low and locked position, call light within reach. All needs met at this time.

## 2018-05-01 NOTE — PROGRESS NOTES
Assumed care of the pt at 1915, received report form the day shift RN. Pt is AOx4, febrile (100.3f) and fatigued. All other VSS. Pt states that she is in 8/10 pain. Pt has been medicated with PO oxycodone ER and IR and PRN IV morphine. See MAR. Pt has also been provided with PRN IV zofran.     R upper arm PICC tko w/ + blood return and IV abx. Singh to gravity.    Pt was seen by wound today for worsening redness noted on the R pannus and on the patients sacrum. No new orders received.     Pt has + 3 gen. LLE edema/redness and +2 gen. RLE edema/redness. Ice packs in place and legs repositoned as needed for comfort.    Q 2 hour turning has been implemented. Pillows in use for support. Inter-dry dressings in place beneath pannus and nystatin powder in use.     Pt updated on POC, all questions have been answered. Call light within reach, hourly rounding in place.

## 2018-05-01 NOTE — ASSESSMENT & PLAN NOTE
Patient ANC 1470 today, was 900 yesterday  Antibiotic coverage for neutropenic fever - zyvox/merrem (cephalosporins cause hives)  UA checked, nava catheter replaced, blood cultures collected  ID consult

## 2018-05-01 NOTE — PROGRESS NOTES
[facility] Hospitalist Progress Note    Date of Service: 4/30/2018    Chief Complaint  67 y.o. female admitted 4/10/2018 with multiple myeloma, slow healing fracture left humerus and recent BLE cellulitis.  Patient has since completed radiation L humerus, started on radiation B femurs and had single cycle of chemotherapy.    Interval Problem Update  4/24 Patient states anxiety has improved with stopping of steroids, she is still nervous but it is under good control. She could only tolerate one femur treated yesterday but was able to tolerate both today.  Lantus dose decreased with steroids but patient still with fasting glucose >300, will increase lantus dose to 50 units qhs.  Heart rate still <60, dc toprol.  4/25 Patient hypoglycemic again this am prior to breakfast, resolved with PO intake. Will change lantus back to 40 units qhs.  She is still having significant swelling from pannus and legs with weeping.  Will start more aggressive diuresis 40 tid PO.  Renal function doing well.  She is tolerating both femurs for radiation well.    4/26 Patient in depressed mood today, states she misses her family and friends and they cannot visit her right now as they all have busy lives.  She still has same amount drainage from pannus and legs but is having good output with diuresis and renal function is stable - will continue aggressive tid diuresis.  4/27 Patient sleeping much of the day, still with weeping from skin despite aggressive diuresis.  Her platelet count is dropping with chemo regimen, she is now neutropenic with  and protective precautions in place.  She does not have a good prognosis and is still full code without a good disposition to return to [place] and lives alone.  Palliative care consultation requested.  She has one more radiation treatment planned for Monday.  4/28 Patient very sad today, she met with palliative care APN and does want to have code status changed to DNR.   She is more introverted today and trying to keep a positive outlook despite poor prognosis.  No acute complaints.   Patient still in very somber mood, she is having more redness on her shins despite initiation of unasyn yesterday.  Her abdomen is no longer weeping fluids but legs are still significantly edematous.  I will add MRSA empiric coverage with zyvox and not vanco as I'm already stressing her kidney function with frequent diuresis and do not want to stress they any further.  She also received day 11 chemotherapy today with velcade.   Patient feeling okay today but did have fever this am.  Her ANC is low and she was significantly neutropenic yesterday so will start neutropenic fever coverage.  ID consulted for recommendations - continue current meds pending culture results.  She felt very poorly today and radiation on femur postponed until tomorrow.    Consultants/Specialty  Oncology - Christi/Mark  XRT - Peddada    Disposition  Tbd, patient does not want to dc to SNF, feels she has enough support at home.        Review of Systems   Constitutional: Negative for chills and fever.   HENT: Negative for congestion.    Eyes: Negative for blurred vision and photophobia.   Respiratory: Negative for cough and shortness of breath.    Cardiovascular: Positive for leg swelling. Negative for chest pain and claudication.   Gastrointestinal: Negative for abdominal pain, constipation, diarrhea, heartburn, nausea and vomiting.   Genitourinary: Negative for dysuria and hematuria.   Musculoskeletal: Positive for myalgias. Negative for joint pain.   Skin: Negative for itching and rash.   Neurological: Positive for weakness. Negative for dizziness, sensory change, speech change, focal weakness and headaches.   Psychiatric/Behavioral: Positive for depression. The patient is nervous/anxious. The patient does not have insomnia.       Physical Exam  Laboratory/Imaging   Hemodynamics  Temp (24hrs), Av.4 °C (99.4 °F),  Min:36.8 °C (98.2 °F), Max:38.4 °C (101.2 °F)   Temperature: 37.9 °C (100.3 °F)  Pulse  Av.6  Min: 38  Max: 108    Blood Pressure : 132/48      Respiratory      Respiration: 18, Pulse Oximetry: 95 %     Work Of Breathing / Effort: Mild  RUL Breath Sounds: Clear, RML Breath Sounds: Diminished, RLL Breath Sounds: Diminished, [place]TE Breath Sounds: Clear, LLL Breath Sounds: Diminished    Fluids    Intake/Output Summary (Last 24 hours) at 181  Last data filed at 18 1700   Gross per 24 hour   Intake             2420 ml   Output             1825 ml   Net              595 ml       Nutrition  Orders Placed This Encounter   Procedures   • Diet Order     Standing Status:   Standing     Number of Occurrences:   1     Order Specific Question:   Diet:     Answer:   Diabetic [3]     Physical Exam   Constitutional: She is oriented to person, place, and time. She appears well-developed and well-nourished. No distress.   HENT:   Head: Normocephalic and atraumatic.   Eyes: Conjunctivae are normal. No scleral icterus.   Neck: Neck supple. No JVD present.   Cardiovascular: Normal rate, regular rhythm and normal heart sounds.  Exam reveals no gallop and no friction rub.    No murmur heard.  Pulmonary/Chest: Effort normal and breath sounds normal. No respiratory distress. She has no wheezes. She exhibits no tenderness.   Abdominal: Soft. Bowel sounds are normal. She exhibits no distension. There is no tenderness. There is no guarding.   No weeping from abdomen      Musculoskeletal: She exhibits edema (BLE). She exhibits no tenderness.   Neurological: She is alert and oriented to person, place, and time. No cranial nerve deficit.   Skin: Skin is warm and dry. She is not diaphoretic. There is erythema (bilateral shins r>l). No pallor.   Psychiatric: Her behavior is normal.   Depressed mood, flat affect     Nursing note and vitals reviewed.      Recent Labs      18   0415  18   0108  18   0327   WBC  1.7*   1.5*  2.0*   RBC  2.36*  2.57*  2.55*   HEMOGLOBIN  7.3*  7.8*  7.9*   HEMATOCRIT  22.9*  24.8*  24.7*   MCV  95.3  96.5  96.9   MCH  30.9  30.4  31.0   MCHC  32.4*  31.5*  32.0*   RDW  54.9*  55.3*  57.1*   PLATELETCT  88*  80*  79*   MPV  10.7  10.2  11.3     Recent Labs      04/28/18   0415  04/29/18   0108  04/30/18   0327   SODIUM  135  137  137   POTASSIUM  3.6  3.8  3.9   CHLORIDE  98  98  97   CO2  34*  34*  30   GLUCOSE  52*  166*  133*   BUN  18  16  17   CREATININE  0.80  0.80  0.70   CALCIUM  6.3*  6.3*  6.3*                      Assessment/Plan     * Pathologic fracture- (present on admission)   Assessment & Plan    -Left humerus  -widespread lytic disease  -completed radiation to humerus  -pain control             Fever   Assessment & Plan    Patient ANC 1470 today, was 900 yesterday  Antibiotic coverage for neutropenic fever - zyvox/merrem (cephalosporins cause hives)  UA checked, nava catheter replaced, blood cultures collected  ID consult          Cellulitis   Assessment & Plan    Bilateral LE  Unasyn 4/28-4/30, start merrem given neutropenia  Add PO Zyvox 4/29  Worsened rash since 4/27          DNR (do not resuscitate)   Assessment & Plan    Palliative care meeting today, patient wants to change code status to DNR - done, completed Polst with APN.          Chemotherapy-induced thrombocytopenia   Assessment & Plan    Monitor for bleeding  Dropping daily           Leg edema   Assessment & Plan    - Left greater than right  - DC IV fluids  -PO Lasix scheduled bid, doing well and renal function stable.  -Abdominal anasarca also          Hypocalcemia   Assessment & Plan    - Repleting as needed  Repeat 1 g Calcium gluconate again today.          Hypomagnesemia   Assessment & Plan    - Repleting as needed        Anxiety   Assessment & Plan    Exacerbated by situation  Xanax prn, IV Ativan prior to radiation if needed        Chronic venous stasis dermatitis of both lower extremities- (present on  admission)   Assessment & Plan    -wound care, no clear e/o active infection at this time, defer abx's        Multiple myeloma (HCC)- (present on admission)   Assessment & Plan    CyBorD chemo - cycle 1 started 4/19/18  -recent diagnosis, appears aggressive, now with innumerable lytic lesions  -Dr Sim and Okasna consulted for assistance in management  - IV dilaudid PRN, oxy prn. DC maintenance steroids as these were causing confusion  -start oxycontin CR 10 mg BID  -skeletal survey done - spine obscured by body habitus, but multiple other lesions found  XRT to right and left femurs  -Palliative care consult for advanced care planning            Anemia- (present on admission)   Assessment & Plan    -related to her MM, appears near her baseline, no e/o overt bleeding at this time, monitor closely        Hyponatremia- (present on admission)   Assessment & Plan    -mild        Morbid obesity with BMI of 60.0-69.9, adult (CMS-HCC)- (present on admission)   Assessment & Plan    -encourage weight loss  Body mass index is 67.38 kg/m².        DM type 2 (diabetes mellitus, type 2) (CMS-HCC)- (present on admission)   Assessment & Plan    - With hyperglycemia  - Lantus increase from 40 to 50 units qhs and hypoglycemic again prior to breakfast - change back to 40 and leave it there, will adjust with short acting only  - Intermittent steroids for Velcade treatment also stopped per oncology.        Essential hypertension, benign- (present on admission)   Assessment & Plan    High with bradycardia  Increase dose po hydralazine  Clonidine bid to tid, add lisinopril 20 q day  Terazosin qhs  DC Toprol d/t bradycardia - blood pressure better with d/c of this medication.            Quality-Core Measures   Reviewed items::  Medications reviewed, Labs reviewed and Radiology images reviewed  Singh catheter::  Urinary Tract Retention or Urinary Tract Obstruction (high risk skin breakdown d/t urinary incontinence and body habitus.)  DVT  prophylaxis pharmacological::  Heparin  DVT prophylaxis - mechanical:  SCDs  Assessed for rehabilitation services:  Patient was assess for and/or received rehabilitation services during this hospitalization

## 2018-05-01 NOTE — CARE PLAN
Problem: Pain Management  Goal: Pain level will decrease to patient's comfort goal    Intervention: Follow pain managment plan developed in collaboration with patient and Interdisciplinary Team  Pt has been medicated for pain with scheduled and PRN pain medication intervention. Pt utilizes the 0 to 10 pain reporting scale and 2 hour pain reassessment has been implemented.       Problem: Skin Integrity  Goal: Risk for impaired skin integrity will decrease    Intervention: Implement precautions to protect skin integrity in collaboration with the interdisciplinary team  Q 2 hour turns have been implemented. Pt has been repositioned with pillows as needed. Inter-dry cloth and nystatin powder in use. Raissa-care provided PRN.

## 2018-05-01 NOTE — PROGRESS NOTES
Pat from Lab called with critical result of WBC of 2.3 at 0509. Critical lab result read back to Pat.   This critical lab result is within parameters established by [facility] for this patient.    Corrected calcium is 7.7 mg/dL.

## 2018-05-02 PROBLEM — R53.81 DEBILITY: Status: ACTIVE | Noted: 2018-05-02

## 2018-05-02 PROBLEM — I95.9 HYPOTENSION: Status: ACTIVE | Noted: 2018-05-02

## 2018-05-02 LAB
ABO GROUP BLD: NORMAL
ALBUMIN SERPL BCP-MCNC: 1.8 G/DL (ref 3.2–4.9)
ANISOCYTOSIS BLD QL SMEAR: ABNORMAL
BASOPHILS # BLD AUTO: 0.9 % (ref 0–1.8)
BASOPHILS # BLD: 0.03 K/UL (ref 0–0.12)
BLD GP AB SCN SERPL QL: NORMAL
BUN SERPL-MCNC: 19 MG/DL (ref 8–22)
CA-I SERPL-SCNC: 0.9 MMOL/L (ref 1.1–1.3)
CALCIUM SERPL-MCNC: 6.2 MG/DL (ref 8.5–10.5)
CHLORIDE SERPL-SCNC: 97 MMOL/L (ref 96–112)
CO2 SERPL-SCNC: 31 MMOL/L (ref 20–33)
CREAT SERPL-MCNC: 0.89 MG/DL (ref 0.5–1.4)
EOSINOPHIL # BLD AUTO: 0.03 K/UL (ref 0–0.51)
EOSINOPHIL NFR BLD: 0.9 % (ref 0–6.9)
ERYTHROCYTE [DISTWIDTH] IN BLOOD BY AUTOMATED COUNT: 57.1 FL (ref 35.9–50)
GLUCOSE BLD-MCNC: 178 MG/DL (ref 65–99)
GLUCOSE BLD-MCNC: 184 MG/DL (ref 65–99)
GLUCOSE BLD-MCNC: 196 MG/DL (ref 65–99)
GLUCOSE BLD-MCNC: 213 MG/DL (ref 65–99)
GLUCOSE SERPL-MCNC: 130 MG/DL (ref 65–99)
HCT VFR BLD AUTO: 21.9 % (ref 37–47)
HGB BLD-MCNC: 7 G/DL (ref 12–16)
LYMPHOCYTES # BLD AUTO: 0.56 K/UL (ref 1–4.8)
LYMPHOCYTES NFR BLD: 15.6 % (ref 22–41)
MACROCYTES BLD QL SMEAR: ABNORMAL
MAGNESIUM SERPL-MCNC: 1.5 MG/DL (ref 1.5–2.5)
MANUAL DIFF BLD: NORMAL
MCH RBC QN AUTO: 31.1 PG (ref 27–33)
MCHC RBC AUTO-ENTMCNC: 32 G/DL (ref 33.6–35)
MCV RBC AUTO: 97.3 FL (ref 81.4–97.8)
MONOCYTES # BLD AUTO: 0.09 K/UL (ref 0–0.85)
MONOCYTES NFR BLD AUTO: 2.6 % (ref 0–13.4)
MORPHOLOGY BLD-IMP: NORMAL
MYELOCYTES NFR BLD MANUAL: 0.9 %
NEUTROPHILS # BLD AUTO: 2.85 K/UL (ref 2–7.15)
NEUTROPHILS NFR BLD: 79.1 % (ref 44–72)
NRBC # BLD AUTO: 0 K/UL
NRBC BLD-RTO: 0 /100 WBC
PHOSPHATE SERPL-MCNC: 4.1 MG/DL (ref 2.5–4.5)
PLATELET # BLD AUTO: 72 K/UL (ref 164–446)
PLATELET BLD QL SMEAR: NORMAL
PMV BLD AUTO: 12.6 FL (ref 9–12.9)
POTASSIUM SERPL-SCNC: 3.4 MMOL/L (ref 3.6–5.5)
RBC # BLD AUTO: 2.25 M/UL (ref 4.2–5.4)
RBC BLD AUTO: PRESENT
RH BLD: NORMAL
SODIUM SERPL-SCNC: 135 MMOL/L (ref 135–145)
WBC # BLD AUTO: 3.6 K/UL (ref 4.8–10.8)

## 2018-05-02 PROCEDURE — 85007 BL SMEAR W/DIFF WBC COUNT: CPT

## 2018-05-02 PROCEDURE — A9270 NON-COVERED ITEM OR SERVICE: HCPCS | Performed by: INTERNAL MEDICINE

## 2018-05-02 PROCEDURE — 36430 TRANSFUSION BLD/BLD COMPNT: CPT

## 2018-05-02 PROCEDURE — 82330 ASSAY OF CALCIUM: CPT

## 2018-05-02 PROCEDURE — 700102 HCHG RX REV CODE 250 W/ 637 OVERRIDE(OP): Performed by: INTERNAL MEDICINE

## 2018-05-02 PROCEDURE — 82962 GLUCOSE BLOOD TEST: CPT

## 2018-05-02 PROCEDURE — 99233 SBSQ HOSP IP/OBS HIGH 50: CPT | Performed by: INTERNAL MEDICINE

## 2018-05-02 PROCEDURE — 83735 ASSAY OF MAGNESIUM: CPT

## 2018-05-02 PROCEDURE — 86850 RBC ANTIBODY SCREEN: CPT

## 2018-05-02 PROCEDURE — 86900 BLOOD TYPING SEROLOGIC ABO: CPT

## 2018-05-02 PROCEDURE — 85027 COMPLETE CBC AUTOMATED: CPT

## 2018-05-02 PROCEDURE — 700105 HCHG RX REV CODE 258: Performed by: FAMILY MEDICINE

## 2018-05-02 PROCEDURE — 80069 RENAL FUNCTION PANEL: CPT

## 2018-05-02 PROCEDURE — 700111 HCHG RX REV CODE 636 W/ 250 OVERRIDE (IP): Performed by: INTERNAL MEDICINE

## 2018-05-02 PROCEDURE — 86923 COMPATIBILITY TEST ELECTRIC: CPT

## 2018-05-02 PROCEDURE — 770004 HCHG ROOM/CARE - ONCOLOGY PRIVATE *

## 2018-05-02 PROCEDURE — 700105 HCHG RX REV CODE 258: Performed by: INTERNAL MEDICINE

## 2018-05-02 PROCEDURE — 77285 THER RAD SIMULAJ FIELD INTRM: CPT | Performed by: RADIOLOGY

## 2018-05-02 PROCEDURE — 700111 HCHG RX REV CODE 636 W/ 250 OVERRIDE (IP): Performed by: FAMILY MEDICINE

## 2018-05-02 PROCEDURE — 86901 BLOOD TYPING SEROLOGIC RH(D): CPT

## 2018-05-02 PROCEDURE — P9016 RBC LEUKOCYTES REDUCED: HCPCS

## 2018-05-02 RX ORDER — MAGNESIUM SULFATE HEPTAHYDRATE 40 MG/ML
4 INJECTION, SOLUTION INTRAVENOUS ONCE
Status: COMPLETED | OUTPATIENT
Start: 2018-05-02 | End: 2018-05-02

## 2018-05-02 RX ORDER — FUROSEMIDE 40 MG/1
40 TABLET ORAL
Status: DISCONTINUED | OUTPATIENT
Start: 2018-05-02 | End: 2018-05-10

## 2018-05-02 RX ORDER — ACETAMINOPHEN 325 MG/1
650 TABLET ORAL ONCE
Status: COMPLETED | OUTPATIENT
Start: 2018-05-02 | End: 2018-05-02

## 2018-05-02 RX ORDER — POTASSIUM CHLORIDE 20 MEQ/1
40 TABLET, EXTENDED RELEASE ORAL ONCE
Status: COMPLETED | OUTPATIENT
Start: 2018-05-02 | End: 2018-05-02

## 2018-05-02 RX ADMIN — CALCIUM GLUCONATE 3 G: 94 INJECTION, SOLUTION INTRAVENOUS at 13:00

## 2018-05-02 RX ADMIN — AMOXICILLIN AND CLAVULANATE POTASSIUM 1 TABLET: 875; 125 TABLET, FILM COATED ORAL at 09:00

## 2018-05-02 RX ADMIN — CALCIUM CITRATE 200 MG (950 MG) TABLET 1900 MG: at 09:00

## 2018-05-02 RX ADMIN — ACYCLOVIR 400 MG: 200 CAPSULE ORAL at 22:03

## 2018-05-02 RX ADMIN — NYSTATIN: 100000 POWDER TOPICAL at 22:04

## 2018-05-02 RX ADMIN — ONDANSETRON 4 MG: 2 INJECTION INTRAMUSCULAR; INTRAVENOUS at 11:07

## 2018-05-02 RX ADMIN — POLYETHYLENE GLYCOL 3350 1 PACKET: 17 POWDER, FOR SOLUTION ORAL at 22:05

## 2018-05-02 RX ADMIN — ACETAMINOPHEN 650 MG: 325 TABLET, FILM COATED ORAL at 09:17

## 2018-05-02 RX ADMIN — SENNOSIDES AND DOCUSATE SODIUM 2 TABLET: 8.6; 5 TABLET ORAL at 22:03

## 2018-05-02 RX ADMIN — AMOXICILLIN AND CLAVULANATE POTASSIUM 1 TABLET: 875; 125 TABLET, FILM COATED ORAL at 22:02

## 2018-05-02 RX ADMIN — INSULIN HUMAN 4 UNITS: 100 INJECTION, SOLUTION PARENTERAL at 17:19

## 2018-05-02 RX ADMIN — SENNOSIDES AND DOCUSATE SODIUM 2 TABLET: 8.6; 5 TABLET ORAL at 08:59

## 2018-05-02 RX ADMIN — HEPARIN SODIUM 5000 UNITS: 5000 INJECTION, SOLUTION INTRAVENOUS; SUBCUTANEOUS at 05:59

## 2018-05-02 RX ADMIN — DIBASIC SODIUM PHOSPHATE, MONOBASIC POTASSIUM PHOSPHATE AND MONOBASIC SODIUM PHOSPHATE 2 TABLET: 852; 155; 130 TABLET ORAL at 09:00

## 2018-05-02 RX ADMIN — NYSTATIN: 100000 POWDER TOPICAL at 09:04

## 2018-05-02 RX ADMIN — OXYCODONE HYDROCHLORIDE 10 MG: 10 TABLET, FILM COATED, EXTENDED RELEASE ORAL at 22:03

## 2018-05-02 RX ADMIN — INSULIN HUMAN 3 UNITS: 100 INJECTION, SOLUTION PARENTERAL at 12:13

## 2018-05-02 RX ADMIN — INSULIN HUMAN 3 UNITS: 100 INJECTION, SOLUTION PARENTERAL at 09:04

## 2018-05-02 RX ADMIN — ONDANSETRON 4 MG: 2 INJECTION INTRAMUSCULAR; INTRAVENOUS at 23:31

## 2018-05-02 RX ADMIN — OXYCODONE HYDROCHLORIDE 5 MG: 5 TABLET ORAL at 11:07

## 2018-05-02 RX ADMIN — HEPARIN SODIUM 5000 UNITS: 5000 INJECTION, SOLUTION INTRAVENOUS; SUBCUTANEOUS at 17:15

## 2018-05-02 RX ADMIN — DIBASIC SODIUM PHOSPHATE, MONOBASIC POTASSIUM PHOSPHATE AND MONOBASIC SODIUM PHOSPHATE 2 TABLET: 852; 155; 130 TABLET ORAL at 22:02

## 2018-05-02 RX ADMIN — PRAVASTATIN SODIUM 40 MG: 20 TABLET ORAL at 22:03

## 2018-05-02 RX ADMIN — FUROSEMIDE 40 MG: 40 TABLET ORAL at 05:59

## 2018-05-02 RX ADMIN — Medication: at 09:01

## 2018-05-02 RX ADMIN — CALCIUM GLUCONATE 2 G: 94 INJECTION, SOLUTION INTRAVENOUS at 05:59

## 2018-05-02 RX ADMIN — ACYCLOVIR 400 MG: 200 CAPSULE ORAL at 09:00

## 2018-05-02 RX ADMIN — POTASSIUM CHLORIDE 40 MEQ: 1500 TABLET, EXTENDED RELEASE ORAL at 11:07

## 2018-05-02 RX ADMIN — OXYCODONE HYDROCHLORIDE 5 MG: 5 TABLET ORAL at 20:30

## 2018-05-02 RX ADMIN — OXYCODONE HYDROCHLORIDE 10 MG: 10 TABLET, FILM COATED, EXTENDED RELEASE ORAL at 08:59

## 2018-05-02 RX ADMIN — Medication: at 22:04

## 2018-05-02 RX ADMIN — HEPARIN SODIUM 5000 UNITS: 5000 INJECTION, SOLUTION INTRAVENOUS; SUBCUTANEOUS at 22:03

## 2018-05-02 RX ADMIN — MAGNESIUM SULFATE IN WATER 4 G: 40 INJECTION, SOLUTION INTRAVENOUS at 10:59

## 2018-05-02 RX ADMIN — INSULIN HUMAN 3 UNITS: 100 INJECTION, SOLUTION PARENTERAL at 22:05

## 2018-05-02 ASSESSMENT — PAIN SCALES - GENERAL
PAINLEVEL_OUTOF10: 8
PAINLEVEL_OUTOF10: ASSUMED PAIN PRESENT
PAINLEVEL_OUTOF10: 7
PAINLEVEL_OUTOF10: 8
PAINLEVEL_OUTOF10: 4
PAINLEVEL_OUTOF10: 6
PAINLEVEL_OUTOF10: 7
PAINLEVEL_OUTOF10: 4

## 2018-05-02 ASSESSMENT — ENCOUNTER SYMPTOMS
NERVOUS/ANXIOUS: 1
ABDOMINAL PAIN: 0
DEPRESSION: 1
CONSTIPATION: 0
BLURRED VISION: 0
NAUSEA: 0
HEARTBURN: 0
INSOMNIA: 0
FOCAL WEAKNESS: 0
PHOTOPHOBIA: 0
CHILLS: 0
WEAKNESS: 1
VOMITING: 0
HEADACHES: 0
MYALGIAS: 1
FEVER: 0
SHORTNESS OF BREATH: 0
SENSORY CHANGE: 0
COUGH: 0
SPEECH CHANGE: 0
DIZZINESS: 0
CLAUDICATION: 0
DIARRHEA: 0

## 2018-05-02 NOTE — PROGRESS NOTES
[facility] Hospitalist Progress Note    Date of Service: 2018    Chief Complaint  67 y.o. female admitted 4/10/2018 with multiple myeloma, slow healing fracture left humerus and recent BLE cellulitis.  Patient has since completed radiation L humerus, started on radiation B femurs and had single cycle of chemotherapy.    Interval Problem Update  Body pain about 7/10 with pain regimen, involving mostly knees and ankles; not able to walk since early April; denies cough/n/v/abdom pain/diarrhea/dysuria; BLE redness not much better     Consultants/Specialty  Oncology - Christi/Mark  XRT - Peddada  ID    Disposition  Tbd, patient does not want to dc to SNF, feels she has enough support at home.        Review of Systems   Constitutional: Negative for chills and fever.   HENT: Negative for congestion.    Eyes: Negative for blurred vision and photophobia.   Respiratory: Negative for cough and shortness of breath.    Cardiovascular: Positive for leg swelling. Negative for chest pain and claudication.   Gastrointestinal: Negative for abdominal pain, constipation, diarrhea, heartburn, nausea and vomiting.   Genitourinary: Negative for dysuria and hematuria.   Musculoskeletal: Positive for joint pain and myalgias.   Skin: Negative for itching and rash.   Neurological: Positive for weakness. Negative for dizziness, sensory change, speech change, focal weakness and headaches.   Psychiatric/Behavioral: Positive for depression. The patient is nervous/anxious. The patient does not have insomnia.       Physical Exam  Laboratory/Imaging   Hemodynamics  Temp (24hrs), Av.4 °C (99.3 °F), Min:36.8 °C (98.3 °F), Max:37.9 °C (100.3 °F)   Temperature: 37.2 °C (99 °F)  Pulse  Av.4  Min: 38  Max: 128    Blood Pressure : 111/48      Respiratory      Respiration: 19, Pulse Oximetry: 92 %     Work Of Breathing / Effort: Mild  RUL Breath Sounds: Clear, RML Breath Sounds: Diminished, RLL Breath Sounds: Diminished, [place]TE Breath Sounds: Clear,  LLL Breath Sounds: Diminished    Fluids    Intake/Output Summary (Last 24 hours) at 05/01/18 1753  Last data filed at 05/01/18 1723   Gross per 24 hour   Intake              240 ml   Output             1400 ml   Net            -1160 ml       Nutrition  Orders Placed This Encounter   Procedures   • Diet Order     Standing Status:   Standing     Number of Occurrences:   1     Order Specific Question:   Diet:     Answer:   Diabetic [3]     Physical Exam   Constitutional: She is oriented to person, place, and time. She appears well-developed and well-nourished. No distress.   Morbidly obese   HENT:   Head: Normocephalic and atraumatic.   Eyes: Conjunctivae and EOM are normal. No scleral icterus.   Neck: Neck supple. No JVD present.   Cardiovascular: Normal rate, regular rhythm and normal heart sounds.  Exam reveals no gallop and no friction rub.    No murmur heard.  Pulmonary/Chest: Effort normal and breath sounds normal. No respiratory distress. She has no wheezes. She exhibits no tenderness.   Abdominal: Soft. Bowel sounds are normal. She exhibits no distension. There is no tenderness. There is no guarding.   No weeping from abdomen      Musculoskeletal: She exhibits edema (BLE). She exhibits no tenderness.   3-4+ BLE edema   Neurological: She is alert and oriented to person, place, and time. No cranial nerve deficit.   Skin: Skin is warm and dry. She is not diaphoretic. There is erythema (bilateral shins r>l). No pallor.   In anterior shins BLE   Psychiatric: Her behavior is normal.   Depressed mood, flat affect     Nursing note and vitals reviewed.      Recent Labs      04/29/18   0108  04/30/18   0327  05/01/18   0001   WBC  1.5*  2.0*  2.3*   RBC  2.57*  2.55*  2.41*   HEMOGLOBIN  7.8*  7.9*  7.4*   HEMATOCRIT  24.8*  24.7*  23.7*   MCV  96.5  96.9  98.3*   MCH  30.4  31.0  30.7   MCHC  31.5*  32.0*  31.2*   RDW  55.3*  57.1*  56.9*   PLATELETCT  80*  79*  74*   MPV  10.2  11.3  12.3     Recent Labs       04/29/18   0108  04/30/18   0327  05/01/18   0001   SODIUM  137  137  136   POTASSIUM  3.8  3.9  3.4*   CHLORIDE  98  97  97   CO2  34*  30  33   GLUCOSE  166*  133*  173*   BUN  16  17  16   CREATININE  0.80  0.70  0.74   CALCIUM  6.3*  6.3*  6.1*                      Assessment/Plan     * Pathologic fracture- (present on admission)   Assessment & Plan    -Left humerus  -widespread lytic disease  -completed radiation to humerus  -pain control             Cellulitis   Assessment & Plan    Bilateral LE  Worsened rash since 4/27  Changed to po Augmentin per ID; f/u clinically          DNR (do not resuscitate)   Assessment & Plan    Palliative care consulted, patient wants to change code status to DNR - done, completed Polst with APN.          Chemotherapy-induced thrombocytopenia   Assessment & Plan    Gradually lower; monitor; no bleeding now          Leg edema   Assessment & Plan    - Left greater than right  - DC IV fluids  -PO Lasix scheduled bid, doing well and renal function stable.  -Abdominal anasarca also          Hypocalcemia   Assessment & Plan    - Repleting as needed  Partly due to hypomagnesemia; replete Mg          Hypomagnesemia   Assessment & Plan    - Repleting as needed        Chronic venous stasis dermatitis of both lower extremities- (present on admission)   Assessment & Plan    -wound care, oral abx        Multiple myeloma (HCC)- (present on admission)   Assessment & Plan    CyBorD chemo - cycle 1 started 4/19/18  -recent diagnosis, appears aggressive, now with innumerable lytic lesions  -Dr Sim and Oksana consulted for assistance in management  - IV dilaudid PRN, oxy prn. DC maintenance steroids as these were causing confusion  -start oxycontin CR 10 mg BID  -skeletal survey done - spine obscured by body habitus, but multiple other lesions found  XRT to right and left femurs  -Palliative care consult for advanced care planning            Anemia- (present on admission)   Assessment & Plan     -related to her MM, appears near her baseline, no e/o overt bleeding at this time, monitor closely        Morbid obesity with BMI of 60.0-69.9, adult (CMS-HCC)- (present on admission)   Assessment & Plan    -encourage weight loss  Body mass index is 67.38 kg/m².        DM type 2 (diabetes mellitus, type 2) (CMS-HCC)- (present on admission)   Assessment & Plan    - With hyperglycemia  - Lantus increase from 40 to 50 units qhs and hypoglycemic again prior to breakfast - change back to 40 and leave it there, will adjust with short acting only  - Intermittent steroids for Velcade treatment also stopped per oncology.        Essential hypertension, benign- (present on admission)   Assessment & Plan    Controlled; cont current rx            Quality-Core Measures   Reviewed items::  Medications reviewed, Labs reviewed and Radiology images reviewed  Singh catheter::  Urinary Tract Retention or Urinary Tract Obstruction (high risk skin breakdown d/t urinary incontinence and body habitus.)  DVT prophylaxis pharmacological::  Heparin  DVT prophylaxis - mechanical:  SCDs  Assessed for rehabilitation services:  Patient was assess for and/or received rehabilitation services during this hospitalization

## 2018-05-02 NOTE — ASSESSMENT & PLAN NOTE
Mildly low bp today, likely due to bp med's and worse anemia.  Transfuse with PRBC 1 unit today.  Hold bp meds and monitor closely.

## 2018-05-02 NOTE — CARE PLAN
Problem: Communication  Goal: The ability to communicate needs accurately and effectively will improve    Intervention: Educate patient and significant other/support system about the plan of care, procedures, treatments, medications and allow for questions  Pt updated on POC, all questions have been answered at this time.      Problem: Pain Management  Goal: Pain level will decrease to patient's comfort goal    Intervention: Follow pain managment plan developed in collaboration with patient and Interdisciplinary Team  Pt medicated for pain with PRN IV and PO pain medication, see MAR. 0 to 10 pain reassessment performed, 2 hour pain reassessment implemented.

## 2018-05-02 NOTE — ASSESSMENT & PLAN NOTE
Due to current illnesses; not able to walk since early april;  Out of SNF days per SW; cont PT/OT here  Poor prognosis given current diagnoses/morbid obesity.  [facility] has accepted patient.

## 2018-05-02 NOTE — PROGRESS NOTES
Infectious Disease Progress Note    Author: Jana Coronado M.D. Date & Time of service: 2018  3:47 PM    Chief Complaint:  Fevers/cellulitis    Interval History:  2018-MAXIMUM TEMPERATURE 100.3 just back from radiation. Feeling winded. WBC 2.3 platelets 74 ANC 1340 creatinine 0.74  18- MAXIMUM TEMPERATURE 100 WBC 3.6 platelets 72 ANC 2850 creatinine 0.89  Labs Reviewed, Medications Reviewed, Radiology Reviewed and Wound Reviewed.    Review of Systems:  Review of Systems   Constitutional: Positive for malaise/fatigue. Negative for fever.   Respiratory: Negative for shortness of breath.    Cardiovascular: Positive for leg swelling. Negative for chest pain.   Gastrointestinal: Negative for abdominal pain, nausea and vomiting.   Genitourinary: Negative for dysuria.   Musculoskeletal: Positive for myalgias.   Skin: Positive for rash.   Neurological: Negative for sensory change and speech change.       Hemodynamics:  Temp (24hrs), Av.2 °C (98.9 °F), Min:36.7 °C (98.1 °F), Max:37.8 °C (100 °F)  Temperature: 36.8 °C (98.3 °F)  Pulse  Av.4  Min: 38  Max: 128   Blood Pressure : 108/40       Physical Exam:  Physical Exam   Constitutional: She is oriented to person, place, and time. No distress.   Morbidly obese  Looking ill   HENT:   Mouth/Throat: No oropharyngeal exudate.   Eyes: No scleral icterus.   Cardiovascular: Regular rhythm.    No murmur heard.  Pulmonary/Chest: She has no wheezes. She has no rales.   Abdominal: Soft. There is no tenderness. There is no rebound.   Large pannus present   Musculoskeletal: She exhibits edema.   Neurological: She is alert and oriented to person, place, and time.   Skin: No erythema.   Right lower extremity erythema has almost resolved  Has some skin breakdown and Candida rash in the groin   Vitals reviewed.      Meds:    Current Facility-Administered Medications:   •  furosemide  •  amoxicillin-clavulanate  •  prochlorperazine  •  nystatin  •  calcium citrate  •   phosphorus  •  acetaminophen  •  LORazepam  •  PICC Line Insertion has been implemented **AND** May use Lidocaine 1% not to exceed 3 mls for local at insertion site **AND** NOTIFY MD **AND** Tip to dwell in the superior vena cava **AND** Do not use PICC Line until placement verified by Chest X Ray **AND** [CANCELED] DX-CHEST-FOR PICC LINE Perform procedure in: Other(comment f6 below): **AND** If radiologist reading of chest X-ray states any of the following the PICC should be used **AND** Further evaluation of the PICC placement can be retrieved from X-Ray and Imaging **AND** Blood draws through PICC line; draws by RN only **AND** FLUSHING GUIDELINES WHEN IN USE **AND** normal saline PF **AND** FLUSHING GUIDELINES WHEN NOT IN USE **AND** DRESSING MAINTENANCE **AND** Change needleless pressure ports and IV tubing every 72 hours per hospital policy **AND** TUBING **AND** If there is an MD order to remove the PICC line, any RN may remove the PICC line **AND** [] PATIENT EDUCATION MATERIALS **AND** NURSING COMMUNICATION  •  insulin regular **AND** Accu-Chek ACHS **AND** NOTIFY MD and PharmD **AND** glucose 4 g **AND** dextrose 50%  •  cloNIDine  •  lisinopril  •  hydrALAZINE  •  acyclovir  •  ALPRAZolam  •  hydrALAZINE  •  ammonium lactate  •  pravastatin  •  terazosin  •  senna-docusate **AND** polyethylene glycol/lytes **AND** magnesium hydroxide **AND** bisacodyl  •  Respiratory Care per Protocol  •  heparin  •  ondansetron  •  ondansetron  •  morphine injection  •  oxyCODONE immediate-release  •  oxyCODONE CR    Labs:  Recent Labs      18   0327  18   0001  18   0127   WBC  2.0*  2.3*  3.6*   RBC  2.55*  2.41*  2.25*   HEMOGLOBIN  7.9*  7.4*  7.0*   HEMATOCRIT  24.7*  23.7*  21.9*   MCV  96.9  98.3*  97.3   MCH  31.0  30.7  31.1   RDW  57.1*  56.9*  57.1*   PLATELETCT  79*  74*  72*   MPV  11.3  12.3  12.6   NEUTSPOLYS  63.70  58.00  79.10*   LYMPHOCYTES  18.80*  31.20  15.60*   MONOCYTES   5.00  7.80  2.60   EOSINOPHILS  1.20  2.20  0.90   BASOPHILS  0.00  0.40  0.90   RBCMORPHOLO  Normal   --   Present     Recent Labs      04/30/18   0327  05/01/18   0001  05/02/18   0127   SODIUM  137  136  135   POTASSIUM  3.9  3.4*  3.4*   CHLORIDE  97  97  97   CO2  30  33  31   GLUCOSE  133*  173*  130*   BUN  17  16  19     Recent Labs      04/30/18   0327  05/01/18   0001  05/02/18   0127   ALBUMIN  2.2*  2.0*  1.8*   CREATININE  0.70  0.74  0.89       Imaging:  Dx-bone Survey[employer]    Result Date: 4/12/2018 4/12/2018 3:29 PM HISTORY/REASON FOR EXAM:  myeloma. Pt with hx of cancer. ?States that she knows she has cancer in her bones. ?Pt has broken right femur and left humerus in the past. TECHNIQUE/EXAM DESCRIPTION: 14 radiographs including of the spine, ribs, long bones, calvarium, pelvis and chest. COMPARISON:  None. FINDINGS: Skull: Innumerable lucent lesions throughout the calvarium. CHEST: Innumerable lesions throughout the clavicles. Cardiomegaly. Small bilateral pleural effusions. Spine: Most of the spine is completely obscured due to body habitus and cannot be evaluated. Pelvic: Multiple lucent lesion is seen in the bilateral acetabul8, left more than right Bilateral upper extremity: Innumerable lucent lesions throughout. Deformity of the left distal humerus, in keeping with healed prior fracture. Bilateral lower extremity: Innumerable lucent lesions throughout     Innumerable lytic lesions throughout, in keeping with multiple myeloma. Old healed deformity of the left distal humerus. Most of the spine is completely obscured due to body habitus and cannot be evaluated. Cardiomegaly. Small bilateral pleural effusions.    Dx-chest-portable (1 View)    Result Date: 4/30/2018 4/30/2018 12:35 PM HISTORY/REASON FOR EXAM: Fever TECHNIQUE/EXAM DESCRIPTION AND NUMBER OF VIEWS: Single portable view of the chest. COMPARISON: None FINDINGS: There is pulmonary edema and bibasilar atelectasis, right  greater than left. There is a right pleural effusion. The heart is enlarged. There is a right PICC line with the tip located at the cavoatrial junction.     1.  Cardiomegaly with pulmonary edema. 2.  Bibasilar atelectasis, right greater than left. 3.  Right pleural effusion.    Dx-humerus 2+ Left    Result Date: 4/10/2018  4/10/2018 6:25 PM HISTORY/REASON FOR EXAM: Left arm pain. TECHNIQUE/EXAM DESCRIPTION AND NUMBER OF VIEWS: 2 views of the LEFT humerus. COMPARISON: FINDINGS: Bone mineralization is severely osteopenic. There are extensive multifocal lytic lesions seen involving the entire humerus, radius and ulna as well as the visualized portions of the scapula and clavicle. There is an old healed mid humeral diaphyseal fracture.     1.  Diffuse lytic lesions involving all of the visualized osseous structures to include the humerus, radius, ulna, scapula and clavicle. 2.  Old healed fracture of the left humerus.    Ir-picc Line Placement > Age 5    Result Date: 4/20/2018  HISTORY/REASON FOR EXAM:   PICC placement. TECHNIQUE/EXAM DESCRIPTION AND NUMBER OF VIEWS:   PICC line insertion with ultrasound guidance.  The procedure was performed using maximal sterile barrier technique including sterile gown, mask, cap, and donning of sterile gloves following appropriate hand hygiene and/or sterile scrub. Patient skin site was prepped with 2% Chlorhexidine solution. FINDINGS:  PICC line insertion with Ultrasound Guidance was performed by qualified nursing staff without the assistance of a Radiologist.  A follow up chest x-ray will be performed to determine appropriateness of PICC positioning.              Ultrasound-guided PICC placement performed by qualified nursing staff as above.       Micro:  Results     Procedure Component Value Units Date/Time    BLOOD CULTURE [[id number]] Collected:  04/30/18 0620    Order Status:  Completed Specimen:  Blood from Peripheral Updated:  05/01/18 0948     Significant Indicator NEG      "Source BLD     Site PERIPHERAL     Blood Culture No Growth    Note: Blood cultures are incubated for 5 days and  are monitored continuously.Positive blood cultures  are called to the RN and reported as soon as  they are identified.      Narrative:       Protective  Per Hospital Policy: Only change Specimen Src: to \"Line\" if  specified by physician order.    BLOOD CULTURE [[id number]] Collected:  04/30/18 0621    Order Status:  Completed Specimen:  Blood from Peripheral Updated:  05/01/18 0948     Significant Indicator NEG     Source BLD     Site PERIPHERAL     Blood Culture No Growth    Note: Blood cultures are incubated for 5 days and  are monitored continuously.Positive blood cultures  are called to the RN and reported as soon as  they are identified.      Narrative:       Protective  Per Hospital Policy: Only change Specimen Src: to \"Line\" if  specified by physician order.    URINALYSIS [[id number]]  (Abnormal) Collected:  04/30/18 1053    Order Status:  Completed Specimen:  Urine from Urine, Singh Cath Updated:  04/30/18 1124     Color Yellow     Character Turbid (A)     Specific Gravity 1.012     Ph 5.0     Glucose Negative mg/dL      Ketones Negative mg/dL      Protein Negative mg/dL      Bilirubin Negative     Urobilinogen, Urine 0.2     Nitrite Negative     Leukocyte Esterase Moderate (A)     Occult Blood Small (A)     Micro Urine Req Microscopic    Narrative:       [id number] DIXON FLOOD          Assessment:  Active Hospital Problems    Diagnosis   • *Pathologic fracture [M84.40XA]   • Fever [R50.9]   • DNR (do not resuscitate) [Z66]   • Chemotherapy-induced thrombocytopenia [D69.59, T45.1X5A]   • Leg edema [R60.0]   • Hypomagnesemia [E83.42]   • Hypocalcemia [E83.51]   • Anxiety [F41.9]   • Hyponatremia [E87.1]   • Anemia [D64.9]   • Multiple myeloma (HCC) [C90.00]   • Chronic venous stasis dermatitis of both lower extremities [I87.2]   • Cellulitis [L03.90]   • Morbid obesity with BMI of " 60.0-69.9, adult (CMS-[facility]) [E66.01, Z68.44]   • DM type 2 (diabetes mellitus, type 2) (CMS-[facility]) [E11.9]   • Essential hypertension, benign [I10]       Plan:  Neutropenic fevers  Neutropenia has resolved  Discontinue meropenem and Zyvox    Cellulitis-improved  Likely chronic changes/ postradiation  In for 5 days of Augmentin through 5/5/2018    Candidal rash  Continue nystatin powder    Multiple myeloma  On treatment    Morbid obesity    Prognosis  Guarded  ID will sign off. Please call as needed  Discussed with internal medicine.

## 2018-05-02 NOTE — PROGRESS NOTES
[facility] Hospitalist Progress Note    Date of Service: 2018    Chief Complaint  67 y.o. female admitted 4/10/2018 with multiple myeloma, slow healing fracture left humerus and recent BLE cellulitis.  Patient has since completed radiation L humerus, started on radiation B femurs and had single cycle of chemotherapy.    Interval Problem Update  Pain better today; sleepy but arousable; poor appetite; denies dizziness with low bp's;  No new c/o    Consultants/Specialty  Oncology - Christi/Mark  XRT - Peddada  ID    Disposition  Tbd, patient does not want to dc to SNF, feels she has enough support at home.        Review of Systems   Constitutional: Negative for chills and fever.   HENT: Negative for congestion.    Eyes: Negative for blurred vision and photophobia.   Respiratory: Negative for cough and shortness of breath.    Cardiovascular: Positive for leg swelling. Negative for chest pain and claudication.   Gastrointestinal: Negative for abdominal pain, constipation, diarrhea, heartburn, nausea and vomiting.   Genitourinary: Negative for dysuria and hematuria.   Musculoskeletal: Positive for joint pain and myalgias.   Skin: Negative for itching and rash.   Neurological: Positive for weakness. Negative for dizziness, sensory change, speech change, focal weakness and headaches.   Psychiatric/Behavioral: Positive for depression. The patient is nervous/anxious. The patient does not have insomnia.       Physical Exam  Laboratory/Imaging   Hemodynamics  Temp (24hrs), Av.1 °C (98.8 °F), Min:36.7 °C (98.1 °F), Max:37.8 °C (100 °F)   Temperature: 36.8 °C (98.2 °F)  Pulse  Av.5  Min: 38  Max: 128    Blood Pressure : (!) 94/39 (RN notified)      Respiratory      Respiration: 20, Pulse Oximetry: 93 %     Work Of Breathing / Effort: Mild  RUL Breath Sounds: Clear, RML Breath Sounds: Diminished, RLL Breath Sounds: Diminished, CHARLOTTE Breath Sounds: Clear, LLL Breath Sounds: Diminished    Fluids    Intake/Output Summary  (Last 24 hours) at 05/02/18 1648  Last data filed at 05/02/18 1500   Gross per 24 hour   Intake             3054 ml   Output             1300 ml   Net             1754 ml       Nutrition  Orders Placed This Encounter   Procedures   • Diet Order     Standing Status:   Standing     Number of Occurrences:   1     Order Specific Question:   Diet:     Answer:   Diabetic [3]     Physical Exam   Constitutional: She is oriented to person, place, and time. She appears well-developed and well-nourished. No distress.   Morbidly obese   HENT:   Head: Normocephalic and atraumatic.   Eyes: Conjunctivae and EOM are normal. No scleral icterus.   Neck: Neck supple. No JVD present.   Cardiovascular: Normal rate, regular rhythm and normal heart sounds.  Exam reveals no gallop and no friction rub.    No murmur heard.  Pulmonary/Chest: Effort normal and breath sounds normal. No respiratory distress. She has no wheezes. She exhibits no tenderness.   Abdominal: Soft. Bowel sounds are normal. She exhibits no distension. There is no tenderness. There is no guarding.   No weeping from abdomen      Musculoskeletal: She exhibits edema (BLE). She exhibits no tenderness.   3-4+ BLE edema   Neurological: She is alert and oriented to person, place, and time. No cranial nerve deficit.   Skin: Skin is warm and dry. She is not diaphoretic. There is erythema (bilateral shins r>l). No pallor.   In anterior shins BLE   Psychiatric: Her behavior is normal.   Depressed mood, flat affect     Nursing note and vitals reviewed.      Recent Labs      04/30/18 0327 05/01/18   0001  05/02/18   0127   WBC  2.0*  2.3*  3.6*   RBC  2.55*  2.41*  2.25*   HEMOGLOBIN  7.9*  7.4*  7.0*   HEMATOCRIT  24.7*  23.7*  21.9*   MCV  96.9  98.3*  97.3   MCH  31.0  30.7  31.1   MCHC  32.0*  31.2*  32.0*   RDW  57.1*  56.9*  57.1*   PLATELETCT  79*  74*  72*   MPV  11.3  12.3  12.6     Recent Labs      04/30/18   0327  05/01/18   0001  05/02/18   0127   SODIUM  137  136  135    POTASSIUM  3.9  3.4*  3.4*   CHLORIDE  97  97  97   CO2  30  33  31   GLUCOSE  133*  173*  130*   BUN  17  16  19   CREATININE  0.70  0.74  0.89   CALCIUM  6.3*  6.1*  6.2*                      Assessment/Plan     * Pathologic fracture- (present on admission)   Assessment & Plan    -Left humerus  -widespread lytic disease  -completed radiation to humerus  -pain control             Hypotension   Assessment & Plan    Mildly low bp today, likely due to bp med's and worse anemia.  Transfuse with PRBC 1 unit today.  Hold bp meds and monitor closely.        Debility- (present on admission)   Assessment & Plan    Due to current illnesses; not able to walk for a month;  Out of SNF days per SW; cont PT/OT here  Poor prognosis given current diagnoses/morbid obesity.        Cellulitis   Assessment & Plan    Bilateral LE  Worsened rash since 4/27  Changed to po Augmentin per ID; no significant change from yesterday          DNR (do not resuscitate)   Assessment & Plan    Palliative care consulted, patient wants to change code status to DNR - done, completed Polst with APN.          Chemotherapy-induced thrombocytopenia   Assessment & Plan    stable; monitor; no bleeding now          Leg edema   Assessment & Plan    - Left greater than right  - DC IV fluids  -PO Lasix scheduled bid, doing well and renal function stable.  -Abdominal anasarca also          Hypocalcemia   Assessment & Plan    - Repleting as needed  Partly due to hypomagnesemia; replete Mg          Hypomagnesemia   Assessment & Plan    - Repleting as needed        Chronic venous stasis dermatitis of both lower extremities- (present on admission)   Assessment & Plan    -wound care, oral abx        Multiple myeloma (HCC)- (present on admission)   Assessment & Plan    CyBorD chemo - cycle 1 started 4/19/18  -recent diagnosis, appears aggressive, now with innumerable lytic lesions  -Dr Sim and Oksana consulted for assistance in management  - IV dilaudid PRN, oxy  prn. DC maintenance steroids as these were causing confusion  -start oxycontin CR 10 mg BID  -skeletal survey done - spine obscured by body habitus, but multiple other lesions found  XRT to right and left femurs  -Palliative care consult for advanced care planning            Anemia- (present on admission)   Assessment & Plan    Worse today; no luis bleeding; transfuse with 1 unit PRBC and f/u.        Morbid obesity with BMI of 60.0-69.9, adult (CMS-HCC)- (present on admission)   Assessment & Plan    -encourage weight loss  Body mass index is 67.38 kg/m².        DM type 2 (diabetes mellitus, type 2) (CMS-HCC)- (present on admission)   Assessment & Plan    Fair control; adjust meds for good control        Essential hypertension, benign- (present on admission)   Assessment & Plan    Holding bp meds for hypotension.            Quality-Core Measures   Reviewed items::  Medications reviewed, Labs reviewed and Radiology images reviewed  Singh catheter::  Urinary Tract Retention or Urinary Tract Obstruction (high risk skin breakdown d/t urinary incontinence and body habitus.)  DVT prophylaxis pharmacological::  Heparin  DVT prophylaxis - mechanical:  SCDs  Assessed for rehabilitation services:  Patient was assess for and/or received rehabilitation services during this hospitalization

## 2018-05-02 NOTE — CARE PLAN
Problem: Nutritional:  Goal: Achieve adequate nutritional intake  Patient will consume >50% of meals   Outcome: PROGRESSING AS EXPECTED  Pt reports eating ~ 50% of meals + snacks. Pt states PO intake can be difficult due to nausea/ poor appetite. Noted pt preferences and added high protein snacks TID and standard food items pt tolerates well such as cream of wheat, oatmeal, and soups with meals. Supplement orders received. Boost Glucose control added once daily. Plan/Rec:  Nutrition Representative to see pt daily for snacks/meal preferences as appropriate with diet order.   RD to monitor for consistently adequate intake.

## 2018-05-02 NOTE — THERAPY
Nrsg requests hold PT today. Getting transfusion and pt is hypotensive. PT will attempt tomorrow if pt is able to participate.

## 2018-05-02 NOTE — PROGRESS NOTES
Rec'd report & assumed care of pt at 1900. Assessment completed. Pt is A&Ox4, max assist, low air loss mattress in use. Patient reports pain of 8/10, medication provided per MAR. POC discussed & questions answered. Bed locked and in lowest position, call light within reach, non-skid socks in place, Q2 hr rounding. Pt reports no further needs at this time.

## 2018-05-02 NOTE — PROGRESS NOTES
Assumed care of pt at 0700, pt AOx4, max assist. Pt reports pain of 7/10, medication given per MAR. Morning medications and assessment complete. Tylenol given as premed prior to administration of RBC's.Bed in low and locked position, call light within reach. All needs met at this time.

## 2018-05-03 PROBLEM — I95.9 HYPOTENSION: Status: RESOLVED | Noted: 2018-05-02 | Resolved: 2018-05-03

## 2018-05-03 LAB
ALBUMIN SERPL BCP-MCNC: 2 G/DL (ref 3.2–4.9)
ANION GAP SERPL CALC-SCNC: 9 MMOL/L (ref 0–11.9)
BASOPHILS # BLD AUTO: 0.3 % (ref 0–1.8)
BASOPHILS # BLD: 0.01 K/UL (ref 0–0.12)
BUN SERPL-MCNC: 20 MG/DL (ref 8–22)
CA-I SERPL-SCNC: 0.9 MMOL/L (ref 1.1–1.3)
CALCIUM SERPL-MCNC: 6.8 MG/DL (ref 8.5–10.5)
CHLORIDE SERPL-SCNC: 96 MMOL/L (ref 96–112)
CO2 SERPL-SCNC: 30 MMOL/L (ref 20–33)
CREAT SERPL-MCNC: 0.93 MG/DL (ref 0.5–1.4)
EOSINOPHIL # BLD AUTO: 0.05 K/UL (ref 0–0.51)
EOSINOPHIL NFR BLD: 1.6 % (ref 0–6.9)
ERYTHROCYTE [DISTWIDTH] IN BLOOD BY AUTOMATED COUNT: 57.1 FL (ref 35.9–50)
GLUCOSE BLD-MCNC: 196 MG/DL (ref 65–99)
GLUCOSE BLD-MCNC: 221 MG/DL (ref 65–99)
GLUCOSE SERPL-MCNC: 167 MG/DL (ref 65–99)
HCT VFR BLD AUTO: 23.8 % (ref 37–47)
HGB BLD-MCNC: 7.9 G/DL (ref 12–16)
IMM GRANULOCYTES # BLD AUTO: 0.04 K/UL (ref 0–0.11)
IMM GRANULOCYTES NFR BLD AUTO: 1.3 % (ref 0–0.9)
LYMPHOCYTES # BLD AUTO: 0.67 K/UL (ref 1–4.8)
LYMPHOCYTES NFR BLD: 21.9 % (ref 22–41)
MAGNESIUM SERPL-MCNC: 2 MG/DL (ref 1.5–2.5)
MCH RBC QN AUTO: 31.5 PG (ref 27–33)
MCHC RBC AUTO-ENTMCNC: 33.2 G/DL (ref 33.6–35)
MCV RBC AUTO: 94.8 FL (ref 81.4–97.8)
MONOCYTES # BLD AUTO: 0.3 K/UL (ref 0–0.85)
MONOCYTES NFR BLD AUTO: 9.8 % (ref 0–13.4)
NEUTROPHILS # BLD AUTO: 1.99 K/UL (ref 2–7.15)
NEUTROPHILS NFR BLD: 65.1 % (ref 44–72)
NRBC # BLD AUTO: 0 K/UL
NRBC BLD-RTO: 0 /100 WBC
PHOSPHATE SERPL-MCNC: 4.4 MG/DL (ref 2.5–4.5)
PLATELET # BLD AUTO: 83 K/UL (ref 164–446)
PMV BLD AUTO: 12.6 FL (ref 9–12.9)
POTASSIUM SERPL-SCNC: 3.8 MMOL/L (ref 3.6–5.5)
RBC # BLD AUTO: 2.51 M/UL (ref 4.2–5.4)
SODIUM SERPL-SCNC: 135 MMOL/L (ref 135–145)
WBC # BLD AUTO: 3.1 K/UL (ref 4.8–10.8)

## 2018-05-03 PROCEDURE — 700111 HCHG RX REV CODE 636 W/ 250 OVERRIDE (IP): Performed by: INTERNAL MEDICINE

## 2018-05-03 PROCEDURE — 82330 ASSAY OF CALCIUM: CPT

## 2018-05-03 PROCEDURE — 700102 HCHG RX REV CODE 250 W/ 637 OVERRIDE(OP): Performed by: INTERNAL MEDICINE

## 2018-05-03 PROCEDURE — 85025 COMPLETE CBC W/AUTO DIFF WBC: CPT

## 2018-05-03 PROCEDURE — A6250 SKIN SEAL PROTECT MOISTURIZR: HCPCS | Performed by: INTERNAL MEDICINE

## 2018-05-03 PROCEDURE — A9270 NON-COVERED ITEM OR SERVICE: HCPCS | Performed by: INTERNAL MEDICINE

## 2018-05-03 PROCEDURE — 82962 GLUCOSE BLOOD TEST: CPT | Mod: 91

## 2018-05-03 PROCEDURE — 700105 HCHG RX REV CODE 258: Performed by: INTERNAL MEDICINE

## 2018-05-03 PROCEDURE — 97530 THERAPEUTIC ACTIVITIES: CPT

## 2018-05-03 PROCEDURE — 97535 SELF CARE MNGMENT TRAINING: CPT

## 2018-05-03 PROCEDURE — 80069 RENAL FUNCTION PANEL: CPT

## 2018-05-03 PROCEDURE — 770004 HCHG ROOM/CARE - ONCOLOGY PRIVATE *

## 2018-05-03 PROCEDURE — 83735 ASSAY OF MAGNESIUM: CPT

## 2018-05-03 PROCEDURE — 99232 SBSQ HOSP IP/OBS MODERATE 35: CPT | Performed by: INTERNAL MEDICINE

## 2018-05-03 RX ORDER — IBUPROFEN 200 MG
1000 CAPSULE ORAL
Status: DISCONTINUED | OUTPATIENT
Start: 2018-05-03 | End: 2018-05-03

## 2018-05-03 RX ORDER — CALCITRIOL 0.25 UG/1
0.25 CAPSULE, LIQUID FILLED ORAL DAILY
Status: DISCONTINUED | OUTPATIENT
Start: 2018-05-03 | End: 2018-05-10

## 2018-05-03 RX ORDER — LORATADINE 10 MG/1
10 TABLET ORAL DAILY
Status: COMPLETED | OUTPATIENT
Start: 2018-05-03 | End: 2018-05-07

## 2018-05-03 RX ORDER — CALCIUM CARBONATE 500 MG/1
1000 TABLET, CHEWABLE ORAL
Status: DISCONTINUED | OUTPATIENT
Start: 2018-05-03 | End: 2018-05-10

## 2018-05-03 RX ADMIN — OXYCODONE HYDROCHLORIDE 10 MG: 10 TABLET, FILM COATED, EXTENDED RELEASE ORAL at 10:21

## 2018-05-03 RX ADMIN — HEPARIN SODIUM 5000 UNITS: 5000 INJECTION, SOLUTION INTRAVENOUS; SUBCUTANEOUS at 06:00

## 2018-05-03 RX ADMIN — Medication: at 10:17

## 2018-05-03 RX ADMIN — CALCIUM GLUCONATE 3 G: 94 INJECTION, SOLUTION INTRAVENOUS at 14:00

## 2018-05-03 RX ADMIN — ANTACID TABLETS 1000 MG: 500 TABLET, CHEWABLE ORAL at 15:44

## 2018-05-03 RX ADMIN — AMOXICILLIN AND CLAVULANATE POTASSIUM 1 TABLET: 875; 125 TABLET, FILM COATED ORAL at 21:06

## 2018-05-03 RX ADMIN — CALCIUM CITRATE 200 MG (950 MG) TABLET 1900 MG: at 10:19

## 2018-05-03 RX ADMIN — NYSTATIN: 100000 POWDER TOPICAL at 21:05

## 2018-05-03 RX ADMIN — ACYCLOVIR 400 MG: 200 CAPSULE ORAL at 10:16

## 2018-05-03 RX ADMIN — AMOXICILLIN AND CLAVULANATE POTASSIUM 1 TABLET: 875; 125 TABLET, FILM COATED ORAL at 01:00

## 2018-05-03 RX ADMIN — CALCITRIOL 0.25 MCG: 0.25 CAPSULE, LIQUID FILLED ORAL at 13:59

## 2018-05-03 RX ADMIN — FUROSEMIDE 40 MG: 40 TABLET ORAL at 06:00

## 2018-05-03 RX ADMIN — OXYCODONE HYDROCHLORIDE 10 MG: 10 TABLET, FILM COATED, EXTENDED RELEASE ORAL at 21:04

## 2018-05-03 RX ADMIN — ACYCLOVIR 400 MG: 200 CAPSULE ORAL at 21:04

## 2018-05-03 RX ADMIN — OXYCODONE HYDROCHLORIDE 5 MG: 5 TABLET ORAL at 14:37

## 2018-05-03 RX ADMIN — DIBASIC SODIUM PHOSPHATE, MONOBASIC POTASSIUM PHOSPHATE AND MONOBASIC SODIUM PHOSPHATE 2 TABLET: 852; 155; 130 TABLET ORAL at 10:22

## 2018-05-03 RX ADMIN — MORPHINE SULFATE 1 MG: 4 INJECTION INTRAVENOUS at 10:20

## 2018-05-03 RX ADMIN — ONDANSETRON 4 MG: 2 INJECTION INTRAMUSCULAR; INTRAVENOUS at 10:21

## 2018-05-03 RX ADMIN — SENNOSIDES AND DOCUSATE SODIUM 2 TABLET: 8.6; 5 TABLET ORAL at 21:04

## 2018-05-03 RX ADMIN — FUROSEMIDE 40 MG: 40 TABLET ORAL at 14:07

## 2018-05-03 RX ADMIN — INSULIN HUMAN 3 UNITS: 100 INJECTION, SOLUTION PARENTERAL at 14:04

## 2018-05-03 RX ADMIN — Medication: at 21:05

## 2018-05-03 RX ADMIN — LORATADINE 10 MG: 10 TABLET ORAL at 14:03

## 2018-05-03 RX ADMIN — HEPARIN SODIUM 5000 UNITS: 5000 INJECTION, SOLUTION INTRAVENOUS; SUBCUTANEOUS at 21:05

## 2018-05-03 RX ADMIN — INSULIN HUMAN 4 UNITS: 100 INJECTION, SOLUTION PARENTERAL at 21:09

## 2018-05-03 RX ADMIN — NYSTATIN: 100000 POWDER TOPICAL at 09:00

## 2018-05-03 RX ADMIN — DIBASIC SODIUM PHOSPHATE, MONOBASIC POTASSIUM PHOSPHATE AND MONOBASIC SODIUM PHOSPHATE 2 TABLET: 852; 155; 130 TABLET ORAL at 21:07

## 2018-05-03 RX ADMIN — PROCHLORPERAZINE EDISYLATE 10 MG: 5 INJECTION INTRAMUSCULAR; INTRAVENOUS at 01:53

## 2018-05-03 RX ADMIN — PRAVASTATIN SODIUM 40 MG: 20 TABLET ORAL at 21:05

## 2018-05-03 RX ADMIN — MORPHINE SULFATE 1 MG: 4 INJECTION INTRAVENOUS at 15:44

## 2018-05-03 RX ADMIN — ALPRAZOLAM 0.5 MG: 0.5 TABLET ORAL at 10:24

## 2018-05-03 RX ADMIN — ALPRAZOLAM 0.5 MG: 0.5 TABLET ORAL at 15:43

## 2018-05-03 RX ADMIN — HEPARIN SODIUM 5000 UNITS: 5000 INJECTION, SOLUTION INTRAVENOUS; SUBCUTANEOUS at 14:00

## 2018-05-03 ASSESSMENT — ENCOUNTER SYMPTOMS
NERVOUS/ANXIOUS: 1
INSOMNIA: 0
VOMITING: 0
FOCAL WEAKNESS: 0
SHORTNESS OF BREATH: 0
ABDOMINAL PAIN: 0
HEADACHES: 0
COUGH: 0
HEARTBURN: 0
BLURRED VISION: 0
FEVER: 0
DIZZINESS: 0
CLAUDICATION: 0
SENSORY CHANGE: 0
PHOTOPHOBIA: 0
WEAKNESS: 1
NAUSEA: 0
CHILLS: 0
DEPRESSION: 1
DIARRHEA: 0
MYALGIAS: 1
SPEECH CHANGE: 0
CONSTIPATION: 0

## 2018-05-03 ASSESSMENT — PAIN SCALES - GENERAL
PAINLEVEL_OUTOF10: 8
PAINLEVEL_OUTOF10: 6
PAINLEVEL_OUTOF10: 6
PAINLEVEL_OUTOF10: 8

## 2018-05-03 ASSESSMENT — COGNITIVE AND FUNCTIONAL STATUS - GENERAL
HELP NEEDED FOR BATHING: A LOT
DRESSING REGULAR UPPER BODY CLOTHING: A LOT
DAILY ACTIVITIY SCORE: 12
DRESSING REGULAR LOWER BODY CLOTHING: TOTAL
PERSONAL GROOMING: A LOT
SUGGESTED CMS G CODE MODIFIER DAILY ACTIVITY: CL
TOILETING: TOTAL

## 2018-05-03 NOTE — PROGRESS NOTES
[facility] Hospitalist Progress Note    Date of Service: 5/3/2018    Chief Complaint  67 y.o. female admitted 4/10/2018 with multiple myeloma, slow healing fracture left humerus and recent BLE cellulitis.  Patient has since completed radiation L humerus, started on radiation B femurs and had single cycle of chemotherapy.    Interval Problem Update  c/o sinus congestion and associated L ear stuffiness/decreased hearing; no pain; body pain controlled; eating little; no sob    Consultants/Specialty  Oncology - Christi/Mark  XRT - Peddada  ID    Disposition  Tbd, patient does not want to dc to SNF, feels she has enough support at home.        Review of Systems   Constitutional: Negative for chills and fever.   HENT: Negative for congestion.    Eyes: Negative for blurred vision and photophobia.   Respiratory: Negative for cough and shortness of breath.    Cardiovascular: Positive for leg swelling. Negative for chest pain and claudication.   Gastrointestinal: Negative for abdominal pain, constipation, diarrhea, heartburn, nausea and vomiting.   Genitourinary: Negative for dysuria and hematuria.   Musculoskeletal: Positive for joint pain and myalgias.   Skin: Negative for itching and rash.   Neurological: Positive for weakness. Negative for dizziness, sensory change, speech change, focal weakness and headaches.   Psychiatric/Behavioral: Positive for depression. The patient is nervous/anxious. The patient does not have insomnia.       Physical Exam  Laboratory/Imaging   Hemodynamics  Temp (24hrs), Av.8 °C (98.3 °F), Min:36.6 °C (97.8 °F), Max:37.3 °C (99.1 °F)   Temperature: 36.7 °C (98.1 °F)  Pulse  Av.4  Min: 38  Max: 128    Blood Pressure : 119/49      Respiratory      Respiration: 20, Pulse Oximetry: 96 %     Work Of Breathing / Effort: Mild  RUL Breath Sounds: Clear, RML Breath Sounds: Diminished, RLL Breath Sounds: Diminished, CHARLOTTE Breath Sounds: Clear, LLL Breath Sounds: Diminished    Fluids    Intake/Output  Summary (Last 24 hours) at 05/03/18 1253  Last data filed at 05/03/18 0400   Gross per 24 hour   Intake             1310 ml   Output             1300 ml   Net               10 ml       Nutrition  Orders Placed This Encounter   Procedures   • Diet Order     Standing Status:   Standing     Number of Occurrences:   1     Order Specific Question:   Diet:     Answer:   Diabetic [3]     Physical Exam   Constitutional: She is oriented to person, place, and time. She appears well-developed and well-nourished. No distress.   Morbidly obese   HENT:   Head: Normocephalic and atraumatic.   bilat TM's ok   Eyes: Conjunctivae and EOM are normal. No scleral icterus.   Neck: Neck supple. No JVD present.   Cardiovascular: Normal rate, regular rhythm and normal heart sounds.  Exam reveals no gallop and no friction rub.    No murmur heard.  Pulmonary/Chest: Effort normal and breath sounds normal. No respiratory distress. She has no wheezes. She exhibits no tenderness.   Abdominal: Soft. Bowel sounds are normal. She exhibits no distension. There is no tenderness. There is no guarding.   No weeping from abdomen      Musculoskeletal: She exhibits edema (BLE). She exhibits no tenderness.   3-4+ BLE edema   Neurological: She is alert and oriented to person, place, and time. No cranial nerve deficit.   Skin: Skin is warm and dry. She is not diaphoretic. There is erythema (bilateral shins r>l). No pallor.   In anterior shins BLE   Psychiatric: Her behavior is normal.   Depressed mood, flat affect     Nursing note and vitals reviewed.      Recent Labs      05/01/18   0001  05/02/18   0127  05/03/18   0112   WBC  2.3*  3.6*  3.1*   RBC  2.41*  2.25*  2.51*   HEMOGLOBIN  7.4*  7.0*  7.9*   HEMATOCRIT  23.7*  21.9*  23.8*   MCV  98.3*  97.3  94.8   MCH  30.7  31.1  31.5   MCHC  31.2*  32.0*  33.2*   RDW  56.9*  57.1*  57.1*   PLATELETCT  74*  72*  83*   MPV  12.3  12.6  12.6     Recent Labs      05/01/18   0001  05/02/18   0127  05/03/18   0112    SODIUM  136  135  135   POTASSIUM  3.4*  3.4*  3.8   CHLORIDE  97  97  96   CO2  33  31  30   GLUCOSE  173*  130*  167*   BUN  16  19  20   CREATININE  0.74  0.89  0.93   CALCIUM  6.1*  6.2*  6.8*                      Assessment/Plan     * Pathologic fracture- (present on admission)   Assessment & Plan    -Left humerus  -widespread lytic disease  -completed radiation to humerus  -pain control             Debility- (present on admission)   Assessment & Plan    Due to current illnesses; not able to walk since early april;  Out of SNF days per SW; cont PT/OT here  Poor prognosis given current diagnoses/morbid obesity.        Cellulitis   Assessment & Plan    Bilateral LE  Stable appearance but not resolving; venous stasis component  Changed to po Augmentin per ID; no significant change from yesterday          DNR (do not resuscitate)   Assessment & Plan    Palliative care consulted, patient wants to change code status to DNR - done, completed Polst with APN.          Chemotherapy-induced thrombocytopenia   Assessment & Plan    improving; monitor; no bleeding now          Leg edema   Assessment & Plan    - Left greater than right  - DC IV fluids  -PO Lasix scheduled bid, doing well and renal function stable.            Hypocalcemia   Assessment & Plan    - Repleting as needed  Partly due to hypomagnesemia and recent bisphosphonate use; replete Mg  Check PTH level        Hypomagnesemia   Assessment & Plan    - Repleting as needed        Chronic venous stasis dermatitis of both lower extremities- (present on admission)   Assessment & Plan    -wound care, oral abx        Multiple myeloma (HCC)- (present on admission)   Assessment & Plan    CyBorD chemo - cycle 1 started 4/19/18; cycle 2 to start on 5/10  -recent diagnosis, appears aggressive, now with innumerable lytic lesions  -Dr Sim and Oksana consulted for assistance in management  - IV dilaudid PRN, oxy prn. DC maintenance steroids as these were causing  confusion  -start oxycontin CR 10 mg BID  -skeletal survey done - spine obscured by body habitus, but multiple other lesions found  XRT to right and left femurs  -Palliative care consult for advanced care planning            Anemia- (present on admission)   Assessment & Plan    Improved after 1 unit PRBC yesterday; monitor.        Morbid obesity with BMI of 60.0-69.9, adult (CMS-HCC)- (present on admission)   Assessment & Plan    -encourage weight loss  Body mass index is 67.38 kg/m².        DM type 2 (diabetes mellitus, type 2) (CMS-HCC)- (present on admission)   Assessment & Plan    Fair control; adjust meds for good control        Essential hypertension, benign- (present on admission)   Assessment & Plan    Holding bp meds for hypotension. Stable bp's today.          Quality-Core Measures   Reviewed items::  Medications reviewed, Labs reviewed and Radiology images reviewed  Singh catheter::  Urinary Tract Retention or Urinary Tract Obstruction (high risk skin breakdown d/t urinary incontinence and body habitus.)  DVT prophylaxis pharmacological::  Heparin  DVT prophylaxis - mechanical:  SCDs  Assessed for rehabilitation services:  Patient was assess for and/or received rehabilitation services during this hospitalization

## 2018-05-03 NOTE — CARE PLAN
Problem: Safety  Goal: Will remain free from falls    Intervention: Assess risk factors for falls  Fall precautions in place

## 2018-05-03 NOTE — PALLIATIVE CARE
"Palliative Care Advance Care Planning Progress Note    General: Aleida Pagan is a 67-year-old female admitted for/10/2018 due to pain caused by multiple myeloma.  She was originally seen by palliative care on 4/28/2018.  Today she is complaining of left ear pain and decreased hearing.  She was able to hear during whisper test but left < right.  We reviewed POLST form.  She selected DNR and selective treatments.  She was unsure about her choices for a feeding tube between no feeding tube and a trial of a feeding tube.  She stated tearfully \"I wonder what the point would be.\"  I asked her to explain her thoughts and feelings to me.  She expressed her concern as she understands her multiple myeloma is fatal.  She stated \"I wish you were here when my sister was here.\"  We discussed that after her first encounter she her sister.  I offered to call her sister again today, introduce myself, and discussed palliative care consult thus far.  Patient was agreeable.  Encouraged her to think about her choices regarding a feeding tube or no feeding tube.    Today we also discussed barriers to discharge.  Patient lives in [place], [place].  She does not have family support that could provide 24-hour care.  At this point the patient needs help with simple mobility and all ADLs besides eating and reading.  To my knowledge, there are no hospice services in [facility].  We discussed other locations where hospice care can be provided.  Encouraged patient to consider her thoughts and feelings regarding her prognosis and goals of care at this time.  She reports feelings of hopelessness and continues to ask \"why me.  I know that is not the right question, but I cannot stop thinking about it.\"  I encouraged her to continue to seek marito and the things that brought her marito before including reading and spending time with family.    Provided therapeutic communication including therapeutic touch/silence, active listening, open ended questions, " normalization/validation of thoughts and feelings, and statements of support throughout encounter. Provided additional business card with palliative care contact information as patient gave the other one to her sister and encouraged patient to call with any questions or needs.     Call placed to patient's sister/DPOA-HC Shazia Joy. Introduced myself and discussed role of palliative care.  She reports that her sister did update her on consult and she has received a copy of her advance directive from our [facility].  Shazia expressed she tries to visit on the weekends but will be unable to come this weekend from [place].  Provided overview of mind.  She reports she is the patient's main support system and is unable to care for the patient back in [place] she also confirms she is unaware of any of care or hospice services and follow.  And I expressed that [facility] does have long-term care and reports that the patient's was well taken care of there.  Discussed hospice consult recommendation and alternative locations of hospice care.  And I appreciated the update and requested she be emailed POLST form if completed.  I asked Shazia if the patient enjoyed animals or music.  She agreed that the patient enjoyed most specifically small dogs.  Request placed for volunteer services to have pet therapy with small dog if possible.    Outcome: DNR, AD completed and scanned into record, POLST partially completed - patient considering choices regarding artificial nutrition. Difficult disposition. Encouraged patient consideration of hospice care at alternative location to home in [facility] as there are no services there.    Recommendations: I do not recommend an ethics or hospice consult at this time because Patient seeking disease modifying treatment at this time. She was encouraged to consider a hospice referral for more information; no ethical dilemma exists at this time.    Thank you for allowing Palliative Care to  participate in this patient's care. Please call our team with questions and/or additional needs.    Total visit time was 70 minutes discussing advance care planning.    Mariya ZELAYA  Palliative Care Nurse Practitioner  [phone]

## 2018-05-03 NOTE — THERAPY
"Occupational Therapy Treatment completed with focus on ADLs, ADL transfers and patient education.  Functional Status:  Pt seen for OT tx. Max A x2 supine > sit, once seated EOB pt able to hold self in midline and use B UEs for light ADLs. Pt limited this session by swelling and pain. Pt unable to move B LEs to EOB and required assistance. Pt did fatigue quickly w/ OOB activity. MAx A to return to supine. Pt c/o SOB. Mod A rolling in bed to Rt and Lt. Pt emotional and reports \"just not feeling well.\" Psychosocial intervention addressed regarding current limitations and prolonged hospitalization. Pt pleasant and cooperative.   Plan of Care: Will benefit from Occupational Therapy 3 times per week  Discharge Recommendations:  Equipment Will Continue to Assess for Equipment Needs.     See \"Rehab Therapy-Acute\" Patient Summary Report for complete documentation.   "

## 2018-05-03 NOTE — CARE PLAN
Problem: Bowel/Gastric:  Goal: Will not experience complications related to bowel motility    Intervention: Implement Bowel Protocol, if applicable  Bowel protocol initiated. Pt encouraged to maintain adequate oral intake. Pt encouraged to perform bed mobility exercises to promote bowel motility.      Problem: Skin Integrity  Goal: Risk for impaired skin integrity will decrease    Intervention: Implement precautions to protect skin integrity in collaboration with the interdisciplinary team  Low air loss mattress in place, q 2 hour turns, aidee-care provided prn, pt encouraged to maintain adequate oral intake.

## 2018-05-03 NOTE — PROGRESS NOTES
Pt is emotional this am.  Nauseated and having pain.  Medicated for all and is now resting more comfortably.  Refused breakfast this am.  Pt c/o of L earache.  Awaiting MD assessment presently.  Interdry to pangilberto.  Cool pack to LE.  PICC flushes with ease.

## 2018-05-03 NOTE — PROGRESS NOTES
Assumed pt care - bedside report received.  Pt is aaox4-extremely fatigued.  Hypotension noted/tachycardia - dr. horta informed/held bp medications.  Dim lungs/4L 02.  PT and OT to see pt today and unable to work with her due to pain/low bp.  Singh patent/good urine output.  picc patent/good blood return.  Pt in a barriatric low air loss bed.  Obese/interdry under panus.  Turning q2 and prn.  Legs red/edema/ice applied and elevated.  50% of food intake - started supplements.  Pt makes needs known - will continue to round.  Pt makes needs known - will continue to round.    Pt received one unit RBC today.

## 2018-05-03 NOTE — PROGRESS NOTES
Assumed care of the pt at 1915, received report from the day shift RN.     Pt is AOx4, with complaint of 8/10 RLQ abdominal pain. Pt medicated with PRN PO oxycodone. Pt denies nausea, numbness or tingling at this time. Pt VSS.     Pt updated on POC: monitor VS, lab values, q 2 turning. All questions have been answered at this time.     O2 via nc, nava to gravity.    Pt is on a low air loss mattress with overhead trapeze. Pt has +4 BLE pitting edema and redness. Pt has +4 abdominal edema and the patients RLQ is weeping. The pt has multiple areas of skin peeling noted beneath the patients pannus. This area has been cleansed, dried, nystatin powder in use and interdry in place. Sacral bruising present, but unchanged.     Call light within reach, hourly rounding in place.

## 2018-05-03 NOTE — PROGRESS NOTES
Kel from Lab called with critical result of Calcium of 6.8 at 0300. Critical lab result read back to Kel. Corrected calcium is 8.4 mg/dL    This critical lab result is within parameters established by [facility] for this patient.

## 2018-05-04 LAB
ALBUMIN SERPL BCP-MCNC: 1.8 G/DL (ref 3.2–4.9)
ANISOCYTOSIS BLD QL SMEAR: ABNORMAL
BARCODED ABORH UBTYP: 6200
BARCODED ABORH UBTYP: 6200
BARCODED PRD CODE UBPRD: NORMAL
BARCODED PRD CODE UBPRD: NORMAL
BARCODED UNIT NUM UBUNT: NORMAL
BARCODED UNIT NUM UBUNT: NORMAL
BASOPHILS # BLD AUTO: 0 % (ref 0–1.8)
BASOPHILS # BLD: 0 K/UL (ref 0–0.12)
BUN SERPL-MCNC: 17 MG/DL (ref 8–22)
CA-I SERPL-SCNC: 0.9 MMOL/L (ref 1.1–1.3)
CALCIUM SERPL-MCNC: 6.8 MG/DL (ref 8.5–10.5)
CHLORIDE SERPL-SCNC: 97 MMOL/L (ref 96–112)
CO2 SERPL-SCNC: 33 MMOL/L (ref 20–33)
COMPONENT R 8504R: NORMAL
COMPONENT R 8504R: NORMAL
CREAT SERPL-MCNC: 0.76 MG/DL (ref 0.5–1.4)
EOSINOPHIL # BLD AUTO: 0.05 K/UL (ref 0–0.51)
EOSINOPHIL NFR BLD: 1.8 % (ref 0–6.9)
ERYTHROCYTE [DISTWIDTH] IN BLOOD BY AUTOMATED COUNT: 56.9 FL (ref 35.9–50)
GLUCOSE BLD-MCNC: 168 MG/DL (ref 65–99)
GLUCOSE BLD-MCNC: 169 MG/DL (ref 65–99)
GLUCOSE BLD-MCNC: 193 MG/DL (ref 65–99)
GLUCOSE BLD-MCNC: 208 MG/DL (ref 65–99)
GLUCOSE SERPL-MCNC: 171 MG/DL (ref 65–99)
HCT VFR BLD AUTO: 21.3 % (ref 37–47)
HGB BLD-MCNC: 6.8 G/DL (ref 12–16)
LYMPHOCYTES # BLD AUTO: 0.46 K/UL (ref 1–4.8)
LYMPHOCYTES NFR BLD: 15.9 % (ref 22–41)
MACROCYTES BLD QL SMEAR: ABNORMAL
MANUAL DIFF BLD: NORMAL
MCH RBC QN AUTO: 30.8 PG (ref 27–33)
MCHC RBC AUTO-ENTMCNC: 31.9 G/DL (ref 33.6–35)
MCV RBC AUTO: 96.4 FL (ref 81.4–97.8)
METAMYELOCYTES NFR BLD MANUAL: 0.9 %
MONOCYTES # BLD AUTO: 0.08 K/UL (ref 0–0.85)
MONOCYTES NFR BLD AUTO: 2.6 % (ref 0–13.4)
MORPHOLOGY BLD-IMP: NORMAL
NEUTROPHILS # BLD AUTO: 2.29 K/UL (ref 2–7.15)
NEUTROPHILS NFR BLD: 70.8 % (ref 44–72)
NEUTS BAND NFR BLD MANUAL: 8 % (ref 0–10)
NRBC # BLD AUTO: 0 K/UL
NRBC BLD-RTO: 0 /100 WBC
PHOSPHATE SERPL-MCNC: 3.7 MG/DL (ref 2.5–4.5)
PLATELET # BLD AUTO: 109 K/UL (ref 164–446)
PLATELET BLD QL SMEAR: NORMAL
PMV BLD AUTO: 12.2 FL (ref 9–12.9)
POTASSIUM SERPL-SCNC: 3.6 MMOL/L (ref 3.6–5.5)
PRODUCT TYPE UPROD: NORMAL
PRODUCT TYPE UPROD: NORMAL
PTH-INTACT SERPL-MCNC: 338.3 PG/ML (ref 14–72)
RBC # BLD AUTO: 2.21 M/UL (ref 4.2–5.4)
RBC BLD AUTO: PRESENT
SODIUM SERPL-SCNC: 135 MMOL/L (ref 135–145)
TOXIC GRANULES BLD QL SMEAR: SLIGHT
UNIT STATUS USTAT: NORMAL
UNIT STATUS USTAT: NORMAL
WBC # BLD AUTO: 2.9 K/UL (ref 4.8–10.8)

## 2018-05-04 PROCEDURE — 700111 HCHG RX REV CODE 636 W/ 250 OVERRIDE (IP): Performed by: INTERNAL MEDICINE

## 2018-05-04 PROCEDURE — 85007 BL SMEAR W/DIFF WBC COUNT: CPT

## 2018-05-04 PROCEDURE — A9270 NON-COVERED ITEM OR SERVICE: HCPCS | Performed by: INTERNAL MEDICINE

## 2018-05-04 PROCEDURE — 84160 ASSAY OF PROTEIN ANY SOURCE: CPT

## 2018-05-04 PROCEDURE — 700102 HCHG RX REV CODE 250 W/ 637 OVERRIDE(OP): Performed by: INTERNAL MEDICINE

## 2018-05-04 PROCEDURE — 83883 ASSAY NEPHELOMETRY NOT SPEC: CPT

## 2018-05-04 PROCEDURE — 700105 HCHG RX REV CODE 258: Performed by: INTERNAL MEDICINE

## 2018-05-04 PROCEDURE — 84165 PROTEIN E-PHORESIS SERUM: CPT

## 2018-05-04 PROCEDURE — 85027 COMPLETE CBC AUTOMATED: CPT

## 2018-05-04 PROCEDURE — 82962 GLUCOSE BLOOD TEST: CPT

## 2018-05-04 PROCEDURE — 86923 COMPATIBILITY TEST ELECTRIC: CPT

## 2018-05-04 PROCEDURE — 80069 RENAL FUNCTION PANEL: CPT

## 2018-05-04 PROCEDURE — 770004 HCHG ROOM/CARE - ONCOLOGY PRIVATE *

## 2018-05-04 PROCEDURE — 700111 HCHG RX REV CODE 636 W/ 250 OVERRIDE (IP): Performed by: HOSPITALIST

## 2018-05-04 PROCEDURE — 82330 ASSAY OF CALCIUM: CPT

## 2018-05-04 PROCEDURE — 36430 TRANSFUSION BLD/BLD COMPNT: CPT

## 2018-05-04 PROCEDURE — 99232 SBSQ HOSP IP/OBS MODERATE 35: CPT | Performed by: INTERNAL MEDICINE

## 2018-05-04 PROCEDURE — 83970 ASSAY OF PARATHORMONE: CPT

## 2018-05-04 PROCEDURE — P9016 RBC LEUKOCYTES REDUCED: HCPCS

## 2018-05-04 RX ADMIN — OXYCODONE HYDROCHLORIDE 10 MG: 10 TABLET, FILM COATED, EXTENDED RELEASE ORAL at 09:30

## 2018-05-04 RX ADMIN — DIBASIC SODIUM PHOSPHATE, MONOBASIC POTASSIUM PHOSPHATE AND MONOBASIC SODIUM PHOSPHATE 2 TABLET: 852; 155; 130 TABLET ORAL at 20:09

## 2018-05-04 RX ADMIN — LORATADINE 10 MG: 10 TABLET ORAL at 09:27

## 2018-05-04 RX ADMIN — Medication: at 20:10

## 2018-05-04 RX ADMIN — ACYCLOVIR 400 MG: 200 CAPSULE ORAL at 09:27

## 2018-05-04 RX ADMIN — HEPARIN SODIUM 5000 UNITS: 5000 INJECTION, SOLUTION INTRAVENOUS; SUBCUTANEOUS at 05:24

## 2018-05-04 RX ADMIN — FUROSEMIDE 40 MG: 40 TABLET ORAL at 05:24

## 2018-05-04 RX ADMIN — HEPARIN SODIUM 5000 UNITS: 5000 INJECTION, SOLUTION INTRAVENOUS; SUBCUTANEOUS at 21:38

## 2018-05-04 RX ADMIN — METFORMIN HYDROCHLORIDE 500 MG: 500 TABLET, FILM COATED ORAL at 18:20

## 2018-05-04 RX ADMIN — NYSTATIN: 100000 POWDER TOPICAL at 09:00

## 2018-05-04 RX ADMIN — ACYCLOVIR 400 MG: 200 CAPSULE ORAL at 20:09

## 2018-05-04 RX ADMIN — SENNOSIDES AND DOCUSATE SODIUM 1 TABLET: 8.6; 5 TABLET ORAL at 20:10

## 2018-05-04 RX ADMIN — HEPARIN SODIUM 5000 UNITS: 5000 INJECTION, SOLUTION INTRAVENOUS; SUBCUTANEOUS at 15:43

## 2018-05-04 RX ADMIN — LORAZEPAM 1 MG: 2 INJECTION INTRAMUSCULAR; INTRAVENOUS at 11:44

## 2018-05-04 RX ADMIN — INSULIN HUMAN 3 UNITS: 100 INJECTION, SOLUTION PARENTERAL at 18:27

## 2018-05-04 RX ADMIN — Medication: at 09:28

## 2018-05-04 RX ADMIN — CALCIUM GLUCONATE 3 G: 94 INJECTION, SOLUTION INTRAVENOUS at 12:42

## 2018-05-04 RX ADMIN — FUROSEMIDE 40 MG: 40 TABLET ORAL at 15:43

## 2018-05-04 RX ADMIN — OXYCODONE HYDROCHLORIDE 10 MG: 10 TABLET, FILM COATED, EXTENDED RELEASE ORAL at 20:09

## 2018-05-04 RX ADMIN — AMOXICILLIN AND CLAVULANATE POTASSIUM 1 TABLET: 875; 125 TABLET, FILM COATED ORAL at 20:19

## 2018-05-04 RX ADMIN — ANTACID TABLETS 1000 MG: 500 TABLET, CHEWABLE ORAL at 12:43

## 2018-05-04 RX ADMIN — AMOXICILLIN AND CLAVULANATE POTASSIUM 1 TABLET: 875; 125 TABLET, FILM COATED ORAL at 09:28

## 2018-05-04 RX ADMIN — INSULIN HUMAN 3 UNITS: 100 INJECTION, SOLUTION PARENTERAL at 09:36

## 2018-05-04 RX ADMIN — PRAVASTATIN SODIUM 40 MG: 20 TABLET ORAL at 20:09

## 2018-05-04 RX ADMIN — DIBASIC SODIUM PHOSPHATE, MONOBASIC POTASSIUM PHOSPHATE AND MONOBASIC SODIUM PHOSPHATE 2 TABLET: 852; 155; 130 TABLET ORAL at 09:27

## 2018-05-04 RX ADMIN — INSULIN HUMAN 3 UNITS: 100 INJECTION, SOLUTION PARENTERAL at 20:18

## 2018-05-04 RX ADMIN — ANTACID TABLETS 1000 MG: 500 TABLET, CHEWABLE ORAL at 09:28

## 2018-05-04 RX ADMIN — INSULIN HUMAN 4 UNITS: 100 INJECTION, SOLUTION PARENTERAL at 15:47

## 2018-05-04 RX ADMIN — ANTACID TABLETS 1000 MG: 500 TABLET, CHEWABLE ORAL at 18:20

## 2018-05-04 RX ADMIN — CALCITRIOL 0.25 MCG: 0.25 CAPSULE, LIQUID FILLED ORAL at 09:29

## 2018-05-04 RX ADMIN — MORPHINE SULFATE 1 MG: 4 INJECTION INTRAVENOUS at 11:24

## 2018-05-04 RX ADMIN — NYSTATIN: 100000 POWDER TOPICAL at 20:10

## 2018-05-04 RX ADMIN — CALCIUM CITRATE 200 MG (950 MG) TABLET 1900 MG: at 09:27

## 2018-05-04 ASSESSMENT — ENCOUNTER SYMPTOMS
DIARRHEA: 0
PHOTOPHOBIA: 0
DIZZINESS: 0
FOCAL WEAKNESS: 0
SENSORY CHANGE: 0
BLURRED VISION: 0
FEVER: 0
SHORTNESS OF BREATH: 0
NERVOUS/ANXIOUS: 1
HEARTBURN: 0
INSOMNIA: 0
VOMITING: 0
SPEECH CHANGE: 0
CLAUDICATION: 0
COUGH: 0
ABDOMINAL PAIN: 0
NAUSEA: 0
HEADACHES: 0
CONSTIPATION: 0
DEPRESSION: 1
WEAKNESS: 1
CHILLS: 0
MYALGIAS: 1

## 2018-05-04 ASSESSMENT — PAIN SCALES - GENERAL
PAINLEVEL_OUTOF10: 8
PAINLEVEL_OUTOF10: 8
PAINLEVEL_OUTOF10: 6
PAINLEVEL_OUTOF10: 7

## 2018-05-04 ASSESSMENT — LIFESTYLE VARIABLES: DO YOU DRINK ALCOHOL: NO

## 2018-05-04 ASSESSMENT — PAIN SCALES - WONG BAKER: WONGBAKER_NUMERICALRESPONSE: HURTS EVEN MORE

## 2018-05-04 NOTE — PROGRESS NOTES
Shazia from Lab called with critical result of Calcium 6.8 at 0109. Critical lab result read back to Shazia.   This critical lab result is within parameters established by Dr. [facility] policy for this patient    Corrected calcium calculated: 8.56 mg/dL.

## 2018-05-04 NOTE — PROGRESS NOTES
[facility] Hospitalist Progress Note    Date of Service: 2018    Chief Complaint  67 y.o. female admitted 4/10/2018 with multiple myeloma, slow healing fracture left humerus and recent BLE cellulitis.  Patient has since completed radiation L humerus, started on radiation B femurs and had single cycle of chemotherapy.    Interval Problem Update  c/o sore spot in sacrum; similar joint pain, controlled; eating little;   Denies bleeding sx's.  Consultants/Specialty  Oncology - Christi/Mark  XRT - Peddada  ID    Disposition  Tbd, patient does not want to dc to SNF, feels she has enough support at home.        Review of Systems   Constitutional: Negative for chills and fever.   HENT: Negative for congestion.    Eyes: Negative for blurred vision and photophobia.   Respiratory: Negative for cough and shortness of breath.    Cardiovascular: Positive for leg swelling. Negative for chest pain and claudication.   Gastrointestinal: Negative for abdominal pain, constipation, diarrhea, heartburn, nausea and vomiting.   Genitourinary: Negative for dysuria and hematuria.   Musculoskeletal: Positive for joint pain and myalgias.   Skin: Negative for itching and rash.   Neurological: Positive for weakness. Negative for dizziness, sensory change, speech change, focal weakness and headaches.   Psychiatric/Behavioral: Positive for depression. The patient is nervous/anxious. The patient does not have insomnia.       Physical Exam  Laboratory/Imaging   Hemodynamics  Temp (24hrs), Av.8 °C (98.3 °F), Min:36.7 °C (98.1 °F), Max:36.9 °C (98.5 °F)   Temperature: 36.8 °C (98.2 °F)  Pulse  Av.7  Min: 38  Max: 128    Blood Pressure : 121/62      Respiratory      Respiration: 19, Pulse Oximetry: 92 %     Work Of Breathing / Effort: Mild  RUL Breath Sounds: Clear, RML Breath Sounds: Diminished, RLL Breath Sounds: Diminished, CHARLOTTE Breath Sounds: Clear, LLL Breath Sounds: Diminished    Fluids    Intake/Output Summary (Last 24 hours) at  05/04/18 1243  Last data filed at 05/04/18 0645   Gross per 24 hour   Intake           1724.5 ml   Output             1875 ml   Net           -150.5 ml       Nutrition  Orders Placed This Encounter   Procedures   • Diet Order     Standing Status:   Standing     Number of Occurrences:   1     Order Specific Question:   Diet:     Answer:   Diabetic [3]     Physical Exam   Constitutional: She is oriented to person, place, and time. She appears well-developed and well-nourished. No distress.   Morbidly obese   HENT:   Head: Normocephalic and atraumatic.   bilat TM's ok   Eyes: Conjunctivae and EOM are normal. No scleral icterus.   Neck: Neck supple. No JVD present.   Cardiovascular: Normal rate, regular rhythm and normal heart sounds.  Exam reveals no gallop and no friction rub.    No murmur heard.  Pulmonary/Chest: Effort normal and breath sounds normal. No respiratory distress. She has no wheezes. She exhibits no tenderness.   Abdominal: Soft. Bowel sounds are normal. She exhibits no distension. There is no tenderness. There is no guarding.   No weeping from abdomen      Musculoskeletal: She exhibits edema (BLE). She exhibits no tenderness.   3-4+ BLE edema   Neurological: She is alert and oriented to person, place, and time. No cranial nerve deficit.   Skin: Skin is warm and dry. She is not diaphoretic. There is erythema (bilateral shins r>l). No pallor.   In anterior shins BLE   Psychiatric: Her behavior is normal.   Depressed mood, flat affect     Nursing note and vitals reviewed.      Recent Labs      05/02/18   0127  05/03/18   0112  05/04/18   0009   WBC  3.6*  3.1*  2.9*   RBC  2.25*  2.51*  2.21*   HEMOGLOBIN  7.0*  7.9*  6.8*   HEMATOCRIT  21.9*  23.8*  21.3*   MCV  97.3  94.8  96.4   MCH  31.1  31.5  30.8   MCHC  32.0*  33.2*  31.9*   RDW  57.1*  57.1*  56.9*   PLATELETCT  72*  83*  109*   MPV  12.6  12.6  12.2     Recent Labs      05/02/18   0127  05/03/18   0112  05/04/18   0009   SODIUM  135  135  135    POTASSIUM  3.4*  3.8  3.6   CHLORIDE  97  96  97   CO2  31  30  33   GLUCOSE  130*  167*  171*   BUN  19  20  17   CREATININE  0.89  0.93  0.76   CALCIUM  6.2*  6.8*  6.8*                      Assessment/Plan     * Pathologic fracture- (present on admission)   Assessment & Plan    -Left humerus  -widespread lytic disease  -completed radiation to humerus  -pain control             Debility- (present on admission)   Assessment & Plan    Due to current illnesses; not able to walk since early april;  Out of SNF days per SW; cont PT/OT here  Poor prognosis given current diagnoses/morbid obesity.        Cellulitis   Assessment & Plan    Bilateral LE  Stable appearance but not resolving; venous stasis component  Changed to po Augmentin per ID; no significant change from yesterday          DNR (do not resuscitate)   Assessment & Plan    Palliative care consulted, patient wants to change code status to DNR - done, completed Polst with APN.          Chemotherapy-induced thrombocytopenia   Assessment & Plan    improving; monitor; no bleeding now          Leg edema   Assessment & Plan    - Left greater than right  - DC IV fluids  -PO Lasix scheduled bid, doing well and renal function stable.            Hypocalcemia   Assessment & Plan    - Repleting as needed  Partly due to hypomagnesemia and recent bisphosphonate use; replete Mg  PTH level high so hypoparathyroidism is ruled out.        Hypomagnesemia   Assessment & Plan    - Repleting as needed        Chronic venous stasis dermatitis of both lower extremities- (present on admission)   Assessment & Plan    -wound care, oral abx        Multiple myeloma (HCC)- (present on admission)   Assessment & Plan    CyBorD chemo - cycle 1 started 4/19/18; cycle 2 to start on 5/10  -recent diagnosis, appears aggressive, now with innumerable lytic lesions  -Dr Sim and Oksana consulted for assistance in management  - IV dilaudid PRN, oxy prn. DC maintenance steroids as these were  causing confusion  -start oxycontin CR 10 mg BID  -skeletal survey done - spine obscured by body habitus, but multiple other lesions found  XRT to right and left femurs  -Palliative care consulted for advanced care planning            Anemia- (present on admission)   Assessment & Plan    HGb dropped again; transfuse with 1 unit PRBC ordered; f/u cbc        Morbid obesity with BMI of 60.0-69.9, adult (CMS-HCC)- (present on admission)   Assessment & Plan    -encourage weight loss  Body mass index is 67.38 kg/m².        DM type 2 (diabetes mellitus, type 2) (CMS-HCC)- (present on admission)   Assessment & Plan    Fair control; adjust meds for good control        Essential hypertension, benign- (present on admission)   Assessment & Plan    Holding bp meds for hypotension. Stable bp's today.          Quality-Core Measures   Reviewed items::  Medications reviewed, Labs reviewed and Radiology images reviewed  Singh catheter::  Urinary Tract Retention or Urinary Tract Obstruction (high risk skin breakdown d/t urinary incontinence and body habitus.)  DVT prophylaxis pharmacological::  Heparin  DVT prophylaxis - mechanical:  SCDs  Assessed for rehabilitation services:  Patient was assess for and/or received rehabilitation services during this hospitalization

## 2018-05-04 NOTE — PROGRESS NOTES
Pt had a period of crying this afternoon.  Pt stated she was afraid to go to sleep and never wake up again.  This RN assured pt she would wake up after a brief nap today.  Support and comfort given to pt as she asked questions regarding hospice and dying in general.  Pt is now resting comfortably in bed.

## 2018-05-04 NOTE — PROGRESS NOTES
"/56   Pulse (!) 110   Temp 36.8 °C (98.2 °F)   Resp 18   Ht 1.6 m (5' 2.99\")   Wt (!) 172.5 kg (380 lb 4.7 oz)   LMP 04/11/1994   SpO2 95%   Breastfeeding? No   BMI 67.38 kg/m²   Pt is AOx4. Pain is 8/10. Scheduled pain medication giver per MAR. Denies nausea.   "

## 2018-05-04 NOTE — CARE PLAN
Problem: Infection  Goal: Will remain free from infection  Outcome: PROGRESSING AS EXPECTED  Pt has nava catheter in place. Right PICC line in place. Assess for sign and symptoms of infection.     Problem: Mobility  Goal: Risk for activity intolerance will decrease  Outcome: PROGRESSING SLOWER THAN EXPECTED  Pt has been in bed through most of hospital stay. Requires 4 + person assist to turn.

## 2018-05-04 NOTE — DISCHARGE PLANNING
Anticipated Discharge Disposition: LTACH    Action: NEO met with pt at bedside to discuss LTACH. Pt was agreeable, but asked SW to call her sister to further discuss discharge plan. NEO called Shazia per pt's request and Shazia will return NEO's call around 3pm. NEO spoke with pt and pt's sister who are agreeable to LTACH; choice faxed to MELISSA [facility] for [facility].    Barriers to Discharge: Pt's insurance might not cover LTACH, or LTACH denies pt.     Plan: Referral sent to LTACH for evaluation.

## 2018-05-05 LAB
ALBUMIN SERPL BCP-MCNC: 1.8 G/DL (ref 3.2–4.9)
BACTERIA BLD CULT: NORMAL
BACTERIA BLD CULT: NORMAL
BASOPHILS # BLD AUTO: 0.6 % (ref 0–1.8)
BASOPHILS # BLD: 0.02 K/UL (ref 0–0.12)
BUN SERPL-MCNC: 14 MG/DL (ref 8–22)
CA-I SERPL-SCNC: 0.9 MMOL/L (ref 1.1–1.3)
CALCIUM SERPL-MCNC: 7 MG/DL (ref 8.5–10.5)
CHLORIDE SERPL-SCNC: 99 MMOL/L (ref 96–112)
CO2 SERPL-SCNC: 35 MMOL/L (ref 20–33)
CREAT SERPL-MCNC: 0.69 MG/DL (ref 0.5–1.4)
EOSINOPHIL # BLD AUTO: 0.03 K/UL (ref 0–0.51)
EOSINOPHIL NFR BLD: 1 % (ref 0–6.9)
ERYTHROCYTE [DISTWIDTH] IN BLOOD BY AUTOMATED COUNT: 58.3 FL (ref 35.9–50)
GLUCOSE BLD-MCNC: 145 MG/DL (ref 65–99)
GLUCOSE BLD-MCNC: 161 MG/DL (ref 65–99)
GLUCOSE BLD-MCNC: 184 MG/DL (ref 65–99)
GLUCOSE BLD-MCNC: 194 MG/DL (ref 65–99)
GLUCOSE SERPL-MCNC: 148 MG/DL (ref 65–99)
HCT VFR BLD AUTO: 28 % (ref 37–47)
HGB BLD-MCNC: 8.7 G/DL (ref 12–16)
IMM GRANULOCYTES # BLD AUTO: 0.13 K/UL (ref 0–0.11)
IMM GRANULOCYTES NFR BLD AUTO: 4.2 % (ref 0–0.9)
LYMPHOCYTES # BLD AUTO: 0.71 K/UL (ref 1–4.8)
LYMPHOCYTES NFR BLD: 22.9 % (ref 22–41)
MAGNESIUM SERPL-MCNC: 1.6 MG/DL (ref 1.5–2.5)
MCH RBC QN AUTO: 30 PG (ref 27–33)
MCHC RBC AUTO-ENTMCNC: 31.1 G/DL (ref 33.6–35)
MCV RBC AUTO: 96.6 FL (ref 81.4–97.8)
MONOCYTES # BLD AUTO: 0.49 K/UL (ref 0–0.85)
MONOCYTES NFR BLD AUTO: 15.8 % (ref 0–13.4)
NEUTROPHILS # BLD AUTO: 1.72 K/UL (ref 2–7.15)
NEUTROPHILS NFR BLD: 55.5 % (ref 44–72)
NRBC # BLD AUTO: 0 K/UL
NRBC BLD-RTO: 0 /100 WBC
PHOSPHATE SERPL-MCNC: 3.3 MG/DL (ref 2.5–4.5)
PLATELET # BLD AUTO: 117 K/UL (ref 164–446)
PMV BLD AUTO: 11.2 FL (ref 9–12.9)
POTASSIUM SERPL-SCNC: 3.7 MMOL/L (ref 3.6–5.5)
RBC # BLD AUTO: 2.9 M/UL (ref 4.2–5.4)
SIGNIFICANT IND 70042: NORMAL
SIGNIFICANT IND 70042: NORMAL
SITE SITE: NORMAL
SITE SITE: NORMAL
SODIUM SERPL-SCNC: 139 MMOL/L (ref 135–145)
SOURCE SOURCE: NORMAL
SOURCE SOURCE: NORMAL
WBC # BLD AUTO: 3.1 K/UL (ref 4.8–10.8)

## 2018-05-05 PROCEDURE — 700111 HCHG RX REV CODE 636 W/ 250 OVERRIDE (IP): Performed by: INTERNAL MEDICINE

## 2018-05-05 PROCEDURE — A9270 NON-COVERED ITEM OR SERVICE: HCPCS | Performed by: INTERNAL MEDICINE

## 2018-05-05 PROCEDURE — 770004 HCHG ROOM/CARE - ONCOLOGY PRIVATE *

## 2018-05-05 PROCEDURE — 700102 HCHG RX REV CODE 250 W/ 637 OVERRIDE(OP): Performed by: INTERNAL MEDICINE

## 2018-05-05 PROCEDURE — 82330 ASSAY OF CALCIUM: CPT

## 2018-05-05 PROCEDURE — 99232 SBSQ HOSP IP/OBS MODERATE 35: CPT | Performed by: INTERNAL MEDICINE

## 2018-05-05 PROCEDURE — 80069 RENAL FUNCTION PANEL: CPT

## 2018-05-05 PROCEDURE — 85025 COMPLETE CBC W/AUTO DIFF WBC: CPT

## 2018-05-05 PROCEDURE — 83735 ASSAY OF MAGNESIUM: CPT

## 2018-05-05 PROCEDURE — 82962 GLUCOSE BLOOD TEST: CPT | Mod: 91

## 2018-05-05 RX ADMIN — HEPARIN SODIUM 5000 UNITS: 5000 INJECTION, SOLUTION INTRAVENOUS; SUBCUTANEOUS at 13:59

## 2018-05-05 RX ADMIN — CALCITRIOL 0.25 MCG: 0.25 CAPSULE, LIQUID FILLED ORAL at 09:29

## 2018-05-05 RX ADMIN — NYSTATIN: 100000 POWDER TOPICAL at 09:30

## 2018-05-05 RX ADMIN — CALCIUM CITRATE 200 MG (950 MG) TABLET 1900 MG: at 09:36

## 2018-05-05 RX ADMIN — ONDANSETRON 4 MG: 2 INJECTION INTRAMUSCULAR; INTRAVENOUS at 21:14

## 2018-05-05 RX ADMIN — DIBASIC SODIUM PHOSPHATE, MONOBASIC POTASSIUM PHOSPHATE AND MONOBASIC SODIUM PHOSPHATE 2 TABLET: 852; 155; 130 TABLET ORAL at 09:35

## 2018-05-05 RX ADMIN — Medication: at 09:00

## 2018-05-05 RX ADMIN — METOPROLOL TARTRATE 25 MG: 25 TABLET, FILM COATED ORAL at 13:58

## 2018-05-05 RX ADMIN — ONDANSETRON 4 MG: 2 INJECTION INTRAMUSCULAR; INTRAVENOUS at 03:00

## 2018-05-05 RX ADMIN — METFORMIN HYDROCHLORIDE 500 MG: 500 TABLET, FILM COATED ORAL at 09:30

## 2018-05-05 RX ADMIN — DIBASIC SODIUM PHOSPHATE, MONOBASIC POTASSIUM PHOSPHATE AND MONOBASIC SODIUM PHOSPHATE 2 TABLET: 852; 155; 130 TABLET ORAL at 21:20

## 2018-05-05 RX ADMIN — ACYCLOVIR 400 MG: 200 CAPSULE ORAL at 09:29

## 2018-05-05 RX ADMIN — INSULIN HUMAN 3 UNITS: 100 INJECTION, SOLUTION PARENTERAL at 14:03

## 2018-05-05 RX ADMIN — METOPROLOL TARTRATE 25 MG: 25 TABLET, FILM COATED ORAL at 21:19

## 2018-05-05 RX ADMIN — ACYCLOVIR 400 MG: 200 CAPSULE ORAL at 21:19

## 2018-05-05 RX ADMIN — INSULIN HUMAN 3 UNITS: 100 INJECTION, SOLUTION PARENTERAL at 09:42

## 2018-05-05 RX ADMIN — LORATADINE 10 MG: 10 TABLET ORAL at 09:29

## 2018-05-05 RX ADMIN — HEPARIN SODIUM 5000 UNITS: 5000 INJECTION, SOLUTION INTRAVENOUS; SUBCUTANEOUS at 22:00

## 2018-05-05 RX ADMIN — HEPARIN SODIUM 5000 UNITS: 5000 INJECTION, SOLUTION INTRAVENOUS; SUBCUTANEOUS at 05:58

## 2018-05-05 RX ADMIN — FUROSEMIDE 40 MG: 40 TABLET ORAL at 17:04

## 2018-05-05 RX ADMIN — ANTACID TABLETS 1000 MG: 500 TABLET, CHEWABLE ORAL at 13:57

## 2018-05-05 RX ADMIN — ANTACID TABLETS 1000 MG: 500 TABLET, CHEWABLE ORAL at 09:29

## 2018-05-05 RX ADMIN — FUROSEMIDE 40 MG: 40 TABLET ORAL at 05:58

## 2018-05-05 RX ADMIN — AMOXICILLIN AND CLAVULANATE POTASSIUM 1 TABLET: 875; 125 TABLET, FILM COATED ORAL at 21:19

## 2018-05-05 RX ADMIN — ANTACID TABLETS 1000 MG: 500 TABLET, CHEWABLE ORAL at 17:04

## 2018-05-05 RX ADMIN — OXYCODONE HYDROCHLORIDE 10 MG: 10 TABLET, FILM COATED, EXTENDED RELEASE ORAL at 21:20

## 2018-05-05 RX ADMIN — AMOXICILLIN AND CLAVULANATE POTASSIUM 1 TABLET: 875; 125 TABLET, FILM COATED ORAL at 09:33

## 2018-05-05 RX ADMIN — NYSTATIN: 100000 POWDER TOPICAL at 21:16

## 2018-05-05 RX ADMIN — OXYCODONE HYDROCHLORIDE 10 MG: 10 TABLET, FILM COATED, EXTENDED RELEASE ORAL at 09:29

## 2018-05-05 RX ADMIN — INSULIN HUMAN 3 UNITS: 100 INJECTION, SOLUTION PARENTERAL at 18:20

## 2018-05-05 RX ADMIN — Medication: at 21:16

## 2018-05-05 RX ADMIN — SENNOSIDES AND DOCUSATE SODIUM 1 TABLET: 8.6; 5 TABLET ORAL at 21:20

## 2018-05-05 RX ADMIN — PRAVASTATIN SODIUM 40 MG: 20 TABLET ORAL at 21:20

## 2018-05-05 RX ADMIN — OXYCODONE HYDROCHLORIDE 5 MG: 5 TABLET ORAL at 23:05

## 2018-05-05 RX ADMIN — METFORMIN HYDROCHLORIDE 1000 MG: 500 TABLET, FILM COATED ORAL at 18:16

## 2018-05-05 ASSESSMENT — ENCOUNTER SYMPTOMS
DIARRHEA: 0
DEPRESSION: 1
HEADACHES: 0
DOUBLE VISION: 0
FOCAL WEAKNESS: 0
INSOMNIA: 0
MYALGIAS: 1
SPEECH CHANGE: 0
NAUSEA: 0
SPUTUM PRODUCTION: 0
NAUSEA: 1
CONSTIPATION: 0
COUGH: 0
PALPITATIONS: 0
SINUS PAIN: 0
ABDOMINAL PAIN: 0
CLAUDICATION: 0
SHORTNESS OF BREATH: 0
SENSORY CHANGE: 0
ORTHOPNEA: 0
EYE DISCHARGE: 0
BLURRED VISION: 0
NERVOUS/ANXIOUS: 1
DIZZINESS: 0
CHILLS: 0
PHOTOPHOBIA: 0
FEVER: 0
WEAKNESS: 1
HEARTBURN: 0
VOMITING: 0
SORE THROAT: 0
CONSTIPATION: 1
HEMOPTYSIS: 0

## 2018-05-05 ASSESSMENT — PAIN SCALES - GENERAL
PAINLEVEL_OUTOF10: 7
PAINLEVEL_OUTOF10: 8
PAINLEVEL_OUTOF10: 6
PAINLEVEL_OUTOF10: 7
PAINLEVEL_OUTOF10: 3

## 2018-05-05 NOTE — CARE PLAN
Problem: Infection  Goal: Will remain free from infection  Outcome: PROGRESSING AS EXPECTED  Pt has nava catheter in place and Right PICC. Assess for signs and symptoms of infection.        Problem: Skin Integrity  Goal: Risk for impaired skin integrity will decrease  Outcome: PROGRESSING AS EXPECTED    Pt has skin tear to right pannus. Area has been cleansed, interdry in place, barrier cream applied.

## 2018-05-05 NOTE — PROGRESS NOTES
Patient has new skin tears to right lower pannus. Wound care photos taken and uploaded to [facility]. Cleaned area, applied nystatin powder, barrier cream and inter dry placed. Wound care consult already placed. Will continue to monitor.

## 2018-05-05 NOTE — PROGRESS NOTES
Oncology/Hematology Progress Note               Author: Anselmocharlie Flores    Cc:  Multiple myeloma  Date & Time created: 5/5/2018  4:43 PM     Interval History:  No major changes. She continues to have some nausea, but she has not thrown up today. She feels her chronic pain is being well controlled. She tells me she finished her radiation. She is a little constipated. Her breathing feels fine, and she has no cough.      PMHx, PSurgHx, SocHx, FAMHx:  All reviewed and no changes    Review of Systems:  Review of Systems   Constitutional: Positive for malaise/fatigue. Negative for chills and fever.   HENT: Negative for nosebleeds, sinus pain and sore throat.    Eyes: Negative for blurred vision, double vision and discharge.   Respiratory: Negative for cough, hemoptysis, sputum production and shortness of breath.    Cardiovascular: Positive for leg swelling. Negative for chest pain, palpitations and orthopnea.   Gastrointestinal: Positive for constipation and nausea. Negative for abdominal pain, diarrhea, heartburn and vomiting.   Genitourinary: Negative for dysuria, frequency and urgency.   Skin: Negative for itching and rash.   Neurological: Positive for weakness.       Physical Exam:  Physical Exam   Constitutional: She is oriented to person, place, and time. She appears well-developed and well-nourished. No distress.   ECOG=3, chronically ill appearing   HENT:   Head: Normocephalic and atraumatic.   Mouth/Throat: Oropharynx is clear and moist. No oropharyngeal exudate.   Eyes: Conjunctivae and EOM are normal. No scleral icterus.   Neck: Normal range of motion. Neck supple.   Cardiovascular: Normal rate, regular rhythm and normal heart sounds.  Exam reveals no gallop and no friction rub.    No murmur heard.  Pulmonary/Chest: Effort normal and breath sounds normal. No respiratory distress. She has no wheezes. She has no rales.   Decreased BS globally   Abdominal: Soft. Bowel sounds are normal. She exhibits no distension.  There is no tenderness. There is no rebound and no guarding.   Morbidly obese   Musculoskeletal: She exhibits edema and tenderness.   Limited ROM of legs, 2+ edema of legs   Lymphadenopathy:     She has no cervical adenopathy.   Neurological: She is alert and oriented to person, place, and time.   Skin: Skin is warm and dry. Rash noted. She is not diaphoretic. There is erythema.   Bilateral erythema on shins   Psychiatric: Her behavior is normal. Thought content normal.   Depressed appearing       Labs:        Invalid input(s): [id number]      Recent Labs      18   0045   SODIUM  135  135  139   POTASSIUM  3.8  3.6  3.7   CHLORIDE  96  97  99   CO2  30  33  35*   BUN  20  17  14   CREATININE  0.93  0.76  0.69   MAGNESIUM  2.0   --   1.6   PHOSPHORUS  4.4  3.7  3.3   CALCIUM  6.8*  6.8*  7.0*     Recent Labs      18   0045   GLUCOSE  167*  171*  148*     Recent Labs      18   0145   RBC  2.51*  2.21*  2.90*   HEMOGLOBIN  7.9*  6.8*  8.7*   HEMATOCRIT  23.8*  21.3*  28.0*   PLATELETCT  83*  109*  117*     Recent Labs      18   0145   WBC  3.1*  2.9*  3.1*   NEUTSPOLYS  65.10  70.80  55.50   LYMPHOCYTES  21.90*  15.90*  22.90   MONOCYTES  9.80  2.60  15.80*   EOSINOPHILS  1.60  1.80  1.00   BASOPHILS  0.30  0.00  0.60     Recent Labs      18   00018   0045   SODIUM  135  135  139   POTASSIUM  3.8  3.6  3.7   CHLORIDE  96  97  99   CO2  30  33  35*   GLUCOSE  167*  171*  148*   BUN  20  17  14   CREATININE  0.93  0.76  0.69   CALCIUM  6.8*  6.8*  7.0*     Hemodynamics:  Temp (24hrs), Av.3 °C (99.1 °F), Min:36.9 °C (98.5 °F), Max:37.8 °C (100 °F)  Temperature: 36.9 °C (98.5 °F)  Pulse  Av.8  Min: 38  Max: 128   Blood Pressure : 148/66     Respiratory:    Respiration: 20, Pulse Oximetry: 95 %     Work Of Breathing /  Effort: Mild  RUL Breath Sounds: Clear, RML Breath Sounds: Diminished, RLL Breath Sounds: Diminished, CHARL[place]TE Breath Sounds: Clear, LLL Breath Sounds: Diminished  Fluids:    Intake/Output Summary (Last 24 hours) at 04/30/18 1057  Last data filed at 04/30/18 1000   Gross per 24 hour   Intake                0 ml   Output             2275 ml   Net            -2275 ml       GI/Nutrition:  Orders Placed This Encounter   Procedures   • Diet Order     Standing Status:   Standing     Number of Occurrences:   1     Order Specific Question:   Diet:     Answer:   Diabetic [3]     Medical Decision Making, by Problem:  Active Hospital Problems    Diagnosis   • *Pathologic fracture [M84.40XA]   • DNR (do not resuscitate) [Z66]   • Chemotherapy-induced thrombocytopenia [D69.59, T45.1X5A]   • Leg edema [R60.0]   • Hypomagnesemia [E83.42]   • Hypocalcemia [E83.51]   • Anxiety [F41.9]   • Hyponatremia [E87.1]   • Anemia [D64.9]   • Multiple myeloma (HCC) [C90.00]   • Chronic venous stasis dermatitis of both lower extremities [I87.2]   • Cellulitis [L03.90]   • Morbid obesity with BMI of 60.0-69.9, adult (CMS-[facility]) [E66.01, Z68.44]   • DM type 2 (diabetes mellitus, type 2) (CMS-HCC) [E11.9]   • Essential hypertension, benign [I10]       Plan:  1.  Multiple myeloma-- 1P deletion intermediate risk, IgG kappa.  Stage III based on ISS staging (beta 2 9.9 and albumin 2.7 before).  Admitted with pathologic fracture of left humerus. Received palliative radiation to the left humerus during this admission. Poor performance status. Lives alone in [facility].       Started chemotherapy here as an inpatient. Received cycle #1 day #1 of CyBorD on April 19. She has completed CYCLE #1.  --Due to start cycle #2 on May 10  --In hospital now with difficult discharge disposition.  --Finished on radiation to the femurs.   --Dexamethasone, as part of the chemotherapy, has been held on a few occasions related to poorly controlled diabetes.       2.   Anemia--  related to multiple myeloma as well as chemotherapy.  We will transfuse if hemoglobin less than 7.0.    3.  Thrombocytopenia--  related to multiple myeloma and chemotherapy. Continue to monitor. Transfuse if platelets less than 10.    4.  Neutropenia--  she has mild neutropenia related to the chemotherapy. We will continue to monitor. If ANC drops below 1.0, we will start Granix.    5.  Cellulitis--  she is having fevers, and this is felt likely to be to the cellulitis on her legs. She was on meropenem and Zyvox.  Her antibiotics have been changed over to oral Augmentin.  --Continue antibiotics per medicine  --Continue to monitor    6. Type 2 diabetes mellitus--  medicine team managing diabetes. Has problems with early morning hypoglycemia related to steroids and insulin which will be managed by the hospitalist. Overall improved after stopping Decadron.    7.  Hypocalcemia--  not a candidate currently for osteoclast therapy or a bisphosphonate.  Continue to monitor and replace calcium as needed  --Medicine team replacing    8.  Nausea--  likely related to medications.  --Use aggressive antiemesis medications.    9.  Bone pain--  seems to be related to her multiple myeloma and lytic lesions.  Continue ongoing pain management.  Hopefully we'll get relief with the use of radiation as well.           High complexity given that she is at high risk for morbidity and mortality. She has multiple medical problems.      Quality-Core Measures   Reviewed items::  Radiology images reviewed, Labs reviewed and Medications reviewed

## 2018-05-05 NOTE — PROGRESS NOTES
[facility] Hospitalist Progress Note    Date of Service: 2018    Chief Complaint  67 y.o. female admitted 4/10/2018 with multiple myeloma, slow healing fracture left humerus and recent BLE cellulitis.  Patient has since completed radiation L humerus, started on radiation B femurs and had single cycle of chemotherapy.    Interval Problem Update  Feels ok; same chronic joint pain; no sob; no new c/o    Consultants/Specialty  Oncology - Christi/Mark  XRT - Peddada  ID    Disposition  Tbd, patient does not want to dc to SNF, feels she has enough support at home.        Review of Systems   Constitutional: Negative for chills and fever.   HENT: Negative for congestion.    Eyes: Negative for blurred vision and photophobia.   Respiratory: Negative for cough and shortness of breath.    Cardiovascular: Positive for leg swelling. Negative for chest pain and claudication.   Gastrointestinal: Negative for abdominal pain, constipation, diarrhea, heartburn, nausea and vomiting.   Genitourinary: Negative for dysuria and hematuria.   Musculoskeletal: Positive for joint pain and myalgias.   Skin: Negative for itching and rash.   Neurological: Positive for weakness. Negative for dizziness, sensory change, speech change, focal weakness and headaches.   Psychiatric/Behavioral: Positive for depression. The patient is nervous/anxious. The patient does not have insomnia.       Physical Exam  Laboratory/Imaging   Hemodynamics  Temp (24hrs), Av.3 °C (99.2 °F), Min:37.1 °C (98.7 °F), Max:37.8 °C (100 °F)   Temperature: 37.1 °C (98.7 °F)  Pulse  Av.5  Min: 38  Max: 128    Blood Pressure : 153/67      Respiratory      Respiration: 18, Pulse Oximetry: 93 %     Work Of Breathing / Effort: Mild  RUL Breath Sounds: Clear, RML Breath Sounds: Diminished, RLL Breath Sounds: Diminished, CHARLOTTE Breath Sounds: Clear, LLL Breath Sounds: Diminished    Fluids    Intake/Output Summary (Last 24 hours) at 18 7113  Last data filed at 18  0600   Gross per 24 hour   Intake              480 ml   Output             1850 ml   Net            -1370 ml       Nutrition  Orders Placed This Encounter   Procedures   • Diet Order     Standing Status:   Standing     Number of Occurrences:   1     Order Specific Question:   Diet:     Answer:   Diabetic [3]     Physical Exam   Constitutional: She is oriented to person, place, and time. She appears well-developed and well-nourished. No distress.   Morbidly obese   HENT:   Head: Normocephalic and atraumatic.   bilat TM's ok   Eyes: Conjunctivae and EOM are normal. No scleral icterus.   Neck: Neck supple. No JVD present.   Cardiovascular: Normal rate, regular rhythm and normal heart sounds.  Exam reveals no gallop and no friction rub.    No murmur heard.  Pulmonary/Chest: Effort normal and breath sounds normal. No respiratory distress. She has no wheezes. She exhibits no tenderness.   Abdominal: Soft. Bowel sounds are normal. She exhibits no distension. There is no tenderness. There is no guarding.   No weeping from abdomen      Musculoskeletal: She exhibits edema (BLE). She exhibits no tenderness.   3-4+ BLE edema   Neurological: She is alert and oriented to person, place, and time. No cranial nerve deficit.   Skin: Skin is warm and dry. She is not diaphoretic. There is erythema (bilateral shins r>l). No pallor.   In anterior shins BLE   Psychiatric: Her behavior is normal.   Depressed mood, flat affect     Nursing note and vitals reviewed.      Recent Labs      05/03/18   0112  05/04/18   0009  05/05/18   0145   WBC  3.1*  2.9*  3.1*   RBC  2.51*  2.21*  2.90*   HEMOGLOBIN  7.9*  6.8*  8.7*   HEMATOCRIT  23.8*  21.3*  28.0*   MCV  94.8  96.4  96.6   MCH  31.5  30.8  30.0   MCHC  33.2*  31.9*  31.1*   RDW  57.1*  56.9*  58.3*   PLATELETCT  83*  109*  117*   MPV  12.6  12.2  11.2     Recent Labs      05/03/18   0112  05/04/18   0009  05/05/18   0045   SODIUM  135  135  139   POTASSIUM  3.8  3.6  3.7   CHLORIDE  96  97   99   CO2  30  33  35*   GLUCOSE  167*  171*  148*   BUN  20  17  14   CREATININE  0.93  0.76  0.69   CALCIUM  6.8*  6.8*  7.0*                      Assessment/Plan     * Pathologic fracture- (present on admission)   Assessment & Plan    -Left humerus  -widespread lytic disease  -completed radiation to humerus  -pain control             Multiple myeloma (HCC)- (present on admission)   Assessment & Plan    CyBorD chemo - cycle 1 started 4/19/18; cycle 2 to start on 5/10  -recent diagnosis, appears aggressive, now with innumerable lytic lesions  -Dr Sim and Oksana consulted for assistance in management  - IV dilaudid PRN, oxy prn. DC maintenance steroids as these were causing confusion  -start oxycontin CR 10 mg BID  -skeletal survey done - spine obscured by body habitus, but multiple other lesions found  XRT to right and left femurs  -Palliative care consulted for advanced care planning            Hypocalcemia   Assessment & Plan    - Repleting as needed  Partly due to hypomagnesemia and recent bisphosphonate use; replete Mg  PTH level high so hypoparathyroidism is ruled out.        Hypomagnesemia   Assessment & Plan    - Repleting as needed        Debility- (present on admission)   Assessment & Plan    Due to current illnesses; not able to walk since early april;  Out of SNF days per SW; cont PT/OT here  Poor prognosis given current diagnoses/morbid obesity.        Cellulitis   Assessment & Plan    Bilateral LE  Stable appearance but not resolving; venous stasis component  Changed to po Augmentin per ID; no significant change from yesterday          DNR (do not resuscitate)   Assessment & Plan    Palliative care consulted, patient wants to change code status to DNR - done, completed Polst with APN.          Chemotherapy-induced thrombocytopenia   Assessment & Plan    improving; monitor; no bleeding now          Leg edema   Assessment & Plan    - Left greater than right  - DC IV fluids  -PO Lasix scheduled bid,  doing well and renal function stable.            Chronic venous stasis dermatitis of both lower extremities- (present on admission)   Assessment & Plan    -wound care, oral abx        Anemia- (present on admission)   Assessment & Plan    Stable cbc; monitor        Morbid obesity with BMI of 60.0-69.9, adult (CMS-HCC)- (present on admission)   Assessment & Plan    -encourage weight loss  Body mass index is 67.38 kg/m².        DM type 2 (diabetes mellitus, type 2) (CMS-HCC)- (present on admission)   Assessment & Plan    Fair control; adjust meds for good control        Essential hypertension, benign- (present on admission)   Assessment & Plan    bp meds on hold last several days for hypotension.  bp's higher today so will resume Metoprolol.          Quality-Core Measures   Reviewed items::  Medications reviewed, Labs reviewed and Radiology images reviewed  Singh catheter::  Urinary Tract Retention or Urinary Tract Obstruction (high risk skin breakdown d/t urinary incontinence and body habitus.)  DVT prophylaxis pharmacological::  Heparin  DVT prophylaxis - mechanical:  SCDs  Assessed for rehabilitation services:  Patient was assess for and/or received rehabilitation services during this hospitalization

## 2018-05-05 NOTE — DISCHARGE PLANNING
Medical Social Worker    [facility] called [facility]missions. Per , admissions team only available Monday through Friday.

## 2018-05-05 NOTE — CARE PLAN
Problem: Infection  Goal: Will remain free from infection  Outcome: PROGRESSING AS EXPECTED  Pt has nava catheter in place and Right PICC. Assess for signs and symptoms of infection. Changed PICC dressing.     Problem: Skin Integrity  Goal: Risk for impaired skin integrity will decrease  Outcome: PROGRESSING AS EXPECTED  Pt has skin tear to right pannus. Area cleansed, interdry in place, barrier cream applied, photos taken. Wound consult has been placed.

## 2018-05-05 NOTE — DISCHARGE PLANNING
Received choice form from [facility].  Referral sent to [facility] at 6061 on 05-04-18

## 2018-05-05 NOTE — PROGRESS NOTES
"/61   Pulse (!) 119   Temp 37.8 °C (100 °F)   Resp 20   Ht 1.6 m (5' 2.99\")   Wt (!) 172.5 kg (380 lb 4.7 oz)   LMP 04/11/1994   SpO2 93%   Breastfeeding? No   BMI 67.38 kg/m²   Pt is AOX4. Pain is 8/10. Given scheduled pain med per MAR. Pt is anxious and tearful. Denies nausea.   "

## 2018-05-05 NOTE — PROGRESS NOTES
Assumed care of pt at 0700. Rc'd report from Neena. Pt is Aox4. Pt is in bed. .  Denies pain and nausea. Hourly rounding in place. Call light within reach. Bed in lowest, locked position.

## 2018-05-06 PROBLEM — L89.309 DECUBITUS ULCER OF BUTTOCK: Status: ACTIVE | Noted: 2018-05-06

## 2018-05-06 LAB
ALBUMIN SERPL BCP-MCNC: 1.8 G/DL (ref 3.2–4.9)
ALBUMIN SERPL-MCNC: 2.1 G/DL (ref 3.75–5.01)
ALPHA1 GLOB SERPL ELPH-MCNC: 0.55 G/DL (ref 0.19–0.46)
ALPHA2 GLOB SERPL ELPH-MCNC: 0.5 G/DL (ref 0.48–1.05)
B-GLOBULIN SERPL ELPH-MCNC: 0.47 G/DL (ref 0.48–1.1)
BASOPHILS # BLD AUTO: 0.9 % (ref 0–1.8)
BASOPHILS # BLD: 0.03 K/UL (ref 0–0.12)
BUN SERPL-MCNC: 12 MG/DL (ref 8–22)
CA-I SERPL-SCNC: 0.9 MMOL/L (ref 1.1–1.3)
CALCIUM SERPL-MCNC: 6.9 MG/DL (ref 8.5–10.5)
CHLORIDE SERPL-SCNC: 99 MMOL/L (ref 96–112)
CO2 SERPL-SCNC: 36 MMOL/L (ref 20–33)
CREAT SERPL-MCNC: 0.6 MG/DL (ref 0.5–1.4)
EER PROT ELECT SER Q1092: ABNORMAL
EOSINOPHIL # BLD AUTO: 0 K/UL (ref 0–0.51)
EOSINOPHIL NFR BLD: 0 % (ref 0–6.9)
ERYTHROCYTE [DISTWIDTH] IN BLOOD BY AUTOMATED COUNT: 57.1 FL (ref 35.9–50)
GAMMA GLOB SERPL ELPH-MCNC: 1.59 G/DL (ref 0.62–1.51)
GLUCOSE BLD-MCNC: 136 MG/DL (ref 65–99)
GLUCOSE BLD-MCNC: 155 MG/DL (ref 65–99)
GLUCOSE BLD-MCNC: 175 MG/DL (ref 65–99)
GLUCOSE BLD-MCNC: 186 MG/DL (ref 65–99)
GLUCOSE SERPL-MCNC: 155 MG/DL (ref 65–99)
HCT VFR BLD AUTO: 28.2 % (ref 37–47)
HGB BLD-MCNC: 8.8 G/DL (ref 12–16)
INTERPRETATION SERPL IFE-IMP: ABNORMAL
KAPPA LC FREE SER-MCNC: 2.74 MG/DL (ref 0.33–1.94)
KAPPA LC FREE/LAMBDA FREE SER NEPH: 3.75 {RATIO} (ref 0.26–1.65)
LAMBDA LC FREE SERPL-MCNC: 0.73 MG/DL (ref 0.57–2.63)
LYMPHOCYTES # BLD AUTO: 0.33 K/UL (ref 1–4.8)
LYMPHOCYTES NFR BLD: 10.6 % (ref 22–41)
MANUAL DIFF BLD: NORMAL
MCH RBC QN AUTO: 30.3 PG (ref 27–33)
MCHC RBC AUTO-ENTMCNC: 31.2 G/DL (ref 33.6–35)
MCV RBC AUTO: 97.2 FL (ref 81.4–97.8)
METAMYELOCYTES NFR BLD MANUAL: 0.9 %
MONOCYTES # BLD AUTO: 0.16 K/UL (ref 0–0.85)
MONOCYTES NFR BLD AUTO: 5.3 % (ref 0–13.4)
MORPHOLOGY BLD-IMP: NORMAL
MYELOCYTES NFR BLD MANUAL: 4.4 %
NEUTROPHILS # BLD AUTO: 2.36 K/UL (ref 2–7.15)
NEUTROPHILS NFR BLD: 69 % (ref 44–72)
NEUTS BAND NFR BLD MANUAL: 7.1 % (ref 0–10)
NRBC # BLD AUTO: 0 K/UL
NRBC BLD-RTO: 0 /100 WBC
PHOSPHATE SERPL-MCNC: 3.4 MG/DL (ref 2.5–4.5)
PLATELET # BLD AUTO: 125 K/UL (ref 164–446)
PLATELET BLD QL SMEAR: NORMAL
PMV BLD AUTO: 10.8 FL (ref 9–12.9)
POTASSIUM SERPL-SCNC: 3.7 MMOL/L (ref 3.6–5.5)
PROMYELOCYTES NFR BLD MANUAL: 1.8 %
PROT SERPL-MCNC: 5.2 G/DL (ref 6–8.3)
RBC # BLD AUTO: 2.9 M/UL (ref 4.2–5.4)
RBC BLD AUTO: NORMAL
SODIUM SERPL-SCNC: 138 MMOL/L (ref 135–145)
WBC # BLD AUTO: 3.1 K/UL (ref 4.8–10.8)

## 2018-05-06 PROCEDURE — 82330 ASSAY OF CALCIUM: CPT

## 2018-05-06 PROCEDURE — 700111 HCHG RX REV CODE 636 W/ 250 OVERRIDE (IP): Performed by: INTERNAL MEDICINE

## 2018-05-06 PROCEDURE — A9270 NON-COVERED ITEM OR SERVICE: HCPCS | Performed by: INTERNAL MEDICINE

## 2018-05-06 PROCEDURE — 85027 COMPLETE CBC AUTOMATED: CPT

## 2018-05-06 PROCEDURE — 85007 BL SMEAR W/DIFF WBC COUNT: CPT

## 2018-05-06 PROCEDURE — 700102 HCHG RX REV CODE 250 W/ 637 OVERRIDE(OP): Performed by: INTERNAL MEDICINE

## 2018-05-06 PROCEDURE — 80069 RENAL FUNCTION PANEL: CPT

## 2018-05-06 PROCEDURE — 82962 GLUCOSE BLOOD TEST: CPT | Mod: 91

## 2018-05-06 PROCEDURE — 770004 HCHG ROOM/CARE - ONCOLOGY PRIVATE *

## 2018-05-06 PROCEDURE — 99232 SBSQ HOSP IP/OBS MODERATE 35: CPT | Performed by: INTERNAL MEDICINE

## 2018-05-06 RX ADMIN — DIBASIC SODIUM PHOSPHATE, MONOBASIC POTASSIUM PHOSPHATE AND MONOBASIC SODIUM PHOSPHATE 2 TABLET: 852; 155; 130 TABLET ORAL at 21:47

## 2018-05-06 RX ADMIN — ACYCLOVIR 400 MG: 200 CAPSULE ORAL at 09:29

## 2018-05-06 RX ADMIN — OXYCODONE HYDROCHLORIDE 10 MG: 10 TABLET, FILM COATED, EXTENDED RELEASE ORAL at 21:47

## 2018-05-06 RX ADMIN — CALCITRIOL 0.25 MCG: 0.25 CAPSULE, LIQUID FILLED ORAL at 09:30

## 2018-05-06 RX ADMIN — ACYCLOVIR 400 MG: 200 CAPSULE ORAL at 21:45

## 2018-05-06 RX ADMIN — HEPARIN SODIUM 5000 UNITS: 5000 INJECTION, SOLUTION INTRAVENOUS; SUBCUTANEOUS at 06:15

## 2018-05-06 RX ADMIN — NYSTATIN: 100000 POWDER TOPICAL at 07:51

## 2018-05-06 RX ADMIN — INSULIN HUMAN 3 UNITS: 100 INJECTION, SOLUTION PARENTERAL at 09:31

## 2018-05-06 RX ADMIN — SENNOSIDES AND DOCUSATE SODIUM 2 TABLET: 8.6; 5 TABLET ORAL at 09:35

## 2018-05-06 RX ADMIN — METFORMIN HYDROCHLORIDE 1000 MG: 500 TABLET, FILM COATED ORAL at 18:23

## 2018-05-06 RX ADMIN — LORATADINE 10 MG: 10 TABLET ORAL at 09:30

## 2018-05-06 RX ADMIN — HEPARIN SODIUM 5000 UNITS: 5000 INJECTION, SOLUTION INTRAVENOUS; SUBCUTANEOUS at 21:46

## 2018-05-06 RX ADMIN — ANTACID TABLETS 1000 MG: 500 TABLET, CHEWABLE ORAL at 18:22

## 2018-05-06 RX ADMIN — PRAVASTATIN SODIUM 40 MG: 20 TABLET ORAL at 21:48

## 2018-05-06 RX ADMIN — OXYCODONE HYDROCHLORIDE 5 MG: 5 TABLET ORAL at 12:31

## 2018-05-06 RX ADMIN — METFORMIN HYDROCHLORIDE 1000 MG: 500 TABLET, FILM COATED ORAL at 09:29

## 2018-05-06 RX ADMIN — INSULIN HUMAN 3 UNITS: 100 INJECTION, SOLUTION PARENTERAL at 13:16

## 2018-05-06 RX ADMIN — INSULIN HUMAN 3 UNITS: 100 INJECTION, SOLUTION PARENTERAL at 21:52

## 2018-05-06 RX ADMIN — METOPROLOL TARTRATE 25 MG: 25 TABLET, FILM COATED ORAL at 09:30

## 2018-05-06 RX ADMIN — Medication: at 21:46

## 2018-05-06 RX ADMIN — FUROSEMIDE 40 MG: 40 TABLET ORAL at 06:15

## 2018-05-06 RX ADMIN — CALCIUM CITRATE 200 MG (950 MG) TABLET 1900 MG: at 09:34

## 2018-05-06 RX ADMIN — NYSTATIN: 100000 POWDER TOPICAL at 21:47

## 2018-05-06 RX ADMIN — ANTACID TABLETS 1000 MG: 500 TABLET, CHEWABLE ORAL at 12:31

## 2018-05-06 RX ADMIN — DIBASIC SODIUM PHOSPHATE, MONOBASIC POTASSIUM PHOSPHATE AND MONOBASIC SODIUM PHOSPHATE 2 TABLET: 852; 155; 130 TABLET ORAL at 09:32

## 2018-05-06 RX ADMIN — FUROSEMIDE 40 MG: 40 TABLET ORAL at 16:00

## 2018-05-06 RX ADMIN — ANTACID TABLETS 1000 MG: 500 TABLET, CHEWABLE ORAL at 09:33

## 2018-05-06 RX ADMIN — HEPARIN SODIUM 5000 UNITS: 5000 INJECTION, SOLUTION INTRAVENOUS; SUBCUTANEOUS at 13:25

## 2018-05-06 RX ADMIN — ONDANSETRON 4 MG: 2 INJECTION INTRAMUSCULAR; INTRAVENOUS at 20:04

## 2018-05-06 RX ADMIN — Medication: at 09:34

## 2018-05-06 RX ADMIN — METOPROLOL TARTRATE 25 MG: 25 TABLET, FILM COATED ORAL at 21:46

## 2018-05-06 RX ADMIN — OXYCODONE HYDROCHLORIDE 10 MG: 10 TABLET, FILM COATED, EXTENDED RELEASE ORAL at 09:30

## 2018-05-06 ASSESSMENT — PAIN SCALES - GENERAL
PAINLEVEL_OUTOF10: 6
PAINLEVEL_OUTOF10: 3
PAINLEVEL_OUTOF10: 6

## 2018-05-06 ASSESSMENT — ENCOUNTER SYMPTOMS
NAUSEA: 0
CONSTIPATION: 0
HEARTBURN: 0
DEPRESSION: 1
MYALGIAS: 1
HEADACHES: 0
ABDOMINAL PAIN: 0
INSOMNIA: 0
FOCAL WEAKNESS: 0
SHORTNESS OF BREATH: 0
DIARRHEA: 0
NERVOUS/ANXIOUS: 1
COUGH: 0
SPEECH CHANGE: 0
BLURRED VISION: 0
CHILLS: 0
PHOTOPHOBIA: 0
DIZZINESS: 0
SENSORY CHANGE: 0
VOMITING: 0
WEAKNESS: 1
CLAUDICATION: 0
FEVER: 0

## 2018-05-06 NOTE — CARE PLAN
Problem: Infection  Goal: Will remain free from infection  Outcome: PROGRESSING AS EXPECTED  Pt has Left PICC in place. Pt has nava catheter in place. Assess for signs and symptoms of infection.     Problem: Skin Integrity  Goal: Risk for impaired skin integrity will decrease  Outcome: PROGRESSING AS EXPECTED  Presence of skin breakdown in sacrum and pannus. Barrier cream applied.

## 2018-05-06 NOTE — PROGRESS NOTES
Maame from Lab called charge, Liss, with critical result of Calcium at 6.9 at 0525.   This critical lab result is within parameters established by [facility] policy per patient.     Corrected calcium calculated: 8.66 mg/dL.

## 2018-05-06 NOTE — WOUND TEAM
Wound consult placed for evaluation of posterior thigh wounds bilaterally and abdominal fold wound.  Patient has been seen by us previously and interventions in place.  Abdominal pannus skin appears improved from previous photos but does have a partial thickness wound in the center.  I applied mepilex lite foam dressing and these are also on her posterior thigh wounds which appear to be skin tears.  She is a challenge due to her size but staff is following orders and protocols and she doesn't seem worse.

## 2018-05-06 NOTE — PROGRESS NOTES
Received report from night RN at 0700 and assumed care of patient.  Patient AO x4, resting comfortably in bed.  Patient turned with assist x3 and pericare given.  Mepilex x2 applied to L and R buttocks for skin tears.  Nystatin applied to panus with InterDry.  Safety precautions in place.  POC discussed.  All needs met at this time.  Personal belongings and call light within reach.  Patient educated to call for assistance and verbalizes understanding.  Hourly rounding in practice.

## 2018-05-06 NOTE — PROGRESS NOTES
"/71   Pulse (!) 102   Temp 37.1 °C (98.7 °F)   Resp 18   Ht 1.6 m (5' 2.99\")   Wt (!) 172.5 kg (380 lb 4.7 oz)   LMP 04/11/1994   SpO2 92%   Breastfeeding? No   BMI 67.38 kg/m²   Pt is AOx4. Pain is 8/10. Nausea present. PRN Zofran given.   "

## 2018-05-06 NOTE — PROGRESS NOTES
[facility] Hospitalist Progress Note    Date of Service: 2018    Chief Complaint  67 y.o. female admitted 4/10/2018 with multiple myeloma, slow healing fracture left humerus and recent BLE cellulitis.  Patient has since completed radiation L humerus, started on radiation B femurs and had single cycle of chemotherapy.    Interval Problem Update  Doing about the same; pain controlled; no new c/o    Consultants/Specialty  Oncology - Christi/Mark  XRT - Peddada  ID    Disposition  Tbd, patient does not want to dc to SNF, feels she has enough support at home.        Review of Systems   Constitutional: Negative for chills and fever.   HENT: Negative for congestion.    Eyes: Negative for blurred vision and photophobia.   Respiratory: Negative for cough and shortness of breath.    Cardiovascular: Positive for leg swelling. Negative for chest pain and claudication.   Gastrointestinal: Negative for abdominal pain, constipation, diarrhea, heartburn, nausea and vomiting.   Genitourinary: Negative for dysuria and hematuria.   Musculoskeletal: Positive for joint pain and myalgias.   Skin: Negative for itching and rash.   Neurological: Positive for weakness. Negative for dizziness, sensory change, speech change, focal weakness and headaches.   Psychiatric/Behavioral: Positive for depression. The patient is nervous/anxious. The patient does not have insomnia.       Physical Exam  Laboratory/Imaging   Hemodynamics  Temp (24hrs), Av.9 °C (98.5 °F), Min:36.9 °C (98.4 °F), Max:37.1 °C (98.7 °F)   Temperature: 37 °C (98.6 °F)  Pulse  Av.6  Min: 38  Max: 128    Blood Pressure : 146/54      Respiratory      Respiration: 18, Pulse Oximetry: 90 %     Work Of Breathing / Effort: Mild  RUL Breath Sounds: Clear, RML Breath Sounds: Diminished, RLL Breath Sounds: Diminished, CHARLOTTE Breath Sounds: Clear, LLL Breath Sounds: Diminished    Fluids    Intake/Output Summary (Last 24 hours) at 18 1550  Last data filed at 18 1100    Gross per 24 hour   Intake              240 ml   Output             2450 ml   Net            -2210 ml       Nutrition  Orders Placed This Encounter   Procedures   • Diet Order     Standing Status:   Standing     Number of Occurrences:   1     Order Specific Question:   Diet:     Answer:   Diabetic [3]     Physical Exam   Constitutional: She is oriented to person, place, and time. She appears well-developed and well-nourished. No distress.   Morbidly obese   HENT:   Head: Normocephalic and atraumatic.   Eyes: Conjunctivae and EOM are normal. No scleral icterus.   Neck: Neck supple. No JVD present.   Cardiovascular: Normal rate, regular rhythm and normal heart sounds.  Exam reveals no gallop and no friction rub.    No murmur heard.  Pulmonary/Chest: Effort normal and breath sounds normal. No respiratory distress. She has no wheezes. She exhibits no tenderness.   Abdominal: Soft. Bowel sounds are normal. She exhibits no distension. There is no tenderness. There is no guarding.        Musculoskeletal: She exhibits edema (BLE). She exhibits no tenderness.   3-4+ BLE edema   Neurological: She is alert and oriented to person, place, and time. No cranial nerve deficit.   Skin: Skin is warm and dry. She is not diaphoretic. There is erythema (bilateral shins r>l). No pallor.   In anterior shins BLE   Psychiatric: Her behavior is normal.   Depressed mood, flat affect     Nursing note and vitals reviewed.      Recent Labs      05/04/18   0009  05/05/18   0145  05/06/18   0614   WBC  2.9*  3.1*  3.1*   RBC  2.21*  2.90*  2.90*   HEMOGLOBIN  6.8*  8.7*  8.8*   HEMATOCRIT  21.3*  28.0*  28.2*   MCV  96.4  96.6  97.2   MCH  30.8  30.0  30.3   MCHC  31.9*  31.1*  31.2*   RDW  56.9*  58.3*  57.1*   PLATELETCT  109*  117*  125*   MPV  12.2  11.2  10.8     Recent Labs      05/04/18   0009  05/05/18   0045  05/06/18   0418   SODIUM  135  139  138   POTASSIUM  3.6  3.7  3.7   CHLORIDE  97  99  99   CO2  33  35*  36*   GLUCOSE  171*  148*   155*   BUN  17  14  12   CREATININE  0.76  0.69  0.60   CALCIUM  6.8*  7.0*  6.9*                      Assessment/Plan     * Pathologic fracture- (present on admission)   Assessment & Plan    -Left humerus  -widespread lytic disease  -completed radiation to humerus  -pain control             Multiple myeloma (HCC)- (present on admission)   Assessment & Plan    CyBorD chemo - cycle 1 started 4/19/18; cycle 2 to start on 5/10  -recent diagnosis, appears aggressive, now with innumerable lytic lesions  -Dr Sim and Oksana consulted for assistance in management  - IV dilaudid PRN, oxy prn. DC maintenance steroids as these were causing confusion  -start oxycontin CR 10 mg BID  -skeletal survey done - spine obscured by body habitus, but multiple other lesions found  XRT to right and left femurs  -Palliative care consulted for advanced care planning            Hypocalcemia   Assessment & Plan    - Repleting as needed   due to recent bisphosphonate use; replete Mg  PTH level high so hypoparathyroidism is ruled out.        Hypomagnesemia   Assessment & Plan    - Repleting as needed        Decubitus ulcer of buttock   Assessment & Plan    Wound care; rotate frequently in bed; air mattress.        Debility- (present on admission)   Assessment & Plan    Due to current illnesses; not able to walk since early april;  Out of SNF days per SW; cont PT/OT here  Poor prognosis given current diagnoses/morbid obesity.  [facility] referral made.        Cellulitis   Assessment & Plan    Bilateral LE  Stable appearance but not resolving; venous stasis component  Changed to po Augmentin per ID; no significant change from yesterday          DNR (do not resuscitate)   Assessment & Plan    Palliative care consulted, patient wants to change code status to DNR - done, completed Polst with APN.          Chemotherapy-induced thrombocytopenia   Assessment & Plan    stable; monitor; no bleeding now          Leg edema   Assessment & Plan    - Left  greater than right  - DC IV fluids  -PO Lasix scheduled bid, doing well and renal function stable.            Chronic venous stasis dermatitis of both lower extremities- (present on admission)   Assessment & Plan    -wound care, oral abx        Anemia- (present on admission)   Assessment & Plan    Stable cbc; monitor        Morbid obesity with BMI of 60.0-69.9, adult (CMS-HCC)- (present on admission)   Assessment & Plan    -encourage weight loss  Body mass index is 67.38 kg/m².        DM type 2 (diabetes mellitus, type 2) (CMS-HCC)- (present on admission)   Assessment & Plan    Fair control; adjust meds for good control        Essential hypertension, benign- (present on admission)   Assessment & Plan    Adjust med for good control.          Quality-Core Measures   Reviewed items::  Medications reviewed, Labs reviewed and Radiology images reviewed  Singh catheter::  Urinary Tract Retention or Urinary Tract Obstruction (high risk skin breakdown d/t urinary incontinence and body habitus.)  DVT prophylaxis pharmacological::  Heparin  DVT prophylaxis - mechanical:  SCDs  Assessed for rehabilitation services:  Patient was assess for and/or received rehabilitation services during this hospitalization

## 2018-05-07 PROBLEM — R19.7 DIARRHEA: Status: ACTIVE | Noted: 2018-05-07

## 2018-05-07 LAB
ALBUMIN SERPL BCP-MCNC: 1.8 G/DL (ref 3.2–4.9)
BASOPHILS # BLD AUTO: 1 % (ref 0–1.8)
BASOPHILS # BLD: 0.04 K/UL (ref 0–0.12)
BUN SERPL-MCNC: 13 MG/DL (ref 8–22)
C DIFF DNA SPEC QL NAA+PROBE: NEGATIVE
C DIFF TOX GENS STL QL NAA+PROBE: NEGATIVE
CA-I SERPL-SCNC: 0.9 MMOL/L (ref 1.1–1.3)
CALCIUM SERPL-MCNC: 6.9 MG/DL (ref 8.5–10.5)
CHLORIDE SERPL-SCNC: 99 MMOL/L (ref 96–112)
CO2 SERPL-SCNC: 34 MMOL/L (ref 20–33)
CREAT SERPL-MCNC: 0.71 MG/DL (ref 0.5–1.4)
EOSINOPHIL # BLD AUTO: 0.02 K/UL (ref 0–0.51)
EOSINOPHIL NFR BLD: 0.5 % (ref 0–6.9)
ERYTHROCYTE [DISTWIDTH] IN BLOOD BY AUTOMATED COUNT: 56.6 FL (ref 35.9–50)
GLUCOSE BLD-MCNC: 135 MG/DL (ref 65–99)
GLUCOSE BLD-MCNC: 141 MG/DL (ref 65–99)
GLUCOSE BLD-MCNC: 171 MG/DL (ref 65–99)
GLUCOSE SERPL-MCNC: 122 MG/DL (ref 65–99)
HCT VFR BLD AUTO: 28.4 % (ref 37–47)
HGB BLD-MCNC: 8.7 G/DL (ref 12–16)
IMM GRANULOCYTES # BLD AUTO: 0.25 K/UL (ref 0–0.11)
IMM GRANULOCYTES NFR BLD AUTO: 6.1 % (ref 0–0.9)
LYMPHOCYTES # BLD AUTO: 0.82 K/UL (ref 1–4.8)
LYMPHOCYTES NFR BLD: 20 % (ref 22–41)
MAGNESIUM SERPL-MCNC: 1.4 MG/DL (ref 1.5–2.5)
MCH RBC QN AUTO: 30.2 PG (ref 27–33)
MCHC RBC AUTO-ENTMCNC: 30.6 G/DL (ref 33.6–35)
MCV RBC AUTO: 98.6 FL (ref 81.4–97.8)
MONOCYTES # BLD AUTO: 0.54 K/UL (ref 0–0.85)
MONOCYTES NFR BLD AUTO: 13.1 % (ref 0–13.4)
NEUTROPHILS # BLD AUTO: 2.44 K/UL (ref 2–7.15)
NEUTROPHILS NFR BLD: 59.3 % (ref 44–72)
NRBC # BLD AUTO: 0.02 K/UL
NRBC BLD-RTO: 0.5 /100 WBC
PHOSPHATE SERPL-MCNC: 3.2 MG/DL (ref 2.5–4.5)
PLATELET # BLD AUTO: 126 K/UL (ref 164–446)
PMV BLD AUTO: 10.7 FL (ref 9–12.9)
POTASSIUM SERPL-SCNC: 3.4 MMOL/L (ref 3.6–5.5)
RBC # BLD AUTO: 2.88 M/UL (ref 4.2–5.4)
SODIUM SERPL-SCNC: 140 MMOL/L (ref 135–145)
WBC # BLD AUTO: 4.1 K/UL (ref 4.8–10.8)

## 2018-05-07 PROCEDURE — 83735 ASSAY OF MAGNESIUM: CPT

## 2018-05-07 PROCEDURE — 82962 GLUCOSE BLOOD TEST: CPT | Mod: 91

## 2018-05-07 PROCEDURE — 770004 HCHG ROOM/CARE - ONCOLOGY PRIVATE *

## 2018-05-07 PROCEDURE — 700102 HCHG RX REV CODE 250 W/ 637 OVERRIDE(OP): Performed by: INTERNAL MEDICINE

## 2018-05-07 PROCEDURE — 99232 SBSQ HOSP IP/OBS MODERATE 35: CPT | Performed by: INTERNAL MEDICINE

## 2018-05-07 PROCEDURE — 85025 COMPLETE CBC W/AUTO DIFF WBC: CPT

## 2018-05-07 PROCEDURE — A9270 NON-COVERED ITEM OR SERVICE: HCPCS | Performed by: INTERNAL MEDICINE

## 2018-05-07 PROCEDURE — 700111 HCHG RX REV CODE 636 W/ 250 OVERRIDE (IP): Performed by: INTERNAL MEDICINE

## 2018-05-07 PROCEDURE — 97530 THERAPEUTIC ACTIVITIES: CPT

## 2018-05-07 PROCEDURE — 80069 RENAL FUNCTION PANEL: CPT

## 2018-05-07 PROCEDURE — 82330 ASSAY OF CALCIUM: CPT

## 2018-05-07 PROCEDURE — 87493 C DIFF AMPLIFIED PROBE: CPT

## 2018-05-07 RX ORDER — POTASSIUM CHLORIDE 20 MEQ/1
20 TABLET, EXTENDED RELEASE ORAL DAILY
Status: DISCONTINUED | OUTPATIENT
Start: 2018-05-07 | End: 2018-05-07

## 2018-05-07 RX ORDER — MAGNESIUM SULFATE HEPTAHYDRATE 40 MG/ML
4 INJECTION, SOLUTION INTRAVENOUS ONCE
Status: COMPLETED | OUTPATIENT
Start: 2018-05-07 | End: 2018-05-07

## 2018-05-07 RX ORDER — POTASSIUM CHLORIDE 20 MEQ/1
20 TABLET, EXTENDED RELEASE ORAL 2 TIMES DAILY
Status: DISCONTINUED | OUTPATIENT
Start: 2018-05-07 | End: 2018-05-10

## 2018-05-07 RX ADMIN — PRAVASTATIN SODIUM 40 MG: 20 TABLET ORAL at 21:08

## 2018-05-07 RX ADMIN — HEPARIN SODIUM 5000 UNITS: 5000 INJECTION, SOLUTION INTRAVENOUS; SUBCUTANEOUS at 21:06

## 2018-05-07 RX ADMIN — METOPROLOL TARTRATE 25 MG: 25 TABLET, FILM COATED ORAL at 09:07

## 2018-05-07 RX ADMIN — FUROSEMIDE 40 MG: 40 TABLET ORAL at 15:19

## 2018-05-07 RX ADMIN — METFORMIN HYDROCHLORIDE 1000 MG: 500 TABLET, FILM COATED ORAL at 09:06

## 2018-05-07 RX ADMIN — CALCIUM CITRATE 200 MG (950 MG) TABLET 1900 MG: at 09:08

## 2018-05-07 RX ADMIN — INSULIN HUMAN 3 UNITS: 100 INJECTION, SOLUTION PARENTERAL at 13:08

## 2018-05-07 RX ADMIN — NYSTATIN: 100000 POWDER TOPICAL at 09:08

## 2018-05-07 RX ADMIN — POTASSIUM CHLORIDE 20 MEQ: 1500 TABLET, EXTENDED RELEASE ORAL at 09:09

## 2018-05-07 RX ADMIN — ACYCLOVIR 400 MG: 200 CAPSULE ORAL at 09:08

## 2018-05-07 RX ADMIN — METOPROLOL TARTRATE 25 MG: 25 TABLET, FILM COATED ORAL at 21:06

## 2018-05-07 RX ADMIN — OXYCODONE HYDROCHLORIDE 10 MG: 10 TABLET, FILM COATED, EXTENDED RELEASE ORAL at 21:07

## 2018-05-07 RX ADMIN — ANTACID TABLETS 1000 MG: 500 TABLET, CHEWABLE ORAL at 09:07

## 2018-05-07 RX ADMIN — ONDANSETRON 4 MG: 2 INJECTION INTRAMUSCULAR; INTRAVENOUS at 18:29

## 2018-05-07 RX ADMIN — ANTACID TABLETS 1000 MG: 500 TABLET, CHEWABLE ORAL at 13:06

## 2018-05-07 RX ADMIN — Medication: at 21:06

## 2018-05-07 RX ADMIN — POTASSIUM CHLORIDE 20 MEQ: 1500 TABLET, EXTENDED RELEASE ORAL at 21:07

## 2018-05-07 RX ADMIN — NYSTATIN: 100000 POWDER TOPICAL at 21:07

## 2018-05-07 RX ADMIN — OXYCODONE HYDROCHLORIDE 5 MG: 5 TABLET ORAL at 05:47

## 2018-05-07 RX ADMIN — OXYCODONE HYDROCHLORIDE 5 MG: 5 TABLET ORAL at 00:45

## 2018-05-07 RX ADMIN — HEPARIN SODIUM 5000 UNITS: 5000 INJECTION, SOLUTION INTRAVENOUS; SUBCUTANEOUS at 13:14

## 2018-05-07 RX ADMIN — HEPARIN SODIUM 5000 UNITS: 5000 INJECTION, SOLUTION INTRAVENOUS; SUBCUTANEOUS at 05:43

## 2018-05-07 RX ADMIN — LORATADINE 10 MG: 10 TABLET ORAL at 09:08

## 2018-05-07 RX ADMIN — ANTACID TABLETS 1000 MG: 500 TABLET, CHEWABLE ORAL at 18:21

## 2018-05-07 RX ADMIN — METFORMIN HYDROCHLORIDE 1000 MG: 500 TABLET, FILM COATED ORAL at 18:21

## 2018-05-07 RX ADMIN — FUROSEMIDE 40 MG: 40 TABLET ORAL at 05:42

## 2018-05-07 RX ADMIN — ALPRAZOLAM 0.5 MG: 0.5 TABLET ORAL at 15:19

## 2018-05-07 RX ADMIN — CALCITRIOL 0.25 MCG: 0.25 CAPSULE, LIQUID FILLED ORAL at 09:07

## 2018-05-07 RX ADMIN — Medication: at 09:08

## 2018-05-07 RX ADMIN — MAGNESIUM SULFATE IN WATER 4 G: 40 INJECTION, SOLUTION INTRAVENOUS at 15:00

## 2018-05-07 RX ADMIN — OXYCODONE HYDROCHLORIDE 10 MG: 10 TABLET, FILM COATED, EXTENDED RELEASE ORAL at 09:07

## 2018-05-07 RX ADMIN — ACYCLOVIR 400 MG: 200 CAPSULE ORAL at 21:05

## 2018-05-07 ASSESSMENT — GAIT ASSESSMENTS: GAIT LEVEL OF ASSIST: UNABLE TO PARTICIPATE

## 2018-05-07 ASSESSMENT — ENCOUNTER SYMPTOMS
FEVER: 0
FOCAL WEAKNESS: 0
HEADACHES: 0
DIZZINESS: 0
DIARRHEA: 1
COUGH: 0
SENSORY CHANGE: 0
MYALGIAS: 1
CLAUDICATION: 0
DEPRESSION: 1
SPEECH CHANGE: 0
ABDOMINAL PAIN: 0
WEAKNESS: 1
INSOMNIA: 0
PHOTOPHOBIA: 0
NERVOUS/ANXIOUS: 1
HEARTBURN: 0
CONSTIPATION: 0
CHILLS: 0
VOMITING: 0
SHORTNESS OF BREATH: 0
NAUSEA: 1
BLURRED VISION: 0

## 2018-05-07 ASSESSMENT — PAIN SCALES - GENERAL
PAINLEVEL_OUTOF10: 3
PAINLEVEL_OUTOF10: 3
PAINLEVEL_OUTOF10: 7
PAINLEVEL_OUTOF10: 3
PAINLEVEL_OUTOF10: 7

## 2018-05-07 NOTE — DISCHARGE PLANNING
Medical Social Work    [facility] unable to accept as they can't take pt's actively getting chemo or radiation.

## 2018-05-07 NOTE — CARE PLAN
Problem: Bowel/Gastric:  Goal: Will not experience complications related to bowel motility  Outcome: PROGRESSING SLOWER THAN EXPECTED  Patient incontinent of 2 large BM's this shift    Problem: Skin Integrity  Goal: Risk for impaired skin integrity will decrease  Outcome: PROGRESSING SLOWER THAN EXPECTED  Skin tear on each buttock. Mepilex removed due to incontinence. Barrier cream applied

## 2018-05-07 NOTE — CARE PLAN
Problem: Mobility  Goal: Risk for activity intolerance will decrease  Got up to the side of bed with PT. Education done on need to mobilize frequently, pt able to turn and help staff reposition her q 2 hrs.     Problem: Pain Management  Goal: Pain level will decrease to patient's comfort goal    Intervention: Follow pain managment plan developed in collaboration with patient and Interdisciplinary Team  Pain well controlled at this time per pt with PO pain medication      Problem: Skin Integrity  Goal: Risk for impaired skin integrity will decrease  q 2 hr turns in effect, barrier and Felipe cream in use, dietary consulting and pt on a bariatric low air loss mattress.

## 2018-05-07 NOTE — PROGRESS NOTES
Patient A&O x4, tearful tonight over stool incontinence. 2 large, soft BM's tonight, held stool softeners. Patient c/o some n/v; medicated with zofran with relief. Singh in place to gravity drainage with clear, yellow urine. Mepilex taken off skin tears on buttocks due to incontinence and increased moisture, barrier cream applied. Sacrum is red and blanching. PICC flushes well, positive blood return. Call light and all belongings within reach, all needs met at this time.

## 2018-05-07 NOTE — DIETARY
Nutrition Services: Update   Pt is currently on diabetic diet. Pt is receiving Boost Glucose Control once a day with breakfast and snacks TID. Pt reports her appetite is not good. Pt states she is just not hungry and had complaints of nausea, altered tasted of meals and the smell of the food is off. Adjusted pt's snacks per pt preferences. Per chart pt PO 50-75% 1 meal documented in the last week. Wt 4/16: 172.5 kg via bed scale    Recommendations/Plan:  1. Encourage intake of meals  2. Document intake of all meals as % taken in ADL's to provide interdisciplinary communication across all shifts.   3. Monitor weight.  4. Nutrition rep will continue to see patient for ongoing meal and snack preferences.    RD following

## 2018-05-07 NOTE — PROGRESS NOTES
A&Ox4, tearful at time. R double lumen PICC line assessed, patent, blood return present, running TKOx2 lumens. Singh in place to gravity, moderate amount of dark yellow urine draining. BLE remains mildly swollen and red, improved per pt, medicated lotion applied per order. Pt remains max assist, q 2 hr turns in effect. Skin under pannus red, cleaned and Nystatin powder applied. POC discussed, including need to discuss placement options further with MD and SW, all questions and concerns were addressed. VSS.

## 2018-05-07 NOTE — THERAPY
"Physical Therapy Treatment completed.   Bed Mobility:  Supine to Sit: Moderate Assist (->Max w/HOB elevated)  Transfers: Did not assess  Gait: Level Of Assist: Unable to Participate       Plan of Care: Will benefit from Physical Therapy 3 times per week  Discharge Recommendations: Equipment: Will Continue to Assess for Equipment Needs. Post-acute therapy Discharge to a transitional care facility for continued skilled therapy services.     See \"Rehab Therapy-Acute\" Patient Summary Report for complete documentation.       "

## 2018-05-07 NOTE — PROGRESS NOTES
Shazia from Lab called with critical result of Calcium of 6.9 at 0144. Critical lab result read back to Shazia. Corrected calcium 8.66   This critical lab result is within parameters established by [facility] for this patient.

## 2018-05-07 NOTE — PROGRESS NOTES
[facility] Hospitalist Progress Note    Date of Service: 2018    Chief Complaint  67 y.o. female admitted 4/10/2018 with multiple myeloma, slow healing fracture left humerus and recent BLE cellulitis.  Patient has since completed radiation L humerus, started on radiation B femurs and had single cycle of chemotherapy.    Interval Problem Update  c/o new n/v/diarrhea today, no abdom pain; same chronic joint pain    Consultants/Specialty  Oncology - Christi/Mark  XRT - Peddada  ID    Disposition  Tbd, patient does not want to dc to SNF, feels she has enough support at home.        Review of Systems   Constitutional: Negative for chills and fever.   HENT: Negative for congestion.    Eyes: Negative for blurred vision and photophobia.   Respiratory: Negative for cough and shortness of breath.    Cardiovascular: Positive for leg swelling. Negative for chest pain and claudication.   Gastrointestinal: Positive for diarrhea and nausea. Negative for abdominal pain, constipation, heartburn and vomiting.   Genitourinary: Negative for dysuria and hematuria.   Musculoskeletal: Positive for joint pain and myalgias.   Skin: Negative for itching and rash.   Neurological: Positive for weakness. Negative for dizziness, sensory change, speech change, focal weakness and headaches.   Psychiatric/Behavioral: Positive for depression. The patient is nervous/anxious. The patient does not have insomnia.       Physical Exam  Laboratory/Imaging   Hemodynamics  Temp (24hrs), Av.9 °C (98.5 °F), Min:36.3 °C (97.4 °F), Max:37.2 °C (99 °F)   Temperature: 37.1 °C (98.7 °F)  Pulse  Av.1  Min: 38  Max: 128    Blood Pressure : 130/55      Respiratory      Respiration: 17, Pulse Oximetry: 97 %     Work Of Breathing / Effort: Mild  RUL Breath Sounds: Clear, RML Breath Sounds: Clear, RLL Breath Sounds: Diminished, CHARLOTTE Breath Sounds: Clear, LLL Breath Sounds: Diminished    Fluids    Intake/Output Summary (Last 24 hours) at 18 1336  Last data  filed at 05/07/18 0402   Gross per 24 hour   Intake                0 ml   Output             1760 ml   Net            -1760 ml       Nutrition  Orders Placed This Encounter   Procedures   • Diet Order     Standing Status:   Standing     Number of Occurrences:   1     Order Specific Question:   Diet:     Answer:   Diabetic [3]     Physical Exam   Constitutional: She is oriented to person, place, and time. She appears well-developed and well-nourished. No distress.   Morbidly obese   HENT:   Head: Normocephalic and atraumatic.   Eyes: Conjunctivae and EOM are normal. No scleral icterus.   Neck: Neck supple. No JVD present.   Cardiovascular: Normal rate, regular rhythm and normal heart sounds.  Exam reveals no gallop and no friction rub.    No murmur heard.  Pulmonary/Chest: Effort normal and breath sounds normal. No respiratory distress. She has no wheezes. She exhibits no tenderness.   Abdominal: Soft. Bowel sounds are normal. She exhibits no distension. There is no tenderness. There is no guarding.        Musculoskeletal: She exhibits edema (BLE). She exhibits no tenderness.   3-4+ BLE edema   Neurological: She is alert and oriented to person, place, and time. No cranial nerve deficit.   Skin: Skin is warm and dry. She is not diaphoretic. There is erythema (bilateral shins r>l). No pallor.   In anterior shins BLE   Psychiatric: Her behavior is normal.   Depressed mood, flat affect     Nursing note and vitals reviewed.      Recent Labs      05/05/18   0145  05/06/18   0614  05/07/18   0100   WBC  3.1*  3.1*  4.1*   RBC  2.90*  2.90*  2.88*   HEMOGLOBIN  8.7*  8.8*  8.7*   HEMATOCRIT  28.0*  28.2*  28.4*   MCV  96.6  97.2  98.6*   MCH  30.0  30.3  30.2   MCHC  31.1*  31.2*  30.6*   RDW  58.3*  57.1*  56.6*   PLATELETCT  117*  125*  126*   MPV  11.2  10.8  10.7     Recent Labs      05/05/18   0045  05/06/18   0418  05/07/18   0100   SODIUM  139  138  140   POTASSIUM  3.7  3.7  3.4*   CHLORIDE  99  99  99   CO2  35*   36*  34*   GLUCOSE  148*  155*  122*   BUN  14  12  13   CREATININE  0.69  0.60  0.71   CALCIUM  7.0*  6.9*  6.9*                      Assessment/Plan     * Pathologic fracture- (present on admission)   Assessment & Plan    -Left humerus  -widespread lytic disease  -completed radiation to humerus  -pain control             Multiple myeloma (HCC)- (present on admission)   Assessment & Plan    CyBorD chemo - cycle 1 started 4/19/18; cycle 2 to start on 5/10  -recent diagnosis, appears aggressive, now with innumerable lytic lesions  -Dr Sim and Oksana consulted for assistance in management  - IV dilaudid PRN, oxy prn. DC maintenance steroids as these were causing confusion  -start oxycontin CR 10 mg BID  -skeletal survey done - spine obscured by body habitus, but multiple other lesions found  XRT to right and left femurs  -Palliative care consulted for advanced care planning            Diarrhea   Assessment & Plan    New; check stool c diff; treatment as indicated.        Hypocalcemia   Assessment & Plan    - Repleting as needed   due to recent bisphosphonate use; replete Mg  PTH level high so hypoparathyroidism is ruled out.        Hypomagnesemia   Assessment & Plan    - Repleting as needed        Decubitus ulcer of buttock   Assessment & Plan    Wound care; rotate frequently in bed; air mattress.        Debility- (present on admission)   Assessment & Plan    Due to current illnesses; not able to walk since early april;  Out of SNF days per SW; cont PT/OT here  Poor prognosis given current diagnoses/morbid obesity.  LTAC referral made, declined.        Cellulitis   Assessment & Plan    Bilateral LE  improving; venous stasis component  Changed to po Augmentin per ID;           DNR (do not resuscitate)   Assessment & Plan    Palliative care consulted, patient wants to change code status to DNR - done, completed Polst with APN.          Chemotherapy-induced thrombocytopenia   Assessment & Plan    stable; monitor; no  bleeding now          Leg edema   Assessment & Plan      -PO Lasix scheduled bid, doing well and renal function stable.            Chronic venous stasis dermatitis of both lower extremities- (present on admission)   Assessment & Plan    -wound care, oral abx        Anemia- (present on admission)   Assessment & Plan    Stable cbc; monitor        Morbid obesity with BMI of 60.0-69.9, adult (CMS-HCC)- (present on admission)   Assessment & Plan    -encourage weight loss  Body mass index is 67.38 kg/m².        DM type 2 (diabetes mellitus, type 2) (CMS-HCC)- (present on admission)   Assessment & Plan    Fair control; adjust meds for good control        Essential hypertension, benign- (present on admission)   Assessment & Plan    Adjust med for good control.          Quality-Core Measures   Reviewed items::  Medications reviewed, Labs reviewed and Radiology images reviewed  Singh catheter::  Urinary Tract Retention or Urinary Tract Obstruction (high risk skin breakdown d/t urinary incontinence and body habitus.)  DVT prophylaxis pharmacological::  Heparin  DVT prophylaxis - mechanical:  SCDs  Assessed for rehabilitation services:  Patient was assess for and/or received rehabilitation services during this hospitalization

## 2018-05-07 NOTE — DISCHARGE PLANNING
Medical Social Work    SW spoke with Viri from [facility]. She is still reviewing referral and will call this SW back with an update once she has completed reviewing referral.     Plan: Await call back from [facility] liaison.

## 2018-05-08 ENCOUNTER — APPOINTMENT (OUTPATIENT)
Dept: RADIOLOGY | Facility: MEDICAL CENTER | Age: 68
DRG: 840 | End: 2018-05-08
Attending: INTERNAL MEDICINE
Payer: MEDICARE

## 2018-05-08 LAB
ALBUMIN SERPL BCP-MCNC: 1.7 G/DL (ref 3.2–4.9)
BASOPHILS # BLD AUTO: 1.1 % (ref 0–1.8)
BASOPHILS # BLD: 0.04 K/UL (ref 0–0.12)
BUN SERPL-MCNC: 12 MG/DL (ref 8–22)
CA-I SERPL-SCNC: 0.9 MMOL/L (ref 1.1–1.3)
CALCIUM SERPL-MCNC: 6.6 MG/DL (ref 8.5–10.5)
CHLORIDE SERPL-SCNC: 99 MMOL/L (ref 96–112)
CO2 SERPL-SCNC: 35 MMOL/L (ref 20–33)
CREAT SERPL-MCNC: 0.7 MG/DL (ref 0.5–1.4)
EOSINOPHIL # BLD AUTO: 0.01 K/UL (ref 0–0.51)
EOSINOPHIL NFR BLD: 0.3 % (ref 0–6.9)
ERYTHROCYTE [DISTWIDTH] IN BLOOD BY AUTOMATED COUNT: 56.8 FL (ref 35.9–50)
GLUCOSE BLD-MCNC: 138 MG/DL (ref 65–99)
GLUCOSE BLD-MCNC: 146 MG/DL (ref 65–99)
GLUCOSE BLD-MCNC: 148 MG/DL (ref 65–99)
GLUCOSE BLD-MCNC: 211 MG/DL (ref 65–99)
GLUCOSE SERPL-MCNC: 144 MG/DL (ref 65–99)
HCT VFR BLD AUTO: 27.7 % (ref 37–47)
HGB BLD-MCNC: 8.5 G/DL (ref 12–16)
IMM GRANULOCYTES # BLD AUTO: 0.25 K/UL (ref 0–0.11)
IMM GRANULOCYTES NFR BLD AUTO: 7.1 % (ref 0–0.9)
LYMPHOCYTES # BLD AUTO: 0.97 K/UL (ref 1–4.8)
LYMPHOCYTES NFR BLD: 27.7 % (ref 22–41)
MAGNESIUM SERPL-MCNC: 1.9 MG/DL (ref 1.5–2.5)
MCH RBC QN AUTO: 30.4 PG (ref 27–33)
MCHC RBC AUTO-ENTMCNC: 30.7 G/DL (ref 33.6–35)
MCV RBC AUTO: 98.9 FL (ref 81.4–97.8)
MONOCYTES # BLD AUTO: 0.43 K/UL (ref 0–0.85)
MONOCYTES NFR BLD AUTO: 12.3 % (ref 0–13.4)
NEUTROPHILS # BLD AUTO: 1.8 K/UL (ref 2–7.15)
NEUTROPHILS NFR BLD: 51.5 % (ref 44–72)
NRBC # BLD AUTO: 0 K/UL
NRBC BLD-RTO: 0 /100 WBC
PHOSPHATE SERPL-MCNC: 2.8 MG/DL (ref 2.5–4.5)
PLATELET # BLD AUTO: 107 K/UL (ref 164–446)
PMV BLD AUTO: 10.8 FL (ref 9–12.9)
POTASSIUM SERPL-SCNC: 3.6 MMOL/L (ref 3.6–5.5)
RBC # BLD AUTO: 2.8 M/UL (ref 4.2–5.4)
SODIUM SERPL-SCNC: 137 MMOL/L (ref 135–145)
WBC # BLD AUTO: 3.5 K/UL (ref 4.8–10.8)

## 2018-05-08 PROCEDURE — 700111 HCHG RX REV CODE 636 W/ 250 OVERRIDE (IP): Performed by: INTERNAL MEDICINE

## 2018-05-08 PROCEDURE — 700102 HCHG RX REV CODE 250 W/ 637 OVERRIDE(OP): Performed by: INTERNAL MEDICINE

## 2018-05-08 PROCEDURE — 73060 X-RAY EXAM OF HUMERUS: CPT | Mod: LT

## 2018-05-08 PROCEDURE — 82330 ASSAY OF CALCIUM: CPT

## 2018-05-08 PROCEDURE — 97530 THERAPEUTIC ACTIVITIES: CPT

## 2018-05-08 PROCEDURE — A9270 NON-COVERED ITEM OR SERVICE: HCPCS | Performed by: INTERNAL MEDICINE

## 2018-05-08 PROCEDURE — 99233 SBSQ HOSP IP/OBS HIGH 50: CPT | Performed by: INTERNAL MEDICINE

## 2018-05-08 PROCEDURE — 80069 RENAL FUNCTION PANEL: CPT

## 2018-05-08 PROCEDURE — 83735 ASSAY OF MAGNESIUM: CPT

## 2018-05-08 PROCEDURE — 82962 GLUCOSE BLOOD TEST: CPT

## 2018-05-08 PROCEDURE — 770004 HCHG ROOM/CARE - ONCOLOGY PRIVATE *

## 2018-05-08 PROCEDURE — 85025 COMPLETE CBC W/AUTO DIFF WBC: CPT

## 2018-05-08 PROCEDURE — 97535 SELF CARE MNGMENT TRAINING: CPT

## 2018-05-08 RX ORDER — LOPERAMIDE HYDROCHLORIDE 2 MG/1
2 CAPSULE ORAL 4 TIMES DAILY PRN
Status: DISCONTINUED | OUTPATIENT
Start: 2018-05-08 | End: 2018-05-10

## 2018-05-08 RX ADMIN — PRAVASTATIN SODIUM 40 MG: 20 TABLET ORAL at 21:03

## 2018-05-08 RX ADMIN — ALPRAZOLAM 0.5 MG: 0.5 TABLET ORAL at 14:22

## 2018-05-08 RX ADMIN — Medication: at 21:00

## 2018-05-08 RX ADMIN — ANTACID TABLETS 1000 MG: 500 TABLET, CHEWABLE ORAL at 18:15

## 2018-05-08 RX ADMIN — CALCITRIOL 0.25 MCG: 0.25 CAPSULE, LIQUID FILLED ORAL at 08:57

## 2018-05-08 RX ADMIN — POTASSIUM CHLORIDE 20 MEQ: 1500 TABLET, EXTENDED RELEASE ORAL at 09:00

## 2018-05-08 RX ADMIN — PROCHLORPERAZINE EDISYLATE 10 MG: 5 INJECTION INTRAMUSCULAR; INTRAVENOUS at 09:06

## 2018-05-08 RX ADMIN — INSULIN HUMAN 3 UNITS: 100 INJECTION, SOLUTION PARENTERAL at 21:05

## 2018-05-08 RX ADMIN — CALCIUM CITRATE 200 MG (950 MG) TABLET 1900 MG: at 08:59

## 2018-05-08 RX ADMIN — NYSTATIN: 100000 POWDER TOPICAL at 21:02

## 2018-05-08 RX ADMIN — HEPARIN SODIUM 5000 UNITS: 5000 INJECTION, SOLUTION INTRAVENOUS; SUBCUTANEOUS at 06:19

## 2018-05-08 RX ADMIN — ACYCLOVIR 400 MG: 200 CAPSULE ORAL at 08:58

## 2018-05-08 RX ADMIN — HEPARIN SODIUM 5000 UNITS: 5000 INJECTION, SOLUTION INTRAVENOUS; SUBCUTANEOUS at 13:10

## 2018-05-08 RX ADMIN — ANTACID TABLETS 1000 MG: 500 TABLET, CHEWABLE ORAL at 13:10

## 2018-05-08 RX ADMIN — INSULIN HUMAN 4 UNITS: 100 INJECTION, SOLUTION PARENTERAL at 13:17

## 2018-05-08 RX ADMIN — OXYCODONE HYDROCHLORIDE 10 MG: 10 TABLET, FILM COATED, EXTENDED RELEASE ORAL at 08:58

## 2018-05-08 RX ADMIN — FUROSEMIDE 40 MG: 40 TABLET ORAL at 06:18

## 2018-05-08 RX ADMIN — Medication: at 09:08

## 2018-05-08 RX ADMIN — OXYCODONE HYDROCHLORIDE 10 MG: 10 TABLET, FILM COATED, EXTENDED RELEASE ORAL at 21:02

## 2018-05-08 RX ADMIN — METFORMIN HYDROCHLORIDE 1000 MG: 500 TABLET, FILM COATED ORAL at 09:00

## 2018-05-08 RX ADMIN — FUROSEMIDE 40 MG: 40 TABLET ORAL at 17:41

## 2018-05-08 RX ADMIN — POTASSIUM CHLORIDE 20 MEQ: 1500 TABLET, EXTENDED RELEASE ORAL at 21:02

## 2018-05-08 RX ADMIN — ALPRAZOLAM 0.5 MG: 0.5 TABLET ORAL at 06:28

## 2018-05-08 RX ADMIN — METOPROLOL TARTRATE 25 MG: 25 TABLET, FILM COATED ORAL at 21:01

## 2018-05-08 RX ADMIN — HEPARIN SODIUM 5000 UNITS: 5000 INJECTION, SOLUTION INTRAVENOUS; SUBCUTANEOUS at 21:01

## 2018-05-08 RX ADMIN — OXYCODONE HYDROCHLORIDE 5 MG: 5 TABLET ORAL at 21:02

## 2018-05-08 RX ADMIN — ACYCLOVIR 400 MG: 200 CAPSULE ORAL at 21:00

## 2018-05-08 RX ADMIN — ANTACID TABLETS 1000 MG: 500 TABLET, CHEWABLE ORAL at 08:58

## 2018-05-08 RX ADMIN — METFORMIN HYDROCHLORIDE 1000 MG: 500 TABLET, FILM COATED ORAL at 18:15

## 2018-05-08 RX ADMIN — NYSTATIN: 100000 POWDER TOPICAL at 09:08

## 2018-05-08 RX ADMIN — ONDANSETRON 4 MG: 4 TABLET, ORALLY DISINTEGRATING ORAL at 13:10

## 2018-05-08 RX ADMIN — METOPROLOL TARTRATE 25 MG: 25 TABLET, FILM COATED ORAL at 08:58

## 2018-05-08 RX ADMIN — LOPERAMIDE HYDROCHLORIDE 2 MG: 2 CAPSULE ORAL at 21:01

## 2018-05-08 ASSESSMENT — PAIN SCALES - GENERAL
PAINLEVEL_OUTOF10: 3
PAINLEVEL_OUTOF10: 7
PAINLEVEL_OUTOF10: 3
PAINLEVEL_OUTOF10: 7

## 2018-05-08 ASSESSMENT — COGNITIVE AND FUNCTIONAL STATUS - GENERAL
DRESSING REGULAR UPPER BODY CLOTHING: A LITTLE
PERSONAL GROOMING: A LITTLE
DRESSING REGULAR LOWER BODY CLOTHING: TOTAL
SUGGESTED CMS G CODE MODIFIER DAILY ACTIVITY: CK
TOILETING: TOTAL
DAILY ACTIVITIY SCORE: 14
HELP NEEDED FOR BATHING: A LOT

## 2018-05-08 ASSESSMENT — ENCOUNTER SYMPTOMS
DEPRESSION: 1
WEAKNESS: 0
MYALGIAS: 1
DIZZINESS: 0
HEARTBURN: 0
CONSTIPATION: 0
PHOTOPHOBIA: 0
SENSORY CHANGE: 0
SORE THROAT: 0
CHILLS: 0
HEADACHES: 0
CLAUDICATION: 0
SHORTNESS OF BREATH: 0
COUGH: 0
VOMITING: 0
BLURRED VISION: 0
NERVOUS/ANXIOUS: 0
DIARRHEA: 0
FEVER: 0
ABDOMINAL PAIN: 0
INSOMNIA: 0
SPEECH CHANGE: 0

## 2018-05-08 NOTE — PROGRESS NOTES
Kel from Lab called with critical result of Calcium 6.6 at 0246. Critical lab result read back to Kel. Corrected calcium 8.4  This critical lab result is within parameters established by [facility] for this patient.

## 2018-05-08 NOTE — CARE PLAN
Problem: Skin Integrity  Goal: Risk for impaired skin integrity will decrease  Outcome: PROGRESSING AS EXPECTED  Low air loss mattress in use. Q 2 turns and repositioning in use. Patient participating in turns. Frequent toileting offered. Felipe cream in use.

## 2018-05-08 NOTE — THERAPY
"Occupational Therapy Treatment completed with focus on ADLs.  Functional Status:  Pt appeared motivated & less tearful today.  Pt required Mod A for supine to sit EOB with HOB elevated.  Pt able to don her own T-shirt with CGA.  Pt did require Total A for LB dressing.  Pt remains fearful of falling even while seated EOB.  Pt able to perform grooming tasks seated EOB with SBA.  Pt returned to supine in bed with Max A x 3.  Plan of Care: Will benefit from Occupational Therapy 3 times per week  Discharge Recommendations:  Equipment Will Continue to Assess for Equipment Needs. Post-acute therapy Discharge to a transitional care facility for continued skilled therapy services.    See \"Rehab Therapy-Acute\" Patient Summary Report for complete documentation.   "

## 2018-05-08 NOTE — PROGRESS NOTES
Patient A&O x4, very tearful and anxious tonight. PICC flushing with good blood return. Singh to gravity drainage with moderate yellow output. Patient not tolerating diet well today; tried eating some of her soup for dinner, followed by 100 ml emesis. Zofran previously given. Ice packs applied to BLE for pain, patient states relief. Call light and all belongings within reach, hourly rounding in place.

## 2018-05-08 NOTE — CARE PLAN
Problem: Bowel/Gastric:  Goal: Will not experience complications related to bowel motility  Outcome: PROGRESSING SLOWER THAN EXPECTED  Patient incontinent of stool, unable to notice if she has had a BM or not. Frequent skin assessment for breakdown    Problem: Mobility  Goal: Risk for activity intolerance will decrease  Outcome: PROGRESSING SLOWER THAN EXPECTED    Intervention: Encourage patient to increase activity level in collaboration with Interdisciplinary Team  Patient encouraged to help staff as much as possible with turns and repositioning. Patient will help with encouragement but doesn't tolerate well

## 2018-05-08 NOTE — PROGRESS NOTES
Assumed care of pt at 0700, pt AOx4, max assist. Morning medications given and morning assessment complete. Bed in low and locked position, call light within reach. All needs met at this time.

## 2018-05-09 LAB
25(OH)D3 SERPL-MCNC: 13 NG/ML (ref 30–100)
ALBUMIN SERPL BCP-MCNC: 1.6 G/DL (ref 3.2–4.9)
BASOPHILS # BLD AUTO: 0.9 % (ref 0–1.8)
BASOPHILS # BLD: 0.03 K/UL (ref 0–0.12)
BUN SERPL-MCNC: 12 MG/DL (ref 8–22)
CA-I SERPL-SCNC: 0.9 MMOL/L (ref 1.1–1.3)
CALCIUM SERPL-MCNC: 6.8 MG/DL (ref 8.5–10.5)
CHLORIDE SERPL-SCNC: 100 MMOL/L (ref 96–112)
CO2 SERPL-SCNC: 35 MMOL/L (ref 20–33)
CREAT SERPL-MCNC: 0.6 MG/DL (ref 0.5–1.4)
EOSINOPHIL # BLD AUTO: 0 K/UL (ref 0–0.51)
EOSINOPHIL NFR BLD: 0 % (ref 0–6.9)
ERYTHROCYTE [DISTWIDTH] IN BLOOD BY AUTOMATED COUNT: 56.1 FL (ref 35.9–50)
GLUCOSE BLD-MCNC: 134 MG/DL (ref 65–99)
GLUCOSE BLD-MCNC: 139 MG/DL (ref 65–99)
GLUCOSE BLD-MCNC: 148 MG/DL (ref 65–99)
GLUCOSE BLD-MCNC: 161 MG/DL (ref 65–99)
GLUCOSE BLD-MCNC: 177 MG/DL (ref 65–99)
GLUCOSE SERPL-MCNC: 123 MG/DL (ref 65–99)
HCT VFR BLD AUTO: 26.8 % (ref 37–47)
HGB BLD-MCNC: 8.3 G/DL (ref 12–16)
LYMPHOCYTES # BLD AUTO: 1 K/UL (ref 1–4.8)
LYMPHOCYTES NFR BLD: 30.3 % (ref 22–41)
MANUAL DIFF BLD: NORMAL
MCH RBC QN AUTO: 30.3 PG (ref 27–33)
MCHC RBC AUTO-ENTMCNC: 31 G/DL (ref 33.6–35)
MCV RBC AUTO: 97.8 FL (ref 81.4–97.8)
MONOCYTES # BLD AUTO: 0.35 K/UL (ref 0–0.85)
MONOCYTES NFR BLD AUTO: 10.7 % (ref 0–13.4)
MORPHOLOGY BLD-IMP: NORMAL
MYELOCYTES NFR BLD MANUAL: 0.9 %
NEUTROPHILS # BLD AUTO: 1.89 K/UL (ref 2–7.15)
NEUTROPHILS NFR BLD: 52.7 % (ref 44–72)
NEUTS BAND NFR BLD MANUAL: 4.5 % (ref 0–10)
NRBC # BLD AUTO: 0 K/UL
NRBC BLD-RTO: 0 /100 WBC
PHOSPHATE SERPL-MCNC: 2.2 MG/DL (ref 2.5–4.5)
PLATELET # BLD AUTO: 137 K/UL (ref 164–446)
PLATELET BLD QL SMEAR: NORMAL
PMV BLD AUTO: 10.1 FL (ref 9–12.9)
POTASSIUM SERPL-SCNC: 3.6 MMOL/L (ref 3.6–5.5)
RBC # BLD AUTO: 2.74 M/UL (ref 4.2–5.4)
RBC BLD AUTO: NORMAL
SODIUM SERPL-SCNC: 139 MMOL/L (ref 135–145)
WBC # BLD AUTO: 3.3 K/UL (ref 4.8–10.8)

## 2018-05-09 PROCEDURE — A9270 NON-COVERED ITEM OR SERVICE: HCPCS | Performed by: INTERNAL MEDICINE

## 2018-05-09 PROCEDURE — 360978 HOYER XL BARIATRIC SLING 501-850LBS: Performed by: INTERNAL MEDICINE

## 2018-05-09 PROCEDURE — 85007 BL SMEAR W/DIFF WBC COUNT: CPT

## 2018-05-09 PROCEDURE — 80069 RENAL FUNCTION PANEL: CPT

## 2018-05-09 PROCEDURE — 82330 ASSAY OF CALCIUM: CPT

## 2018-05-09 PROCEDURE — 770004 HCHG ROOM/CARE - ONCOLOGY PRIVATE *

## 2018-05-09 PROCEDURE — 97535 SELF CARE MNGMENT TRAINING: CPT | Mod: XE

## 2018-05-09 PROCEDURE — 82962 GLUCOSE BLOOD TEST: CPT | Mod: 91

## 2018-05-09 PROCEDURE — 700102 HCHG RX REV CODE 250 W/ 637 OVERRIDE(OP): Performed by: INTERNAL MEDICINE

## 2018-05-09 PROCEDURE — 700111 HCHG RX REV CODE 636 W/ 250 OVERRIDE (IP): Performed by: INTERNAL MEDICINE

## 2018-05-09 PROCEDURE — 82306 VITAMIN D 25 HYDROXY: CPT

## 2018-05-09 PROCEDURE — 97530 THERAPEUTIC ACTIVITIES: CPT

## 2018-05-09 PROCEDURE — 99232 SBSQ HOSP IP/OBS MODERATE 35: CPT | Performed by: INTERNAL MEDICINE

## 2018-05-09 PROCEDURE — 85027 COMPLETE CBC AUTOMATED: CPT

## 2018-05-09 RX ADMIN — FUROSEMIDE 40 MG: 40 TABLET ORAL at 05:31

## 2018-05-09 RX ADMIN — ONDANSETRON 4 MG: 4 TABLET, ORALLY DISINTEGRATING ORAL at 09:02

## 2018-05-09 RX ADMIN — POTASSIUM CHLORIDE 20 MEQ: 1500 TABLET, EXTENDED RELEASE ORAL at 20:05

## 2018-05-09 RX ADMIN — ANTACID TABLETS 1000 MG: 500 TABLET, CHEWABLE ORAL at 09:05

## 2018-05-09 RX ADMIN — INSULIN HUMAN 3 UNITS: 100 INJECTION, SOLUTION PARENTERAL at 18:18

## 2018-05-09 RX ADMIN — METOPROLOL TARTRATE 25 MG: 25 TABLET, FILM COATED ORAL at 09:04

## 2018-05-09 RX ADMIN — HEPARIN SODIUM 5000 UNITS: 5000 INJECTION, SOLUTION INTRAVENOUS; SUBCUTANEOUS at 13:35

## 2018-05-09 RX ADMIN — OXYCODONE HYDROCHLORIDE 5 MG: 5 TABLET ORAL at 05:31

## 2018-05-09 RX ADMIN — DIBASIC SODIUM PHOSPHATE, MONOBASIC POTASSIUM PHOSPHATE AND MONOBASIC SODIUM PHOSPHATE 1 TABLET: 852; 155; 130 TABLET ORAL at 13:36

## 2018-05-09 RX ADMIN — METFORMIN HYDROCHLORIDE 1000 MG: 500 TABLET, FILM COATED ORAL at 18:14

## 2018-05-09 RX ADMIN — PROCHLORPERAZINE EDISYLATE 10 MG: 5 INJECTION INTRAMUSCULAR; INTRAVENOUS at 17:27

## 2018-05-09 RX ADMIN — PRAVASTATIN SODIUM 40 MG: 20 TABLET ORAL at 20:05

## 2018-05-09 RX ADMIN — METOPROLOL TARTRATE 25 MG: 25 TABLET, FILM COATED ORAL at 20:05

## 2018-05-09 RX ADMIN — FUROSEMIDE 40 MG: 40 TABLET ORAL at 16:19

## 2018-05-09 RX ADMIN — HEPARIN SODIUM 5000 UNITS: 5000 INJECTION, SOLUTION INTRAVENOUS; SUBCUTANEOUS at 05:31

## 2018-05-09 RX ADMIN — NYSTATIN: 100000 POWDER TOPICAL at 09:06

## 2018-05-09 RX ADMIN — Medication: at 09:06

## 2018-05-09 RX ADMIN — HEPARIN SODIUM 5000 UNITS: 5000 INJECTION, SOLUTION INTRAVENOUS; SUBCUTANEOUS at 20:05

## 2018-05-09 RX ADMIN — ACYCLOVIR 400 MG: 200 CAPSULE ORAL at 09:05

## 2018-05-09 RX ADMIN — Medication: at 20:05

## 2018-05-09 RX ADMIN — ACYCLOVIR 400 MG: 200 CAPSULE ORAL at 20:05

## 2018-05-09 RX ADMIN — POTASSIUM CHLORIDE 20 MEQ: 1500 TABLET, EXTENDED RELEASE ORAL at 09:05

## 2018-05-09 RX ADMIN — NYSTATIN: 100000 POWDER TOPICAL at 20:04

## 2018-05-09 RX ADMIN — ANTACID TABLETS 1000 MG: 500 TABLET, CHEWABLE ORAL at 18:14

## 2018-05-09 RX ADMIN — DIBASIC SODIUM PHOSPHATE, MONOBASIC POTASSIUM PHOSPHATE AND MONOBASIC SODIUM PHOSPHATE 1 TABLET: 852; 155; 130 TABLET ORAL at 18:14

## 2018-05-09 RX ADMIN — OXYCODONE HYDROCHLORIDE 10 MG: 10 TABLET, FILM COATED, EXTENDED RELEASE ORAL at 20:04

## 2018-05-09 RX ADMIN — CALCITRIOL 0.25 MCG: 0.25 CAPSULE, LIQUID FILLED ORAL at 09:05

## 2018-05-09 RX ADMIN — ALPRAZOLAM 0.5 MG: 0.5 TABLET ORAL at 10:33

## 2018-05-09 RX ADMIN — ANTACID TABLETS 1000 MG: 500 TABLET, CHEWABLE ORAL at 13:36

## 2018-05-09 RX ADMIN — CALCIUM CITRATE 200 MG (950 MG) TABLET 1900 MG: at 09:05

## 2018-05-09 RX ADMIN — OXYCODONE HYDROCHLORIDE 10 MG: 10 TABLET, FILM COATED, EXTENDED RELEASE ORAL at 09:04

## 2018-05-09 RX ADMIN — METFORMIN HYDROCHLORIDE 1000 MG: 500 TABLET, FILM COATED ORAL at 09:06

## 2018-05-09 ASSESSMENT — ENCOUNTER SYMPTOMS
FEVER: 0
PHOTOPHOBIA: 0
SHORTNESS OF BREATH: 0
DIZZINESS: 0
DIARRHEA: 0
BLURRED VISION: 0
CONSTIPATION: 0
WEAKNESS: 0
CHILLS: 0
SORE THROAT: 0
ABDOMINAL PAIN: 0
INSOMNIA: 0
SENSORY CHANGE: 0
CLAUDICATION: 0
HEADACHES: 0
DEPRESSION: 1
NERVOUS/ANXIOUS: 0
HEARTBURN: 0
VOMITING: 0
SPEECH CHANGE: 0
MYALGIAS: 1
COUGH: 0

## 2018-05-09 ASSESSMENT — PAIN SCALES - GENERAL
PAINLEVEL_OUTOF10: 5
PAINLEVEL_OUTOF10: 6
PAINLEVEL_OUTOF10: 6

## 2018-05-09 ASSESSMENT — COGNITIVE AND FUNCTIONAL STATUS - GENERAL
DRESSING REGULAR UPPER BODY CLOTHING: A LITTLE
PERSONAL GROOMING: A LITTLE
DRESSING REGULAR LOWER BODY CLOTHING: TOTAL
DAILY ACTIVITIY SCORE: 14
SUGGESTED CMS G CODE MODIFIER DAILY ACTIVITY: CK
HELP NEEDED FOR BATHING: A LOT
TOILETING: TOTAL

## 2018-05-09 NOTE — PROGRESS NOTES
Assumed care of patient. Patient A&O X4. Extended release oxy in use for generalized pain and left arm pian with positive results. Patient calls for medication as needed. Patient on low air loss bed. Frequently repositioned. Skin tear to panis covered with mepilex lite. Skin tears to buttocks open to air due to frequent incontinence.  Patient calls appropriately for assistance as needed. Nausea medication in use PRN.

## 2018-05-09 NOTE — THERAPY
"Occupational Therapy Evaluation completed.   Functional Status:  Pt seen for OT tx, pt willing to participate in session, post session verbalizing feeling frustrated having to start her rehab process all over again. Pt tolerated sitting eob with supervision/sba, UB dressing with supervision, toileting max a post bed pan use in bed, max a of 2 to partially clear bed, limited by fear of falling. Pt would continue to benefit from acute skilled OT services while in house, consider leigh lift t/f's or full body slide board t/f to cardiac chair.   Plan of Care: Will benefit from Occupational Therapy 3 times per week  Discharge Recommendations:  Equipment: Will Continue to Assess for Equipment Needs. Post-acute therapy Discharge to a transitional care facility for continued skilled therapy services.    See \"Rehab Therapy-Acute\" Patient Summary Report for complete documentation.    "

## 2018-05-09 NOTE — PROGRESS NOTES
Lab called with critical result of Calcium 6.8 at 0540. Critical lab result read back to lab. Corrected calcium 8.72   This critical lab result is within parameters established by [facility] for this patient.

## 2018-05-09 NOTE — CARE PLAN
Problem: Mobility  Goal: Risk for activity intolerance will decrease  Outcome: PROGRESSING AS EXPECTED  Up to edge of bed. Attempted to stand Patient partially lifted weight off of bed. Unable to stand    Problem: Skin Integrity  Goal: Risk for impaired skin integrity will decrease  Outcome: PROGRESSING AS EXPECTED  Mepilex lite to panis skin tear. Nystatin powder used in panis. Felipe cream to buttocks. Frequently offering bed pan to avoid incontinence.

## 2018-05-09 NOTE — PROGRESS NOTES
[facility] Hospitalist Progress Note    Date of Service: 2018    Chief Complaint  67 y.o. female admitted 4/10/2018 with multiple myeloma, left humerus, bilateral femur radiation completed.    Interval Problem Update  Patient very debilitated, she is off antibiotics since I saw her last, still very tearful and depressed and lonely as family and friends have difficulty coming to visit [place].  She feels horrible and is concerned as her left arm is hurting more than prior - Xrays repeated today and no fracture or new lytic lesion seen.  I asked her if she wanted to try another round of chemotherapy as she was already feeling so poorly.  She states she doesn't feel she has a choice, she is not ready to give up trying even if it means feeling worse than she already does.    Consultants/Specialty  Oncology - Reganti    Disposition  Difficult discharge.        Review of Systems   Constitutional: Negative for chills and fever.   HENT: Negative for congestion and sore throat.    Eyes: Negative for blurred vision and photophobia.   Respiratory: Negative for cough and shortness of breath.    Cardiovascular: Negative for chest pain, claudication and leg swelling.   Gastrointestinal: Negative for abdominal pain, constipation, diarrhea, heartburn and vomiting.   Genitourinary: Negative for dysuria and hematuria.   Musculoskeletal: Positive for myalgias (globally). Negative for joint pain.   Skin: Negative for itching and rash.   Neurological: Negative for dizziness, sensory change, speech change, weakness and headaches.   Psychiatric/Behavioral: Positive for depression. The patient is not nervous/anxious and does not have insomnia.       Physical Exam  Laboratory/Imaging   Hemodynamics  Temp (24hrs), Av.7 °C (98 °F), Min:36.3 °C (97.4 °F), Max:36.9 °C (98.4 °F)   Temperature: 36.7 °C (98 °F)  Pulse  Av.5  Min: 38  Max: 128    Blood Pressure : 157/63      Respiratory      Respiration: 18, Pulse Oximetry: 93 %      Work Of Breathing / Effort: Mild  RUL Breath Sounds: Clear, RML Breath Sounds: Diminished, RLL Breath Sounds: Diminished, CHARLOTTE Breath Sounds: Clear, LLL Breath Sounds: Diminished    Fluids    Intake/Output Summary (Last 24 hours) at 05/08/18 1902  Last data filed at 05/08/18 1817   Gross per 24 hour   Intake             1260 ml   Output              800 ml   Net              460 ml       Nutrition  Orders Placed This Encounter   Procedures   • Diet Order     Standing Status:   Standing     Number of Occurrences:   1     Order Specific Question:   Diet:     Answer:   Diabetic [3]     Physical Exam   Constitutional: She is oriented to person, place, and time. She appears well-developed and well-nourished. No distress.   HENT:   Head: Normocephalic and atraumatic.   Eyes: Conjunctivae are normal. No scleral icterus.   Neck: Neck supple. No JVD present.   Cardiovascular: Normal rate, regular rhythm and normal heart sounds.  Exam reveals no gallop and no friction rub.    No murmur heard.  Pulmonary/Chest: Effort normal and breath sounds normal. No respiratory distress. She exhibits no tenderness.   Abdominal: Soft. Bowel sounds are normal. She exhibits no distension. There is no guarding.   Musculoskeletal: She exhibits no edema or tenderness.   Neurological: She is alert and oriented to person, place, and time. No cranial nerve deficit.   Skin: Skin is warm and dry. She is not diaphoretic. No erythema. No pallor.   Erythema bilateral LE/shins - improved from prior.   Psychiatric: She has a normal mood and affect. Her behavior is normal.   Nursing note and vitals reviewed.      Recent Labs      05/06/18   0614  05/07/18   0100  05/08/18   0132   WBC  3.1*  4.1*  3.5*   RBC  2.90*  2.88*  2.80*   HEMOGLOBIN  8.8*  8.7*  8.5*   HEMATOCRIT  28.2*  28.4*  27.7*   MCV  97.2  98.6*  98.9*   MCH  30.3  30.2  30.4   MCHC  31.2*  30.6*  30.7*   RDW  57.1*  56.6*  56.8*   PLATELETCT  125*  126*  107*   MPV  10.8  10.7  10.8      Recent Labs      05/06/18   0418  05/07/18   0100  05/08/18   0132   SODIUM  138  140  137   POTASSIUM  3.7  3.4*  3.6   CHLORIDE  99  99  99   CO2  36*  34*  35*   GLUCOSE  155*  122*  144*   BUN  12  13  12   CREATININE  0.60  0.71  0.70   CALCIUM  6.9*  6.9*  6.6*                      Assessment/Plan     * Pathologic fracture- (present on admission)   Assessment & Plan    -Left humerus  -widespread lytic disease  -completed radiation to humerus  -pain control             Multiple myeloma (HCC)- (present on admission)   Assessment & Plan    CyBorD chemo - cycle 1 started 4/19/18; cycle 2 to start on 5/10  -recent diagnosis, appears aggressive, now with innumerable lytic lesions  -Dr Sim and Oksana consulted for assistance in management  - IV dilaudid PRN, oxy prn. DC maintenance steroids as these were causing confusion  -start oxycontin CR 10 mg BID  -skeletal survey done - spine obscured by body habitus, but multiple other lesions found  XRT to right and left femurs  -Palliative care consulted for advanced care planning            Diarrhea   Assessment & Plan    Resolved  C diff negative  Imodium PRN          Hypocalcemia   Assessment & Plan    - Repleting as needed   due to recent bisphosphonate use; replete Mg  Corrected calcium 8.44 - low secondary to hypoalbuminemia          Hypomagnesemia   Assessment & Plan    - Repleting as needed        Decubitus ulcer of buttock   Assessment & Plan    Wound care; rotate frequently in bed; air mattress.        Debility- (present on admission)   Assessment & Plan    Due to current illnesses; not able to walk since early april;  Out of SNF days per SW; cont PT/OT here  Poor prognosis given current diagnoses/morbid obesity.  LTAC declined d/t active chemotherapy.        Cellulitis   Assessment & Plan    Bilateral LE  improving; venous stasis component  Changed to po Augmentin per ID, completed 5/5/18          DNR (do not resuscitate)   Assessment & Plan     Palliative care consulted, patient wants to change code status to DNR - done, completed Polst with APN.          Chemotherapy-induced thrombocytopenia   Assessment & Plan    stable; monitor; no bleeding now          Leg edema   Assessment & Plan      -PO Lasix scheduled bid, doing well and renal function stable.            Chronic venous stasis dermatitis of both lower extremities- (present on admission)   Assessment & Plan    -wound care, oral abx        Anemia- (present on admission)   Assessment & Plan    Stable cbc; monitor        Morbid obesity with BMI of 60.0-69.9, adult (CMS-HCC)- (present on admission)   Assessment & Plan    -encourage weight loss  Body mass index is 67.38 kg/m².        DM type 2 (diabetes mellitus, type 2) (CMS-HCC)- (present on admission)   Assessment & Plan    Fair control; adjust meds for good control        Essential hypertension, benign- (present on admission)   Assessment & Plan    Adjust med for good control.          Quality-Core Measures   Reviewed items::  Labs reviewed, Medications reviewed and Radiology images reviewed  Singh catheter::  Urinary Tract Retention or Urinary Tract Obstruction  DVT prophylaxis pharmacological::  Heparin  Assessed for rehabilitation services:  Patient was assess for and/or received rehabilitation services during this hospitalization

## 2018-05-09 NOTE — PROGRESS NOTES
.  HEMATOLOGY-ONCOLOGY PROGRESS NOTE  ( Dr. Sim initial consult)  Multiple myeloma-- 1P deletion intermediate risk, IgG kappa.  Stage III based on ISS staging (beta 2 9.9 and albumin 2.7 before).  Admitted with pathologic fracture of left humerus. Received palliative radiation to the left humerus during this admission. Poor performance status. Lives alone in [facility].       Started chemotherapy here as an inpatient. Received cycle #1 day #1 of CyBorD on April 19. She has completed CYCLE #1.  - C # 2 switched to weekly regimen Velcade 1.5 mg/m2 + Cytoxan 300 mg/m2+ DEX 40 mg weekly days 1,8,15,22 of every 28 day cycle . Start C2D1 on 5/10/18 .     Subjective: No overnight events   She is lying in bed comfortably  . No fevers or chills. She looks fatigued.   No diarrhea   ECOG 3-4     Objective:  Medications reviewed and notable for:  Current Facility-Administered Medications   Medication Dose   • loperamide (IMODIUM) capsule 2 mg  2 mg   • potassium chloride SA (Kdur) tablet 20 mEq  20 mEq   • metFORMIN (GLUCOPHAGE) tablet 1,000 mg  1,000 mg   • metoprolol (LOPRESSOR) tablet 25 mg  25 mg   • calcitRIOL (ROCALTROL) capsule 0.25 mcg  0.25 mcg   • calcium carbonate (TUMS) chewable tab 1,000 mg  1,000 mg   • furosemide (LASIX) tablet 40 mg  40 mg   • prochlorperazine (COMPAZINE) injection 10 mg  10 mg   • nystatin (MYCOSTATIN) powder     • calcium citrate (CALCITRATE) tablet 1,900 mg  1,900 mg   • acetaminophen (TYLENOL) tablet 975 mg  975 mg   • LORazepam (ATIVAN) injection 1 mg  1 mg   • normal saline PF 10-20 mL  10-20 mL   • insulin regular (HUMULIN R) injection 3-14 Units  3-14 Units    And   • glucose 4 g chewable tablet 16 g  16 g    And   • dextrose 50% (D50W) injection 25 mL  25 mL   • acyclovir (ZOVIRAX) capsule 400 mg  400 mg   • ALPRAZolam (XANAX) tablet 0.5 mg  0.5 mg   • hydrALAZINE (APRESOLINE) injection 10 mg  10 mg   • ammonium lactate (LAC-HYDRIN) 12 % lotion     • pravastatin (PRAVACHOL)  "tablet 40 mg  40 mg   • polyethylene glycol/lytes (MIRALAX) PACKET 1 Packet  1 Packet    And   • magnesium hydroxide (MILK OF MAGNESIA) suspension 30 mL  30 mL    And   • bisacodyl (DULCOLAX) suppository 10 mg  10 mg   • Respiratory Care per Protocol     • heparin injection 5,000 Units  5,000 Units   • ondansetron (ZOFRAN) syringe/vial injection 4 mg  4 mg   • ondansetron (ZOFRAN ODT) dispertab 4 mg  4 mg   • morphine (pf) 4 mg/ml injection 1 mg  1 mg   • oxyCODONE immediate-release (ROXICODONE) tablet 5 mg  5 mg   • oxyCODONE CR (OXYCONTIN) tablet 10 mg  10 mg       ROS:   I did do 10 point system review which is negative except as mentioned above   Constitutional: + fatigue, + malaise, No fevers or chills  Resp: No cough or SOB   Cardio: No CP or palpitations   GI: No abdominal pain, no N/V/D    Blood pressure 129/55, pulse 92, temperature 36.8 °C (98.2 °F), resp. rate 18, height 1.6 m (5' 2.99\"), weight (!) 172.5 kg (380 lb 4.7 oz), last menstrual period 04/11/1994, SpO2 96 %, not currently breastfeeding.    ECOG PS: 4  General:  comfortable, NAD  HEENT: PERRLA, EOMI , looks weak   Cor:   regular rate and rhythm, no murmurs, rubs, or gallops  Pulm:   clear to auscultation bilaterally  Abd:   bowel sounds present, soft, nontender, nondistended, large pannus   Extremities:  Chronic venous stasis changes, erythema bilateral cellulitis   Neurologic:  A&O x 3  Pyschiatric:  Appropriate mood and affect    Labs reviewed and notable for:  Recent Labs      05/07/18   0100  05/08/18   0132  05/09/18   0354   WBC  4.1*  3.5*  3.3*   RBC  2.88*  2.80*  2.74*   HEMOGLOBIN  8.7*  8.5*  8.3*   HEMATOCRIT  28.4*  27.7*  26.8*   MCV  98.6*  98.9*  97.8   MCH  30.2  30.4  30.3   MCHC  30.6*  30.7*  31.0*   RDW  56.6*  56.8*  56.1*   PLATELETCT  126*  107*  137*   MPV  10.7  10.8  10.1         .@CMP  Recent Results (from the past 24 hour(s))   ACCU-CHEK GLUCOSE    Collection Time: 05/08/18  8:59 AM   Result Value Ref Range    " Glucose - Accu-Ck 138 (H) 65 - 99 mg/dL   ACCU-CHEK GLUCOSE    Collection Time: 05/08/18  1:13 PM   Result Value Ref Range    Glucose - Accu-Ck 211 (H) 65 - 99 mg/dL   ACCU-CHEK GLUCOSE    Collection Time: 05/08/18  6:14 PM   Result Value Ref Range    Glucose - Accu-Ck 148 (H) 65 - 99 mg/dL   ACCU-CHEK GLUCOSE    Collection Time: 05/08/18  8:59 PM   Result Value Ref Range    Glucose - Accu-Ck 177 (H) 65 - 99 mg/dL   RENAL FUNCTION PANEL    Collection Time: 05/09/18  3:54 AM   Result Value Ref Range    Sodium 139 135 - 145 mmol/L    Potassium 3.6 3.6 - 5.5 mmol/L    Chloride 100 96 - 112 mmol/L    Co2 35 (H) 20 - 33 mmol/L    Glucose 123 (H) 65 - 99 mg/dL    Creatinine 0.60 0.50 - 1.40 mg/dL    Bun 12 8 - 22 mg/dL    Calcium 6.8 (LL) 8.5 - 10.5 mg/dL    Phosphorus 2.2 (L) 2.5 - 4.5 mg/dL    Albumin 1.6 (L) 3.2 - 4.9 g/dL   CBC WITH DIFFERENTIAL    Collection Time: 05/09/18  3:54 AM   Result Value Ref Range    WBC 3.3 (L) 4.8 - 10.8 K/uL    RBC 2.74 (L) 4.20 - 5.40 M/uL    Hemoglobin 8.3 (L) 12.0 - 16.0 g/dL    Hematocrit 26.8 (L) 37.0 - 47.0 %    MCV 97.8 81.4 - 97.8 fL    MCH 30.3 27.0 - 33.0 pg    MCHC 31.0 (L) 33.6 - 35.0 g/dL    RDW 56.1 (H) 35.9 - 50.0 fL    Platelet Count 137 (L) 164 - 446 K/uL    MPV 10.1 9.0 - 12.9 fL    Nucleated RBC 0.00 /100 WBC    NRBC (Absolute) 0.00 K/uL    Neutrophils-Polys 52.70 44.00 - 72.00 %    Lymphocytes 30.30 22.00 - 41.00 %    Monocytes 10.70 0.00 - 13.40 %    Eosinophils 0.00 0.00 - 6.90 %    Basophils 0.90 0.00 - 1.80 %    Neutrophils (Absolute) 1.89 (L) 2.00 - 7.15 K/uL    Lymphs (Absolute) 1.00 1.00 - 4.80 K/uL    Monos (Absolute) 0.35 0.00 - 0.85 K/uL    Eos (Absolute) 0.00 0.00 - 0.51 K/uL    Baso (Absolute) 0.03 0.00 - 0.12 K/uL   IONIZED CALCIUM    Collection Time: 05/09/18  3:54 AM   Result Value Ref Range    Ionized Calcium 0.9 (L) 1.1 - 1.3 mmol/L   ESTIMATED GFR    Collection Time: 05/09/18  3:54 AM   Result Value Ref Range    GFR If  >60 >60  mL/min/1.73 m 2    GFR If Non African American >60 >60 mL/min/1.73 m 2   DIFFERENTIAL MANUAL    Collection Time: 05/09/18  3:54 AM   Result Value Ref Range    Bands-Stabs 4.50 0.00 - 10.00 %    Myelocytes 0.90 %    Manual Diff Status PERFORMED    PERIPHERAL SMEAR REVIEW    Collection Time: 05/09/18  3:54 AM   Result Value Ref Range    Peripheral Smear Review see below    PLATELET ESTIMATE    Collection Time: 05/09/18  3:54 AM   Result Value Ref Range    Plt Estimation Decreased    MORPHOLOGY    Collection Time: 05/09/18  3:54 AM   Result Value Ref Range    RBC Morphology Normal    VITAMIN D,25 HYDROXY    Collection Time: 05/09/18  4:30 AM   Result Value Ref Range    25-Hydroxy   Vitamin D 25 13 (L) 30 - 100 ng/mL       Diagnostic imaging:      Assessment and Recommendations:  1.  Multiple myeloma-- 1P deletion intermediate risk, IgG kappa.  Stage III based on ISS staging (beta 2 9.9 and albumin 2.7 before).  Admitted with pathologic fracture of left humerus. Received palliative radiation to the left humerus during this admission. Poor performance status. Lives alone in [facility].       Started chemotherapy here as an inpatient. Received cycle #1 day #1 of CyBorD on April 19. She has completed CYCLE #1.  Her myeloma markers are improving showing response to treatment.   --Due to start cycle #2 on May 10 ( switch her to weekly regimen Velcade 1.5 mg/m2, Cytoxan 300 mg/m2 and DEX 40 mg weekly )   --In hospital now with difficult discharge disposition.  --Finished on radiation to the femurs.   --Dexamethasone, as part of the chemotherapy, has been held on a few occasions related to poorly controlled diabetes.    2. Cytopenias - Counts stable. Plan to transfuse if HGB < 7.0 or PLT 10,000 and also start neupogen if ANC < 1000  3. Bilateral LE swelling on Augmentin now   4. Hypocalcemia on aggressive replacement by primary service, not a candidate to start bisphosphonate therapy , Mg , K N, low albumin 1.6 and also  low Vit D level 13 L    5. Pain control  6. Severe protein calorie malnutrition     Plan:   - I reviewed her myeloma panel responding to treatment   - She had multiple medical comorbidities resulting in morbidity. Her ECOG PS 3-4. I did discuss with her about chemotherapy continuation , she would like to for now.   - Plan to start C # 2 on 5/10/18 - will change to weekly regimen since they had hold couple of doses of DEX due to high BS and better tolerable especially with her already poor PS   - Her ionized calcium is low , albumin low - continue to replace   - Start Vit D supplements   - I went over side effects again in length.        Spent 40 minutes today   Orders placed and signed in [facility]   High complexicity/Drug monitoring     We will continue to follow with you; please call with any questions, [phone].      Sarahi Reyes MD  [facility]   [phone]

## 2018-05-09 NOTE — CARE PLAN
Problem: Mobility  Goal: Risk for activity intolerance will decrease  Outcome: PROGRESSING SLOWER THAN EXPECTED  Patient able to assist with turns and repositioning but requires encouragement and instruction. Increased activity causes SOB and anxiety    Problem: Psychosocial Needs:  Goal: Level of anxiety will decrease  Outcome: PROGRESSING AS EXPECTED  Patient often anxious and tearful, comfort and emotional support provided and PRN xanax being used with relief

## 2018-05-09 NOTE — PROGRESS NOTES
Patient A&O x4, fatigued but not tearful tonight. Patient c/o diarrhea and 7/10 leg pain; medicated per MAR with PRN imodium and oxy. Ice applied to legs. New mepilex lite applied to skin tear under pannus. Barrier cream applied to sacrum. Patient tolerated chicken soup for dinner without emesis. Denies nausea currently. Call light and all belongings within reach, all needs met at this time. Hourly rounding in place.

## 2018-05-10 PROBLEM — R09.81 NASAL CONGESTION: Status: ACTIVE | Noted: 2018-05-10

## 2018-05-10 LAB
ALBUMIN SERPL BCP-MCNC: 1.7 G/DL (ref 3.2–4.9)
ALBUMIN/GLOB SERPL: 0.4 G/DL
ALP SERPL-CCNC: 63 U/L (ref 30–99)
ALT SERPL-CCNC: 14 U/L (ref 2–50)
ANION GAP SERPL CALC-SCNC: 3 MMOL/L (ref 0–11.9)
AST SERPL-CCNC: 19 U/L (ref 12–45)
BASOPHILS # BLD AUTO: 0 % (ref 0–1.8)
BASOPHILS # BLD: 0 K/UL (ref 0–0.12)
BILIRUB SERPL-MCNC: 0.3 MG/DL (ref 0.1–1.5)
BUN SERPL-MCNC: 11 MG/DL (ref 8–22)
CA-I SERPL-SCNC: 0.9 MMOL/L (ref 1.1–1.3)
CALCIUM SERPL-MCNC: 6.9 MG/DL (ref 8.5–10.5)
CHLORIDE SERPL-SCNC: 101 MMOL/L (ref 96–112)
CO2 SERPL-SCNC: 35 MMOL/L (ref 20–33)
CREAT SERPL-MCNC: 0.62 MG/DL (ref 0.5–1.4)
EOSINOPHIL # BLD AUTO: 0 K/UL (ref 0–0.51)
EOSINOPHIL NFR BLD: 0 % (ref 0–6.9)
ERYTHROCYTE [DISTWIDTH] IN BLOOD BY AUTOMATED COUNT: 57.1 FL (ref 35.9–50)
GLOBULIN SER CALC-MCNC: 3.8 G/DL (ref 1.9–3.5)
GLUCOSE BLD-MCNC: 132 MG/DL (ref 65–99)
GLUCOSE BLD-MCNC: 190 MG/DL (ref 65–99)
GLUCOSE BLD-MCNC: 207 MG/DL (ref 65–99)
GLUCOSE BLD-MCNC: 264 MG/DL (ref 65–99)
GLUCOSE SERPL-MCNC: 120 MG/DL (ref 65–99)
HCT VFR BLD AUTO: 28.6 % (ref 37–47)
HGB BLD-MCNC: 8.6 G/DL (ref 12–16)
LYMPHOCYTES # BLD AUTO: 0.71 K/UL (ref 1–4.8)
LYMPHOCYTES NFR BLD: 18.7 % (ref 22–41)
MANUAL DIFF BLD: NORMAL
MCH RBC QN AUTO: 29.7 PG (ref 27–33)
MCHC RBC AUTO-ENTMCNC: 30.1 G/DL (ref 33.6–35)
MCV RBC AUTO: 98.6 FL (ref 81.4–97.8)
METAMYELOCYTES NFR BLD MANUAL: 1.9 %
MONOCYTES # BLD AUTO: 0.03 K/UL (ref 0–0.85)
MONOCYTES NFR BLD AUTO: 0.9 % (ref 0–13.4)
MORPHOLOGY BLD-IMP: NORMAL
MYELOCYTES NFR BLD MANUAL: 4.7 %
NEUTROPHILS # BLD AUTO: 2.8 K/UL (ref 2–7.15)
NEUTROPHILS NFR BLD: 72.9 % (ref 44–72)
NEUTS BAND NFR BLD MANUAL: 0.9 % (ref 0–10)
NRBC # BLD AUTO: 0 K/UL
NRBC BLD-RTO: 0 /100 WBC
PHOSPHATE SERPL-MCNC: 2.6 MG/DL (ref 2.5–4.5)
PLATELET # BLD AUTO: 137 K/UL (ref 164–446)
PLATELET BLD QL SMEAR: NORMAL
PMV BLD AUTO: 10.2 FL (ref 9–12.9)
POTASSIUM SERPL-SCNC: 3.8 MMOL/L (ref 3.6–5.5)
PROT SERPL-MCNC: 5.5 G/DL (ref 6–8.2)
RBC # BLD AUTO: 2.9 M/UL (ref 4.2–5.4)
RBC BLD AUTO: NORMAL
SODIUM SERPL-SCNC: 139 MMOL/L (ref 135–145)
WBC # BLD AUTO: 3.8 K/UL (ref 4.8–10.8)

## 2018-05-10 PROCEDURE — 700102 HCHG RX REV CODE 250 W/ 637 OVERRIDE(OP): Performed by: INTERNAL MEDICINE

## 2018-05-10 PROCEDURE — 85007 BL SMEAR W/DIFF WBC COUNT: CPT

## 2018-05-10 PROCEDURE — A9270 NON-COVERED ITEM OR SERVICE: HCPCS | Performed by: INTERNAL MEDICINE

## 2018-05-10 PROCEDURE — 302255 BARRIER CREAM MOISTURE BAZA PROTECT (ZINC) 5OZ: Performed by: INTERNAL MEDICINE

## 2018-05-10 PROCEDURE — 700111 HCHG RX REV CODE 636 W/ 250 OVERRIDE (IP): Performed by: INTERNAL MEDICINE

## 2018-05-10 PROCEDURE — 84100 ASSAY OF PHOSPHORUS: CPT

## 2018-05-10 PROCEDURE — 86334 IMMUNOFIX E-PHORESIS SERUM: CPT

## 2018-05-10 PROCEDURE — 82330 ASSAY OF CALCIUM: CPT

## 2018-05-10 PROCEDURE — 84165 PROTEIN E-PHORESIS SERUM: CPT

## 2018-05-10 PROCEDURE — 82784 ASSAY IGA/IGD/IGG/IGM EACH: CPT

## 2018-05-10 PROCEDURE — 83883 ASSAY NEPHELOMETRY NOT SPEC: CPT | Mod: 91

## 2018-05-10 PROCEDURE — 84156 ASSAY OF PROTEIN URINE: CPT | Mod: XU

## 2018-05-10 PROCEDURE — 85027 COMPLETE CBC AUTOMATED: CPT

## 2018-05-10 PROCEDURE — 82962 GLUCOSE BLOOD TEST: CPT | Mod: 91

## 2018-05-10 PROCEDURE — 97530 THERAPEUTIC ACTIVITIES: CPT

## 2018-05-10 PROCEDURE — 700111 HCHG RX REV CODE 636 W/ 250 OVERRIDE (IP): Mod: JG | Performed by: INTERNAL MEDICINE

## 2018-05-10 PROCEDURE — A6250 SKIN SEAL PROTECT MOISTURIZR: HCPCS | Performed by: INTERNAL MEDICINE

## 2018-05-10 PROCEDURE — 770004 HCHG ROOM/CARE - ONCOLOGY PRIVATE *

## 2018-05-10 PROCEDURE — 80053 COMPREHEN METABOLIC PANEL: CPT

## 2018-05-10 PROCEDURE — 84160 ASSAY OF PROTEIN ANY SOURCE: CPT

## 2018-05-10 PROCEDURE — 99233 SBSQ HOSP IP/OBS HIGH 50: CPT | Performed by: INTERNAL MEDICINE

## 2018-05-10 RX ORDER — FUROSEMIDE 10 MG/ML
40 INJECTION INTRAMUSCULAR; INTRAVENOUS
Status: DISCONTINUED | OUTPATIENT
Start: 2018-05-11 | End: 2018-05-22

## 2018-05-10 RX ORDER — ONDANSETRON 4 MG/1
4 TABLET, ORALLY DISINTEGRATING ORAL EVERY 4 HOURS PRN
Status: DISCONTINUED | OUTPATIENT
Start: 2018-05-10 | End: 2018-05-26 | Stop reason: HOSPADM

## 2018-05-10 RX ORDER — PHENYLEPHRINE HCL 10 MG/1
10 TABLET, FILM COATED ORAL EVERY 4 HOURS PRN
Status: DISCONTINUED | OUTPATIENT
Start: 2018-05-10 | End: 2018-05-10

## 2018-05-10 RX ORDER — ECHINACEA PURPUREA EXTRACT 125 MG
2 TABLET ORAL
Status: DISCONTINUED | OUTPATIENT
Start: 2018-05-10 | End: 2018-05-26 | Stop reason: HOSPADM

## 2018-05-10 RX ORDER — PHENYLEPHRINE HCL 10 MG/1
10 TABLET, FILM COATED ORAL EVERY 4 HOURS PRN
Status: DISCONTINUED | OUTPATIENT
Start: 2018-05-10 | End: 2018-05-26 | Stop reason: HOSPADM

## 2018-05-10 RX ORDER — POTASSIUM CHLORIDE 20 MEQ/1
20 TABLET, EXTENDED RELEASE ORAL 2 TIMES DAILY
Status: DISCONTINUED | OUTPATIENT
Start: 2018-05-10 | End: 2018-05-25

## 2018-05-10 RX ORDER — CYCLOPHOSPHAMIDE 50 MG/1
850 CAPSULE ORAL ONCE
Status: COMPLETED | OUTPATIENT
Start: 2018-05-10 | End: 2018-05-10

## 2018-05-10 RX ORDER — LOPERAMIDE HYDROCHLORIDE 2 MG/1
2 CAPSULE ORAL 4 TIMES DAILY PRN
Status: DISCONTINUED | OUTPATIENT
Start: 2018-05-10 | End: 2018-05-26 | Stop reason: HOSPADM

## 2018-05-10 RX ORDER — OXYCODONE HCL 10 MG/1
10 TABLET, FILM COATED, EXTENDED RELEASE ORAL EVERY 12 HOURS
Status: DISCONTINUED | OUTPATIENT
Start: 2018-05-10 | End: 2018-05-26 | Stop reason: HOSPADM

## 2018-05-10 RX ORDER — ONDANSETRON 2 MG/ML
4 INJECTION INTRAMUSCULAR; INTRAVENOUS EVERY 4 HOURS PRN
Status: DISCONTINUED | OUTPATIENT
Start: 2018-05-10 | End: 2018-05-26 | Stop reason: HOSPADM

## 2018-05-10 RX ORDER — CALCITRIOL 0.25 UG/1
0.25 CAPSULE, LIQUID FILLED ORAL DAILY
Status: DISCONTINUED | OUTPATIENT
Start: 2018-05-11 | End: 2018-05-26 | Stop reason: HOSPADM

## 2018-05-10 RX ORDER — ACETAMINOPHEN 325 MG/1
975 TABLET ORAL EVERY 6 HOURS PRN
Status: DISCONTINUED | OUTPATIENT
Start: 2018-05-10 | End: 2018-05-26 | Stop reason: HOSPADM

## 2018-05-10 RX ORDER — PRAVASTATIN SODIUM 20 MG
40 TABLET ORAL
Status: DISCONTINUED | OUTPATIENT
Start: 2018-05-10 | End: 2018-05-26 | Stop reason: HOSPADM

## 2018-05-10 RX ORDER — AMMONIUM LACTATE 12 G/100G
LOTION TOPICAL 2 TIMES DAILY
Status: DISCONTINUED | OUTPATIENT
Start: 2018-05-10 | End: 2018-05-13

## 2018-05-10 RX ORDER — IBUPROFEN 200 MG
1900 CAPSULE ORAL DAILY
Status: DISCONTINUED | OUTPATIENT
Start: 2018-05-11 | End: 2018-05-21

## 2018-05-10 RX ORDER — LORAZEPAM 2 MG/ML
1 INJECTION INTRAMUSCULAR EVERY 4 HOURS PRN
Status: DISCONTINUED | OUTPATIENT
Start: 2018-05-10 | End: 2018-05-26 | Stop reason: HOSPADM

## 2018-05-10 RX ORDER — MORPHINE SULFATE 4 MG/ML
1 INJECTION, SOLUTION INTRAMUSCULAR; INTRAVENOUS EVERY 4 HOURS PRN
Status: DISCONTINUED | OUTPATIENT
Start: 2018-05-10 | End: 2018-05-26 | Stop reason: HOSPADM

## 2018-05-10 RX ORDER — DEXAMETHASONE 4 MG/1
40 TABLET ORAL ONCE
Status: COMPLETED | OUTPATIENT
Start: 2018-05-10 | End: 2018-05-10

## 2018-05-10 RX ORDER — CALCIUM CARBONATE 500 MG/1
1000 TABLET, CHEWABLE ORAL
Status: DISCONTINUED | OUTPATIENT
Start: 2018-05-10 | End: 2018-05-26 | Stop reason: HOSPADM

## 2018-05-10 RX ORDER — HYDRALAZINE HYDROCHLORIDE 20 MG/ML
10 INJECTION INTRAMUSCULAR; INTRAVENOUS EVERY 4 HOURS PRN
Status: DISCONTINUED | OUTPATIENT
Start: 2018-05-10 | End: 2018-05-26 | Stop reason: HOSPADM

## 2018-05-10 RX ORDER — POLYETHYLENE GLYCOL 3350 17 G/17G
1 POWDER, FOR SOLUTION ORAL
Status: DISCONTINUED | OUTPATIENT
Start: 2018-05-10 | End: 2018-05-26 | Stop reason: HOSPADM

## 2018-05-10 RX ORDER — DEXTROSE MONOHYDRATE 25 G/50ML
25 INJECTION, SOLUTION INTRAVENOUS
Status: DISCONTINUED | OUTPATIENT
Start: 2018-05-10 | End: 2018-05-26 | Stop reason: HOSPADM

## 2018-05-10 RX ORDER — BISACODYL 10 MG
10 SUPPOSITORY, RECTAL RECTAL
Status: DISCONTINUED | OUTPATIENT
Start: 2018-05-10 | End: 2018-05-26 | Stop reason: HOSPADM

## 2018-05-10 RX ORDER — ALPRAZOLAM 0.5 MG/1
0.5 TABLET ORAL EVERY 6 HOURS PRN
Status: DISCONTINUED | OUTPATIENT
Start: 2018-05-10 | End: 2018-05-26 | Stop reason: HOSPADM

## 2018-05-10 RX ORDER — HEPARIN SODIUM 5000 [USP'U]/ML
5000 INJECTION, SOLUTION INTRAVENOUS; SUBCUTANEOUS EVERY 8 HOURS
Status: DISCONTINUED | OUTPATIENT
Start: 2018-05-10 | End: 2018-05-26 | Stop reason: HOSPADM

## 2018-05-10 RX ORDER — OXYCODONE HYDROCHLORIDE 5 MG/1
5 TABLET ORAL EVERY 4 HOURS PRN
Status: DISCONTINUED | OUTPATIENT
Start: 2018-05-10 | End: 2018-05-26 | Stop reason: HOSPADM

## 2018-05-10 RX ORDER — ACYCLOVIR 200 MG/1
400 CAPSULE ORAL 2 TIMES DAILY
Status: DISCONTINUED | OUTPATIENT
Start: 2018-05-10 | End: 2018-05-26 | Stop reason: HOSPADM

## 2018-05-10 RX ORDER — ECHINACEA PURPUREA EXTRACT 125 MG
2 TABLET ORAL
Status: DISCONTINUED | OUTPATIENT
Start: 2018-05-10 | End: 2018-05-10

## 2018-05-10 RX ORDER — FUROSEMIDE 10 MG/ML
40 INJECTION INTRAMUSCULAR; INTRAVENOUS
Status: DISCONTINUED | OUTPATIENT
Start: 2018-05-10 | End: 2018-05-10

## 2018-05-10 RX ADMIN — METOPROLOL TARTRATE 25 MG: 25 TABLET, FILM COATED ORAL at 08:40

## 2018-05-10 RX ADMIN — POTASSIUM CHLORIDE 20 MEQ: 1500 TABLET, EXTENDED RELEASE ORAL at 20:32

## 2018-05-10 RX ADMIN — DEXAMETHASONE 40 MG: 4 TABLET ORAL at 13:27

## 2018-05-10 RX ADMIN — ANTACID TABLETS 1000 MG: 500 TABLET, CHEWABLE ORAL at 18:23

## 2018-05-10 RX ADMIN — FUROSEMIDE 40 MG: 40 TABLET ORAL at 05:46

## 2018-05-10 RX ADMIN — OXYCODONE HYDROCHLORIDE 10 MG: 10 TABLET, FILM COATED, EXTENDED RELEASE ORAL at 20:31

## 2018-05-10 RX ADMIN — HEPARIN SODIUM 5000 UNITS: 5000 INJECTION, SOLUTION INTRAVENOUS; SUBCUTANEOUS at 13:31

## 2018-05-10 RX ADMIN — METFORMIN HYDROCHLORIDE 1000 MG: 500 TABLET, FILM COATED ORAL at 18:22

## 2018-05-10 RX ADMIN — ALPRAZOLAM 0.5 MG: 0.5 TABLET ORAL at 11:20

## 2018-05-10 RX ADMIN — ANTACID TABLETS 1000 MG: 500 TABLET, CHEWABLE ORAL at 08:43

## 2018-05-10 RX ADMIN — SALINE NASAL SPRAY 2 SPRAY: 1.5 SOLUTION NASAL at 12:31

## 2018-05-10 RX ADMIN — METFORMIN HYDROCHLORIDE 1000 MG: 500 TABLET, FILM COATED ORAL at 08:42

## 2018-05-10 RX ADMIN — PRAVASTATIN SODIUM 40 MG: 20 TABLET ORAL at 20:31

## 2018-05-10 RX ADMIN — INSULIN HUMAN 7 UNITS: 100 INJECTION, SOLUTION PARENTERAL at 20:32

## 2018-05-10 RX ADMIN — ACYCLOVIR 400 MG: 200 CAPSULE ORAL at 08:41

## 2018-05-10 RX ADMIN — DIBASIC SODIUM PHOSPHATE, MONOBASIC POTASSIUM PHOSPHATE AND MONOBASIC SODIUM PHOSPHATE 1 TABLET: 852; 155; 130 TABLET ORAL at 18:24

## 2018-05-10 RX ADMIN — PHENYLEPHRINE HCL 10 MG: 10 TABLET, FILM COATED ORAL at 12:31

## 2018-05-10 RX ADMIN — BORTEZOMIB 4.15 MG: 3.5 INJECTION, POWDER, LYOPHILIZED, FOR SOLUTION INTRAVENOUS; SUBCUTANEOUS at 13:41

## 2018-05-10 RX ADMIN — ANTACID TABLETS 1000 MG: 500 TABLET, CHEWABLE ORAL at 12:30

## 2018-05-10 RX ADMIN — Medication: at 20:32

## 2018-05-10 RX ADMIN — OXYCODONE HYDROCHLORIDE 10 MG: 10 TABLET, FILM COATED, EXTENDED RELEASE ORAL at 08:40

## 2018-05-10 RX ADMIN — HEPARIN SODIUM 5000 UNITS: 5000 INJECTION, SOLUTION INTRAVENOUS; SUBCUTANEOUS at 22:16

## 2018-05-10 RX ADMIN — CALCIUM CITRATE 200 MG (950 MG) TABLET 1900 MG: at 08:42

## 2018-05-10 RX ADMIN — INSULIN HUMAN 4 UNITS: 100 INJECTION, SOLUTION PARENTERAL at 18:24

## 2018-05-10 RX ADMIN — CALCITRIOL 0.25 MCG: 0.25 CAPSULE, LIQUID FILLED ORAL at 08:41

## 2018-05-10 RX ADMIN — CYCLOPHOSPHAMIDE 850 MG: 50 CAPSULE ORAL at 13:33

## 2018-05-10 RX ADMIN — POTASSIUM CHLORIDE 20 MEQ: 1500 TABLET, EXTENDED RELEASE ORAL at 08:43

## 2018-05-10 RX ADMIN — DIBASIC SODIUM PHOSPHATE, MONOBASIC POTASSIUM PHOSPHATE AND MONOBASIC SODIUM PHOSPHATE 1 TABLET: 852; 155; 130 TABLET ORAL at 12:30

## 2018-05-10 RX ADMIN — INSULIN HUMAN 3 UNITS: 100 INJECTION, SOLUTION PARENTERAL at 13:17

## 2018-05-10 RX ADMIN — NYSTATIN: 100000 POWDER TOPICAL at 08:43

## 2018-05-10 RX ADMIN — METOPROLOL TARTRATE 25 MG: 25 TABLET, FILM COATED ORAL at 20:31

## 2018-05-10 RX ADMIN — DIBASIC SODIUM PHOSPHATE, MONOBASIC POTASSIUM PHOSPHATE AND MONOBASIC SODIUM PHOSPHATE 1 TABLET: 852; 155; 130 TABLET ORAL at 00:32

## 2018-05-10 RX ADMIN — Medication: at 08:44

## 2018-05-10 RX ADMIN — FUROSEMIDE 40 MG: 10 INJECTION, SOLUTION INTRAMUSCULAR; INTRAVENOUS at 13:28

## 2018-05-10 RX ADMIN — HEPARIN SODIUM 5000 UNITS: 5000 INJECTION, SOLUTION INTRAVENOUS; SUBCUTANEOUS at 05:46

## 2018-05-10 RX ADMIN — DIBASIC SODIUM PHOSPHATE, MONOBASIC POTASSIUM PHOSPHATE AND MONOBASIC SODIUM PHOSPHATE 1 TABLET: 852; 155; 130 TABLET ORAL at 05:46

## 2018-05-10 RX ADMIN — ACYCLOVIR 400 MG: 200 CAPSULE ORAL at 20:31

## 2018-05-10 RX ADMIN — OXYCODONE HYDROCHLORIDE 5 MG: 5 TABLET ORAL at 17:03

## 2018-05-10 ASSESSMENT — LIFESTYLE VARIABLES: EVER_SMOKED: NEVER

## 2018-05-10 ASSESSMENT — PAIN SCALES - GENERAL
PAINLEVEL_OUTOF10: 6
PAINLEVEL_OUTOF10: 4
PAINLEVEL_OUTOF10: 6
PAINLEVEL_OUTOF10: 7
PAINLEVEL_OUTOF10: 6
PAINLEVEL_OUTOF10: 5
PAINLEVEL_OUTOF10: 6
PAINLEVEL_OUTOF10: 6
PAINLEVEL_OUTOF10: 4

## 2018-05-10 ASSESSMENT — ENCOUNTER SYMPTOMS
MYALGIAS: 1
ABDOMINAL PAIN: 0
NERVOUS/ANXIOUS: 0
DIARRHEA: 0
HEARTBURN: 0
FEVER: 0
CLAUDICATION: 0
DIZZINESS: 0
BLURRED VISION: 0
COUGH: 0
SHORTNESS OF BREATH: 0
CHILLS: 0
SENSORY CHANGE: 0
WEAKNESS: 0
SORE THROAT: 0
VOMITING: 0
DEPRESSION: 1
CONSTIPATION: 0
INSOMNIA: 0
SPEECH CHANGE: 0
HEADACHES: 0
PHOTOPHOBIA: 0

## 2018-05-10 ASSESSMENT — COPD QUESTIONNAIRES
HAVE YOU SMOKED AT LEAST 100 CIGARETTES IN YOUR ENTIRE LIFE: NO/DON'T KNOW
DURING THE PAST 4 WEEKS HOW MUCH DID YOU FEEL SHORT OF BREATH: SOME OF THE TIME
COPD SCREENING SCORE: 5
DO YOU EVER COUGH UP ANY MUCUS OR PHLEGM?: NO/ONLY WITH OCCASIONAL COLDS OR INFECTIONS

## 2018-05-10 ASSESSMENT — GAIT ASSESSMENTS: GAIT LEVEL OF ASSIST: UNABLE TO PARTICIPATE

## 2018-05-10 NOTE — THERAPY
"Physical Therapy Treatment completed.   Bed Mobility:  Supine to Sit: Moderate Assist (w/HOB elevated and no rail)  Transfers: Sit to Stand: Total Assist X 2 (to partially clear the bed)      Plan of Care: Will benefit from Physical Therapy 3 times per week  Discharge Recommendations: Equipment: Will Continue to Assess for Equipment Needs. Post-acute therapy Discharge to a transitional care facility for continued skilled therapy services.     See \"Rehab Therapy-Acute\" Patient Summary Report for complete documentation.       "

## 2018-05-10 NOTE — CARE PLAN
Problem: Infection  Goal: Will remain free from infection  Outcome: PROGRESSING AS EXPECTED  Pt has right PICC. Singh catheter in place. Assess for signs and symptoms of infection.     Problem: Skin Integrity  Goal: Risk for impaired skin integrity will decrease  Outcome: PROGRESSING AS EXPECTED  Pt has redness and skin tears present on pannus and sacrum. Repositioning needed to maintain skin integrity.

## 2018-05-10 NOTE — DISCHARGE PLANNING
"Updated Clinical Chart Review:    Consults:   Heme/Onc-05/10/18- \"Proceed with C2W1 as planned today. She is due for W2 on 5/17/18. Ordered Myeloma panel on 5/10/18 - will review results .\"    PT/OT- 05/09/18- Post-acute therapy Discharge to a transitional care facility for continued skilled therapy services.     Dietary-05/07/18 \"Pt is currently on diabetic diet. Pt is receiving Boost Glucose Control once a day with breakfast and snacks TID.\"    Wound Team-\"Wound consult placed for evaluation of posterior thigh wounds bilaterally and abdominal fold wound.\"    Palliative Care- 04/28/18-\"I do not recommend an ethics or hospice consult at this time because patient is recieving radiation therapy and would like continued care at this time. No ethica dilema exists at this time.\"    Updated Discharge Planning:  Referral to [facility] declined d/t chemo and radiation regimen.   Out of SNF days. Pt lives in [place] with limited access to healthcare for oncology needs.   Doesn't meet income guidelines for Medicaid.    Discharge Disposition:  TBD.       "

## 2018-05-10 NOTE — PROGRESS NOTES
Pharmacy Pharmacotherapy Consult for LOS >30 days    Admit Date: 4/10/2018      Medications were reviewed for appropriateness and ongoing need.     Current Facility-Administered Medications   Medication Dose Route Frequency Provider Last Rate Last Dose   • bortezomib (VELCADE) 4.15 mg in syringe 1.66 mL Chemo  1.5 mg/m2 Subcutaneous Once Sarahi Reyes M.D.       • cyclophosphamide (CYTOXAN) capsule 850 mg  850 mg Oral Once Sarahi Reyes M.D.       • dexamethasone (DECADRON) tablet 40 mg  40 mg Oral Once Sarahi Reyes M.D.       • sodium chloride (OCEAN) 0.65 % nasal spray 2 Spray  2 Spray Nasal Q2HRS PRN Jada Chen D.O.       • phenylephrine (SUDOGEST PE) 10 MG tablet 10 mg  10 mg Oral Q4HRS PRN Jada Chen D.O.       • phosphorus (K-PHOS-NEUTRAL, PHOSPHA 250 NEUTRAL) per tablet 1 Tab  1 Tab Oral Q6HRS Jada Chen D.O.   1 Tab at 05/10/18 0546   • loperamide (IMODIUM) capsule 2 mg  2 mg Oral 4X/DAY PRN Jada Chen, D.O.   2 mg at 05/08/18 2101   • potassium chloride SA (Kdur) tablet 20 mEq  20 mEq Oral BID Brandon Gann, M.D.   20 mEq at 05/10/18 0843   • metFORMIN (GLUCOPHAGE) tablet 1,000 mg  1,000 mg Oral BID WITH MEALS Brandon Gann, M.D.   1,000 mg at 05/10/18 0842   • metoprolol (LOPRESSOR) tablet 25 mg  25 mg Oral TWICE DAILY Van EMANUEL Gann, M.D.   25 mg at 05/10/18 0840   • calcitRIOL (ROCALTROL) capsule 0.25 mcg  0.25 mcg Oral DAILY Van C Gann, M.D.   0.25 mcg at 05/10/18 0841   • calcium carbonate (TUMS) chewable tab 1,000 mg  1,000 mg Oral TID WITH MEALS Van EMANUEL Gann, M.D.   1,000 mg at 05/10/18 0843   • furosemide (LASIX) tablet 40 mg  40 mg Oral BID DIURETIC Van EMANUEL Gann, M.D.   40 mg at 05/10/18 0546   • prochlorperazine (COMPAZINE) injection 10 mg  10 mg Intravenous Q6HRS PRN MC TobinOAlesia   10 mg at 05/09/18 1727   • nystatin (MYCOSTATIN) powder   Topical BID Jada Chen D.O.       • calcium citrate (CALCITRATE) tablet 1,900 mg  1,900 mg Oral DAILY Jada Chen D.O.   1,900 mg at  05/10/18 0842   • acetaminophen (TYLENOL) tablet 975 mg  975 mg Oral Q6HRS PRN MC TobinOAlesia   975 mg at 04/30/18 0532   • LORazepam (ATIVAN) injection 1 mg  1 mg Intravenous Q4HRS PRN Kumar Snow M.D.   1 mg at 05/04/18 1144   • normal saline PF 10-20 mL  10-20 mL Intravenous PRN Lavell Sim M.D.       • insulin regular (HUMULIN R) injection 3-14 Units  3-14 Units Subcutaneous 4X/DAY MEGAN Snow M.D.   Stopped at 05/09/18 2100    And   • glucose 4 g chewable tablet 16 g  16 g Oral Q15 MIN PRN Kumar Snow M.D.   16 g at 04/28/18 0639    And   • dextrose 50% (D50W) injection 25 mL  25 mL Intravenous Q15 MIN PRN Kumar Snow M.D.   50 mL at 04/22/18 0935   • acyclovir (ZOVIRAX) capsule 400 mg  400 mg Oral BID Jada Chen D.O.   400 mg at 05/10/18 0841   • ALPRAZolam (XANAX) tablet 0.5 mg  0.5 mg Oral Q6HRS PRN MC TobinOAlesia   0.5 mg at 05/10/18 1120   • hydrALAZINE (APRESOLINE) injection 10 mg  10 mg Intravenous Q4HRS PRN Elida Bourne M.D.   10 mg at 04/15/18 2130   • ammonium lactate (LAC-HYDRIN) 12 % lotion   Topical BID Wound Care Nurse, R.N.       • pravastatin (PRAVACHOL) tablet 40 mg  40 mg Oral QHS Armaan Ramirez M.D.   40 mg at 05/09/18 2005   • polyethylene glycol/lytes (MIRALAX) PACKET 1 Packet  1 Packet Oral QDAY PREMILY Ramirez M.D.   1 Packet at 05/02/18 2205    And   • magnesium hydroxide (MILK OF MAGNESIA) suspension 30 mL  30 mL Oral QDAY PRN Armaan Ramirez M.D.        And   • bisacodyl (DULCOLAX) suppository 10 mg  10 mg Rectal QDAY PRN Armaan Ramirez M.D.       • Respiratory Care per Protocol   Nebulization Continuous RT Armaan Ramirez M.D.       • heparin injection 5,000 Units  5,000 Units Subcutaneous Q8HRS Armaan Ramirez M.D.   5,000 Units at 05/10/18 0546   • ondansetron (ZOFRAN) syringe/vial injection 4 mg  4 mg Intravenous Q4HRS PRN Armaan Ramirez M.D.   4 mg at 05/07/18 1829   • ondansetron (ZOFRAN ODT) dispertab 4 mg  4 mg Oral Q4HRS PRN Armaan  HEVER Ramirez M.D.   4 mg at 05/09/18 0902   • morphine (pf) 4 mg/ml injection 1 mg  1 mg Intravenous Q4HRS PRN Armaan Ramirez M.D.   1 mg at 05/04/18 1124   • oxyCODONE immediate-release (ROXICODONE) tablet 5 mg  5 mg Oral Q4HRS PREMILY aRmirez M.D.   5 mg at 05/09/18 0531   • oxyCODONE CR (OXYCONTIN) tablet 10 mg  10 mg Oral Q12HRS Armaan Ramirez M.D.   10 mg at 05/10/18 0840       Recommendations:  Discussed change of Nystatin powder bid to baby powder bid w/ Dr. Chen.  All other medications are appropriate at this time.     Bere Lawson, PharmD, BCOP

## 2018-05-10 NOTE — PROGRESS NOTES
Kel from Lab called with critical result of Calcium 6.9 at 0148. Critical lab result read back to Kel.   This critical lab result is within parameters established by Dr. [facility] policy for this patient.     Corrected calcium: 8.74 mg/dL.

## 2018-05-10 NOTE — PROGRESS NOTES
Chemotherapy Verification - PRIMARY RN  Cycle 2 Day 1    Height = 160cm  Weight = 172.5kg  BSA = 2.77m2      Medication: Velcade Dose: 1.5mg/m2  Calculated Dose: 4.15mg (ordered dose: 4.15mg)                            (In mg/m2, AUC, mg/kg)     Medication: cytoxan  Dose: 300mg/m2  Calculated Dose: 831mg (ordered Dose 850mg, <5% difference)                             (In mg/m2, AUC, mg/kg)      I confirm this process was performed independently with the BSA and all final chemotherapy dosing calculations congruent.  Any discrepancies of 5% or greater have been addressed with the chemotherapy pharmacist. The resolution of the discrepancy has been documented in the EPIC progress notes.

## 2018-05-10 NOTE — PROGRESS NOTES
"Pharmacy Chemotherapy Note    Second Check    Patient Name: YESSY SINGER  MRN: [id number]    Oncologist: Chiquis    Protocol: CyBorD           21 day cycle for 3-4 cycles  (transplant) or maximal response, disease progression or unacceptable toxicity (previously untreated or non-transplant candidates)    Starting Cycle 2:  Bortezomib 1.5 mg/m2 IV push over 3-5 seconds or SubQ on days 1, 8, 15 and 22  Cyclophosphamide 300 mg/m2 PO Days 1, 8, 15, and 22  Dexamethasone 40 mg PO Daily on days 1, 8, 15, and 22     Every 28 day cycle x 4 cycles    *Dosing Reference*  NCCN Guidelines for Multiple Myeloma V.3.2017  EMANUEL Russell et al. Cyclophosphamide, bortezomib and dexamethasone (CyBorD) induction for newly diagnosed multiple myeloma: High response rates in a phase II clinical trial. Leukemia. 2009 July ; 23(7): 0828-4930.  Drew CASTELLANOS, et al. Once- versus twice-weekly bortezomib induction therapy with CyBorD in newly diagnosed multiple myeloma. BLOOD, 22APRIL 2010VOLUME 115, NUMBER 16  Juan S et al. Randomized, multicenter, phase 2 study (EVOLUTION) of combinations of bortezomib, dexamethasone, cyclophosphamide, and lenalidomide in previously untreated multiple myeloma. BLOOD, 2012 119: 3004-4632      PREVIOUS CHEMO:  NONE    SIGNIFICANT EVENTS:  Admitted for CC of pain 4/10/2018-present    Dx: Multiple Myeloma         BP (!) 165/50   Pulse 90   Temp 37.2 °C (98.9 °F)   Resp 18   Ht 1.6 m (5' 2.99\")   Wt (!) 172.5 kg (380 lb 4.7 oz)   LMP 04/11/1994   SpO2 98%   Breastfeeding? No   BMI 67.38 kg/m²  Body surface area is 2.77 meters squared.        **Per MD FLEMING to use actual BSA of 2.77 m2.  Weight from 4/16/18 k to use for cycle 2.  Unable to obtain weight from bed or stand up scale.      LABS 5/10/2018:   ANC: 2800      WBC: 3.8     Plt: 137k   Hgb/Hct: 8.6/28.6     SCr: 0.62mg/dL CrCl: >125 mL/min    PHOS: 2.6   K: 3.8  Na: 139          Glu: 120 Ca: 6.9  ALB: 1.7 [place] Ca: 8.7  iCa:0.9 (patient on Calcium " supplements)  LFT's: 19/14/63 TBili = 0.3     Drug Order   (Drug name, dose, route, IV Fluid & volume, frequency, number of doses) Cycle: 2 Day 1      Previous treatment: Cycle 1 Day: 11     Medication = Cyclophosphamide  Base Dose= 300 mg/m2  Calc Dose: Base Dose x 2.77 m2 = 831 mg  Final Dose = 850 mg  Route = PO          <5% difference, OK to treat with final dose    Medication = Bortezomib (Velcade)  Base Dose = 1.5 mg/m²  Calc Dose: Base Dose x 2.77 m² = 4.15 mg  Final Dose = 4.15 mg  Route = subcutaneous  Fluid & Volume = 1.66 mL in syringe  Conc = 2.5 mg/mL  Admin Duration = to be given subcutaneously          <5% difference, OK to treat with final dose      By my signature below, I confirm this process was performed independently with the BSA and all final chemotherapy dosing calculations congruent. I have reviewed the above chemotherapy order and that my calculation of the final dose and BSA (when applicable) corroborate those calculations of the  pharmacist. Discrepancies of 5% or greater in the written dose have been addressed and documented within the EPIC Progress notes.    LESLIE Lugo, PharmD

## 2018-05-10 NOTE — PROGRESS NOTES
"/58   Pulse 95   Temp 37.3 °C (99.1 °F)   Resp 18   Ht 1.6 m (5' 2.99\")   Wt (!) 172.5 kg (380 lb 4.7 oz)   LMP 04/11/1994   SpO2 98%   Breastfeeding? No   BMI 67.38 kg/m²   Pt is AOx4. Pain is 6/10. Scheduled pain med administered per MAR. Denies nausea.   "

## 2018-05-10 NOTE — PROGRESS NOTES
HEMATOLOGY-ONCOLOGY PROGRESS NOTE    ( Dr. Sim initial consult)  Multiple myeloma-- 1P deletion intermediate risk, IgG kappa.  Stage III based on ISS staging (beta 2 9.9 and albumin 2.7 before).  Admitted with pathologic fracture of left humerus. Received palliative radiation to the left humerus during this admission. Poor performance status. Lives alone in [facility].       Started chemotherapy here as an inpatient. Received cycle #1 day #1 of CyBorD on April 19. She has completed CYCLE #1.  - C # 2 switched to weekly regimen Velcade 1.5 mg/m2 + Cytoxan 300 mg/m2+ DEX 40 mg weekly days 1,8,15,22 of every 28 day cycle . Start C2D1 on 5/10/18 .  Subjective:  No new complaints, no fevers or chills.     Objective:  Medications reviewed and notable for:  Current Facility-Administered Medications   Medication Dose   • phosphorus (K-PHOS-NEUTRAL, PHOSPHA 250 NEUTRAL) per tablet 1 Tab  1 Tab   • loperamide (IMODIUM) capsule 2 mg  2 mg   • potassium chloride SA (Kdur) tablet 20 mEq  20 mEq   • metFORMIN (GLUCOPHAGE) tablet 1,000 mg  1,000 mg   • metoprolol (LOPRESSOR) tablet 25 mg  25 mg   • calcitRIOL (ROCALTROL) capsule 0.25 mcg  0.25 mcg   • calcium carbonate (TUMS) chewable tab 1,000 mg  1,000 mg   • furosemide (LASIX) tablet 40 mg  40 mg   • prochlorperazine (COMPAZINE) injection 10 mg  10 mg   • nystatin (MYCOSTATIN) powder     • calcium citrate (CALCITRATE) tablet 1,900 mg  1,900 mg   • acetaminophen (TYLENOL) tablet 975 mg  975 mg   • LORazepam (ATIVAN) injection 1 mg  1 mg   • normal saline PF 10-20 mL  10-20 mL   • insulin regular (HUMULIN R) injection 3-14 Units  3-14 Units    And   • glucose 4 g chewable tablet 16 g  16 g    And   • dextrose 50% (D50W) injection 25 mL  25 mL   • acyclovir (ZOVIRAX) capsule 400 mg  400 mg   • ALPRAZolam (XANAX) tablet 0.5 mg  0.5 mg   • hydrALAZINE (APRESOLINE) injection 10 mg  10 mg   • ammonium lactate (LAC-HYDRIN) 12 % lotion     • pravastatin (PRAVACHOL) tablet 40  "mg  40 mg   • polyethylene glycol/lytes (MIRALAX) PACKET 1 Packet  1 Packet    And   • magnesium hydroxide (MILK OF MAGNESIA) suspension 30 mL  30 mL    And   • bisacodyl (DULCOLAX) suppository 10 mg  10 mg   • Respiratory Care per Protocol     • heparin injection 5,000 Units  5,000 Units   • ondansetron (ZOFRAN) syringe/vial injection 4 mg  4 mg   • ondansetron (ZOFRAN ODT) dispertab 4 mg  4 mg   • morphine (pf) 4 mg/ml injection 1 mg  1 mg   • oxyCODONE immediate-release (ROXICODONE) tablet 5 mg  5 mg   • oxyCODONE CR (OXYCONTIN) tablet 10 mg  10 mg       ROS:   I did do 10 point system review which is negative except as mentioned above   Blood pressure 146/51, pulse 92, temperature 36.7 °C (98 °F), resp. rate 20, height 1.6 m (5' 2.99\"), weight (!) 172.5 kg (380 lb 4.7 oz), last menstrual period 04/11/1994, SpO2 93 %, not currently breastfeeding.      General:  comfortable, NAD  HEENT:  PERRLA, anicteric  Neck:   supple, no lymphadenopathy  Cor:   regular rate and rhythm, no murmurs, rubs, or gallops  Pulm:   clear to auscultation bilaterally  Abd:   bowel sounds present, soft, nontender, nondistended  Extremities:  Bilateral chronic venous stasis, erythema stable.     Labs reviewed and notable for:  Recent Labs      05/08/18   0132  05/09/18   0354  05/10/18   0220   WBC  3.5*  3.3*  3.8*   RBC  2.80*  2.74*  2.90*   HEMOGLOBIN  8.5*  8.3*  8.6*   HEMATOCRIT  27.7*  26.8*  28.6*   MCV  98.9*  97.8  98.6*   MCH  30.4  30.3  29.7   MCHC  30.7*  31.0*  30.1*   RDW  56.8*  56.1*  57.1*   PLATELETCT  107*  137*  137*   MPV  10.8  10.1  10.2         .@CMP  Recent Results (from the past 24 hour(s))   ACCU-CHEK GLUCOSE    Collection Time: 05/09/18  9:05 AM   Result Value Ref Range    Glucose - Accu-Ck 134 (H) 65 - 99 mg/dL   ACCU-CHEK GLUCOSE    Collection Time: 05/09/18  1:35 PM   Result Value Ref Range    Glucose - Accu-Ck 148 (H) 65 - 99 mg/dL   ACCU-CHEK GLUCOSE    Collection Time: 05/09/18  6:17 PM   Result Value " Ref Range    Glucose - Accu-Ck 161 (H) 65 - 99 mg/dL   ACCU-CHEK GLUCOSE    Collection Time: 05/09/18  8:02 PM   Result Value Ref Range    Glucose - Accu-Ck 139 (H) 65 - 99 mg/dL   RENAL FUNCTION PANEL    Collection Time: 05/10/18 12:48 AM   Result Value Ref Range    Sodium 139 135 - 145 mmol/L    Potassium 3.8 3.6 - 5.5 mmol/L    Chloride 101 96 - 112 mmol/L    Co2 35 (H) 20 - 33 mmol/L    Glucose 120 (H) 65 - 99 mg/dL    Creatinine 0.62 0.50 - 1.40 mg/dL    Bun 11 8 - 22 mg/dL    Calcium 6.9 (LL) 8.5 - 10.5 mg/dL    Phosphorus 2.6 2.5 - 4.5 mg/dL    Albumin 1.7 (L) 3.2 - 4.9 g/dL   IONIZED CALCIUM    Collection Time: 05/10/18 12:48 AM   Result Value Ref Range    Ionized Calcium 0.9 (L) 1.1 - 1.3 mmol/L   COMP METABOLIC PANEL    Collection Time: 05/10/18 12:48 AM   Result Value Ref Range    Anion Gap 3.0 0.0 - 11.9    AST(SGOT) 19 12 - 45 U/L    ALT(SGPT) 14 2 - 50 U/L    Alkaline Phosphatase 63 30 - 99 U/L    Total Bilirubin 0.3 0.1 - 1.5 mg/dL    Total Protein 5.5 (L) 6.0 - 8.2 g/dL    Globulin 3.8 (H) 1.9 - 3.5 g/dL    A-G Ratio 0.4 g/dL   GUALBERTO+PE Random Urine    Collection Time: 05/10/18 12:48 AM   Result Value Ref Range    Specimen Type Random     Total Volume Random mL   ESTIMATED GFR    Collection Time: 05/10/18 12:48 AM   Result Value Ref Range    GFR If African American >60 >60 mL/min/1.73 m 2    GFR If Non African American >60 >60 mL/min/1.73 m 2   CBC WITH DIFFERENTIAL    Collection Time: 05/10/18  2:20 AM   Result Value Ref Range    WBC 3.8 (L) 4.8 - 10.8 K/uL    RBC 2.90 (L) 4.20 - 5.40 M/uL    Hemoglobin 8.6 (L) 12.0 - 16.0 g/dL    Hematocrit 28.6 (L) 37.0 - 47.0 %    MCV 98.6 (H) 81.4 - 97.8 fL    MCH 29.7 27.0 - 33.0 pg    MCHC 30.1 (L) 33.6 - 35.0 g/dL    RDW 57.1 (H) 35.9 - 50.0 fL    Platelet Count 137 (L) 164 - 446 K/uL    MPV 10.2 9.0 - 12.9 fL    Nucleated RBC 0.00 /100 WBC    NRBC (Absolute) 0.00 K/uL    Neutrophils-Polys 72.90 (H) 44.00 - 72.00 %    Lymphocytes 18.70 (L) 22.00 - 41.00 %     Monocytes 0.90 0.00 - 13.40 %    Eosinophils 0.00 0.00 - 6.90 %    Basophils 0.00 0.00 - 1.80 %    Neutrophils (Absolute) 2.80 2.00 - 7.15 K/uL    Lymphs (Absolute) 0.71 (L) 1.00 - 4.80 K/uL    Monos (Absolute) 0.03 0.00 - 0.85 K/uL    Eos (Absolute) 0.00 0.00 - 0.51 K/uL    Baso (Absolute) 0.00 0.00 - 0.12 K/uL   DIFFERENTIAL MANUAL    Collection Time: 05/10/18  2:20 AM   Result Value Ref Range    Bands-Stabs 0.90 0.00 - 10.00 %    Metamyelocytes 1.90 %    Myelocytes 4.70 %    Manual Diff Status PERFORMED    PERIPHERAL SMEAR REVIEW    Collection Time: 05/10/18  2:20 AM   Result Value Ref Range    Peripheral Smear Review see below    PLATELET ESTIMATE    Collection Time: 05/10/18  2:20 AM   Result Value Ref Range    Plt Estimation Decreased    MORPHOLOGY    Collection Time: 05/10/18  2:20 AM   Result Value Ref Range    RBC Morphology Normal        Diagnostic imaging:      Assessment and Recommendations:  1.  Multiple myeloma-- 1P deletion intermediate risk, IgG kappa.  Stage III based on ISS staging (beta 2 9.9 and albumin 2.7 before).  Admitted with pathologic fracture of left humerus. Received palliative radiation to the left humerus during this admission. Poor performance status. Lives alone in [facility].       Started chemotherapy here as an inpatient. Received cycle #1 day #1 of CyBorD on April 19. She has completed CYCLE #1.  Her myeloma markers are improving showing response to treatment.   --Due to start cycle #2 on May 10 ( switch her to weekly regimen Velcade 1.5 mg/m2, Cytoxan 300 mg/m2 and DEX 40 mg weekly ) - C2W1 , Due next dose 5/17/18  --In hospital now with difficult discharge disposition.  --Finished on radiation to the femurs.   --Dexamethasone, as part of the chemotherapy, has been held on a few occasions related to poorly controlled diabetes.     2. Cytopenias - Counts stable. Plan to transfuse if HGB < 7.0 or PLT 10,000 and also start neupogen if ANC < 1000  3. Bilateral LE swelling on  Augmentin now   4. Hypocalcemia on aggressive replacement by primary service, not a candidate to start bisphosphonate therapy , Mg , K N, low albumin 1.6 and also low Vit D level 13 L on supplements   5. Pain control  6. Severe protein calorie malnutrition     Plan:  - Clinically stable   - Proceed with C2W1 as planned today   - She is due for W2 on 5/17/18   - Monitor side effects   - Ordered Myeloma panel on 5/10/18 - will review results     - Our team will follow her peripherally during hospitalization.   We will continue to follow with you; please call with any questions, [phone].      Sarahi Reyes MD  [facility]   [phone]

## 2018-05-10 NOTE — PROGRESS NOTES
"Pharmacy Chemotherapy calculation:    Patient Name: Aleida Pagan  DX: Multiple Myeloma    Cycle 2, Day 1 (weekly regimen) **Paper Order in Chart**  Previous treatment: C1D11 = 4/29/18 (twice weekly regimen)    Protocol: CyBorD    *Dosing Reference*  Cycle 1    Beginning with Cycle 2  Bortezomib 1.5 mg/m2 IVP or subQ on Days 1, 8, 15, and 22  Cyclophosphamide 300 mg/m2 po on Days  1, 8, 15, and 22  Dexamethasone 40 mg po on Days  1, 8, 15, and 22   Repeat every 28 days x 4 cycles   Drew SEVERINO, et al. Once-versus twice-weekly bortezomib induction therapy with CyBorD in newly diagnosed multiple myeloma. Blood 2010 [id number]; doi: [email or web address]       Allergies:  Actos [pioglitazone hydrochloride]; Advil [ibuprofen micronized]; Cephalosporins; and [place]    BP (!) 165/50   Pulse 90   Temp 37.2 °C (98.9 °F)   Resp 18   Ht 1.6 m (5' 2.99\")   Wt (!) 172.5 kg (380 lb 4.7 oz)   LMP 04/11/1994   SpO2 98%   Breastfeeding? No   BMI 67.38 kg/m²  Body surface area is 2.77 meters squared.       **Use actual BSA of 2.77 m2.  Weight from 4/16/18 k to use for cycle 2.  Unable to obtain weight from bed or stand up scale due to patient pain with transfer.      Labs 5/10/18:  ANC~ 2800 Plt = 137k   Hgb = 8.6     SCr = 0.62mg/dL CrCl > 125 mL/min   AST/ALT/AP = 19/14/63 TBili = 0.3     K+ = 3.8  Ca2+ = 6.9  Albumin = 1.7 [place]. Ca2+ = 8.74  Ionized Ca = 0.9 (pt receiving supplements)       Bortezomib (Velcade) 1.5 mg/m2 x  2.77 m2 = 4.16 mg    <5% difference, ok to treat with final dose = 4.15 mg subQ    Cyclophosphamide 300 mg/m2 x  2.77 m2 po =831 mg   <5% difference, ok to treat with final written dose = 850 mg po (dose rounded to the nearest 50 mg capsule)         Bere Lawson, PharmD, BCOP           "

## 2018-05-10 NOTE — CARE PLAN
Problem: Nutritional:  Goal: Achieve adequate nutritional intake  Patient will consume >50% of meals   Outcome: PROGRESSING AS EXPECTED  No PO documented since 5/8. Per chart pt PO < 25-75% 2 meals. Pt receiving snacks BID and pt cancelled supplements. Please document intake of all meals as % taken in ADL's to provide interdisciplinary communication across all shifts. RD will continue to follow.

## 2018-05-10 NOTE — PROGRESS NOTES
[facility] Hospitalist Progress Note    Date of Service: 2018    Chief Complaint  67 y.o. female admitted 4/10/2018 with multiple myeloma, left humerus, bilateral femur radiation completed.    Interval Problem Update   Patient very debilitated, she is off antibiotics since I saw her last, still very tearful and depressed and lonely as family and friends have difficulty coming to visit [place]/t [place].  She feels horrible and is concerned as her left arm is hurting more than prior - Xrays repeated today and no fracture or new lytic lesion seen.  I asked her if she wanted to try another round of chemotherapy as she was already feeling so poorly.  She states she doesn't feel she has a choice, she is not ready to give up trying even if it means feeling worse than she already does.   Patient feeling slightly less depressed today, she is having less pain in the left arm.  Plan to proceed with chemo tomorrow but at a lesser dose d/t poor functional status since the last dose.    Consultants/Specialty  Oncology - [facility]    Disposition  Difficult discharge.        Review of Systems   Constitutional: Negative for chills and fever.   HENT: Negative for congestion and sore throat.    Eyes: Negative for blurred vision and photophobia.   Respiratory: Negative for cough and shortness of breath.    Cardiovascular: Negative for chest pain, claudication and leg swelling.   Gastrointestinal: Negative for abdominal pain, constipation, diarrhea, heartburn and vomiting.   Genitourinary: Negative for dysuria and hematuria.   Musculoskeletal: Positive for myalgias (globally). Negative for joint pain.   Skin: Negative for itching and rash.   Neurological: Negative for dizziness, sensory change, speech change, weakness and headaches.   Psychiatric/Behavioral: Positive for depression. The patient is not nervous/anxious and does not have insomnia.       Physical Exam  Laboratory/Imaging   Hemodynamics  Temp (24hrs), Av.8 °C (98.2  °F), Min:36.7 °C (98 °F), Max:37 °C (98.6 °F)   Temperature: 36.7 °C (98 °F)  Pulse  Av.9  Min: 38  Max: 128    Blood Pressure : 148/61      Respiratory      Respiration: 18, Pulse Oximetry: 93 %     Work Of Breathing / Effort: Mild  RUL Breath Sounds: Clear, RML Breath Sounds: Diminished, RLL Breath Sounds: Diminished, [place] Breath Sounds: Clear, LLL Breath Sounds: Diminished    Fluids    Intake/Output Summary (Last 24 hours) at 18 1845  Last data filed at 18 1800   Gross per 24 hour   Intake             1200 ml   Output              650 ml   Net              550 ml       Nutrition  Orders Placed This Encounter   Procedures   • Diet Order     Standing Status:   Standing     Number of Occurrences:   1     Order Specific Question:   Diet:     Answer:   Diabetic [3]     Physical Exam   Constitutional: She is oriented to person, place, and time. She appears well-developed and well-nourished. No distress.   HENT:   Head: Normocephalic and atraumatic.   Eyes: Conjunctivae are normal. No scleral icterus.   Neck: Neck supple. No JVD present.   Cardiovascular: Normal rate, regular rhythm and normal heart sounds.  Exam reveals no gallop and no friction rub.    No murmur heard.  Pulmonary/Chest: Effort normal and breath sounds normal. No respiratory distress. She exhibits no tenderness.   Abdominal: Soft. Bowel sounds are normal. She exhibits no distension. There is no guarding.   Musculoskeletal: She exhibits no edema or tenderness.   Neurological: She is alert and oriented to person, place, and time. No cranial nerve deficit.   Skin: Skin is warm and dry. She is not diaphoretic. No erythema. No pallor.   Erythema bilateral LE/shins - improved from prior.   Psychiatric: She has a normal mood and affect. Her behavior is normal.   Nursing note and vitals reviewed.      Recent Labs      18   0100  18   0132  18   0354   WBC  4.1*  3.5*  3.3*   RBC  2.88*  2.80*  2.74*   HEMOGLOBIN  8.7*  8.5*   8.3*   HEMATOCRIT  28.4*  27.7*  26.8*   MCV  98.6*  98.9*  97.8   MCH  30.2  30.4  30.3   MCHC  30.6*  30.7*  31.0*   RDW  56.6*  56.8*  56.1*   PLATELETCT  126*  107*  137*   MPV  10.7  10.8  10.1     Recent Labs      05/07/18   0100  05/08/18   0132  05/09/18   0354   SODIUM  140  137  139   POTASSIUM  3.4*  3.6  3.6   CHLORIDE  99  99  100   CO2  34*  35*  35*   GLUCOSE  122*  144*  123*   BUN  13  12  12   CREATININE  0.71  0.70  0.60   CALCIUM  6.9*  6.6*  6.8*                      Assessment/Plan     * Pathologic fracture- (present on admission)   Assessment & Plan    -Left humerus  -widespread lytic disease  -completed radiation to humerus  -pain control             Multiple myeloma (HCC)- (present on admission)   Assessment & Plan    CyBorD chemo - cycle 1 started 4/19/18; cycle 2 to start on 5/10  -recent diagnosis, appears aggressive, now with innumerable lytic lesions  -Dr Sim and Oksana consulted for assistance in management  - IV dilaudid PRN, oxy prn. DC maintenance steroids as these were causing confusion  -start oxycontin CR 10 mg BID  -skeletal survey done - spine obscured by body habitus, but multiple other lesions found  XRT to right and left femurs completed  -Palliative care consulted for advanced care planning            Diarrhea   Assessment & Plan    Resolved  C diff negative  Imodium PRN          Hypocalcemia   Assessment & Plan    - Repleting as needed   due to recent bisphosphonate use; replete Mg  Corrected calcium  normal - low secondary to hypoalbuminemia          Hypomagnesemia   Assessment & Plan    - Repleting as needed        Decubitus ulcer of buttock   Assessment & Plan    Wound care; rotate frequently in bed; air mattress.        Debility- (present on admission)   Assessment & Plan    Due to current illnesses; not able to walk since early april;  Out of SNF days per SW; cont PT/OT here  Poor prognosis given current diagnoses/morbid obesity.  LTAC declined d/t active  chemotherapy.        Cellulitis   Assessment & Plan    Bilateral LE  improving; venous stasis component  Changed to po Augmentin per ID, completed 5/5/18          DNR (do not resuscitate)   Assessment & Plan    Palliative care consulted, patient wants to change code status to DNR - done, completed Polst with APN.          Chemotherapy-induced thrombocytopenia   Assessment & Plan    stable; monitor; no bleeding now          Leg edema   Assessment & Plan      -PO Lasix scheduled bid, doing well and renal function stable.            Chronic venous stasis dermatitis of both lower extremities- (present on admission)   Assessment & Plan    -wound care, oral abx        Anemia- (present on admission)   Assessment & Plan    Stable cbc; monitor        Morbid obesity with BMI of 60.0-69.9, adult (CMS-HCC)- (present on admission)   Assessment & Plan    -encourage weight loss  Body mass index is 67.38 kg/m².        DM type 2 (diabetes mellitus, type 2) (CMS-HCC)- (present on admission)   Assessment & Plan    Fair control; adjust meds for good control        Essential hypertension, benign- (present on admission)   Assessment & Plan    Adjust med for good control.          Quality-Core Measures   Reviewed items::  Labs reviewed, Medications reviewed and Radiology images reviewed  Singh catheter::  Urinary Tract Retention or Urinary Tract Obstruction  DVT prophylaxis pharmacological::  Heparin  Assessed for rehabilitation services:  Patient was assess for and/or received rehabilitation services during this hospitalization

## 2018-05-10 NOTE — PROGRESS NOTES
Chemotherapy Verification - SECONDARY RN   Cycle # 2 Day # 1    Height = 160 cm  Weight = 172.5 kg  BSA = 2.77 m2       Medication: Velcade  Dose: 1.5 mg/m2  Calculated Dose: 4.155 mg (ordered=4.15 mg)                             (In mg/m2, AUC, mg/kg)     Medication: Cytoxan  Dose: 300 mg/m2  Calculated Dose: 831 mg (htgfglh=174 mg)                             (In mg/m2, AUC, mg/kg)    I confirm that this process was performed independently.

## 2018-05-11 LAB
ALBUMIN 24H UR QL ELPH: DETECTED
ALBUMIN SERPL BCP-MCNC: 1.8 G/DL (ref 3.2–4.9)
ALPHA1 GLOB 24H UR QL ELPH: DETECTED
ALPHA2 GLOB 24H UR QL ELPH: DETECTED
APPEARANCE UR: CLEAR
B-GLOBULIN UR QL ELPH: DETECTED
BACTERIA #/AREA URNS HPF: ABNORMAL /HPF
BASOPHILS # BLD AUTO: 0.3 % (ref 0–1.8)
BASOPHILS # BLD AUTO: NORMAL % (ref 0–1.8)
BASOPHILS # BLD: 0.02 K/UL (ref 0–0.12)
BASOPHILS # BLD: NORMAL K/UL (ref 0–0.12)
BILIRUB UR QL STRIP.AUTO: NEGATIVE
BUN SERPL-MCNC: 10 MG/DL (ref 8–22)
CA-I SERPL-SCNC: 1 MMOL/L (ref 1.1–1.3)
CALCIUM SERPL-MCNC: 6.8 MG/DL (ref 8.5–10.5)
CHLORIDE SERPL-SCNC: 99 MMOL/L (ref 96–112)
CO2 SERPL-SCNC: 33 MMOL/L (ref 20–33)
COLLECT DURATION TIME SPEC: ABNORMAL H
COLOR UR: YELLOW
COMMENT 1642: NORMAL
COMMENT 1642: NORMAL
CREAT SERPL-MCNC: 0.62 MG/DL (ref 0.5–1.4)
EOSINOPHIL # BLD AUTO: 0 K/UL (ref 0–0.51)
EOSINOPHIL # BLD AUTO: NORMAL K/UL (ref 0–0.51)
EOSINOPHIL NFR BLD: 0 % (ref 0–6.9)
EOSINOPHIL NFR BLD: NORMAL % (ref 0–6.9)
EPI CELLS #/AREA URNS HPF: NEGATIVE /HPF
ERYTHROCYTE [DISTWIDTH] IN BLOOD BY AUTOMATED COUNT: 53.6 FL (ref 35.9–50)
ERYTHROCYTE [DISTWIDTH] IN BLOOD BY AUTOMATED COUNT: NORMAL FL (ref 35.9–50)
GAMMA GLOB UR QL ELPH: DETECTED
GLUCOSE BLD-MCNC: 146 MG/DL (ref 65–99)
GLUCOSE BLD-MCNC: 169 MG/DL (ref 65–99)
GLUCOSE BLD-MCNC: 171 MG/DL (ref 65–99)
GLUCOSE BLD-MCNC: 184 MG/DL (ref 65–99)
GLUCOSE SERPL-MCNC: 219 MG/DL (ref 65–99)
GLUCOSE UR STRIP.AUTO-MCNC: NEGATIVE MG/DL
HCT VFR BLD AUTO: 27.3 % (ref 37–47)
HCT VFR BLD AUTO: NORMAL % (ref 37–47)
HGB BLD-MCNC: 8.6 G/DL (ref 12–16)
HGB BLD-MCNC: NORMAL G/DL (ref 12–16)
HYALINE CASTS #/AREA URNS LPF: ABNORMAL /LPF
IMM GRANULOCYTES # BLD AUTO: 0.55 K/UL (ref 0–0.11)
IMM GRANULOCYTES # BLD AUTO: NORMAL K/UL (ref 0–0.11)
IMM GRANULOCYTES NFR BLD AUTO: 8.6 % (ref 0–0.9)
IMM GRANULOCYTES NFR BLD AUTO: NORMAL % (ref 0–0.9)
INTERPRETATION UR IFE-IMP: ABNORMAL
KAPPA LC FREE UR-MCNC: 2.6 MG/DL (ref 0.14–2.42)
KAPPA LC FREE/LAMBDA FREE UR: 28.89 RATIO (ref 2.04–10.37)
KETONES UR STRIP.AUTO-MCNC: NEGATIVE MG/DL
LAMBDA LC FREE UR-MCNC: 0.09 MG/DL (ref 0.02–0.67)
LEUKOCYTE ESTERASE UR QL STRIP.AUTO: ABNORMAL
LYMPHOCYTES # BLD AUTO: 0.96 K/UL (ref 1–4.8)
LYMPHOCYTES # BLD AUTO: NORMAL K/UL (ref 1–4.8)
LYMPHOCYTES NFR BLD: 14.9 % (ref 22–41)
LYMPHOCYTES NFR BLD: NORMAL % (ref 22–41)
MAGNESIUM SERPL-MCNC: 1.5 MG/DL (ref 1.5–2.5)
MCH RBC QN AUTO: 30.4 PG (ref 27–33)
MCH RBC QN AUTO: NORMAL PG (ref 27–33)
MCHC RBC AUTO-ENTMCNC: 31.5 G/DL (ref 33.6–35)
MCHC RBC AUTO-ENTMCNC: NORMAL G/DL (ref 33.6–35)
MCV RBC AUTO: 96.5 FL (ref 81.4–97.8)
MCV RBC AUTO: NORMAL FL (ref 81.4–97.8)
MICRO URNS: ABNORMAL
MONOCYTES # BLD AUTO: 0.16 K/UL (ref 0–0.85)
MONOCYTES # BLD AUTO: NORMAL K/UL (ref 0–0.85)
MONOCYTES NFR BLD AUTO: 2.5 % (ref 0–13.4)
MONOCYTES NFR BLD AUTO: NORMAL % (ref 0–13.4)
MORPHOLOGY BLD-IMP: NORMAL
MORPHOLOGY BLD-IMP: NORMAL
NEUTROPHILS # BLD AUTO: 4.74 K/UL (ref 2–7.15)
NEUTROPHILS # BLD AUTO: NORMAL K/UL (ref 2–7.15)
NEUTROPHILS NFR BLD: 73.7 % (ref 44–72)
NEUTROPHILS NFR BLD: NORMAL % (ref 44–72)
NITRITE UR QL STRIP.AUTO: POSITIVE
NRBC # BLD AUTO: 0 K/UL
NRBC # BLD AUTO: NORMAL K/UL
NRBC BLD-RTO: 0 /100 WBC
NRBC BLD-RTO: NORMAL /100 WBC
PH UR STRIP.AUTO: 7.5 [PH]
PHOSPHATE SERPL-MCNC: 2.7 MG/DL (ref 2.5–4.5)
PLATELET # BLD AUTO: 133 K/UL (ref 164–446)
PLATELET # BLD AUTO: NORMAL K/UL (ref 164–446)
PMV BLD AUTO: 9.8 FL (ref 9–12.9)
PMV BLD AUTO: NORMAL FL (ref 9–12.9)
POTASSIUM SERPL-SCNC: 4.1 MMOL/L (ref 3.6–5.5)
PROT 24H UR-MRATE: ABNORMAL MG/D (ref 10–140)
PROT UR QL STRIP: NEGATIVE MG/DL
RBC # BLD AUTO: 2.83 M/UL (ref 4.2–5.4)
RBC # BLD AUTO: NORMAL M/UL (ref 4.2–5.4)
RBC # URNS HPF: ABNORMAL /HPF
RBC UR QL AUTO: NEGATIVE
SODIUM SERPL-SCNC: 137 MMOL/L (ref 135–145)
SP GR UR STRIP.AUTO: 1.01
TOTAL VOLUME 1105: ABNORMAL ML
UROBILINOGEN UR STRIP.AUTO-MCNC: 0.2 MG/DL
WBC # BLD AUTO: 6.4 K/UL (ref 4.8–10.8)
WBC # BLD AUTO: NORMAL K/UL (ref 4.8–10.8)
WBC #/AREA URNS HPF: ABNORMAL /HPF

## 2018-05-11 PROCEDURE — 87077 CULTURE AEROBIC IDENTIFY: CPT

## 2018-05-11 PROCEDURE — 700102 HCHG RX REV CODE 250 W/ 637 OVERRIDE(OP): Performed by: INTERNAL MEDICINE

## 2018-05-11 PROCEDURE — A9270 NON-COVERED ITEM OR SERVICE: HCPCS | Performed by: INTERNAL MEDICINE

## 2018-05-11 PROCEDURE — 770004 HCHG ROOM/CARE - ONCOLOGY PRIVATE *

## 2018-05-11 PROCEDURE — 97112 NEUROMUSCULAR REEDUCATION: CPT

## 2018-05-11 PROCEDURE — 80069 RENAL FUNCTION PANEL: CPT

## 2018-05-11 PROCEDURE — 99232 SBSQ HOSP IP/OBS MODERATE 35: CPT | Performed by: INTERNAL MEDICINE

## 2018-05-11 PROCEDURE — 87086 URINE CULTURE/COLONY COUNT: CPT

## 2018-05-11 PROCEDURE — 97530 THERAPEUTIC ACTIVITIES: CPT

## 2018-05-11 PROCEDURE — A6250 SKIN SEAL PROTECT MOISTURIZR: HCPCS | Performed by: INTERNAL MEDICINE

## 2018-05-11 PROCEDURE — 81001 URINALYSIS AUTO W/SCOPE: CPT

## 2018-05-11 PROCEDURE — 85025 COMPLETE CBC W/AUTO DIFF WBC: CPT | Mod: 91

## 2018-05-11 PROCEDURE — 82330 ASSAY OF CALCIUM: CPT

## 2018-05-11 PROCEDURE — 83735 ASSAY OF MAGNESIUM: CPT

## 2018-05-11 PROCEDURE — 97535 SELF CARE MNGMENT TRAINING: CPT | Mod: XE

## 2018-05-11 PROCEDURE — 82962 GLUCOSE BLOOD TEST: CPT | Mod: 91

## 2018-05-11 PROCEDURE — 700111 HCHG RX REV CODE 636 W/ 250 OVERRIDE (IP): Performed by: INTERNAL MEDICINE

## 2018-05-11 PROCEDURE — 87186 SC STD MICRODIL/AGAR DIL: CPT

## 2018-05-11 RX ADMIN — METFORMIN HYDROCHLORIDE 1000 MG: 500 TABLET, FILM COATED ORAL at 08:33

## 2018-05-11 RX ADMIN — INSULIN HUMAN 3 UNITS: 100 INJECTION, SOLUTION PARENTERAL at 12:39

## 2018-05-11 RX ADMIN — DIBASIC SODIUM PHOSPHATE, MONOBASIC POTASSIUM PHOSPHATE AND MONOBASIC SODIUM PHOSPHATE 1 TABLET: 852; 155; 130 TABLET ORAL at 19:11

## 2018-05-11 RX ADMIN — CALCIUM CITRATE 200 MG (950 MG) TABLET 1900 MG: at 08:32

## 2018-05-11 RX ADMIN — HEPARIN SODIUM 5000 UNITS: 5000 INJECTION, SOLUTION INTRAVENOUS; SUBCUTANEOUS at 21:35

## 2018-05-11 RX ADMIN — POTASSIUM CHLORIDE 20 MEQ: 1500 TABLET, EXTENDED RELEASE ORAL at 08:33

## 2018-05-11 RX ADMIN — METOPROLOL TARTRATE 25 MG: 25 TABLET, FILM COATED ORAL at 21:25

## 2018-05-11 RX ADMIN — OXYCODONE HYDROCHLORIDE 10 MG: 10 TABLET, FILM COATED, EXTENDED RELEASE ORAL at 08:31

## 2018-05-11 RX ADMIN — METOPROLOL TARTRATE 25 MG: 25 TABLET, FILM COATED ORAL at 08:31

## 2018-05-11 RX ADMIN — LOPERAMIDE HYDROCHLORIDE 2 MG: 2 CAPSULE ORAL at 22:03

## 2018-05-11 RX ADMIN — DIBASIC SODIUM PHOSPHATE, MONOBASIC POTASSIUM PHOSPHATE AND MONOBASIC SODIUM PHOSPHATE 1 TABLET: 852; 155; 130 TABLET ORAL at 12:41

## 2018-05-11 RX ADMIN — INSULIN HUMAN 3 UNITS: 100 INJECTION, SOLUTION PARENTERAL at 09:12

## 2018-05-11 RX ADMIN — ALPRAZOLAM 0.5 MG: 0.5 TABLET ORAL at 08:31

## 2018-05-11 RX ADMIN — ACYCLOVIR 400 MG: 200 CAPSULE ORAL at 08:31

## 2018-05-11 RX ADMIN — PRAVASTATIN SODIUM 40 MG: 20 TABLET ORAL at 21:26

## 2018-05-11 RX ADMIN — POTASSIUM CHLORIDE 20 MEQ: 1500 TABLET, EXTENDED RELEASE ORAL at 21:26

## 2018-05-11 RX ADMIN — HEPARIN SODIUM 5000 UNITS: 5000 INJECTION, SOLUTION INTRAVENOUS; SUBCUTANEOUS at 06:10

## 2018-05-11 RX ADMIN — CALCITRIOL 0.25 MCG: 0.25 CAPSULE, LIQUID FILLED ORAL at 08:31

## 2018-05-11 RX ADMIN — ACYCLOVIR 400 MG: 200 CAPSULE ORAL at 21:25

## 2018-05-11 RX ADMIN — FUROSEMIDE 40 MG: 10 INJECTION, SOLUTION INTRAMUSCULAR; INTRAVENOUS at 06:10

## 2018-05-11 RX ADMIN — ANTACID TABLETS 1000 MG: 500 TABLET, CHEWABLE ORAL at 18:02

## 2018-05-11 RX ADMIN — METFORMIN HYDROCHLORIDE 1000 MG: 500 TABLET, FILM COATED ORAL at 18:02

## 2018-05-11 RX ADMIN — PROCHLORPERAZINE EDISYLATE 10 MG: 5 INJECTION INTRAMUSCULAR; INTRAVENOUS at 21:06

## 2018-05-11 RX ADMIN — DIBASIC SODIUM PHOSPHATE, MONOBASIC POTASSIUM PHOSPHATE AND MONOBASIC SODIUM PHOSPHATE 1 TABLET: 852; 155; 130 TABLET ORAL at 00:02

## 2018-05-11 RX ADMIN — Medication: at 08:34

## 2018-05-11 RX ADMIN — OXYCODONE HYDROCHLORIDE 10 MG: 10 TABLET, FILM COATED, EXTENDED RELEASE ORAL at 21:25

## 2018-05-11 RX ADMIN — DIBASIC SODIUM PHOSPHATE, MONOBASIC POTASSIUM PHOSPHATE AND MONOBASIC SODIUM PHOSPHATE 1 TABLET: 852; 155; 130 TABLET ORAL at 06:10

## 2018-05-11 RX ADMIN — INSULIN HUMAN 3 UNITS: 100 INJECTION, SOLUTION PARENTERAL at 18:01

## 2018-05-11 RX ADMIN — FUROSEMIDE 40 MG: 10 INJECTION, SOLUTION INTRAMUSCULAR; INTRAVENOUS at 15:36

## 2018-05-11 RX ADMIN — ANTACID TABLETS 1000 MG: 500 TABLET, CHEWABLE ORAL at 12:40

## 2018-05-11 RX ADMIN — HEPARIN SODIUM 5000 UNITS: 5000 INJECTION, SOLUTION INTRAVENOUS; SUBCUTANEOUS at 14:16

## 2018-05-11 RX ADMIN — ANTACID TABLETS 1000 MG: 500 TABLET, CHEWABLE ORAL at 08:32

## 2018-05-11 RX ADMIN — ONDANSETRON 4 MG: 4 TABLET, ORALLY DISINTEGRATING ORAL at 19:16

## 2018-05-11 RX ADMIN — Medication: at 21:36

## 2018-05-11 ASSESSMENT — COGNITIVE AND FUNCTIONAL STATUS - GENERAL
DAILY ACTIVITIY SCORE: 14
SUGGESTED CMS G CODE MODIFIER DAILY ACTIVITY: CK
DRESSING REGULAR UPPER BODY CLOTHING: A LITTLE
TOILETING: TOTAL
HELP NEEDED FOR BATHING: A LOT
DRESSING REGULAR LOWER BODY CLOTHING: TOTAL
PERSONAL GROOMING: A LITTLE

## 2018-05-11 ASSESSMENT — ENCOUNTER SYMPTOMS
SORE THROAT: 0
BLURRED VISION: 0
SHORTNESS OF BREATH: 0
CONSTIPATION: 0
DIARRHEA: 0
PHOTOPHOBIA: 0
MYALGIAS: 1
INSOMNIA: 0
CLAUDICATION: 0
NERVOUS/ANXIOUS: 0
ABDOMINAL PAIN: 0
HEADACHES: 0
HEARTBURN: 0
DIZZINESS: 0
SENSORY CHANGE: 0
FEVER: 0
SPEECH CHANGE: 0
VOMITING: 0
WEAKNESS: 0
COUGH: 0
CHILLS: 0
DEPRESSION: 1

## 2018-05-11 ASSESSMENT — PAIN SCALES - GENERAL
PAINLEVEL_OUTOF10: 0
PAINLEVEL_OUTOF10: 6

## 2018-05-11 ASSESSMENT — GAIT ASSESSMENTS: GAIT LEVEL OF ASSIST: UNABLE TO PARTICIPATE

## 2018-05-11 NOTE — PROGRESS NOTES
[facility] Hospitalist Progress Note    Date of Service: 5/11/2018    Chief Complaint  67 y.o. female admitted 4/10/2018 with multiple myeloma, left humerus, bilateral femur radiation completed.    Interval Problem Update  5/8 Patient very debilitated, she is off antibiotics since I saw her last, still very tearful and depressed and lonely as family and friends have difficulty coming to visit d/t [place].  She feels horrible and is concerned as her left arm is hurting more than prior - Xrays repeated today and no fracture or new lytic lesion seen.  I asked her if she wanted to try another round of chemotherapy as she was already feeling so poorly.  She states she doesn't feel she has a choice, she is not ready to give up trying even if it means feeling worse than she already does.  5/9 Patient feeling slightly less depressed today, she is having less pain in the left arm.  Plan to proceed with chemo tomorrow but at a lesser dose d/t poor functional status since the last dose.  5/10 Patient with nasal congestion and showed me what has been coming out of her nose, likely dried mucus with continued oxygen supplementation, will start ocean nasal saline and phenylephrine for congestion.  She resumes chemotherapy today, starting cycle 2.  5/11 Patient feeling about the same, she has quick response to IV diuresis but is still appearing same regarding edema, will continue this while her renal function holds normal.  No acute complaints other than feeling hot and room is stuffy.    Consultants/Specialty  Oncology - [facility]    Disposition  Difficult discharge.        Review of Systems   Constitutional: Negative for chills and fever.   HENT: Negative for congestion and sore throat.    Eyes: Negative for blurred vision and photophobia.   Respiratory: Negative for cough and shortness of breath.    Cardiovascular: Negative for chest pain, claudication and leg swelling.   Gastrointestinal: Negative for abdominal pain,  constipation, diarrhea, heartburn and vomiting.   Genitourinary: Negative for dysuria and hematuria.   Musculoskeletal: Positive for myalgias (globally). Negative for joint pain.   Skin: Negative for itching and rash.   Neurological: Negative for dizziness, sensory change, speech change, weakness and headaches.   Psychiatric/Behavioral: Positive for depression. The patient is not nervous/anxious and does not have insomnia.       Physical Exam  Laboratory/Imaging   Hemodynamics  Temp (24hrs), Av.7 °C (98.1 °F), Min:36.6 °C (97.9 °F), Max:36.8 °C (98.2 °F)   Temperature: 36.6 °C (97.9 °F)  Pulse  Av.5  Min: 38  Max: 128    Blood Pressure : 147/74      Respiratory      Respiration: 18, Pulse Oximetry: 97 %     Work Of Breathing / Effort: Mild  RUL Breath Sounds: Clear, RML Breath Sounds: Diminished, RLL Breath Sounds: Diminished, [place] Breath Sounds: Clear, LLL Breath Sounds: Diminished    Fluids    Intake/Output Summary (Last 24 hours) at 18 1644  Last data filed at 18 1400   Gross per 24 hour   Intake             1827 ml   Output             3375 ml   Net            -1548 ml       Nutrition  Orders Placed This Encounter   Procedures   • Diet Order     Standing Status:   Standing     Number of Occurrences:   1     Order Specific Question:   Diet:     Answer:   Diabetic [3]     Physical Exam   Constitutional: She is oriented to person, place, and time. She appears well-developed and well-nourished. No distress.   HENT:   Head: Normocephalic and atraumatic.   Eyes: Conjunctivae are normal. No scleral icterus.   Neck: Neck supple. No JVD present.   Cardiovascular: Normal rate, regular rhythm and normal heart sounds.  Exam reveals no gallop and no friction rub.    No murmur heard.  Pulmonary/Chest: Effort normal and breath sounds normal. No respiratory distress. She exhibits no tenderness.   Abdominal: Soft. Bowel sounds are normal. She exhibits no distension. There is no guarding.   Musculoskeletal:  She exhibits no edema or tenderness.   Neurological: She is alert and oriented to person, place, and time. No cranial nerve deficit.   Skin: Skin is warm and dry. She is not diaphoretic. No erythema. No pallor.   Erythema bilateral LE/shins - improved from prior.   Psychiatric: She has a normal mood and affect. Her behavior is normal.   Nursing note and vitals reviewed.      Recent Labs      05/10/18   0220  05/11/18   0015  05/11/18   0137   WBC  3.8*  RR  6.4   RBC  2.90*  RR  2.83*   HEMOGLOBIN  8.6*  RR  8.6*   HEMATOCRIT  28.6*  RR  27.3*   MCV  98.6*  RR  96.5   MCH  29.7  RR  30.4   MCHC  30.1*  RR  31.5*   RDW  57.1*  RR  53.6*   PLATELETCT  137*  RR  133*   MPV  10.2  RR  9.8     Recent Labs      05/09/18   0354  05/10/18   0048  05/11/18   0015   SODIUM  139  139  137   POTASSIUM  3.6  3.8  4.1   CHLORIDE  100  101  99   CO2  35*  35*  33   GLUCOSE  123*  120*  219*   BUN  12  11  10   CREATININE  0.60  0.62  0.62   CALCIUM  6.8*  6.9*  6.8*                      Assessment/Plan     * Pathologic fracture- (present on admission)   Assessment & Plan    -Left humerus  -widespread lytic disease  -completed radiation to humerus  -pain control             Multiple myeloma (HCC)- (present on admission)   Assessment & Plan    CyBorD chemo - cycle 1 started 4/19/18; cycle 2 started on 5/10  -recent diagnosis, appears aggressive, now with innumerable lytic lesions  -Dr Sim and Oksana consulted for assistance in management  - IV dilaudid PRN, oxy prn. DC maintenance steroids as these were causing confusion  -oxycontin CR 10 mg BID  -skeletal survey done - spine obscured by body habitus, but multiple other lesions found  XRT to right and left femurs completed  -Palliative care consulted for advanced care planning            Diarrhea   Assessment & Plan    Resolved  C diff negative  Imodium PRN          Hypocalcemia   Assessment & Plan    - Repleting as needed   due to recent bisphosphonate use; replete  Mg  Corrected calcium  normal - low secondary to hypoalbuminemia          Hypomagnesemia   Assessment & Plan    - Repleting as needed        Nasal congestion   Assessment & Plan    Ocean nasal saline  Phenylephrine PRN congestion.          Decubitus ulcer of buttock   Assessment & Plan    Wound care; rotate frequently in bed; air mattress.        Debility- (present on admission)   Assessment & Plan    Due to current illnesses; not able to walk since early april;  Out of SNF days per SW; cont PT/OT here  Poor prognosis given current diagnoses/morbid obesity.  LTAC declined d/t active chemotherapy.        Cellulitis   Assessment & Plan    Bilateral LE  improving; venous stasis component  Changed to po Augmentin per ID, completed 5/5/18          DNR (do not resuscitate)   Assessment & Plan    Palliative care consulted, patient wants to change code status to DNR - done, completed Polst with APN.          Chemotherapy-induced thrombocytopenia   Assessment & Plan    stable; monitor; no bleeding now          Leg edema   Assessment & Plan      -PO to IV Lasix scheduled bid, doing well and renal function stable.            Chronic venous stasis dermatitis of both lower extremities- (present on admission)   Assessment & Plan    -wound care, oral abx        Anemia- (present on admission)   Assessment & Plan    Stable cbc; monitor        Morbid obesity with BMI of 60.0-69.9, adult (CMS-HCC)- (present on admission)   Assessment & Plan    -encourage weight loss  Body mass index is 67.38 kg/m².        DM type 2 (diabetes mellitus, type 2) (CMS-HCC)- (present on admission)   Assessment & Plan    Fair control; adjust meds for good control        Essential hypertension, benign- (present on admission)   Assessment & Plan    Adjust med for good control.          Quality-Core Measures   Reviewed items::  Labs reviewed, Medications reviewed and Radiology images reviewed  Singh catheter::  Urinary Tract Retention or Urinary Tract  Obstruction  DVT prophylaxis pharmacological::  Heparin  Assessed for rehabilitation services:  Patient was assess for and/or received rehabilitation services during this hospitalization

## 2018-05-11 NOTE — PROGRESS NOTES
"/62   Pulse 100   Temp 36.8 °C (98.2 °F)   Resp 18   Ht 1.6 m (5' 2.99\")   Wt (!) 172.5 kg (380 lb 4.7 oz)   LMP 04/11/1994   SpO2 94%   Breastfeeding? No   BMI 67.38 kg/m²   Pt is AOX4.Pain is at 6/10. Given scheduled oxycontin per MAR. Singh is in place, set to gravity. Right PICC in place, flushes with blood return.  "

## 2018-05-11 NOTE — PALLIATIVE CARE
"Palliative Care Advance Care Planning Progress Note    General: Aleida Pagan is a 67 year old female admitted 4/10/18 for intense pain throughout her body due to multiple myeloma. She has undergone radiation therapy. Her myeloma markers are improving showing response to treatment per Dr. Reyes.  She received cycle #2 on 5/10/18 of Velcade, Cytoxan, dexamethasone.  She is next due on 5/17/18.  Comorbidities include morbid obesity, chronic venous stasis dermatitis of bilateral lower extremities, diabetes mellitus type 2, and essential hypertension.    Discussion: Today patient is sitting up at the edge of the bed, wearing normal clothes, and eating macaroni and cheese.  She appears well-groomed today.  Her depression continues.  She is looking forward to visitors over the weekend.  She asked about discharge planning.  Updated her that LTAC declined due to oral chemotherapy.  Patient reports if she is unable to go home to [place] it is her preference to be as close as possible at [facility] in [facility] or at the [facility] in [facility]. She reports she would go to either "if they would take me. Discussed barriers to discharge including oral chemotherapy. Explained I would talk with [facility]O.    Provided therapeutic communication including active listening and open ended question sing throughout encounter. Patient encouraged to call with any questions or needs.     Updated: [facility] [place]. Patient is out of SNF days and over income for Medicaid. We discussed possibility of discharge planning for HHC at .     Outcome: DNR with wishes for continued therapy. Difficult discharge related to oral chemotherapy, patient is out of SNF days, she is likely not independent enough for a .     Plan: Continue to provide support to patient and discuss goals of care as clinical picture progresses. POLST prior to discharge.    Thank you for allowing Palliative Care to participate in this patient's care. Please " call our team with questions and/or additional needs.    Total visit time was 35 minutes discussing advance care planning.    Mariya ZELAYA  Palliative Care Nurse Practitioner  [phone]

## 2018-05-11 NOTE — PROGRESS NOTES
Kel from Lab called with critical result of Calcium 6.8 at 0103. Critical lab result read back to Kel.   This critical lab result is within parameters established by Dr. [facility] policy for this patient.     Corrected calcium: 8.56 mg/dL

## 2018-05-11 NOTE — PROGRESS NOTES
HEMATOLOGY-ONCOLOGY PROGRESS NOTE    ( Dr. Sim initial consult)  Multiple myeloma-- 1P deletion intermediate risk, IgG kappa.  Stage III based on ISS staging (beta 2 9.9 and albumin 2.7 before).  Admitted with pathologic fracture of left humerus. Received palliative radiation to the left humerus during this admission. Poor performance status. Lives alone in [facility].       Started chemotherapy here as an inpatient. Received cycle #1 day #1 of CyBorD on April 19. She has completed CYCLE #1.  - C # 2 switched to weekly regimen Velcade 1.5 mg/m2 + Cytoxan 300 mg/m2+ DEX 40 mg weekly days 1,8,15,22 of every 28 day cycle . Start C2D1 on 5/10/18 . Next dose D8 5/17/18     Subjective:  She tolerated treatment well.   No new complaints. No fevers or chills.     Objective:  Medications reviewed and notable for:  Current Facility-Administered Medications   Medication Dose   • sodium chloride (OCEAN) 0.65 % nasal spray 2 Spray  2 Spray   • phenylephrine (SUDOGEST PE) 10 MG tablet 10 mg  10 mg   • furosemide (LASIX) injection 40 mg  40 mg   • phosphorus (K-PHOS-NEUTRAL, PHOSPHA 250 NEUTRAL) per tablet 1 Tab  1 Tab   • loperamide (IMODIUM) capsule 2 mg  2 mg   • metFORMIN (GLUCOPHAGE) tablet 1,000 mg  1,000 mg   • potassium chloride SA (Kdur) tablet 20 mEq  20 mEq   • metoprolol (LOPRESSOR) tablet 25 mg  25 mg   • calcitRIOL (ROCALTROL) capsule 0.25 mcg  0.25 mcg   • calcium carbonate (TUMS) chewable tab 1,000 mg  1,000 mg   • prochlorperazine (COMPAZINE) injection 10 mg  10 mg   • calcium citrate (CALCITRATE) tablet 1,900 mg  1,900 mg   • acetaminophen (TYLENOL) tablet 975 mg  975 mg   • LORazepam (ATIVAN) injection 1 mg  1 mg   • normal saline PF 10-20 mL  10-20 mL   • insulin regular (HUMULIN R) injection 3-14 Units  3-14 Units    And   • glucose 4 g chewable tablet 16 g  16 g    And   • dextrose 50% (D50W) injection 25 mL  25 mL   • acyclovir (ZOVIRAX) capsule 400 mg  400 mg   • ALPRAZolam (XANAX) tablet 0.5 mg   "0.5 mg   • hydrALAZINE (APRESOLINE) injection 10 mg  10 mg   • ammonium lactate (LAC-HYDRIN) 12 % lotion     • pravastatin (PRAVACHOL) tablet 40 mg  40 mg   • polyethylene glycol/lytes (MIRALAX) PACKET 1 Packet  1 Packet    And   • magnesium hydroxide (MILK OF MAGNESIA) suspension 30 mL  30 mL    And   • bisacodyl (DULCOLAX) suppository 10 mg  10 mg   • Respiratory Care per Protocol     • heparin injection 5,000 Units  5,000 Units   • ondansetron (ZOFRAN) syringe/vial injection 4 mg  4 mg   • ondansetron (ZOFRAN ODT) dispertab 4 mg  4 mg   • morphine (pf) 4 mg/ml injection 1 mg  1 mg   • oxyCODONE immediate-release (ROXICODONE) tablet 5 mg  5 mg   • oxyCODONE CR (OXYCONTIN) tablet 10 mg  10 mg       ROS:   I did do 10 point system review which is negative except as mentioned above     Blood pressure (!) 162/62, pulse 77, temperature 36.8 °C (98.2 °F), resp. rate 18, height 1.6 m (5' 2.99\"), weight (!) 172.5 kg (380 lb 4.7 oz), last menstrual period 04/11/1994, SpO2 99 %, not currently breastfeeding.      General:  comfortable, NAD  HEENT:  PERRLA, EOMI   Neck:   supple, no lymphadenopathy  Cor:   regular rate and rhythm, no murmurs, rubs, or gallops  Pulm:   clear to auscultation bilaterally  Abd:   bowel sounds present, soft, nontender,huge Pannus   Extremities:  + edema, + erythema stable  Neurologic:  A&O x 3  Pyschiatric:  Appropriate mood and affect    Labs reviewed and notable for:  Recent Labs      05/10/18   0220  05/11/18   0015  05/11/18   0137   WBC  3.8*  RR  6.4   RBC  2.90*  RR  2.83*   HEMOGLOBIN  8.6*  RR  8.6*   HEMATOCRIT  28.6*  RR  27.3*   MCV  98.6*  RR  96.5   MCH  29.7  RR  30.4   MCHC  30.1*  RR  31.5*   RDW  57.1*  RR  53.6*   PLATELETCT  137*  RR  133*   MPV  10.2  RR  9.8         .@CMP  Recent Results (from the past 24 hour(s))   ACCU-CHEK GLUCOSE    Collection Time: 05/10/18  8:35 AM   Result Value Ref Range    Glucose - Accu-Ck 132 (H) 65 - 99 mg/dL   ACCU-CHEK GLUCOSE    Collection " Time: 05/10/18 12:34 PM   Result Value Ref Range    Glucose - Accu-Ck 190 (H) 65 - 99 mg/dL   ACCU-CHEK GLUCOSE    Collection Time: 05/10/18  5:48 PM   Result Value Ref Range    Glucose - Accu-Ck 207 (H) 65 - 99 mg/dL   ACCU-CHEK GLUCOSE    Collection Time: 05/10/18  8:23 PM   Result Value Ref Range    Glucose - Accu-Ck 264 (H) 65 - 99 mg/dL   RENAL FUNCTION PANEL    Collection Time: 05/11/18 12:15 AM   Result Value Ref Range    Sodium 137 135 - 145 mmol/L    Potassium 4.1 3.6 - 5.5 mmol/L    Chloride 99 96 - 112 mmol/L    Co2 33 20 - 33 mmol/L    Glucose 219 (H) 65 - 99 mg/dL    Creatinine 0.62 0.50 - 1.40 mg/dL    Bun 10 8 - 22 mg/dL    Calcium 6.8 (LL) 8.5 - 10.5 mg/dL    Phosphorus 2.7 2.5 - 4.5 mg/dL    Albumin 1.8 (L) 3.2 - 4.9 g/dL   CBC WITH DIFFERENTIAL    Collection Time: 05/11/18 12:15 AM   Result Value Ref Range    Neutrophils-Polys RR 44.00 - 72.00 %    Lymphocytes RR 22.00 - 41.00 %    Monocytes RR 0.00 - 13.40 %    Eosinophils RR 0.00 - 6.90 %    Basophils RR 0.00 - 1.80 %    Immature Granulocytes RR 0.00 - 0.90 %    Nucleated RBC RR /100 WBC    Neutrophils (Absolute) RR 2.00 - 7.15 K/uL    Lymphs (Absolute) RR 1.00 - 4.80 K/uL    Monos (Absolute) RR 0.00 - 0.85 K/uL    Eos (Absolute) RR 0.00 - 0.51 K/uL    Baso (Absolute) RR 0.00 - 0.12 K/uL    Immature Granulocytes (abs) RR 0.00 - 0.11 K/uL    NRBC (Absolute) RR K/uL    WBC RR 4.8 - 10.8 K/uL    RBC RR 4.20 - 5.40 M/uL    Hemoglobin RR 12.0 - 16.0 g/dL    Hematocrit RR 37.0 - 47.0 %    MCV RR 81.4 - 97.8 fL    MCH RR 27.0 - 33.0 pg    MCHC RR 33.6 - 35.0 g/dL    RDW RR 35.9 - 50.0 fL    Platelet Count  - 446 K/uL    MPV RR 9.0 - 12.9 fL   IONIZED CALCIUM    Collection Time: 05/11/18 12:15 AM   Result Value Ref Range    Ionized Calcium 1.0 (L) 1.1 - 1.3 mmol/L   MAGNESIUM    Collection Time: 05/11/18 12:15 AM   Result Value Ref Range    Magnesium 1.5 1.5 - 2.5 mg/dL   ESTIMATED GFR    Collection Time: 05/11/18 12:15 AM   Result Value Ref Range     GFR If African American >60 >60 mL/min/1.73 m 2    GFR If Non African American >60 >60 mL/min/1.73 m 2   PERIPHERAL SMEAR REVIEW    Collection Time: 05/11/18 12:15 AM   Result Value Ref Range    Peripheral Smear Review RR    DIFFERENTIAL COMMENT    Collection Time: 05/11/18 12:15 AM   Result Value Ref Range    Comments-Diff see below    CBC WITH DIFFERENTIAL    Collection Time: 05/11/18  1:37 AM   Result Value Ref Range    WBC 6.4 4.8 - 10.8 K/uL    RBC 2.83 (L) 4.20 - 5.40 M/uL    Hemoglobin 8.6 (L) 12.0 - 16.0 g/dL    Hematocrit 27.3 (L) 37.0 - 47.0 %    MCV 96.5 81.4 - 97.8 fL    MCH 30.4 27.0 - 33.0 pg    MCHC 31.5 (L) 33.6 - 35.0 g/dL    RDW 53.6 (H) 35.9 - 50.0 fL    Platelet Count 133 (L) 164 - 446 K/uL    MPV 9.8 9.0 - 12.9 fL    Nucleated RBC 0.00 /100 WBC    NRBC (Absolute) 0.00 K/uL    Neutrophils-Polys 73.70 (H) 44.00 - 72.00 %    Lymphocytes 14.90 (L) 22.00 - 41.00 %    Monocytes 2.50 0.00 - 13.40 %    Eosinophils 0.00 0.00 - 6.90 %    Basophils 0.30 0.00 - 1.80 %    Immature Granulocytes 8.60 (H) 0.00 - 0.90 %    Lymphs (Absolute) 0.96 (L) 1.00 - 4.80 K/uL    Monos (Absolute) 0.16 0.00 - 0.85 K/uL    Eos (Absolute) 0.00 0.00 - 0.51 K/uL    Baso (Absolute) 0.02 0.00 - 0.12 K/uL    Immature Granulocytes (abs) 0.55 (H) 0.00 - 0.11 K/uL    Neutrophils (Absolute) 4.74 2.00 - 7.15 K/uL   PERIPHERAL SMEAR REVIEW    Collection Time: 05/11/18  1:37 AM   Result Value Ref Range    Peripheral Smear Review see below    DIFFERENTIAL COMMENT    Collection Time: 05/11/18  1:37 AM   Result Value Ref Range    Comments-Diff see below        Diagnostic imaging:      Assessment and Recommendations:  1.  Multiple myeloma-- 1P deletion intermediate risk, IgG kappa.  Stage III based on ISS staging (beta 2 9.9 and albumin 2.7 before).  Admitted with pathologic fracture of left humerus. Received palliative radiation to the left humerus during this admission. Poor performance status. Lives alone in [facility].        Started chemotherapy here as an inpatient. Received cycle #1 day #1 of CyBorD on April 19. She has completed CYCLE #1.  Her myeloma markers are improving showing response to treatment.   --Due to start cycle #2 on May 10 ( switch her to weekly regimen Velcade 1.5 mg/m2, Cytoxan 300 mg/m2 and DEX 40 mg weekly ) - C2D1 , Due next dose 5/17/18  --In hospital now with difficult discharge disposition.  --Finished on radiation to the femurs.   --Dexamethasone, as part of the chemotherapy with C1, has been held on a few occasions related to poorly controlled diabetes but now with C2 given only weekly      2. Cytopenias - Counts stable. Plan to transfuse if HGB < 7.0 or PLT 10,000 and also start neupogen if ANC < 1000  3. Bilateral LE swelling on Augmentin now   4. Hypocalcemia on aggressive replacement by primary service, not a candidate to start bisphosphonate therapy , Mg , K N, low albumin 1.6 and also low Vit D level 13 L on supplements   5. Pain control  6. Severe protein calorie malnutrition     Plan:   - She tolerated C2D1 yesterday well   - Her next dose is on 5/17/18   - We will follow her peripherally until, please call if any acute change in clinical condition or any questions.       Please call with any questions, [phone].      Sarahi Reyes MD  [facility]   [phone]

## 2018-05-11 NOTE — CARE PLAN
Problem: Communication  Goal: The ability to communicate needs accurately and effectively will improve  Outcome: PROGRESSING AS EXPECTED  Patient has been alert and oriented x4, able to make needs known.  She has been assisting with position changes in bed and sat up on edge of bed with therapy.  Excoriation to coccyx/gluteal remains, using lisa cream, new picture uploaded to chart.  Open area to pannus still present using interdry and baby powder, continues to be red and moist needing frequent changes.  Patient complaining of bilateral leg pain, prn oxycodone given once today.  She denies nausea, but has no appetite.  Patient started on Cycle 2 of chemotherapy today, tolerated thus far. PICC line flushing well with brisk blood return.

## 2018-05-11 NOTE — THERAPY
"Physical Therapy Treatment completed.   Bed Mobility:  Supine to Sit: Maximal Assist (HOB elevated and use of railing)  Transfers: Sit to Stand: Maximal Assist (from EOB->FWW)  Gait: Level Of Assist: Unable to Participate       Plan of Care: Will benefit from Physical Therapy 3 times per week  Discharge Recommendations: Equipment: Will Continue to Assess for Equipment Needs. Post-acute therapy Discharge to a transitional care facility for continued skilled therapy services.     See \"Rehab Therapy-Acute\" Patient Summary Report for complete documentation.       "

## 2018-05-11 NOTE — THERAPY
"Occupational Therapy Treatment completed with focus on ADLs, ADL transfers and patient education.  Functional Status:  Pt was seen for Occupational Therapy treatment today, see Therapy Kardex for details. Treatment included education in breath control with activity and at rest, self pacing techs and energy conservation for pain management. Educated pt in safety awareness techs as well. Psychosocial intervention addressed. Pt was tearful prior to activity stating, \"I can't do this.\" \" I will fall\". Pt needed increased TLC, encouragement to try to come to EOB, do self care tasks and eat lunch seated base. Pt agreed to come to EOB. Pt required Max A with extended time for supine to sit at EOB with HOB elevated. Pt demo SBA post set up for oral hygiene and grooming. Pt did attempt UB bathing, face, chest and under arms only,  long sitting in bed with Min A prior to EOB. Pt demo Mod A for UB dressing. Did not attempt  LB dressing, pt refusing stating, \"I've been having loose BM's and I don't want to get my only pair of shorts dirty\". Pt was Total A for sock donning.  Pt is Max A for sit to stand with FWW. Pt demo increased sitting balance at EOB with a large step stool placed for feet WB while sitting to eat lunch. Pt is Indep with self feeding and tray set up. Pt left sitting EOB, call light in reach, bedside table in front of her and nursing is aware. RN/CNA aware of OT treatment findings. Pt gave good effort needing encouragement to focus on what she can do instead of focusing on what she cannot do.  Continue Occupational Therapy services as per plan.    Plan of Care: Will benefit from Occupational Therapy 3 times per week  Discharge Recommendations:  Equipment Will Continue to Assess for Equipment Needs. Post-acute therapy Discharge to a transitional care facility for continued skilled therapy services.    See \"Rehab Therapy-Acute\" Patient Summary Report for complete documentation.   "

## 2018-05-12 PROBLEM — R21 RASH: Status: ACTIVE | Noted: 2018-05-12

## 2018-05-12 PROBLEM — R82.71 BACTERIURIA: Status: ACTIVE | Noted: 2018-05-12

## 2018-05-12 LAB
ALBUMIN SERPL BCP-MCNC: 2 G/DL (ref 3.2–4.9)
ALBUMIN SERPL-MCNC: 1.97 G/DL (ref 3.75–5.01)
ALPHA1 GLOB SERPL ELPH-MCNC: 0.48 G/DL (ref 0.19–0.46)
ALPHA2 GLOB SERPL ELPH-MCNC: 0.53 G/DL (ref 0.48–1.05)
B-GLOBULIN SERPL ELPH-MCNC: 0.37 G/DL (ref 0.48–1.1)
BASOPHILS # BLD AUTO: 0.2 % (ref 0–1.8)
BASOPHILS # BLD: 0.01 K/UL (ref 0–0.12)
BUN SERPL-MCNC: 9 MG/DL (ref 8–22)
CA-I SERPL-SCNC: 1 MMOL/L (ref 1.1–1.3)
CALCIUM SERPL-MCNC: 7.2 MG/DL (ref 8.5–10.5)
CHLORIDE SERPL-SCNC: 97 MMOL/L (ref 96–112)
CO2 SERPL-SCNC: 36 MMOL/L (ref 20–33)
COMMENT 1642: NORMAL
CREAT SERPL-MCNC: 0.6 MG/DL (ref 0.5–1.4)
EOSINOPHIL # BLD AUTO: 0.01 K/UL (ref 0–0.51)
EOSINOPHIL NFR BLD: 0.2 % (ref 0–6.9)
ERYTHROCYTE [DISTWIDTH] IN BLOOD BY AUTOMATED COUNT: 55.7 FL (ref 35.9–50)
GAMMA GLOB SERPL ELPH-MCNC: 2.06 G/DL (ref 0.62–1.51)
GLUCOSE BLD-MCNC: 144 MG/DL (ref 65–99)
GLUCOSE BLD-MCNC: 147 MG/DL (ref 65–99)
GLUCOSE BLD-MCNC: 173 MG/DL (ref 65–99)
GLUCOSE BLD-MCNC: 175 MG/DL (ref 65–99)
GLUCOSE SERPL-MCNC: 160 MG/DL (ref 65–99)
HCT VFR BLD AUTO: 24 % (ref 37–47)
HGB BLD-MCNC: 7.3 G/DL (ref 12–16)
IGA SERPL-MCNC: 31 MG/DL (ref 68–408)
IGG SERPL-MCNC: 2200 MG/DL (ref 768–1632)
IGM SERPL-MCNC: 11 MG/DL (ref 35–263)
IMM GRANULOCYTES # BLD AUTO: 0.34 K/UL (ref 0–0.11)
IMM GRANULOCYTES NFR BLD AUTO: 5.3 % (ref 0–0.9)
INTERPRETATION SERPL IFE-IMP: ABNORMAL
KAPPA LC FREE SER-MCNC: 5.9 MG/DL (ref 0.33–1.94)
KAPPA LC FREE/LAMBDA FREE SER NEPH: 8.94 {RATIO} (ref 0.26–1.65)
LAMBDA LC FREE SERPL-MCNC: 0.66 MG/DL (ref 0.57–2.63)
LYMPHOCYTES # BLD AUTO: 1 K/UL (ref 1–4.8)
LYMPHOCYTES NFR BLD: 15.6 % (ref 22–41)
MCH RBC QN AUTO: 30 PG (ref 27–33)
MCHC RBC AUTO-ENTMCNC: 30.4 G/DL (ref 33.6–35)
MCV RBC AUTO: 98.8 FL (ref 81.4–97.8)
MONOCLON BAND OBS SERPL: ABNORMAL
MONOCYTES # BLD AUTO: 0.57 K/UL (ref 0–0.85)
MONOCYTES NFR BLD AUTO: 8.9 % (ref 0–13.4)
MORPHOLOGY BLD-IMP: NORMAL
NEUTROPHILS # BLD AUTO: 4.5 K/UL (ref 2–7.15)
NEUTROPHILS NFR BLD: 69.8 % (ref 44–72)
NRBC # BLD AUTO: 0 K/UL
NRBC BLD-RTO: 0 /100 WBC
PATHOLOGY STUDY: ABNORMAL
PHOSPHATE SERPL-MCNC: 2.7 MG/DL (ref 2.5–4.5)
PLATELET # BLD AUTO: 135 K/UL (ref 164–446)
PMV BLD AUTO: 10.1 FL (ref 9–12.9)
POTASSIUM SERPL-SCNC: 3.6 MMOL/L (ref 3.6–5.5)
PROT SERPL-MCNC: 5.4 G/DL (ref 6–8.3)
RBC # BLD AUTO: 2.43 M/UL (ref 4.2–5.4)
SODIUM SERPL-SCNC: 137 MMOL/L (ref 135–145)
WBC # BLD AUTO: 6.4 K/UL (ref 4.8–10.8)

## 2018-05-12 PROCEDURE — 85025 COMPLETE CBC W/AUTO DIFF WBC: CPT

## 2018-05-12 PROCEDURE — A6250 SKIN SEAL PROTECT MOISTURIZR: HCPCS | Performed by: INTERNAL MEDICINE

## 2018-05-12 PROCEDURE — 80069 RENAL FUNCTION PANEL: CPT

## 2018-05-12 PROCEDURE — 82962 GLUCOSE BLOOD TEST: CPT

## 2018-05-12 PROCEDURE — 99232 SBSQ HOSP IP/OBS MODERATE 35: CPT | Performed by: INTERNAL MEDICINE

## 2018-05-12 PROCEDURE — 82330 ASSAY OF CALCIUM: CPT

## 2018-05-12 PROCEDURE — A9270 NON-COVERED ITEM OR SERVICE: HCPCS | Performed by: INTERNAL MEDICINE

## 2018-05-12 PROCEDURE — 770004 HCHG ROOM/CARE - ONCOLOGY PRIVATE *

## 2018-05-12 PROCEDURE — 700102 HCHG RX REV CODE 250 W/ 637 OVERRIDE(OP): Performed by: INTERNAL MEDICINE

## 2018-05-12 PROCEDURE — 700111 HCHG RX REV CODE 636 W/ 250 OVERRIDE (IP): Performed by: INTERNAL MEDICINE

## 2018-05-12 RX ADMIN — Medication: at 09:00

## 2018-05-12 RX ADMIN — HEPARIN SODIUM 5000 UNITS: 5000 INJECTION, SOLUTION INTRAVENOUS; SUBCUTANEOUS at 05:32

## 2018-05-12 RX ADMIN — Medication: at 13:54

## 2018-05-12 RX ADMIN — ACYCLOVIR 400 MG: 200 CAPSULE ORAL at 20:30

## 2018-05-12 RX ADMIN — DIBASIC SODIUM PHOSPHATE, MONOBASIC POTASSIUM PHOSPHATE AND MONOBASIC SODIUM PHOSPHATE 1 TABLET: 852; 155; 130 TABLET ORAL at 12:37

## 2018-05-12 RX ADMIN — LOPERAMIDE HYDROCHLORIDE 2 MG: 2 CAPSULE ORAL at 22:54

## 2018-05-12 RX ADMIN — METFORMIN HYDROCHLORIDE 1000 MG: 500 TABLET, FILM COATED ORAL at 08:58

## 2018-05-12 RX ADMIN — POTASSIUM CHLORIDE 20 MEQ: 1500 TABLET, EXTENDED RELEASE ORAL at 09:00

## 2018-05-12 RX ADMIN — ANTACID TABLETS 1000 MG: 500 TABLET, CHEWABLE ORAL at 18:08

## 2018-05-12 RX ADMIN — Medication: at 21:00

## 2018-05-12 RX ADMIN — PHENYLEPHRINE HCL 10 MG: 10 TABLET, FILM COATED ORAL at 08:59

## 2018-05-12 RX ADMIN — ANTACID TABLETS 1000 MG: 500 TABLET, CHEWABLE ORAL at 12:36

## 2018-05-12 RX ADMIN — DIBASIC SODIUM PHOSPHATE, MONOBASIC POTASSIUM PHOSPHATE AND MONOBASIC SODIUM PHOSPHATE 1 TABLET: 852; 155; 130 TABLET ORAL at 18:08

## 2018-05-12 RX ADMIN — OXYCODONE HYDROCHLORIDE 10 MG: 10 TABLET, FILM COATED, EXTENDED RELEASE ORAL at 08:59

## 2018-05-12 RX ADMIN — ACYCLOVIR 400 MG: 200 CAPSULE ORAL at 08:58

## 2018-05-12 RX ADMIN — FUROSEMIDE 40 MG: 10 INJECTION, SOLUTION INTRAMUSCULAR; INTRAVENOUS at 05:32

## 2018-05-12 RX ADMIN — POTASSIUM CHLORIDE 20 MEQ: 1500 TABLET, EXTENDED RELEASE ORAL at 20:30

## 2018-05-12 RX ADMIN — PRAVASTATIN SODIUM 40 MG: 20 TABLET ORAL at 20:30

## 2018-05-12 RX ADMIN — DIBASIC SODIUM PHOSPHATE, MONOBASIC POTASSIUM PHOSPHATE AND MONOBASIC SODIUM PHOSPHATE 1 TABLET: 852; 155; 130 TABLET ORAL at 01:01

## 2018-05-12 RX ADMIN — INSULIN HUMAN 3 UNITS: 100 INJECTION, SOLUTION PARENTERAL at 12:38

## 2018-05-12 RX ADMIN — INSULIN HUMAN 3 UNITS: 100 INJECTION, SOLUTION PARENTERAL at 20:38

## 2018-05-12 RX ADMIN — HEPARIN SODIUM 5000 UNITS: 5000 INJECTION, SOLUTION INTRAVENOUS; SUBCUTANEOUS at 22:31

## 2018-05-12 RX ADMIN — HEPARIN SODIUM 5000 UNITS: 5000 INJECTION, SOLUTION INTRAVENOUS; SUBCUTANEOUS at 14:34

## 2018-05-12 RX ADMIN — CALCITRIOL 0.25 MCG: 0.25 CAPSULE, LIQUID FILLED ORAL at 08:59

## 2018-05-12 RX ADMIN — METFORMIN HYDROCHLORIDE 1000 MG: 500 TABLET, FILM COATED ORAL at 18:08

## 2018-05-12 RX ADMIN — CALCIUM CITRATE 200 MG (950 MG) TABLET 1900 MG: at 08:58

## 2018-05-12 RX ADMIN — DIBASIC SODIUM PHOSPHATE, MONOBASIC POTASSIUM PHOSPHATE AND MONOBASIC SODIUM PHOSPHATE 1 TABLET: 852; 155; 130 TABLET ORAL at 05:32

## 2018-05-12 RX ADMIN — OXYCODONE HYDROCHLORIDE 10 MG: 10 TABLET, FILM COATED, EXTENDED RELEASE ORAL at 20:30

## 2018-05-12 RX ADMIN — FUROSEMIDE 40 MG: 10 INJECTION, SOLUTION INTRAMUSCULAR; INTRAVENOUS at 15:58

## 2018-05-12 RX ADMIN — METOPROLOL TARTRATE 25 MG: 25 TABLET, FILM COATED ORAL at 08:59

## 2018-05-12 RX ADMIN — METOPROLOL TARTRATE 25 MG: 25 TABLET, FILM COATED ORAL at 20:30

## 2018-05-12 RX ADMIN — ALPRAZOLAM 0.5 MG: 0.5 TABLET ORAL at 15:08

## 2018-05-12 RX ADMIN — ANTACID TABLETS 1000 MG: 500 TABLET, CHEWABLE ORAL at 08:59

## 2018-05-12 RX ADMIN — OXYCODONE HYDROCHLORIDE 5 MG: 5 TABLET ORAL at 22:37

## 2018-05-12 ASSESSMENT — ENCOUNTER SYMPTOMS
HEADACHES: 0
DIARRHEA: 0
HEARTBURN: 0
FEVER: 0
DEPRESSION: 1
VOMITING: 0
DIZZINESS: 0
CONSTIPATION: 0
COUGH: 0
CHILLS: 0
SHORTNESS OF BREATH: 0
SENSORY CHANGE: 0
PHOTOPHOBIA: 0
MYALGIAS: 1
NERVOUS/ANXIOUS: 0
CLAUDICATION: 0
BLURRED VISION: 0
WEAKNESS: 0
SPEECH CHANGE: 0
SORE THROAT: 0
ABDOMINAL PAIN: 0
INSOMNIA: 0

## 2018-05-12 ASSESSMENT — PAIN SCALES - GENERAL
PAINLEVEL_OUTOF10: 4
PAINLEVEL_OUTOF10: 9
PAINLEVEL_OUTOF10: 4
PAINLEVEL_OUTOF10: 5
PAINLEVEL_OUTOF10: 4

## 2018-05-12 NOTE — PROGRESS NOTES
"/77   Pulse (!) 104   Temp 36.8 °C (98.2 °F)   Resp 20   Ht 1.6 m (5' 2.99\")   Wt (!) 172.5 kg (380 lb 4.7 oz)   LMP 04/11/1994   SpO2 93%   Breastfeeding? No   BMI 67.38 kg/m²   Pt is AOx4. Pain is at 6/10. Repositioned patient, applied ice packs, and administered scheduled medication per MAR.  Administered Zofran and compazine for nausea. Pt is experiencing frequent soft bowel movements. Redness in sacrum and pannus- barrier cream and baby powder applied.   "

## 2018-05-12 NOTE — CARE PLAN
Problem: Communication  Goal: The ability to communicate needs accurately and effectively will improve  Outcome: PROGRESSING AS EXPECTED  Patient has been alert and oriented x4, able to make needs known.  She has anxiety on and off, anxiety increases with any type of movement/activity.  PRN xanax give once and this was effective.  She had small bowel movement.  Singh remains in place, good urine output.  Pitting edema to abdomen and legs.  Patient has been turned and repositioned every two hours.  Perineal completed x2 today, redness/moisture to pannus continues.  Redness and excoriation to bottom slightly improved.  Pain controlled with scheduled OxyContin, no prns given today.  Appetite is good, ate 100% of breakfast and lunch.  Sat at edge of bed for lunch with assist of therapy, assisted back to bed with assist of 3 staff.  PICC line flushing well with brisk blood return.

## 2018-05-13 PROBLEM — R04.0 EPISTAXIS: Status: ACTIVE | Noted: 2018-05-13

## 2018-05-13 LAB
ALBUMIN SERPL BCP-MCNC: 1.9 G/DL (ref 3.2–4.9)
BACTERIA UR CULT: ABNORMAL
BACTERIA UR CULT: ABNORMAL
BASOPHILS # BLD AUTO: 0.2 % (ref 0–1.8)
BASOPHILS # BLD: 0.01 K/UL (ref 0–0.12)
BUN SERPL-MCNC: 8 MG/DL (ref 8–22)
CA-I SERPL-SCNC: 1 MMOL/L (ref 1.1–1.3)
CALCIUM SERPL-MCNC: 7.1 MG/DL (ref 8.5–10.5)
CHLORIDE SERPL-SCNC: 98 MMOL/L (ref 96–112)
CO2 SERPL-SCNC: 35 MMOL/L (ref 20–33)
CREAT SERPL-MCNC: 0.47 MG/DL (ref 0.5–1.4)
EOSINOPHIL # BLD AUTO: 0.01 K/UL (ref 0–0.51)
EOSINOPHIL NFR BLD: 0.2 % (ref 0–6.9)
ERYTHROCYTE [DISTWIDTH] IN BLOOD BY AUTOMATED COUNT: 55.8 FL (ref 35.9–50)
GLUCOSE BLD-MCNC: 128 MG/DL (ref 65–99)
GLUCOSE BLD-MCNC: 135 MG/DL (ref 65–99)
GLUCOSE BLD-MCNC: 149 MG/DL (ref 65–99)
GLUCOSE BLD-MCNC: 167 MG/DL (ref 65–99)
GLUCOSE SERPL-MCNC: 142 MG/DL (ref 65–99)
HCT VFR BLD AUTO: 26.8 % (ref 37–47)
HGB BLD-MCNC: 8.2 G/DL (ref 12–16)
IMM GRANULOCYTES # BLD AUTO: 0.12 K/UL (ref 0–0.11)
IMM GRANULOCYTES NFR BLD AUTO: 3 % (ref 0–0.9)
LYMPHOCYTES # BLD AUTO: 0.6 K/UL (ref 1–4.8)
LYMPHOCYTES NFR BLD: 15 % (ref 22–41)
MCH RBC QN AUTO: 30.4 PG (ref 27–33)
MCHC RBC AUTO-ENTMCNC: 30.6 G/DL (ref 33.6–35)
MCV RBC AUTO: 99.3 FL (ref 81.4–97.8)
MONOCYTES # BLD AUTO: 0.39 K/UL (ref 0–0.85)
MONOCYTES NFR BLD AUTO: 9.7 % (ref 0–13.4)
NEUTROPHILS # BLD AUTO: 2.88 K/UL (ref 2–7.15)
NEUTROPHILS NFR BLD: 71.9 % (ref 44–72)
NRBC # BLD AUTO: 0.03 K/UL
NRBC BLD-RTO: 0.7 /100 WBC
PHOSPHATE SERPL-MCNC: 2.7 MG/DL (ref 2.5–4.5)
PLATELET # BLD AUTO: 106 K/UL (ref 164–446)
PMV BLD AUTO: 10.9 FL (ref 9–12.9)
POTASSIUM SERPL-SCNC: 3.8 MMOL/L (ref 3.6–5.5)
RBC # BLD AUTO: 2.7 M/UL (ref 4.2–5.4)
SIGNIFICANT IND 70042: ABNORMAL
SITE SITE: ABNORMAL
SODIUM SERPL-SCNC: 138 MMOL/L (ref 135–145)
SOURCE SOURCE: ABNORMAL
WBC # BLD AUTO: 4 K/UL (ref 4.8–10.8)

## 2018-05-13 PROCEDURE — 99232 SBSQ HOSP IP/OBS MODERATE 35: CPT | Performed by: INTERNAL MEDICINE

## 2018-05-13 PROCEDURE — 82962 GLUCOSE BLOOD TEST: CPT | Mod: 91

## 2018-05-13 PROCEDURE — 80069 RENAL FUNCTION PANEL: CPT

## 2018-05-13 PROCEDURE — 700102 HCHG RX REV CODE 250 W/ 637 OVERRIDE(OP): Performed by: INTERNAL MEDICINE

## 2018-05-13 PROCEDURE — A9270 NON-COVERED ITEM OR SERVICE: HCPCS | Performed by: INTERNAL MEDICINE

## 2018-05-13 PROCEDURE — 770004 HCHG ROOM/CARE - ONCOLOGY PRIVATE *

## 2018-05-13 PROCEDURE — 82330 ASSAY OF CALCIUM: CPT

## 2018-05-13 PROCEDURE — 85025 COMPLETE CBC W/AUTO DIFF WBC: CPT

## 2018-05-13 PROCEDURE — 700111 HCHG RX REV CODE 636 W/ 250 OVERRIDE (IP): Performed by: INTERNAL MEDICINE

## 2018-05-13 RX ADMIN — ACYCLOVIR 400 MG: 200 CAPSULE ORAL at 07:50

## 2018-05-13 RX ADMIN — METFORMIN HYDROCHLORIDE 1000 MG: 500 TABLET, FILM COATED ORAL at 07:53

## 2018-05-13 RX ADMIN — HEPARIN SODIUM 5000 UNITS: 5000 INJECTION, SOLUTION INTRAVENOUS; SUBCUTANEOUS at 21:12

## 2018-05-13 RX ADMIN — METOPROLOL TARTRATE 25 MG: 25 TABLET, FILM COATED ORAL at 21:12

## 2018-05-13 RX ADMIN — HEPARIN SODIUM 5000 UNITS: 5000 INJECTION, SOLUTION INTRAVENOUS; SUBCUTANEOUS at 06:16

## 2018-05-13 RX ADMIN — Medication: at 07:55

## 2018-05-13 RX ADMIN — HEPARIN SODIUM 5000 UNITS: 5000 INJECTION, SOLUTION INTRAVENOUS; SUBCUTANEOUS at 13:30

## 2018-05-13 RX ADMIN — POTASSIUM CHLORIDE 20 MEQ: 1500 TABLET, EXTENDED RELEASE ORAL at 07:53

## 2018-05-13 RX ADMIN — POTASSIUM CHLORIDE 20 MEQ: 1500 TABLET, EXTENDED RELEASE ORAL at 21:13

## 2018-05-13 RX ADMIN — FUROSEMIDE 40 MG: 10 INJECTION, SOLUTION INTRAMUSCULAR; INTRAVENOUS at 16:11

## 2018-05-13 RX ADMIN — CALCIUM CITRATE 200 MG (950 MG) TABLET 1900 MG: at 07:51

## 2018-05-13 RX ADMIN — METOPROLOL TARTRATE 25 MG: 25 TABLET, FILM COATED ORAL at 07:54

## 2018-05-13 RX ADMIN — ANTACID TABLETS 1000 MG: 500 TABLET, CHEWABLE ORAL at 07:51

## 2018-05-13 RX ADMIN — PRAVASTATIN SODIUM 40 MG: 20 TABLET ORAL at 21:13

## 2018-05-13 RX ADMIN — ANTACID TABLETS 1000 MG: 500 TABLET, CHEWABLE ORAL at 13:29

## 2018-05-13 RX ADMIN — METFORMIN HYDROCHLORIDE 1000 MG: 500 TABLET, FILM COATED ORAL at 16:09

## 2018-05-13 RX ADMIN — DIBASIC SODIUM PHOSPHATE, MONOBASIC POTASSIUM PHOSPHATE AND MONOBASIC SODIUM PHOSPHATE 1 TABLET: 852; 155; 130 TABLET ORAL at 23:54

## 2018-05-13 RX ADMIN — DIBASIC SODIUM PHOSPHATE, MONOBASIC POTASSIUM PHOSPHATE AND MONOBASIC SODIUM PHOSPHATE 1 TABLET: 852; 155; 130 TABLET ORAL at 13:30

## 2018-05-13 RX ADMIN — ALPRAZOLAM 0.5 MG: 0.5 TABLET ORAL at 23:18

## 2018-05-13 RX ADMIN — CALCITRIOL 0.25 MCG: 0.25 CAPSULE, LIQUID FILLED ORAL at 07:53

## 2018-05-13 RX ADMIN — FUROSEMIDE 40 MG: 10 INJECTION, SOLUTION INTRAMUSCULAR; INTRAVENOUS at 06:16

## 2018-05-13 RX ADMIN — OXYCODONE HYDROCHLORIDE 10 MG: 10 TABLET, FILM COATED, EXTENDED RELEASE ORAL at 21:12

## 2018-05-13 RX ADMIN — DIBASIC SODIUM PHOSPHATE, MONOBASIC POTASSIUM PHOSPHATE AND MONOBASIC SODIUM PHOSPHATE 1 TABLET: 852; 155; 130 TABLET ORAL at 00:11

## 2018-05-13 RX ADMIN — DIBASIC SODIUM PHOSPHATE, MONOBASIC POTASSIUM PHOSPHATE AND MONOBASIC SODIUM PHOSPHATE 1 TABLET: 852; 155; 130 TABLET ORAL at 06:16

## 2018-05-13 RX ADMIN — ACYCLOVIR 400 MG: 200 CAPSULE ORAL at 21:11

## 2018-05-13 RX ADMIN — ANTACID TABLETS 1000 MG: 500 TABLET, CHEWABLE ORAL at 16:09

## 2018-05-13 RX ADMIN — Medication: at 21:17

## 2018-05-13 RX ADMIN — ALPRAZOLAM 0.5 MG: 0.5 TABLET ORAL at 16:10

## 2018-05-13 RX ADMIN — OXYCODONE HYDROCHLORIDE 10 MG: 10 TABLET, FILM COATED, EXTENDED RELEASE ORAL at 07:53

## 2018-05-13 RX ADMIN — OXYCODONE HYDROCHLORIDE 5 MG: 5 TABLET ORAL at 22:23

## 2018-05-13 RX ADMIN — INSULIN HUMAN 3 UNITS: 100 INJECTION, SOLUTION PARENTERAL at 13:27

## 2018-05-13 ASSESSMENT — PAIN SCALES - GENERAL
PAINLEVEL_OUTOF10: 7
PAINLEVEL_OUTOF10: 6
PAINLEVEL_OUTOF10: 7
PAINLEVEL_OUTOF10: 6

## 2018-05-13 ASSESSMENT — ENCOUNTER SYMPTOMS
HEADACHES: 0
MYALGIAS: 1
COUGH: 0
SHORTNESS OF BREATH: 0
DEPRESSION: 1
CLAUDICATION: 0
INSOMNIA: 0
HEARTBURN: 0
WEAKNESS: 0
CHILLS: 0
SORE THROAT: 0
SENSORY CHANGE: 0
BLURRED VISION: 0
SPEECH CHANGE: 0
PHOTOPHOBIA: 0
DIARRHEA: 0
FEVER: 0
ABDOMINAL PAIN: 0
DIZZINESS: 0
CONSTIPATION: 0
NERVOUS/ANXIOUS: 0
VOMITING: 0

## 2018-05-13 NOTE — ASSESSMENT & PLAN NOTE
Klebsiella growing in urine culture, nava in place, asymptomatic.  Immunocompromised due to chemotherapy - now on Levaquin  Check repeat UA

## 2018-05-13 NOTE — PROGRESS NOTES
Patient is AOX4. Patient requires 3+ staff members to turn. DL PICC line to TKO fluids. Patient on 2.5 L via NC. Pain localized to BLE and was provided Benedryl cream on AM meds. Ice packs are placed along BLE. Patient having adequate output via FC. Patient is on Q2 hour turns. No acute distresses noted otherwise at this time. Bed in lowest and locked position. Call light within reach. Will continue to monitor.

## 2018-05-13 NOTE — CARE PLAN
Problem: Communication  Goal: The ability to communicate needs accurately and effectively will improve  AxO x4. Able to make needs known. Calls appropriately for assistance. Call light within reach.     Problem: Skin Integrity  Goal: Risk for impaired skin integrity will decrease  Q2 turns in place. Baby powder to pannus/bottom with turns and PRN. Provided benadryl cream, ice packs wrapped in pillow cases, and passive ROM to BLE. Interdrys in place to pannus.

## 2018-05-13 NOTE — CARE PLAN
Problem: Mobility  Goal: Risk for activity intolerance will decrease  Outcome: PROGRESSING AS EXPECTED  Patient has been alert and oriented x4 today, able to make needs known.  Patient complaining of some anxiety this afternoon, asked for prn xanax and was effective.  Patient able to sit on edge of bed with two assist for breakfast.  Competed partial bed bath today. Pannus looking less red.  Open area to bottom remain, barrier cream applied.  Patient denied burning with urination today, preliminary culture came back for urine culture, Dr. Chen aware.  Patient having increased redness/rash to lower legs, Dr Gustavo christianson started patient on benadryl cream BID.  Patient remains on 2.5L nasal cannula, encouraged to use IS.

## 2018-05-13 NOTE — PROGRESS NOTES
[facility] Hospitalist Progress Note    Date of Service: 5/12/2018    Chief Complaint  67 y.o. female admitted 4/10/2018 with multiple myeloma, left humerus, bilateral femur radiation completed.    Interval Problem Update  5/8 Patient very debilitated, she is off antibiotics since I saw her last, still very tearful and depressed and lonely as family and friends have difficulty coming to visit [place].  She feels horrible and is concerned as her left arm is hurting more than prior - Xrays repeated today and no fracture or new lytic lesion seen.  I asked her if she wanted to try another round of chemotherapy as she was already feeling so poorly.  She states she doesn't feel she has a choice, she is not ready to give up trying even if it means feeling worse than she already does.  5/9 Patient feeling slightly less depressed today, she is having less pain in the left arm.  Plan to proceed with chemo tomorrow but at a lesser dose d/t poor functional status since the last dose.  5/10 Patient with nasal congestion and showed me what has been coming out of her nose, likely dried mucus with continued oxygen supplementation, will start ocean nasal saline and phenylephrine for congestion.  She resumes chemotherapy today, starting cycle 2.  5/11 Patient feeling about the same, she has quick response to IV diuresis but is still appearing same regarding edema, will continue this while her renal function holds normal.  No acute complaints other than feeling hot and room is stuffy.  5/12 Patient feeling okay, bilateral shins with new rash that had not been present during hospitalization, patient states it does hurt.  It is only on the anterior portion of her shins, it is in area where ice packs had been placed to cool her but she had been using these for days and today is the first day rash present, could be delayed reaction to topical lotions that have been applied also.  Keep cloth in between ice pack and skin and if rash  persists, will discontinue the lac hydrin lotion that has been applied since early in the admission.    Consultants/Specialty  Oncology - [facility]  .  Disposition  Difficult discharge.        Review of Systems   Constitutional: Negative for chills and fever.   HENT: Negative for congestion and sore throat.    Eyes: Negative for blurred vision and photophobia.   Respiratory: Negative for cough and shortness of breath.    Cardiovascular: Negative for chest pain, claudication and leg swelling.   Gastrointestinal: Negative for abdominal pain, constipation, diarrhea, heartburn and vomiting.   Genitourinary: Negative for dysuria and hematuria.   Musculoskeletal: Positive for myalgias (globally). Negative for joint pain.   Skin: Positive for itching and rash.   Neurological: Negative for dizziness, sensory change, speech change, weakness and headaches.   Psychiatric/Behavioral: Positive for depression. The patient is not nervous/anxious and does not have insomnia.       Physical Exam  Laboratory/Imaging   Hemodynamics  Temp (24hrs), Av.5 °C (97.7 °F), Min:36.3 °C (97.4 °F), Max:36.8 °C (98.2 °F)   Temperature: 36.4 °C (97.5 °F)  Pulse  Av.9  Min: 38  Max: 128    Blood Pressure : 158/66      Respiratory      Respiration: 18, Pulse Oximetry: 93 %     Work Of Breathing / Effort: Mild  RUL Breath Sounds: Clear, RML Breath Sounds: Diminished, RLL Breath Sounds: Diminished, CHARLOTTE Breath Sounds: Clear, LLL Breath Sounds: Diminished    Fluids    Intake/Output Summary (Last 24 hours) at 18 1748  Last data filed at 18 1603   Gross per 24 hour   Intake              530 ml   Output             3900 ml   Net            -3370 ml       Nutrition  Orders Placed This Encounter   Procedures   • Diet Order     Standing Status:   Standing     Number of Occurrences:   1     Order Specific Question:   Diet:     Answer:   Diabetic [3]     Physical Exam   Constitutional: She is oriented to person, place, and time. She appears  well-developed and well-nourished. No distress.   HENT:   Head: Normocephalic and atraumatic.   Eyes: Conjunctivae are normal. No scleral icterus.   Neck: Neck supple. No JVD present.   Cardiovascular: Normal rate, regular rhythm and normal heart sounds.  Exam reveals no gallop and no friction rub.    No murmur heard.  Pulmonary/Chest: Effort normal and breath sounds normal. No respiratory distress. She exhibits no tenderness.   Abdominal: Soft. Bowel sounds are normal. She exhibits no distension. There is no guarding.   Musculoskeletal: She exhibits no edema or tenderness.   Neurological: She is alert and oriented to person, place, and time. No cranial nerve deficit.   Skin: Skin is warm and dry. Rash (bilateral shins where ice packs had been in place, no rash on calves or other areas of the body.) noted. She is not diaphoretic. No erythema. No pallor.   Erythema bilateral LE/shins - improved from prior.   Psychiatric: She has a normal mood and affect. Her behavior is normal.   Nursing note and vitals reviewed.      Recent Labs      05/11/18   0015  05/11/18   0137  05/12/18   0136   WBC  RR  6.4  6.4   RBC  RR  2.83*  2.43*   HEMOGLOBIN  RR  8.6*  7.3*   HEMATOCRIT  RR  27.3*  24.0*   MCV  RR  96.5  98.8*   MCH  RR  30.4  30.0   MCHC  RR  31.5*  30.4*   RDW  RR  53.6*  55.7*   PLATELETCT  RR  133*  135*   MPV  RR  9.8  10.1     Recent Labs      05/10/18   0048  05/11/18   0015  05/12/18   0136   SODIUM  139  137  137   POTASSIUM  3.8  4.1  3.6   CHLORIDE  101  99  97   CO2  35*  33  36*   GLUCOSE  120*  219*  160*   BUN  11  10  9   CREATININE  0.62  0.62  0.60   CALCIUM  6.9*  6.8*  7.2*                      Assessment/Plan     * Pathologic fracture- (present on admission)   Assessment & Plan    -Left humerus  -widespread lytic disease  -completed radiation to humerus  -pain control             Multiple myeloma (HCC)- (present on admission)   Assessment & Plan    CyBorD chemo - cycle 1 started 4/19/18; cycle 2  started on 5/10  -recent diagnosis, appears aggressive, now with innumerable lytic lesions  -Dr Sim and Oksana consulted for assistance in management  - IV dilaudid PRN, oxy prn. DC maintenance steroids as these were causing confusion  -oxycontin CR 10 mg BID  -skeletal survey done - spine obscured by body habitus, but multiple other lesions found  XRT to right and left femurs completed  -Palliative care consulted for advanced care planning            Diarrhea   Assessment & Plan    Resolved  C diff negative  Imodium PRN          Hypocalcemia   Assessment & Plan    - Repleting as needed, trending up with diuresis/less dilution.   due to recent bisphosphonate use; replete Mg  Corrected calcium  normal - low secondary to hypoalbuminemia          Hypomagnesemia   Assessment & Plan    - Repleting as needed        Rash   Assessment & Plan    Bilateral shin involvement only  Suspect is contact dermatitis from ice packs - not on calves nor any other area that had not been touched by ice packs  If ice packs applied to body - cloth to be between skin and ice pack   Benadryl cream prn          Bacteriuria   Assessment & Plan    LFGNR growing in urine culture, nava in place, asymptomatic.          Nasal congestion   Assessment & Plan    Ocean nasal saline  Phenylephrine PRN congestion.          Decubitus ulcer of buttock   Assessment & Plan    Wound care; rotate frequently in bed; air mattress.        Debility- (present on admission)   Assessment & Plan    Due to current illnesses; not able to walk since early april;  Out of SNF days per SW; cont PT/OT here  Poor prognosis given current diagnoses/morbid obesity.  LTAC declined d/t active chemotherapy.        Cellulitis   Assessment & Plan    Bilateral LE  improving; venous stasis component  Changed to po Augmentin per ID, completed 5/5/18          DNR (do not resuscitate)   Assessment & Plan    Palliative care consulted, patient wants to change code status to DNR - done,  completed Polst with APN.          Chemotherapy-induced thrombocytopenia   Assessment & Plan    stable; monitor; no bleeding now          Leg edema   Assessment & Plan      -PO to IV Lasix scheduled bid, doing well and renal function stable.            Chronic venous stasis dermatitis of both lower extremities- (present on admission)   Assessment & Plan    -wound care, oral abx completed          Anemia- (present on admission)   Assessment & Plan    Stable cbc; monitor  Transfuse if hgb <7.0          Morbid obesity with BMI of 60.0-69.9, adult (CMS-HCC)- (present on admission)   Assessment & Plan    -encourage weight loss  Body mass index is 67.38 kg/m².        DM type 2 (diabetes mellitus, type 2) (CMS-HCC)- (present on admission)   Assessment & Plan    Fair control; adjust meds for good control        Essential hypertension, benign- (present on admission)   Assessment & Plan    Adjust med for good control.          Quality-Core Measures   Reviewed items::  Labs reviewed, Medications reviewed and Radiology images reviewed  Singh catheter::  Urinary Tract Retention or Urinary Tract Obstruction  DVT prophylaxis pharmacological::  Heparin  Assessed for rehabilitation services:  Patient was assess for and/or received rehabilitation services during this hospitalization

## 2018-05-13 NOTE — PROGRESS NOTES
Received bedside report from day shift RN. Nicolette x4. Requires three staff members to turn. PICC + blood return both lumens, flushing. 2.5L NC. Patient reports 8/10 pain, to BLE. Provided benadryl cream,  ice packs wrapped in pillow cases, and pain medication see EMAR. Reports mild nausea s/p turns. Denies vomiting. Will continue to monitor. POC discussed with patient. Calls appropriately for assistance. Call light within reach. Bed in lowest and locked position. Q2 rounding in place.

## 2018-05-13 NOTE — CARE PLAN
Problem: Safety  Goal: Will remain free from falls  Outcome: PROGRESSING AS EXPECTED  Patient verbalized the need to use her call light for getting anything out of bed.

## 2018-05-13 NOTE — ASSESSMENT & PLAN NOTE
Continued improvement with benadryl cream  DC Lac-hydrin  Bilateral shin involvement only  Suspect is contact dermatitis from ice packs - not on calves nor any other area that had not been touched by ice packs  If ice packs applied to body - cloth to be between skin and ice pack

## 2018-05-14 LAB
ALBUMIN SERPL BCP-MCNC: 2 G/DL (ref 3.2–4.9)
BASOPHILS # BLD AUTO: 0.3 % (ref 0–1.8)
BASOPHILS # BLD: 0.01 K/UL (ref 0–0.12)
BUN SERPL-MCNC: 7 MG/DL (ref 8–22)
CA-I SERPL-SCNC: 1 MMOL/L (ref 1.1–1.3)
CALCIUM SERPL-MCNC: 7.8 MG/DL (ref 8.5–10.5)
CHLORIDE SERPL-SCNC: 96 MMOL/L (ref 96–112)
CO2 SERPL-SCNC: 36 MMOL/L (ref 20–33)
CREAT SERPL-MCNC: 0.58 MG/DL (ref 0.5–1.4)
EOSINOPHIL # BLD AUTO: 0 K/UL (ref 0–0.51)
EOSINOPHIL NFR BLD: 0 % (ref 0–6.9)
ERYTHROCYTE [DISTWIDTH] IN BLOOD BY AUTOMATED COUNT: 54.1 FL (ref 35.9–50)
GLUCOSE BLD-MCNC: 119 MG/DL (ref 65–99)
GLUCOSE BLD-MCNC: 141 MG/DL (ref 65–99)
GLUCOSE BLD-MCNC: 161 MG/DL (ref 65–99)
GLUCOSE BLD-MCNC: 181 MG/DL (ref 65–99)
GLUCOSE SERPL-MCNC: 157 MG/DL (ref 65–99)
HCT VFR BLD AUTO: 27.6 % (ref 37–47)
HGB BLD-MCNC: 8.7 G/DL (ref 12–16)
IMM GRANULOCYTES # BLD AUTO: 0.08 K/UL (ref 0–0.11)
IMM GRANULOCYTES NFR BLD AUTO: 2 % (ref 0–0.9)
LYMPHOCYTES # BLD AUTO: 0.56 K/UL (ref 1–4.8)
LYMPHOCYTES NFR BLD: 14.1 % (ref 22–41)
MCH RBC QN AUTO: 30.4 PG (ref 27–33)
MCHC RBC AUTO-ENTMCNC: 31.5 G/DL (ref 33.6–35)
MCV RBC AUTO: 96.5 FL (ref 81.4–97.8)
MONOCYTES # BLD AUTO: 0.3 K/UL (ref 0–0.85)
MONOCYTES NFR BLD AUTO: 7.6 % (ref 0–13.4)
NEUTROPHILS # BLD AUTO: 3.01 K/UL (ref 2–7.15)
NEUTROPHILS NFR BLD: 76 % (ref 44–72)
NRBC # BLD AUTO: 0.02 K/UL
NRBC BLD-RTO: 0.5 /100 WBC
PHOSPHATE SERPL-MCNC: 3.1 MG/DL (ref 2.5–4.5)
PLATELET # BLD AUTO: 94 K/UL (ref 164–446)
PMV BLD AUTO: 10.8 FL (ref 9–12.9)
POTASSIUM SERPL-SCNC: 3.9 MMOL/L (ref 3.6–5.5)
RBC # BLD AUTO: 2.86 M/UL (ref 4.2–5.4)
SODIUM SERPL-SCNC: 137 MMOL/L (ref 135–145)
WBC # BLD AUTO: 4 K/UL (ref 4.8–10.8)

## 2018-05-14 PROCEDURE — 99232 SBSQ HOSP IP/OBS MODERATE 35: CPT | Performed by: INTERNAL MEDICINE

## 2018-05-14 PROCEDURE — 700102 HCHG RX REV CODE 250 W/ 637 OVERRIDE(OP): Performed by: INTERNAL MEDICINE

## 2018-05-14 PROCEDURE — 700111 HCHG RX REV CODE 636 W/ 250 OVERRIDE (IP): Performed by: INTERNAL MEDICINE

## 2018-05-14 PROCEDURE — 82962 GLUCOSE BLOOD TEST: CPT

## 2018-05-14 PROCEDURE — 82330 ASSAY OF CALCIUM: CPT

## 2018-05-14 PROCEDURE — 80069 RENAL FUNCTION PANEL: CPT

## 2018-05-14 PROCEDURE — 770004 HCHG ROOM/CARE - ONCOLOGY PRIVATE *

## 2018-05-14 PROCEDURE — 85025 COMPLETE CBC W/AUTO DIFF WBC: CPT

## 2018-05-14 PROCEDURE — A9270 NON-COVERED ITEM OR SERVICE: HCPCS | Performed by: INTERNAL MEDICINE

## 2018-05-14 RX ORDER — CIPROFLOXACIN 500 MG/1
500 TABLET, FILM COATED ORAL EVERY 12 HOURS
Status: DISCONTINUED | OUTPATIENT
Start: 2018-05-14 | End: 2018-05-18

## 2018-05-14 RX ADMIN — CIPROFLOXACIN HYDROCHLORIDE 500 MG: 500 TABLET, FILM COATED ORAL at 20:50

## 2018-05-14 RX ADMIN — FUROSEMIDE 40 MG: 10 INJECTION, SOLUTION INTRAMUSCULAR; INTRAVENOUS at 15:35

## 2018-05-14 RX ADMIN — Medication: at 09:09

## 2018-05-14 RX ADMIN — ANTACID TABLETS 1000 MG: 500 TABLET, CHEWABLE ORAL at 17:38

## 2018-05-14 RX ADMIN — POTASSIUM CHLORIDE 20 MEQ: 1500 TABLET, EXTENDED RELEASE ORAL at 20:51

## 2018-05-14 RX ADMIN — CIPROFLOXACIN HYDROCHLORIDE 500 MG: 500 TABLET, FILM COATED ORAL at 10:16

## 2018-05-14 RX ADMIN — ONDANSETRON HYDROCHLORIDE 4 MG: 2 INJECTION, SOLUTION INTRAMUSCULAR; INTRAVENOUS at 10:26

## 2018-05-14 RX ADMIN — METFORMIN HYDROCHLORIDE 1000 MG: 500 TABLET, FILM COATED ORAL at 09:06

## 2018-05-14 RX ADMIN — FUROSEMIDE 40 MG: 10 INJECTION, SOLUTION INTRAMUSCULAR; INTRAVENOUS at 05:50

## 2018-05-14 RX ADMIN — OXYCODONE HYDROCHLORIDE 5 MG: 5 TABLET ORAL at 05:50

## 2018-05-14 RX ADMIN — DIBASIC SODIUM PHOSPHATE, MONOBASIC POTASSIUM PHOSPHATE AND MONOBASIC SODIUM PHOSPHATE 1 TABLET: 852; 155; 130 TABLET ORAL at 17:38

## 2018-05-14 RX ADMIN — CALCIUM CITRATE 200 MG (950 MG) TABLET 1900 MG: at 09:08

## 2018-05-14 RX ADMIN — CALCITRIOL 0.25 MCG: 0.25 CAPSULE, LIQUID FILLED ORAL at 09:06

## 2018-05-14 RX ADMIN — HEPARIN SODIUM 5000 UNITS: 5000 INJECTION, SOLUTION INTRAVENOUS; SUBCUTANEOUS at 20:50

## 2018-05-14 RX ADMIN — METFORMIN HYDROCHLORIDE 1000 MG: 500 TABLET, FILM COATED ORAL at 17:38

## 2018-05-14 RX ADMIN — ANTACID TABLETS 1000 MG: 500 TABLET, CHEWABLE ORAL at 13:15

## 2018-05-14 RX ADMIN — OXYCODONE HYDROCHLORIDE 10 MG: 10 TABLET, FILM COATED, EXTENDED RELEASE ORAL at 20:51

## 2018-05-14 RX ADMIN — PHENYLEPHRINE HCL 10 MG: 10 TABLET, FILM COATED ORAL at 17:38

## 2018-05-14 RX ADMIN — HEPARIN SODIUM 5000 UNITS: 5000 INJECTION, SOLUTION INTRAVENOUS; SUBCUTANEOUS at 05:50

## 2018-05-14 RX ADMIN — ONDANSETRON 4 MG: 4 TABLET, ORALLY DISINTEGRATING ORAL at 17:13

## 2018-05-14 RX ADMIN — ANTACID TABLETS 1000 MG: 500 TABLET, CHEWABLE ORAL at 09:07

## 2018-05-14 RX ADMIN — HEPARIN SODIUM 5000 UNITS: 5000 INJECTION, SOLUTION INTRAVENOUS; SUBCUTANEOUS at 14:36

## 2018-05-14 RX ADMIN — ONDANSETRON 4 MG: 4 TABLET, ORALLY DISINTEGRATING ORAL at 10:15

## 2018-05-14 RX ADMIN — ACYCLOVIR 400 MG: 200 CAPSULE ORAL at 20:49

## 2018-05-14 RX ADMIN — DIBASIC SODIUM PHOSPHATE, MONOBASIC POTASSIUM PHOSPHATE AND MONOBASIC SODIUM PHOSPHATE 1 TABLET: 852; 155; 130 TABLET ORAL at 05:50

## 2018-05-14 RX ADMIN — PROCHLORPERAZINE EDISYLATE 10 MG: 5 INJECTION INTRAMUSCULAR; INTRAVENOUS at 20:53

## 2018-05-14 RX ADMIN — DIBASIC SODIUM PHOSPHATE, MONOBASIC POTASSIUM PHOSPHATE AND MONOBASIC SODIUM PHOSPHATE 1 TABLET: 852; 155; 130 TABLET ORAL at 13:15

## 2018-05-14 RX ADMIN — METOPROLOL TARTRATE 25 MG: 25 TABLET, FILM COATED ORAL at 20:50

## 2018-05-14 RX ADMIN — INSULIN HUMAN 3 UNITS: 100 INJECTION, SOLUTION PARENTERAL at 20:51

## 2018-05-14 RX ADMIN — PHENYLEPHRINE HCL 10 MG: 10 TABLET, FILM COATED ORAL at 09:09

## 2018-05-14 RX ADMIN — INSULIN HUMAN 3 UNITS: 100 INJECTION, SOLUTION PARENTERAL at 13:15

## 2018-05-14 RX ADMIN — Medication: at 20:50

## 2018-05-14 RX ADMIN — OXYCODONE HYDROCHLORIDE 10 MG: 10 TABLET, FILM COATED, EXTENDED RELEASE ORAL at 09:07

## 2018-05-14 RX ADMIN — PRAVASTATIN SODIUM 40 MG: 20 TABLET ORAL at 20:52

## 2018-05-14 RX ADMIN — METOPROLOL TARTRATE 25 MG: 25 TABLET, FILM COATED ORAL at 09:06

## 2018-05-14 RX ADMIN — POTASSIUM CHLORIDE 20 MEQ: 1500 TABLET, EXTENDED RELEASE ORAL at 09:06

## 2018-05-14 RX ADMIN — ACYCLOVIR 400 MG: 200 CAPSULE ORAL at 09:07

## 2018-05-14 ASSESSMENT — ENCOUNTER SYMPTOMS
SENSORY CHANGE: 0
DIARRHEA: 0
COUGH: 0
PHOTOPHOBIA: 0
TINGLING: 1
DIZZINESS: 0
HEADACHES: 0
BLURRED VISION: 0
VOMITING: 1
CONSTIPATION: 0
ORTHOPNEA: 0
INSOMNIA: 0
CLAUDICATION: 0
SPEECH CHANGE: 0
MYALGIAS: 1
HEARTBURN: 0
NERVOUS/ANXIOUS: 0
WEAKNESS: 0
DEPRESSION: 1
FEVER: 0
CHILLS: 0
NERVOUS/ANXIOUS: 1
SORE THROAT: 0
SHORTNESS OF BREATH: 0
BACK PAIN: 1
ABDOMINAL PAIN: 0
SENSORY CHANGE: 1
WEAKNESS: 1
NAUSEA: 1

## 2018-05-14 ASSESSMENT — PAIN SCALES - GENERAL
PAINLEVEL_OUTOF10: 0
PAINLEVEL_OUTOF10: 5
PAINLEVEL_OUTOF10: 7
PAINLEVEL_OUTOF10: 0

## 2018-05-14 NOTE — PROGRESS NOTES
[facility] Hospitalist Progress Note    Date of Service: 5/13/2018    Chief Complaint  67 y.o. female admitted 4/10/2018 with multiple myeloma, left humerus, bilateral femur radiation completed.    Interval Problem Update  5/8 Patient very debilitated, she is off antibiotics since I saw her last, still very tearful and depressed and lonely as family and friends have difficulty coming to visit [place].  She feels horrible and is concerned as her left arm is hurting more than prior - Xrays repeated today and no fracture or new lytic lesion seen.  I asked her if she wanted to try another round of chemotherapy as she was already feeling so poorly.  She states she doesn't feel she has a choice, she is not ready to give up trying even if it means feeling worse than she already does.  5/9 Patient feeling slightly less depressed today, she is having less pain in the left arm.  Plan to proceed with chemo tomorrow but at a lesser dose d/t poor functional status since the last dose.  5/10 Patient with nasal congestion and showed me what has been coming out of her nose, likely dried mucus with continued oxygen supplementation, will start ocean nasal saline and phenylephrine for congestion.  She resumes chemotherapy today, starting cycle 2.  5/11 Patient feeling about the same, she has quick response to IV diuresis but is still appearing same regarding edema, will continue this while her renal function holds normal.  No acute complaints other than feeling hot and room is stuffy.  5/12 Patient feeling okay, bilateral shins with new rash that had not been present during hospitalization, patient states it does hurt.  It is only on the anterior portion of her shins, it is in area where ice packs had been placed to cool her but she had been using these for days and today is the first day rash present, could be delayed reaction to topical lotions that have been applied also.  Keep cloth in between ice pack and skin and if rash  persists, will discontinue the lac hydrin lotion that has been applied since early in the admission.   Patient with many complaints today, uncomfortable, hot, poor appetite d/t absent taste now.  Support offered and reassurance that what she feels is to be expected due to s/e of chemotherapy.  She started having nose bleed with frequent nose blowing, encouraged her to be gentle and use the ocean spray frequently to keep her nose moisturized.    Consultants/Specialty  Oncology - [facility]  .  Disposition  Difficult discharge.        Review of Systems   Constitutional: Negative for chills and fever.   HENT: Positive for nosebleeds (started ). Negative for congestion and sore throat.    Eyes: Negative for blurred vision and photophobia.   Respiratory: Negative for cough and shortness of breath.    Cardiovascular: Negative for chest pain, claudication and leg swelling.   Gastrointestinal: Negative for abdominal pain, constipation, diarrhea, heartburn and vomiting.   Genitourinary: Negative for dysuria and hematuria.   Musculoskeletal: Positive for myalgias (globally). Negative for joint pain.   Skin: Positive for itching (better) and rash (better).   Neurological: Negative for dizziness, sensory change, speech change, weakness and headaches.   Psychiatric/Behavioral: Positive for depression. The patient is not nervous/anxious and does not have insomnia.       Physical Exam  Laboratory/Imaging   Hemodynamics  Temp (24hrs), Av.8 °C (98.3 °F), Min:36.7 °C (98 °F), Max:37.2 °C (99 °F)   Temperature: 37.1 °C (98.7 °F)  Pulse  Av.4  Min: 38  Max: 128    Blood Pressure : 153/84      Respiratory      Respiration: 18, Pulse Oximetry: 95 %     Work Of Breathing / Effort: Mild  RUL Breath Sounds: Clear, RML Breath Sounds: Diminished, RLL Breath Sounds: Diminished, CHARLOTTE Breath Sounds: Clear, LLL Breath Sounds: Diminished    Fluids    Intake/Output Summary (Last 24 hours) at 18 4687  Last data filed at 18  1600   Gross per 24 hour   Intake             3365 ml   Output             4550 ml   Net            -1185 ml       Nutrition  Orders Placed This Encounter   Procedures   • Diet Order     Standing Status:   Standing     Number of Occurrences:   1     Order Specific Question:   Diet:     Answer:   Diabetic [3]     Physical Exam   Constitutional: She is oriented to person, place, and time. She appears well-developed and well-nourished. No distress.   HENT:   Head: Normocephalic and atraumatic.   Eyes: Conjunctivae are normal. No scleral icterus.   Neck: Neck supple. No JVD present.   Cardiovascular: Normal rate, regular rhythm and normal heart sounds.  Exam reveals no gallop and no friction rub.    No murmur heard.  Pulmonary/Chest: Effort normal and breath sounds normal. No respiratory distress. She exhibits no tenderness.   Abdominal: Soft. Bowel sounds are normal. She exhibits no distension. There is no guarding.   Musculoskeletal: She exhibits edema (ble - improving). She exhibits no tenderness.   Neurological: She is alert and oriented to person, place, and time. No cranial nerve deficit.   Skin: Skin is warm and dry. Rash (bilateral shins - Better today. ) noted. She is not diaphoretic. No erythema. No pallor.   Erythema bilateral LE/shins - improved from prior.   Psychiatric: She has a normal mood and affect. Her behavior is normal.   Nursing note and vitals reviewed.      Recent Labs      05/11/18 0137 05/12/18 0136 05/13/18   0013   WBC  6.4  6.4  4.0*   RBC  2.83*  2.43*  2.70*   HEMOGLOBIN  8.6*  7.3*  8.2*   HEMATOCRIT  27.3*  24.0*  26.8*   MCV  96.5  98.8*  99.3*   MCH  30.4  30.0  30.4   MCHC  31.5*  30.4*  30.6*   RDW  53.6*  55.7*  55.8*   PLATELETCT  133*  135*  106*   MPV  9.8  10.1  10.9     Recent Labs      05/11/18   0015  05/12/18 0136 05/13/18   0013   SODIUM  137  137  138   POTASSIUM  4.1  3.6  3.8   CHLORIDE  99  97  98   CO2  33  36*  35*   GLUCOSE  219*  160*  142*   BUN  10  9  8    CREATININE  0.62  0.60  0.47*   CALCIUM  6.8*  7.2*  7.1*                      Assessment/Plan     * Pathologic fracture- (present on admission)   Assessment & Plan    -Left humerus  -widespread lytic disease  -completed radiation to humerus  -pain control             Multiple myeloma (HCC)- (present on admission)   Assessment & Plan    CyBorD chemo - cycle 1 started 4/19/18; cycle 2 started on 5/10  -recent diagnosis, appears aggressive, now with innumerable lytic lesions  -Dr Sim and Oksana consulted for assistance in management  - IV dilaudid PRN, oxy prn. DC maintenance steroids as these were causing confusion  -oxycontin CR 10 mg BID  -skeletal survey done - spine obscured by body habitus, but multiple other lesions found  XRT to right and left femurs completed  -Palliative care consulted for advanced care planning            Diarrhea   Assessment & Plan    Resolved  C diff negative  Imodium PRN          Hypocalcemia   Assessment & Plan    - Repleting as needed, trending up with diuresis/less dilution.   due to recent bisphosphonate use; replete Mg  Corrected calcium  normal - low secondary to hypoalbuminemia          Hypomagnesemia   Assessment & Plan    - Repleting as needed        Epistaxis   Assessment & Plan    Mild, d/t frequent nose blowing  Heparin for dvt prophylaxis which she needs  Platelets are adequate          Rash   Assessment & Plan    Improved with benadryl cream  DC Lac-hydrin  Bilateral shin involvement only  Suspect is contact dermatitis from ice packs - not on calves nor any other area that had not been touched by ice packs  If ice packs applied to body - cloth to be between skin and ice pack             Bacteriuria   Assessment & Plan    LFGNR growing in urine culture, nava in place, asymptomatic.          Nasal congestion   Assessment & Plan    Ocean nasal saline  Phenylephrine PRN congestion.          Decubitus ulcer of buttock   Assessment & Plan    Wound care; rotate frequently  in bed; air mattress.        Debility- (present on admission)   Assessment & Plan    Due to current illnesses; not able to walk since early april;  Out of SNF days per SW; cont PT/OT here  Poor prognosis given current diagnoses/morbid obesity.  LTAC declined d/t active chemotherapy.        Cellulitis   Assessment & Plan    Bilateral LE  improving; venous stasis component  Changed to po Augmentin per ID, completed 5/5/18          DNR (do not resuscitate)   Assessment & Plan    Palliative care consulted, patient wants to change code status to DNR - done, completed Polst with APN.          Chemotherapy-induced thrombocytopenia   Assessment & Plan    stable; monitor; no bleeding now          Leg edema   Assessment & Plan      -PO to IV Lasix scheduled bid, doing well and renal function stable.            Chronic venous stasis dermatitis of both lower extremities- (present on admission)   Assessment & Plan    -wound care, oral abx completed          Anemia- (present on admission)   Assessment & Plan    Stable cbc; monitor  Transfuse if hgb <7.0          Morbid obesity with BMI of 60.0-69.9, adult (CMS-HCC)- (present on admission)   Assessment & Plan    -encourage weight loss  Body mass index is 67.38 kg/m².        DM type 2 (diabetes mellitus, type 2) (CMS-HCC)- (present on admission)   Assessment & Plan    Fair control; adjust meds for good control        Essential hypertension, benign- (present on admission)   Assessment & Plan    Adjust med for good control.          Quality-Core Measures   Reviewed items::  Labs reviewed, Medications reviewed and Radiology images reviewed  Singh catheter::  Urinary Tract Retention or Urinary Tract Obstruction  DVT prophylaxis pharmacological::  Heparin  Assessed for rehabilitation services:  Patient was assess for and/or received rehabilitation services during this hospitalization

## 2018-05-14 NOTE — CARE PLAN
Problem: Mobility  Goal: Risk for activity intolerance will decrease  Outcome: PROGRESSING AS EXPECTED  Patient able to assist in turns and repositioning. Patient requires encouragement and reinforcement to help     Problem: Psychosocial Needs:  Goal: Level of anxiety will decrease  Outcome: PROGRESSING AS EXPECTED  Patient tearful and anxious; able to identify what triggers anxiety. Emotional support provided, PRN xanax provides relief per patient report

## 2018-05-14 NOTE — CARE PLAN
Problem: Nutritional:  Goal: Achieve adequate nutritional intake  Patient will consume >50% of meals   Outcome: MET Date Met: 05/14/18  PO >50% for majority of meals.

## 2018-05-14 NOTE — ASSESSMENT & PLAN NOTE
Mild, d/t frequent nose blowing  Heparin for dvt prophylaxis which she needs  Platelets are adequate

## 2018-05-14 NOTE — PROGRESS NOTES
Oncology/Hematology Progress Note               Author: Scottdelphineyamileth Rogelio Date & Time created: 5/14/2018  12:49 PM   DX-Multiple myeloma  Multiple myeloma-- 1P deletion intermediate risk, IgG kappa.  Stage III based on ISS staging (beta 2 9.9 and albumin 2.7 before).  Admitted with pathologic fracture of left humerus. Received palliative radiation to the left humerus during this admission. Poor performance status. Lives alone in [facility].       Started chemotherapy here as an inpatient. Received cycle #1 day #1 of CyBorD on April 19. She has completed CYCLE #1.  - C # 2 switched to weekly regimen Velcade 1.5 mg/m2 + Cytoxan 300 mg/m2+ DEX 40 mg weekly days 1,8,15,22 of every 28 day cycle . Start C2D1 on 5/10/18 . Next dose D8 5/17/18      Interval History:  Stable. Pain is well controlled. On PT.Neuropathy.    Review of Systems:  Review of Systems   Constitutional: Positive for malaise/fatigue.   Cardiovascular: Negative for chest pain and orthopnea.   Genitourinary: Negative for dysuria and hematuria.   Musculoskeletal: Positive for back pain, joint pain and myalgias.   Skin: Negative for itching and rash.   Neurological: Positive for tingling, sensory change and weakness. Negative for headaches.   Psychiatric/Behavioral: The patient is nervous/anxious.        Physical Exam:  Physical Exam   Constitutional: She is oriented to person, place, and time. She appears well-developed.   Pulmonary/Chest: Breath sounds normal. No respiratory distress. She has no wheezes.   Abdominal: Soft. Bowel sounds are normal. She exhibits no distension.   obese   Neurological: She is alert and oriented to person, place, and time.   Skin: Skin is warm and dry. No rash noted. She is not diaphoretic.       Labs:        Invalid input(s): [id number]      Recent Labs      05/12/18   0136  05/13/18   0013  05/14/18   0012   SODIUM  137  138  137   POTASSIUM  3.6  3.8  3.9   CHLORIDE  97  98  96   CO2  36*  35*  36*   BUN  9  8  7*    CREATININE  0.60  0.47*  0.58   PHOSPHORUS  2.7  2.7  3.1   CALCIUM  7.2*  7.1*  7.8*     Recent Labs      18   0013  18   0012   GLUCOSE  160*  142*  157*     Recent Labs      183  18   0012   RBC  2.43*  2.70*  2.86*   HEMOGLOBIN  7.3*  8.2*  8.7*   HEMATOCRIT  24.0*  26.8*  27.6*   PLATELETCT  135*  106*  94*     Recent Labs      18   0013  05/14/18   0012   WBC  6.4  4.0*  4.0*   NEUTSPOLYS  69.80  71.90  76.00*   LYMPHOCYTES  15.60*  15.00*  14.10*   MONOCYTES  8.90  9.70  7.60   EOSINOPHILS  0.20  0.20  0.00   BASOPHILS  0.20  0.20  0.30     Recent Labs      18   0012   SODIUM  137  138  137   POTASSIUM  3.6  3.8  3.9   CHLORIDE  97  98  96   CO2  36*  35*  36*   GLUCOSE  160*  142*  157*   BUN  9  8  7*   CREATININE  0.60  0.47*  0.58   CALCIUM  7.2*  7.1*  7.8*     Hemodynamics:  Temp (24hrs), Av.9 °C (98.4 °F), Min:36.7 °C (98 °F), Max:37.1 °C (98.7 °F)  Temperature: 36.9 °C (98.4 °F)  Pulse  Av.7  Min: 38  Max: 128   Blood Pressure : 159/88     Respiratory:    Respiration: 18, Pulse Oximetry: 91 %     Work Of Breathing / Effort: Mild  RUL Breath Sounds: Clear, RML Breath Sounds: Diminished, RLL Breath Sounds: Diminished, CHARLOTTE Breath Sounds: Clear, LLL Breath Sounds: Diminished  Fluids:    Intake/Output Summary (Last 24 hours) at 18 1249  Last data filed at 18 1028   Gross per 24 hour   Intake              600 ml   Output             5350 ml   Net            -4750 ml       GI/Nutrition:  Orders Placed This Encounter   Procedures   • Diet Order     Standing Status:   Standing     Number of Occurrences:   1     Order Specific Question:   Diet:     Answer:   Diabetic [3]     Medical Decision Making, by Problem:  Active Hospital Problems    Diagnosis   • *Pathologic fracture [M84.40XA]   • Multiple myeloma (HCC) [C90.00]   • Diarrhea [R19.7]   • Hypomagnesemia  [E83.42]   • Hypocalcemia [E83.51]   • Epistaxis [R04.0]   • Bacteriuria [R82.71]   • Rash [R21]   • Nasal congestion [R09.81]   • Decubitus ulcer of buttock [L89.309]   • Debility [R53.81]   • DNR (do not resuscitate) [Z66]   • Chemotherapy-induced thrombocytopenia [D69.59, T45.1X5A]   • Leg edema [R60.0]   • Anemia [D64.9]   • Chronic venous stasis dermatitis of both lower extremities [I87.2]   • Cellulitis [L03.90]   • Morbid obesity with BMI of 60.0-69.9, adult (CMS-[facility]) [E66.01, Z68.44]   • DM type 2 (diabetes mellitus, type 2) (CMS-HCC) [E11.9]   • Essential hypertension, benign [I10]       Plan:  MM-Multiple myeloma-- 1P deletion intermediate risk, IgG kappa.  Stage III based on ISS staging (beta 2 9.9 and albumin 2.7 before).  Admitted with pathologic fracture of left humerus. Received palliative radiation to the left humerus during this admission. Poor performance status. [facility]       Started chemotherapy here as an inpatient. Received cycle #1 day #1 of CyBorD on April 19. She has completed CYCLE #1.  - C # 2 switched to weekly regimen Velcade 1.5 mg/m2 + Cytoxan 300 mg/m2+ DEX 40 mg weekly days 1,8,15,22 of every 28 day cycle . Start C2D1 on 5/10/18 . Next dose D8 5/17/18   Neuropathy-Preexisting before initiation of chemo. Will watch.    Quality-Core Measures

## 2018-05-14 NOTE — PROGRESS NOTES
Patient A&O x4, tearful tonight after having a hard day with repositioning and getting help per patient report. Emotional support and comfort measure provided, pt reports feeling better now. C/o knee and leg pain 7/10; medicated per MAR and ice packs applied. Patient nauseous with turning and repositioning, denies emesis. Singh in place to gravity drainage. Patient incontinent of stool; barrier cream applied to sacrum and buttocks. Call light and all belongings within reach, all needs met at this time.

## 2018-05-15 PROBLEM — R09.81 NASAL CONGESTION: Status: RESOLVED | Noted: 2018-05-10 | Resolved: 2018-05-15

## 2018-05-15 PROBLEM — R04.0 EPISTAXIS: Status: RESOLVED | Noted: 2018-05-13 | Resolved: 2018-05-15

## 2018-05-15 LAB
ALBUMIN SERPL BCP-MCNC: 2.1 G/DL (ref 3.2–4.9)
BASOPHILS # BLD AUTO: 0.3 % (ref 0–1.8)
BASOPHILS # BLD: 0.01 K/UL (ref 0–0.12)
BUN SERPL-MCNC: 8 MG/DL (ref 8–22)
CA-I SERPL-SCNC: 1 MMOL/L (ref 1.1–1.3)
CALCIUM SERPL-MCNC: 7.9 MG/DL (ref 8.5–10.5)
CHLORIDE SERPL-SCNC: 95 MMOL/L (ref 96–112)
CO2 SERPL-SCNC: 39 MMOL/L (ref 20–33)
CREAT SERPL-MCNC: 0.6 MG/DL (ref 0.5–1.4)
EOSINOPHIL # BLD AUTO: 0.01 K/UL (ref 0–0.51)
EOSINOPHIL NFR BLD: 0.3 % (ref 0–6.9)
ERYTHROCYTE [DISTWIDTH] IN BLOOD BY AUTOMATED COUNT: 54.2 FL (ref 35.9–50)
GLUCOSE BLD-MCNC: 135 MG/DL (ref 65–99)
GLUCOSE BLD-MCNC: 152 MG/DL (ref 65–99)
GLUCOSE BLD-MCNC: 152 MG/DL (ref 65–99)
GLUCOSE BLD-MCNC: 167 MG/DL (ref 65–99)
GLUCOSE SERPL-MCNC: 120 MG/DL (ref 65–99)
HCT VFR BLD AUTO: 27.8 % (ref 37–47)
HGB BLD-MCNC: 8.6 G/DL (ref 12–16)
IMM GRANULOCYTES # BLD AUTO: 0.06 K/UL (ref 0–0.11)
IMM GRANULOCYTES NFR BLD AUTO: 1.6 % (ref 0–0.9)
LYMPHOCYTES # BLD AUTO: 0.61 K/UL (ref 1–4.8)
LYMPHOCYTES NFR BLD: 16.5 % (ref 22–41)
MCH RBC QN AUTO: 30 PG (ref 27–33)
MCHC RBC AUTO-ENTMCNC: 30.9 G/DL (ref 33.6–35)
MCV RBC AUTO: 96.9 FL (ref 81.4–97.8)
MONOCYTES # BLD AUTO: 0.3 K/UL (ref 0–0.85)
MONOCYTES NFR BLD AUTO: 8.1 % (ref 0–13.4)
NEUTROPHILS # BLD AUTO: 2.7 K/UL (ref 2–7.15)
NEUTROPHILS NFR BLD: 73.2 % (ref 44–72)
NRBC # BLD AUTO: 0.02 K/UL
NRBC BLD-RTO: 0.5 /100 WBC
PHOSPHATE SERPL-MCNC: 3.6 MG/DL (ref 2.5–4.5)
PLATELET # BLD AUTO: 96 K/UL (ref 164–446)
PMV BLD AUTO: 11.4 FL (ref 9–12.9)
POTASSIUM SERPL-SCNC: 4 MMOL/L (ref 3.6–5.5)
RBC # BLD AUTO: 2.87 M/UL (ref 4.2–5.4)
SODIUM SERPL-SCNC: 138 MMOL/L (ref 135–145)
WBC # BLD AUTO: 3.7 K/UL (ref 4.8–10.8)

## 2018-05-15 PROCEDURE — 700102 HCHG RX REV CODE 250 W/ 637 OVERRIDE(OP): Performed by: INTERNAL MEDICINE

## 2018-05-15 PROCEDURE — 770021 HCHG ROOM/CARE - ISO PRIVATE

## 2018-05-15 PROCEDURE — 85025 COMPLETE CBC W/AUTO DIFF WBC: CPT

## 2018-05-15 PROCEDURE — 302092 REMEDY SKIN REPAIR CREAM: Performed by: INTERNAL MEDICINE

## 2018-05-15 PROCEDURE — 87493 C DIFF AMPLIFIED PROBE: CPT

## 2018-05-15 PROCEDURE — 82962 GLUCOSE BLOOD TEST: CPT | Mod: 91

## 2018-05-15 PROCEDURE — 97535 SELF CARE MNGMENT TRAINING: CPT | Mod: XE

## 2018-05-15 PROCEDURE — 770004 HCHG ROOM/CARE - ONCOLOGY PRIVATE *

## 2018-05-15 PROCEDURE — 99232 SBSQ HOSP IP/OBS MODERATE 35: CPT | Performed by: INTERNAL MEDICINE

## 2018-05-15 PROCEDURE — 93971 EXTREMITY STUDY: CPT

## 2018-05-15 PROCEDURE — 302255 BARRIER CREAM MOISTURE BAZA PROTECT (ZINC) 5OZ: Performed by: INTERNAL MEDICINE

## 2018-05-15 PROCEDURE — 97530 THERAPEUTIC ACTIVITIES: CPT

## 2018-05-15 PROCEDURE — 80069 RENAL FUNCTION PANEL: CPT

## 2018-05-15 PROCEDURE — A9270 NON-COVERED ITEM OR SERVICE: HCPCS | Performed by: INTERNAL MEDICINE

## 2018-05-15 PROCEDURE — 302118 SHAMPOO,NO RINSE: Performed by: INTERNAL MEDICINE

## 2018-05-15 PROCEDURE — 82330 ASSAY OF CALCIUM: CPT

## 2018-05-15 PROCEDURE — 700111 HCHG RX REV CODE 636 W/ 250 OVERRIDE (IP): Performed by: INTERNAL MEDICINE

## 2018-05-15 PROCEDURE — A6250 SKIN SEAL PROTECT MOISTURIZR: HCPCS | Performed by: INTERNAL MEDICINE

## 2018-05-15 RX ADMIN — ALPRAZOLAM 0.5 MG: 0.5 TABLET ORAL at 22:30

## 2018-05-15 RX ADMIN — ANTACID TABLETS 1000 MG: 500 TABLET, CHEWABLE ORAL at 17:53

## 2018-05-15 RX ADMIN — PROCHLORPERAZINE EDISYLATE 10 MG: 5 INJECTION INTRAMUSCULAR; INTRAVENOUS at 10:01

## 2018-05-15 RX ADMIN — METOPROLOL TARTRATE 50 MG: 25 TABLET, FILM COATED ORAL at 21:02

## 2018-05-15 RX ADMIN — Medication: at 21:01

## 2018-05-15 RX ADMIN — METFORMIN HYDROCHLORIDE 1000 MG: 500 TABLET, FILM COATED ORAL at 08:14

## 2018-05-15 RX ADMIN — PHENYLEPHRINE HCL 10 MG: 10 TABLET, FILM COATED ORAL at 17:51

## 2018-05-15 RX ADMIN — OXYCODONE HYDROCHLORIDE 10 MG: 10 TABLET, FILM COATED, EXTENDED RELEASE ORAL at 21:03

## 2018-05-15 RX ADMIN — INSULIN HUMAN 3 UNITS: 100 INJECTION, SOLUTION PARENTERAL at 20:53

## 2018-05-15 RX ADMIN — INSULIN HUMAN 3 UNITS: 100 INJECTION, SOLUTION PARENTERAL at 09:14

## 2018-05-15 RX ADMIN — DIBASIC SODIUM PHOSPHATE, MONOBASIC POTASSIUM PHOSPHATE AND MONOBASIC SODIUM PHOSPHATE 1 TABLET: 852; 155; 130 TABLET ORAL at 12:44

## 2018-05-15 RX ADMIN — INSULIN HUMAN 3 UNITS: 100 INJECTION, SOLUTION PARENTERAL at 17:54

## 2018-05-15 RX ADMIN — HEPARIN SODIUM 5000 UNITS: 5000 INJECTION, SOLUTION INTRAVENOUS; SUBCUTANEOUS at 05:49

## 2018-05-15 RX ADMIN — POTASSIUM CHLORIDE 20 MEQ: 1500 TABLET, EXTENDED RELEASE ORAL at 08:14

## 2018-05-15 RX ADMIN — ONDANSETRON 4 MG: 4 TABLET, ORALLY DISINTEGRATING ORAL at 08:12

## 2018-05-15 RX ADMIN — METOPROLOL TARTRATE 25 MG: 25 TABLET, FILM COATED ORAL at 08:12

## 2018-05-15 RX ADMIN — CALCIUM CITRATE 200 MG (950 MG) TABLET 1900 MG: at 08:13

## 2018-05-15 RX ADMIN — FUROSEMIDE 40 MG: 10 INJECTION, SOLUTION INTRAMUSCULAR; INTRAVENOUS at 05:49

## 2018-05-15 RX ADMIN — HEPARIN SODIUM 5000 UNITS: 5000 INJECTION, SOLUTION INTRAVENOUS; SUBCUTANEOUS at 14:26

## 2018-05-15 RX ADMIN — ANTACID TABLETS 1000 MG: 500 TABLET, CHEWABLE ORAL at 12:44

## 2018-05-15 RX ADMIN — ANTACID TABLETS 1000 MG: 500 TABLET, CHEWABLE ORAL at 08:13

## 2018-05-15 RX ADMIN — ACYCLOVIR 400 MG: 200 CAPSULE ORAL at 21:00

## 2018-05-15 RX ADMIN — LOPERAMIDE HYDROCHLORIDE 2 MG: 2 CAPSULE ORAL at 10:51

## 2018-05-15 RX ADMIN — PHENYLEPHRINE HCL 10 MG: 10 TABLET, FILM COATED ORAL at 13:02

## 2018-05-15 RX ADMIN — POTASSIUM CHLORIDE 20 MEQ: 1500 TABLET, EXTENDED RELEASE ORAL at 21:03

## 2018-05-15 RX ADMIN — CIPROFLOXACIN HYDROCHLORIDE 500 MG: 500 TABLET, FILM COATED ORAL at 08:13

## 2018-05-15 RX ADMIN — FUROSEMIDE 40 MG: 10 INJECTION, SOLUTION INTRAMUSCULAR; INTRAVENOUS at 15:36

## 2018-05-15 RX ADMIN — PRAVASTATIN SODIUM 40 MG: 20 TABLET ORAL at 21:03

## 2018-05-15 RX ADMIN — DIBASIC SODIUM PHOSPHATE, MONOBASIC POTASSIUM PHOSPHATE AND MONOBASIC SODIUM PHOSPHATE 1 TABLET: 852; 155; 130 TABLET ORAL at 05:49

## 2018-05-15 RX ADMIN — DIBASIC SODIUM PHOSPHATE, MONOBASIC POTASSIUM PHOSPHATE AND MONOBASIC SODIUM PHOSPHATE 1 TABLET: 852; 155; 130 TABLET ORAL at 00:32

## 2018-05-15 RX ADMIN — ONDANSETRON HYDROCHLORIDE 4 MG: 2 INJECTION, SOLUTION INTRAMUSCULAR; INTRAVENOUS at 20:27

## 2018-05-15 RX ADMIN — HEPARIN SODIUM 5000 UNITS: 5000 INJECTION, SOLUTION INTRAVENOUS; SUBCUTANEOUS at 21:02

## 2018-05-15 RX ADMIN — OXYCODONE HYDROCHLORIDE 10 MG: 10 TABLET, FILM COATED, EXTENDED RELEASE ORAL at 08:12

## 2018-05-15 RX ADMIN — CIPROFLOXACIN HYDROCHLORIDE 500 MG: 500 TABLET, FILM COATED ORAL at 21:01

## 2018-05-15 RX ADMIN — ACYCLOVIR 400 MG: 200 CAPSULE ORAL at 08:12

## 2018-05-15 RX ADMIN — CALCITRIOL 0.25 MCG: 0.25 CAPSULE, LIQUID FILLED ORAL at 08:13

## 2018-05-15 RX ADMIN — METFORMIN HYDROCHLORIDE 1000 MG: 500 TABLET, FILM COATED ORAL at 17:53

## 2018-05-15 RX ADMIN — Medication: at 08:14

## 2018-05-15 ASSESSMENT — ENCOUNTER SYMPTOMS
VOMITING: 1
NAUSEA: 1
HEADACHES: 0
DEPRESSION: 1
FEVER: 0
SPEECH CHANGE: 0
MYALGIAS: 1
BLURRED VISION: 0
WEAKNESS: 0
DIARRHEA: 1
PHOTOPHOBIA: 0
SORE THROAT: 0
COUGH: 0
CLAUDICATION: 0
CONSTIPATION: 0
SENSORY CHANGE: 0
NERVOUS/ANXIOUS: 0
INSOMNIA: 0
DIZZINESS: 0
ABDOMINAL PAIN: 0
HEARTBURN: 0
SHORTNESS OF BREATH: 0
CHILLS: 0

## 2018-05-15 ASSESSMENT — PAIN SCALES - GENERAL
PAINLEVEL_OUTOF10: 5
PAINLEVEL_OUTOF10: 7
PAINLEVEL_OUTOF10: 6

## 2018-05-15 ASSESSMENT — COGNITIVE AND FUNCTIONAL STATUS - GENERAL
TOILETING: TOTAL
HELP NEEDED FOR BATHING: A LOT
DAILY ACTIVITIY SCORE: 14
DRESSING REGULAR LOWER BODY CLOTHING: TOTAL
SUGGESTED CMS G CODE MODIFIER DAILY ACTIVITY: CK
DRESSING REGULAR UPPER BODY CLOTHING: A LITTLE
PERSONAL GROOMING: A LITTLE

## 2018-05-15 NOTE — PROGRESS NOTES
Patient A&O x4, resting in bed, tearful tonight about getting comfortable. Patient c/o nausea; medicated per MAR with compazine. Poor appetite today. Singh in place with large clear yellow output. 2.5L via NC. PICC infusing TKO with positive blood return. Barrier cream applied to sacrum and buttocks after stool incontinence tonight. Interdry under pannus. Call light and all belongings within reach, all needs met at this time.

## 2018-05-15 NOTE — PROGRESS NOTES
[facility] Hospitalist Progress Note    Date of Service: 5/14/2018    Chief Complaint  67 y.o. female admitted 4/10/2018 with multiple myeloma, left humerus, bilateral femur radiation completed.    Interval Problem Update  5/8 Patient very debilitated, she is off antibiotics since I saw her last, still very tearful and depressed and lonely as family and friends have difficulty coming to visit [place].  She feels horrible and is concerned as her left arm is hurting more than prior - Xrays repeated today and no fracture or new lytic lesion seen.  I asked her if she wanted to try another round of chemotherapy as she was already feeling so poorly.  She states she doesn't feel she has a choice, she is not ready to give up trying even if it means feeling worse than she already does.  5/9 Patient feeling slightly less depressed today, she is having less pain in the left arm.  Plan to proceed with chemo tomorrow but at a lesser dose d/t poor functional status since the last dose.  5/10 Patient with nasal congestion and showed me what has been coming out of her nose, likely dried mucus with continued oxygen supplementation, will start ocean nasal saline and phenylephrine for congestion.  She resumes chemotherapy today, starting cycle 2.  5/11 Patient feeling about the same, she has quick response to IV diuresis but is still appearing same regarding edema, will continue this while her renal function holds normal.  No acute complaints other than feeling hot and room is stuffy.  5/12 Patient feeling okay, bilateral shins with new rash that had not been present during hospitalization, patient states it does hurt.  It is only on the anterior portion of her shins, it is in area where ice packs had been placed to cool her but she had been using these for days and today is the first day rash present, could be delayed reaction to topical lotions that have been applied also.  Keep cloth in between ice pack and skin and if rash  persists, will discontinue the lac hydrin lotion that has been applied since early in the admission.   Patient with many complaints today, uncomfortable, hot, poor appetite d/t absent taste now.  Support offered and reassurance that what she feels is to be expected due to s/e of chemotherapy.  She started having nose bleed with frequent nose blowing, encouraged her to be gentle and use the ocean spray frequently to keep her nose moisturized.   Patient without new complaint, still uncomfortable and sleeping much of the time.  Rash on legs continuing to improve.  Urine culture positive for Klebsiella - 7 days of PO cipro due to indwelling nava and immunocompromise with active chemotherapy every 4 days.    Consultants/Specialty  Oncology - Reganti  .  Disposition  Difficult discharge.        Review of Systems   Constitutional: Negative for chills and fever.   HENT: Positive for nosebleeds (started ). Negative for congestion and sore throat.    Eyes: Negative for blurred vision and photophobia.   Respiratory: Negative for cough and shortness of breath.    Cardiovascular: Negative for chest pain, claudication and leg swelling.   Gastrointestinal: Positive for nausea and vomiting. Negative for abdominal pain, constipation, diarrhea and heartburn.   Genitourinary: Negative for dysuria and hematuria.   Musculoskeletal: Positive for myalgias (globally). Negative for joint pain.   Skin: Positive for itching (better) and rash (better).   Neurological: Negative for dizziness, sensory change, speech change, weakness and headaches.   Psychiatric/Behavioral: Positive for depression. The patient is not nervous/anxious and does not have insomnia.       Physical Exam  Laboratory/Imaging   Hemodynamics  Temp (24hrs), Av.9 °C (98.4 °F), Min:36.9 °C (98.4 °F), Max:36.9 °C (98.4 °F)   Temperature: 36.9 °C (98.4 °F)  Pulse  Av.8  Min: 38  Max: 128    Blood Pressure : (!) 167/81      Respiratory      Respiration: 20,  Pulse Oximetry: 96 %     Work Of Breathing / Effort: Mild  RUL Breath Sounds: Clear, RML Breath Sounds: Diminished, RLL Breath Sounds: Diminished, CHARLOTTE Breath Sounds: Clear, LLL Breath Sounds: Diminished    Fluids    Intake/Output Summary (Last 24 hours) at 05/14/18 2006  Last data filed at 05/14/18 1744   Gross per 24 hour   Intake             1708 ml   Output             5200 ml   Net            -3492 ml       Nutrition  Orders Placed This Encounter   Procedures   • Diet Order     Standing Status:   Standing     Number of Occurrences:   1     Order Specific Question:   Diet:     Answer:   Diabetic [3]     Physical Exam   Constitutional: She is oriented to person, place, and time. She appears well-developed and well-nourished. No distress.   HENT:   Head: Normocephalic and atraumatic.   Eyes: Conjunctivae are normal. No scleral icterus.   Neck: Neck supple. No JVD present.   Cardiovascular: Normal rate, regular rhythm and normal heart sounds.  Exam reveals no gallop and no friction rub.    No murmur heard.  Pulmonary/Chest: Effort normal and breath sounds normal. No respiratory distress. She exhibits no tenderness.   Abdominal: Soft. Bowel sounds are normal. She exhibits no distension. There is no guarding.   Musculoskeletal: She exhibits edema (ble - improving). She exhibits no tenderness.   Neurological: She is alert and oriented to person, place, and time. No cranial nerve deficit.   Skin: Skin is warm and dry. Rash (bilateral shins - Better today. ) noted. She is not diaphoretic. No erythema. No pallor.   Erythema bilateral LE/shins - improved from prior.   Psychiatric: She has a normal mood and affect. Her behavior is normal.   Nursing note and vitals reviewed.      Recent Labs      05/12/18   0136  05/13/18   0013  05/14/18   0012   WBC  6.4  4.0*  4.0*   RBC  2.43*  2.70*  2.86*   HEMOGLOBIN  7.3*  8.2*  8.7*   HEMATOCRIT  24.0*  26.8*  27.6*   MCV  98.8*  99.3*  96.5   MCH  30.0  30.4  30.4   MCHC  30.4*   30.6*  31.5*   RDW  55.7*  55.8*  54.1*   PLATELETCT  135*  106*  94*   MPV  10.1  10.9  10.8     Recent Labs      05/12/18   0136  05/13/18   0013  05/14/18   0012   SODIUM  137  138  137   POTASSIUM  3.6  3.8  3.9   CHLORIDE  97  98  96   CO2  36*  35*  36*   GLUCOSE  160*  142*  157*   BUN  9  8  7*   CREATININE  0.60  0.47*  0.58   CALCIUM  7.2*  7.1*  7.8*                      Assessment/Plan     * Pathologic fracture- (present on admission)   Assessment & Plan    -Left humerus  -widespread lytic disease  -completed radiation to humerus  -pain control             Multiple myeloma (HCC)- (present on admission)   Assessment & Plan    CyBorD chemo - cycle 1 started 4/19/18; cycle 2 started on 5/10  -recent diagnosis, appears aggressive, now with innumerable lytic lesions  -Dr Sim and Oksana consulted for assistance in management  - IV dilaudid PRN, oxy prn. DC maintenance steroids as these were causing confusion  -oxycontin CR 10 mg BID  -skeletal survey done - spine obscured by body habitus, but multiple other lesions found  XRT to right and left femurs completed  -Palliative care consulted for advanced care planning            Diarrhea   Assessment & Plan    Resolved  C diff negative  Imodium PRN          Hypocalcemia   Assessment & Plan    - Repleting as needed, trending up with diuresis/less dilution.   due to recent bisphosphonate use; replete Mg  Corrected calcium  normal - low secondary to hypoalbuminemia          Hypomagnesemia   Assessment & Plan    - Repleting as needed        Epistaxis   Assessment & Plan    Mild, d/t frequent nose blowing  Heparin for dvt prophylaxis which she needs  Platelets are adequate          Rash   Assessment & Plan    Continued improvement with benadryl cream  DC Lac-hydrin  Bilateral shin involvement only  Suspect is contact dermatitis from ice packs - not on calves nor any other area that had not been touched by ice packs  If ice packs applied to body - cloth to be  between skin and ice pack             Bacteriuria   Assessment & Plan    Klebsiella growing in urine culture, nava in place, asymptomatic.  Immunocompromised due to chemotherapy - cipro x 7 days based on sensitivity.        Nasal congestion   Assessment & Plan    Ocean nasal saline  Phenylephrine PRN congestion.          Decubitus ulcer of buttock   Assessment & Plan    Wound care; rotate frequently in bed; air mattress.        Debility- (present on admission)   Assessment & Plan    Due to current illnesses; not able to walk since early april;  Out of SNF days per SW; cont PT/OT here  Poor prognosis given current diagnoses/morbid obesity.  LTAC declined d/t active chemotherapy.        Cellulitis   Assessment & Plan    Bilateral LE  improving; venous stasis component  Changed to po Augmentin per ID, completed 5/5/18          DNR (do not resuscitate)   Assessment & Plan    Palliative care consulted, patient wants to change code status to DNR - done, completed Polst with APN.          Chemotherapy-induced thrombocytopenia   Assessment & Plan    stable; monitor; no bleeding now          Leg edema   Assessment & Plan      -PO to IV Lasix scheduled bid, doing well and renal function stable.            Chronic venous stasis dermatitis of both lower extremities- (present on admission)   Assessment & Plan    -wound care, oral abx completed          Anemia- (present on admission)   Assessment & Plan    Stable cbc; monitor  Transfuse if hgb <7.0          Morbid obesity with BMI of 60.0-69.9, adult (CMS-HCC)- (present on admission)   Assessment & Plan    -encourage weight loss  Body mass index is 67.38 kg/m².        DM type 2 (diabetes mellitus, type 2) (CMS-HCC)- (present on admission)   Assessment & Plan    Fair control; adjust meds for good control        Essential hypertension, benign- (present on admission)   Assessment & Plan    Adjust med for good control.          Quality-Core Measures   Reviewed items::  Labs  reviewed, Medications reviewed and Radiology images reviewed  Singh catheter::  Urinary Tract Retention or Urinary Tract Obstruction  DVT prophylaxis pharmacological::  Heparin  Assessed for rehabilitation services:  Patient was assess for and/or received rehabilitation services during this hospitalization

## 2018-05-15 NOTE — THERAPY
"Occupational Therapy Treatment completed with focus on ADLs, ADL transfers and patient education.  Functional Status:  Pt was seen for Occupational Therapy treatment today, see Therapy Kardex for details. Treatment included education in breath control with activity and at rest, self pacing techs and energy conservation for pain management. Educated pt in safety awareness techs as well. Psychosocial intervention addressed. Pt required Max A for supine to sit at EOB;  Pt demo increased sitting balance both static and dynamic as demo with self care tasks. Pt doffed pullover shirt with supervision only using Bilateral UE's without c/o pain. Sponge bathed UB with Min A for back only. Pt applied deodorant without assist. Oral hygiene and grooming done seated base with supervision post set up. Pt donned T-shirt with supervision. Did not attempt LB dressing today due to loose stools. Pt is Total A for sock donning.  Pt began to become nauseated and began to vomit. RN informed. While vomiting pt had a BM in bed and became upset stating, \"I'm sorry, so sorry.\" Pt needed increased TLC and encouragement to continue to participate.Pt required Max A for sit to supine and Mod A for rolling and all bed mobility. Pt washed hair with shampoo cap long sitting in bed with SBA only, verbal cues for sequencing task and extended time. Pt was left up in bed , call light in reach, bedside table in reach and nursing is aware. RN updated on OT treatment findings and recommendations .Continue Occupational Therapy services as per plan.    Plan of Care: Will benefit from Occupational Therapy 3 times per week  Discharge Recommendations:  Equipment Will Continue to Assess for Equipment Needs. Post-acute therapy Discharge to a transitional care facility for continued skilled therapy services.    See \"Rehab Therapy-Acute\" Patient Summary Report for complete documentation.   "

## 2018-05-15 NOTE — CARE PLAN
Problem: Bowel/Gastric:  Goal: Normal bowel function is maintained or improved  Outcome: PROGRESSING SLOWER THAN EXPECTED  Patient incontinent of stool. Can sometimes tell when she has had a BM but not always. BM today    Problem: Pain Management  Goal: Pain level will decrease to patient's comfort goal    Intervention: Educate and implement non-pharmacologic comfort measures. Examples: relaxation, distration, play therapy, activity therapy, massage, etc.  Patient uses ice packs and repositioning to help with pain management and states relief with interventions

## 2018-05-15 NOTE — CARE PLAN
Problem: Communication  Goal: The ability to communicate needs accurately and effectively will improve  Outcome: PROGRESSING AS EXPECTED  Patient has been resting for most of the day.  She sat at edge of bed for breakfast.  Edema to abdomen and bilateral legs continuous.  Large urine output.  Urine slightly cloudy with odor, some burning with urination at times, she was started on cipro today BID.  Appetite has been poor today.  Nausea throughout the day and one emesis this morning, using prn zofran x3 today.

## 2018-05-16 LAB
C DIFF DNA SPEC QL NAA+PROBE: NEGATIVE
C DIFF TOX GENS STL QL NAA+PROBE: NEGATIVE
GLUCOSE BLD-MCNC: 141 MG/DL (ref 65–99)
GLUCOSE BLD-MCNC: 151 MG/DL (ref 65–99)
GLUCOSE BLD-MCNC: 158 MG/DL (ref 65–99)
GLUCOSE BLD-MCNC: 224 MG/DL (ref 65–99)

## 2018-05-16 PROCEDURE — 82962 GLUCOSE BLOOD TEST: CPT | Mod: 91

## 2018-05-16 PROCEDURE — A9270 NON-COVERED ITEM OR SERVICE: HCPCS | Performed by: INTERNAL MEDICINE

## 2018-05-16 PROCEDURE — 700102 HCHG RX REV CODE 250 W/ 637 OVERRIDE(OP): Performed by: INTERNAL MEDICINE

## 2018-05-16 PROCEDURE — 700111 HCHG RX REV CODE 636 W/ 250 OVERRIDE (IP): Performed by: INTERNAL MEDICINE

## 2018-05-16 PROCEDURE — 99232 SBSQ HOSP IP/OBS MODERATE 35: CPT | Performed by: INTERNAL MEDICINE

## 2018-05-16 PROCEDURE — 770004 HCHG ROOM/CARE - ONCOLOGY PRIVATE *

## 2018-05-16 RX ORDER — DULOXETIN HYDROCHLORIDE 20 MG/1
40 CAPSULE, DELAYED RELEASE ORAL DAILY
Status: DISCONTINUED | OUTPATIENT
Start: 2018-05-16 | End: 2018-05-26 | Stop reason: HOSPADM

## 2018-05-16 RX ADMIN — METFORMIN HYDROCHLORIDE 1000 MG: 500 TABLET, FILM COATED ORAL at 09:59

## 2018-05-16 RX ADMIN — INSULIN HUMAN 3 UNITS: 100 INJECTION, SOLUTION PARENTERAL at 21:10

## 2018-05-16 RX ADMIN — OXYCODONE HYDROCHLORIDE 5 MG: 5 TABLET ORAL at 02:11

## 2018-05-16 RX ADMIN — CIPROFLOXACIN HYDROCHLORIDE 500 MG: 500 TABLET, FILM COATED ORAL at 09:59

## 2018-05-16 RX ADMIN — METOPROLOL TARTRATE 50 MG: 25 TABLET, FILM COATED ORAL at 21:06

## 2018-05-16 RX ADMIN — FUROSEMIDE 40 MG: 10 INJECTION, SOLUTION INTRAMUSCULAR; INTRAVENOUS at 05:47

## 2018-05-16 RX ADMIN — ANTACID TABLETS 1000 MG: 500 TABLET, CHEWABLE ORAL at 18:26

## 2018-05-16 RX ADMIN — ANTACID TABLETS 1000 MG: 500 TABLET, CHEWABLE ORAL at 09:00

## 2018-05-16 RX ADMIN — INSULIN HUMAN 4 UNITS: 100 INJECTION, SOLUTION PARENTERAL at 10:06

## 2018-05-16 RX ADMIN — HEPARIN SODIUM 5000 UNITS: 5000 INJECTION, SOLUTION INTRAVENOUS; SUBCUTANEOUS at 05:47

## 2018-05-16 RX ADMIN — METFORMIN HYDROCHLORIDE 1000 MG: 500 TABLET, FILM COATED ORAL at 18:26

## 2018-05-16 RX ADMIN — METOPROLOL TARTRATE 50 MG: 25 TABLET, FILM COATED ORAL at 09:59

## 2018-05-16 RX ADMIN — INSULIN HUMAN 3 UNITS: 100 INJECTION, SOLUTION PARENTERAL at 18:30

## 2018-05-16 RX ADMIN — LOPERAMIDE HYDROCHLORIDE 2 MG: 2 CAPSULE ORAL at 10:19

## 2018-05-16 RX ADMIN — CIPROFLOXACIN HYDROCHLORIDE 500 MG: 500 TABLET, FILM COATED ORAL at 21:05

## 2018-05-16 RX ADMIN — ACYCLOVIR 400 MG: 200 CAPSULE ORAL at 09:58

## 2018-05-16 RX ADMIN — ALPRAZOLAM 0.5 MG: 0.5 TABLET ORAL at 10:19

## 2018-05-16 RX ADMIN — DULOXETINE HYDROCHLORIDE 40 MG: 20 CAPSULE, DELAYED RELEASE ORAL at 15:52

## 2018-05-16 RX ADMIN — ACYCLOVIR 400 MG: 200 CAPSULE ORAL at 21:06

## 2018-05-16 RX ADMIN — ONDANSETRON HYDROCHLORIDE 4 MG: 2 INJECTION, SOLUTION INTRAMUSCULAR; INTRAVENOUS at 10:19

## 2018-05-16 RX ADMIN — HEPARIN SODIUM 5000 UNITS: 5000 INJECTION, SOLUTION INTRAVENOUS; SUBCUTANEOUS at 12:39

## 2018-05-16 RX ADMIN — HEPARIN SODIUM 5000 UNITS: 5000 INJECTION, SOLUTION INTRAVENOUS; SUBCUTANEOUS at 21:16

## 2018-05-16 RX ADMIN — POTASSIUM CHLORIDE 20 MEQ: 1500 TABLET, EXTENDED RELEASE ORAL at 09:58

## 2018-05-16 RX ADMIN — Medication: at 21:00

## 2018-05-16 RX ADMIN — FUROSEMIDE 40 MG: 10 INJECTION, SOLUTION INTRAMUSCULAR; INTRAVENOUS at 15:47

## 2018-05-16 RX ADMIN — OXYCODONE HYDROCHLORIDE 10 MG: 10 TABLET, FILM COATED, EXTENDED RELEASE ORAL at 09:59

## 2018-05-16 RX ADMIN — ANTACID TABLETS 1000 MG: 500 TABLET, CHEWABLE ORAL at 12:39

## 2018-05-16 RX ADMIN — POTASSIUM CHLORIDE 20 MEQ: 1500 TABLET, EXTENDED RELEASE ORAL at 21:05

## 2018-05-16 RX ADMIN — CALCIUM CITRATE 200 MG (950 MG) TABLET 1900 MG: at 09:59

## 2018-05-16 RX ADMIN — PRAVASTATIN SODIUM 40 MG: 20 TABLET ORAL at 21:05

## 2018-05-16 RX ADMIN — OXYCODONE HYDROCHLORIDE 10 MG: 10 TABLET, FILM COATED, EXTENDED RELEASE ORAL at 21:06

## 2018-05-16 RX ADMIN — CALCITRIOL 0.25 MCG: 0.25 CAPSULE, LIQUID FILLED ORAL at 09:58

## 2018-05-16 ASSESSMENT — ENCOUNTER SYMPTOMS
BLURRED VISION: 0
PHOTOPHOBIA: 0
MYALGIAS: 1
NAUSEA: 0
SORE THROAT: 0
ABDOMINAL PAIN: 0
SHORTNESS OF BREATH: 0
SPEECH CHANGE: 0
SENSORY CHANGE: 0
COUGH: 0
VOMITING: 0
DEPRESSION: 1
CLAUDICATION: 0
CONSTIPATION: 0
NERVOUS/ANXIOUS: 0
CHILLS: 0
INSOMNIA: 0
FEVER: 0
HEARTBURN: 0
WEAKNESS: 0
DIZZINESS: 0
HEADACHES: 0
DIARRHEA: 1

## 2018-05-16 ASSESSMENT — PAIN SCALES - GENERAL
PAINLEVEL_OUTOF10: 5
PAINLEVEL_OUTOF10: 6
PAINLEVEL_OUTOF10: 5
PAINLEVEL_OUTOF10: 6

## 2018-05-16 ASSESSMENT — PAIN SCALES - WONG BAKER
WONGBAKER_NUMERICALRESPONSE: HURTS EVEN MORE
WONGBAKER_NUMERICALRESPONSE: HURTS A LITTLE MORE

## 2018-05-16 NOTE — PALLIATIVE CARE
"Palliative Care Advance Care Planning Progress Note    General: Aleida Pagan is a 67-year-old female admitted 4/10/2018 due to pain caused by multiple myeloma.  She is being treated with chemotherapy (next dose due 5/17/18 currently and finished radiation therapy to multiple sites.  She was originally seen by palliative care on 4/28/2018.  Today the patient is resting comfortably in bed.  She reports \"I finally found a comfortable position.\"  She reports decreased swelling to her bilateral lower extreme peers somewhat wrinkled and dry.    We discussed the addition of duloxetine for dual purpose of adjuvant pain medication and depression.  Patient expressed \"I hate that word depression.\"  Explored her thoughts and feelings and she expressed she \"just does not like it.\"  Education provided about situational depression being common in patients diagnosed with cancer.  Discussed that many recommend starting an anti-depending on the situation.  Patient expressed she felt reassured and is agreeable to starting duloxetine.  I sat with her while listens to her share stories, some from her childhood and some from more she reports her sister did visit this weekend.    What the next steps were and where she would be going after the hospitalization.  I discussed barriers to discharge as patient is out of and is unable to go to LTAC due to chemotherapy.  We discussed potential barriers to group home care with ADLs.  We discussed patient's income/assets as a means to pay for private care at some point.  Patient expressed feeling overwhelmed and would appreciate guidance.  Explained that social work is following her case.    Provided therapeutic communication including active listening, therapeutic touch/silence, normalization of thoughts and feelings, and open body language/questions throughout encounter.  Patient encouraged to call with any questions or needs.     Updated: Bedside nurse and Dr. De La Rosa.  Recommended addition " of duloxetine for dual purpose of pain management and situational depression.    Thank you for allowing Palliative Care to participate in this patient's care. Please call our team with questions and/or additional needs.    Total visit time was 40 minutes discussing advance care planning.    Mariya ZELAYA  Palliative Care Nurse Practitioner  [phone]

## 2018-05-16 NOTE — DOCUMENTATION QUERY
DOCUMENTATION QUERY    PROVIDERS: Please select “Cosign w/ note”to reply to query.    To better represent the severity of illness of your patient, please review the following information and exercise your independent professional judgment in responding to this query.     Decubitus ulcer of buttock is documented in the progress notes without specified location (right vs. left etc), stage of wound and whether or not it was POA.     There is no documentation found in the medical record by the wound care RN regarding the decubitus ulcer of the buttock.    .Based upon the clinical findings, risk factors, and treatment, can this diagnosis be further specified? and please specify if this condition was present on admission or developed after admission    • Decubitus ulcer of buttock (please specify stage and location) present on admission.  • Decubitus ulcer of buttock (please specify stage and location) developed after admission.   • Decubitus ulcer of buttock ruled out   • Other explanation of clinical findings  • Unable to determine          The medical record reflects the following:   Clinical Findings Documented decubitus ulcer of the buttock without specification  No wound team note regarding buttock wound found in medical record   Treatment Wound care (no documentation found)   Rotate frequently in bed  Air mattress    Risk Factors Multiple myeloma  Pathological fracture   Morbid obesity   Cellulitis   Hypoglycemia  Adult failure to thrive   Chemotherapy   Antineoplastic induced pancytopenia   Bacteriuria   Type 2 diabetes    Location within medical record Progress Notes     Thank you,   Anaya Castillo RN  Clinical   [phone]

## 2018-05-16 NOTE — PROGRESS NOTES
Patient complaining of left leg and knee pain. The area is swollen, firm and red with increased pain to the touch.     Paged on call hospitalist Dr. Arriaza and received orders for a doppler study on the LLE to be done tonight.    Updated Dr. Arriaza on results of venous duplex study. No further orders were received at this time.

## 2018-05-16 NOTE — PROGRESS NOTES
[facility] Hospitalist Progress Note    Date of Service: 2018    Chief Complaint  67 y.o. female admitted 4/10/2018 with multiple myeloma, left humerus, bilateral femur radiation completed.    Interval Problem Update  Less diarrhea today; no n/v/abdom pain; feels a little better overall.  Consultants/Specialty  Oncology - [facility]  .  Disposition  Difficult discharge.        Review of Systems   Constitutional: Negative for chills and fever.   HENT: Negative for congestion, nosebleeds (started ) and sore throat.    Eyes: Negative for blurred vision and photophobia.   Respiratory: Negative for cough and shortness of breath.    Cardiovascular: Negative for chest pain, claudication and leg swelling.   Gastrointestinal: Positive for diarrhea. Negative for abdominal pain, constipation, heartburn, nausea and vomiting.   Genitourinary: Negative for dysuria and hematuria.   Musculoskeletal: Positive for myalgias (globally). Negative for joint pain.   Skin: Positive for rash (better). Negative for itching (better).   Neurological: Negative for dizziness, sensory change, speech change, weakness and headaches.   Psychiatric/Behavioral: Positive for depression. The patient is not nervous/anxious and does not have insomnia.       Physical Exam  Laboratory/Imaging   Hemodynamics  Temp (24hrs), Av.8 °C (98.2 °F), Min:36.6 °C (97.9 °F), Max:36.9 °C (98.4 °F)   Temperature: 36.7 °C (98 °F)  Pulse  Av.1  Min: 38  Max: 128    Blood Pressure : 143/63      Respiratory      Respiration: 17, Pulse Oximetry: 95 %     Work Of Breathing / Effort: Mild  RUL Breath Sounds: Clear, RML Breath Sounds: Diminished, RLL Breath Sounds: Diminished, CHARLOTTE Breath Sounds: Clear, LLL Breath Sounds: Diminished    Fluids    Intake/Output Summary (Last 24 hours) at 18 1435  Last data filed at 18 0900   Gross per 24 hour   Intake             1534 ml   Output             2300 ml   Net             -766 ml       Nutrition  Orders Placed  This Encounter   Procedures   • Diet Order     Standing Status:   Standing     Number of Occurrences:   1     Order Specific Question:   Diet:     Answer:   Diabetic [3]     Physical Exam   Constitutional: She is oriented to person, place, and time. She appears well-developed and well-nourished. No distress.   HENT:   Head: Normocephalic and atraumatic.   Eyes: Conjunctivae are normal. No scleral icterus.   Neck: Neck supple. No JVD present.   Cardiovascular: Normal rate, regular rhythm and normal heart sounds.  Exam reveals no gallop and no friction rub.    No murmur heard.  Pulmonary/Chest: Effort normal and breath sounds normal. No respiratory distress. She exhibits no tenderness.   Abdominal: Soft. Bowel sounds are normal. She exhibits no distension. There is no guarding.   Musculoskeletal: She exhibits edema (ble - improving). She exhibits no tenderness.   3+ ble edema, improving   Neurological: She is alert and oriented to person, place, and time. No cranial nerve deficit.   Skin: Skin is warm and dry. Rash (bilateral shins - Better today. ) noted. She is not diaphoretic. No erythema. No pallor.   Erythema bilateral LE/shins - improved from prior.   Psychiatric: She has a normal mood and affect. Her behavior is normal.   Nursing note and vitals reviewed.      Recent Labs      05/14/18   0012  05/15/18   0050   WBC  4.0*  3.7*   RBC  2.86*  2.87*   HEMOGLOBIN  8.7*  8.6*   HEMATOCRIT  27.6*  27.8*   MCV  96.5  96.9   MCH  30.4  30.0   MCHC  31.5*  30.9*   RDW  54.1*  54.2*   PLATELETCT  94*  96*   MPV  10.8  11.4     Recent Labs      05/14/18   0012  05/15/18   0050   SODIUM  137  138   POTASSIUM  3.9  4.0   CHLORIDE  96  95*   CO2  36*  39*   GLUCOSE  157*  120*   BUN  7*  8   CREATININE  0.58  0.60   CALCIUM  7.8*  7.9*                      Assessment/Plan     * Pathologic fracture- (present on admission)   Assessment & Plan    -Left humerus  -widespread lytic disease  -completed radiation to humerus  -pain  control             Multiple myeloma (HCC)- (present on admission)   Assessment & Plan    CyBorD chemo - cycle 1 started 4/19/18; cycle 2 started on 5/10  -recent diagnosis, appears aggressive, now with innumerable lytic lesions  -Dr Sim and Oksana consulted for assistance in management  - IV dilaudid PRN, oxy prn. DC maintenance steroids as these were causing confusion  -oxycontin CR 10 mg BID  -skeletal survey done - spine obscured by body habitus, but multiple other lesions found  XRT to right and left femurs completed  -Palliative care consulted for advanced care planning            Diarrhea   Assessment & Plan    Recurred last 3 days, with intermittent n/v;  c diff negative;   Dc neutraphos which can contributes to diarrhea  Better today          Hypocalcemia   Assessment & Plan    - Repleting;   due to recent bisphosphonate use; replete Mg  stable          Hypomagnesemia   Assessment & Plan    - Repleting as needed        Rash   Assessment & Plan    Continued improvement with benadryl cream  DC Lac-hydrin  Bilateral shin involvement only  Suspect is contact dermatitis from ice packs - not on calves nor any other area that had not been touched by ice packs  If ice packs applied to body - cloth to be between skin and ice pack             Bacteriuria   Assessment & Plan    Klebsiella growing in urine culture, nava in place, asymptomatic.  Immunocompromised due to chemotherapy - cipro x 7 days based on sensitivity.        Decubitus ulcer of buttock   Assessment & Plan    Wound care; rotate frequently in bed; air mattress.        Debility- (present on admission)   Assessment & Plan    Due to current illnesses; not able to walk since early april;  Out of SNF days per SW; cont PT/OT here  Poor prognosis given current diagnoses/morbid obesity.  LTAC declined d/t active chemotherapy.        Cellulitis   Assessment & Plan    Bilateral LE  improving; venous stasis component  Changed to po Augmentin per ID,  completed 5/5/18          DNR (do not resuscitate)   Assessment & Plan    Palliative care consulted, patient wants to change code status to DNR - done, completed Polst with APN.          Chemotherapy-induced thrombocytopenia   Assessment & Plan    stable; monitor; no bleeding now          Leg edema   Assessment & Plan      -PO to IV Lasix scheduled bid, doing well and renal function stable. Improved edema slowly.          Chronic venous stasis dermatitis of both lower extremities- (present on admission)   Assessment & Plan    -wound care, oral abx completed          Anemia- (present on admission)   Assessment & Plan    Stable cbc; monitor  Transfuse if hgb <7.0          Morbid obesity with BMI of 60.0-69.9, adult (CMS-HCC)- (present on admission)   Assessment & Plan    -encourage weight loss  Body mass index is 67.38 kg/m².        DM type 2 (diabetes mellitus, type 2) (CMS-HCC)- (present on admission)   Assessment & Plan    Fair control; adjust meds for good control        Essential hypertension, benign- (present on admission)   Assessment & Plan    Adjust med for good control.          Quality-Core Measures   Reviewed items::  Labs reviewed, Medications reviewed and Radiology images reviewed  Singh catheter::  Urinary Tract Retention or Urinary Tract Obstruction  DVT prophylaxis pharmacological::  Heparin  Assessed for rehabilitation services:  Patient was assess for and/or received rehabilitation services during this hospitalization

## 2018-05-16 NOTE — CARE PLAN
Problem: Communication  Goal: The ability to communicate needs accurately and effectively will improve  Outcome: PROGRESSING AS EXPECTED  Patient been alert and oriented x4 today, slightly withdrawn.  Two loose stools today, order to check for c.diff placed by Dr. Gann, waiting for sample.  Patient has large amount of urine output with IV lasix.  Pain in bilateral legs/knees continues, using scheduled OxyContin, declined prn oxycodone.  Patient continues to complain of sinus pressure, prn sudogest given x2.  Patient sat on edge of bed for breakfast.  Skin tears/excoriation to bottom continues, using lisa cream for incontinent stools.  Patient also has some skin breakdown to pannus, using interdry and baby powder.   Patient completed bed bath this morning.  Appetite is poor.  Patient having nausea and had small emesis this morning, using prn zofran and compazine. PICC line flushing well with brisk blood return.

## 2018-05-16 NOTE — PROGRESS NOTES
[facility] Hospitalist Progress Note    Date of Service: 5/15/2018    Chief Complaint  67 y.o. female admitted 4/10/2018 with multiple myeloma, left humerus, bilateral femur radiation completed.    Interval Problem Update  c/o 3-4 days of diarrhea/n/v/mild abdom pain; better with Imodium; same chronic body pain; breathing stable; leg rash better    Consultants/Specialty  Oncology - [facility]  .  Disposition  Difficult discharge.        Review of Systems   Constitutional: Negative for chills and fever.   HENT: Negative for congestion, nosebleeds (started ) and sore throat.    Eyes: Negative for blurred vision and photophobia.   Respiratory: Negative for cough and shortness of breath.    Cardiovascular: Negative for chest pain, claudication and leg swelling.   Gastrointestinal: Positive for diarrhea, nausea and vomiting. Negative for abdominal pain, constipation and heartburn.   Genitourinary: Negative for dysuria and hematuria.   Musculoskeletal: Positive for myalgias (globally). Negative for joint pain.   Skin: Positive for itching (better) and rash (better).   Neurological: Negative for dizziness, sensory change, speech change, weakness and headaches.   Psychiatric/Behavioral: Positive for depression. The patient is not nervous/anxious and does not have insomnia.       Physical Exam  Laboratory/Imaging   Hemodynamics  Temp (24hrs), Av.7 °C (98 °F), Min:36.6 °C (97.9 °F), Max:36.8 °C (98.2 °F)   Temperature: 36.6 °C (97.9 °F)  Pulse  Av  Min: 38  Max: 128    Blood Pressure : 160/80      Respiratory      Respiration: 18, Pulse Oximetry: 95 %     Work Of Breathing / Effort: Mild  RUL Breath Sounds: Clear, RML Breath Sounds: Diminished, RLL Breath Sounds: Diminished, CHARL[place]TE Breath Sounds: Clear, LLL Breath Sounds: Diminished    Fluids    Intake/Output Summary (Last 24 hours) at 05/15/18 1720  Last data filed at 05/15/18 1500   Gross per 24 hour   Intake             1408 ml   Output             5300 ml   Net             -8532 ml       Nutrition  Orders Placed This Encounter   Procedures   • Diet Order     Standing Status:   Standing     Number of Occurrences:   1     Order Specific Question:   Diet:     Answer:   Diabetic [3]     Physical Exam   Constitutional: She is oriented to person, place, and time. She appears well-developed and well-nourished. No distress.   HENT:   Head: Normocephalic and atraumatic.   Eyes: Conjunctivae are normal. No scleral icterus.   Neck: Neck supple. No JVD present.   Cardiovascular: Normal rate, regular rhythm and normal heart sounds.  Exam reveals no gallop and no friction rub.    No murmur heard.  Pulmonary/Chest: Effort normal and breath sounds normal. No respiratory distress. She exhibits no tenderness.   Abdominal: Soft. Bowel sounds are normal. She exhibits no distension. There is no guarding.   Musculoskeletal: She exhibits edema (ble - improving). She exhibits no tenderness.   3+ ble edema   Neurological: She is alert and oriented to person, place, and time. No cranial nerve deficit.   Skin: Skin is warm and dry. Rash (bilateral shins - Better today. ) noted. She is not diaphoretic. No erythema. No pallor.   Erythema bilateral LE/shins - improved from prior.   Psychiatric: She has a normal mood and affect. Her behavior is normal.   Nursing note and vitals reviewed.      Recent Labs      05/13/18   0013  05/14/18   0012  05/15/18   0050   WBC  4.0*  4.0*  3.7*   RBC  2.70*  2.86*  2.87*   HEMOGLOBIN  8.2*  8.7*  8.6*   HEMATOCRIT  26.8*  27.6*  27.8*   MCV  99.3*  96.5  96.9   MCH  30.4  30.4  30.0   MCHC  30.6*  31.5*  30.9*   RDW  55.8*  54.1*  54.2*   PLATELETCT  106*  94*  96*   MPV  10.9  10.8  11.4     Recent Labs      05/13/18   0013  05/14/18   0012  05/15/18   0050   SODIUM  138  137  138   POTASSIUM  3.8  3.9  4.0   CHLORIDE  98  96  95*   CO2  35*  36*  39*   GLUCOSE  142*  157*  120*   BUN  8  7*  8   CREATININE  0.47*  0.58  0.60   CALCIUM  7.1*  7.8*  7.9*                       Assessment/Plan     * Pathologic fracture- (present on admission)   Assessment & Plan    -Left humerus  -widespread lytic disease  -completed radiation to humerus  -pain control             Multiple myeloma (HCC)- (present on admission)   Assessment & Plan    CyBorD chemo - cycle 1 started 4/19/18; cycle 2 started on 5/10  -recent diagnosis, appears aggressive, now with innumerable lytic lesions  -Dr Sim and Oksana consulted for assistance in management  - IV dilaudid PRN, oxy prn. DC maintenance steroids as these were causing confusion  -oxycontin CR 10 mg BID  -skeletal survey done - spine obscured by body habitus, but multiple other lesions found  XRT to right and left femurs completed  -Palliative care consulted for advanced care planning            Diarrhea   Assessment & Plan    Recurred last 3 days, with intermittent n/v;  Check c diff since at risk; hold imodium for now  Dc neutraphos which can contributes to diarrhea          Hypocalcemia   Assessment & Plan    - Repleting as needed, trending up with diuresis/less dilution.   due to recent bisphosphonate use; replete Mg  Corrected calcium  normal - low secondary to hypoalbuminemia          Hypomagnesemia   Assessment & Plan    - Repleting as needed        Rash   Assessment & Plan    Continued improvement with benadryl cream  DC Lac-hydrin  Bilateral shin involvement only  Suspect is contact dermatitis from ice packs - not on calves nor any other area that had not been touched by ice packs  If ice packs applied to body - cloth to be between skin and ice pack             Bacteriuria   Assessment & Plan    Klebsiella growing in urine culture, nava in place, asymptomatic.  Immunocompromised due to chemotherapy - cipro x 7 days based on sensitivity.        Decubitus ulcer of buttock   Assessment & Plan    Wound care; rotate frequently in bed; air mattress.        Debility- (present on admission)   Assessment & Plan    Due to current illnesses; not able  to walk since early april;  Out of SNF days per SW; cont PT/OT here  Poor prognosis given current diagnoses/morbid obesity.  LTAC declined d/t active chemotherapy.        Cellulitis   Assessment & Plan    Bilateral LE  improving; venous stasis component  Changed to po Augmentin per ID, completed 5/5/18          DNR (do not resuscitate)   Assessment & Plan    Palliative care consulted, patient wants to change code status to DNR - done, completed Polst with APN.          Chemotherapy-induced thrombocytopenia   Assessment & Plan    stable; monitor; no bleeding now          Leg edema   Assessment & Plan      -PO to IV Lasix scheduled bid, doing well and renal function stable.            Chronic venous stasis dermatitis of both lower extremities- (present on admission)   Assessment & Plan    -wound care, oral abx completed          Anemia- (present on admission)   Assessment & Plan    Stable cbc; monitor  Transfuse if hgb <7.0          Morbid obesity with BMI of 60.0-69.9, adult (CMS-HCC)- (present on admission)   Assessment & Plan    -encourage weight loss  Body mass index is 67.38 kg/m².        DM type 2 (diabetes mellitus, type 2) (CMS-HCC)- (present on admission)   Assessment & Plan    Fair control; adjust meds for good control        Essential hypertension, benign- (present on admission)   Assessment & Plan    Adjust med for good control.          Quality-Core Measures   Reviewed items::  Labs reviewed, Medications reviewed and Radiology images reviewed  Singh catheter::  Urinary Tract Retention or Urinary Tract Obstruction  DVT prophylaxis pharmacological::  Heparin  Assessed for rehabilitation services:  Patient was assess for and/or received rehabilitation services during this hospitalization

## 2018-05-16 NOTE — PROGRESS NOTES
"Pharmacy Chemotherapy calculation:    Patient Name: Aleida Pagan  DX: Multiple Myeloma    Cycle 2, Day 8 (weekly regimen) **Paper Order in Chart**  Previous treatment:  C2 D1 = 5/10/18 (C1 was twice weekly regimen)    Protocol: CyBorD    *Dosing Reference*  Cycle 1    Beginning with Cycle 2  Bortezomib 1.5 mg/m2 IVP or subQ on Days 1, 8, 15, and 22  Cyclophosphamide 300 mg/m2 po on Days  1, 8, 15, and 22  Dexamethasone 40 mg po on Days  1, 8, 15, and 22   Repeat every 28 days x 4 cycles   Drew SEVERINO, et al. Once-versus twice-weekly bortezomib induction therapy with CyBorD in newly diagnosed multiple myeloma. Blood 2010 115:[id number]; doi: [email or web address]       Allergies:  Actos [pioglitazone hydrochloride]; Advil [ibuprofen micronized]; Cephalosporins; and [place]    /52   Pulse 83   Temp 36.7 °C (98 °F)   Resp 18   Ht 1.6 m (5' 2.99\")   Wt (!) 172.5 kg (380 lb 4.7 oz)   LMP 04/11/1994   SpO2 94%   Breastfeeding? No   BMI 67.38 kg/m²  Body surface area is 2.77 meters squared.       **Use actual BSA of 2.77 m2.  Weight from 4/16/18 k to use for cycle 2.  Unable to obtain weight from bed or stand up scale due to patient pain with transfer.**      Labs 5/17/18:  ANC~ 2190 Plt = 138k   Hgb = 8.5     SCr = 0.56 mg/dL CrCl > 125 mL/min (min Scr 0.7)  Mag = 1.1 (mag ox 400mg po tid ordered) K+ = 3.9  Phos = 3.6    Labs 5/10/18:  AST/ALT/AP = 19/14/63 TBili = 0.3          Bortezomib (Velcade) 1.5 mg/m2 x  2.77 m2 = 4.16 mg    <5% difference, ok to treat with final dose = 4.15 mg subQ    Cyclophosphamide 300 mg/m2 x  2.77 m2 po =831 mg   <5% difference, ok to treat with final written dose = 850 mg po (dose rounded to the nearest 50 mg capsule)      Bere L. Fronk, PharmD, BCOP           "

## 2018-05-16 NOTE — PROGRESS NOTES
Patient A&O x4. Tearful and anxious tonight, PRN xanax given with good results. Patient nauseous after eating chicken soup and yogurt smoothie for dinner; zofran given, no emesis. Ice packs applied under knees and oxy given for increased pain in the legs. Singh draining large clear yellow output. Patient has had incontinent diarrhea x2 tonight. PICC running TKO fluids, positive blood return. Barrier cream applied to sacrum, interdry in place under pannus.

## 2018-05-17 PROBLEM — L03.90 CELLULITIS: Status: RESOLVED | Noted: 2018-02-18 | Resolved: 2018-05-17

## 2018-05-17 LAB
ANION GAP SERPL CALC-SCNC: 4 MMOL/L (ref 0–11.9)
BASOPHILS # BLD AUTO: 0.6 % (ref 0–1.8)
BASOPHILS # BLD: 0.02 K/UL (ref 0–0.12)
BUN SERPL-MCNC: 6 MG/DL (ref 8–22)
CALCIUM SERPL-MCNC: 8.3 MG/DL (ref 8.5–10.5)
CHLORIDE SERPL-SCNC: 94 MMOL/L (ref 96–112)
CO2 SERPL-SCNC: 39 MMOL/L (ref 20–33)
CREAT SERPL-MCNC: 0.56 MG/DL (ref 0.5–1.4)
EOSINOPHIL # BLD AUTO: 0.02 K/UL (ref 0–0.51)
EOSINOPHIL NFR BLD: 0.6 % (ref 0–6.9)
ERYTHROCYTE [DISTWIDTH] IN BLOOD BY AUTOMATED COUNT: 53.6 FL (ref 35.9–50)
GLUCOSE BLD-MCNC: 163 MG/DL (ref 65–99)
GLUCOSE BLD-MCNC: 171 MG/DL (ref 65–99)
GLUCOSE BLD-MCNC: 178 MG/DL (ref 65–99)
GLUCOSE BLD-MCNC: 249 MG/DL (ref 65–99)
GLUCOSE SERPL-MCNC: 136 MG/DL (ref 65–99)
HCT VFR BLD AUTO: 27.6 % (ref 37–47)
HGB BLD-MCNC: 8.5 G/DL (ref 12–16)
IMM GRANULOCYTES # BLD AUTO: 0.03 K/UL (ref 0–0.11)
IMM GRANULOCYTES NFR BLD AUTO: 0.9 % (ref 0–0.9)
LYMPHOCYTES # BLD AUTO: 0.69 K/UL (ref 1–4.8)
LYMPHOCYTES NFR BLD: 20.9 % (ref 22–41)
MAGNESIUM SERPL-MCNC: 1.1 MG/DL (ref 1.5–2.5)
MCH RBC QN AUTO: 29.8 PG (ref 27–33)
MCHC RBC AUTO-ENTMCNC: 30.8 G/DL (ref 33.6–35)
MCV RBC AUTO: 96.8 FL (ref 81.4–97.8)
MONOCYTES # BLD AUTO: 0.35 K/UL (ref 0–0.85)
MONOCYTES NFR BLD AUTO: 10.6 % (ref 0–13.4)
NEUTROPHILS # BLD AUTO: 2.19 K/UL (ref 2–7.15)
NEUTROPHILS NFR BLD: 66.4 % (ref 44–72)
NRBC # BLD AUTO: 0.02 K/UL
NRBC BLD-RTO: 0.6 /100 WBC
PHOSPHATE SERPL-MCNC: 3.6 MG/DL (ref 2.5–4.5)
PLATELET # BLD AUTO: 138 K/UL (ref 164–446)
PMV BLD AUTO: 10.7 FL (ref 9–12.9)
POTASSIUM SERPL-SCNC: 3.9 MMOL/L (ref 3.6–5.5)
RBC # BLD AUTO: 2.85 M/UL (ref 4.2–5.4)
SODIUM SERPL-SCNC: 137 MMOL/L (ref 135–145)
WBC # BLD AUTO: 3.3 K/UL (ref 4.8–10.8)

## 2018-05-17 PROCEDURE — 82962 GLUCOSE BLOOD TEST: CPT

## 2018-05-17 PROCEDURE — 99232 SBSQ HOSP IP/OBS MODERATE 35: CPT | Performed by: INTERNAL MEDICINE

## 2018-05-17 PROCEDURE — 700102 HCHG RX REV CODE 250 W/ 637 OVERRIDE(OP): Performed by: INTERNAL MEDICINE

## 2018-05-17 PROCEDURE — 97530 THERAPEUTIC ACTIVITIES: CPT

## 2018-05-17 PROCEDURE — 83735 ASSAY OF MAGNESIUM: CPT

## 2018-05-17 PROCEDURE — A9270 NON-COVERED ITEM OR SERVICE: HCPCS | Performed by: INTERNAL MEDICINE

## 2018-05-17 PROCEDURE — 84100 ASSAY OF PHOSPHORUS: CPT

## 2018-05-17 PROCEDURE — 700111 HCHG RX REV CODE 636 W/ 250 OVERRIDE (IP): Mod: JG | Performed by: INTERNAL MEDICINE

## 2018-05-17 PROCEDURE — 770004 HCHG ROOM/CARE - ONCOLOGY PRIVATE *

## 2018-05-17 PROCEDURE — 700111 HCHG RX REV CODE 636 W/ 250 OVERRIDE (IP): Performed by: INTERNAL MEDICINE

## 2018-05-17 PROCEDURE — 80048 BASIC METABOLIC PNL TOTAL CA: CPT

## 2018-05-17 PROCEDURE — 85025 COMPLETE CBC W/AUTO DIFF WBC: CPT

## 2018-05-17 RX ORDER — MAGNESIUM SULFATE HEPTAHYDRATE 40 MG/ML
4 INJECTION, SOLUTION INTRAVENOUS ONCE
Status: COMPLETED | OUTPATIENT
Start: 2018-05-17 | End: 2018-05-17

## 2018-05-17 RX ORDER — MAGNESIUM SULFATE 1 G/100ML
1 INJECTION INTRAVENOUS ONCE
Status: COMPLETED | OUTPATIENT
Start: 2018-05-17 | End: 2018-05-17

## 2018-05-17 RX ORDER — DEXAMETHASONE 4 MG/1
40 TABLET ORAL ONCE
Status: COMPLETED | OUTPATIENT
Start: 2018-05-17 | End: 2018-05-17

## 2018-05-17 RX ORDER — MAGNESIUM SULFATE HEPTAHYDRATE 40 MG/ML
4 INJECTION, SOLUTION INTRAVENOUS ONCE
Status: DISCONTINUED | OUTPATIENT
Start: 2018-05-17 | End: 2018-05-17

## 2018-05-17 RX ORDER — CYCLOPHOSPHAMIDE 50 MG/1
850 CAPSULE ORAL ONCE
Status: COMPLETED | OUTPATIENT
Start: 2018-05-17 | End: 2018-05-17

## 2018-05-17 RX ADMIN — MAGNESIUM SULFATE IN WATER 4 G: 40 INJECTION, SOLUTION INTRAVENOUS at 18:14

## 2018-05-17 RX ADMIN — DEXAMETHASONE 40 MG: 4 TABLET ORAL at 14:26

## 2018-05-17 RX ADMIN — HEPARIN SODIUM 5000 UNITS: 5000 INJECTION, SOLUTION INTRAVENOUS; SUBCUTANEOUS at 22:00

## 2018-05-17 RX ADMIN — CYCLOPHOSPHAMIDE 850 MG: 50 CAPSULE ORAL at 14:27

## 2018-05-17 RX ADMIN — METOPROLOL TARTRATE 50 MG: 25 TABLET, FILM COATED ORAL at 20:58

## 2018-05-17 RX ADMIN — HEPARIN SODIUM 5000 UNITS: 5000 INJECTION, SOLUTION INTRAVENOUS; SUBCUTANEOUS at 14:04

## 2018-05-17 RX ADMIN — PRAVASTATIN SODIUM 40 MG: 20 TABLET ORAL at 20:58

## 2018-05-17 RX ADMIN — LORAZEPAM 1 MG: 2 INJECTION INTRAMUSCULAR; INTRAVENOUS at 19:43

## 2018-05-17 RX ADMIN — MAGNESIUM SULFATE IN DEXTROSE 1 G: 10 INJECTION, SOLUTION INTRAVENOUS at 16:14

## 2018-05-17 RX ADMIN — CALCITRIOL 0.25 MCG: 0.25 CAPSULE, LIQUID FILLED ORAL at 08:02

## 2018-05-17 RX ADMIN — ONDANSETRON HYDROCHLORIDE 4 MG: 2 INJECTION, SOLUTION INTRAMUSCULAR; INTRAVENOUS at 19:42

## 2018-05-17 RX ADMIN — BORTEZOMIB 4.15 MG: 3.5 INJECTION, POWDER, LYOPHILIZED, FOR SOLUTION INTRAVENOUS; SUBCUTANEOUS at 14:24

## 2018-05-17 RX ADMIN — PHENYLEPHRINE HCL 10 MG: 10 TABLET, FILM COATED ORAL at 13:30

## 2018-05-17 RX ADMIN — CIPROFLOXACIN HYDROCHLORIDE 500 MG: 500 TABLET, FILM COATED ORAL at 08:03

## 2018-05-17 RX ADMIN — DULOXETINE HYDROCHLORIDE 40 MG: 20 CAPSULE, DELAYED RELEASE ORAL at 08:02

## 2018-05-17 RX ADMIN — INSULIN HUMAN 3 UNITS: 100 INJECTION, SOLUTION PARENTERAL at 18:20

## 2018-05-17 RX ADMIN — ANTACID TABLETS 1000 MG: 500 TABLET, CHEWABLE ORAL at 11:14

## 2018-05-17 RX ADMIN — FUROSEMIDE 40 MG: 10 INJECTION, SOLUTION INTRAMUSCULAR; INTRAVENOUS at 16:14

## 2018-05-17 RX ADMIN — MAGNESIUM GLUCONATE 500 MG ORAL TABLET 400 MG: 500 TABLET ORAL at 11:14

## 2018-05-17 RX ADMIN — METOPROLOL TARTRATE 50 MG: 25 TABLET, FILM COATED ORAL at 08:02

## 2018-05-17 RX ADMIN — FUROSEMIDE 40 MG: 10 INJECTION, SOLUTION INTRAMUSCULAR; INTRAVENOUS at 05:49

## 2018-05-17 RX ADMIN — ONDANSETRON HYDROCHLORIDE 4 MG: 2 INJECTION, SOLUTION INTRAMUSCULAR; INTRAVENOUS at 09:41

## 2018-05-17 RX ADMIN — Medication: at 21:02

## 2018-05-17 RX ADMIN — METFORMIN HYDROCHLORIDE 1000 MG: 500 TABLET, FILM COATED ORAL at 18:14

## 2018-05-17 RX ADMIN — INSULIN HUMAN 4 UNITS: 100 INJECTION, SOLUTION PARENTERAL at 21:06

## 2018-05-17 RX ADMIN — OXYCODONE HYDROCHLORIDE 10 MG: 10 TABLET, FILM COATED, EXTENDED RELEASE ORAL at 08:03

## 2018-05-17 RX ADMIN — ANTACID TABLETS 1000 MG: 500 TABLET, CHEWABLE ORAL at 18:14

## 2018-05-17 RX ADMIN — INSULIN HUMAN 3 UNITS: 100 INJECTION, SOLUTION PARENTERAL at 14:04

## 2018-05-17 RX ADMIN — POTASSIUM CHLORIDE 20 MEQ: 1500 TABLET, EXTENDED RELEASE ORAL at 20:57

## 2018-05-17 RX ADMIN — METFORMIN HYDROCHLORIDE 1000 MG: 500 TABLET, FILM COATED ORAL at 07:40

## 2018-05-17 RX ADMIN — ACYCLOVIR 400 MG: 200 CAPSULE ORAL at 20:59

## 2018-05-17 RX ADMIN — ANTACID TABLETS 1000 MG: 500 TABLET, CHEWABLE ORAL at 07:40

## 2018-05-17 RX ADMIN — POTASSIUM CHLORIDE 20 MEQ: 1500 TABLET, EXTENDED RELEASE ORAL at 08:03

## 2018-05-17 RX ADMIN — CIPROFLOXACIN HYDROCHLORIDE 500 MG: 500 TABLET, FILM COATED ORAL at 20:59

## 2018-05-17 RX ADMIN — HEPARIN SODIUM 5000 UNITS: 5000 INJECTION, SOLUTION INTRAVENOUS; SUBCUTANEOUS at 05:49

## 2018-05-17 RX ADMIN — ACYCLOVIR 400 MG: 200 CAPSULE ORAL at 08:02

## 2018-05-17 RX ADMIN — CALCIUM CITRATE 200 MG (950 MG) TABLET 1900 MG: at 08:03

## 2018-05-17 RX ADMIN — Medication: at 08:03

## 2018-05-17 RX ADMIN — INSULIN HUMAN 3 UNITS: 100 INJECTION, SOLUTION PARENTERAL at 08:15

## 2018-05-17 ASSESSMENT — ENCOUNTER SYMPTOMS
SORE THROAT: 0
INSOMNIA: 0
DEPRESSION: 0
SENSORY CHANGE: 1
CONSTIPATION: 0
MYALGIAS: 1
SHORTNESS OF BREATH: 0
NERVOUS/ANXIOUS: 0
COUGH: 0
HEMOPTYSIS: 0
HEARTBURN: 0
ORTHOPNEA: 0
DEPRESSION: 1
FEVER: 0
VOMITING: 0
BLURRED VISION: 0
DIARRHEA: 0
SENSORY CHANGE: 0
DIZZINESS: 0
ABDOMINAL PAIN: 0
WEAKNESS: 0
HEADACHES: 0
PHOTOPHOBIA: 0
SPEECH CHANGE: 0
NAUSEA: 0
NERVOUS/ANXIOUS: 1
CLAUDICATION: 0
BACK PAIN: 1
TINGLING: 1
WEAKNESS: 1
CHILLS: 0

## 2018-05-17 ASSESSMENT — PAIN SCALES - WONG BAKER
WONGBAKER_NUMERICALRESPONSE: DOESN'T HURT AT ALL
WONGBAKER_NUMERICALRESPONSE: DOESN'T HURT AT ALL

## 2018-05-17 ASSESSMENT — PAIN SCALES - GENERAL
PAINLEVEL_OUTOF10: 1
PAINLEVEL_OUTOF10: 0
PAINLEVEL_OUTOF10: 2

## 2018-05-17 ASSESSMENT — GAIT ASSESSMENTS: GAIT LEVEL OF ASSIST: UNABLE TO PARTICIPATE

## 2018-05-17 ASSESSMENT — LIFESTYLE VARIABLES: DO YOU DRINK ALCOHOL: NO

## 2018-05-17 NOTE — PROGRESS NOTES
[facility] Hospitalist Progress Note    Date of Service: 2018    Chief Complaint  67 y.o. female admitted 4/10/2018 with multiple myeloma, left humerus, bilateral femur radiation completed.    Interval Problem Update  No diarrhea today; some nausea earlier; no abdom pain; sitting at edge of bed; no new c/o  Consultants/Specialty  Oncology - Reganti  .  Disposition  Difficult discharge.        Review of Systems   Constitutional: Negative for chills and fever.   HENT: Negative for congestion, nosebleeds (started ) and sore throat.    Eyes: Negative for blurred vision and photophobia.   Respiratory: Negative for cough and shortness of breath.    Cardiovascular: Negative for chest pain, claudication and leg swelling.   Gastrointestinal: Negative for abdominal pain, constipation, diarrhea, heartburn, nausea and vomiting.   Genitourinary: Negative for dysuria and hematuria.   Musculoskeletal: Positive for myalgias (globally). Negative for joint pain.   Skin: Negative for itching (better) and rash (better).   Neurological: Negative for dizziness, sensory change, speech change, weakness and headaches.   Psychiatric/Behavioral: Positive for depression. The patient is not nervous/anxious and does not have insomnia.       Physical Exam  Laboratory/Imaging   Hemodynamics  Temp (24hrs), Av.7 °C (98 °F), Min:36.6 °C (97.8 °F), Max:36.8 °C (98.3 °F)   Temperature: 36.6 °C (97.8 °F)  Pulse  Av  Min: 38  Max: 128    Blood Pressure : 145/70      Respiratory      Respiration: 20, Pulse Oximetry: 93 %        RUL Breath Sounds: Clear, RML Breath Sounds: Diminished, RLL Breath Sounds: Diminished, CHARL[place]TE Breath Sounds: Clear, LLL Breath Sounds: Diminished    Fluids    Intake/Output Summary (Last 24 hours) at 18 1319  Last data filed at 18 2116   Gross per 24 hour   Intake                0 ml   Output             3500 ml   Net            -3500 ml       Nutrition  Orders Placed This Encounter   Procedures   • Diet  Order     Standing Status:   Standing     Number of Occurrences:   1     Order Specific Question:   Diet:     Answer:   Diabetic [3]     Physical Exam   Constitutional: She is oriented to person, place, and time. She appears well-developed and well-nourished. No distress.   HENT:   Head: Normocephalic and atraumatic.   Eyes: Conjunctivae are normal. No scleral icterus.   Neck: Neck supple. No JVD present.   Cardiovascular: Normal rate, regular rhythm and normal heart sounds.  Exam reveals no gallop and no friction rub.    No murmur heard.  Pulmonary/Chest: Effort normal and breath sounds normal. No respiratory distress. She exhibits no tenderness.   Abdominal: Soft. Bowel sounds are normal. She exhibits no distension. There is no guarding.   Musculoskeletal: She exhibits edema (ble - improving). She exhibits no tenderness.   3+ ble edema, improving   Neurological: She is alert and oriented to person, place, and time. No cranial nerve deficit.   Skin: Skin is warm and dry. No rash (bilateral shins - Better today. ) noted. She is not diaphoretic. No erythema. No pallor.   Erythema bilateral LE/shins - improved from prior.   Psychiatric: She has a normal mood and affect. Her behavior is normal.   Nursing note and vitals reviewed.      Recent Labs      05/15/18   0050  05/17/18   0212   WBC  3.7*  3.3*   RBC  2.87*  2.85*   HEMOGLOBIN  8.6*  8.5*   HEMATOCRIT  27.8*  27.6*   MCV  96.9  96.8   MCH  30.0  29.8   MCHC  30.9*  30.8*   RDW  54.2*  53.6*   PLATELETCT  96*  138*   MPV  11.4  10.7     Recent Labs      05/15/18   0050  05/17/18   0212   SODIUM  138  137   POTASSIUM  4.0  3.9   CHLORIDE  95*  94*   CO2  39*  39*   GLUCOSE  120*  136*   BUN  8  6*   CREATININE  0.60  0.56   CALCIUM  7.9*  8.3*                      Assessment/Plan     * Pathologic fracture- (present on admission)   Assessment & Plan    -Left humerus  -widespread lytic disease  -completed radiation to humerus  -pain control             Multiple  myeloma (HCC)- (present on admission)   Assessment & Plan    CyBorD chemo - cycle 1 started 4/19/18; cycle 2 started on 5/10  -recent diagnosis, appears aggressive, now with innumerable lytic lesions  -Dr Sim and Oksana consulted for assistance in management  - IV dilaudid PRN, oxy prn. DC maintenance steroids as these were causing confusion  -oxycontin CR 10 mg BID  -skeletal survey done - spine obscured by body habitus, but multiple other lesions found  XRT to right and left femurs completed  -Palliative care consulted for advanced care planning            Diarrhea   Assessment & Plan    Recurred last 3 days, with intermittent n/v;  c diff negative;   Dc neutraphos which can contributes to diarrhea  Better today          Hypocalcemia   Assessment & Plan    - Repleting;   due to recent bisphosphonate use; replete Mg  improving          Hypomagnesemia   Assessment & Plan    - low today; Repleting as needed; pharmacy low on IV Mg so will do oral replacement        Rash   Assessment & Plan    Continued improvement with benadryl cream  DC Lac-hydrin  Bilateral shin involvement only  Suspect is contact dermatitis from ice packs - not on calves nor any other area that had not been touched by ice packs  If ice packs applied to body - cloth to be between skin and ice pack             Bacteriuria   Assessment & Plan    Klebsiella growing in urine culture, nava in place, asymptomatic.  Immunocompromised due to chemotherapy - cipro x 7 days based on sensitivity.        Decubitus ulcer of buttock   Assessment & Plan    Wound care; rotate frequently in bed; air mattress.        Debility- (present on admission)   Assessment & Plan    Due to current illnesses; not able to walk since early april;  Out of SNF days per SW; cont PT/OT here  Poor prognosis given current diagnoses/morbid obesity.  LTAC declined d/t active chemotherapy.        DNR (do not resuscitate)   Assessment & Plan    Palliative care consulted, patient wants  to change code status to DNR - done, completed Polst with APN.          Chemotherapy-induced thrombocytopenia   Assessment & Plan    improving; monitor; no bleeding now          Leg edema   Assessment & Plan      -PO to IV Lasix scheduled bid, doing well and renal function stable. Improved edema slowly.          Chronic venous stasis dermatitis of both lower extremities- (present on admission)   Assessment & Plan    -wound care, oral abx completed          Anemia- (present on admission)   Assessment & Plan    Stable cbc; monitor  Transfuse if hgb <7.0          Morbid obesity with BMI of 60.0-69.9, adult (CMS-HCC)- (present on admission)   Assessment & Plan    -encourage weight loss  Body mass index is 67.38 kg/m².        DM type 2 (diabetes mellitus, type 2) (CMS-HCC)- (present on admission)   Assessment & Plan    Fair control; adjust meds for good control        Essential hypertension, benign- (present on admission)   Assessment & Plan    Adjust med for good control.          Quality-Core Measures   Reviewed items::  Labs reviewed, Medications reviewed and Radiology images reviewed  Singh catheter::  Urinary Tract Retention or Urinary Tract Obstruction  DVT prophylaxis pharmacological::  Heparin  Assessed for rehabilitation services:  Patient was assess for and/or received rehabilitation services during this hospitalization

## 2018-05-17 NOTE — THERAPY
"Physical Therapy Treatment completed.   Bed Mobility:  Supine to Sit: Maximal Assist  Transfers: Sit to Stand: Maximal Assist (from EOB->FWW)  Gait: Level Of Assist: Unable to Participate      Plan of Care: Will benefit from Physical Therapy 3 times per week  Discharge Recommendations: Equipment: Will Continue to Assess for Equipment Needs.     See \"Rehab Therapy-Acute\" Patient Summary Report for complete documentation.       "

## 2018-05-17 NOTE — PROGRESS NOTES
Chemotherapy Verification - PRIMARY RN      Height = 160 cm Weight = 172.5 kg  BSA = 2.77 m2       Medication: Velcade  Dose: 1.5 mg /m2  Calculated Dose: 4.155, Order dose 4.15                             (In mg/m2, AUC, mg/kg)         I confirm this process was performed independently with the BSA and all final chemotherapy dosing calculations congruent.  Any discrepancies of 5% or greater have been addressed with the chemotherapy pharmacist. The resolution of the discrepancy has been documented in the EPIC progress notes.

## 2018-05-17 NOTE — CARE PLAN
Problem: Safety  Goal: Will remain free from injury  Outcome: PROGRESSING AS EXPECTED  Assessed and educated the patient on the importance of asking for assistance to remain free from injury. Patient calls appropriately for assistance and provides help with bed controls for repositioning.     Problem: Skin Integrity  Goal: Risk for impaired skin integrity will decrease    Intervention: Assess risk factors for impaired skin integrity and/or pressure ulcers  Assessed for risk factors for impaired skin integrity. Utilized barrier cream, intradry and baby powder to prevent further skin irritation and breakdown.

## 2018-05-17 NOTE — PROGRESS NOTES
Received report from day shift RN. Assumed care at 1900. Patient is A/Ox4 resting comfortably in bed. Patient denies n/v, pain, or discomfort at this time. Medicated per MAR. 3-4x assistance with turning. RUE PICC in place with TKO, dressing CDI. Barrier cream applied to sacrum and buttocks. New interdry replaced under pannus. POC discussed with patient. Safety precautions in place, bed locked in low, call light within reach, bed locked low. Rounding every hour in place. Will continue to monitor.

## 2018-05-17 NOTE — PROGRESS NOTES
Oncology/Hematology Progress Note               Author: Bessy Rogelio Date & Time created: 5/17/2018  1:39 PM   DX-Multiple myeloma  Multiple myeloma-- 1P deletion intermediate risk, IgG kappa.  Stage III based on ISS staging (beta 2 9.9 and albumin 2.7 before).  Admitted with pathologic fracture of left humerus. Received palliative radiation to the left humerus during this admission. Poor performance status. Lives alone in [facility].       Started chemotherapy here as an inpatient. Received cycle #1 day #1 of CyBorD on April 19. She has completed CYCLE #1.  - C # 2 switched to weekly regimen Velcade 1.5 mg/m2 + Cytoxan 300 mg/m2+ DEX 40 mg weekly days 1,8,15,22 of every 28 day cycle . Start C2D1 on 5/10/18 . Next dose D8 5/17/18 today.     Interval History:  Stable. Pain is well controlled. On PT.Neuropathy.    Review of Systems:  Review of Systems   Constitutional: Positive for malaise/fatigue.   Respiratory: Negative for cough and hemoptysis.    Cardiovascular: Negative for chest pain and orthopnea.   Genitourinary: Negative for dysuria and hematuria.   Musculoskeletal: Positive for back pain, joint pain and myalgias.   Skin: Negative for itching and rash.   Neurological: Positive for tingling, sensory change and weakness. Negative for headaches.   Psychiatric/Behavioral: Negative for depression. The patient is nervous/anxious.        Physical Exam:  Physical Exam   Constitutional: She is oriented to person, place, and time. She appears well-developed.   Pulmonary/Chest: Breath sounds normal. No respiratory distress. She has no wheezes.   Abdominal: Soft. Bowel sounds are normal. She exhibits no distension. There is no guarding.   obese   Musculoskeletal: She exhibits edema and tenderness.   Neurological: She is alert and oriented to person, place, and time.   Neuropathy is stable.   Skin: Skin is warm and dry. No rash noted. She is not diaphoretic.       Labs:        Invalid input(s): [id number]       Recent Labs      05/15/18   0050  05/17/18   0212   SODIUM  138  137   POTASSIUM  4.0  3.9   CHLORIDE  95*  94*   CO2  39*  39*   BUN  8  6*   CREATININE  0.60  0.56   MAGNESIUM   --   1.1*   PHOSPHORUS  3.6  3.6   CALCIUM  7.9*  8.3*     Recent Labs      05/15/18   0050  18   0212   GLUCOSE  120*  136*     Recent Labs      05/15/18   0050  05/17/18   0212   RBC  2.87*  2.85*   HEMOGLOBIN  8.6*  8.5*   HEMATOCRIT  27.8*  27.6*   PLATELETCT  96*  138*     Recent Labs      05/15/18   0050  05/17/18   0212   WBC  3.7*  3.3*   NEUTSPOLYS  73.20*  66.40   LYMPHOCYTES  16.50*  20.90*   MONOCYTES  8.10  10.60   EOSINOPHILS  0.30  0.60   BASOPHILS  0.30  0.60     Recent Labs      05/15/18   0050  05/17/18   0212   SODIUM  138  137   POTASSIUM  4.0  3.9   CHLORIDE  95*  94*   CO2  39*  39*   GLUCOSE  120*  136*   BUN  8  6*   CREATININE  0.60  0.56   CALCIUM  7.9*  8.3*     Hemodynamics:  Temp (24hrs), Av.7 °C (98 °F), Min:36.6 °C (97.8 °F), Max:36.8 °C (98.3 °F)  Temperature: 36.6 °C (97.8 °F)  Pulse  Av  Min: 38  Max: 128   Blood Pressure : 145/70     Respiratory:    Respiration: 20, Pulse Oximetry: 93 %        RUL Breath Sounds: Clear, RML Breath Sounds: Diminished, RLL Breath Sounds: Diminished, [place] Breath Sounds: Clear, LLL Breath Sounds: Diminished  Fluids:    Intake/Output Summary (Last 24 hours) at 18 1249  Last data filed at 18 1028   Gross per 24 hour   Intake              600 ml   Output             5350 ml   Net            -4750 ml       GI/Nutrition:  Orders Placed This Encounter   Procedures   • Diet Order     Standing Status:   Standing     Number of Occurrences:   1     Order Specific Question:   Diet:     Answer:   Diabetic [3]     Medical Decision Making, by Problem:  Active Hospital Problems    Diagnosis   • *Pathologic fracture [M84.40XA]   • Multiple myeloma (HCC) [C90.00]   • Diarrhea [R19.7]   • Hypomagnesemia [E83.42]   • Hypocalcemia [E83.51]   • Epistaxis [R04.0]   •  Bacteriuria [R82.71]   • Rash [R21]   • Nasal congestion [R09.81]   • Decubitus ulcer of buttock [L89.309]   • Debility [R53.81]   • DNR (do not resuscitate) [Z66]   • Chemotherapy-induced thrombocytopenia [D69.59, T45.1X5A]   • Leg edema [R60.0]   • Anemia [D64.9]   • Chronic venous stasis dermatitis of both lower extremities [I87.2]   • Cellulitis [L03.90]   • Morbid obesity with BMI of 60.0-69.9, adult (CMS-[facility]) [E66.01, Z68.44]   • DM type 2 (diabetes mellitus, type 2) (CMS-[facility]) [E11.9]   • Essential hypertension, benign [I10]       Plan:  MM-Multiple myeloma-- 1P deletion intermediate risk, IgG kappa.  Stage III based on ISS staging (beta 2 9.9 and albumin 2.7 before).  Admitted with pathologic fracture of left humerus. Received palliative radiation to the left humerus during this admission. Poor performance status. Lives alone in [facility].       Started chemotherapy here as an inpatient. Received cycle #1 day #1 of CyBorD on April 19. She has completed CYCLE #1.  - C # 2 switched to weekly regimen Velcade 1.5 mg/m2 + Cytoxan 300 mg/m2+ DEX 40 mg weekly days 1,8,15,22 of every 28 day cycle . Start C2D1 on 5/10/18 . Next dose D8 5/17/18 today. Orders written Neuropathy is stable.  Neuropathy-Preexisting before initiation of chemo. Will watch.    Quality-Core Measures

## 2018-05-17 NOTE — PROGRESS NOTES
"Pharmacy Chemotherapy Note    Second Check    Patient Name: YESSY SINGER  MRN: [id number]    Oncologist: Chiquis    Protocol: CyBorD           21 day cycle for 3-4 cycles  (transplant) or maximal response, disease progression or unacceptable toxicity (previously untreated or non-transplant candidates)    Starting Cycle 2:  Bortezomib 1.5 mg/m2 IV push over 3-5 seconds or SubQ on days 1, 8, 15 and 22  Cyclophosphamide 300 mg/m2 PO Days 1, 8, 15, and 22  Dexamethasone 40 mg PO Daily on days 1, 8, 15, and 22     Every 28 day cycle x 4 cycles    *Dosing Reference*  NCCN Guidelines for Multiple Myeloma V.3.2017  EMANUEL Russell et al. Cyclophosphamide, bortezomib and dexamethasone (CyBorD) induction for newly diagnosed multiple myeloma: High response rates in a phase II clinical trial. Leukemia. 2009 July ; 23(7): 1015-2758.  Drew CASTELLANOS, et al. Once- versus twice-weekly bortezomib induction therapy with CyBorD in newly diagnosed multiple myeloma. BLOOD, 22APRIL 2010VOLUME 115, NUMBER 16  Juan S et al. Randomized, multicenter, phase 2 study (EVOLUTION) of combinations of bortezomib, dexamethasone, cyclophosphamide, and lenalidomide in previously untreated multiple myeloma. BLOOD, 2012 119: 9615-2183      PREVIOUS CHEMO:  NONE    SIGNIFICANT EVENTS:  Admitted for CC of pain 4/10/2018-present    Dx: Multiple Myeloma         /70   Pulse 81   Temp 36.6 °C (97.8 °F)   Resp 20   Ht 1.6 m (5' 2.99\")   Wt (!) 172.5 kg (380 lb 4.7 oz)   LMP 04/11/1994   SpO2 93%   Breastfeeding? No   BMI 67.38 kg/m²  Body surface area is 2.77 meters squared.        **Per MD FLEMING to use actual BSA of 2.77 m2.  Weight from 4/16/18 k to use for cycle 2.  Unable to obtain weight from bed or stand up scale.      LABS 5/17/2018:   ANC: 2190      WBC: 3.3     Plt: 138k   Hgb/Hct: 8.5/27.6     SCr: 0.56 mg/dL CrCl: >125 mL/min (min SCr of 0.7)    PHOS: 3.6   K: 3.9  Na: 137          Glu: 136 Ca: 8.3  ALB: 2.1 (5/15/2018) [place] Ca: 10.1 "     LABS 5/10/2018:  LFT's: 19/14/63 TBili = 0.3     Drug Order   (Drug name, dose, route, IV Fluid & volume, frequency, number of doses) Cycle: 2 Day 8     Previous treatment: Cycle 2 Day: 1 (5/10/2018)     Medication = Cyclophosphamide  Base Dose= 300 mg/m2  Calc Dose: Base Dose x 2.77 m2 = 831 mg  Final Dose = 850 mg  Route = PO          <5% difference, OK to treat with final dose    Medication = Bortezomib (Velcade)  Base Dose = 1.5 mg/m²  Calc Dose: Base Dose x 2.77 m² = 4.15 mg  Final Dose = 4.15 mg  Route = subcutaneous  Fluid & Volume = 1.66 mL in syringe  Conc = 2.5 mg/mL  Admin Duration = to be given subcutaneously          <5% difference, OK to treat with final dose      By my signature below, I confirm this process was performed independently with the BSA and all final chemotherapy dosing calculations congruent. I have reviewed the above chemotherapy order and that my calculation of the final dose and BSA (when applicable) corroborate those calculations of the  pharmacist. Discrepancies of 5% or greater in the written dose have been addressed and documented within the [facility] Progress notes.    LESLIE Lugo, PharmD

## 2018-05-18 PROBLEM — R21 RASH: Status: RESOLVED | Noted: 2018-05-12 | Resolved: 2018-05-18

## 2018-05-18 PROBLEM — J32.9 SINUSITIS: Status: ACTIVE | Noted: 2018-05-18

## 2018-05-18 LAB
GLUCOSE BLD-MCNC: 153 MG/DL (ref 65–99)
GLUCOSE BLD-MCNC: 196 MG/DL (ref 65–99)
GLUCOSE BLD-MCNC: 201 MG/DL (ref 65–99)

## 2018-05-18 PROCEDURE — A9270 NON-COVERED ITEM OR SERVICE: HCPCS | Performed by: INTERNAL MEDICINE

## 2018-05-18 PROCEDURE — 700102 HCHG RX REV CODE 250 W/ 637 OVERRIDE(OP): Performed by: INTERNAL MEDICINE

## 2018-05-18 PROCEDURE — 770004 HCHG ROOM/CARE - ONCOLOGY PRIVATE *

## 2018-05-18 PROCEDURE — 82962 GLUCOSE BLOOD TEST: CPT

## 2018-05-18 PROCEDURE — 700111 HCHG RX REV CODE 636 W/ 250 OVERRIDE (IP): Performed by: INTERNAL MEDICINE

## 2018-05-18 PROCEDURE — 99232 SBSQ HOSP IP/OBS MODERATE 35: CPT | Performed by: INTERNAL MEDICINE

## 2018-05-18 RX ORDER — LEVOFLOXACIN 500 MG/1
500 TABLET, FILM COATED ORAL DAILY
Status: DISCONTINUED | OUTPATIENT
Start: 2018-05-18 | End: 2018-05-19

## 2018-05-18 RX ADMIN — HEPARIN SODIUM 5000 UNITS: 5000 INJECTION, SOLUTION INTRAVENOUS; SUBCUTANEOUS at 08:38

## 2018-05-18 RX ADMIN — METOPROLOL TARTRATE 50 MG: 25 TABLET, FILM COATED ORAL at 20:41

## 2018-05-18 RX ADMIN — INSULIN HUMAN 4 UNITS: 100 INJECTION, SOLUTION PARENTERAL at 14:08

## 2018-05-18 RX ADMIN — HEPARIN SODIUM 5000 UNITS: 5000 INJECTION, SOLUTION INTRAVENOUS; SUBCUTANEOUS at 20:42

## 2018-05-18 RX ADMIN — LEVOFLOXACIN 500 MG: 500 TABLET, FILM COATED ORAL at 14:07

## 2018-05-18 RX ADMIN — POTASSIUM CHLORIDE 20 MEQ: 1500 TABLET, EXTENDED RELEASE ORAL at 08:40

## 2018-05-18 RX ADMIN — ACYCLOVIR 400 MG: 200 CAPSULE ORAL at 08:39

## 2018-05-18 RX ADMIN — ACYCLOVIR 400 MG: 200 CAPSULE ORAL at 20:41

## 2018-05-18 RX ADMIN — ANTACID TABLETS 1000 MG: 500 TABLET, CHEWABLE ORAL at 08:40

## 2018-05-18 RX ADMIN — CIPROFLOXACIN HYDROCHLORIDE 500 MG: 500 TABLET, FILM COATED ORAL at 08:40

## 2018-05-18 RX ADMIN — ANTACID TABLETS 1000 MG: 500 TABLET, CHEWABLE ORAL at 14:01

## 2018-05-18 RX ADMIN — OXYCODONE HYDROCHLORIDE 10 MG: 10 TABLET, FILM COATED, EXTENDED RELEASE ORAL at 08:39

## 2018-05-18 RX ADMIN — METOPROLOL TARTRATE 50 MG: 25 TABLET, FILM COATED ORAL at 08:40

## 2018-05-18 RX ADMIN — METFORMIN HYDROCHLORIDE 1000 MG: 500 TABLET, FILM COATED ORAL at 08:39

## 2018-05-18 RX ADMIN — Medication: at 08:40

## 2018-05-18 RX ADMIN — PRAVASTATIN SODIUM 40 MG: 20 TABLET ORAL at 20:41

## 2018-05-18 RX ADMIN — ONDANSETRON HYDROCHLORIDE 4 MG: 2 INJECTION, SOLUTION INTRAMUSCULAR; INTRAVENOUS at 03:32

## 2018-05-18 RX ADMIN — OXYCODONE HYDROCHLORIDE 10 MG: 10 TABLET, FILM COATED, EXTENDED RELEASE ORAL at 20:41

## 2018-05-18 RX ADMIN — ANTACID TABLETS 1000 MG: 500 TABLET, CHEWABLE ORAL at 17:24

## 2018-05-18 RX ADMIN — INSULIN HUMAN 3 UNITS: 100 INJECTION, SOLUTION PARENTERAL at 20:44

## 2018-05-18 RX ADMIN — POTASSIUM CHLORIDE 20 MEQ: 1500 TABLET, EXTENDED RELEASE ORAL at 20:41

## 2018-05-18 RX ADMIN — FUROSEMIDE 40 MG: 10 INJECTION, SOLUTION INTRAMUSCULAR; INTRAVENOUS at 08:38

## 2018-05-18 RX ADMIN — METFORMIN HYDROCHLORIDE 1000 MG: 500 TABLET, FILM COATED ORAL at 17:25

## 2018-05-18 RX ADMIN — ONDANSETRON HYDROCHLORIDE 4 MG: 2 INJECTION, SOLUTION INTRAMUSCULAR; INTRAVENOUS at 23:09

## 2018-05-18 RX ADMIN — FUROSEMIDE 40 MG: 10 INJECTION, SOLUTION INTRAMUSCULAR; INTRAVENOUS at 17:26

## 2018-05-18 RX ADMIN — INSULIN HUMAN 3 UNITS: 100 INJECTION, SOLUTION PARENTERAL at 09:12

## 2018-05-18 RX ADMIN — CALCITRIOL 0.25 MCG: 0.25 CAPSULE, LIQUID FILLED ORAL at 08:40

## 2018-05-18 RX ADMIN — DULOXETINE HYDROCHLORIDE 40 MG: 20 CAPSULE, DELAYED RELEASE ORAL at 08:39

## 2018-05-18 RX ADMIN — HEPARIN SODIUM 5000 UNITS: 5000 INJECTION, SOLUTION INTRAVENOUS; SUBCUTANEOUS at 14:01

## 2018-05-18 RX ADMIN — CALCIUM CITRATE 200 MG (950 MG) TABLET 1900 MG: at 08:40

## 2018-05-18 RX ADMIN — Medication: at 20:52

## 2018-05-18 ASSESSMENT — LIFESTYLE VARIABLES: DO YOU DRINK ALCOHOL: NO

## 2018-05-18 ASSESSMENT — ENCOUNTER SYMPTOMS
SHORTNESS OF BREATH: 0
INSOMNIA: 0
WEAKNESS: 0
CLAUDICATION: 0
SORE THROAT: 0
VOMITING: 0
DIARRHEA: 1
DEPRESSION: 1
COUGH: 0
HEARTBURN: 0
NAUSEA: 1
FEVER: 0
CONSTIPATION: 0
DIZZINESS: 0
SPEECH CHANGE: 0
PHOTOPHOBIA: 0
CHILLS: 0
HEADACHES: 0
BLURRED VISION: 0
ABDOMINAL PAIN: 0
SENSORY CHANGE: 0
NERVOUS/ANXIOUS: 0
MYALGIAS: 1

## 2018-05-18 ASSESSMENT — PAIN SCALES - GENERAL
PAINLEVEL_OUTOF10: 7
PAINLEVEL_OUTOF10: 1

## 2018-05-18 ASSESSMENT — PAIN SCALES - WONG BAKER: WONGBAKER_NUMERICALRESPONSE: DOESN'T HURT AT ALL

## 2018-05-18 NOTE — PROGRESS NOTES
Received report from day shift RN. Assumed care at 1900. Patient is A/Ox4. Patient complians of n/v and anxiety at this time. Medicated per MAR. 3-4x assistance with turning. RUE PICC in place with TKO, dressing CDI. Barrier cream applied to sacrum and buttocks. New interdry replaced under pannus. Singh stat locked to gravity. POC discussed with patient. Safety precautions in place, bed locked in low, call light within reach, bed locked low. Rounding every hour in place. Will continue to monitor.

## 2018-05-18 NOTE — CARE PLAN
Problem: Safety  Goal: Will remain free from injury  Outcome: PROGRESSING AS EXPECTED  Assessed and ensured safety precautions in place. Bed locked in low, call light and personal belongings within reach.     Problem: Skin Integrity  Goal: Risk for impaired skin integrity will decrease    Intervention: Assess risk factors for impaired skin integrity and/or pressure ulcers  Assessed overall skin integrity. Applied barrier cream and baby powder to sacrum and buttocks, intradryl under pannus, and benadryl cream on BLE cellulitis as ordered per MAR.

## 2018-05-18 NOTE — PROGRESS NOTES
Bedside report received from night shift RN. Assumed care. Pt is A&Ox4. Pt is sitting edge of bed. Pt denies pain at this time. Pt was updated on the plan of care for the day. All questions answered.   Pt has call light within reach and bed is in lowest position.  All fall precautions in place. Pt had no other needs at this time, hourly rounding in place.

## 2018-05-18 NOTE — PROGRESS NOTES
Patient refused turns and measures today to reposition her, being unable to lay full flat and turn. Patient became tearful and extremely anxious, needing RN to repeatedly encourage her to relax and breathe. Patient wanted to be changed and bed made at shift change, noticed time and refused care again, wanting night shift to handle it.

## 2018-05-18 NOTE — PROGRESS NOTES
[facility] Hospitalist Progress Note    Date of Service: 2018    Chief Complaint  67 y.o. female admitted 4/10/2018 with multiple myeloma, left humerus, bilateral femur radiation completed.    Interval Problem Update  Had diarrhea earlier with mild nausea; c/o worse sinus discharge, purulent now;  Consultants/Specialty  Oncology - Reganti  .  Disposition  Difficult discharge.        Review of Systems   Constitutional: Negative for chills and fever.   HENT: Positive for congestion. Negative for nosebleeds (started ) and sore throat.    Eyes: Negative for blurred vision and photophobia.   Respiratory: Negative for cough and shortness of breath.    Cardiovascular: Negative for chest pain, claudication and leg swelling.   Gastrointestinal: Positive for diarrhea and nausea. Negative for abdominal pain, constipation, heartburn and vomiting.   Genitourinary: Negative for dysuria and hematuria.   Musculoskeletal: Positive for myalgias (globally). Negative for joint pain.   Skin: Negative for itching (better) and rash (better).   Neurological: Negative for dizziness, sensory change, speech change, weakness and headaches.   Psychiatric/Behavioral: Positive for depression. The patient is not nervous/anxious and does not have insomnia.       Physical Exam  Laboratory/Imaging   Hemodynamics  Temp (24hrs), Av.7 °C (98.1 °F), Min:36.3 °C (97.4 °F), Max:37.2 °C (98.9 °F)   Temperature: 37.2 °C (98.9 °F)  Pulse  Av.1  Min: 38  Max: 128    Blood Pressure : (!) 165/58      Respiratory      Respiration: 18, Pulse Oximetry: 94 %     Work Of Breathing / Effort: Mild;Moderate  RML Breath Sounds: Diminished, RLL Breath Sounds: Diminished, CHARLOTTE Breath Sounds: Clear, LLL Breath Sounds: Diminished    Fluids    Intake/Output Summary (Last 24 hours) at 18 1235  Last data filed at 18   Gross per 24 hour   Intake                0 ml   Output             1750 ml   Net            -1750 ml       Nutrition  Orders Placed  This Encounter   Procedures   • Diet Order     Standing Status:   Standing     Number of Occurrences:   1     Order Specific Question:   Diet:     Answer:   Diabetic [3]     Physical Exam   Constitutional: She is oriented to person, place, and time. She appears well-developed and well-nourished. No distress.   HENT:   Head: Normocephalic and atraumatic.   Eyes: Conjunctivae are normal. No scleral icterus.   Neck: Neck supple. No JVD present.   Cardiovascular: Normal rate, regular rhythm and normal heart sounds.  Exam reveals no gallop and no friction rub.    No murmur heard.  Pulmonary/Chest: Effort normal and breath sounds normal. No respiratory distress. She exhibits no tenderness.   Abdominal: Soft. Bowel sounds are normal. She exhibits no distension. There is no guarding.   Musculoskeletal: She exhibits edema (ble - improving). She exhibits no tenderness.   3+ ble edema, improving   Neurological: She is alert and oriented to person, place, and time. No cranial nerve deficit.   Skin: Skin is warm and dry. No rash (bilateral shins - Better today. ) noted. She is not diaphoretic. No erythema. No pallor.   Erythema bilateral LE/shins - improved from prior.   Psychiatric: She has a normal mood and affect. Her behavior is normal.   Nursing note and vitals reviewed.      Recent Labs      05/17/18   0212   WBC  3.3*   RBC  2.85*   HEMOGLOBIN  8.5*   HEMATOCRIT  27.6*   MCV  96.8   MCH  29.8   MCHC  30.8*   RDW  53.6*   PLATELETCT  138*   MPV  10.7     Recent Labs      05/17/18   0212   SODIUM  137   POTASSIUM  3.9   CHLORIDE  94*   CO2  39*   GLUCOSE  136*   BUN  6*   CREATININE  0.56   CALCIUM  8.3*                      Assessment/Plan     * Pathologic fracture- (present on admission)   Assessment & Plan    -Left humerus  -widespread lytic disease  -completed radiation to humerus  -pain control             Multiple myeloma (HCC)- (present on admission)   Assessment & Plan    CyBorD chemo - cycle 1 started 4/19/18;  cycle 2 started on 5/10  -recent diagnosis, appears aggressive, now with innumerable lytic lesions  -Dr Sim and Oksana consulted for assistance in management  - IV dilaudid PRN, oxy prn. DC maintenance steroids as these were causing confusion  -oxycontin CR 10 mg BID  -skeletal survey done - spine obscured by body habitus, but multiple other lesions found  XRT to right and left femurs completed  -Palliative care consulted for advanced care planning            Diarrhea   Assessment & Plan    Recurred last several days, with intermittent n/v;  c diff negative;   Dc neutraphos which can contributes to diarrhea  monitor          Hypocalcemia   Assessment & Plan    - Repleting;   due to recent bisphosphonate use; replete Mg  improving          Hypomagnesemia   Assessment & Plan    Low  Yesterday, repleted; f/u labs        Sinusitis   Assessment & Plan    Worse sx's with purulent discharge per patient; start Levaquin and monitor.        Bacteriuria   Assessment & Plan    Klebsiella growing in urine culture, nava in place, asymptomatic.  Immunocompromised due to chemotherapy - cipro x 7 days based on sensitivity.        Decubitus ulcer of buttock   Assessment & Plan    Wound care; rotate frequently in bed; air mattress.        Debility- (present on admission)   Assessment & Plan    Due to current illnesses; not able to walk since early april;  Out of SNF days per SW; cont PT/OT here  Poor prognosis given current diagnoses/morbid obesity.  LTAC declined d/t active chemotherapy.        DNR (do not resuscitate)   Assessment & Plan    Palliative care consulted, patient wants to change code status to DNR - done, completed Polst with APN.          Chemotherapy-induced thrombocytopenia   Assessment & Plan    improving; monitor; no bleeding now          Leg edema   Assessment & Plan      -PO to IV Lasix scheduled bid, doing well and renal function stable. Improved edema slowly.          Chronic venous stasis dermatitis of  both lower extremities- (present on admission)   Assessment & Plan    -wound care, oral abx completed          Anemia- (present on admission)   Assessment & Plan    Stable cbc; monitor  Transfuse if hgb <7.0          Morbid obesity with BMI of 60.0-69.9, adult (CMS-HCC)- (present on admission)   Assessment & Plan    -encourage weight loss  Body mass index is 67.38 kg/m².        DM type 2 (diabetes mellitus, type 2) (CMS-HCC)- (present on admission)   Assessment & Plan    Fair control; adjust meds for good control        Essential hypertension, benign- (present on admission)   Assessment & Plan    Adjust med for good control.          Quality-Core Measures   Reviewed items::  Labs reviewed, Medications reviewed and Radiology images reviewed  Singh catheter::  Urinary Tract Retention or Urinary Tract Obstruction  DVT prophylaxis pharmacological::  Heparin  Assessed for rehabilitation services:  Patient was assess for and/or received rehabilitation services during this hospitalization

## 2018-05-18 NOTE — DOCUMENTATION QUERY
DOCUMENTATION QUERY    PROVIDERS: Please select “Cosign w/ note”to reply to query.    To better represent the severity of illness of your patient, please review the following information and exercise your independent professional judgment in responding to this query.     Decubitus ulcers of buttocks, bilateral stage 2 is your response to a previous query dated 5/16/18.      Based upon the clinical findings, risk factors, and treatment, please specify if this condition was present on admission or developed after admission    • Decubitus ulcers of buttocks, bilaterally stage 2 was present on admission   • Decubitus ulcers of buttocks, bilaterally stage 2 developed after admission   • Other explanation of clinical findings  • Unable to determine          The medical record reflects the following:   Clinical Findings Documented decubitus ulcers of buttocks, bilaterally stage 2 without specification of POA status   Treatment Wound care (no documentation found)   Rotate frequently in bed  Air mattress    Risk Factors Multiple myeloma  Pathological fracture   Morbid obesity   Cellulitis   Hypoglycemia  Adult failure to thrive   Chemotherapy   Antineoplastic induced pancytopenia   Bacteriuria   Type 2 diabetes    Location within medical record Query response and progress note      Thank you,   Anaya Castillo RN  Clinical   [phone]

## 2018-05-19 ENCOUNTER — APPOINTMENT (OUTPATIENT)
Dept: RADIOLOGY | Facility: MEDICAL CENTER | Age: 68
DRG: 840 | End: 2018-05-19
Attending: INTERNAL MEDICINE
Payer: MEDICARE

## 2018-05-19 LAB
ALBUMIN SERPL BCP-MCNC: 2.3 G/DL (ref 3.2–4.9)
ALBUMIN/GLOB SERPL: 0.7 G/DL
ALP SERPL-CCNC: 55 U/L (ref 30–99)
ALT SERPL-CCNC: 13 U/L (ref 2–50)
ANION GAP SERPL CALC-SCNC: 6 MMOL/L (ref 0–11.9)
AST SERPL-CCNC: 23 U/L (ref 12–45)
BASOPHILS # BLD AUTO: 0.6 % (ref 0–1.8)
BASOPHILS # BLD: 0.02 K/UL (ref 0–0.12)
BILIRUB SERPL-MCNC: 0.3 MG/DL (ref 0.1–1.5)
BUN SERPL-MCNC: 8 MG/DL (ref 8–22)
CALCIUM SERPL-MCNC: 7.9 MG/DL (ref 8.5–10.5)
CHLORIDE SERPL-SCNC: 93 MMOL/L (ref 96–112)
CO2 SERPL-SCNC: 34 MMOL/L (ref 20–33)
CREAT SERPL-MCNC: 0.59 MG/DL (ref 0.5–1.4)
EOSINOPHIL # BLD AUTO: 0 K/UL (ref 0–0.51)
EOSINOPHIL NFR BLD: 0 % (ref 0–6.9)
ERYTHROCYTE [DISTWIDTH] IN BLOOD BY AUTOMATED COUNT: 55.1 FL (ref 35.9–50)
GLOBULIN SER CALC-MCNC: 3.2 G/DL (ref 1.9–3.5)
GLUCOSE BLD-MCNC: 160 MG/DL (ref 65–99)
GLUCOSE BLD-MCNC: 165 MG/DL (ref 65–99)
GLUCOSE BLD-MCNC: 183 MG/DL (ref 65–99)
GLUCOSE BLD-MCNC: 210 MG/DL (ref 65–99)
GLUCOSE SERPL-MCNC: 198 MG/DL (ref 65–99)
HCT VFR BLD AUTO: 25.4 % (ref 37–47)
HGB BLD-MCNC: 7.7 G/DL (ref 12–16)
IMM GRANULOCYTES # BLD AUTO: 0.09 K/UL (ref 0–0.11)
IMM GRANULOCYTES NFR BLD AUTO: 2.6 % (ref 0–0.9)
LYMPHOCYTES # BLD AUTO: 0.46 K/UL (ref 1–4.8)
LYMPHOCYTES NFR BLD: 13.1 % (ref 22–41)
MAGNESIUM SERPL-MCNC: 1.7 MG/DL (ref 1.5–2.5)
MCH RBC QN AUTO: 29.6 PG (ref 27–33)
MCHC RBC AUTO-ENTMCNC: 30.3 G/DL (ref 33.6–35)
MCV RBC AUTO: 97.7 FL (ref 81.4–97.8)
MONOCYTES # BLD AUTO: 0.42 K/UL (ref 0–0.85)
MONOCYTES NFR BLD AUTO: 11.9 % (ref 0–13.4)
NEUTROPHILS # BLD AUTO: 2.53 K/UL (ref 2–7.15)
NEUTROPHILS NFR BLD: 71.8 % (ref 44–72)
NRBC # BLD AUTO: 0.02 K/UL
NRBC BLD-RTO: 0.6 /100 WBC
PLATELET # BLD AUTO: 122 K/UL (ref 164–446)
PMV BLD AUTO: 9.9 FL (ref 9–12.9)
POTASSIUM SERPL-SCNC: 3.8 MMOL/L (ref 3.6–5.5)
PROT SERPL-MCNC: 5.5 G/DL (ref 6–8.2)
RBC # BLD AUTO: 2.6 M/UL (ref 4.2–5.4)
SODIUM SERPL-SCNC: 133 MMOL/L (ref 135–145)
WBC # BLD AUTO: 3.5 K/UL (ref 4.8–10.8)

## 2018-05-19 PROCEDURE — A9270 NON-COVERED ITEM OR SERVICE: HCPCS | Performed by: INTERNAL MEDICINE

## 2018-05-19 PROCEDURE — 80053 COMPREHEN METABOLIC PANEL: CPT

## 2018-05-19 PROCEDURE — 700101 HCHG RX REV CODE 250: Performed by: INTERNAL MEDICINE

## 2018-05-19 PROCEDURE — 770004 HCHG ROOM/CARE - ONCOLOGY PRIVATE *

## 2018-05-19 PROCEDURE — 94640 AIRWAY INHALATION TREATMENT: CPT

## 2018-05-19 PROCEDURE — 82962 GLUCOSE BLOOD TEST: CPT | Mod: 91

## 2018-05-19 PROCEDURE — 700102 HCHG RX REV CODE 250 W/ 637 OVERRIDE(OP): Performed by: INTERNAL MEDICINE

## 2018-05-19 PROCEDURE — 85025 COMPLETE CBC W/AUTO DIFF WBC: CPT

## 2018-05-19 PROCEDURE — 71045 X-RAY EXAM CHEST 1 VIEW: CPT

## 2018-05-19 PROCEDURE — 99232 SBSQ HOSP IP/OBS MODERATE 35: CPT | Performed by: INTERNAL MEDICINE

## 2018-05-19 PROCEDURE — 83735 ASSAY OF MAGNESIUM: CPT

## 2018-05-19 PROCEDURE — 700111 HCHG RX REV CODE 636 W/ 250 OVERRIDE (IP): Performed by: INTERNAL MEDICINE

## 2018-05-19 RX ORDER — LEVOFLOXACIN 5 MG/ML
750 INJECTION, SOLUTION INTRAVENOUS EVERY 24 HOURS
Status: DISCONTINUED | OUTPATIENT
Start: 2018-05-20 | End: 2018-05-21

## 2018-05-19 RX ORDER — IPRATROPIUM BROMIDE AND ALBUTEROL SULFATE 2.5; .5 MG/3ML; MG/3ML
3 SOLUTION RESPIRATORY (INHALATION)
Status: DISCONTINUED | OUTPATIENT
Start: 2018-05-19 | End: 2018-05-26 | Stop reason: HOSPADM

## 2018-05-19 RX ADMIN — INSULIN HUMAN 3 UNITS: 100 INJECTION, SOLUTION PARENTERAL at 20:38

## 2018-05-19 RX ADMIN — POTASSIUM CHLORIDE 20 MEQ: 1500 TABLET, EXTENDED RELEASE ORAL at 08:30

## 2018-05-19 RX ADMIN — FUROSEMIDE 40 MG: 10 INJECTION, SOLUTION INTRAMUSCULAR; INTRAVENOUS at 06:40

## 2018-05-19 RX ADMIN — HEPARIN SODIUM 5000 UNITS: 5000 INJECTION, SOLUTION INTRAVENOUS; SUBCUTANEOUS at 22:53

## 2018-05-19 RX ADMIN — PHENYLEPHRINE HCL 10 MG: 10 TABLET, FILM COATED ORAL at 11:16

## 2018-05-19 RX ADMIN — OXYCODONE HYDROCHLORIDE 10 MG: 10 TABLET, FILM COATED, EXTENDED RELEASE ORAL at 20:37

## 2018-05-19 RX ADMIN — METOPROLOL TARTRATE 50 MG: 25 TABLET, FILM COATED ORAL at 20:37

## 2018-05-19 RX ADMIN — METOPROLOL TARTRATE 50 MG: 25 TABLET, FILM COATED ORAL at 08:29

## 2018-05-19 RX ADMIN — ANTACID TABLETS 1000 MG: 500 TABLET, CHEWABLE ORAL at 17:41

## 2018-05-19 RX ADMIN — METFORMIN HYDROCHLORIDE 1000 MG: 500 TABLET, FILM COATED ORAL at 17:41

## 2018-05-19 RX ADMIN — HYDRALAZINE HYDROCHLORIDE 10 MG: 20 INJECTION INTRAMUSCULAR; INTRAVENOUS at 17:39

## 2018-05-19 RX ADMIN — CALCIUM CITRATE 200 MG (950 MG) TABLET 1900 MG: at 08:29

## 2018-05-19 RX ADMIN — Medication: at 08:44

## 2018-05-19 RX ADMIN — PROCHLORPERAZINE EDISYLATE 10 MG: 5 INJECTION INTRAMUSCULAR; INTRAVENOUS at 08:40

## 2018-05-19 RX ADMIN — METRONIDAZOLE 500 MG: 500 INJECTION, SOLUTION INTRAVENOUS at 22:53

## 2018-05-19 RX ADMIN — HEPARIN SODIUM 5000 UNITS: 5000 INJECTION, SOLUTION INTRAVENOUS; SUBCUTANEOUS at 06:00

## 2018-05-19 RX ADMIN — POTASSIUM CHLORIDE 20 MEQ: 1500 TABLET, EXTENDED RELEASE ORAL at 20:38

## 2018-05-19 RX ADMIN — INSULIN HUMAN 4 UNITS: 100 INJECTION, SOLUTION PARENTERAL at 08:32

## 2018-05-19 RX ADMIN — METFORMIN HYDROCHLORIDE 1000 MG: 500 TABLET, FILM COATED ORAL at 08:29

## 2018-05-19 RX ADMIN — LORAZEPAM 1 MG: 2 INJECTION INTRAMUSCULAR; INTRAVENOUS at 16:11

## 2018-05-19 RX ADMIN — PRAVASTATIN SODIUM 40 MG: 20 TABLET ORAL at 20:37

## 2018-05-19 RX ADMIN — OXYCODONE HYDROCHLORIDE 10 MG: 10 TABLET, FILM COATED, EXTENDED RELEASE ORAL at 08:28

## 2018-05-19 RX ADMIN — LEVOFLOXACIN 500 MG: 500 TABLET, FILM COATED ORAL at 08:27

## 2018-05-19 RX ADMIN — HEPARIN SODIUM 5000 UNITS: 5000 INJECTION, SOLUTION INTRAVENOUS; SUBCUTANEOUS at 15:49

## 2018-05-19 RX ADMIN — INSULIN HUMAN 3 UNITS: 100 INJECTION, SOLUTION PARENTERAL at 13:10

## 2018-05-19 RX ADMIN — IPRATROPIUM BROMIDE AND ALBUTEROL SULFATE 3 ML: .5; 3 SOLUTION RESPIRATORY (INHALATION) at 16:54

## 2018-05-19 RX ADMIN — FUROSEMIDE 40 MG: 10 INJECTION, SOLUTION INTRAMUSCULAR; INTRAVENOUS at 15:49

## 2018-05-19 RX ADMIN — Medication: at 20:37

## 2018-05-19 RX ADMIN — ANTACID TABLETS 1000 MG: 500 TABLET, CHEWABLE ORAL at 13:10

## 2018-05-19 RX ADMIN — DULOXETINE HYDROCHLORIDE 40 MG: 20 CAPSULE, DELAYED RELEASE ORAL at 08:28

## 2018-05-19 RX ADMIN — CALCITRIOL 0.25 MCG: 0.25 CAPSULE, LIQUID FILLED ORAL at 08:28

## 2018-05-19 RX ADMIN — ACYCLOVIR 400 MG: 200 CAPSULE ORAL at 08:31

## 2018-05-19 RX ADMIN — INSULIN HUMAN 3 UNITS: 100 INJECTION, SOLUTION PARENTERAL at 17:42

## 2018-05-19 RX ADMIN — ONDANSETRON 4 MG: 4 TABLET, ORALLY DISINTEGRATING ORAL at 13:13

## 2018-05-19 RX ADMIN — ANTACID TABLETS 1000 MG: 500 TABLET, CHEWABLE ORAL at 08:31

## 2018-05-19 RX ADMIN — ACYCLOVIR 400 MG: 200 CAPSULE ORAL at 20:37

## 2018-05-19 ASSESSMENT — ENCOUNTER SYMPTOMS
CHILLS: 0
SPEECH CHANGE: 0
COUGH: 0
WEAKNESS: 0
FEVER: 0
SENSORY CHANGE: 0
BLURRED VISION: 0
DIARRHEA: 1
CONSTIPATION: 0
NERVOUS/ANXIOUS: 0
VOMITING: 0
DEPRESSION: 1
SORE THROAT: 0
CLAUDICATION: 0
MYALGIAS: 1
PHOTOPHOBIA: 0
INSOMNIA: 0
HEADACHES: 0
ABDOMINAL PAIN: 0
DIZZINESS: 0
NAUSEA: 1
SHORTNESS OF BREATH: 0
HEARTBURN: 0

## 2018-05-19 ASSESSMENT — PAIN SCALES - GENERAL
PAINLEVEL_OUTOF10: 7
PAINLEVEL_OUTOF10: 6

## 2018-05-19 NOTE — PROGRESS NOTES
[facility] Hospitalist Progress Note    Date of Service: 2018    Chief Complaint  67 y.o. female admitted 4/10/2018 with multiple myeloma, left humerus, bilateral femur radiation completed.    Interval Problem Update  Less diarrhea; some nausea still, seems related to taking meds; no new c/o; still has sinus congestion but less discharge.  Consultants/Specialty  Oncology - Reganti  .  Disposition  Difficult discharge.        Review of Systems   Constitutional: Negative for chills and fever.   HENT: Positive for congestion. Negative for nosebleeds (started ) and sore throat.    Eyes: Negative for blurred vision and photophobia.   Respiratory: Negative for cough and shortness of breath.    Cardiovascular: Negative for chest pain, claudication and leg swelling.   Gastrointestinal: Positive for diarrhea and nausea. Negative for abdominal pain, constipation, heartburn and vomiting.   Genitourinary: Negative for dysuria and hematuria.   Musculoskeletal: Positive for myalgias (globally). Negative for joint pain.   Skin: Negative for itching (better) and rash (better).   Neurological: Negative for dizziness, sensory change, speech change, weakness and headaches.   Psychiatric/Behavioral: Positive for depression. The patient is not nervous/anxious and does not have insomnia.       Physical Exam  Laboratory/Imaging   Hemodynamics  Temp (24hrs), Av.5 °C (97.7 °F), Min:36.3 °C (97.4 °F), Max:36.8 °C (98.2 °F)   Temperature: 36.4 °C (97.5 °F)  Pulse  Av.1  Min: 38  Max: 128    Blood Pressure : 150/78      Respiratory      Respiration: 18, Pulse Oximetry: 93 %     Work Of Breathing / Effort: Mild;Moderate  RUL Breath Sounds: Clear, RML Breath Sounds: Diminished, RLL Breath Sounds: Diminished, [place]TE Breath Sounds: Clear, LLL Breath Sounds: Diminished    Fluids    Intake/Output Summary (Last 24 hours) at 18 1221  Last data filed at 18 1201   Gross per 24 hour   Intake                0 ml   Output              1725 ml   Net            -1725 ml       Nutrition  Orders Placed This Encounter   Procedures   • Diet Order     Standing Status:   Standing     Number of Occurrences:   1     Order Specific Question:   Diet:     Answer:   Diabetic [3]     Physical Exam   Constitutional: She is oriented to person, place, and time. She appears well-developed and well-nourished. No distress.   HENT:   Head: Normocephalic and atraumatic.   Eyes: Conjunctivae are normal. No scleral icterus.   Neck: Neck supple. No JVD present.   Cardiovascular: Normal rate, regular rhythm and normal heart sounds.  Exam reveals no gallop and no friction rub.    No murmur heard.  Pulmonary/Chest: Effort normal and breath sounds normal. No respiratory distress. She exhibits no tenderness.   Abdominal: Soft. Bowel sounds are normal. She exhibits no distension. There is no guarding.   Musculoskeletal: She exhibits edema (ble - improving). She exhibits no tenderness.   3+ ble edema, improving   Neurological: She is alert and oriented to person, place, and time. No cranial nerve deficit.   Skin: Skin is warm and dry. No rash (bilateral shins - Better today. ) noted. She is not diaphoretic. No erythema. No pallor.   Erythema bilateral LE/shins - improved from prior.   Psychiatric: She has a normal mood and affect. Her behavior is normal.   Nursing note and vitals reviewed.      Recent Labs      05/17/18   0212  05/19/18   0822   WBC  3.3*  3.5*   RBC  2.85*  2.60*   HEMOGLOBIN  8.5*  7.7*   HEMATOCRIT  27.6*  25.4*   MCV  96.8  97.7   MCH  29.8  29.6   MCHC  30.8*  30.3*   RDW  53.6*  55.1*   PLATELETCT  138*  122*   MPV  10.7  9.9     Recent Labs      05/17/18   0212  05/19/18   0430   SODIUM  137  133*   POTASSIUM  3.9  3.8   CHLORIDE  94*  93*   CO2  39*  34*   GLUCOSE  136*  198*   BUN  6*  8   CREATININE  0.56  0.59   CALCIUM  8.3*  7.9*                      Assessment/Plan     * Pathologic fracture- (present on admission)   Assessment & Plan    -Left  humerus  -widespread lytic disease  -completed radiation to humerus  -pain control             Multiple myeloma (HCC)- (present on admission)   Assessment & Plan    CyBorD chemo - cycle 1 started 4/19/18; cycle 2 started on 5/10  -recent diagnosis, appears aggressive, now with innumerable lytic lesions  -Dr Sim and Oksana consulted for assistance in management  - IV dilaudid PRN, oxy prn. DC maintenance steroids as these were causing confusion  -oxycontin CR 10 mg BID  -skeletal survey done - spine obscured by body habitus, but multiple other lesions found  XRT to right and left femurs completed  -Palliative care consulted for advanced care planning            Diarrhea   Assessment & Plan    Less today  c diff negative;   Dc neutraphos which can contributes to diarrhea  monitor          Hypocalcemia   Assessment & Plan    - Repleting;   due to recent bisphosphonate use; replete Mg  improving          Hypomagnesemia   Assessment & Plan    Replete as indicated; f/u labs        Sinusitis   Assessment & Plan    Worse sx's with purulent discharge per patient; start Levaquin and monitor.        Bacteriuria   Assessment & Plan    Klebsiella growing in urine culture, nava in place, asymptomatic.  Immunocompromised due to chemotherapy - cipro x 7 days based on sensitivity.        Decubitus ulcer of buttock   Assessment & Plan    Wound care; rotate frequently in bed; air mattress.        Debility- (present on admission)   Assessment & Plan    Due to current illnesses; not able to walk since early april;  Out of SNF days per SW; cont PT/OT here  Poor prognosis given current diagnoses/morbid obesity.  LTAC declined d/t active chemotherapy.        DNR (do not resuscitate)   Assessment & Plan    Palliative care consulted, patient wants to change code status to DNR - done, completed Polst with APN.          Chemotherapy-induced thrombocytopenia   Assessment & Plan    improving; monitor; no bleeding now          Leg edema    Assessment & Plan      -PO to IV Lasix scheduled bid, doing well and renal function stable. Improved edema slowly.          Chronic venous stasis dermatitis of both lower extremities- (present on admission)   Assessment & Plan    -wound care, oral abx completed          Anemia- (present on admission)   Assessment & Plan    Sl. Lower hgb today; no active bleeding; monitor  Transfuse if hgb <7.0          Morbid obesity with BMI of 60.0-69.9, adult (CMS-HCC)- (present on admission)   Assessment & Plan    -encourage weight loss  Body mass index is 67.38 kg/m².        DM type 2 (diabetes mellitus, type 2) (CMS-HCC)- (present on admission)   Assessment & Plan    Fair control; adjust meds for good control        Essential hypertension, benign- (present on admission)   Assessment & Plan    Adjust med for good control.          Quality-Core Measures   Reviewed items::  Labs reviewed, Medications reviewed and Radiology images reviewed  Singh catheter::  Urinary Tract Retention or Urinary Tract Obstruction  DVT prophylaxis pharmacological::  Heparin  Assessed for rehabilitation services:  Patient was assess for and/or received rehabilitation services during this hospitalization

## 2018-05-19 NOTE — PROGRESS NOTES
Assumed care of pt at 0700. Bedside report received. POC discussed, call light in reach, pt calls for assistance, all needs met at this time.

## 2018-05-19 NOTE — PROGRESS NOTES
Pt complaining of difficulty catching breath. Scheduled Lasix given and PRN Ativan. Lungs sound clear to diminished.Paged Dr Gann to update.

## 2018-05-19 NOTE — PROGRESS NOTES
Patient sat on the edge of bed for morning and afternoon. Patient was able to nap in this position. Patient had bowel movement and declined cleaning for at least an hour. Patient was less tearful today and enjoyed watching TV.

## 2018-05-19 NOTE — CARE PLAN
Problem: Safety  Goal: Will remain free from injury  Outcome: PROGRESSING AS EXPECTED  Patient educated about the importance of calling for assistance. Patient verbalizes understanding and calls for assistance appropriately. Safety precautions in place including patient call light within reach, bed in low position and locked, personal possessions close by, patient rounding [id number], bed alarm on, frequent toileting offered, and non-skid socks in place. Patient remains free of injury since start of shift.         Problem: Pain Management  Goal: Pain level will decrease to patient's comfort goal  Outcome: PROGRESSING AS EXPECTED  Pain managed with medication and repositioning.

## 2018-05-19 NOTE — PROGRESS NOTES
Assumed care of pt at 1845. Received report from KEE Winters. Pt A/O x 4, in bed, watching TV. Pt was able to sit at edge of bed today for 4 hours.  PICC in place - NS running TKO. PICC site CDI, flushes well with + blood return. Discussed POC for night with pt at bedside. No complaints of pain or nausea. Pt bed bound at this time, up 4+ assist. Bed in lowest, locked position. Call light and personal belongings within reach. Pt has no further questions or concerns at this time. Hourly rounding in place.

## 2018-05-20 ENCOUNTER — APPOINTMENT (OUTPATIENT)
Dept: RADIOLOGY | Facility: MEDICAL CENTER | Age: 68
DRG: 840 | End: 2018-05-20
Attending: INTERNAL MEDICINE
Payer: MEDICARE

## 2018-05-20 PROBLEM — J96.00 ACUTE RESPIRATORY FAILURE (HCC): Status: ACTIVE | Noted: 2018-05-20

## 2018-05-20 LAB
APPEARANCE UR: CLEAR
BACTERIA #/AREA URNS HPF: NEGATIVE /HPF
BASOPHILS # BLD AUTO: 0 % (ref 0–1.8)
BASOPHILS # BLD: 0 K/UL (ref 0–0.12)
BILIRUB UR QL STRIP.AUTO: NEGATIVE
COLOR UR: YELLOW
EOSINOPHIL # BLD AUTO: 0.01 K/UL (ref 0–0.51)
EOSINOPHIL NFR BLD: 0.4 % (ref 0–6.9)
EPI CELLS #/AREA URNS HPF: ABNORMAL /HPF
ERYTHROCYTE [DISTWIDTH] IN BLOOD BY AUTOMATED COUNT: 53.2 FL (ref 35.9–50)
GLUCOSE BLD-MCNC: 136 MG/DL (ref 65–99)
GLUCOSE BLD-MCNC: 150 MG/DL (ref 65–99)
GLUCOSE BLD-MCNC: 163 MG/DL (ref 65–99)
GLUCOSE BLD-MCNC: 207 MG/DL (ref 65–99)
GLUCOSE UR STRIP.AUTO-MCNC: NEGATIVE MG/DL
HCT VFR BLD AUTO: 25.6 % (ref 37–47)
HGB BLD-MCNC: 8.1 G/DL (ref 12–16)
HYALINE CASTS #/AREA URNS LPF: ABNORMAL /LPF
IMM GRANULOCYTES # BLD AUTO: 0.04 K/UL (ref 0–0.11)
IMM GRANULOCYTES NFR BLD AUTO: 1.5 % (ref 0–0.9)
INR PPP: 1.13 (ref 0.87–1.13)
KETONES UR STRIP.AUTO-MCNC: NEGATIVE MG/DL
LEUKOCYTE ESTERASE UR QL STRIP.AUTO: ABNORMAL
LYMPHOCYTES # BLD AUTO: 0.33 K/UL (ref 1–4.8)
LYMPHOCYTES NFR BLD: 12.6 % (ref 22–41)
MCH RBC QN AUTO: 30.3 PG (ref 27–33)
MCHC RBC AUTO-ENTMCNC: 31.6 G/DL (ref 33.6–35)
MCV RBC AUTO: 95.9 FL (ref 81.4–97.8)
MICRO URNS: ABNORMAL
MONOCYTES # BLD AUTO: 0.36 K/UL (ref 0–0.85)
MONOCYTES NFR BLD AUTO: 13.7 % (ref 0–13.4)
NEUTROPHILS # BLD AUTO: 1.88 K/UL (ref 2–7.15)
NEUTROPHILS NFR BLD: 71.8 % (ref 44–72)
NITRITE UR QL STRIP.AUTO: NEGATIVE
NRBC # BLD AUTO: 0.03 K/UL
NRBC BLD-RTO: 1.1 /100 WBC
PH UR STRIP.AUTO: 8.5 [PH]
PLATELET # BLD AUTO: 99 K/UL (ref 164–446)
PMV BLD AUTO: 10.7 FL (ref 9–12.9)
PROT UR QL STRIP: NEGATIVE MG/DL
PROTHROMBIN TIME: 14.2 SEC (ref 12–14.6)
RBC # BLD AUTO: 2.67 M/UL (ref 4.2–5.4)
RBC # URNS HPF: ABNORMAL /HPF
RBC UR QL AUTO: NEGATIVE
SP GR UR STRIP.AUTO: 1.01
UROBILINOGEN UR STRIP.AUTO-MCNC: 0.2 MG/DL
WBC # BLD AUTO: 2.6 K/UL (ref 4.8–10.8)
WBC #/AREA URNS HPF: ABNORMAL /HPF

## 2018-05-20 PROCEDURE — 700101 HCHG RX REV CODE 250: Performed by: INTERNAL MEDICINE

## 2018-05-20 PROCEDURE — 82962 GLUCOSE BLOOD TEST: CPT | Mod: 91

## 2018-05-20 PROCEDURE — 700111 HCHG RX REV CODE 636 W/ 250 OVERRIDE (IP): Performed by: INTERNAL MEDICINE

## 2018-05-20 PROCEDURE — 85025 COMPLETE CBC W/AUTO DIFF WBC: CPT

## 2018-05-20 PROCEDURE — 76604 US EXAM CHEST: CPT

## 2018-05-20 PROCEDURE — 99233 SBSQ HOSP IP/OBS HIGH 50: CPT | Performed by: INTERNAL MEDICINE

## 2018-05-20 PROCEDURE — 700117 HCHG RX CONTRAST REV CODE 255: Performed by: INTERNAL MEDICINE

## 2018-05-20 PROCEDURE — 770004 HCHG ROOM/CARE - ONCOLOGY PRIVATE *

## 2018-05-20 PROCEDURE — A9270 NON-COVERED ITEM OR SERVICE: HCPCS | Performed by: INTERNAL MEDICINE

## 2018-05-20 PROCEDURE — 85610 PROTHROMBIN TIME: CPT

## 2018-05-20 PROCEDURE — 700102 HCHG RX REV CODE 250 W/ 637 OVERRIDE(OP): Performed by: INTERNAL MEDICINE

## 2018-05-20 PROCEDURE — 81001 URINALYSIS AUTO W/SCOPE: CPT

## 2018-05-20 PROCEDURE — 71275 CT ANGIOGRAPHY CHEST: CPT

## 2018-05-20 RX ORDER — AMLODIPINE BESYLATE 5 MG/1
5 TABLET ORAL
Status: DISCONTINUED | OUTPATIENT
Start: 2018-05-20 | End: 2018-05-26 | Stop reason: HOSPADM

## 2018-05-20 RX ORDER — LORAZEPAM 2 MG/ML
0.5 INJECTION INTRAMUSCULAR
Status: COMPLETED | OUTPATIENT
Start: 2018-05-20 | End: 2018-05-20

## 2018-05-20 RX ADMIN — IOHEXOL 100 ML: 350 INJECTION, SOLUTION INTRAVENOUS at 14:51

## 2018-05-20 RX ADMIN — FUROSEMIDE 40 MG: 10 INJECTION, SOLUTION INTRAMUSCULAR; INTRAVENOUS at 06:29

## 2018-05-20 RX ADMIN — INSULIN HUMAN 4 UNITS: 100 INJECTION, SOLUTION PARENTERAL at 08:38

## 2018-05-20 RX ADMIN — INSULIN HUMAN 3 UNITS: 100 INJECTION, SOLUTION PARENTERAL at 13:11

## 2018-05-20 RX ADMIN — HEPARIN SODIUM 5000 UNITS: 5000 INJECTION, SOLUTION INTRAVENOUS; SUBCUTANEOUS at 06:29

## 2018-05-20 RX ADMIN — ANTACID TABLETS 1000 MG: 500 TABLET, CHEWABLE ORAL at 08:43

## 2018-05-20 RX ADMIN — ALPRAZOLAM 0.5 MG: 0.5 TABLET ORAL at 13:11

## 2018-05-20 RX ADMIN — CALCITRIOL 0.25 MCG: 0.25 CAPSULE, LIQUID FILLED ORAL at 08:44

## 2018-05-20 RX ADMIN — POTASSIUM CHLORIDE 20 MEQ: 1500 TABLET, EXTENDED RELEASE ORAL at 20:23

## 2018-05-20 RX ADMIN — Medication: at 20:23

## 2018-05-20 RX ADMIN — METOPROLOL TARTRATE 50 MG: 25 TABLET, FILM COATED ORAL at 08:43

## 2018-05-20 RX ADMIN — POTASSIUM CHLORIDE 20 MEQ: 1500 TABLET, EXTENDED RELEASE ORAL at 08:44

## 2018-05-20 RX ADMIN — OXYCODONE HYDROCHLORIDE 10 MG: 10 TABLET, FILM COATED, EXTENDED RELEASE ORAL at 20:22

## 2018-05-20 RX ADMIN — ONDANSETRON HYDROCHLORIDE 4 MG: 2 INJECTION, SOLUTION INTRAMUSCULAR; INTRAVENOUS at 02:16

## 2018-05-20 RX ADMIN — ANTACID TABLETS 1000 MG: 500 TABLET, CHEWABLE ORAL at 17:53

## 2018-05-20 RX ADMIN — LEVOFLOXACIN 750 MG: 750 INJECTION, SOLUTION INTRAVENOUS at 08:44

## 2018-05-20 RX ADMIN — Medication: at 09:00

## 2018-05-20 RX ADMIN — ACYCLOVIR 400 MG: 200 CAPSULE ORAL at 20:22

## 2018-05-20 RX ADMIN — CALCIUM CITRATE 200 MG (950 MG) TABLET 1900 MG: at 08:43

## 2018-05-20 RX ADMIN — METOPROLOL TARTRATE 50 MG: 25 TABLET, FILM COATED ORAL at 20:23

## 2018-05-20 RX ADMIN — LORAZEPAM 0.5 MG: 2 INJECTION INTRAMUSCULAR; INTRAVENOUS at 13:10

## 2018-05-20 RX ADMIN — HEPARIN SODIUM 5000 UNITS: 5000 INJECTION, SOLUTION INTRAVENOUS; SUBCUTANEOUS at 22:34

## 2018-05-20 RX ADMIN — FUROSEMIDE 40 MG: 10 INJECTION, SOLUTION INTRAMUSCULAR; INTRAVENOUS at 15:56

## 2018-05-20 RX ADMIN — OXYCODONE HYDROCHLORIDE 10 MG: 10 TABLET, FILM COATED, EXTENDED RELEASE ORAL at 08:43

## 2018-05-20 RX ADMIN — METRONIDAZOLE 500 MG: 500 INJECTION, SOLUTION INTRAVENOUS at 06:29

## 2018-05-20 RX ADMIN — ANTACID TABLETS 1000 MG: 500 TABLET, CHEWABLE ORAL at 13:11

## 2018-05-20 RX ADMIN — METFORMIN HYDROCHLORIDE 1000 MG: 500 TABLET, FILM COATED ORAL at 08:48

## 2018-05-20 RX ADMIN — ACYCLOVIR 400 MG: 200 CAPSULE ORAL at 08:44

## 2018-05-20 RX ADMIN — OXYCODONE HYDROCHLORIDE 5 MG: 5 TABLET ORAL at 04:49

## 2018-05-20 RX ADMIN — METRONIDAZOLE 500 MG: 500 INJECTION, SOLUTION INTRAVENOUS at 15:56

## 2018-05-20 RX ADMIN — METRONIDAZOLE 500 MG: 500 INJECTION, SOLUTION INTRAVENOUS at 22:35

## 2018-05-20 RX ADMIN — LORAZEPAM 1 MG: 2 INJECTION INTRAMUSCULAR; INTRAVENOUS at 10:57

## 2018-05-20 RX ADMIN — AMLODIPINE BESYLATE 5 MG: 5 TABLET ORAL at 13:11

## 2018-05-20 RX ADMIN — OXYCODONE HYDROCHLORIDE 5 MG: 5 TABLET ORAL at 13:11

## 2018-05-20 RX ADMIN — DULOXETINE HYDROCHLORIDE 40 MG: 20 CAPSULE, DELAYED RELEASE ORAL at 08:44

## 2018-05-20 RX ADMIN — HEPARIN SODIUM 5000 UNITS: 5000 INJECTION, SOLUTION INTRAVENOUS; SUBCUTANEOUS at 15:56

## 2018-05-20 RX ADMIN — PROCHLORPERAZINE EDISYLATE 10 MG: 5 INJECTION INTRAMUSCULAR; INTRAVENOUS at 08:33

## 2018-05-20 RX ADMIN — MORPHINE SULFATE 1 MG: 4 INJECTION INTRAVENOUS at 00:36

## 2018-05-20 RX ADMIN — PRAVASTATIN SODIUM 40 MG: 20 TABLET ORAL at 20:22

## 2018-05-20 ASSESSMENT — ENCOUNTER SYMPTOMS
PHOTOPHOBIA: 0
ABDOMINAL PAIN: 0
SORE THROAT: 0
SHORTNESS OF BREATH: 1
CHILLS: 0
HEARTBURN: 0
MYALGIAS: 1
CLAUDICATION: 0
SENSORY CHANGE: 0
DIZZINESS: 0
FEVER: 0
BLURRED VISION: 0
WEAKNESS: 0
CONSTIPATION: 0
DEPRESSION: 1
VOMITING: 0
NERVOUS/ANXIOUS: 0
INSOMNIA: 0
NAUSEA: 1
HEADACHES: 0
SPEECH CHANGE: 0
COUGH: 0
DIARRHEA: 0

## 2018-05-20 ASSESSMENT — PAIN SCALES - GENERAL
PAINLEVEL_OUTOF10: 8
PAINLEVEL_OUTOF10: 3
PAINLEVEL_OUTOF10: 7
PAINLEVEL_OUTOF10: 6
PAINLEVEL_OUTOF10: 5

## 2018-05-20 NOTE — PROGRESS NOTES
Assumed care of pt at 1845. Received report from KEE Leija. Pt A/O x 4, in bed, watching TV. Pt reported increase in SOB today. Chest x-ray was complete showing possible an increase in the right pleural effusion as well as an indrease in PNA. Will continue to monitor respiratory status. PICC in place - NS running TKO. PICC site CDI, flushes well with + blood return. Discussed POC for night with pt at bedside. No complaints of pain or nausea. Pt bed bound at this time, up 4+ assist. Bed in lowest, locked position. Call light and personal belongings within reach. Pt has no further questions or concerns at this time. Hourly rounding in place.

## 2018-05-20 NOTE — PROGRESS NOTES
Dr. Gann updated with results of chest xray, ordered Flagyl IV and changed her Levoquin to IV from oral. Also ordered nebulizer treatments QID.

## 2018-05-20 NOTE — PROGRESS NOTES
[facility] Hospitalist Progress Note    Date of Service: 2018    Chief Complaint  67 y.o. female admitted 4/10/2018 with multiple myeloma, left humerus, bilateral femur radiation completed.    Interval Problem Update  c/o more sob since yesterday; no cough/chest pain; still with nausea/anorexia; no diarrhea today; some leakage around nava yesterday with bladder spasm but better after cath changed.  Consultants/Specialty  Oncology - Reganti  .  Disposition  Difficult discharge.        Review of Systems   Constitutional: Negative for chills and fever.   HENT: Positive for congestion. Negative for nosebleeds (started ) and sore throat.    Eyes: Negative for blurred vision and photophobia.   Respiratory: Positive for shortness of breath. Negative for cough.    Cardiovascular: Negative for chest pain, claudication and leg swelling.   Gastrointestinal: Positive for nausea. Negative for abdominal pain, constipation, diarrhea, heartburn and vomiting.   Genitourinary: Negative for dysuria and hematuria.   Musculoskeletal: Positive for myalgias (globally). Negative for joint pain.   Skin: Negative for itching (better) and rash (better).   Neurological: Negative for dizziness, sensory change, speech change, weakness and headaches.   Psychiatric/Behavioral: Positive for depression. The patient is not nervous/anxious and does not have insomnia.       Physical Exam  Laboratory/Imaging   Hemodynamics  Temp (24hrs), Av.6 °C (97.9 °F), Min:36.2 °C (97.2 °F), Max:37.2 °C (99 °F)   Temperature: 36.4 °C (97.5 °F)  Pulse  Av.4  Min: 38  Max: 128    Blood Pressure : 160/65      Respiratory      Respiration: 19, Pulse Oximetry: 96 %     Given By:: Mouthpiece, Work Of Breathing / Effort: Mild;Moderate  RUL Breath Sounds: Clear, RML Breath Sounds: Diminished, RLL Breath Sounds: Diminished, CHARLOTTE Breath Sounds: Clear, LLL Breath Sounds: Diminished    Fluids    Intake/Output Summary (Last 24 hours) at 18 1120  Last data  filed at 05/20/18 1000   Gross per 24 hour   Intake              120 ml   Output             1950 ml   Net            -1830 ml       Nutrition  Orders Placed This Encounter   Procedures   • Diet Order     Standing Status:   Standing     Number of Occurrences:   1     Order Specific Question:   Diet:     Answer:   Diabetic [3]     Physical Exam   Constitutional: She is oriented to person, place, and time. She appears well-developed and well-nourished. No distress.   HENT:   Head: Normocephalic and atraumatic.   Eyes: Conjunctivae are normal. No scleral icterus.   Neck: Neck supple. No JVD present.   Cardiovascular: Normal rate, regular rhythm and normal heart sounds.  Exam reveals no gallop and no friction rub.    No murmur heard.  Pulmonary/Chest: No respiratory distress. She exhibits no tenderness.   RLL crackles  Mildly labored breathing   Abdominal: Soft. Bowel sounds are normal. She exhibits no distension. There is no guarding.   Musculoskeletal: She exhibits edema (ble - improving). She exhibits no tenderness.   3+ ble edema, improving   Neurological: She is alert and oriented to person, place, and time. No cranial nerve deficit.   Skin: Skin is warm and dry. No rash (bilateral shins - Better today. ) noted. She is not diaphoretic. No erythema. No pallor.   Erythema bilateral LE/shins - improved from prior.   Psychiatric: Her behavior is normal.   Flat affect   Nursing note and vitals reviewed.      Recent Labs      05/19/18   0822  05/20/18   0226   WBC  3.5*  2.6*   RBC  2.60*  2.67*   HEMOGLOBIN  7.7*  8.1*   HEMATOCRIT  25.4*  25.6*   MCV  97.7  95.9   MCH  29.6  30.3   MCHC  30.3*  31.6*   RDW  55.1*  53.2*   PLATELETCT  122*  99*   MPV  9.9  10.7     Recent Labs      05/19/18   0430   SODIUM  133*   POTASSIUM  3.8   CHLORIDE  93*   CO2  34*   GLUCOSE  198*   BUN  8   CREATININE  0.59   CALCIUM  7.9*     Recent Labs      05/20/18   0950   INR  1.13                  Assessment/Plan     * Pathologic  fracture- (present on admission)   Assessment & Plan    -Left humerus  -widespread lytic disease  -completed radiation to humerus  -pain control             Acute respiratory failure (HCC)   Assessment & Plan    More labored breathing with worse RLL consolidation/effusion per CXR;  O2 sat still OK on 2.5 L; IR checked US for thoracentesis and thought not enough effusion to tap;  Will check chest CTA to assess ?PNA/PE; placed on Levaquin/Flagyl for now.  Nebs qid.        Multiple myeloma (HCC)- (present on admission)   Assessment & Plan    CyBorD chemo - cycle 1 started 4/19/18; cycle 2 started on 5/10  -recent diagnosis, appears aggressive, now with innumerable lytic lesions  -Dr Sim and Oksana consulted for assistance in management  - IV dilaudid PRN, oxy prn. DC maintenance steroids as these were causing confusion  -oxycontin CR 10 mg BID  -skeletal survey done - spine obscured by body habitus, but multiple other lesions found  XRT to right and left femurs completed  -Palliative care consulted for advanced care planning            Diarrhea   Assessment & Plan    Less today  c diff negative;   Dc neutraphos which can contributes to diarrhea  monitor          Hypocalcemia   Assessment & Plan    - Repleting;   due to recent bisphosphonate use; replete Mg  improving          Hypomagnesemia   Assessment & Plan    Replete as indicated; f/u labs        Sinusitis   Assessment & Plan    Worse sx's with purulent discharge per patient; started Levaquin and monitor.        Bacteriuria   Assessment & Plan    Klebsiella growing in urine culture, nava in place, asymptomatic.  Immunocompromised due to chemotherapy - now on Levaquin  Check repeat UA        Decubitus ulcer of buttock   Assessment & Plan    Wound care; rotate frequently in bed; air mattress.        Debility- (present on admission)   Assessment & Plan    Due to current illnesses; not able to walk since early april;  Out of SNF days per SW; cont PT/OT here  Poor  prognosis given current diagnoses/morbid obesity.  LTAC declined d/t active chemotherapy.        DNR (do not resuscitate)   Assessment & Plan    Palliative care consulted, patient wants to change code status to DNR - done, completed Polst with APN.          Chemotherapy-induced thrombocytopenia   Assessment & Plan    improving; monitor; no bleeding now          Leg edema   Assessment & Plan      -PO to IV Lasix scheduled bid, doing well and renal function stable. Improved edema slowly.          Chronic venous stasis dermatitis of both lower extremities- (present on admission)   Assessment & Plan    -wound care, oral abx completed          Anemia- (present on admission)   Assessment & Plan    hgb better; no active bleeding; monitor  Transfuse if hgb <7.0          Morbid obesity with BMI of 60.0-69.9, adult (CMS-HCC)- (present on admission)   Assessment & Plan    -encourage weight loss  Body mass index is 67.38 kg/m².        DM type 2 (diabetes mellitus, type 2) (CMS-HCC)- (present on admission)   Assessment & Plan    Fair control; adjust meds for good control        Essential hypertension, benign- (present on admission)   Assessment & Plan    Adjust med for good control.          Quality-Core Measures   Reviewed items::  Labs reviewed, Medications reviewed and Radiology images reviewed  Singh catheter::  Urinary Tract Retention or Urinary Tract Obstruction  DVT prophylaxis pharmacological::  Heparin  Assessed for rehabilitation services:  Patient was assess for and/or received rehabilitation services during this hospitalization

## 2018-05-21 PROBLEM — R19.7 DIARRHEA: Status: RESOLVED | Noted: 2018-05-07 | Resolved: 2018-05-21

## 2018-05-21 PROBLEM — J18.9 PNEUMONIA: Status: ACTIVE | Noted: 2018-05-21

## 2018-05-21 LAB
ANION GAP SERPL CALC-SCNC: 3 MMOL/L (ref 0–11.9)
BASOPHILS # BLD AUTO: 0 % (ref 0–1.8)
BASOPHILS # BLD: 0 K/UL (ref 0–0.12)
BUN SERPL-MCNC: 7 MG/DL (ref 8–22)
CALCIUM SERPL-MCNC: 8.6 MG/DL (ref 8.5–10.5)
CHLORIDE SERPL-SCNC: 92 MMOL/L (ref 96–112)
CO2 SERPL-SCNC: 36 MMOL/L (ref 20–33)
CREAT SERPL-MCNC: 0.7 MG/DL (ref 0.5–1.4)
EOSINOPHIL # BLD AUTO: 0 K/UL (ref 0–0.51)
EOSINOPHIL NFR BLD: 0 % (ref 0–6.9)
ERYTHROCYTE [DISTWIDTH] IN BLOOD BY AUTOMATED COUNT: 53.1 FL (ref 35.9–50)
GLUCOSE BLD-MCNC: 140 MG/DL (ref 65–99)
GLUCOSE BLD-MCNC: 175 MG/DL (ref 65–99)
GLUCOSE BLD-MCNC: 180 MG/DL (ref 65–99)
GLUCOSE BLD-MCNC: 195 MG/DL (ref 65–99)
GLUCOSE SERPL-MCNC: 175 MG/DL (ref 65–99)
HCT VFR BLD AUTO: 26 % (ref 37–47)
HGB BLD-MCNC: 8.2 G/DL (ref 12–16)
IMM GRANULOCYTES # BLD AUTO: 0.04 K/UL (ref 0–0.11)
IMM GRANULOCYTES NFR BLD AUTO: 1.8 % (ref 0–0.9)
LYMPHOCYTES # BLD AUTO: 0.44 K/UL (ref 1–4.8)
LYMPHOCYTES NFR BLD: 20 % (ref 22–41)
MCH RBC QN AUTO: 30.4 PG (ref 27–33)
MCHC RBC AUTO-ENTMCNC: 31.5 G/DL (ref 33.6–35)
MCV RBC AUTO: 96.3 FL (ref 81.4–97.8)
MONOCYTES # BLD AUTO: 0.32 K/UL (ref 0–0.85)
MONOCYTES NFR BLD AUTO: 14.5 % (ref 0–13.4)
NEUTROPHILS # BLD AUTO: 1.4 K/UL (ref 2–7.15)
NEUTROPHILS NFR BLD: 63.7 % (ref 44–72)
NRBC # BLD AUTO: 0.03 K/UL
NRBC BLD-RTO: 1.4 /100 WBC
PLATELET # BLD AUTO: 95 K/UL (ref 164–446)
PMV BLD AUTO: 10.9 FL (ref 9–12.9)
POTASSIUM SERPL-SCNC: 4.4 MMOL/L (ref 3.6–5.5)
RBC # BLD AUTO: 2.7 M/UL (ref 4.2–5.4)
SODIUM SERPL-SCNC: 131 MMOL/L (ref 135–145)
WBC # BLD AUTO: 2.2 K/UL (ref 4.8–10.8)

## 2018-05-21 PROCEDURE — 97110 THERAPEUTIC EXERCISES: CPT

## 2018-05-21 PROCEDURE — 700111 HCHG RX REV CODE 636 W/ 250 OVERRIDE (IP): Performed by: INTERNAL MEDICINE

## 2018-05-21 PROCEDURE — 770004 HCHG ROOM/CARE - ONCOLOGY PRIVATE *

## 2018-05-21 PROCEDURE — 700102 HCHG RX REV CODE 250 W/ 637 OVERRIDE(OP): Performed by: INTERNAL MEDICINE

## 2018-05-21 PROCEDURE — 97530 THERAPEUTIC ACTIVITIES: CPT

## 2018-05-21 PROCEDURE — 94640 AIRWAY INHALATION TREATMENT: CPT

## 2018-05-21 PROCEDURE — 80048 BASIC METABOLIC PNL TOTAL CA: CPT

## 2018-05-21 PROCEDURE — 82962 GLUCOSE BLOOD TEST: CPT | Mod: 91

## 2018-05-21 PROCEDURE — A9270 NON-COVERED ITEM OR SERVICE: HCPCS | Performed by: INTERNAL MEDICINE

## 2018-05-21 PROCEDURE — 700101 HCHG RX REV CODE 250: Performed by: INTERNAL MEDICINE

## 2018-05-21 PROCEDURE — 85025 COMPLETE CBC W/AUTO DIFF WBC: CPT

## 2018-05-21 PROCEDURE — 99233 SBSQ HOSP IP/OBS HIGH 50: CPT | Performed by: INTERNAL MEDICINE

## 2018-05-21 RX ORDER — METRONIDAZOLE 500 MG/1
500 TABLET ORAL EVERY 8 HOURS
Status: COMPLETED | OUTPATIENT
Start: 2018-05-21 | End: 2018-05-24

## 2018-05-21 RX ORDER — LEVOFLOXACIN 750 MG/1
750 TABLET, FILM COATED ORAL DAILY
Status: COMPLETED | OUTPATIENT
Start: 2018-05-21 | End: 2018-05-24

## 2018-05-21 RX ADMIN — HEPARIN SODIUM 5000 UNITS: 5000 INJECTION, SOLUTION INTRAVENOUS; SUBCUTANEOUS at 05:07

## 2018-05-21 RX ADMIN — METOPROLOL TARTRATE 50 MG: 25 TABLET, FILM COATED ORAL at 08:18

## 2018-05-21 RX ADMIN — HEPARIN SODIUM 5000 UNITS: 5000 INJECTION, SOLUTION INTRAVENOUS; SUBCUTANEOUS at 13:46

## 2018-05-21 RX ADMIN — FUROSEMIDE 40 MG: 10 INJECTION, SOLUTION INTRAMUSCULAR; INTRAVENOUS at 16:51

## 2018-05-21 RX ADMIN — FUROSEMIDE 40 MG: 10 INJECTION, SOLUTION INTRAMUSCULAR; INTRAVENOUS at 05:07

## 2018-05-21 RX ADMIN — LEVOFLOXACIN 750 MG: 750 TABLET, FILM COATED ORAL at 12:20

## 2018-05-21 RX ADMIN — METOPROLOL TARTRATE 50 MG: 25 TABLET, FILM COATED ORAL at 21:04

## 2018-05-21 RX ADMIN — AMLODIPINE BESYLATE 5 MG: 5 TABLET ORAL at 08:18

## 2018-05-21 RX ADMIN — IPRATROPIUM BROMIDE AND ALBUTEROL SULFATE 3 ML: .5; 3 SOLUTION RESPIRATORY (INHALATION) at 22:07

## 2018-05-21 RX ADMIN — ANTACID TABLETS 1000 MG: 500 TABLET, CHEWABLE ORAL at 08:18

## 2018-05-21 RX ADMIN — METRONIDAZOLE 500 MG: 500 TABLET ORAL at 13:46

## 2018-05-21 RX ADMIN — Medication: at 12:20

## 2018-05-21 RX ADMIN — OXYCODONE HYDROCHLORIDE 10 MG: 10 TABLET, FILM COATED, EXTENDED RELEASE ORAL at 08:18

## 2018-05-21 RX ADMIN — DULOXETINE HYDROCHLORIDE 40 MG: 20 CAPSULE, DELAYED RELEASE ORAL at 08:18

## 2018-05-21 RX ADMIN — ACYCLOVIR 400 MG: 200 CAPSULE ORAL at 21:04

## 2018-05-21 RX ADMIN — METRONIDAZOLE 500 MG: 500 INJECTION, SOLUTION INTRAVENOUS at 05:06

## 2018-05-21 RX ADMIN — OXYCODONE HYDROCHLORIDE 10 MG: 10 TABLET, FILM COATED, EXTENDED RELEASE ORAL at 21:04

## 2018-05-21 RX ADMIN — Medication: at 21:03

## 2018-05-21 RX ADMIN — PRAVASTATIN SODIUM 40 MG: 20 TABLET ORAL at 21:04

## 2018-05-21 RX ADMIN — METRONIDAZOLE 500 MG: 500 TABLET ORAL at 21:04

## 2018-05-21 RX ADMIN — ACYCLOVIR 400 MG: 200 CAPSULE ORAL at 08:18

## 2018-05-21 RX ADMIN — ANTACID TABLETS 1000 MG: 500 TABLET, CHEWABLE ORAL at 16:51

## 2018-05-21 RX ADMIN — POTASSIUM CHLORIDE 20 MEQ: 1500 TABLET, EXTENDED RELEASE ORAL at 08:17

## 2018-05-21 RX ADMIN — OXYCODONE HYDROCHLORIDE 5 MG: 5 TABLET ORAL at 02:53

## 2018-05-21 RX ADMIN — INSULIN HUMAN 3 UNITS: 100 INJECTION, SOLUTION PARENTERAL at 09:47

## 2018-05-21 RX ADMIN — CALCITRIOL 0.25 MCG: 0.25 CAPSULE, LIQUID FILLED ORAL at 08:18

## 2018-05-21 RX ADMIN — INSULIN HUMAN 3 UNITS: 100 INJECTION, SOLUTION PARENTERAL at 21:00

## 2018-05-21 RX ADMIN — HEPARIN SODIUM 5000 UNITS: 5000 INJECTION, SOLUTION INTRAVENOUS; SUBCUTANEOUS at 21:03

## 2018-05-21 RX ADMIN — MORPHINE SULFATE 1 MG: 4 INJECTION INTRAVENOUS at 03:38

## 2018-05-21 RX ADMIN — ANTACID TABLETS 1000 MG: 500 TABLET, CHEWABLE ORAL at 12:20

## 2018-05-21 RX ADMIN — POTASSIUM CHLORIDE 20 MEQ: 1500 TABLET, EXTENDED RELEASE ORAL at 21:04

## 2018-05-21 RX ADMIN — INSULIN HUMAN 3 UNITS: 100 INJECTION, SOLUTION PARENTERAL at 13:30

## 2018-05-21 ASSESSMENT — ENCOUNTER SYMPTOMS
CLAUDICATION: 0
DIARRHEA: 0
HEARTBURN: 0
BLURRED VISION: 0
DIZZINESS: 0
CONSTIPATION: 0
PHOTOPHOBIA: 0
COUGH: 0
ABDOMINAL PAIN: 0
INSOMNIA: 0
CHILLS: 0
NAUSEA: 0
DEPRESSION: 1
SENSORY CHANGE: 0
HEADACHES: 0
FEVER: 0
VOMITING: 0
SORE THROAT: 0
WEAKNESS: 0
SPEECH CHANGE: 0
NERVOUS/ANXIOUS: 0
MYALGIAS: 1
SHORTNESS OF BREATH: 1

## 2018-05-21 ASSESSMENT — PAIN SCALES - GENERAL
PAINLEVEL_OUTOF10: 7
PAINLEVEL_OUTOF10: 4
PAINLEVEL_OUTOF10: 7
PAINLEVEL_OUTOF10: 8

## 2018-05-21 ASSESSMENT — GAIT ASSESSMENTS: GAIT LEVEL OF ASSIST: UNABLE TO PARTICIPATE

## 2018-05-21 NOTE — CARE PLAN
Problem: Communication  Goal: The ability to communicate needs accurately and effectively will improve  Outcome: PROGRESSING AS EXPECTED  Encouraged to voice questions and concerns. Call light within reach.     Problem: Skin Integrity  Goal: Risk for impaired skin integrity will decrease  Outcome: PROGRESSING AS EXPECTED  Assessed skin integrity. Repositioned. Pillows used for support.

## 2018-05-21 NOTE — PROGRESS NOTES
[facility] Hospitalist Progress Note    Date of Service: 2018    Chief Complaint  67 y.o. female admitted 4/10/2018 with multiple myeloma, left humerus, bilateral femur radiation completed.    Interval Problem Update  Less sob today; no cough; still anorexic not eating much; no diarrhea  Consultants/Specialty  Oncology - Reganti  .  Disposition  Difficult discharge.        Review of Systems   Constitutional: Negative for chills and fever.   HENT: Positive for congestion. Negative for nosebleeds (started ) and sore throat.    Eyes: Negative for blurred vision and photophobia.   Respiratory: Positive for shortness of breath. Negative for cough.    Cardiovascular: Negative for chest pain, claudication and leg swelling.   Gastrointestinal: Negative for abdominal pain, constipation, diarrhea, heartburn, nausea and vomiting.   Genitourinary: Negative for dysuria and hematuria.   Musculoskeletal: Positive for myalgias (globally). Negative for joint pain.   Skin: Negative for itching (better) and rash (better).   Neurological: Negative for dizziness, sensory change, speech change, weakness and headaches.   Psychiatric/Behavioral: Positive for depression. The patient is not nervous/anxious and does not have insomnia.       Physical Exam  Laboratory/Imaging   Hemodynamics  Temp (24hrs), Av.6 °C (97.8 °F), Min:36.5 °C (97.7 °F), Max:36.6 °C (97.9 °F)   Temperature: 36.5 °C (97.7 °F)  Pulse  Av.3  Min: 38  Max: 128    Blood Pressure : 160/80      Respiratory      Respiration: 18, Pulse Oximetry: 90 %     Work Of Breathing / Effort: Mild  RUL Breath Sounds: Clear, RML Breath Sounds: Diminished, RLL Breath Sounds: Diminished, CHARLOTTE Breath Sounds: Clear, LLL Breath Sounds: Diminished    Fluids    Intake/Output Summary (Last 24 hours) at 18 1254  Last data filed at 18 0347   Gross per 24 hour   Intake                0 ml   Output             1478 ml   Net            -1478 ml       Nutrition  Orders Placed  This Encounter   Procedures   • Diet Order     Standing Status:   Standing     Number of Occurrences:   1     Order Specific Question:   Diet:     Answer:   Diabetic [3]     Physical Exam   Constitutional: She is oriented to person, place, and time. She appears well-developed and well-nourished. No distress.   HENT:   Head: Normocephalic and atraumatic.   Eyes: Conjunctivae are normal. No scleral icterus.   Neck: Neck supple. No JVD present.   Cardiovascular: Normal rate, regular rhythm and normal heart sounds.  Exam reveals no gallop and no friction rub.    No murmur heard.  Pulmonary/Chest: No respiratory distress. She exhibits no tenderness.   RLL crackles  Mildly labored breathing but better than yesterday   Abdominal: Soft. Bowel sounds are normal. She exhibits no distension. There is no guarding.   Musculoskeletal: She exhibits edema (ble - improving). She exhibits no tenderness.   3+ ble edema, improving   Neurological: She is alert and oriented to person, place, and time. No cranial nerve deficit.   Skin: Skin is warm and dry. No rash (bilateral shins - Better today. ) noted. She is not diaphoretic. No erythema. No pallor.   Erythema bilateral LE/shins - improved from prior.   Psychiatric: Her behavior is normal.   Flat affect   Nursing note and vitals reviewed.      Recent Labs      05/19/18   0822  05/20/18   0226  05/21/18   0500   WBC  3.5*  2.6*  2.2*   RBC  2.60*  2.67*  2.70*   HEMOGLOBIN  7.7*  8.1*  8.2*   HEMATOCRIT  25.4*  25.6*  26.0*   MCV  97.7  95.9  96.3   MCH  29.6  30.3  30.4   MCHC  30.3*  31.6*  31.5*   RDW  55.1*  53.2*  53.1*   PLATELETCT  122*  99*  95*   MPV  9.9  10.7  10.9     Recent Labs      05/19/18   0430  05/21/18   0500   SODIUM  133*  131*   POTASSIUM  3.8  4.4   CHLORIDE  93*  92*   CO2  34*  36*   GLUCOSE  198*  175*   BUN  8  7*   CREATININE  0.59  0.70   CALCIUM  7.9*  8.6     Recent Labs      05/20/18   0950   INR  1.13                  Assessment/Plan     * Pathologic  fracture- (present on admission)   Assessment & Plan    -Left humerus  -widespread lytic disease  -completed radiation to humerus  -pain control             Pneumonia   Assessment & Plan    RLL per chest CT, no PE/pleural effusion; on Levaquin/Flagyl with some improvement today;  Cont rx and monitor;        Acute respiratory failure (HCC)   Assessment & Plan    Due to pneumonia; on iv abx's with some improvement today; cont current care        Multiple myeloma (HCC)- (present on admission)   Assessment & Plan    CyBorD chemo - cycle 1 started 4/19/18; cycle 2 started on 5/10  -recent diagnosis, appears aggressive, now with innumerable lytic lesions  -Dr Sim and Oksana consulted for assistance in management  - IV dilaudid PRN, oxy prn. DC maintenance steroids as these were causing confusion  -oxycontin CR 10 mg BID  -skeletal survey done - spine obscured by body habitus, but multiple other lesions found  XRT to right and left femurs completed  -Palliative care consulted for advanced care planning            Hypocalcemia   Assessment & Plan    Improved; decrease oral supplement          Hypomagnesemia   Assessment & Plan    Replete as indicated; f/u labs        Sinusitis   Assessment & Plan    Worse sx's with purulent discharge per patient; started Levaquin and monitor.        Bacteriuria   Assessment & Plan    Klebsiella growing in urine culture, nava in place, asymptomatic.  Immunocompromised due to chemotherapy - now on Levaquin  Check repeat UA        Decubitus ulcer of buttock   Assessment & Plan    Wound care; rotate frequently in bed; air mattress.        Debility- (present on admission)   Assessment & Plan    Due to current illnesses; not able to walk since early april;  Out of SNF days per SW; cont PT/OT here  Poor prognosis given current diagnoses/morbid obesity.  LTAC declined d/t active chemotherapy.        DNR (do not resuscitate)   Assessment & Plan    Palliative care consulted, patient wants to  change code status to DNR - done, completed Polst with APN.          Chemotherapy-induced thrombocytopenia   Assessment & Plan    improving; monitor; no bleeding now          Leg edema   Assessment & Plan      -PO to IV Lasix scheduled bid, doing well and renal function stable. Improved edema slowly.          Chronic venous stasis dermatitis of both lower extremities- (present on admission)   Assessment & Plan    -wound care, oral abx completed          Anemia- (present on admission)   Assessment & Plan    hgb better; no active bleeding; monitor  Transfuse if hgb <7.0          Morbid obesity with BMI of 60.0-69.9, adult (CMS-HCC)- (present on admission)   Assessment & Plan    -encourage weight loss  Body mass index is 67.38 kg/m².        DM type 2 (diabetes mellitus, type 2) (CMS-HCC)- (present on admission)   Assessment & Plan    Fair control; adjust meds for good control        Essential hypertension, benign- (present on admission)   Assessment & Plan    Adjust med for good control.          Quality-Core Measures   Reviewed items::  Labs reviewed, Medications reviewed and Radiology images reviewed  Singh catheter::  Urinary Tract Retention or Urinary Tract Obstruction  DVT prophylaxis pharmacological::  Heparin  Assessed for rehabilitation services:  Patient was assess for and/or received rehabilitation services during this hospitalization

## 2018-05-21 NOTE — PROGRESS NOTES
Assumed care of pt at 1845. Received report from KEE Leija. Pt A/O x 4, in bed, watching TV. Pt had a chest CT done today. PNA is worse, verified no PE or pneumothorax.  PICC in place - NS running TKO. PICC site CDI, Red port has clotted, will call MD for order of alteplase. Discussed POC for night with pt at bedside. No complaints of pain or nausea. Pt bed bound at this time, up 4+ assist. Singh CDI, urine is yellow, clear. Bed in lowest, locked position. Call light and personal belongings within reach. Pt has no further questions or concerns at this time. Hourly rounding in place.

## 2018-05-21 NOTE — THERAPY
"Physical Therapy Treatment completed.   Bed Mobility:  Supine to Sit: Moderate Assist (HOB semi-flat and use of draw sheet)  Transfers: Sit to Stand:  (pt declined, not feeling well today)  Gait: Level Of Assist: Unable to Participate        Plan of Care: Will benefit from Physical Therapy 3 times per week  Discharge Recommendations: Equipment: Will Continue to Assess for Equipment Needs. Post-acute therapy Discharge to a transitional care facility for continued skilled therapy services.     See \"Rehab Therapy-Acute\" Patient Summary Report for complete documentation.       "

## 2018-05-21 NOTE — DIETARY
Nutrition Services: Re-Screen Variable PO   Pt is currently on diabetic diet. Pt is receiving smoothie TID and 2 pm snack. Pt was busy with other discipline. Per chart pt PO 0-50%, however most meals < 25%. Wt 5/20: 168.92 kg via bed scale - wt has fluctuated since admit.    Recommendations/Plan:  1. If PO doesn't improve consider nutrition support.   2. Encourage intake of meals  3. Document intake of all meals as % taken in ADL's to provide interdisciplinary communication across all shifts.   4. Monitor weight.  5. Nutrition rep will continue to see patient for ongoing meal and snack preferences.  6. Obtain supplement order per RD as needed.    RD following

## 2018-05-22 LAB
ALBUMIN SERPL BCP-MCNC: 2.6 G/DL (ref 3.2–4.9)
ALBUMIN/GLOB SERPL: 0.7 G/DL
ALP SERPL-CCNC: 66 U/L (ref 30–99)
ALT SERPL-CCNC: 11 U/L (ref 2–50)
ANION GAP SERPL CALC-SCNC: 3 MMOL/L (ref 0–11.9)
AST SERPL-CCNC: 20 U/L (ref 12–45)
BASOPHILS # BLD AUTO: 0.4 % (ref 0–1.8)
BASOPHILS # BLD: 0.01 K/UL (ref 0–0.12)
BILIRUB SERPL-MCNC: 0.4 MG/DL (ref 0.1–1.5)
BUN SERPL-MCNC: 7 MG/DL (ref 8–22)
CALCIUM SERPL-MCNC: 8.7 MG/DL (ref 8.5–10.5)
CHLORIDE SERPL-SCNC: 88 MMOL/L (ref 96–112)
CO2 SERPL-SCNC: 37 MMOL/L (ref 20–33)
CREAT SERPL-MCNC: 0.59 MG/DL (ref 0.5–1.4)
EOSINOPHIL # BLD AUTO: 0.01 K/UL (ref 0–0.51)
EOSINOPHIL NFR BLD: 0.4 % (ref 0–6.9)
ERYTHROCYTE [DISTWIDTH] IN BLOOD BY AUTOMATED COUNT: 51.4 FL (ref 35.9–50)
GLOBULIN SER CALC-MCNC: 3.6 G/DL (ref 1.9–3.5)
GLUCOSE BLD-MCNC: 159 MG/DL (ref 65–99)
GLUCOSE BLD-MCNC: 203 MG/DL (ref 65–99)
GLUCOSE BLD-MCNC: 205 MG/DL (ref 65–99)
GLUCOSE BLD-MCNC: 228 MG/DL (ref 65–99)
GLUCOSE SERPL-MCNC: 189 MG/DL (ref 65–99)
HCT VFR BLD AUTO: 27.2 % (ref 37–47)
HGB BLD-MCNC: 8.7 G/DL (ref 12–16)
IMM GRANULOCYTES # BLD AUTO: 0.03 K/UL (ref 0–0.11)
IMM GRANULOCYTES NFR BLD AUTO: 1.3 % (ref 0–0.9)
LYMPHOCYTES # BLD AUTO: 0.54 K/UL (ref 1–4.8)
LYMPHOCYTES NFR BLD: 23.1 % (ref 22–41)
MCH RBC QN AUTO: 30.3 PG (ref 27–33)
MCHC RBC AUTO-ENTMCNC: 32 G/DL (ref 33.6–35)
MCV RBC AUTO: 94.8 FL (ref 81.4–97.8)
MONOCYTES # BLD AUTO: 0.32 K/UL (ref 0–0.85)
MONOCYTES NFR BLD AUTO: 13.7 % (ref 0–13.4)
NEUTROPHILS # BLD AUTO: 1.43 K/UL (ref 2–7.15)
NEUTROPHILS NFR BLD: 61.1 % (ref 44–72)
NRBC # BLD AUTO: 0.02 K/UL
NRBC BLD-RTO: 0.9 /100 WBC
PLATELET # BLD AUTO: 122 K/UL (ref 164–446)
PMV BLD AUTO: 11 FL (ref 9–12.9)
POTASSIUM SERPL-SCNC: 4.3 MMOL/L (ref 3.6–5.5)
PROT SERPL-MCNC: 6.2 G/DL (ref 6–8.2)
RBC # BLD AUTO: 2.87 M/UL (ref 4.2–5.4)
SODIUM SERPL-SCNC: 128 MMOL/L (ref 135–145)
WBC # BLD AUTO: 2.3 K/UL (ref 4.8–10.8)

## 2018-05-22 PROCEDURE — 700111 HCHG RX REV CODE 636 W/ 250 OVERRIDE (IP): Performed by: INTERNAL MEDICINE

## 2018-05-22 PROCEDURE — 80053 COMPREHEN METABOLIC PANEL: CPT

## 2018-05-22 PROCEDURE — A9270 NON-COVERED ITEM OR SERVICE: HCPCS | Performed by: INTERNAL MEDICINE

## 2018-05-22 PROCEDURE — 99233 SBSQ HOSP IP/OBS HIGH 50: CPT | Performed by: INTERNAL MEDICINE

## 2018-05-22 PROCEDURE — 85025 COMPLETE CBC W/AUTO DIFF WBC: CPT

## 2018-05-22 PROCEDURE — G8988 SELF CARE GOAL STATUS: HCPCS | Mod: CJ

## 2018-05-22 PROCEDURE — G8987 SELF CARE CURRENT STATUS: HCPCS | Mod: CK

## 2018-05-22 PROCEDURE — 97112 NEUROMUSCULAR REEDUCATION: CPT

## 2018-05-22 PROCEDURE — 770004 HCHG ROOM/CARE - ONCOLOGY PRIVATE *

## 2018-05-22 PROCEDURE — 700102 HCHG RX REV CODE 250 W/ 637 OVERRIDE(OP): Performed by: INTERNAL MEDICINE

## 2018-05-22 PROCEDURE — 82962 GLUCOSE BLOOD TEST: CPT

## 2018-05-22 PROCEDURE — 97110 THERAPEUTIC EXERCISES: CPT

## 2018-05-22 RX ORDER — FUROSEMIDE 10 MG/ML
40 INJECTION INTRAMUSCULAR; INTRAVENOUS 3 TIMES DAILY
Status: DISCONTINUED | OUTPATIENT
Start: 2018-05-22 | End: 2018-05-26 | Stop reason: HOSPADM

## 2018-05-22 RX ADMIN — METRONIDAZOLE 500 MG: 500 TABLET ORAL at 13:03

## 2018-05-22 RX ADMIN — METOPROLOL TARTRATE 50 MG: 25 TABLET, FILM COATED ORAL at 08:03

## 2018-05-22 RX ADMIN — ANTACID TABLETS 1000 MG: 500 TABLET, CHEWABLE ORAL at 13:03

## 2018-05-22 RX ADMIN — FUROSEMIDE 40 MG: 10 INJECTION, SOLUTION INTRAMUSCULAR; INTRAVENOUS at 16:48

## 2018-05-22 RX ADMIN — METRONIDAZOLE 500 MG: 500 TABLET ORAL at 21:39

## 2018-05-22 RX ADMIN — Medication: at 22:26

## 2018-05-22 RX ADMIN — INSULIN HUMAN 3 UNITS: 100 INJECTION, SOLUTION PARENTERAL at 18:14

## 2018-05-22 RX ADMIN — INSULIN HUMAN 4 UNITS: 100 INJECTION, SOLUTION PARENTERAL at 21:47

## 2018-05-22 RX ADMIN — INSULIN HUMAN 4 UNITS: 100 INJECTION, SOLUTION PARENTERAL at 08:28

## 2018-05-22 RX ADMIN — HEPARIN SODIUM 5000 UNITS: 5000 INJECTION, SOLUTION INTRAVENOUS; SUBCUTANEOUS at 13:03

## 2018-05-22 RX ADMIN — POTASSIUM CHLORIDE 20 MEQ: 1500 TABLET, EXTENDED RELEASE ORAL at 08:03

## 2018-05-22 RX ADMIN — AMLODIPINE BESYLATE 5 MG: 5 TABLET ORAL at 08:03

## 2018-05-22 RX ADMIN — ANTACID TABLETS 1000 MG: 500 TABLET, CHEWABLE ORAL at 08:03

## 2018-05-22 RX ADMIN — ANTACID TABLETS 1000 MG: 500 TABLET, CHEWABLE ORAL at 18:14

## 2018-05-22 RX ADMIN — FUROSEMIDE 40 MG: 10 INJECTION, SOLUTION INTRAMUSCULAR; INTRAVENOUS at 21:39

## 2018-05-22 RX ADMIN — HEPARIN SODIUM 5000 UNITS: 5000 INJECTION, SOLUTION INTRAVENOUS; SUBCUTANEOUS at 21:39

## 2018-05-22 RX ADMIN — PRAVASTATIN SODIUM 40 MG: 20 TABLET ORAL at 21:38

## 2018-05-22 RX ADMIN — ACYCLOVIR 400 MG: 200 CAPSULE ORAL at 21:37

## 2018-05-22 RX ADMIN — LEVOFLOXACIN 750 MG: 750 TABLET, FILM COATED ORAL at 08:03

## 2018-05-22 RX ADMIN — FUROSEMIDE 40 MG: 10 INJECTION, SOLUTION INTRAMUSCULAR; INTRAVENOUS at 06:15

## 2018-05-22 RX ADMIN — DULOXETINE HYDROCHLORIDE 40 MG: 20 CAPSULE, DELAYED RELEASE ORAL at 08:02

## 2018-05-22 RX ADMIN — OXYCODONE HYDROCHLORIDE 10 MG: 10 TABLET, FILM COATED, EXTENDED RELEASE ORAL at 21:37

## 2018-05-22 RX ADMIN — LORAZEPAM 1 MG: 2 INJECTION INTRAMUSCULAR; INTRAVENOUS at 02:41

## 2018-05-22 RX ADMIN — OXYCODONE HYDROCHLORIDE 10 MG: 10 TABLET, FILM COATED, EXTENDED RELEASE ORAL at 08:02

## 2018-05-22 RX ADMIN — METRONIDAZOLE 500 MG: 500 TABLET ORAL at 06:15

## 2018-05-22 RX ADMIN — METOPROLOL TARTRATE 50 MG: 25 TABLET, FILM COATED ORAL at 21:37

## 2018-05-22 RX ADMIN — HEPARIN SODIUM 5000 UNITS: 5000 INJECTION, SOLUTION INTRAVENOUS; SUBCUTANEOUS at 06:15

## 2018-05-22 RX ADMIN — Medication: at 08:04

## 2018-05-22 RX ADMIN — ACYCLOVIR 400 MG: 200 CAPSULE ORAL at 08:03

## 2018-05-22 RX ADMIN — POTASSIUM CHLORIDE 20 MEQ: 1500 TABLET, EXTENDED RELEASE ORAL at 21:37

## 2018-05-22 RX ADMIN — INSULIN HUMAN 4 UNITS: 100 INJECTION, SOLUTION PARENTERAL at 13:04

## 2018-05-22 RX ADMIN — CALCITRIOL 0.25 MCG: 0.25 CAPSULE, LIQUID FILLED ORAL at 08:03

## 2018-05-22 ASSESSMENT — ENCOUNTER SYMPTOMS
DIARRHEA: 0
FEVER: 0
DIZZINESS: 0
CLAUDICATION: 0
ABDOMINAL PAIN: 0
WEAKNESS: 1
HEADACHES: 0
INSOMNIA: 0
CHILLS: 0
PHOTOPHOBIA: 0
VOMITING: 0
BLURRED VISION: 0
SORE THROAT: 0
NERVOUS/ANXIOUS: 0
SPEECH CHANGE: 0
NAUSEA: 0
COUGH: 1
CONSTIPATION: 0
DEPRESSION: 1
ROS GI COMMENTS: POOR APPETITE
SENSORY CHANGE: 0
MYALGIAS: 0
SHORTNESS OF BREATH: 1
SPUTUM PRODUCTION: 0
HEARTBURN: 0

## 2018-05-22 ASSESSMENT — PAIN SCALES - GENERAL
PAINLEVEL_OUTOF10: 7
PAINLEVEL_OUTOF10: 5
PAINLEVEL_OUTOF10: 6
PAINLEVEL_OUTOF10: 7

## 2018-05-22 ASSESSMENT — COGNITIVE AND FUNCTIONAL STATUS - GENERAL
PERSONAL GROOMING: A LITTLE
TOILETING: TOTAL
DAILY ACTIVITIY SCORE: 14
HELP NEEDED FOR BATHING: A LOT
DRESSING REGULAR UPPER BODY CLOTHING: A LITTLE
SUGGESTED CMS G CODE MODIFIER DAILY ACTIVITY: CK
DRESSING REGULAR LOWER BODY CLOTHING: TOTAL

## 2018-05-22 ASSESSMENT — PAIN SCALES - WONG BAKER: WONGBAKER_NUMERICALRESPONSE: DOESN'T HURT AT ALL

## 2018-05-22 NOTE — PROGRESS NOTES
Monserrat from Lab called with critical result of WBC 2.3 at 0848. Critical lab result read back to Monserrat.   This critical lab result is within parameters established by Dr.[facility] Policy for this patient

## 2018-05-22 NOTE — CARE PLAN
Problem: Safety  Goal: Will remain free from injury  Hourly rounding in effect, pt instructed to call for assistance, bed locked and in lowest position. Bed alarm off due to bariatric bed. Pt calls appropriately for assistance. Non skid socks in use.        Problem: Knowledge Deficit  Goal: Knowledge of disease process/condition, treatment plan, diagnostic tests, and medications will improve  Pt updated and educated on nursing interventions, medications and POC

## 2018-05-22 NOTE — CARE PLAN
Problem: Safety  Goal: Will remain free from injury  Outcome: PROGRESSING AS EXPECTED  Patient calls appropriately for assistance. Safety precautions in place: bed locked in low, call light and personal belongings within reach.    Problem: Psychosocial Needs:  Goal: Level of anxiety will decrease    Intervention: Identify and develop with patient strategies to cope with anxiety triggers  Assessed and developed anxiety triggers (I.e. claustrophobic). Patient prefers hospital room door to be open. Also, used Ativan to relax patient. Medicated per MAR.

## 2018-05-22 NOTE — THERAPY
"Occupational Therapy Treatment completed with focus on patient education and upper extremity function.  Functional Status:  Pt seen for OT tx today, pt making slow but making gains with therapy, now demonstrating improved seated eob balance and endurance required for eob/oob ADLs. Pt participated in BUE's/LE's ther-ex and ROM with resistance from therapist, educated on performing BUE/LE's ROM and gluets tightening/relasing seated exercises for strengthening purposes, worked on core strengthening and lateral wt-shifting in sitting, able to come upright from L side and requires use of UE support to pull come right from L side. Will continue to follow while in house  Plan of Care: Will benefit from Occupational Therapy 3 times per week  Discharge Recommendations:  Equipment Will Continue to Assess for Equipment Needs. Post-acute therapy Discharge to a transitional care facility for continued skilled therapy services.    See \"Rehab Therapy-Acute\" Patient Summary Report for complete documentation.   "

## 2018-05-22 NOTE — PROGRESS NOTES
Received report from day shift RN. Assumed care at 1900. Patient sitting up in bed. Medicated per MAR. PAULE PICC in place TKO. Bed bound x 4 assistance with turning. Singh stat locked draining to gravity. Patient reported feeling SOB, contacted RT for PRN neb treatment. POC discussed with patient. Safety precautions in place, bed locked in low, call light within reach, and personal belongings within reach. Hourly rounding in place.

## 2018-05-22 NOTE — PROGRESS NOTES
"Pt A&Ox4. VS: /51   Pulse 77   Temp 36.7 °C (98.1 °F)   Resp 19   Ht 1.6 m (5' 2.99\")   Wt (!) 168.9 kg (372 lb 6.4 oz)   LMP 04/11/1994   SpO2 98%   Breastfeeding? No   BMI 65.98 kg/m² . Pt denies numbness, tingling, SOB and chest pain. Pt states pain 7/10, scheduled pain medication administered and declines short acting meds at this time. Pt states mild nausea, but declined nausea meds as well. PICC line patent with positive blood return. Pt needs met at this time, call light within reach, hourly rounding in effect, and will continue to monitor.       "

## 2018-05-23 LAB
GLUCOSE BLD-MCNC: 162 MG/DL (ref 65–99)
GLUCOSE BLD-MCNC: 211 MG/DL (ref 65–99)
GLUCOSE BLD-MCNC: 217 MG/DL (ref 65–99)
GLUCOSE BLD-MCNC: 233 MG/DL (ref 65–99)

## 2018-05-23 PROCEDURE — 97530 THERAPEUTIC ACTIVITIES: CPT

## 2018-05-23 PROCEDURE — 700102 HCHG RX REV CODE 250 W/ 637 OVERRIDE(OP): Performed by: INTERNAL MEDICINE

## 2018-05-23 PROCEDURE — A9270 NON-COVERED ITEM OR SERVICE: HCPCS | Performed by: INTERNAL MEDICINE

## 2018-05-23 PROCEDURE — 770004 HCHG ROOM/CARE - ONCOLOGY PRIVATE *

## 2018-05-23 PROCEDURE — 99232 SBSQ HOSP IP/OBS MODERATE 35: CPT | Performed by: INTERNAL MEDICINE

## 2018-05-23 PROCEDURE — 82962 GLUCOSE BLOOD TEST: CPT | Mod: 91

## 2018-05-23 PROCEDURE — 700111 HCHG RX REV CODE 636 W/ 250 OVERRIDE (IP): Performed by: INTERNAL MEDICINE

## 2018-05-23 PROCEDURE — 97110 THERAPEUTIC EXERCISES: CPT

## 2018-05-23 RX ADMIN — METRONIDAZOLE 500 MG: 500 TABLET ORAL at 22:53

## 2018-05-23 RX ADMIN — MORPHINE SULFATE 1 MG: 4 INJECTION INTRAVENOUS at 00:39

## 2018-05-23 RX ADMIN — HEPARIN SODIUM 5000 UNITS: 5000 INJECTION, SOLUTION INTRAVENOUS; SUBCUTANEOUS at 05:31

## 2018-05-23 RX ADMIN — DULOXETINE HYDROCHLORIDE 40 MG: 20 CAPSULE, DELAYED RELEASE ORAL at 09:44

## 2018-05-23 RX ADMIN — METFORMIN HYDROCHLORIDE 1000 MG: 500 TABLET, FILM COATED ORAL at 09:44

## 2018-05-23 RX ADMIN — INSULIN HUMAN 4 UNITS: 100 INJECTION, SOLUTION PARENTERAL at 22:59

## 2018-05-23 RX ADMIN — PRAVASTATIN SODIUM 40 MG: 20 TABLET ORAL at 20:09

## 2018-05-23 RX ADMIN — OXYCODONE HYDROCHLORIDE 5 MG: 5 TABLET ORAL at 15:18

## 2018-05-23 RX ADMIN — OXYCODONE HYDROCHLORIDE 5 MG: 5 TABLET ORAL at 09:45

## 2018-05-23 RX ADMIN — ACYCLOVIR 400 MG: 200 CAPSULE ORAL at 20:09

## 2018-05-23 RX ADMIN — FUROSEMIDE 40 MG: 10 INJECTION, SOLUTION INTRAMUSCULAR; INTRAVENOUS at 09:44

## 2018-05-23 RX ADMIN — AMLODIPINE BESYLATE 5 MG: 5 TABLET ORAL at 09:45

## 2018-05-23 RX ADMIN — LORAZEPAM 1 MG: 2 INJECTION INTRAMUSCULAR; INTRAVENOUS at 02:41

## 2018-05-23 RX ADMIN — INSULIN HUMAN 3 UNITS: 100 INJECTION, SOLUTION PARENTERAL at 18:21

## 2018-05-23 RX ADMIN — ANTACID TABLETS 1000 MG: 500 TABLET, CHEWABLE ORAL at 18:20

## 2018-05-23 RX ADMIN — ANTACID TABLETS 1000 MG: 500 TABLET, CHEWABLE ORAL at 13:42

## 2018-05-23 RX ADMIN — METOPROLOL TARTRATE 50 MG: 25 TABLET, FILM COATED ORAL at 09:44

## 2018-05-23 RX ADMIN — METOPROLOL TARTRATE 50 MG: 25 TABLET, FILM COATED ORAL at 20:09

## 2018-05-23 RX ADMIN — POTASSIUM CHLORIDE 20 MEQ: 1500 TABLET, EXTENDED RELEASE ORAL at 09:45

## 2018-05-23 RX ADMIN — Medication: at 20:10

## 2018-05-23 RX ADMIN — ONDANSETRON HYDROCHLORIDE 4 MG: 2 INJECTION, SOLUTION INTRAMUSCULAR; INTRAVENOUS at 13:48

## 2018-05-23 RX ADMIN — INSULIN HUMAN 4 UNITS: 100 INJECTION, SOLUTION PARENTERAL at 13:40

## 2018-05-23 RX ADMIN — FUROSEMIDE 40 MG: 10 INJECTION, SOLUTION INTRAMUSCULAR; INTRAVENOUS at 20:09

## 2018-05-23 RX ADMIN — ONDANSETRON HYDROCHLORIDE 4 MG: 2 INJECTION, SOLUTION INTRAMUSCULAR; INTRAVENOUS at 09:53

## 2018-05-23 RX ADMIN — CALCITRIOL 0.25 MCG: 0.25 CAPSULE, LIQUID FILLED ORAL at 09:44

## 2018-05-23 RX ADMIN — ANTACID TABLETS 1000 MG: 500 TABLET, CHEWABLE ORAL at 09:44

## 2018-05-23 RX ADMIN — OXYCODONE HYDROCHLORIDE 5 MG: 5 TABLET ORAL at 05:31

## 2018-05-23 RX ADMIN — HEPARIN SODIUM 5000 UNITS: 5000 INJECTION, SOLUTION INTRAVENOUS; SUBCUTANEOUS at 13:48

## 2018-05-23 RX ADMIN — FUROSEMIDE 40 MG: 10 INJECTION, SOLUTION INTRAMUSCULAR; INTRAVENOUS at 15:18

## 2018-05-23 RX ADMIN — METRONIDAZOLE 500 MG: 500 TABLET ORAL at 13:48

## 2018-05-23 RX ADMIN — OXYCODONE HYDROCHLORIDE 10 MG: 10 TABLET, FILM COATED, EXTENDED RELEASE ORAL at 20:09

## 2018-05-23 RX ADMIN — LEVOFLOXACIN 750 MG: 750 TABLET, FILM COATED ORAL at 09:44

## 2018-05-23 RX ADMIN — OXYCODONE HYDROCHLORIDE 10 MG: 10 TABLET, FILM COATED, EXTENDED RELEASE ORAL at 09:45

## 2018-05-23 RX ADMIN — HEPARIN SODIUM 5000 UNITS: 5000 INJECTION, SOLUTION INTRAVENOUS; SUBCUTANEOUS at 22:53

## 2018-05-23 RX ADMIN — METRONIDAZOLE 500 MG: 500 TABLET ORAL at 05:31

## 2018-05-23 RX ADMIN — ACYCLOVIR 400 MG: 200 CAPSULE ORAL at 09:45

## 2018-05-23 RX ADMIN — INSULIN HUMAN 4 UNITS: 100 INJECTION, SOLUTION PARENTERAL at 09:16

## 2018-05-23 RX ADMIN — METFORMIN HYDROCHLORIDE 1000 MG: 500 TABLET, FILM COATED ORAL at 18:20

## 2018-05-23 RX ADMIN — POTASSIUM CHLORIDE 20 MEQ: 1500 TABLET, EXTENDED RELEASE ORAL at 20:09

## 2018-05-23 ASSESSMENT — PAIN SCALES - GENERAL
PAINLEVEL_OUTOF10: 4
PAINLEVEL_OUTOF10: 8
PAINLEVEL_OUTOF10: 5
PAINLEVEL_OUTOF10: 5
PAINLEVEL_OUTOF10: 6
PAINLEVEL_OUTOF10: 8
PAINLEVEL_OUTOF10: 5
PAINLEVEL_OUTOF10: 6

## 2018-05-23 ASSESSMENT — ENCOUNTER SYMPTOMS
DEPRESSION: 1
HEADACHES: 0
SHORTNESS OF BREATH: 1
VOMITING: 0
HEARTBURN: 0
SPUTUM PRODUCTION: 0
DIZZINESS: 0
BLURRED VISION: 0
NAUSEA: 0
CHILLS: 0
SPEECH CHANGE: 0
COUGH: 1
MYALGIAS: 0
CLAUDICATION: 0
ROS GI COMMENTS: POOR APPETITE
INSOMNIA: 0
SORE THROAT: 0
WEAKNESS: 1
PHOTOPHOBIA: 0
NERVOUS/ANXIOUS: 0
SENSORY CHANGE: 0
CONSTIPATION: 0
ABDOMINAL PAIN: 0
DIARRHEA: 0
FEVER: 0

## 2018-05-23 ASSESSMENT — GAIT ASSESSMENTS: GAIT LEVEL OF ASSIST: UNABLE TO PARTICIPATE

## 2018-05-23 ASSESSMENT — LIFESTYLE VARIABLES: DO YOU DRINK ALCOHOL: NO

## 2018-05-23 ASSESSMENT — PAIN SCALES - WONG BAKER
WONGBAKER_NUMERICALRESPONSE: DOESN'T HURT AT ALL
WONGBAKER_NUMERICALRESPONSE: DOESN'T HURT AT ALL

## 2018-05-23 NOTE — CARE PLAN
"Problem: Bowel/Gastric:  Goal: Will not experience complications related to bowel motility  Outcome: NOT MET  Vomited after eating soft/liquid breakfast and taking medications this morning.  States \"this happens sometimes.\"  Will attempt to medicate with antiemetic prior to eating and medication in future.    Problem: Mobility  Goal: Risk for activity intolerance will decrease  Outcome: PROGRESSING SLOWER THAN EXPECTED  Able to direct and participate in moving from supine to sitting at edge of bed with 3 staff assist.      "

## 2018-05-23 NOTE — PROGRESS NOTES
Received report from day shift RN. Assumed care at 1900. Patient sitting up in bed. Medicated per MAR. DIVINE double lumen PICC in place TKO. Bed bound x 2-3 assistance with turning. Singh stat locked draining to gravity. POC discussed with patient. Safety precautions in place, bed locked in low, call light within reach, and personal belongings within reach. Hourly rounding in place.

## 2018-05-23 NOTE — PROGRESS NOTES
Assumed care of patient after bedside report.  Patient awake, alert, able to make needs known.  Resting quietly in ave bed.  Call light within reach.

## 2018-05-23 NOTE — PROGRESS NOTES
[facility] Hospitalist Progress Note    Date of Service: 2018    Chief Complaint  67 y.o. female admitted 4/10/2018 with prolonged hospital course, admitted for chemotherapy for multiple myeloma, pathologic fracture and complications thereof.    Interval Problem Update  Patient not tearful today but does have flat affect, voice is returning but still weak with recent pneumonia/pharyngitis.  She still has areas that are significantly edematous and weeping.  She is tolerating bid lovenox and will change back to tid to see if some additional edema can be removed.  Appetite wavers but she is trying to eat more.    Consultants/Specialty  Oncology - due for chemo     Disposition  Difficult discharge        Review of Systems   Constitutional: Positive for malaise/fatigue. Negative for chills and fever.   HENT: Negative for congestion and sore throat.    Eyes: Negative for blurred vision and photophobia.   Respiratory: Positive for cough and shortness of breath. Negative for sputum production.    Cardiovascular: Negative for chest pain, claudication and leg swelling.   Gastrointestinal: Negative for abdominal pain, constipation, diarrhea, heartburn, nausea and vomiting.        Poor appetite     Genitourinary: Negative for dysuria and hematuria.   Musculoskeletal: Negative for joint pain and myalgias.   Skin: Negative for itching and rash.   Neurological: Positive for weakness (legs cannot support her weight anymore). Negative for dizziness, sensory change, speech change and headaches.   Psychiatric/Behavioral: Positive for depression. The patient is not nervous/anxious and does not have insomnia.       Physical Exam  Laboratory/Imaging   Hemodynamics  Temp (24hrs), Av.8 °C (98.3 °F), Min:36.6 °C (97.9 °F), Max:37.2 °C (98.9 °F)   Temperature: 37.2 °C (98.9 °F)  Pulse  Av.5  Min: 38  Max: 128 Heart Rate (Monitored): 77  Blood Pressure : 144/47      Respiratory      Respiration: 18, Pulse Oximetry: 94 %, O2  Daily Delivery Respiratory : Silicone Nasal Cannula     Given By:: Mouthpiece, Work Of Breathing / Effort: Mild  RUL Breath Sounds: Clear, RML Breath Sounds: Clear;Diminished, RLL Breath Sounds: Diminished, CHARLOTTE Breath Sounds: Clear, LLL Breath Sounds: Clear;Diminished    Fluids    Intake/Output Summary (Last 24 hours) at 05/22/18 2117  Last data filed at 05/22/18 1800   Gross per 24 hour   Intake              360 ml   Output             4950 ml   Net            -4590 ml       Nutrition  Orders Placed This Encounter   Procedures   • Diet Order     Standing Status:   Standing     Number of Occurrences:   1     Order Specific Question:   Diet:     Answer:   Diabetic [3]     Physical Exam   Constitutional: She is oriented to person, place, and time. She appears well-developed and well-nourished. No distress.   HENT:   Head: Normocephalic and atraumatic.   Eyes: Conjunctivae are normal. No scleral icterus.   Neck: Neck supple. No JVD present.   Cardiovascular: Normal rate, regular rhythm and normal heart sounds.  Exam reveals no gallop and no friction rub.    No murmur heard.  Pulmonary/Chest: Effort normal and breath sounds normal. No respiratory distress. She exhibits no tenderness.   Abdominal: Soft. Bowel sounds are normal. She exhibits no distension. There is no guarding.   Musculoskeletal: She exhibits edema (globally). She exhibits no tenderness.   Neurological: She is alert and oriented to person, place, and time. No cranial nerve deficit.   Skin: Skin is warm and dry. She is not diaphoretic. No erythema. No pallor.   Rash has resolved, peeling skin with dc of lac-hydrin but could have developed allergy to this also.     Psychiatric: Her behavior is normal.   Flat affect consistent with depression     Nursing note and vitals reviewed.      Recent Labs      05/20/18   0226  05/21/18   0500  05/22/18   0827   WBC  2.6*  2.2*  2.3*   RBC  2.67*  2.70*  2.87*   HEMOGLOBIN  8.1*  8.2*  8.7*   HEMATOCRIT  25.6*  26.0*   27.2*   MCV  95.9  96.3  94.8   MCH  30.3  30.4  30.3   MCHC  31.6*  31.5*  32.0*   RDW  53.2*  53.1*  51.4*   PLATELETCT  99*  95*  122*   MPV  10.7  10.9  11.0     Recent Labs      05/21/18   0500  05/22/18   0827   SODIUM  131*  128*   POTASSIUM  4.4  4.3   CHLORIDE  92*  88*   CO2  36*  37*   GLUCOSE  175*  189*   BUN  7*  7*   CREATININE  0.70  0.59   CALCIUM  8.6  8.7     Recent Labs      05/20/18   0950   INR  1.13                  Assessment/Plan     * Pathologic fracture- (present on admission)   Assessment & Plan    -Left humerus  -widespread lytic disease  -completed radiation to humerus  -pain control             Pneumonia   Assessment & Plan    RLL per chest CT, no PE/pleural effusion; on Levaquin/Flagyl with some improvement  Laryngitis;  Cont rx and monitor;        Acute respiratory failure (HCC)   Assessment & Plan    Due to pneumonia; on iv abx's with some improvement today; cont current care        Multiple myeloma (HCC)- (present on admission)   Assessment & Plan    CyBorD chemo - cycle 1 started 4/19/18; cycle 2 started on 5/10 (5/17, due 5/24)  -recent diagnosis, appears aggressive, now with innumerable lytic lesions  -Dr Sim and Oksana consulted for assistance in management  - IV dilaudid PRN, oxy prn. DC maintenance steroids as these were causing confusion  -oxycontin CR 10 mg BID  -skeletal survey done - spine obscured by body habitus, but multiple other lesions found  XRT to right and left femurs completed  -Palliative care consulted for advanced care planning            Hypocalcemia   Assessment & Plan    Improved; decrease oral supplement          Hypomagnesemia   Assessment & Plan    Replete as indicated; f/u labs        Sinusitis   Assessment & Plan    Worse sx's with purulent discharge per patient; started Levaquin and monitor.        Bacteriuria   Assessment & Plan    Klebsiella growing in urine culture, nava in place, asymptomatic.  Immunocompromised due to chemotherapy - now on  Levaquin  Check repeat UA        Decubitus ulcer of buttock   Assessment & Plan    Wound care; rotate frequently in bed; air mattress.        Debility- (present on admission)   Assessment & Plan    Due to current illnesses; not able to walk since early april;  Out of SNF days per SW; cont PT/OT here  Poor prognosis given current diagnoses/morbid obesity.  LTAC declined d/t active chemotherapy.        DNR (do not resuscitate)   Assessment & Plan    Palliative care consulted, patient wants to change code status to DNR - done, completed Polst with APN.          Chemotherapy-induced thrombocytopenia   Assessment & Plan    improving; monitor; no bleeding now          Leg edema   Assessment & Plan      -PO to IV Lasix scheduled bid, doing well and renal function stable. Improved edema slowly.          Chronic venous stasis dermatitis of both lower extremities- (present on admission)   Assessment & Plan    -wound care, oral abx completed          Anemia- (present on admission)   Assessment & Plan    hgb better; no active bleeding; monitor  Transfuse if hgb <7.0          Morbid obesity with BMI of 60.0-69.9, adult (CMS-HCC)- (present on admission)   Assessment & Plan    -encourage weight loss  Body mass index is 67.38 kg/m².        DM type 2 (diabetes mellitus, type 2) (CMS-HCC)- (present on admission)   Assessment & Plan    Fair control; adjust meds for good control        Essential hypertension, benign- (present on admission)   Assessment & Plan    Adjust med for good control.          Quality-Core Measures   Reviewed items::  Labs reviewed, Medications reviewed and Radiology images reviewed  Singh catheter::  Urinary Tract Retention or Urinary Tract Obstruction  DVT prophylaxis pharmacological::  Contraindicated - High bleeding risk  DVT prophylaxis - mechanical:  SCDs  Antibiotics:  Treating active infection/contamination beyond 24 hours perioperative coverage  Assessed for rehabilitation services:  Patient was assess  for and/or received rehabilitation services during this hospitalization

## 2018-05-23 NOTE — THERAPY
"Physical Therapy Treatment completed.   Bed Mobility:  Supine to Sit:  (pt found seated on the EOB)Max assist sit->supine w/HOB flat and no rail  Transfers: Sit to Stand: Unable to Participate  Gait: Level Of Assist: Unable to Participate       Plan of Care: Will benefit from Physical Therapy 3 times per week  Discharge Recommendations: Equipment: Will Continue to Assess for Equipment Needs. Post-acute therapy Discharge to a transitional care facility for continued skilled therapy services.     See \"Rehab Therapy-Acute\" Patient Summary Report for complete documentation.       "

## 2018-05-23 NOTE — CARE PLAN
Problem: Safety  Goal: Will remain free from injury  Outcome: PROGRESSING AS EXPECTED      Problem: Infection  Goal: Will remain free from infection  Outcome: PROGRESSING AS EXPECTED  Assessed and monitored PICC line dressing for signs and symptoms of infection. Educated the patient on signs and symptoms.

## 2018-05-24 LAB
ANION GAP SERPL CALC-SCNC: 5 MMOL/L (ref 0–11.9)
BASOPHILS # BLD AUTO: 0 % (ref 0–1.8)
BASOPHILS # BLD: 0 K/UL (ref 0–0.12)
BUN SERPL-MCNC: 8 MG/DL (ref 8–22)
CALCIUM SERPL-MCNC: 8.4 MG/DL (ref 8.5–10.5)
CHLORIDE SERPL-SCNC: 90 MMOL/L (ref 96–112)
CO2 SERPL-SCNC: 37 MMOL/L (ref 20–33)
CREAT SERPL-MCNC: 0.73 MG/DL (ref 0.5–1.4)
EOSINOPHIL # BLD AUTO: 0.01 K/UL (ref 0–0.51)
EOSINOPHIL NFR BLD: 0.5 % (ref 0–6.9)
ERYTHROCYTE [DISTWIDTH] IN BLOOD BY AUTOMATED COUNT: 52.9 FL (ref 35.9–50)
GLUCOSE BLD-MCNC: 186 MG/DL (ref 65–99)
GLUCOSE BLD-MCNC: 186 MG/DL (ref 65–99)
GLUCOSE BLD-MCNC: 188 MG/DL (ref 65–99)
GLUCOSE BLD-MCNC: 207 MG/DL (ref 65–99)
GLUCOSE SERPL-MCNC: 201 MG/DL (ref 65–99)
HCT VFR BLD AUTO: 26.2 % (ref 37–47)
HGB BLD-MCNC: 8.4 G/DL (ref 12–16)
IMM GRANULOCYTES # BLD AUTO: 0.02 K/UL (ref 0–0.11)
IMM GRANULOCYTES NFR BLD AUTO: 0.9 % (ref 0–0.9)
LYMPHOCYTES # BLD AUTO: 0.53 K/UL (ref 1–4.8)
LYMPHOCYTES NFR BLD: 24.7 % (ref 22–41)
MCH RBC QN AUTO: 30.3 PG (ref 27–33)
MCHC RBC AUTO-ENTMCNC: 32.1 G/DL (ref 33.6–35)
MCV RBC AUTO: 94.6 FL (ref 81.4–97.8)
MONOCYTES # BLD AUTO: 0.31 K/UL (ref 0–0.85)
MONOCYTES NFR BLD AUTO: 14.4 % (ref 0–13.4)
NEUTROPHILS # BLD AUTO: 1.28 K/UL (ref 2–7.15)
NEUTROPHILS NFR BLD: 59.5 % (ref 44–72)
NRBC # BLD AUTO: 0.02 K/UL
NRBC BLD-RTO: 0.9 /100 WBC
PLATELET # BLD AUTO: 163 K/UL (ref 164–446)
PMV BLD AUTO: 10.9 FL (ref 9–12.9)
POTASSIUM SERPL-SCNC: 3.9 MMOL/L (ref 3.6–5.5)
RBC # BLD AUTO: 2.77 M/UL (ref 4.2–5.4)
SODIUM SERPL-SCNC: 132 MMOL/L (ref 135–145)
WBC # BLD AUTO: 2.2 K/UL (ref 4.8–10.8)

## 2018-05-24 PROCEDURE — G8979 MOBILITY GOAL STATUS: HCPCS | Mod: CL

## 2018-05-24 PROCEDURE — 302118 SHAMPOO,NO RINSE: Performed by: INTERNAL MEDICINE

## 2018-05-24 PROCEDURE — 700111 HCHG RX REV CODE 636 W/ 250 OVERRIDE (IP): Performed by: INTERNAL MEDICINE

## 2018-05-24 PROCEDURE — 85025 COMPLETE CBC W/AUTO DIFF WBC: CPT

## 2018-05-24 PROCEDURE — 97110 THERAPEUTIC EXERCISES: CPT

## 2018-05-24 PROCEDURE — G8978 MOBILITY CURRENT STATUS: HCPCS | Mod: CM

## 2018-05-24 PROCEDURE — 700102 HCHG RX REV CODE 250 W/ 637 OVERRIDE(OP): Performed by: INTERNAL MEDICINE

## 2018-05-24 PROCEDURE — 82962 GLUCOSE BLOOD TEST: CPT | Mod: 91

## 2018-05-24 PROCEDURE — 97535 SELF CARE MNGMENT TRAINING: CPT

## 2018-05-24 PROCEDURE — A9270 NON-COVERED ITEM OR SERVICE: HCPCS | Performed by: INTERNAL MEDICINE

## 2018-05-24 PROCEDURE — 80048 BASIC METABOLIC PNL TOTAL CA: CPT

## 2018-05-24 PROCEDURE — 99232 SBSQ HOSP IP/OBS MODERATE 35: CPT | Performed by: INTERNAL MEDICINE

## 2018-05-24 PROCEDURE — 770004 HCHG ROOM/CARE - ONCOLOGY PRIVATE *

## 2018-05-24 RX ADMIN — INSULIN HUMAN 3 UNITS: 100 INJECTION, SOLUTION PARENTERAL at 09:28

## 2018-05-24 RX ADMIN — METRONIDAZOLE 500 MG: 500 TABLET ORAL at 22:37

## 2018-05-24 RX ADMIN — POTASSIUM CHLORIDE 20 MEQ: 1500 TABLET, EXTENDED RELEASE ORAL at 08:56

## 2018-05-24 RX ADMIN — ACYCLOVIR 400 MG: 200 CAPSULE ORAL at 20:31

## 2018-05-24 RX ADMIN — ANTACID TABLETS 1000 MG: 500 TABLET, CHEWABLE ORAL at 18:07

## 2018-05-24 RX ADMIN — METOPROLOL TARTRATE 50 MG: 25 TABLET, FILM COATED ORAL at 08:56

## 2018-05-24 RX ADMIN — METFORMIN HYDROCHLORIDE 1000 MG: 500 TABLET, FILM COATED ORAL at 08:56

## 2018-05-24 RX ADMIN — OXYCODONE HYDROCHLORIDE 10 MG: 10 TABLET, FILM COATED, EXTENDED RELEASE ORAL at 08:55

## 2018-05-24 RX ADMIN — Medication: at 22:37

## 2018-05-24 RX ADMIN — INSULIN HUMAN 4 UNITS: 100 INJECTION, SOLUTION PARENTERAL at 20:35

## 2018-05-24 RX ADMIN — HEPARIN SODIUM 5000 UNITS: 5000 INJECTION, SOLUTION INTRAVENOUS; SUBCUTANEOUS at 06:04

## 2018-05-24 RX ADMIN — FUROSEMIDE 40 MG: 10 INJECTION, SOLUTION INTRAMUSCULAR; INTRAVENOUS at 16:08

## 2018-05-24 RX ADMIN — METFORMIN HYDROCHLORIDE 1000 MG: 500 TABLET, FILM COATED ORAL at 18:08

## 2018-05-24 RX ADMIN — ALPRAZOLAM 0.5 MG: 0.5 TABLET ORAL at 00:24

## 2018-05-24 RX ADMIN — HEPARIN SODIUM 5000 UNITS: 5000 INJECTION, SOLUTION INTRAVENOUS; SUBCUTANEOUS at 13:28

## 2018-05-24 RX ADMIN — INSULIN HUMAN 3 UNITS: 100 INJECTION, SOLUTION PARENTERAL at 13:31

## 2018-05-24 RX ADMIN — OXYCODONE HYDROCHLORIDE 10 MG: 10 TABLET, FILM COATED, EXTENDED RELEASE ORAL at 20:32

## 2018-05-24 RX ADMIN — METOPROLOL TARTRATE 50 MG: 25 TABLET, FILM COATED ORAL at 20:31

## 2018-05-24 RX ADMIN — ANTACID TABLETS 1000 MG: 500 TABLET, CHEWABLE ORAL at 08:55

## 2018-05-24 RX ADMIN — CALCITRIOL 0.25 MCG: 0.25 CAPSULE, LIQUID FILLED ORAL at 08:55

## 2018-05-24 RX ADMIN — HEPARIN SODIUM 5000 UNITS: 5000 INJECTION, SOLUTION INTRAVENOUS; SUBCUTANEOUS at 22:37

## 2018-05-24 RX ADMIN — ACYCLOVIR 400 MG: 200 CAPSULE ORAL at 08:55

## 2018-05-24 RX ADMIN — POTASSIUM CHLORIDE 20 MEQ: 1500 TABLET, EXTENDED RELEASE ORAL at 20:32

## 2018-05-24 RX ADMIN — FUROSEMIDE 40 MG: 10 INJECTION, SOLUTION INTRAMUSCULAR; INTRAVENOUS at 20:32

## 2018-05-24 RX ADMIN — METRONIDAZOLE 500 MG: 500 TABLET ORAL at 13:27

## 2018-05-24 RX ADMIN — OXYCODONE HYDROCHLORIDE 5 MG: 5 TABLET ORAL at 01:52

## 2018-05-24 RX ADMIN — ANTACID TABLETS 1000 MG: 500 TABLET, CHEWABLE ORAL at 13:27

## 2018-05-24 RX ADMIN — LEVOFLOXACIN 750 MG: 750 TABLET, FILM COATED ORAL at 08:55

## 2018-05-24 RX ADMIN — FUROSEMIDE 40 MG: 10 INJECTION, SOLUTION INTRAMUSCULAR; INTRAVENOUS at 08:57

## 2018-05-24 RX ADMIN — OXYCODONE HYDROCHLORIDE 5 MG: 5 TABLET ORAL at 18:08

## 2018-05-24 RX ADMIN — METRONIDAZOLE 500 MG: 500 TABLET ORAL at 06:04

## 2018-05-24 RX ADMIN — INSULIN HUMAN 3 UNITS: 100 INJECTION, SOLUTION PARENTERAL at 18:05

## 2018-05-24 RX ADMIN — OXYCODONE HYDROCHLORIDE 5 MG: 5 TABLET ORAL at 10:54

## 2018-05-24 RX ADMIN — Medication: at 08:59

## 2018-05-24 RX ADMIN — ONDANSETRON HYDROCHLORIDE 4 MG: 2 INJECTION, SOLUTION INTRAMUSCULAR; INTRAVENOUS at 20:45

## 2018-05-24 RX ADMIN — DULOXETINE HYDROCHLORIDE 40 MG: 20 CAPSULE, DELAYED RELEASE ORAL at 08:57

## 2018-05-24 RX ADMIN — PRAVASTATIN SODIUM 40 MG: 20 TABLET ORAL at 20:32

## 2018-05-24 RX ADMIN — AMLODIPINE BESYLATE 5 MG: 5 TABLET ORAL at 08:57

## 2018-05-24 ASSESSMENT — ENCOUNTER SYMPTOMS
SPUTUM PRODUCTION: 0
PHOTOPHOBIA: 0
COUGH: 0
HEARTBURN: 0
INSOMNIA: 0
ABDOMINAL PAIN: 0
CHILLS: 0
CONSTIPATION: 0
SHORTNESS OF BREATH: 1
NERVOUS/ANXIOUS: 1
MYALGIAS: 1
FEVER: 0
BLURRED VISION: 0
BACK PAIN: 1
HEADACHES: 0
SORE THROAT: 0
DEPRESSION: 1
MYALGIAS: 0
NERVOUS/ANXIOUS: 0
HALLUCINATIONS: 0
ORTHOPNEA: 0
DEPRESSION: 0
WEAKNESS: 1
DIARRHEA: 0
ROS GI COMMENTS: POOR APPETITE
NAUSEA: 0
VOMITING: 0
SENSORY CHANGE: 0
TINGLING: 1
SENSORY CHANGE: 1
HEMOPTYSIS: 0
DIZZINESS: 0
SPEECH CHANGE: 0
CLAUDICATION: 0

## 2018-05-24 ASSESSMENT — COGNITIVE AND FUNCTIONAL STATUS - GENERAL
WALKING IN HOSPITAL ROOM: TOTAL
SUGGESTED CMS G CODE MODIFIER MOBILITY: CM
MOVING FROM LYING ON BACK TO SITTING ON SIDE OF FLAT BED: UNABLE
MOBILITY SCORE: 8
TURNING FROM BACK TO SIDE WHILE IN FLAT BAD: A LOT
MOVING TO AND FROM BED TO CHAIR: UNABLE
STANDING UP FROM CHAIR USING ARMS: A LOT
CLIMB 3 TO 5 STEPS WITH RAILING: TOTAL

## 2018-05-24 ASSESSMENT — PAIN SCALES - GENERAL
PAINLEVEL_OUTOF10: 0
PAINLEVEL_OUTOF10: 6
PAINLEVEL_OUTOF10: 3
PAINLEVEL_OUTOF10: 3
PAINLEVEL_OUTOF10: 0
PAINLEVEL_OUTOF10: 1
PAINLEVEL_OUTOF10: 1
PAINLEVEL_OUTOF10: 8
PAINLEVEL_OUTOF10: 4

## 2018-05-24 ASSESSMENT — LIFESTYLE VARIABLES
DO YOU DRINK ALCOHOL: NO
SUBSTANCE_ABUSE: 0

## 2018-05-24 ASSESSMENT — GAIT ASSESSMENTS: GAIT LEVEL OF ASSIST: UNABLE TO PARTICIPATE

## 2018-05-24 NOTE — PROGRESS NOTES
Oncology/Hematology Progress Note               Author: Dyllan Gutiérrez Date & Time created: 5/24/2018  1:22 PM   DX-Multiple myeloma  Multiple myeloma-- 1P deletion intermediate risk, IgG kappa.  Stage III based on ISS staging (beta 2 9.9 and albumin 2.7 before).  Admitted with pathologic fracture of left humerus. Received palliative radiation to the left humerus during this admission. Poor performance status. Lives alone in [facility].       Started chemotherapy here as an inpatient. Received cycle #1 day #1 of CyBorD on April 19. She has completed CYCLE #1.  - C # 2 switched to weekly regimen Velcade 1.5 mg/m2 + Cytoxan 300 mg/m2+ DEX 40 mg weekly days 1,8,15,22 of every 28 day cycle . Start C2D1 on 5/10/18, day 8 on 5/17.   Interval History:  She continues to have severe debility.  trying to discharge to LTAC. Currently being diuresed. Finishing antibiotics for pneumonia today. Kidney function remains stable. She is sad, depressed.     Review of Systems:  Review of Systems   Constitutional: Positive for malaise/fatigue. Negative for chills and fever.   HENT: Negative for ear discharge, ear pain, hearing loss, nosebleeds and tinnitus.    Respiratory: Negative for cough and hemoptysis.    Cardiovascular: Negative for chest pain and orthopnea.   Gastrointestinal: Negative for abdominal pain, heartburn, nausea and vomiting.   Genitourinary: Negative for dysuria and hematuria.   Musculoskeletal: Positive for back pain, joint pain and myalgias.   Skin: Negative for itching and rash.   Neurological: Positive for tingling, sensory change and weakness. Negative for headaches.   Psychiatric/Behavioral: Negative for depression, hallucinations, substance abuse and suicidal ideas. The patient is nervous/anxious.        Physical Exam:  Physical Exam   Constitutional: She is oriented to person, place, and time. She appears well-developed.   Pulmonary/Chest: Breath sounds normal. No respiratory distress. She has  no wheezes.   Abdominal: Soft. Bowel sounds are normal. She exhibits no distension. There is no guarding.   obese   Musculoskeletal: She exhibits edema and tenderness.   Neurological: She is alert and oriented to person, place, and time.   Neuropathy is stable.   Skin: Skin is warm and dry. No rash noted. She is not diaphoretic.       Labs:        Invalid input(s): [id number]      Recent Labs      18   08   SODIUM  128*   POTASSIUM  4.3   CHLORIDE  88*   CO2  37*   BUN  7*   CREATININE  0.59   CALCIUM  8.7     Recent Labs      18   08   ALTSGPT  11   ASTSGOT  20   ALKPHOSPHAT  66   TBILIRUBIN  0.4   GLUCOSE  189*     Recent Labs      18   RBC  2.87*   HEMOGLOBIN  8.7*   HEMATOCRIT  27.2*   PLATELETCT  122*     Recent Labs      18   WBC  2.3*   NEUTSPOLYS  61.10   LYMPHOCYTES  23.10   MONOCYTES  13.70*   EOSINOPHILS  0.40   BASOPHILS  0.40   ASTSGOT  20   ALTSGPT  11   ALKPHOSPHAT  66   TBILIRUBIN  0.4     Recent Labs      1827   SODIUM  128*   POTASSIUM  4.3   CHLORIDE  88*   CO2  37*   GLUCOSE  189*   BUN  7*   CREATININE  0.59   CALCIUM  8.7     Hemodynamics:  Temp (24hrs), Av.7 °C (98 °F), Min:36.4 °C (97.5 °F), Max:37.1 °C (98.7 °F)  Temperature: 36.7 °C (98 °F)  Pulse  Av.6  Min: 38  Max: 128   Blood Pressure : 139/51     Respiratory:    Respiration: 14, Pulse Oximetry: 99 %     Work Of Breathing / Effort: Mild  RUL Breath Sounds: Clear, RML Breath Sounds: Diminished, RLL Breath Sounds: Diminished, [place]TE Breath Sounds: Clear, LLL Breath Sounds: Diminished  Fluids:    Intake/Output Summary (Last 24 hours) at 18 1249  Last data filed at 18 1028   Gross per 24 hour   Intake              600 ml   Output             5350 ml   Net            -4750 ml       GI/Nutrition:  Orders Placed This Encounter   Procedures   • Diet Order     Standing Status:   Standing     Number of Occurrences:   1     Order Specific Question:   Diet:     Answer:    Diabetic [3]     Medical Decision Making, by Problem:  Active Hospital Problems    Diagnosis   • *Pathologic fracture [M84.40XA]   • Multiple myeloma (HCC) [C90.00]   • Diarrhea [R19.7]   • Hypomagnesemia [E83.42]   • Hypocalcemia [E83.51]   • Epistaxis [R04.0]   • Bacteriuria [R82.71]   • Rash [R21]   • Nasal congestion [R09.81]   • Decubitus ulcer of buttock [L89.309]   • Debility [R53.81]   • DNR (do not resuscitate) [Z66]   • Chemotherapy-induced thrombocytopenia [D69.59, T45.1X5A]   • Leg edema [R60.0]   • Anemia [D64.9]   • Chronic venous stasis dermatitis of both lower extremities [I87.2]   • Cellulitis [L03.90]   • Morbid obesity with BMI of 60.0-69.9, adult (CMS-HCC) [E66.01, Z68.44]   • DM type 2 (diabetes mellitus, type 2) (CMS-HCC) [E11.9]   • Essential hypertension, benign [I10]       Plan:  MM-Multiple myeloma-- 1P deletion intermediate risk, IgG kappa.  Stage III based on ISS staging (beta 2 9.9 and albumin 2.7 before).  Admitted with pathologic fracture of left humerus. Received palliative radiation to the left humerus during this admission. Poor performance status. Lives alone in [facility].       Started chemotherapy here as an inpatient. Received cycle #1 day #1 of CyBorD on April 19. She has completed CYCLE #1.  - C # 2 switched to weekly regimen Velcade 1.5 mg/m2 + Cytoxan 300 mg/m2+ DEX 40 mg weekly days 1,8,15,22 of every 28 day cycle . Start C2D1 on 5/10/18 .  D8 5/17/18     Neuropathy-Preexisting before initiation of chemo. Will watch.      Plan  -We'll postpone day 15 of Cy BorD until tomorrow, she will finish her antibiotics today  -We'll try to arrange discharge to LTAC and continue on treatment over there. We will discuss this with Dr. Sim  -In the meantime aggressive diuresis  - involved in discharge planning  -Significant social issues mainly due to patient performance status, she lives in [facility]

## 2018-05-24 NOTE — PROGRESS NOTES
"Pharmacy Chemotherapy Verification    Patient Name: Aleida Pagan  DX: Multiple Myeloma    Cycle 2, Day 15 (weekly regimen) **PAPER ORDER IN CHART**  Previous treatment:  C2 D8 = 5/17/18 (C1 was twice weekly regimen)    Protocol: CyBorD    *Dosing Reference*  Cycle 1   - -  Beginning with Cycle 2  Bortezomib 1.5 mg/m2 IVP or subQ on Days 1, 8, 15, and 22  Cyclophosphamide 300 mg/m2 PO on Days  1, 8, 15, and 22  Dexamethasone 40 mg PO on Days  1, 8, 15, and 22   Repeat every 28 days x 4 cycles   Drew SEVERINO, et al. Once-versus twice-weekly bortezomib induction therapy with CyBorD in newly diagnosed multiple myeloma. Blood 2010 [id number]; doi: [email or web address]     Allergies:  Actos [pioglitazone hydrochloride]; Advil [ibuprofen micronized]; Cephalosporins; and [place]  /51   Pulse 84   Temp 36.7 °C (98 °F)   Resp 14   Ht 1.6 m (5' 2.99\")   Wt (!) 168.9 kg (372 lb 6.4 oz)   LMP 04/11/1994   SpO2 99%   Breastfeeding? No   BMI 65.98 kg/m²  Body surface area is 2.74 meters squared.       **Use actual BSA of 2.74 m2 per Dr Gutiérrez.**    Labs 5/24/18  ANC~1280 Plt = 163k   Hgb = 8.4     SCr = 0.73 mg/dL CrCl > 125 mL/min (min SCr 0.7)    Labs 5/22/18  AST/ALT/AP = 20/11/66 Tbili = 0.4 K+ = 4.3      Bortezomib (Velcade) 1.5 mg/m2 x  2.74 m2 = 4.11 mg    <5% difference, OK to treat with final dose = 4.15 mg subcutaneous    Cyclophosphamide 300 mg/m2 x  2.74 m2  =  822 mg   <5% difference, OK to treat with final written dose = 850 mg PO     Dilma Zhu, PharmD, BCOP            "

## 2018-05-24 NOTE — PROGRESS NOTES
Bedside report received from day shift RN. Assumed care. Pt is A&Ox4. Pt is in bed. Pt denies pain at this time. Pt was updated on the plan of care for the night. All questions answered.   Pt has call light within reach and bed is in lowest position.  All fall precautions in place. Pt had no other needs at this time, hourly rounding in place.

## 2018-05-24 NOTE — CARE PLAN
Problem: Safety  Goal: Will remain free from injury  Outcome: PROGRESSING AS EXPECTED  Patient has remained free from injury in bed. Patient and nurse take all precautions when taking medications and when moving around the bed.    Problem: Psychosocial Needs:  Goal: Level of anxiety will decrease  Outcome: PROGRESSING AS EXPECTED  Patient has not expressed anxiety this shift. Patient is conversing with nurse and seems relaxed and comfortable.

## 2018-05-24 NOTE — PROGRESS NOTES
[facility] Hospitalist Progress Note    Date of Service: 2018    Chief Complaint  67 y.o. female admitted 4/10/2018 with prolonged hospital course, admitted for chemotherapy for multiple myeloma, pathologic fracture and complications thereof.    Interval Problem Update   Patient not tearful today but does have flat affect, voice is returning but still weak with recent pneumonia/pharyngitis.  She still has areas that are significantly edematous and weeping.  She is tolerating bid lovenox and will change back to tid to see if some additional edema can be removed.  Appetite wavers but she is trying to eat more.   Patient sitting on edge of the bed with feet down on step much of the day, states this position is more comfortable for her leg.  Her voice is getting stronger and denies cough.  No new complaints    Consultants/Specialty  Oncology - due for chemo     Disposition  Difficult discharge        Review of Systems   Constitutional: Positive for malaise/fatigue. Negative for chills and fever.   HENT: Negative for congestion and sore throat.    Eyes: Negative for blurred vision and photophobia.   Respiratory: Positive for cough and shortness of breath. Negative for sputum production.    Cardiovascular: Negative for chest pain, claudication and leg swelling.   Gastrointestinal: Negative for abdominal pain, constipation, diarrhea, heartburn, nausea and vomiting.        Poor appetite     Genitourinary: Negative for dysuria and hematuria.   Musculoskeletal: Negative for joint pain and myalgias.   Skin: Negative for itching and rash.   Neurological: Positive for weakness (legs cannot support her weight anymore). Negative for dizziness, sensory change, speech change and headaches.   Psychiatric/Behavioral: Positive for depression. The patient is not nervous/anxious and does not have insomnia.       Physical Exam  Laboratory/Imaging   Hemodynamics  Temp (24hrs), Av.7 °C (98.1 °F), Min:36.4 °C (97.5 °F),  Max:37.1 °C (98.7 °F)   Temperature: 36.4 °C (97.5 °F)  Pulse  Av.6  Min: 38  Max: 128    Blood Pressure : 159/53      Respiratory      Respiration: 18, Pulse Oximetry: 98 %     Work Of Breathing / Effort: Mild  RUL Breath Sounds: Clear, RML Breath Sounds: Diminished, RLL Breath Sounds: Diminished, CHARLOTTE Breath Sounds: Clear, LLL Breath Sounds: Diminished    Fluids    Intake/Output Summary (Last 24 hours) at 18  Last data filed at 18 1600   Gross per 24 hour   Intake                0 ml   Output             5300 ml   Net            -5300 ml       Nutrition  Orders Placed This Encounter   Procedures   • Diet Order     Standing Status:   Standing     Number of Occurrences:   1     Order Specific Question:   Diet:     Answer:   Diabetic [3]     Physical Exam   Constitutional: She is oriented to person, place, and time. She appears well-developed and well-nourished. No distress.   HENT:   Head: Normocephalic and atraumatic.   Eyes: Conjunctivae are normal. No scleral icterus.   Neck: Neck supple. No JVD present.   Cardiovascular: Normal rate, regular rhythm and normal heart sounds.  Exam reveals no gallop and no friction rub.    No murmur heard.  Pulmonary/Chest: Effort normal and breath sounds normal. No respiratory distress. She exhibits no tenderness.   Abdominal: Soft. Bowel sounds are normal. She exhibits no distension. There is no guarding.   Musculoskeletal: She exhibits edema (globally). She exhibits no tenderness.   Neurological: She is alert and oriented to person, place, and time. No cranial nerve deficit.   Skin: Skin is warm and dry. She is not diaphoretic. No erythema. No pallor.   Rash has resolved, peeling skin with dc of lac-hydrin but could have developed allergy to this also.     Psychiatric: Her behavior is normal.   Flat affect consistent with depression     Nursing note and vitals reviewed.      Recent Labs      18   0500  18   0827   WBC  2.2*  2.3*   RBC  2.70*   2.87*   HEMOGLOBIN  8.2*  8.7*   HEMATOCRIT  26.0*  27.2*   MCV  96.3  94.8   MCH  30.4  30.3   MCHC  31.5*  32.0*   RDW  53.1*  51.4*   PLATELETCT  95*  122*   MPV  10.9  11.0     Recent Labs      05/21/18   0500  05/22/18   0827   SODIUM  131*  128*   POTASSIUM  4.4  4.3   CHLORIDE  92*  88*   CO2  36*  37*   GLUCOSE  175*  189*   BUN  7*  7*   CREATININE  0.70  0.59   CALCIUM  8.6  8.7                      Assessment/Plan     * Pathologic fracture- (present on admission)   Assessment & Plan    -Left humerus  -widespread lytic disease  -completed radiation to humerus  -pain control             Pneumonia   Assessment & Plan    RLL per chest CT, no PE/pleural effusion; on Levaquin/Flagyl with some improvement  Laryngitis;  Cont rx and monitor;        Acute respiratory failure (HCC)   Assessment & Plan    Due to pneumonia; on iv abx's with some improvement today; cont current care        Multiple myeloma (HCC)- (present on admission)   Assessment & Plan    CyBorD chemo - cycle 1 started 4/19/18; cycle 2 started on 5/10 (5/17, due 5/24)  -recent diagnosis, appears aggressive, now with innumerable lytic lesions  -Dr Sim and Oksana consulted for assistance in management  - IV dilaudid PRN, oxy prn. DC maintenance steroids as these were causing confusion  -oxycontin CR 10 mg BID  -skeletal survey done - spine obscured by body habitus, but multiple other lesions found  XRT to right and left femurs completed  -Palliative care consulted for advanced care planning            Hypocalcemia   Assessment & Plan    Improved; decrease oral supplement          Hypomagnesemia   Assessment & Plan    Replete as indicated; f/u labs        Sinusitis   Assessment & Plan    Worse sx's with purulent discharge per patient; started Levaquin and monitor.        Bacteriuria   Assessment & Plan    Klebsiella growing in urine culture, nava in place, asymptomatic.  Immunocompromised due to chemotherapy - now on Levaquin  Check repeat UA         Decubitus ulcer of buttock   Assessment & Plan    Wound care; rotate frequently in bed; air mattress.        Debility- (present on admission)   Assessment & Plan    Due to current illnesses; not able to walk since early april;  Out of SNF days per SW; cont PT/OT here  Poor prognosis given current diagnoses/morbid obesity.  LTAC declined d/t active chemotherapy.        DNR (do not resuscitate)   Assessment & Plan    Palliative care consulted, patient wants to change code status to DNR - done, completed Polst with APN.          Chemotherapy-induced thrombocytopenia   Assessment & Plan    improving; monitor; no bleeding now          Leg edema   Assessment & Plan      -PO to IV Lasix scheduled bid, doing well and renal function stable. Improved edema slowly.          Chronic venous stasis dermatitis of both lower extremities- (present on admission)   Assessment & Plan    -wound care, oral abx completed          Anemia- (present on admission)   Assessment & Plan    hgb better; no active bleeding; monitor  Transfuse if hgb <7.0          Morbid obesity with BMI of 60.0-69.9, adult (CMS-HCC)- (present on admission)   Assessment & Plan    -encourage weight loss  Body mass index is 67.38 kg/m².        DM type 2 (diabetes mellitus, type 2) (CMS-HCC)- (present on admission)   Assessment & Plan    Fair control; adjust meds for good control        Essential hypertension, benign- (present on admission)   Assessment & Plan    Adjust med for good control.          Quality-Core Measures   Reviewed items::  Labs reviewed, Medications reviewed and Radiology images reviewed  Singh catheter::  Urinary Tract Retention or Urinary Tract Obstruction  DVT prophylaxis pharmacological::  Contraindicated - High bleeding risk  DVT prophylaxis - mechanical:  SCDs  Antibiotics:  Treating active infection/contamination beyond 24 hours perioperative coverage  Assessed for rehabilitation services:  Patient was assess for and/or received  rehabilitation services during this hospitalization

## 2018-05-24 NOTE — PROGRESS NOTES
Bere from Lab called with critical result of WBC at 2.2. Critical lab result read back to Bere.   This critical lab result is within parameters established by Dr. Chen for this patient.

## 2018-05-24 NOTE — THERAPY
"Pt receptive to PT today. Was tearful this AM after discussion with oncologist, but still motivated and willing to work with therapy. Pt reports she was able to get herself to EOB almost on her own, and has preferred sitting EOB to supine. Pt tolerated seated UE and LE exercises while at EOB. Fatigued by end of session as she had taken a pain pill just prior to PT. Pt requested to remain EOB. PT POC and goals updated to reflect pt's CLOF and goals. PT will continue to follow while in house.     Physical Therapy Treatment completed.   Bed Mobility:  Supine to Sit:  (pt seated EOB pre/post PT session)  Transfers: Sit to Stand: Unable to Participate  Gait: Level Of Assist: Unable to Participate        Plan of Care: Will benefit from Physical Therapy 3 times per week  Discharge Recommendations: Equipment: Will Continue to Assess for Equipment Needs. Post-acute therapy Discharge to a transitional care facility for continued therapy services.     See \"Rehab Therapy-Acute\" Patient Summary Report for complete documentation.       "

## 2018-05-25 LAB
GLUCOSE BLD-MCNC: 199 MG/DL (ref 65–99)
GLUCOSE BLD-MCNC: 201 MG/DL (ref 65–99)
GLUCOSE BLD-MCNC: 269 MG/DL (ref 65–99)
GLUCOSE BLD-MCNC: 275 MG/DL (ref 65–99)

## 2018-05-25 PROCEDURE — 700111 HCHG RX REV CODE 636 W/ 250 OVERRIDE (IP): Performed by: INTERNAL MEDICINE

## 2018-05-25 PROCEDURE — 700102 HCHG RX REV CODE 250 W/ 637 OVERRIDE(OP): Performed by: INTERNAL MEDICINE

## 2018-05-25 PROCEDURE — 97535 SELF CARE MNGMENT TRAINING: CPT | Mod: XE

## 2018-05-25 PROCEDURE — A9270 NON-COVERED ITEM OR SERVICE: HCPCS | Performed by: INTERNAL MEDICINE

## 2018-05-25 PROCEDURE — 99231 SBSQ HOSP IP/OBS SF/LOW 25: CPT | Performed by: INTERNAL MEDICINE

## 2018-05-25 PROCEDURE — 302255 BARRIER CREAM MOISTURE BAZA PROTECT (ZINC) 5OZ: Performed by: INTERNAL MEDICINE

## 2018-05-25 PROCEDURE — A6250 SKIN SEAL PROTECT MOISTURIZR: HCPCS | Performed by: INTERNAL MEDICINE

## 2018-05-25 PROCEDURE — 97530 THERAPEUTIC ACTIVITIES: CPT

## 2018-05-25 PROCEDURE — 770004 HCHG ROOM/CARE - ONCOLOGY PRIVATE *

## 2018-05-25 PROCEDURE — 82962 GLUCOSE BLOOD TEST: CPT | Mod: 91

## 2018-05-25 RX ORDER — DEXAMETHASONE 4 MG/1
40 TABLET ORAL ONCE
Status: COMPLETED | OUTPATIENT
Start: 2018-05-25 | End: 2018-05-25

## 2018-05-25 RX ORDER — CYCLOPHOSPHAMIDE 50 MG/1
850 CAPSULE ORAL ONCE
Status: COMPLETED | OUTPATIENT
Start: 2018-05-25 | End: 2018-05-25

## 2018-05-25 RX ADMIN — OXYCODONE HYDROCHLORIDE 5 MG: 5 TABLET ORAL at 17:49

## 2018-05-25 RX ADMIN — INSULIN HUMAN 4 UNITS: 100 INJECTION, SOLUTION PARENTERAL at 12:38

## 2018-05-25 RX ADMIN — INSULIN HUMAN 7 UNITS: 100 INJECTION, SOLUTION PARENTERAL at 17:41

## 2018-05-25 RX ADMIN — METOPROLOL TARTRATE 50 MG: 25 TABLET, FILM COATED ORAL at 08:53

## 2018-05-25 RX ADMIN — INSULIN HUMAN 3 UNITS: 100 INJECTION, SOLUTION PARENTERAL at 08:57

## 2018-05-25 RX ADMIN — PROCHLORPERAZINE EDISYLATE 10 MG: 5 INJECTION INTRAMUSCULAR; INTRAVENOUS at 21:15

## 2018-05-25 RX ADMIN — METOPROLOL TARTRATE 50 MG: 25 TABLET, FILM COATED ORAL at 21:14

## 2018-05-25 RX ADMIN — PRAVASTATIN SODIUM 40 MG: 20 TABLET ORAL at 21:14

## 2018-05-25 RX ADMIN — CYCLOPHOSPHAMIDE 850 MG: 50 CAPSULE ORAL at 15:47

## 2018-05-25 RX ADMIN — HEPARIN SODIUM 5000 UNITS: 5000 INJECTION, SOLUTION INTRAVENOUS; SUBCUTANEOUS at 21:15

## 2018-05-25 RX ADMIN — OXYCODONE HYDROCHLORIDE 5 MG: 5 TABLET ORAL at 00:40

## 2018-05-25 RX ADMIN — ACYCLOVIR 400 MG: 200 CAPSULE ORAL at 08:52

## 2018-05-25 RX ADMIN — ONDANSETRON HYDROCHLORIDE 4 MG: 2 INJECTION, SOLUTION INTRAMUSCULAR; INTRAVENOUS at 08:14

## 2018-05-25 RX ADMIN — ACYCLOVIR 400 MG: 200 CAPSULE ORAL at 21:14

## 2018-05-25 RX ADMIN — Medication: at 08:54

## 2018-05-25 RX ADMIN — FUROSEMIDE 40 MG: 10 INJECTION, SOLUTION INTRAMUSCULAR; INTRAVENOUS at 08:54

## 2018-05-25 RX ADMIN — OXYCODONE HYDROCHLORIDE 10 MG: 10 TABLET, FILM COATED, EXTENDED RELEASE ORAL at 21:14

## 2018-05-25 RX ADMIN — METFORMIN HYDROCHLORIDE 1000 MG: 500 TABLET, FILM COATED ORAL at 17:40

## 2018-05-25 RX ADMIN — HEPARIN SODIUM 5000 UNITS: 5000 INJECTION, SOLUTION INTRAVENOUS; SUBCUTANEOUS at 06:03

## 2018-05-25 RX ADMIN — DEXAMETHASONE 40 MG: 4 TABLET ORAL at 14:49

## 2018-05-25 RX ADMIN — DULOXETINE HYDROCHLORIDE 40 MG: 20 CAPSULE, DELAYED RELEASE ORAL at 08:52

## 2018-05-25 RX ADMIN — METFORMIN HYDROCHLORIDE 1000 MG: 500 TABLET, FILM COATED ORAL at 08:53

## 2018-05-25 RX ADMIN — HEPARIN SODIUM 5000 UNITS: 5000 INJECTION, SOLUTION INTRAVENOUS; SUBCUTANEOUS at 14:50

## 2018-05-25 RX ADMIN — FUROSEMIDE 40 MG: 10 INJECTION, SOLUTION INTRAMUSCULAR; INTRAVENOUS at 14:52

## 2018-05-25 RX ADMIN — Medication: at 21:15

## 2018-05-25 RX ADMIN — FUROSEMIDE 40 MG: 10 INJECTION, SOLUTION INTRAMUSCULAR; INTRAVENOUS at 21:14

## 2018-05-25 RX ADMIN — ONDANSETRON HYDROCHLORIDE 4 MG: 2 INJECTION, SOLUTION INTRAMUSCULAR; INTRAVENOUS at 15:52

## 2018-05-25 RX ADMIN — LORAZEPAM 1 MG: 2 INJECTION INTRAMUSCULAR; INTRAVENOUS at 03:55

## 2018-05-25 RX ADMIN — ANTACID TABLETS 1000 MG: 500 TABLET, CHEWABLE ORAL at 12:37

## 2018-05-25 RX ADMIN — ALPRAZOLAM 0.5 MG: 0.5 TABLET ORAL at 03:57

## 2018-05-25 RX ADMIN — ANTACID TABLETS 1000 MG: 500 TABLET, CHEWABLE ORAL at 08:51

## 2018-05-25 RX ADMIN — BORTEZOMIB 4.15 MG: 3.5 INJECTION, POWDER, LYOPHILIZED, FOR SOLUTION INTRAVENOUS; SUBCUTANEOUS at 15:44

## 2018-05-25 RX ADMIN — CALCITRIOL 0.25 MCG: 0.25 CAPSULE, LIQUID FILLED ORAL at 08:51

## 2018-05-25 RX ADMIN — INSULIN HUMAN 7 UNITS: 100 INJECTION, SOLUTION PARENTERAL at 21:16

## 2018-05-25 RX ADMIN — AMLODIPINE BESYLATE 5 MG: 5 TABLET ORAL at 08:54

## 2018-05-25 RX ADMIN — POTASSIUM CHLORIDE 20 MEQ: 1500 TABLET, EXTENDED RELEASE ORAL at 09:05

## 2018-05-25 RX ADMIN — OXYCODONE HYDROCHLORIDE 10 MG: 10 TABLET, FILM COATED, EXTENDED RELEASE ORAL at 08:52

## 2018-05-25 RX ADMIN — ANTACID TABLETS 1000 MG: 500 TABLET, CHEWABLE ORAL at 17:40

## 2018-05-25 RX ADMIN — POTASSIUM BICARBONATE 25 MEQ: 25 TABLET, EFFERVESCENT ORAL at 21:15

## 2018-05-25 ASSESSMENT — ENCOUNTER SYMPTOMS
SPEECH CHANGE: 0
PHOTOPHOBIA: 0
SENSORY CHANGE: 1
INSOMNIA: 0
DIARRHEA: 0
SHORTNESS OF BREATH: 1
DEPRESSION: 0
TINGLING: 1
SENSORY CHANGE: 0
SPUTUM PRODUCTION: 0
NAUSEA: 0
DOUBLE VISION: 0
HEADACHES: 0
ROS GI COMMENTS: POOR APPETITE
WEAKNESS: 1
PALPITATIONS: 0
DEPRESSION: 1
NERVOUS/ANXIOUS: 1
NERVOUS/ANXIOUS: 0
HALLUCINATIONS: 0
SORE THROAT: 0
HEMOPTYSIS: 0
CONSTIPATION: 0
FEVER: 0
CHILLS: 0
BACK PAIN: 1
COUGH: 0
SHORTNESS OF BREATH: 0
HEARTBURN: 0
DIZZINESS: 0
MYALGIAS: 0
WEIGHT LOSS: 0
CLAUDICATION: 0
MYALGIAS: 1
ORTHOPNEA: 0
VOMITING: 0
ABDOMINAL PAIN: 0
BLURRED VISION: 0

## 2018-05-25 ASSESSMENT — COGNITIVE AND FUNCTIONAL STATUS - GENERAL
DRESSING REGULAR LOWER BODY CLOTHING: TOTAL
TOILETING: TOTAL
PERSONAL GROOMING: A LITTLE
DAILY ACTIVITIY SCORE: 14
HELP NEEDED FOR BATHING: A LOT
DRESSING REGULAR UPPER BODY CLOTHING: A LITTLE
SUGGESTED CMS G CODE MODIFIER DAILY ACTIVITY: CK

## 2018-05-25 ASSESSMENT — LIFESTYLE VARIABLES: SUBSTANCE_ABUSE: 0

## 2018-05-25 ASSESSMENT — PAIN SCALES - GENERAL
PAINLEVEL_OUTOF10: 3
PAINLEVEL_OUTOF10: 6
PAINLEVEL_OUTOF10: 5
PAINLEVEL_OUTOF10: 1
PAINLEVEL_OUTOF10: 7

## 2018-05-25 NOTE — CARE PLAN
Problem: Safety  Goal: Will remain free from falls  Outcome: PROGRESSING AS EXPECTED  Patient remained free from falls when sitting edge of bed during the majority of the day.    Problem: Psychosocial Needs:  Goal: Level of anxiety will decrease  Outcome: PROGRESSING AS EXPECTED  Patient requested alprazolam to sleep for the night.

## 2018-05-25 NOTE — PROGRESS NOTES
"Pharmacy Chemotherapy Note    Second Check    Patient Name: YESSY SNIGER  MRN: [id number]    Oncologist: Chiquis    Protocol: CyBorD           21 day cycle for 3-4 cycles  (transplant) or maximal response, disease progression or unacceptable toxicity (previously untreated or non-transplant candidates)    Starting Cycle 2:  Bortezomib 1.5 mg/m2 IV push over 3-5 seconds or SubQ on days 1, 8, 15 and 22  Cyclophosphamide 300 mg/m2 PO Days 1, 8, 15, and 22  Dexamethasone 40 mg PO Daily on days 1, 8, 15, and 22     Every 28 day cycle x 4 cycles    *Dosing Reference*  NCCN Guidelines for Multiple Myeloma V.3.2017  EMANUEL Russell et al. Cyclophosphamide, bortezomib and dexamethasone (CyBorD) induction for newly diagnosed multiple myeloma: High response rates in a phase II clinical trial. Leukemia. 2009 July ; 23(7): 0590-6314.  Drew CASTELLANOS, et al. Once- versus twice-weekly bortezomib induction therapy with CyBorD in newly diagnosed multiple myeloma. BLOOD, 22APRIL 2010VOLUME 115, NUMBER 16  Juan S et al. Randomized, multicenter, phase 2 study (EVOLUTION) of combinations of bortezomib, dexamethasone, cyclophosphamide, and lenalidomide in previously untreated multiple myeloma. BLOOD, 2012 119: 2679-9836      PREVIOUS CHEMO:  NONE    SIGNIFICANT EVENTS:  Admitted for CC of pain 4/10/2018-present    Dx: Multiple Myeloma         /61   Pulse 93   Temp 36.3 °C (97.3 °F)   Resp 18   Ht 1.6 m (5' 2.99\")   Wt (!) 168.9 kg (372 lb 6.4 oz)   LMP 04/11/1994   SpO2 94%   Breastfeeding? No   BMI 65.98 kg/m²  Body surface area is 2.74 meters squared.        **Per MD FLEMING to use actual BSA of 2.74 m2**    LABS 5/24/2018:   ANC: 1280      WBC: 2.2     Plt: 163k   Hgb/Hct: 8.4/26.2     SCr: 0.73 mg/dL CrCl: >125 mL/min (min SCr of 0.7)   K: 3.9  Na: 132          Glu: 201 Ca: 8.4  ALB: 2.1 (5/15/2018) [place] Ca: 9.9    LABS 5/10/2018:  LFT's: 19/14/63 TBili = 0.3     Drug Order   (Drug name, dose, route, IV Fluid & volume, " frequency, number of doses) Cycle: 2 Day 15     Previous treatment: Cycle 2 Day: 8 (5/17/2018)     Medication = Cyclophosphamide  Base Dose= 300 mg/m2  Calc Dose: Base Dose x 2.74 m2 = 822 mg  Final Dose = 850 mg  Route = PO          <5% difference, OK to treat with final dose    Medication = Bortezomib (Velcade)  Base Dose = 1.5 mg/m²  Calc Dose: Base Dose x 2.74 m² = 4.11 mg  Final Dose = 4.15 mg  Route = subcutaneous  Fluid & Volume = 1.66 mL in syringe  Conc = 2.5 mg/mL  Admin Duration = to be given subcutaneously          <5% difference, OK to treat with final dose      By my signature below, I confirm this process was performed independently with the BSA and all final chemotherapy dosing calculations congruent. I have reviewed the above chemotherapy order and that my calculation of the final dose and BSA (when applicable) corroborate those calculations of the  pharmacist. Discrepancies of 5% or greater in the written dose have been addressed and documented within the EPIC Progress notes.    LESLIE Lugo, PharmD

## 2018-05-25 NOTE — PROGRESS NOTES
Chemotherapy Verification - SECONDARY RN  Cycle 2 Day 15       Height = 160 cm  Weight = 168.9  BSA = 2.74 m2       Medication: Velcade  Dose: 1.5 mg/m2  Calculated Dose: 4.11 mg (4.15 mg ordered)                             (In mg/m2, AUC, mg/kg)     Medication: Cyclophosphamide  Dose: 300 mg/m2  Calculated Dose: 822 mg (850 mg ordered)                             (In mg/m2, AUC, mg/kg)      I confirm that this process was performed independently.

## 2018-05-25 NOTE — PROGRESS NOTES
Oncology/Hematology Progress Note               Author: Dyllan Gutiérrez Date & Time created: 5/25/2018  11:29 AM   DX-Multiple myeloma  Multiple myeloma-- 1P deletion intermediate risk, IgG kappa.  Stage III based on ISS staging (beta 2 9.9 and albumin 2.7 before).  Admitted with pathologic fracture of left humerus. Received palliative radiation to the left humerus during this admission. Poor performance status. Lives alone in [facility].       Started chemotherapy here as an inpatient. Received cycle #1 day #1 of CyBorD on April 19. She has completed CYCLE #1.  - C # 2 switched to weekly regimen Velcade 1.5 mg/m2 + Cytoxan 300 mg/m2+ DEX 40 mg weekly days 1,8,15,22 of every 28 day cycle . Start C2D1 on 5/10/18, day 8 on 5/17.   Interval History:  She did finish her antibiotics yesterday. There is no new complaints. She is sitting in bed today. She is due for day 15 of cycle #2 of CyBorD    Review of Systems:  Review of Systems   Constitutional: Positive for malaise/fatigue. Negative for chills, fever and weight loss.   HENT: Negative for ear discharge, ear pain, hearing loss, nosebleeds and tinnitus.    Eyes: Negative for blurred vision, double vision and photophobia.   Respiratory: Negative for cough, hemoptysis, sputum production and shortness of breath.    Cardiovascular: Negative for chest pain, palpitations and orthopnea.   Gastrointestinal: Negative for abdominal pain, heartburn, nausea and vomiting.   Genitourinary: Negative for dysuria and hematuria.   Musculoskeletal: Positive for back pain, joint pain and myalgias.   Skin: Positive for rash. Negative for itching.   Neurological: Positive for tingling, sensory change and weakness. Negative for headaches.   Psychiatric/Behavioral: Negative for depression, hallucinations, substance abuse and suicidal ideas. The patient is nervous/anxious.        Physical Exam:  Physical Exam   Constitutional: She is oriented to person, place, and time. She appears  well-developed.   Morbid obesity   HENT:   Head: Normocephalic and atraumatic.   Eyes: Conjunctivae are normal. Pupils are equal, round, and reactive to light.   Neck: Normal range of motion.   Cardiovascular: Normal rate.    Pulmonary/Chest: Breath sounds normal. No respiratory distress. She has no wheezes.   Abdominal: Soft. Bowel sounds are normal. She exhibits distension. She exhibits no mass. There is no tenderness. There is no rebound and no guarding.   obese   Musculoskeletal: She exhibits edema and tenderness.   Neurological: She is alert and oriented to person, place, and time.   Neuropathy is stable.   Skin: Skin is warm and dry. No rash noted. She is not diaphoretic.       Labs:        Invalid input(s): [id number]      Recent Labs      18   1049   SODIUM  132*   POTASSIUM  3.9   CHLORIDE  90*   CO2  37*   BUN  8   CREATININE  0.73   CALCIUM  8.4*     Recent Labs      18   1049   GLUCOSE  201*     Recent Labs      18   1049   RBC  2.77*   HEMOGLOBIN  8.4*   HEMATOCRIT  26.2*   PLATELETCT  163*     Recent Labs      18   1049   WBC  2.2*   NEUTSPOLYS  59.50   LYMPHOCYTES  24.70   MONOCYTES  14.40*   EOSINOPHILS  0.50   BASOPHILS  0.00     Recent Labs      18   1049   SODIUM  132*   POTASSIUM  3.9   CHLORIDE  90*   CO2  37*   GLUCOSE  201*   BUN  8   CREATININE  0.73   CALCIUM  8.4*     Hemodynamics:  Temp (24hrs), Av.7 °C (98 °F), Min:36.3 °C (97.3 °F), Max:36.9 °C (98.4 °F)  Temperature: 36.3 °C (97.3 °F)  Pulse  Av.8  Min: 38  Max: 128   Blood Pressure : 134/61     Respiratory:    Respiration: 18, Pulse Oximetry: 94 %     Work Of Breathing / Effort: Mild  RUL Breath Sounds: Clear, RML Breath Sounds: Diminished, RLL Breath Sounds: Diminished, CHARLOTTE Breath Sounds: Clear, LLL Breath Sounds: Diminished  Fluids:    Intake/Output Summary (Last 24 hours) at 18 1249  Last data filed at 18 1028   Gross per 24 hour   Intake              600 ml   Output              5350 ml   Net            -4750 ml       GI/Nutrition:  Orders Placed This Encounter   Procedures   • Diet Order     Standing Status:   Standing     Number of Occurrences:   1     Order Specific Question:   Diet:     Answer:   Diabetic [3]     Medical Decision Making, by Problem:  Active Hospital Problems    Diagnosis   • *Pathologic fracture [M84.40XA]   • Multiple myeloma (HCC) [C90.00]   • Diarrhea [R19.7]   • Hypomagnesemia [E83.42]   • Hypocalcemia [E83.51]   • Epistaxis [R04.0]   • Bacteriuria [R82.71]   • Rash [R21]   • Nasal congestion [R09.81]   • Decubitus ulcer of buttock [L89.309]   • Debility [R53.81]   • DNR (do not resuscitate) [Z66]   • Chemotherapy-induced thrombocytopenia [D69.59, T45.1X5A]   • Leg edema [R60.0]   • Anemia [D64.9]   • Chronic venous stasis dermatitis of both lower extremities [I87.2]   • Cellulitis [L03.90]   • Morbid obesity with BMI of 60.0-69.9, adult (CMS-[facility]) [E66.01, Z68.44]   • DM type 2 (diabetes mellitus, type 2) (CMS-HCC) [E11.9]   • Essential hypertension, benign [I10]       Plan:  MM-Multiple myeloma-- 1P deletion intermediate risk, IgG kappa.  Stage III based on ISS staging (beta 2 9.9 and albumin 2.7 before).  Admitted with pathologic fracture of left humerus. Received palliative radiation to the left humerus during this admission. Poor performance status. Lives alone in [facility].       Started chemotherapy here as an inpatient. Received cycle #1 day #1 of CyBorD on April 19. She has completed CYCLE #1.  - C # 2 switched to weekly regimen Velcade 1.5 mg/m2 + Cytoxan 300 mg/m2+ DEX 40 mg weekly days 1,8,15,22 of every 28 day cycle . Start C2D1 on 5/10/18 .  D8 5/17/18     Neuropathy-Preexisting before initiation of chemo. Will watch.      Plan  -We'll proceed with cycle #2, day 15 of CyBorD. The patient is off antibiotics. No ongoing infection  -diuresing the patient Lasix by mouth  -We'll try to send the patient to LTAC, if she is a candidate she will continue on  oral therapy for multiple myeloma, this cam Revlimid, ninlaro and dexamethasone etc. Dr. Sim trying to arrange oral  prescription from the office  -Other treatment options will be to go back home on oral treatment and have laboratory workup done in [place] q 2 weeks and we'll have to follow with Dr. Sim at least once a month  -Significant social issues mainly due to patient performance status, she lives in [facility]  -She does have very poor prognosis mainly due to comorbidities    Quality-Core Measures

## 2018-05-25 NOTE — PROGRESS NOTES
Chemotherapy Verification - PRIMARY RN  Cycle 2 Day 15    Height = 160cm  Weight = 168.9kg  BSA = 2.74m2       Medication: bortezomib (velcade)  Dose: 1.5mg/m2  Calculated Dose: 4.11mg (ordered dose: 4.15mg, <5% difference)                            (In mg/m2, AUC, mg/kg)     I confirm this process was performed independently with the BSA and all final chemotherapy dosing calculations congruent.  Any discrepancies of 5% or greater have been addressed with the chemotherapy pharmacist. The resolution of the discrepancy has been documented in the EPIC progress notes.

## 2018-05-25 NOTE — PROGRESS NOTES
[facility] Hospitalist Progress Note    Date of Service: 5/24/2018    Chief Complaint  67 y.o. female admitted 4/10/2018 with prolonged hospital course, admitted for chemotherapy for multiple myeloma, pathologic fracture and complications thereof.    Interval Problem Update  5/22 Patient not tearful today but does have flat affect, voice is returning but still weak with recent pneumonia/pharyngitis.  She still has areas that are significantly edematous and weeping.  She is tolerating bid lovenox and will change back to tid to see if some additional edema can be removed.  Appetite wavers but she is trying to eat more.  5/23 Patient sitting on edge of the bed with feet down on step much of the day, states this position is more comfortable for her leg.  Her voice is getting stronger and denies cough.  No new complaints  5/24 Patient seen after oncology visit.  States she is upset that things are not going better, she really wants to fight hard against her cancer and she is pushing herself to get stronger and do more and was proud of herself that she got to the edge of the bed almost unassisted today which is a huge step.  An honest conversation was held with patient regarding the poor prognosis she has given her severe debility and that chemo would likely continue to make her weaker and this was not what her oncology team wanted to put her through, she was very tearful and felt she had been let down and there had to be something else that could help her.  Oncology is now trying to get patient on an oral regimen for treatment that could allow continued treatment without the set backs of the stronger regimen of CyBorD.  Dr Sim' office is working on coordinating this and cycle 2 of CyBorD is the last planned cycle.    Consultants/Specialty  Oncology     Disposition  Difficult discharge        Review of Systems   Constitutional: Positive for malaise/fatigue. Negative for chills and fever.   HENT: Negative for congestion  and sore throat.    Eyes: Negative for blurred vision and photophobia.   Respiratory: Positive for shortness of breath (minimal). Negative for cough and sputum production.    Cardiovascular: Negative for chest pain, claudication and leg swelling.   Gastrointestinal: Negative for abdominal pain, constipation, diarrhea, heartburn, nausea and vomiting.        Poor appetite     Genitourinary: Negative for dysuria and hematuria.   Musculoskeletal: Negative for joint pain and myalgias.   Skin: Negative for itching and rash.   Neurological: Positive for weakness (legs cannot support her weight anymore). Negative for dizziness, sensory change, speech change and headaches.   Psychiatric/Behavioral: Positive for depression. The patient is not nervous/anxious and does not have insomnia.       Physical Exam  Laboratory/Imaging   Hemodynamics  Temp (24hrs), Av.6 °C (97.9 °F), Min:36.4 °C (97.5 °F), Max:36.8 °C (98.3 °F)   Temperature: 36.8 °C (98.3 °F)  Pulse  Av.6  Min: 38  Max: 128    Blood Pressure : 123/45      Respiratory      Respiration: 16, Pulse Oximetry: 99 %     Work Of Breathing / Effort: Mild  RUL Breath Sounds: Clear, RML Breath Sounds: Diminished, RLL Breath Sounds: Diminished, CHARLOTTE Breath Sounds: Clear, LLL Breath Sounds: Diminished    Fluids    Intake/Output Summary (Last 24 hours) at 18 1747  Last data filed at 18 1700   Gross per 24 hour   Intake                0 ml   Output             3175 ml   Net            -3175 ml       Nutrition  Orders Placed This Encounter   Procedures   • Diet Order     Standing Status:   Standing     Number of Occurrences:   1     Order Specific Question:   Diet:     Answer:   Diabetic [3]     Physical Exam   Constitutional: She is oriented to person, place, and time. She appears well-developed and well-nourished. No distress.   HENT:   Head: Normocephalic and atraumatic.   Eyes: Conjunctivae are normal. No scleral icterus.   Neck: Neck supple. No JVD present.    Cardiovascular: Normal rate, regular rhythm and normal heart sounds.  Exam reveals no gallop and no friction rub.    No murmur heard.  Pulmonary/Chest: Effort normal and breath sounds normal. No respiratory distress. She exhibits no tenderness.   Abdominal: Soft. Bowel sounds are normal. She exhibits no distension. There is no guarding.   Musculoskeletal: She exhibits edema (globally). She exhibits no tenderness.   Neurological: She is alert and oriented to person, place, and time. No cranial nerve deficit.   Skin: Skin is warm and dry. She is not diaphoretic. No erythema. No pallor.   Rash has resolved, peeling skin with dc of lac-hydrin but could have developed allergy to this also.     Psychiatric: Her behavior is normal.   Flat affect consistent with depression     Nursing note and vitals reviewed.      Recent Labs      05/22/18   0827 05/24/18   1049   WBC  2.3*  2.2*   RBC  2.87*  2.77*   HEMOGLOBIN  8.7*  8.4*   HEMATOCRIT  27.2*  26.2*   MCV  94.8  94.6   MCH  30.3  30.3   MCHC  32.0*  32.1*   RDW  51.4*  52.9*   PLATELETCT  122*  163*   MPV  11.0  10.9     Recent Labs      05/22/18   0827  05/24/18   1049   SODIUM  128*  132*   POTASSIUM  4.3  3.9   CHLORIDE  88*  90*   CO2  37*  37*   GLUCOSE  189*  201*   BUN  7*  8   CREATININE  0.59  0.73   CALCIUM  8.7  8.4*                      Assessment/Plan     * Pathologic fracture- (present on admission)   Assessment & Plan    -Left humerus  -widespread lytic disease  -completed radiation to humerus  -pain control             Pneumonia   Assessment & Plan    RLL per chest CT, no PE/pleural effusion; completes Levaquin/Flagyl 5/24        Acute respiratory failure (HCC)   Assessment & Plan    Continued improvement  Completes tx for pneumonia today.          Multiple myeloma (HCC)- (present on admission)   Assessment & Plan    CyBorD chemo - cycle 1 started 4/19/18; cycle 2 started on 5/10 (5/17, due 5/24 but deferred to 5/25 d/t completion of  antibiotics)  -recent diagnosis, appears aggressive, now with innumerable lytic lesions  -Oncology consulted  - IV dilaudid PRN, oxy prn. DC maintenance steroids as these were causing confusion  -oxycontin CR 10 mg BID  -skeletal survey done - spine obscured by body habitus, but multiple other lesions found  XRT to right and left femurs completed  -Palliative care consulted for advanced care planning  Coordination for oral chemo regimen underway through Dr Sim office for treatment after cycle 2.            Hypocalcemia   Assessment & Plan    Improved; decrease oral supplement          Hypomagnesemia   Assessment & Plan    Replete as indicated; f/u labs        Sinusitis   Assessment & Plan    Resolved          Bacteriuria   Assessment & Plan    Klebsiella growing in urine culture, nava in place, asymptomatic.  Immunocompromised due to chemotherapy - now on Levaquin  Check repeat UA        Decubitus ulcer of buttock   Assessment & Plan    Wound care; rotate frequently in bed; air mattress.        Debility- (present on admission)   Assessment & Plan    Due to current illnesses; not able to walk since early april;  Out of SNF days per SW; cont PT/OT here  Poor prognosis given current diagnoses/morbid obesity.  [facility] declined d/t active chemotherapy.        DNR (do not resuscitate)   Assessment & Plan    Palliative care consulted, patient wants to change code status to DNR - done, completed Polst with APN.          Chemotherapy-induced thrombocytopenia   Assessment & Plan    improving; monitor; no bleeding now          Leg edema   Assessment & Plan      -PO to IV Lasix scheduled bid, doing well and renal function stable. Improved edema slowly.          Chronic venous stasis dermatitis of both lower extremities- (present on admission)   Assessment & Plan    -wound care, oral abx completed          Anemia- (present on admission)   Assessment & Plan    hgb better; no active bleeding; monitor  Transfuse if hgb <7.0           Morbid obesity with BMI of 60.0-69.9, adult (CMS-HCC)- (present on admission)   Assessment & Plan    -encourage weight loss  Body mass index is 67.38 kg/m².        DM type 2 (diabetes mellitus, type 2) (CMS-HCC)- (present on admission)   Assessment & Plan    Fair control; adjust meds for good control        Essential hypertension, benign- (present on admission)   Assessment & Plan    Adjust med for good control.          Quality-Core Measures   Reviewed items::  Labs reviewed, Medications reviewed and Radiology images reviewed  Singh catheter::  Urinary Tract Retention or Urinary Tract Obstruction  DVT prophylaxis pharmacological::  Contraindicated - High bleeding risk  DVT prophylaxis - mechanical:  SCDs  Antibiotics:  Treating active infection/contamination beyond 24 hours perioperative coverage  Assessed for rehabilitation services:  Patient was assess for and/or received rehabilitation services during this hospitalization

## 2018-05-25 NOTE — DISCHARGE PLANNING
Contacted [facility]. S/w Seble, regarding previous referral.  Provided brief update on POC, as pt will be completing cycle 2 today per IDT rounds, and Dr. Deluna's office is working to obtain oral chemo agents.   [facility] liaison here, sts they can accept pt.   Dr. Chen aware of this, tentative plan for discharge tomorrow.     Update:  Met with pt and friend at bedside.  Discussed acceptance at [facility], with potential discharge tomorrow afternoon.  Pt sts understanding, and is in agreement with this.  Sts concern r/t bariatric bed and accommodations for her comfort r/t skin integrity.  Advised this could be ordered by physician at receiving facility, notified Dr. Chen of concern.   S/w pt's sister Shazia per phone call in pt's room.  Provided address of [facility] by email per Shazia's request.   Pt completed needed signature for COBRA, no further questions or concerns at this time.

## 2018-05-25 NOTE — CARE PLAN
Problem: Nutritional:  Goal: Achieve adequate nutritional intake  Patient will consume >50% of meals    Outcome: PROGRESSING AS EXPECTED  Per ADLs, pt consuming 50-75% the last two meals however previously poor PO intake.  Pt reports appetite is still poor.  We reviewed snacks - pt still not willing to try Boost or Magic Cups.  RD will continue to monitor for PO intake to ensure adequate nutrition is being met.

## 2018-05-25 NOTE — PROGRESS NOTES
Patient given cytoxan oral capsules and had nausea/emesis after some of the capsules,  200cc of emesis with 4 pieces of capsules noted.  Patient given IV zofran and states will finish 5 last capsules after nausea has resolved.  Notified Dr. Chen.

## 2018-05-25 NOTE — DISCHARGE PLANNING
Anticipated Discharge Disposition: [facility]    Action: Received confirmation from [facility] liaison [facility] they have accepted pt and received insurance auth.  Accepting physician in Dr. Muniz.  [facility] states they will have a bed ready 5/26/2018 and requested a 1500 transfer.  Faxed transfer form and [facility] PCS form to [facility].    Barriers to Discharge: None    Plan: Transfer to [facility], awaiting final transport time.

## 2018-05-25 NOTE — PROGRESS NOTES
Bedside report received from day shift RN. Assumed care. Pt is A&Ox4. Pt is sitting on the side of bed. Pt denies pain at this time. Pt was updated on the plan of care for the night. All questions answered.   Pt has call light within reach and bed is in lowest position.  All fall precautions in place. Pt had no other needs at this time, hourly rounding in place.

## 2018-05-25 NOTE — THERAPY
"Occupational Therapy Treatment completed with focus on ADLs, ADL transfers, patient education and upper extremity function.  Functional Status:  Pt was seen for Occupational Therapy treatment today, see Therapy Kardex for details. Treatment included education in breath control with activity and at rest, self pacing techs and energy conservation for pain management. Educated pt in safety awareness techs as well. Psychosocial intervention addressed. Pt demo Mod A for supine to sit at EOB with HOB elevated and use of overhead trapeze bar. Min/Mod A for scooting forward. Pt doffed overhead T-shirt with supervision. Pt sponge bathed post set up with Min A. Pt able to apply deodorant today without assist,  compensating by using trapeze bar to assist with shoulder flexion to reach underarms. Pt demonstrated supervision for UB dressing seated base,  LB dressing not assessed due to pt declined to attempt. Pt afraid of having a BM, with LB movement, in her only pair of shorts here in hospital. Will attempt sock donning with sock aid next OT session.Oral hygiene and grooming done seated base with set up. Suggested washing hair with shampoo cap today, pt declined stating, \"Next time\". Pt continues to focus on what she cannot do needing increased TLC/encouragement to focus on the progress she has made in therapy.  Pt was left up in seated EOB,call light in reach, bedside table in reach, bed alarm on  and nursing is aware. RN updated on OT treatment findings and recommendations . Continue Occupational Therapy services as per plan.    Plan of Care: Will benefit from Occupational Therapy 3 times per week  Discharge Recommendations:  Equipment Will Continue to Assess for Equipment Needs. Post-acute therapy Discharge to a transitional care facility for continued skilled therapy services.    See \"Rehab Therapy-Acute\" Patient Summary Report for complete documentation.   "

## 2018-05-26 ENCOUNTER — RESOLUTE PROFESSIONAL BILLING HOSPITAL PROF FEE (OUTPATIENT)
Dept: HOSPITALIST | Facility: MEDICAL CENTER | Age: 68
End: 2018-05-26
Payer: MEDICARE

## 2018-05-26 ENCOUNTER — HOSPITAL ENCOUNTER (OUTPATIENT)
Facility: MEDICAL CENTER | Age: 68
End: 2018-07-13
Attending: HOSPITALIST | Admitting: HOSPITALIST
Payer: MEDICARE

## 2018-05-26 VITALS
OXYGEN SATURATION: 96 % | HEIGHT: 63 IN | SYSTOLIC BLOOD PRESSURE: 140 MMHG | BODY MASS INDEX: 51.91 KG/M2 | HEART RATE: 91 BPM | DIASTOLIC BLOOD PRESSURE: 64 MMHG | TEMPERATURE: 98 F | WEIGHT: 293 LBS | RESPIRATION RATE: 18 BRPM

## 2018-05-26 LAB
ALBUMIN SERPL BCP-MCNC: 2.8 G/DL (ref 3.2–4.9)
ALBUMIN/GLOB SERPL: 0.8 G/DL
ALP SERPL-CCNC: 59 U/L (ref 30–99)
ALT SERPL-CCNC: 7 U/L (ref 2–50)
ANION GAP SERPL CALC-SCNC: 6 MMOL/L (ref 0–11.9)
AST SERPL-CCNC: 15 U/L (ref 12–45)
BASOPHILS # BLD AUTO: 0.3 % (ref 0–1.8)
BASOPHILS # BLD: 0.01 K/UL (ref 0–0.12)
BILIRUB SERPL-MCNC: 0.4 MG/DL (ref 0.1–1.5)
BUN SERPL-MCNC: 12 MG/DL (ref 8–22)
CALCIUM SERPL-MCNC: 8.6 MG/DL (ref 8.5–10.5)
CHLORIDE SERPL-SCNC: 85 MMOL/L (ref 96–112)
CO2 SERPL-SCNC: 35 MMOL/L (ref 20–33)
CREAT SERPL-MCNC: 0.82 MG/DL (ref 0.5–1.4)
EOSINOPHIL # BLD AUTO: 0 K/UL (ref 0–0.51)
EOSINOPHIL NFR BLD: 0 % (ref 0–6.9)
ERYTHROCYTE [DISTWIDTH] IN BLOOD BY AUTOMATED COUNT: 52.7 FL (ref 35.9–50)
GLOBULIN SER CALC-MCNC: 3.7 G/DL (ref 1.9–3.5)
GLUCOSE BLD-MCNC: 208 MG/DL (ref 65–99)
GLUCOSE BLD-MCNC: 216 MG/DL (ref 65–99)
GLUCOSE SERPL-MCNC: 266 MG/DL (ref 65–99)
HCT VFR BLD AUTO: 24.4 % (ref 37–47)
HGB BLD-MCNC: 8.1 G/DL (ref 12–16)
IMM GRANULOCYTES # BLD AUTO: 0.07 K/UL (ref 0–0.11)
IMM GRANULOCYTES NFR BLD AUTO: 1.8 % (ref 0–0.9)
LYMPHOCYTES # BLD AUTO: 0.64 K/UL (ref 1–4.8)
LYMPHOCYTES NFR BLD: 16.4 % (ref 22–41)
MCH RBC QN AUTO: 30.8 PG (ref 27–33)
MCHC RBC AUTO-ENTMCNC: 33.2 G/DL (ref 33.6–35)
MCV RBC AUTO: 92.8 FL (ref 81.4–97.8)
MONOCYTES # BLD AUTO: 0.09 K/UL (ref 0–0.85)
MONOCYTES NFR BLD AUTO: 2.3 % (ref 0–13.4)
NEUTROPHILS # BLD AUTO: 3.1 K/UL (ref 2–7.15)
NEUTROPHILS NFR BLD: 79.2 % (ref 44–72)
NRBC # BLD AUTO: 0 K/UL
NRBC BLD-RTO: 0 /100 WBC
PLATELET # BLD AUTO: 150 K/UL (ref 164–446)
PMV BLD AUTO: 10.4 FL (ref 9–12.9)
POTASSIUM SERPL-SCNC: 4.6 MMOL/L (ref 3.6–5.5)
PROT SERPL-MCNC: 6.5 G/DL (ref 6–8.2)
RBC # BLD AUTO: 2.63 M/UL (ref 4.2–5.4)
SODIUM SERPL-SCNC: 126 MMOL/L (ref 135–145)
WBC # BLD AUTO: 3.9 K/UL (ref 4.8–10.8)

## 2018-05-26 PROCEDURE — 82962 GLUCOSE BLOOD TEST: CPT

## 2018-05-26 PROCEDURE — A9270 NON-COVERED ITEM OR SERVICE: HCPCS | Performed by: INTERNAL MEDICINE

## 2018-05-26 PROCEDURE — 87086 URINE CULTURE/COLONY COUNT: CPT

## 2018-05-26 PROCEDURE — 99239 HOSP IP/OBS DSCHRG MGMT >30: CPT | Performed by: INTERNAL MEDICINE

## 2018-05-26 PROCEDURE — 87641 MR-STAPH DNA AMP PROBE: CPT

## 2018-05-26 PROCEDURE — 85025 COMPLETE CBC W/AUTO DIFF WBC: CPT

## 2018-05-26 PROCEDURE — 700102 HCHG RX REV CODE 250 W/ 637 OVERRIDE(OP): Performed by: INTERNAL MEDICINE

## 2018-05-26 PROCEDURE — 82962 GLUCOSE BLOOD TEST: CPT | Mod: 91

## 2018-05-26 PROCEDURE — 80053 COMPREHEN METABOLIC PANEL: CPT

## 2018-05-26 PROCEDURE — 700111 HCHG RX REV CODE 636 W/ 250 OVERRIDE (IP): Performed by: INTERNAL MEDICINE

## 2018-05-26 RX ORDER — ALPRAZOLAM 0.5 MG/1
0.5 TABLET ORAL EVERY 6 HOURS PRN
Qty: 30 TAB | Refills: 0
Start: 2018-05-26 | End: 2018-06-09

## 2018-05-26 RX ORDER — CALCITRIOL 0.25 UG/1
0.25 CAPSULE, LIQUID FILLED ORAL DAILY
Qty: 30 CAP
Start: 2018-05-27

## 2018-05-26 RX ORDER — OXYCODONE HCL 10 MG/1
10 TABLET, FILM COATED, EXTENDED RELEASE ORAL EVERY 12 HOURS
Qty: 14 EACH
Start: 2018-05-26 | End: 2018-06-09

## 2018-05-26 RX ORDER — OXYCODONE HYDROCHLORIDE 5 MG/1
5 TABLET ORAL EVERY 4 HOURS PRN
Qty: 30 TAB | Refills: 0
Start: 2018-05-26 | End: 2018-06-09

## 2018-05-26 RX ORDER — ACETAMINOPHEN 325 MG/1
975 TABLET ORAL EVERY 6 HOURS PRN
Qty: 30 TAB | Refills: 0 | Status: SHIPPED | OUTPATIENT
Start: 2018-05-26 | End: 2018-08-16

## 2018-05-26 RX ORDER — AMLODIPINE BESYLATE 5 MG/1
5 TABLET ORAL DAILY
Qty: 30 TAB
Start: 2018-05-27 | End: 2018-08-16

## 2018-05-26 RX ORDER — PHENYLEPHRINE HCL 10 MG/1
10 TABLET, FILM COATED ORAL EVERY 4 HOURS PRN
Qty: 42 TAB
Start: 2018-05-26 | End: 2018-08-16

## 2018-05-26 RX ORDER — DEXTROSE MONOHYDRATE 25 G/50ML
25 INJECTION, SOLUTION INTRAVENOUS PRN
Refills: 0
Start: 2018-05-26 | End: 2018-08-16

## 2018-05-26 RX ORDER — LOPERAMIDE HYDROCHLORIDE 2 MG/1
2 CAPSULE ORAL 4 TIMES DAILY PRN
Qty: 30 CAP
Start: 2018-05-26 | End: 2018-08-16

## 2018-05-26 RX ORDER — BISACODYL 10 MG
10 SUPPOSITORY, RECTAL RECTAL
Refills: 0
Start: 2018-05-26 | End: 2018-08-16

## 2018-05-26 RX ORDER — ONDANSETRON 2 MG/ML
4 INJECTION INTRAMUSCULAR; INTRAVENOUS EVERY 4 HOURS PRN
Qty: 84 ML
Start: 2018-05-26 | End: 2018-08-16

## 2018-05-26 RX ORDER — HEPARIN SODIUM 5000 [USP'U]/ML
5000 INJECTION, SOLUTION INTRAVENOUS; SUBCUTANEOUS EVERY 8 HOURS
Refills: 0
Start: 2018-05-26 | End: 2018-08-16

## 2018-05-26 RX ORDER — IPRATROPIUM BROMIDE AND ALBUTEROL SULFATE 2.5; .5 MG/3ML; MG/3ML
3 SOLUTION RESPIRATORY (INHALATION) EVERY 6 HOURS PRN
Qty: 30 BULLET
Start: 2018-05-26 | End: 2018-08-16

## 2018-05-26 RX ORDER — LORAZEPAM 2 MG/ML
1 INJECTION INTRAMUSCULAR EVERY 4 HOURS PRN
Refills: 0
Start: 2018-05-26 | End: 2018-06-09

## 2018-05-26 RX ORDER — ACYCLOVIR 200 MG/1
400 CAPSULE ORAL 2 TIMES DAILY
Refills: 0
Start: 2018-05-26 | End: 2018-10-03 | Stop reason: CLARIF

## 2018-05-26 RX ORDER — DULOXETINE 40 MG/1
40 CAPSULE, DELAYED RELEASE ORAL DAILY
Qty: 30 CAP
Start: 2018-05-27 | End: 2018-08-16

## 2018-05-26 RX ORDER — ECHINACEA PURPUREA EXTRACT 125 MG
2 TABLET ORAL
Qty: 1 BOTTLE | Refills: 3
Start: 2018-05-26 | End: 2018-08-16

## 2018-05-26 RX ORDER — ONDANSETRON 4 MG/1
4 TABLET, ORALLY DISINTEGRATING ORAL EVERY 4 HOURS PRN
Qty: 10 TAB | Refills: 0
Start: 2018-05-26 | End: 2018-08-16

## 2018-05-26 RX ORDER — METOPROLOL TARTRATE 50 MG/1
50 TABLET, FILM COATED ORAL 2 TIMES DAILY
Qty: 60 TAB
Start: 2018-05-26 | End: 2018-08-16

## 2018-05-26 RX ORDER — POLYETHYLENE GLYCOL 3350 17 G/17G
17 POWDER, FOR SOLUTION ORAL
Refills: 3
Start: 2018-05-26 | End: 2018-08-16

## 2018-05-26 RX ORDER — FUROSEMIDE 10 MG/ML
40 INJECTION INTRAMUSCULAR; INTRAVENOUS 3 TIMES DAILY
Refills: 0
Start: 2018-05-26 | End: 2018-08-16

## 2018-05-26 RX ADMIN — CALCITRIOL 0.25 MCG: 0.25 CAPSULE, LIQUID FILLED ORAL at 08:57

## 2018-05-26 RX ADMIN — HEPARIN SODIUM 5000 UNITS: 5000 INJECTION, SOLUTION INTRAVENOUS; SUBCUTANEOUS at 06:35

## 2018-05-26 RX ADMIN — OXYCODONE HYDROCHLORIDE 10 MG: 10 TABLET, FILM COATED, EXTENDED RELEASE ORAL at 08:59

## 2018-05-26 RX ADMIN — FUROSEMIDE 40 MG: 10 INJECTION, SOLUTION INTRAMUSCULAR; INTRAVENOUS at 09:00

## 2018-05-26 RX ADMIN — METOPROLOL TARTRATE 50 MG: 25 TABLET, FILM COATED ORAL at 08:58

## 2018-05-26 RX ADMIN — METFORMIN HYDROCHLORIDE 1000 MG: 500 TABLET, FILM COATED ORAL at 08:57

## 2018-05-26 RX ADMIN — INSULIN HUMAN 4 UNITS: 100 INJECTION, SOLUTION PARENTERAL at 09:00

## 2018-05-26 RX ADMIN — AMLODIPINE BESYLATE 5 MG: 5 TABLET ORAL at 08:59

## 2018-05-26 RX ADMIN — ALPRAZOLAM 0.5 MG: 0.5 TABLET ORAL at 14:07

## 2018-05-26 RX ADMIN — ANTACID TABLETS 1000 MG: 500 TABLET, CHEWABLE ORAL at 09:11

## 2018-05-26 RX ADMIN — ANTACID TABLETS 1000 MG: 500 TABLET, CHEWABLE ORAL at 12:36

## 2018-05-26 RX ADMIN — INSULIN HUMAN 4 UNITS: 100 INJECTION, SOLUTION PARENTERAL at 12:35

## 2018-05-26 RX ADMIN — ACYCLOVIR 400 MG: 200 CAPSULE ORAL at 08:58

## 2018-05-26 RX ADMIN — DULOXETINE HYDROCHLORIDE 40 MG: 20 CAPSULE, DELAYED RELEASE ORAL at 08:57

## 2018-05-26 RX ADMIN — POTASSIUM BICARBONATE 25 MEQ: 25 TABLET, EFFERVESCENT ORAL at 08:59

## 2018-05-26 RX ADMIN — ONDANSETRON HYDROCHLORIDE 4 MG: 2 INJECTION, SOLUTION INTRAMUSCULAR; INTRAVENOUS at 09:09

## 2018-05-26 RX ADMIN — PROCHLORPERAZINE EDISYLATE 10 MG: 5 INJECTION INTRAMUSCULAR; INTRAVENOUS at 11:16

## 2018-05-26 RX ADMIN — ALPRAZOLAM 0.5 MG: 0.5 TABLET ORAL at 00:13

## 2018-05-26 ASSESSMENT — PAIN SCALES - GENERAL: PAINLEVEL_OUTOF10: 5

## 2018-05-26 NOTE — CARE PLAN
Problem: Venous Thromboembolism (VTW)/Deep Vein Thrombosis (DVT) Prevention:  Goal: Patient will participate in Venous Thrombosis (VTE)/Deep Vein Thrombosis (DVT)Prevention Measures  Heparin given sub-q for VTE prophylaxis.     Problem: Pain Management  Goal: Pain level will decrease to patient's comfort goal    Intervention: Follow pain managment plan developed in collaboration with patient and Interdisciplinary Team  Pt medicated per MAR for pain. Pt reassessed within 2 hours to evaluate effectiveness; pt sleeping upon reassessment and pt declined need for additional pain meds upon further inquiries.

## 2018-05-26 NOTE — DISCHARGE PLANNING
Anticipated Discharge Disposition: [facility]    Action: JAMAAL received report that pt will transfer to [facility] @ 1400.     Barriers to Discharge: None known    Plan: Pt to transfer to [facility] @ 1400 via [facility].

## 2018-05-26 NOTE — PROGRESS NOTES
[facility] Hospitalist Progress Note    Date of Service: 5/25/2018    Chief Complaint  67 y.o. female admitted 4/10/2018 with prolonged hospital course, admitted for chemotherapy for multiple myeloma, pathologic fracture and complications thereof.    Interval Problem Update  5/22 Patient not tearful today but does have flat affect, voice is returning but still weak with recent pneumonia/pharyngitis.  She still has areas that are significantly edematous and weeping.  She is tolerating bid lovenox and will change back to tid to see if some additional edema can be removed.  Appetite wavers but she is trying to eat more.  5/23 Patient sitting on edge of the bed with feet down on step much of the day, states this position is more comfortable for her leg.  Her voice is getting stronger and denies cough.  No new complaints  5/24 Patient seen after oncology visit.  States she is upset that things are not going better, she really wants to fight hard against her cancer and she is pushing herself to get stronger and do more and was proud of herself that she got to the edge of the bed almost unassisted today which is a huge step.  An honest conversation was held with patient regarding the poor prognosis she has given her severe debility and that chemo would likely continue to make her weaker and this was not what her oncology team wanted to put her through, she was very tearful and felt she had been let down and there had to be something else that could help her.  Oncology is now trying to get patient on an oral regimen for treatment that could allow continued treatment without the set backs of the stronger regimen of CyBorD.  Dr Sim' office is working on coordinating this and cycle 2 of CyBorD is the last planned cycle.  5/25 Patient in better spirits today, she called her sister to be a part of our conversation today regarding plan of treatment.  She is agreeable to trial a different regimen once it gets authorized from   [facility] office for oral therapy.  [facility] has accepted patient and she will transfer tomorrow afternoon as long as she is doing well following today's administration of chemotherapy.    Consultants/Specialty  Oncology     Disposition  Difficult discharge        Review of Systems   Constitutional: Positive for malaise/fatigue. Negative for chills and fever.   HENT: Negative for congestion and sore throat.    Eyes: Negative for blurred vision and photophobia.   Respiratory: Positive for shortness of breath (minimal). Negative for cough and sputum production.    Cardiovascular: Negative for chest pain, claudication and leg swelling.   Gastrointestinal: Negative for abdominal pain, constipation, diarrhea, heartburn, nausea and vomiting.        Poor appetite     Genitourinary: Negative for dysuria and hematuria.   Musculoskeletal: Negative for joint pain and myalgias.   Skin: Negative for itching and rash.   Neurological: Positive for weakness (legs cannot support her weight anymore). Negative for dizziness, sensory change, speech change and headaches.   Psychiatric/Behavioral: Positive for depression. The patient is not nervous/anxious and does not have insomnia.       Physical Exam  Laboratory/Imaging   Hemodynamics  Temp (24hrs), Av.5 °C (97.7 °F), Min:36.1 °C (97 °F), Max:36.9 °C (98.4 °F)   Temperature: 36.1 °C (97 °F)  Pulse  Av.8  Min: 38  Max: 128    Blood Pressure : 117/55      Respiratory      Respiration: 18, Pulse Oximetry: 93 %     Work Of Breathing / Effort: Mild  RUL Breath Sounds: Clear, RML Breath Sounds: Diminished, RLL Breath Sounds: Diminished, CHARLOTTE Breath Sounds: Clear, LLL Breath Sounds: Diminished    Fluids    Intake/Output Summary (Last 24 hours) at 18  Last data filed at 18 1746   Gross per 24 hour   Intake              707 ml   Output             1800 ml   Net            -1093 ml       Nutrition  Orders Placed This Encounter   Procedures   • Diet Order     Standing Status:    Standing     Number of Occurrences:   1     Order Specific Question:   Diet:     Answer:   Diabetic [3]     Physical Exam   Constitutional: She is oriented to person, place, and time. She appears well-developed and well-nourished. No distress.   HENT:   Head: Normocephalic and atraumatic.   Eyes: Conjunctivae are normal. No scleral icterus.   Neck: Neck supple. No JVD present.   Cardiovascular: Normal rate, regular rhythm and normal heart sounds.  Exam reveals no gallop and no friction rub.    No murmur heard.  Pulmonary/Chest: Effort normal and breath sounds normal. No respiratory distress. She exhibits no tenderness.   Abdominal: Soft. Bowel sounds are normal. She exhibits no distension. There is no guarding.   Musculoskeletal: She exhibits edema (globally). She exhibits no tenderness.   Neurological: She is alert and oriented to person, place, and time. No cranial nerve deficit.   Skin: Skin is warm and dry. She is not diaphoretic. No erythema. No pallor.   Rash has resolved, peeling skin      Psychiatric: Her behavior is normal.   Flat affect consistent with depression     Nursing note and vitals reviewed.      Recent Labs      05/24/18   1049   WBC  2.2*   RBC  2.77*   HEMOGLOBIN  8.4*   HEMATOCRIT  26.2*   MCV  94.6   MCH  30.3   MCHC  32.1*   RDW  52.9*   PLATELETCT  163*   MPV  10.9     Recent Labs      05/24/18   1049   SODIUM  132*   POTASSIUM  3.9   CHLORIDE  90*   CO2  37*   GLUCOSE  201*   BUN  8   CREATININE  0.73   CALCIUM  8.4*                      Assessment/Plan     * Pathologic fracture- (present on admission)   Assessment & Plan    -Left humerus  -widespread lytic disease  -completed radiation to humerus  -pain control             Pneumonia   Assessment & Plan    RLL per chest CT, no PE/pleural effusion; completes Levaquin/Flagyl 5/24        Acute respiratory failure (HCC)   Assessment & Plan    Continued improvement  Completed tx for pneumonia.          Multiple myeloma (HCC)- (present on  admission)   Assessment & Plan    CyBorD chemo - cycle 1 started 4/19/18; cycle 2 started on 5/10 (5/17, due 5/24 but deferred to 5/25 d/t completion of antibiotics)  -recent diagnosis, appears aggressive, now with innumerable lytic lesions  -Oncology consulted  - IV dilaudid PRN, oxy prn. DC maintenance steroids as these were causing confusion  -oxycontin CR 10 mg BID  -skeletal survey done - spine obscured by body habitus, but multiple other lesions found  XRT to right and left femurs completed  -Palliative care consulted for advanced care planning  Coordination for oral chemo regimen underway through Dr Sim office for treatment after cycle 2.            Hypocalcemia   Assessment & Plan    Improved; decrease oral supplement          Hypomagnesemia   Assessment & Plan    Replete as indicated; f/u labs        Sinusitis   Assessment & Plan    Resolved          Bacteriuria   Assessment & Plan    Klebsiella growing in urine culture, nava in place, asymptomatic.  Immunocompromised due to chemotherapy - now on Levaquin  Check repeat UA        Decubitus ulcer of buttock   Assessment & Plan    Wound care; rotate frequently in bed; air mattress.        Debility- (present on admission)   Assessment & Plan    Due to current illnesses; not able to walk since early april;  Out of SNF days per SW; cont PT/OT here  Poor prognosis given current diagnoses/morbid obesity.  [facility] has accepted patient.          DNR (do not resuscitate)   Assessment & Plan    Palliative care consulted, patient wants to change code status to DNR - done, completed Polst with APN.          Chemotherapy-induced thrombocytopenia   Assessment & Plan    improving; monitor; no bleeding now          Leg edema   Assessment & Plan      -PO to IV Lasix scheduled bid, doing well and renal function stable. Improved edema slowly.          Chronic venous stasis dermatitis of both lower extremities- (present on admission)   Assessment & Plan    -wound care, oral  abx completed          Anemia- (present on admission)   Assessment & Plan    hgb better; no active bleeding; monitor  Transfuse if hgb <7.0          Morbid obesity with BMI of 60.0-69.9, adult (CMS-HCC)- (present on admission)   Assessment & Plan    -encourage weight loss  Body mass index is 67.38 kg/m².        DM type 2 (diabetes mellitus, type 2) (CMS-HCC)- (present on admission)   Assessment & Plan    Fair control; adjust meds for good control        Essential hypertension, benign- (present on admission)   Assessment & Plan    Adjust med for good control.          Quality-Core Measures   Reviewed items::  Labs reviewed, Medications reviewed and Radiology images reviewed  Singh catheter::  Urinary Tract Retention or Urinary Tract Obstruction  DVT prophylaxis pharmacological::  Contraindicated - High bleeding risk  DVT prophylaxis - mechanical:  SCDs  Antibiotics:  Treating active infection/contamination beyond 24 hours perioperative coverage  Assessed for rehabilitation services:  Patient was assess for and/or received rehabilitation services during this hospitalization

## 2018-05-26 NOTE — DISCHARGE INSTRUCTIONS
Discharge Instructions    Discharged to other by ambulance with escort. Discharged via ambulance, hospital escort: Yes.  Special equipment needed: Oxygen    Be sure to schedule a follow-up appointment with your primary care doctor or any specialists as instructed.     Discharge Plan:   Influenza Vaccine Indication: Not indicated: Previously immunized this influenza season and > 8 years of age    I understand that a diet low in cholesterol, fat, and sodium is recommended for good health. Unless I have been given specific instructions below for another diet, I accept this instruction as my diet prescription.   Other diet: Diabetic    Special Instructions: None    · Is patient discharged on Warfarin / Coumadin?   No     Depression / Suicide Risk    As you are discharged from this [facility] facility, it is important to learn how to keep safe from harming yourself.    Recognize the warning signs:  · Abrupt changes in personality, positive or negative- including increase in energy   · Giving away possessions  · Change in eating patterns- significant weight changes-  positive or negative  · Change in sleeping patterns- unable to sleep or sleeping all the time   · Unwillingness or inability to communicate  · Depression  · Unusual sadness, discouragement and loneliness  · Talk of wanting to die  · Neglect of personal appearance   · Rebelliousness- reckless behavior  · Withdrawal from people/activities they love  · Confusion- inability to concentrate     If you or a loved one observes any of these behaviors or has concerns about self-harm, here's what you can do:  · Talk about it- your feelings and reasons for harming yourself  · Remove any means that you might use to hurt yourself (examples: pills, rope, extension cords, firearm)  · Get professional help from the community (Mental Health, Substance Abuse, psychological counseling)  · Do not be alone:Call your Safe Contact- someone whom you trust who will be there for  you.  · Call your local CRISIS HOTLINE 750-3056 or 797-895-0032  · Call your local Children's Mobile Crisis Response Team [place] (217) 821-5312 or www.[employer]  · Call the toll free National Suicide Prevention Hotlines   · National Suicide Prevention Lifeline 521-558-RAVL (4108)  · trueEX Line Network 800-SUICIDE (623-6704)    Multiple Myeloma  Introduction  Multiple myeloma is a form of cancer that results from the uncontrolled growth of abnormal plasma cells. Plasma cells are a type of white blood cell produced in the soft tissue inside bones (bone marrow). These are cells in your blood that normally help you fight infection. They are part of your body’s defense system (immune system). Plasma cells that become cancerous will grow out of control. As a result, they interfere with normal blood cells and many important functions that normal cells perform in your body. With multiple myeloma, the abnormal plasma cells cause multiple tumors to form.  Multiple myeloma damages your bones and causes other health problems because of its effect on blood cells. The disease progresses and reduces your ability to fight off infections.  What are the causes?  The cause of multiple myeloma is not known.  What increases the risk?  Risk factors include:  · Being older than 65.  · Being .  · Having a family history of the disease.  What are the signs or symptoms?  Signs and symptoms of multiple myeloma may include:  · Bone pain, especially in the back, ribs, and hips.  · Broken bones (fractures).  · Low blood counts, including reduced red blood cells (anemia), reduced white blood cells (leukopenia), and reduced platelets.  · Fatigue.  · Weakness.  · Infections.  · Bleeding, such as bleeding from the nose or gums, or increased bleeding from a scrape or cut.  · High blood calcium levels.  · Increased urination.  · Confusion.  How is this diagnosed?  Your health care provider will do a physical exam  and take your medical history. Tests will be done to help confirm the diagnosis. Tests may include:  · Blood tests.  · Urine tests.  · X-rays.  · MRI.  · Bone marrow biopsy. In this test, a sample of marrow is removed from one of your bones. The sample is viewed under a microscope to check for abnormal plasma cells.  How is this treated?  There is no cure for multiple myeloma. Treatment options may vary depending on how much the disease has advanced. Possible treatment options may include:  · Medicines that kill cancer cells (chemotherapy).  · Medicines that help prevent bone damage (bisphosphonates).  · Radiation therapy. High-energy rays are used to kill cancer cells.  · Surgery. This may be done to repair damage to bone.  · Targeted drug therapy. These medicines block the growth and spread of cancer cells.  · Immunotherapy. This is also called biologic therapy. It involves the use of medicines to strengthen the ability of your immune system to fight cancer cells.  · Stem cell transplant. Healthy stem cells are infused into your body. These stem cells produce new blood cells to replace those killed by the disease or by other treatments. The healthy cells that are transplanted may be your own or may come from another person.  · Plasmapheresis. This is a procedure used to remove plasma cells from your blood.  · Other medicines to treat problems such as infections or pain.  Follow these instructions at home:  · Take medicines only as directed by your health care provider.  · Drink enough fluid to keep your urine clear or pale yellow.  · Eat a well-balanced diet. Work with a dietitian to make sure you are getting the nutrition you need.  · Take vitamins or dietary supplements as directed by your health care provider or dietitian.  · Stay active. Talk to your health care provider about what types of exercises and activities are safe for you.  ¨ Avoid activities that cause increased pain.  ¨ Do not lift anything heavier  than 10 lb (4.5 kg).  · Consider joining a support group or seeking counseling to help you cope with the stress of having multiple myeloma.  · Keep all follow-up visits as directed by your health care provider. This is important.  Contact a health care provider if:  · Your pain is not controlled with medicine or is getting worse.  · You have a fever.  · You have swollen legs.  · You have weakness or dizziness.  · You have unexplained weight loss.  · You have unexplained bleeding or bruising.  · You have a cough or cold symptoms.  · You feel depressed.  · You have changes in urination or bowel movements.  Get help right away if:  · You have sudden severe pain, especially back pain.  · You have numbness or weakness in your arms, hands, legs, or feet.  · You have confusion.  · You have weakness on one side of your body.  · You have slurred speech.  · You have trouble staying awake.  · You have shortness of breath.  · You have blood in your stool or urine.  · You vomit or cough up blood.  This information is not intended to replace advice given to you by your health care provider. Make sure you discuss any questions you have with your health care provider.  Document Released: 09/12/2002 Document Revised: 05/25/2017 Document Reviewed: 07/21/2015  © 2017 [employer]

## 2018-05-26 NOTE — DISCHARGE SUMMARY
CHIEF COMPLAINT ON ADMISSION  Chief Complaint   Patient presents with   • Sent by MD     Sent by Dr. Sim, Oncology, for admission for chemo and radiation       CODE STATUS  DNR    HPI & HOSPITAL COURSE  This is a 67 y.o. year old female here with multiple myeloma as a direct admission for initiation of chemotherapy and radiation for known pathologic fracture of the left humerus.  She tolerated radiation therapy to the humerus and additional treatments to bilateral femurs which had the appearance on bone survey for significant involvement but not impending fracture.  She has received 2 cycles of CyBorD, first of which was full dose, second cut back to weekly due to complication of cytopenias, infection with cellulitis, UTI and pneumonia which have been treated.  Given her poor progression with treatment of current chemotherapy, another oral regimen is getting approval at Dr Sim' office and will be coordinated to delivery to patient while at LTAC.  Currently she is not neutropenic but could have this happen in next few days due to chem received on 5/25/18.  She has been working with physical therapy and trying to improve mobility but currently is not able to hold her weight on her feet.  She has normal renal function despite aggressive diuresis with IV lasix to reduce the significant edema she has accumulated over course of treatment, this likely can be transitioned to oral regimen as edema improves.    Therefore, she is discharged in fair and stable condition for further post-acute management.     SPECIFIC OUTPATIENT FOLLOW-UP  Dr Sim when discharged from LTAC    DISCHARGE PROBLEM LIST  Principal Problem:    Pathologic fracture POA: Yes  Active Problems:    Multiple myeloma (HCC) POA: Yes    Acute respiratory failure (HCC) POA: Unknown    Pneumonia POA: No    Hypomagnesemia POA: Clinically Undetermined    Hypocalcemia POA: Clinically Undetermined    Essential hypertension, benign POA: Yes    DM type 2  (diabetes mellitus, type 2) (CMS-HCC) POA: Yes    Morbid obesity with BMI of 60.0-69.9, adult (CMS-HCC) POA: Yes    Anemia POA: Yes    Chronic venous stasis dermatitis of both lower extremities POA: Yes    Leg edema POA: Clinically Undetermined    Chemotherapy-induced thrombocytopenia POA: Unknown    DNR (do not resuscitate) POA: Unknown    Debility POA: Yes    Decubitus ulcer of buttock POA: No    Bacteriuria POA: No    Sinusitis POA: No  Resolved Problems:    Hypotension POA: No    Diarrhea POA: No    Hyponatremia POA: Yes    Anxiety POA: Yes    Cellulitis POA: No      Overview: Bilateral lower extremities    Fever POA: Unknown    Nasal congestion POA: Unknown    Rash POA: Unknown    Epistaxis POA: Unknown      FOLLOW UP  No future appointments.  [facility] ([facility])  [address]  [place] [address]  [phone]          MEDICATIONS ON DISCHARGE   Aleida Pagan   Home Medication Instructions LILIAN:[id number]    Printed on:05/26/18 1045   Medication Information                      acetaminophen (TYLENOL) 325 MG Tab  Take 3 Tabs by mouth every 6 hours as needed.             acyclovir (ZOVIRAX) 200 MG Cap  Take 2 Caps by mouth 2 Times a Day.             ALPRAZolam (XANAX) 0.5 MG Tab  Take 1 Tab by mouth every 6 hours as needed for Anxiety for up to 14 days.             amLODIPine (NORVASC) 5 MG Tab  Take 1 Tab by mouth every day.             bisacodyl (DULCOLAX) 10 MG Suppos  Insert 1 Suppository in rectum 1 time daily as needed (if magnesium hydroxide ineffective after 24 hours).             calcitRIOL (ROCALTROL) 0.25 MCG Cap  Take 1 Cap by mouth every day.             calcium carbonate 1000 MG Chew Tab  Take 1 Tab by mouth 3 times a day, with meals.             dextrose 50% (D50W) 50 % Solution  25 mL by Intravenous route as needed (If FSBG is less than or equal to 70 mg/dL and If patient is NPO).             diphenhydrAMINE-ZnAcetate (BENADRYL ITCH) 1-0.1 % Cream  Apply to LE  as needed PRN rash             DULoxetine 40 MG Cap DR Particles  Take 40 mg by mouth every day.             furosemide (LASIX) 10 MG/ML Solution  4 mL by Intravenous route 3 times a day.             glucose 4 g 4 g Chew Tab  Take 4 Tabs by mouth as needed for Low Blood Sugar (If FSBG is less than or equal to 70 mg/dL and patient able to eat or drink).             heparin 5000 UNIT/ML Solution  Inject 1 mL as instructed every 8 hours.             insulin regular (HUMULIN R) 100 Unit/mL Solution  Inject 3-14 Units as instructed 4 Times a Day,Before Meals and at Bedtime.             ipratropium-albuterol (DUONEB) 0.5-2.5 (3) MG/3ML nebulizer solution  3 mL by Nebulization route every 6 hours as needed for Shortness of Breath.             loperamide (IMODIUM) 2 MG Cap  Take 1 Cap by mouth 4 times a day as needed for Diarrhea.             LORazepam (ATIVAN) 2 MG/ML Solution  0.5 mL by Intravenous route every four hours as needed (or prior to radiation) for up to 14 days.             magnesium hydroxide (MILK OF MAGNESIA) 400 MG/5ML Suspension  Take 30 mL by mouth 1 time daily as needed (if polyethylene glycol ineffective after 24 hours).             metFORMIN (GLUCOPHAGE) 1000 MG tablet  Take 1 Tab by mouth 2 times a day, with meals.             metoprolol (LOPRESSOR) 50 MG Tab  Take 1 Tab by mouth 2 Times a Day.             ondansetron (ZOFRAN ODT) 4 MG TABLET DISPERSIBLE  Take 1 Tab by mouth every four hours as needed (give PO if IV route is unavailable. May give per feeding tube.).             ondansetron (ZOFRAN) 4 MG/2ML Solution injection  2 mL by Intravenous route every four hours as needed for Nausea.             oxyCODONE CR (OXYCONTIN) 10 MG Tablet Extended Release 12 hour Abuse-Deterrent  Take 1 Tab by mouth every 12 hours for 14 days.             oxyCODONE immediate-release (ROXICODONE) 5 MG Tab  Take 1 Tab by mouth every four hours as needed for up to 14 days.             phenylephrine (SUDOGEST PE) 10 MG  Tab  Take 1 Tab by mouth every four hours as needed (congestion).             polyethylene glycol/lytes (MIRALAX) Pack  Take 1 Packet by mouth 1 time daily as needed (if sennosides and docusate ineffective after 24 hours).             potassium bicarbonate (KLYTE) 25 MEQ tablet  Take 1 Tab by mouth 2 Times a Day.             pravastatin (PRAVACHOL) 40 MG tablet  Take 1 Tab by mouth every day.             prochlorperazine (COMPAZINE) 5 MG/ML injection  2 mL by Intravenous route every 6 hours as needed.             sodium chloride (OCEAN) 0.65 % Solution  Spray 2 Sprays in nose every 2 hours as needed.                 DIET  Orders Placed This Encounter   Procedures   • Diet Order     Standing Status:   Standing     Number of Occurrences:   1     Order Specific Question:   Diet:     Answer:   Diabetic [3]       ACTIVITY  As tolerated. Exercise encouraged.  Class 3 - comfortable at rest but have symptoms with less-than-ordinary effort.    LINES, DRAINS, AND WOUNDS  This is an automated list. Peripheral IVs will be removed prior to discharge.  Central Line Group 1 (A) Right;Basilic Dual Lumen;PICC 5 (Active)   Line Secured Securement Dressing;Taped 5/26/2018  9:00 AM   Patency and Function Check Performed at Beginning of Shift 5/26/2018  9:00 AM   Line Necessity Assessed Chemotherapy (Continuous Infusion of Vesicant Therapy) 5/26/2018  9:00 AM   Consider Removal of Femoral Line Not Applicable 5/26/2018  9:00 AM   Closed Tubing Set Up Yes 5/26/2018  9:00 AM   Hand Washing / Gloves Prior to Every Access Yes 5/26/2018  9:00 AM   Next Daily Chlorhexidine Bath Due (Regional ONLY) 05/26/18 5/26/2018  9:00 AM   Port Access  Scrub the Hub Prior to Access;Blood Return  5/26/2018  9:00 AM   Site Condition / Description Assessed;Patent;Clean;Dry;Intact 5/26/2018  9:00 AM   Signs and Symptoms of Infection None Apparent at this Time 5/26/2018  9:00 AM   Dressing Type / Description Antimicrobial Patch  (BioPatch);Transparent;Clean;Dry;Intact 5/26/2018  9:00 AM   Dressing Status Observed 5/26/2018  9:00 AM   Next Dressing Change  06/02/18 5/26/2018  9:00 AM   Date Primary Tubing Changed 05/22/18 5/26/2018  9:00 AM   Date Secondary Tubing Changed 05/22/18 5/24/2018  8:00 PM   NEXT Primary Tubing Change  05/26/18 5/26/2018  9:00 AM   NEXT Secondary Tubing Change  05/23/18 5/24/2018  8:00 PM   Date IV Connector(s) Changed 05/26/18 5/26/2018  9:00 AM   NEXT IV Connector(s) Change Date 05/29/18 5/26/2018  9:00 AM     Urinary Catheter Indwelling Catheter 16 (Active)   Catheter State [place] 5/25/2018  9:00 AM   Skin Integrity Intact 5/25/2018  9:00 AM   Catheter Secured No 5/25/2018  9:00 AM   Collection Device Changed No 5/25/2018  9:00 AM   Output (mL) 1000 mL 5/26/2018  4:40 AM      Surgical Incision  Incision Left Shoulder (Active)       Surgical Incision  Incision Right Knee (Active)       Wound Other (comment) Shin Right Lower (Active)   Wound Bed Pink 5/26/2018  9:30 AM   Drainage  None 5/26/2018  9:30 AM   Periwound Skin Pink;Red;Normal 5/26/2018  9:30 AM   Cleansing Not Applicable 5/26/2018  9:30 AM   Dressing Options Open to Air 5/25/2018  8:45 PM   Dressing Status / Change Observed 5/25/2018  8:45 PM   Dressing Cleansing/Solutions Not Applicable 5/25/2018  8:45 PM   Periwound Protectant Not Applicable 5/25/2018  8:45 PM   Length (cm) 1.3 5/18/2018  8:45 PM   Width (cm) 0.8 5/18/2018  8:45 PM   Dressing Change Frequency Every 48 hrs 4/14/2018  9:45 AM   Next Dressing Change  04/18/18 5/5/2018  9:30 AM   Time Spent with Patient (mins) 45 4/12/2018  1:00 PM       Wound Skin Tear Buttock Left (Active)   Wound Bed Red;[place] 5/26/2018  9:30 AM   Drainage  None 5/26/2018  9:30 AM   Periwound Skin Pink 5/26/2018  9:30 AM   Cleansing Not Applicable 5/26/2018  9:30 AM   Dressing Options Open to Air 5/26/2018  9:30 AM   Dressing Status / Change Clean;Dry;Intact 5/25/2018  8:45 PM   Dressing Cleansing/Solutions Other  (Comments) 5/24/2018  8:00 PM   Periwound Protectant Barrier Paste 5/26/2018  9:30 AM   Weekly Photo (Inpatient Only) 05/10/18 5/17/2018  9:00 AM   Dressing Change Frequency As Needed 5/17/2018  9:00 AM       Wound Skin Tear Buttock Right (Active)   Wound Bed Pink;Red 5/26/2018  9:30 AM   Drainage  None 5/26/2018  9:30 AM   Periwound Skin Pink;Red;Normal;Intact 5/26/2018  9:30 AM   Cleansing Not Applicable 5/26/2018  9:30 AM   Dressing Options Open to Air 5/26/2018  9:30 AM   Dressing Status / Change Clean;Dry;Intact;Observed 5/25/2018  8:45 PM   Dressing Cleansing/Solutions Other (Comments) 5/22/2018  9:10 PM   Periwound Protectant Barrier Paste 5/26/2018  9:30 AM       Wound Other (comment) Abdomen Left (Active)   Wound Bed Pink 5/26/2018  9:30 AM   Drainage  None 5/26/2018  9:30 AM   Periwound Skin Pink 5/26/2018  9:30 AM   Cleansing Not Applicable 5/26/2018  9:30 AM   Dressing Status / Change Clean;Dry 5/26/2018  9:30 AM   Dressing Cleansing/Solutions Other (Comments) 5/26/2018  9:30 AM              Urinary Catheter Indwelling Catheter 16 (Active)   Catheter State [place] 5/25/2018  9:00 AM   Skin Integrity Intact 5/25/2018  9:00 AM   Catheter Secured No 5/25/2018  9:00 AM   Collection Device Changed No 5/25/2018  9:00 AM   Output (mL) 1000 mL 5/26/2018  4:40 AM        MENTAL STATUS ON TRANSFER  Level of Consciousness: Alert  Orientation : Oriented x 4  Speech: Speech Clear    CONSULTATIONS  Dr Sim - oncology  Dr Gandhi - radiation oncology  Dr Coronado - ID    PROCEDURES  PICC 4/20/18    LABORATORY  Lab Results   Component Value Date/Time    SODIUM 126 (L) 05/26/2018 12:23 AM    POTASSIUM 4.6 05/26/2018 12:23 AM    CHLORIDE 85 (L) 05/26/2018 12:23 AM    CO2 35 (H) 05/26/2018 12:23 AM    GLUCOSE 266 (H) 05/26/2018 12:23 AM    BUN 12 05/26/2018 12:23 AM    CREATININE 0.82 05/26/2018 12:23 AM    CREATININE 1.10 08/12/2014        Lab Results   Component Value Date/Time    WBC 3.9 (L) 05/26/2018 12:23 AM     HEMOGLOBIN 8.1 (L) 05/26/2018 12:23 AM    HEMATOCRIT 24.4 (L) 05/26/2018 12:23 AM    PLATELETCT 150 (L) 05/26/2018 12:23 AM        Total time of the discharge process exceeds 40 minutes.

## 2018-05-26 NOTE — DISCHARGE PLANNING
Per [facility] request, transport has been arranged for patient to transfer to [facility] 08/26/18 at 1400 via [facility].  DARIN Blackburn has been advised of transport time.

## 2018-05-26 NOTE — PROGRESS NOTES
Received report from day RN, assumed care of pt 1900.  Pt is A&Ox4.   RUE PICC, patent with + blood return, dressing is CDI.  Pt on 2 L NC,  on.   Reviewed POC with pt; she is in agreement.   Medicated per MAR. Pt had large BM. Is able to turn with 2 assist.  Singh to gravity.   Frequent rounding in place. Call light within reach.

## 2018-05-27 LAB
ALBUMIN SERPL BCP-MCNC: 2.6 G/DL (ref 3.2–4.9)
ALBUMIN/GLOB SERPL: 0.6 G/DL
ALP SERPL-CCNC: 51 U/L (ref 30–99)
ALT SERPL-CCNC: 10 U/L (ref 2–50)
ANION GAP SERPL CALC-SCNC: 5 MMOL/L (ref 0–11.9)
APPEARANCE UR: CLEAR
AST SERPL-CCNC: 18 U/L (ref 12–45)
BACTERIA #/AREA URNS HPF: ABNORMAL /HPF
BASOPHILS # BLD AUTO: 0 % (ref 0–1.8)
BASOPHILS # BLD: 0 K/UL (ref 0–0.12)
BILIRUB SERPL-MCNC: 0.6 MG/DL (ref 0.1–1.5)
BILIRUB UR QL STRIP.AUTO: NEGATIVE
BUN SERPL-MCNC: 13 MG/DL (ref 8–22)
CALCIUM SERPL-MCNC: 7.9 MG/DL (ref 8.4–10.2)
CASTS URNS QL MICRO: ABNORMAL /LPF
CHLORIDE SERPL-SCNC: 85 MMOL/L (ref 96–112)
CO2 SERPL-SCNC: 37 MMOL/L (ref 20–33)
COLOR UR: YELLOW
CREAT SERPL-MCNC: 1.03 MG/DL (ref 0.5–1.4)
EOSINOPHIL # BLD AUTO: 0 K/UL (ref 0–0.51)
EOSINOPHIL NFR BLD: 0 % (ref 0–6.9)
EPI CELLS #/AREA URNS HPF: ABNORMAL /HPF
ERYTHROCYTE [DISTWIDTH] IN BLOOD BY AUTOMATED COUNT: 53.6 FL (ref 35.9–50)
GLOBULIN SER CALC-MCNC: 4.1 G/DL (ref 1.9–3.5)
GLUCOSE BLD-MCNC: 241 MG/DL (ref 65–99)
GLUCOSE SERPL-MCNC: 296 MG/DL (ref 65–99)
GLUCOSE UR STRIP.AUTO-MCNC: NEGATIVE MG/DL
HCT VFR BLD AUTO: 23.6 % (ref 37–47)
HGB BLD-MCNC: 7.6 G/DL (ref 12–16)
IMM GRANULOCYTES # BLD AUTO: 0.05 K/UL (ref 0–0.11)
IMM GRANULOCYTES NFR BLD AUTO: 1.5 % (ref 0–0.9)
KETONES UR STRIP.AUTO-MCNC: NEGATIVE MG/DL
LEUKOCYTE ESTERASE UR QL STRIP.AUTO: ABNORMAL
LYMPHOCYTES # BLD AUTO: 0.39 K/UL (ref 1–4.8)
LYMPHOCYTES NFR BLD: 12.1 % (ref 22–41)
MAGNESIUM SERPL-MCNC: 1.2 MG/DL (ref 1.5–2.5)
MCH RBC QN AUTO: 30.2 PG (ref 27–33)
MCHC RBC AUTO-ENTMCNC: 32.2 G/DL (ref 33.6–35)
MCV RBC AUTO: 93.7 FL (ref 81.4–97.8)
MICRO URNS: ABNORMAL
MONOCYTES # BLD AUTO: 0.46 K/UL (ref 0–0.85)
MONOCYTES NFR BLD AUTO: 14.2 % (ref 0–13.4)
MRSA DNA SPEC QL NAA+PROBE: NORMAL
NEUTROPHILS # BLD AUTO: 2.33 K/UL (ref 2–7.15)
NEUTROPHILS NFR BLD: 72.2 % (ref 44–72)
NITRITE UR QL STRIP.AUTO: NEGATIVE
NRBC # BLD AUTO: 0 K/UL
NRBC BLD-RTO: 0 /100 WBC
PH UR STRIP.AUTO: 8.5 [PH]
PLATELET # BLD AUTO: 142 K/UL (ref 164–446)
PMV BLD AUTO: 9.3 FL (ref 9–12.9)
POTASSIUM SERPL-SCNC: 4.1 MMOL/L (ref 3.6–5.5)
PROT SERPL-MCNC: 6.7 G/DL (ref 6–8.2)
PROT UR QL STRIP: NEGATIVE MG/DL
RBC # BLD AUTO: 2.52 M/UL (ref 4.2–5.4)
RBC # URNS HPF: ABNORMAL /HPF
RBC UR QL AUTO: ABNORMAL
SIGNIFICANT IND 70042: NORMAL
SITE SITE: NORMAL
SODIUM SERPL-SCNC: 127 MMOL/L (ref 135–145)
SOURCE SOURCE: NORMAL
SP GR UR STRIP.AUTO: 1.01
WBC # BLD AUTO: 3.2 K/UL (ref 4.8–10.8)
WBC #/AREA URNS HPF: ABNORMAL /HPF
YEAST #/AREA URNS HPF: ABNORMAL /HPF

## 2018-05-27 PROCEDURE — 82962 GLUCOSE BLOOD TEST: CPT

## 2018-05-27 PROCEDURE — 81001 URINALYSIS AUTO W/SCOPE: CPT

## 2018-05-27 PROCEDURE — 85025 COMPLETE CBC W/AUTO DIFF WBC: CPT

## 2018-05-27 PROCEDURE — 83735 ASSAY OF MAGNESIUM: CPT

## 2018-05-27 PROCEDURE — 80053 COMPREHEN METABOLIC PANEL: CPT

## 2018-05-27 ASSESSMENT — ENCOUNTER SYMPTOMS
BACK PAIN: 0
INSOMNIA: 0
NAUSEA: 1
FEVER: 0
SHORTNESS OF BREATH: 0
CONSTIPATION: 0
VOMITING: 0
COUGH: 0
DIARRHEA: 0
ABDOMINAL PAIN: 0
EYE PAIN: 0
SORE THROAT: 0
CHILLS: 0
HEADACHES: 0

## 2018-05-27 NOTE — [facility]
Hospital Medicine Progress Note, Adult, Complex               Author: Georgina Muniz Date & Time created: 5/27/2018  8:50 AM     CC: aftercare, debility from prolonged hospitalization for MM    Interval History:  [place] [facility] [place]  Slept OK  Didn't eat breakfast, wants chiobani smoothie  Na better  Blood counts stable, no neutropenia    Review of Systems:  Review of Systems   Constitutional: Negative for chills and fever.   HENT: Negative for congestion and sore throat.    Eyes: Negative for pain.   Respiratory: Negative for cough and shortness of breath.    Cardiovascular: Negative for chest pain.   Gastrointestinal: Positive for nausea. Negative for abdominal pain, constipation, diarrhea and vomiting.   Genitourinary: Negative for dysuria.   Musculoskeletal: Negative for back pain and joint pain.   Skin: Negative for rash.   Neurological: Negative for headaches.   Psychiatric/Behavioral: The patient does not have insomnia.        T 98 P 87 /80 RR 21 SaO2 94% 3L I/O 960/550 no BM  Physical Exam   Constitutional: She is oriented to person, place, and time. She appears well-developed. No distress.   HENT:   Head: Normocephalic and atraumatic.   Mouth/Throat: No oropharyngeal exudate.   Eyes: Conjunctivae are normal. Right eye exhibits no discharge. Left eye exhibits no discharge. No scleral icterus.   Neck: Neck supple. No tracheal deviation present.   Cardiovascular: Normal rate, regular rhythm and intact distal pulses.    No murmur heard.  Pulmonary/Chest: Effort normal. No respiratory distress. She has no wheezes. She exhibits no tenderness.   diminished   Abdominal: Soft. Bowel sounds are normal. She exhibits no distension. There is no tenderness.   Musculoskeletal: She exhibits edema (3+).   Neurological: She is alert and oriented to person, place, and time.   Skin: Skin is warm. There is erythema (B shins with scale).   Vitals reviewed.      Labs:        Invalid input(s): [id number]      Recent  Labs      05/24/18   1049  05/26/18   0023  05/27/18   0550   SODIUM  132*  126*  127*   POTASSIUM  3.9  4.6  4.1   CHLORIDE  90*  85*  85*   CO2  37*  35*  37*   BUN  8  12  13   CREATININE  0.73  0.82  1.03   MAGNESIUM   --    --   1.2*   CALCIUM  8.4*  8.6  7.9*     Recent Labs      05/24/18   1049  05/26/18   0023  05/27/18   0550   ALTSGPT   --   7  10   ASTSGOT   --   15  18   ALKPHOSPHAT   --   59  51   TBILIRUBIN   --   0.4  0.6   GLUCOSE  201*  266*  296*     Recent Labs      05/24/18   1049  05/26/18   0023  05/27/18   0550   RBC  2.77*  2.63*  2.52*   HEMOGLOBIN  8.4*  8.1*  7.6*   HEMATOCRIT  26.2*  24.4*  23.6*   PLATELETCT  163*  150*  142*     Recent Labs      05/24/18   1049  05/26/18   0023  05/27/18   0550   WBC  2.2*  3.9*  3.2*   NEUTSPOLYS  59.50  79.20*  72.20*   LYMPHOCYTES  24.70  16.40*  12.10*   MONOCYTES  14.40*  2.30  14.20*   EOSINOPHILS  0.50  0.00  0.00   BASOPHILS  0.00  0.30  0.00   ASTSGOT   --   15  18   ALTSGPT   --   7  10   ALKPHOSPHAT   --   59  51   TBILIRUBIN   --   0.4  0.6          GI/Nutrition:  Orders Placed This Encounter   Procedures   • DIET ORDER     Standing Status:   Standing     Number of Occurrences:   1     Order Specific Question:   Diet:     Answer:   Diabetic [3]     Order Specific Question:   Consistency/Fluid modifications:     Answer:   Thin Liquids [3]     Medical Decision Making, by Problem:  MM IgG kappa stage 3   -s/p 2 cycles CyBorD, not tolerated well   -oral regimen planned per notes with Dr. Sim   -patient said she is unaware of prognosis   -monitor CBC for cytopenias after chemo  L pathologic humerus fx and B femur palliative radiation   -multiple lytic lesions on imaging  Tx RLL PNA PTA  Buttock pressure decub   -upgrade to low air loss mattress  B LE venous insufficiency with prior cellulitis   -continue elevation, add compression with wound care   -diuresis as able   -change norvasc as can contribute to water  retention  Anemia/leukopenia/thrombocytopenia   -follow need for GCSF, transfusion  DM, uncontrolled high   -continue metformin   -add NPH   -RISS  HTN   -change norvasc to lisinopril (will also help kidney with DM)  Morbid obesity  Hyponatremia   -? Over diuresis vs SIADH vs other   -follow  Hypomagnesemia   -replete  Metabolic alkalosis   -check urine chloride, may be contraction   -DC lasix   -start diamox  Azotemia   -stop IV lasix  Debility   -PT/OT P        Quality-Core Measures   Reviewed items::  Labs reviewed and Medications reviewed  Singh catheter::  Neurogenic Bladder  Central line in place:  Need for access and Chemotherapy  DVT prophylaxis pharmacological::  Enoxaparin (Lovenox)

## 2018-05-28 LAB
ANION GAP SERPL CALC-SCNC: 3 MMOL/L (ref 0–11.9)
BASOPHILS # BLD AUTO: 0 % (ref 0–1.8)
BASOPHILS # BLD: 0 K/UL (ref 0–0.12)
BUN SERPL-MCNC: 17 MG/DL (ref 8–22)
CALCIUM SERPL-MCNC: 8.2 MG/DL (ref 8.4–10.2)
CHLORIDE SERPL-SCNC: 89 MMOL/L (ref 96–112)
CO2 SERPL-SCNC: 37 MMOL/L (ref 20–33)
CREAT SERPL-MCNC: 0.85 MG/DL (ref 0.5–1.4)
EOSINOPHIL # BLD AUTO: 0 K/UL (ref 0–0.51)
EOSINOPHIL NFR BLD: 0 % (ref 0–6.9)
ERYTHROCYTE [DISTWIDTH] IN BLOOD BY AUTOMATED COUNT: 53.7 FL (ref 35.9–50)
GLUCOSE BLD-MCNC: 132 MG/DL (ref 65–99)
GLUCOSE BLD-MCNC: 171 MG/DL (ref 65–99)
GLUCOSE BLD-MCNC: 237 MG/DL (ref 65–99)
GLUCOSE BLD-MCNC: 337 MG/DL (ref 65–99)
GLUCOSE SERPL-MCNC: 143 MG/DL (ref 65–99)
HCT VFR BLD AUTO: 24.2 % (ref 37–47)
HGB BLD-MCNC: 7.8 G/DL (ref 12–16)
IMM GRANULOCYTES # BLD AUTO: 0.02 K/UL (ref 0–0.11)
IMM GRANULOCYTES NFR BLD AUTO: 1 % (ref 0–0.9)
LYMPHOCYTES # BLD AUTO: 0.25 K/UL (ref 1–4.8)
LYMPHOCYTES NFR BLD: 12.4 % (ref 22–41)
MAGNESIUM SERPL-MCNC: 1.6 MG/DL (ref 1.5–2.5)
MCH RBC QN AUTO: 30.4 PG (ref 27–33)
MCHC RBC AUTO-ENTMCNC: 32.2 G/DL (ref 33.6–35)
MCV RBC AUTO: 94.2 FL (ref 81.4–97.8)
MONOCYTES # BLD AUTO: 0.39 K/UL (ref 0–0.85)
MONOCYTES NFR BLD AUTO: 19.3 % (ref 0–13.4)
NEUTROPHILS # BLD AUTO: 1.36 K/UL (ref 2–7.15)
NEUTROPHILS NFR BLD: 67.3 % (ref 44–72)
NRBC # BLD AUTO: 0.02 K/UL
NRBC BLD-RTO: 1 /100 WBC
PHOSPHATE SERPL-MCNC: 2.5 MG/DL (ref 2.5–4.5)
PLATELET # BLD AUTO: 140 K/UL (ref 164–446)
PMV BLD AUTO: 10.2 FL (ref 9–12.9)
POTASSIUM SERPL-SCNC: 3.8 MMOL/L (ref 3.6–5.5)
RBC # BLD AUTO: 2.57 M/UL (ref 4.2–5.4)
SODIUM SERPL-SCNC: 129 MMOL/L (ref 135–145)
WBC # BLD AUTO: 2.1 K/UL (ref 4.8–10.8)

## 2018-05-28 PROCEDURE — 83735 ASSAY OF MAGNESIUM: CPT

## 2018-05-28 PROCEDURE — 80048 BASIC METABOLIC PNL TOTAL CA: CPT

## 2018-05-28 PROCEDURE — 85025 COMPLETE CBC W/AUTO DIFF WBC: CPT

## 2018-05-28 PROCEDURE — 84100 ASSAY OF PHOSPHORUS: CPT

## 2018-05-28 PROCEDURE — 82962 GLUCOSE BLOOD TEST: CPT | Mod: 91

## 2018-05-28 ASSESSMENT — ENCOUNTER SYMPTOMS
HEADACHES: 0
FEVER: 0
ABDOMINAL PAIN: 0
BACK PAIN: 0
INSOMNIA: 0
VOMITING: 0
SORE THROAT: 0
EYE PAIN: 0
SHORTNESS OF BREATH: 0
DIARRHEA: 0
NAUSEA: 1
COUGH: 0
CONSTIPATION: 0

## 2018-05-28 NOTE — [facility]
Hospital Medicine Progress Note, Adult, Complex               Author: Georgina Muniz Date & Time created: 5/28/2018  9:58 AM     CC: aftercare, debility from prolonged hospitalization for MM    Interval History:  CBC trending down with counts, ANC still adequate  R hamstring pain after PT yesterday  Ongoing nausea  Nava leaking, replacement attempted overnight but anatomy difficult to identify    Review of Systems:  Review of Systems   Constitutional: Negative for fever.   HENT: Negative for congestion and sore throat.    Eyes: Negative for pain.   Respiratory: Negative for cough and shortness of breath.    Cardiovascular: Negative for chest pain.   Gastrointestinal: Positive for nausea. Negative for abdominal pain, constipation, diarrhea and vomiting.   Genitourinary: Negative for dysuria.        Leaking nava   Musculoskeletal: Negative for back pain.        R leg hamstring pain and shin pain   Skin: Negative for rash.   Neurological: Negative for headaches.   Psychiatric/Behavioral: The patient does not have insomnia.        T 97.6 P 84 /70RR 24 SaO2 97% 2L I/O 1.6/1 no BM  Physical Exam   Constitutional: She is oriented to person, place, and time. She appears well-developed. No distress.   Over-nourished   HENT:   Head: Normocephalic and atraumatic.   Mouth/Throat: No oropharyngeal exudate.   Eyes: Conjunctivae are normal. Right eye exhibits no discharge. Left eye exhibits no discharge. No scleral icterus.   Neck: Neck supple. No tracheal deviation present.   Cardiovascular: Normal rate, regular rhythm and intact distal pulses.    No murmur heard.  Pulmonary/Chest: Effort normal. No respiratory distress. She has no wheezes. She exhibits no tenderness.   diminished   Abdominal: Soft. Bowel sounds are normal. She exhibits no distension. There is no tenderness.   Musculoskeletal: She exhibits edema (3+).   Neurological: She is alert and oriented to person, place, and time.   Skin: Skin is warm. There is  erythema (B shins with scale).   Vitals reviewed.      Labs:        Invalid input(s): [id number]      Recent Labs      05/26/18   0023  05/27/18   0550  05/28/18   0600   SODIUM  126*  127*  129*   POTASSIUM  4.6  4.1  3.8   CHLORIDE  85*  85*  89*   CO2  35*  37*  37*   BUN  12  13  17   CREATININE  0.82  1.03  0.85   MAGNESIUM   --   1.2*  1.6   PHOSPHORUS   --    --   2.5   CALCIUM  8.6  7.9*  8.2*     Recent Labs      05/26/18   0023  05/27/18   0550  05/28/18   0600   ALTSGPT  7  10   --    ASTSGOT  15  18   --    ALKPHOSPHAT  59  51   --    TBILIRUBIN  0.4  0.6   --    GLUCOSE  266*  296*  143*     Recent Labs      05/26/18   0023  05/27/18   0550  05/28/18   0600   RBC  2.63*  2.52*  2.57*   HEMOGLOBIN  8.1*  7.6*  7.8*   HEMATOCRIT  24.4*  23.6*  24.2*   PLATELETCT  150*  142*  140*     Recent Labs      05/26/18   0023  05/27/18   0550  05/28/18   0600   WBC  3.9*  3.2*  2.1*   NEUTSPOLYS  79.20*  72.20*  67.30   LYMPHOCYTES  16.40*  12.10*  12.40*   MONOCYTES  2.30  14.20*  19.30*   EOSINOPHILS  0.00  0.00  0.00   BASOPHILS  0.30  0.00  0.00   ASTSGOT  15  18   --    ALTSGPT  7  10   --    ALKPHOSPHAT  59  51   --    TBILIRUBIN  0.4  0.6   --           GI/Nutrition:  Orders Placed This Encounter   Procedures   • DIET ORDER     Standing Status:   Standing     Number of Occurrences:   1     Order Specific Question:   Diet:     Answer:   Diabetic [3]     Order Specific Question:   Consistency/Fluid modifications:     Answer:   Thin Liquids [3]     Medical Decision Making, by Problem:  MM IgG kappa stage 3   -s/p 2 cycles CyBorD, not tolerated well per notes in epic   -oral regimen planned per notes with Dr. Sim, need to follow up with him tomorrow   -patient said she is unaware of prognosis   -monitor CBC for cytopenias after chemo, may need granix tomorrow  L pathologic humerus fx and B femur palliative radiation   -multiple lytic lesions on imaging  Tx RLL PNA PTA  Buttock pressure decub   -upgrade  to low air loss mattress  B LE venous insufficiency with prior cellulitis   -continue elevation, add compression with wound care   -diuresis as able   -off norvasc due to water retention  Anemia/leukopenia/thrombocytopenia   -follow need for GCSF, transfusion  DM2   -much improved with NPH   -continue metformin   -RISS  R leg pain   -unclear if lytic pain vs other   -add flexeril, increase norco  HTN   -lisinopril titration  Morbid obesity  Hyponatremia   -improved  Hypomagnesemia   -replete further with oral medication  Metabolic alkalosis   -check urine chloride, may be contraction   -diamox  Azotemia   -improved off IV lasix  Debility   -PT/OT         Quality-Core Measures   Reviewed items::  Labs reviewed and Medications reviewed  Singh catheter::  Neurogenic Bladder  Central line in place:  Need for access and Chemotherapy  DVT prophylaxis pharmacological::  Enoxaparin (Lovenox)

## 2018-05-29 LAB
ANION GAP SERPL CALC-SCNC: 5 MMOL/L (ref 0–11.9)
BASOPHILS # BLD AUTO: 0.6 % (ref 0–1.8)
BASOPHILS # BLD: 0.01 K/UL (ref 0–0.12)
BUN SERPL-MCNC: 11 MG/DL (ref 8–22)
CALCIUM SERPL-MCNC: 8.4 MG/DL (ref 8.4–10.2)
CHLORIDE SERPL-SCNC: 93 MMOL/L (ref 96–112)
CHLORIDE UR-SCNC: 42 MMOL/L
CO2 SERPL-SCNC: 33 MMOL/L (ref 20–33)
CREAT SERPL-MCNC: 0.8 MG/DL (ref 0.5–1.4)
EOSINOPHIL # BLD AUTO: 0.01 K/UL (ref 0–0.51)
EOSINOPHIL NFR BLD: 0.6 % (ref 0–6.9)
ERYTHROCYTE [DISTWIDTH] IN BLOOD BY AUTOMATED COUNT: 56.9 FL (ref 35.9–50)
GLUCOSE BLD-MCNC: 168 MG/DL (ref 65–99)
GLUCOSE BLD-MCNC: 186 MG/DL (ref 65–99)
GLUCOSE BLD-MCNC: 190 MG/DL (ref 65–99)
GLUCOSE BLD-MCNC: 193 MG/DL (ref 65–99)
GLUCOSE SERPL-MCNC: 183 MG/DL (ref 65–99)
HCT VFR BLD AUTO: 26 % (ref 37–47)
HGB BLD-MCNC: 8.3 G/DL (ref 12–16)
IMM GRANULOCYTES # BLD AUTO: 0.01 K/UL (ref 0–0.11)
IMM GRANULOCYTES NFR BLD AUTO: 0.6 % (ref 0–0.9)
LYMPHOCYTES # BLD AUTO: 0.5 K/UL (ref 1–4.8)
LYMPHOCYTES NFR BLD: 28.2 % (ref 22–41)
MCH RBC QN AUTO: 30.7 PG (ref 27–33)
MCHC RBC AUTO-ENTMCNC: 31.9 G/DL (ref 33.6–35)
MCV RBC AUTO: 96.3 FL (ref 81.4–97.8)
MONOCYTES # BLD AUTO: 0.4 K/UL (ref 0–0.85)
MONOCYTES NFR BLD AUTO: 22.6 % (ref 0–13.4)
NEUTROPHILS # BLD AUTO: 0.84 K/UL (ref 2–7.15)
NEUTROPHILS NFR BLD: 47.4 % (ref 44–72)
NRBC # BLD AUTO: 0.02 K/UL
NRBC BLD-RTO: 1.1 /100 WBC
PLATELET # BLD AUTO: 119 K/UL (ref 164–446)
PMV BLD AUTO: 9.9 FL (ref 9–12.9)
POTASSIUM SERPL-SCNC: 3.6 MMOL/L (ref 3.6–5.5)
RBC # BLD AUTO: 2.7 M/UL (ref 4.2–5.4)
SODIUM SERPL-SCNC: 131 MMOL/L (ref 135–145)
WBC # BLD AUTO: 1.8 K/UL (ref 4.8–10.8)

## 2018-05-29 PROCEDURE — 82436 ASSAY OF URINE CHLORIDE: CPT

## 2018-05-29 PROCEDURE — 80048 BASIC METABOLIC PNL TOTAL CA: CPT

## 2018-05-29 PROCEDURE — 82962 GLUCOSE BLOOD TEST: CPT | Mod: 91

## 2018-05-29 PROCEDURE — 85025 COMPLETE CBC W/AUTO DIFF WBC: CPT

## 2018-05-29 ASSESSMENT — ENCOUNTER SYMPTOMS
VOMITING: 0
WEAKNESS: 1
ORTHOPNEA: 0
CONSTIPATION: 0
FEVER: 0
BRUISES/BLEEDS EASILY: 0
COUGH: 0
ABDOMINAL PAIN: 0
CHILLS: 0
MYALGIAS: 0
DIAPHORESIS: 0
PALPITATIONS: 0
SHORTNESS OF BREATH: 0

## 2018-05-29 NOTE — [facility]
Hospital Medicine Progress Note, Adult, Complex               Author: Za Isaac Date & Time created: 5/29/2018  8:41 AM     CC: multiple myeloma with debility after chemotherapy    Interval History:  Patient claims to still feel very weak and states she cannot walk after chemotherapy  She was up in a wheelchair yesterday and has not done this in two weeks    Review of Systems:  Review of Systems   Constitutional: Negative for chills, diaphoresis and fever.   HENT: Negative for congestion.    Respiratory: Negative for cough and shortness of breath.    Cardiovascular: Negative for palpitations, orthopnea and leg swelling.   Gastrointestinal: Negative for abdominal pain, constipation and vomiting.   Genitourinary: Negative for dysuria and urgency.   Musculoskeletal: Negative for myalgias.   Skin: Negative for itching and rash.   Neurological: Positive for weakness.   Endo/Heme/Allergies: Does not bruise/bleed easily.       Physical Exam:  Physical Exam   Constitutional: She is oriented to person, place, and time. No distress.   HENT:   Mouth/Throat: Oropharynx is clear and moist.   Eyes: Right eye exhibits no discharge. No scleral icterus.   Neck: Neck supple.   Cardiovascular: Normal rate, regular rhythm and intact distal pulses.  PMI is displaced.    No murmur heard.  Pulses:       Dorsalis pedis pulses are 2+ on the right side, and 2+ on the left side.   Pulmonary/Chest: No respiratory distress. She has no wheezes. She has no rales.   Abdominal: Soft. She exhibits no distension.   Musculoskeletal: She exhibits no edema.   Neurological: She is alert and oriented to person, place, and time. Coordination normal.   Skin: Skin is dry. No rash noted. No erythema.   Psychiatric: Her behavior is normal.   Nursing note and vitals reviewed.      Labs:        Invalid input(s): [id number]      Recent Labs      05/27/18   0550  05/28/18   0600  05/29/18   0725   SODIUM  127*  129*  131*   POTASSIUM  4.1  3.8  3.6    CHLORIDE  85*  89*  93*   CO2  37*  37*  33   BUN  13  17  11   CREATININE  1.03  0.85  0.80   MAGNESIUM  1.2*  1.6   --    PHOSPHORUS   --   2.5   --    CALCIUM  7.9*  8.2*  8.4     Recent Labs      05/27/18   0550  05/28/18   0600 05/29/18   0725   ALTSGPT  10   --    --    ASTSGOT  18   --    --    ALKPHOSPHAT  51   --    --    TBILIRUBIN  0.6   --    --    GLUCOSE  296*  143*  183*     Recent Labs      05/27/18   0550  05/28/18   0600 05/29/18   0725   RBC  2.52*  2.57*  2.70*   HEMOGLOBIN  7.6*  7.8*  8.3*   HEMATOCRIT  23.6*  24.2*  26.0*   PLATELETCT  142*  140*  119*     Recent Labs      05/27/18   0550  05/28/18   0600 05/29/18   0725   WBC  3.2*  2.1*  1.8*   NEUTSPOLYS  72.20*  67.30  47.40   LYMPHOCYTES  12.10*  12.40*  28.20   MONOCYTES  14.20*  19.30*  22.60*   EOSINOPHILS  0.00  0.00  0.60   BASOPHILS  0.00  0.00  0.60   ASTSGOT  18   --    --    ALTSGPT  10   --    --    ALKPHOSPHAT  51   --    --    TBILIRUBIN  0.6   --    --            Hemodynamics:   T97.9 /60 HR92 RR18 O2 sat 95% on room air     GI/Nutrition:  Orders Placed This Encounter   Procedures   • DIET ORDER     Standing Status:   Standing     Number of Occurrences:   1     Order Specific Question:   Diet:     Answer:   Diabetic [3]     Order Specific Question:   Consistency/Fluid modifications:     Answer:   Thin Liquids [3]     Medical Decision Making, by Problem:  Multiple myeloma 1P deletion intermediate risk, IgG kappa.  Stage III based on ISS staging             treated with  2 cycles CyBorD, not tolerated well    Patient to resume oral therapy, Dr. Sim setting up through office, I placed a call today to check on the status of this    Neutropenia induced by chemotherapy, start granix today    L pathologic humerus fx and B femur palliative radiation   Due to lytic lesions, physical therapy as patient lives alone in [place]    Buttock pressure decub              Skin care and low air loss mattress  Bilateral lower  extremity venous insufficiency, edema currently resolved   Had worsening edema previously on norvasc  Type II diabetes mellitus             NPH, metformin and sliding scale insulin  R leg pain   Better on flexeril and norco  HTN   lisinopril  Morbid obesity, encourage weight loss after discharge    Metabolic alkalosis   On diamox    DNR      Quality-Core Measures   Reviewed items::  Labs reviewed and Medications reviewed  Singh catheter::  Urinary Tract Retention or Urinary Tract Obstruction  DVT prophylaxis pharmacological::  Contraindicated - High bleeding risk  Ulcer Prophylaxis::  Not indicated  Assessed for rehabilitation services:  Patient was assess for and/or received rehabilitation services during this hospitalization

## 2018-05-30 LAB
ANION GAP SERPL CALC-SCNC: 7 MMOL/L (ref 0–11.9)
BUN SERPL-MCNC: 13 MG/DL (ref 8–22)
CALCIUM SERPL-MCNC: 9 MG/DL (ref 8.4–10.2)
CHLORIDE SERPL-SCNC: 94 MMOL/L (ref 96–112)
CO2 SERPL-SCNC: 28 MMOL/L (ref 20–33)
CREAT SERPL-MCNC: 0.83 MG/DL (ref 0.5–1.4)
ERYTHROCYTE [DISTWIDTH] IN BLOOD BY AUTOMATED COUNT: 57.2 FL (ref 35.9–50)
GLUCOSE BLD-MCNC: 134 MG/DL (ref 65–99)
GLUCOSE BLD-MCNC: 157 MG/DL (ref 65–99)
GLUCOSE BLD-MCNC: 172 MG/DL (ref 65–99)
GLUCOSE BLD-MCNC: 180 MG/DL (ref 65–99)
GLUCOSE BLD-MCNC: 182 MG/DL (ref 65–99)
GLUCOSE SERPL-MCNC: 186 MG/DL (ref 65–99)
HCT VFR BLD AUTO: 27.7 % (ref 37–47)
HGB BLD-MCNC: 8.6 G/DL (ref 12–16)
MCH RBC QN AUTO: 30.3 PG (ref 27–33)
MCHC RBC AUTO-ENTMCNC: 31 G/DL (ref 33.6–35)
MCV RBC AUTO: 97.5 FL (ref 81.4–97.8)
PLATELET # BLD AUTO: 118 K/UL (ref 164–446)
PMV BLD AUTO: 11.4 FL (ref 9–12.9)
POTASSIUM SERPL-SCNC: 4.3 MMOL/L (ref 3.6–5.5)
RBC # BLD AUTO: 2.84 M/UL (ref 4.2–5.4)
SODIUM SERPL-SCNC: 129 MMOL/L (ref 135–145)
WBC # BLD AUTO: 12.7 K/UL (ref 4.8–10.8)

## 2018-05-30 PROCEDURE — 82962 GLUCOSE BLOOD TEST: CPT | Mod: 91

## 2018-05-30 PROCEDURE — 80048 BASIC METABOLIC PNL TOTAL CA: CPT

## 2018-05-30 PROCEDURE — 85027 COMPLETE CBC AUTOMATED: CPT

## 2018-05-30 ASSESSMENT — ENCOUNTER SYMPTOMS
BRUISES/BLEEDS EASILY: 0
SHORTNESS OF BREATH: 0
FEVER: 0
HEMOPTYSIS: 0
MYALGIAS: 0
ABDOMINAL PAIN: 0
VOMITING: 0
WEAKNESS: 1
NAUSEA: 0
ORTHOPNEA: 0
CONSTIPATION: 0

## 2018-05-30 NOTE — [facility]
Hospital Medicine Progress Note, Adult, Complex               Author: Za Isaac Date & Time created: 5/30/2018  9:19 AM     CC: multiple myeloma with debility after chemotherapy    Interval History:  Patient claims to still feel very weak and states she cannot walk after chemotherapy  Patient is up in a wheelchair this morning, she does feel some improved strength but still diffusely weak and easily fatigued    Review of Systems:  Review of Systems   Constitutional: Negative for fever.   HENT: Negative for congestion.    Respiratory: Negative for hemoptysis and shortness of breath.    Cardiovascular: Negative for chest pain, orthopnea and leg swelling.   Gastrointestinal: Negative for abdominal pain, constipation, nausea and vomiting.   Genitourinary: Negative for dysuria, frequency and hematuria.   Musculoskeletal: Negative for myalgias.   Skin: Negative for itching.   Neurological: Positive for weakness.   Endo/Heme/Allergies: Does not bruise/bleed easily.       Physical Exam:  Physical Exam   Constitutional: She is oriented to person, place, and time. No distress.   Eyes: Conjunctivae are normal. Right eye exhibits no discharge. No scleral icterus.   Neck: Neck supple.   Cardiovascular: Normal rate, regular rhythm and intact distal pulses.  PMI is displaced.    No murmur heard.  Pulses:       Dorsalis pedis pulses are 2+ on the right side, and 2+ on the left side.   Pulmonary/Chest: Breath sounds normal. No respiratory distress. She has no rales.   Abdominal: Soft. She exhibits no distension.   Large abdominal pannus   Musculoskeletal: She exhibits no edema.   Neurological: She is alert and oriented to person, place, and time. Coordination normal.   Skin: No rash noted. She is not diaphoretic. No erythema. There is pallor.   Psychiatric: Her behavior is normal.   Nursing note and vitals reviewed.      Labs:        Invalid input(s): [id number]      Recent Labs      05/28/18   0600  05/29/18   0785    SODIUM  129*  131*   POTASSIUM  3.8  3.6   CHLORIDE  89*  93*   CO2  37*  33   BUN  17  11   CREATININE  0.85  0.80   MAGNESIUM  1.6   --    PHOSPHORUS  2.5   --    CALCIUM  8.2*  8.4     Recent Labs      05/28/18   0600  05/29/18   0725   GLUCOSE  143*  183*     Recent Labs      05/28/18   0600  05/29/18   0725   RBC  2.57*  2.70*   HEMOGLOBIN  7.8*  8.3*   HEMATOCRIT  24.2*  26.0*   PLATELETCT  140*  119*     Recent Labs      05/28/18   0600  05/29/18   0725   WBC  2.1*  1.8*   NEUTSPOLYS  67.30  47.40   LYMPHOCYTES  12.40*  28.20   MONOCYTES  19.30*  22.60*   EOSINOPHILS  0.00  0.60   BASOPHILS  0.00  0.60           Hemodynamics:   T97.9 /52 HR82 RR18 O2 sat 95% on room air     GI/Nutrition:  Orders Placed This Encounter   Procedures   • DIET ORDER     Standing Status:   Standing     Number of Occurrences:   1     Order Specific Question:   Diet:     Answer:   Diabetic [3]     Order Specific Question:   Consistency/Fluid modifications:     Answer:   Thin Liquids [3]     Medical Decision Making, by Problem:  Multiple myeloma 1P deletion intermediate risk, IgG kappa.  Stage III based on ISS staging             treated with  2 cycles CyBorD, not tolerated well    Patient to resume oral therapy when/if approved by insurance, discussed with Dr. Sim and he is working on insurance approval and specialty pharmacy acceptance to fill the medication but expects no response until next week    Neutropenia induced by chemotherapy, continue granix   Recheck labs, patient afebrile   Reverse isolation    L pathologic humerus fx and B femur palliative radiation   Due to lytic lesions, physical therapy    Pain control    Buttock pressure decub              Skin care and low air loss mattress  Bilateral lower extremity venous insufficiency, edema  resolved   Had worsening edema previously on norvasc  Type II diabetes mellitus             NPH, metformin and sliding scale insulin  R leg pain    flexeril and  norco  HTN   lisinopril  Morbid obesity, encourage weight loss after discharge    Metabolic alkalosis   On diamox    DNR      Quality-Core Measures   Reviewed items::  Labs reviewed and Medications reviewed  Singh catheter::  Urinary Tract Retention or Urinary Tract Obstruction  DVT prophylaxis pharmacological::  Contraindicated - High bleeding risk  Ulcer Prophylaxis::  Not indicated  Assessed for rehabilitation services:  Patient was assess for and/or received rehabilitation services during this hospitalization

## 2018-05-31 LAB
ANION GAP SERPL CALC-SCNC: 3 MMOL/L (ref 0–11.9)
BACTERIA UR CULT: ABNORMAL
BACTERIA UR CULT: ABNORMAL
BUN SERPL-MCNC: 12 MG/DL (ref 8–22)
CALCIUM SERPL-MCNC: 9.1 MG/DL (ref 8.4–10.2)
CHLORIDE SERPL-SCNC: 97 MMOL/L (ref 96–112)
CO2 SERPL-SCNC: 31 MMOL/L (ref 20–33)
CREAT SERPL-MCNC: 0.88 MG/DL (ref 0.5–1.4)
ERYTHROCYTE [DISTWIDTH] IN BLOOD BY AUTOMATED COUNT: 57.9 FL (ref 35.9–50)
GLUCOSE BLD-MCNC: 142 MG/DL (ref 65–99)
GLUCOSE BLD-MCNC: 152 MG/DL (ref 65–99)
GLUCOSE BLD-MCNC: 164 MG/DL (ref 65–99)
GLUCOSE SERPL-MCNC: 137 MG/DL (ref 65–99)
HCT VFR BLD AUTO: 23.2 % (ref 37–47)
HGB BLD-MCNC: 7.4 G/DL (ref 12–16)
MCH RBC QN AUTO: 30.8 PG (ref 27–33)
MCHC RBC AUTO-ENTMCNC: 31.9 G/DL (ref 33.6–35)
MCV RBC AUTO: 96.7 FL (ref 81.4–97.8)
PLATELET # BLD AUTO: 134 K/UL (ref 164–446)
PMV BLD AUTO: 11.3 FL (ref 9–12.9)
POTASSIUM SERPL-SCNC: 4.2 MMOL/L (ref 3.6–5.5)
RBC # BLD AUTO: 2.4 M/UL (ref 4.2–5.4)
SIGNIFICANT IND 70042: ABNORMAL
SITE SITE: ABNORMAL
SODIUM SERPL-SCNC: 131 MMOL/L (ref 135–145)
SOURCE SOURCE: ABNORMAL
WBC # BLD AUTO: 8.1 K/UL (ref 4.8–10.8)

## 2018-05-31 PROCEDURE — 82962 GLUCOSE BLOOD TEST: CPT | Mod: 91

## 2018-05-31 PROCEDURE — 85027 COMPLETE CBC AUTOMATED: CPT

## 2018-05-31 PROCEDURE — 80048 BASIC METABOLIC PNL TOTAL CA: CPT

## 2018-05-31 ASSESSMENT — ENCOUNTER SYMPTOMS
FEVER: 0
NAUSEA: 0
BRUISES/BLEEDS EASILY: 0
CHILLS: 0
WEAKNESS: 0
CONSTIPATION: 0
MYALGIAS: 0
ORTHOPNEA: 0
DIAPHORESIS: 0
PALPITATIONS: 0
SHORTNESS OF BREATH: 0
ABDOMINAL PAIN: 0
COUGH: 0
HEMOPTYSIS: 0
HEARTBURN: 0

## 2018-05-31 NOTE — [facility]
Hospital Medicine Progress Note, Adult, Complex               Author: Za Isaac Date & Time created: 5/31/2018  1:12 PM     CC: multiple myeloma with debility after chemotherapy    Interval History:  Patient claims to still feel very weak and states she cannot walk after chemotherapy  Patient is up in a wheelchair during the daytime, she continues to require assistance with any  movement    Review of Systems:  Review of Systems   Constitutional: Negative for chills, diaphoresis and fever.   HENT: Negative for congestion.    Respiratory: Negative for cough, hemoptysis and shortness of breath.    Cardiovascular: Negative for chest pain, palpitations, orthopnea and leg swelling.   Gastrointestinal: Negative for abdominal pain, constipation, heartburn and nausea.   Genitourinary: Negative for dysuria and hematuria.   Musculoskeletal: Negative for myalgias.   Skin: Negative for itching and rash.   Neurological: Negative for weakness.   Endo/Heme/Allergies: Does not bruise/bleed easily.       Physical Exam:  Physical Exam   Constitutional: She is oriented to person, place, and time. No distress.   HENT:   Mouth/Throat: Oropharynx is clear and moist.   Eyes: Conjunctivae are normal. No scleral icterus.   Neck: No tracheal deviation present.   Cardiovascular: Normal rate, regular rhythm and intact distal pulses.  PMI is displaced.    No murmur heard.  Pulses:       Dorsalis pedis pulses are 2+ on the right side, and 2+ on the left side.   Pulmonary/Chest: Breath sounds normal. No respiratory distress. She has no rales.   Abdominal: Soft. She exhibits no distension. There is no tenderness.   Large abdominal pannus   Musculoskeletal: She exhibits no edema.   Neurological: She is alert and oriented to person, place, and time. Coordination normal.   Skin: Skin is dry. There is pallor.   Psychiatric: Her behavior is normal.   Nursing note and vitals reviewed.      Labs:        Invalid input(s): [id number]      Recent  Labs      05/29/18   0725 05/30/18   1205  05/31/18   0425   SODIUM  131*  129*  131*   POTASSIUM  3.6  4.3  4.2   CHLORIDE  93*  94*  97   CO2  33  28  31   BUN  11  13  12   CREATININE  0.80  0.83  0.88   CALCIUM  8.4  9.0  9.1     Recent Labs      05/29/18   0725 05/30/18   1205  05/31/18   0425   GLUCOSE  183*  186*  137*     Recent Labs      05/29/18   0725 05/30/18   1312  05/31/18   0425   RBC  2.70*  2.84*  2.40*   HEMOGLOBIN  8.3*  8.6*  7.4*   HEMATOCRIT  26.0*  27.7*  23.2*   PLATELETCT  119*  118*  134*     Recent Labs      05/29/18 0725 05/30/18   1312  05/31/18   0425   WBC  1.8*  12.7*  8.1   NEUTSPOLYS  47.40   --    --    LYMPHOCYTES  28.20   --    --    MONOCYTES  22.60*   --    --    EOSINOPHILS  0.60   --    --    BASOPHILS  0.60   --    --            Hemodynamics:   T97.9 /53 HR76 RR18 O2 sat 95% on room air     GI/Nutrition:  Orders Placed This Encounter   Procedures   • DIET ORDER     Standing Status:   Standing     Number of Occurrences:   1     Order Specific Question:   Diet:     Answer:   Diabetic [3]     Order Specific Question:   Consistency/Fluid modifications:     Answer:   Thin Liquids [3]     Medical Decision Making, by Problem:  Multiple myeloma 1P deletion intermediate risk, IgG kappa.  Stage III based on ISS staging             treated with  2 cycles CyBorD, not tolerated well    Patient to resume oral therapy when/if approved by insurance, discussed with Dr. Sim and he is working on insurance approval and specialty pharmacy  to fill the medication but expects no response until next week    Neutropenia induced by chemotherapy,    Improved after granix   Monitor labs    L pathologic humerus fx and B femur palliative radiation   Due to lytic lesions, physical therapy    Pain control    Buttock pressure decub              Skin care and low air loss mattress  Bilateral lower extremity venous insufficiency, edema  resolved   Had worsening edema previously on  norvasc  Type II diabetes mellitus             NPH, metformin and sliding scale insulin  R leg pain    flexeril and norco  HTN   lisinopril  Morbid obesity, BMI over 60,    encourage weight loss after discharge    Metabolic alkalosis    diamox    DNR      Quality-Core Measures   Reviewed items::  Labs reviewed and Medications reviewed  Singh catheter::  Urinary Tract Retention or Urinary Tract Obstruction  DVT prophylaxis pharmacological::  Contraindicated - High bleeding risk  Ulcer Prophylaxis::  Not indicated  Assessed for rehabilitation services:  Patient was assess for and/or received rehabilitation services during this hospitalization

## 2018-06-01 LAB
ANION GAP SERPL CALC-SCNC: 3 MMOL/L (ref 0–11.9)
APPEARANCE UR: CLEAR
BACTERIA #/AREA URNS HPF: ABNORMAL /HPF
BILIRUB UR QL STRIP.AUTO: NEGATIVE
BUN SERPL-MCNC: 13 MG/DL (ref 8–22)
CALCIUM SERPL-MCNC: 9.1 MG/DL (ref 8.4–10.2)
CASTS URNS QL MICRO: ABNORMAL /LPF
CHLORIDE SERPL-SCNC: 99 MMOL/L (ref 96–112)
CO2 SERPL-SCNC: 30 MMOL/L (ref 20–33)
COLOR UR: YELLOW
CREAT SERPL-MCNC: 0.85 MG/DL (ref 0.5–1.4)
ERYTHROCYTE [DISTWIDTH] IN BLOOD BY AUTOMATED COUNT: 55.9 FL (ref 35.9–50)
GLUCOSE BLD-MCNC: 119 MG/DL (ref 65–99)
GLUCOSE BLD-MCNC: 121 MG/DL (ref 65–99)
GLUCOSE BLD-MCNC: 151 MG/DL (ref 65–99)
GLUCOSE BLD-MCNC: 154 MG/DL (ref 65–99)
GLUCOSE SERPL-MCNC: 102 MG/DL (ref 65–99)
GLUCOSE UR STRIP.AUTO-MCNC: NEGATIVE MG/DL
HCT VFR BLD AUTO: 25.9 % (ref 37–47)
HGB BLD-MCNC: 8.2 G/DL (ref 12–16)
KETONES UR STRIP.AUTO-MCNC: NEGATIVE MG/DL
LEUKOCYTE ESTERASE UR QL STRIP.AUTO: NEGATIVE
MCH RBC QN AUTO: 30.3 PG (ref 27–33)
MCHC RBC AUTO-ENTMCNC: 31.7 G/DL (ref 33.6–35)
MCV RBC AUTO: 95.6 FL (ref 81.4–97.8)
MICRO URNS: ABNORMAL
MUCOUS THREADS #/AREA URNS HPF: ABNORMAL /HPF
NITRITE UR QL STRIP.AUTO: NEGATIVE
PH UR STRIP.AUTO: 8.5 [PH]
PLATELET # BLD AUTO: 157 K/UL (ref 164–446)
PMV BLD AUTO: 11.1 FL (ref 9–12.9)
POTASSIUM SERPL-SCNC: 4.2 MMOL/L (ref 3.6–5.5)
PROT UR QL STRIP: NEGATIVE MG/DL
RBC # BLD AUTO: 2.71 M/UL (ref 4.2–5.4)
RBC # URNS HPF: ABNORMAL /HPF
RBC UR QL AUTO: ABNORMAL
SODIUM SERPL-SCNC: 132 MMOL/L (ref 135–145)
SP GR UR STRIP.AUTO: 1.01
UNIDENT CRYS URNS QL MICRO: ABNORMAL /HPF
WBC # BLD AUTO: 6 K/UL (ref 4.8–10.8)
WBC #/AREA URNS HPF: ABNORMAL /HPF

## 2018-06-01 PROCEDURE — 81001 URINALYSIS AUTO W/SCOPE: CPT

## 2018-06-01 PROCEDURE — 80048 BASIC METABOLIC PNL TOTAL CA: CPT

## 2018-06-01 PROCEDURE — 85027 COMPLETE CBC AUTOMATED: CPT

## 2018-06-01 PROCEDURE — 82962 GLUCOSE BLOOD TEST: CPT | Mod: 91

## 2018-06-01 ASSESSMENT — ENCOUNTER SYMPTOMS
WEAKNESS: 1
CONSTIPATION: 0
NAUSEA: 0
BRUISES/BLEEDS EASILY: 0
MYALGIAS: 0
ORTHOPNEA: 0
FEVER: 0
VOMITING: 0
ABDOMINAL PAIN: 0
COUGH: 0
SHORTNESS OF BREATH: 0

## 2018-06-01 NOTE — [facility]
Hospital Medicine Progress Note, Adult, Complex               Author: Za Isaac Date & Time created: 6/1/2018  9:29 AM     CC: multiple myeloma with debility after chemotherapy    Interval History:  Patient claims to still feel very weak and states she cannot walk after chemotherapy  Patient is up in a wheelchair yesterday but could only tolerate a few minutes due to fatigue  Today she is using her left hand to type on her computer as her fracture pain is controlled    Review of Systems:  Review of Systems   Constitutional: Negative for fever.   HENT: Negative for congestion.    Respiratory: Negative for cough and shortness of breath.    Cardiovascular: Negative for chest pain, orthopnea and leg swelling.   Gastrointestinal: Negative for abdominal pain, constipation, nausea and vomiting.   Genitourinary: Negative for dysuria and hematuria.   Musculoskeletal: Negative for myalgias.   Neurological: Positive for weakness.   Endo/Heme/Allergies: Does not bruise/bleed easily.       Physical Exam:  Physical Exam   Constitutional: She is oriented to person, place, and time. No distress.   HENT:   Mouth/Throat: No oropharyngeal exudate.   Eyes: No scleral icterus.   Neck: Neck supple. No tracheal deviation present.   Cardiovascular: Normal rate, regular rhythm and intact distal pulses.  PMI is displaced.    No murmur heard.  Pulses:       Dorsalis pedis pulses are 2+ on the right side, and 2+ on the left side.   Pulmonary/Chest: No respiratory distress. She has no wheezes. She has no rales.   Abdominal: Soft. There is no tenderness.   Large abdominal pannus   Musculoskeletal: She exhibits no edema.   Neurological: She is alert and oriented to person, place, and time. Coordination normal.   Skin: No rash noted. She is not diaphoretic. No erythema. There is pallor.   Psychiatric: Her behavior is normal.   Nursing note and vitals reviewed.      Labs:        Invalid input(s): [id number]      Recent Labs      05/30/18    1205  05/31/18   0425  06/01/18   0400   SODIUM  129*  131*  132*   POTASSIUM  4.3  4.2  4.2   CHLORIDE  94*  97  99   CO2  28  31  30   BUN  13  12  13   CREATININE  0.83  0.88  0.85   CALCIUM  9.0  9.1  9.1     Recent Labs      05/30/18   1205  05/31/18   0425  06/01/18   0400   GLUCOSE  186*  137*  102*     Recent Labs      05/30/18   1312  05/31/18   0425  06/01/18   0400   RBC  2.84*  2.40*  2.71*   HEMOGLOBIN  8.6*  7.4*  8.2*   HEMATOCRIT  27.7*  23.2*  25.9*   PLATELETCT  118*  134*  157*     Recent Labs      05/30/18   1312  05/31/18   0425  06/01/18   0400   WBC  12.7*  8.1  6.0           Hemodynamics:   T97.6 /73 HR78 RR18 O2 sat 95% on room air     GI/Nutrition:  Orders Placed This Encounter   Procedures   • DIET ORDER     Standing Status:   Standing     Number of Occurrences:   1     Order Specific Question:   Diet:     Answer:   Diabetic [3]     Order Specific Question:   Texture/Fiber modifications:     Answer:   Dysphagia 1(Pureed)specify fluid consistency(question 6) [1]     Comments:   Cottage cheese okay      Order Specific Question:   Consistency/Fluid modifications:     Answer:   Thin Liquids [3]     Medical Decision Making, by Problem:  Multiple myeloma 1P deletion intermediate risk, IgG kappa.  Stage III based on ISS staging             treated with  2 cycles CyBorD, not tolerated well    Patient to resume oral therapy when/if approved by insurance, discussed with Dr. Sim and he is working on insurance and pharmacy approval to fill the medication but expects no response until next week    Neutropenia induced by chemotherapy,    Improved after granix    L pathologic humerus fx and B femur palliative radiation   Due to lytic lesions, physical therapy    Pain controlled    Buttock pressure decub              Skin care and low air loss mattress  Bilateral lower extremity venous insufficiency, edema  resolved   Had worsening edema previously on norvasc  Type II diabetes mellitus              NPH, metformin and sliding scale insulin  R leg pain    flexeril and norco  HTN   lisinopril  Morbid obesity, BMI over 60,    encourage weight loss after discharge    Metabolic alkalosis    diamox    DNR      Quality-Core Measures   Reviewed items::  Labs reviewed and Medications reviewed  Singh catheter::  Urinary Tract Retention or Urinary Tract Obstruction  DVT prophylaxis pharmacological::  Contraindicated - High bleeding risk  Ulcer Prophylaxis::  Not indicated  Assessed for rehabilitation services:  Patient was assess for and/or received rehabilitation services during this hospitalization

## 2018-06-02 LAB
GLUCOSE BLD-MCNC: 141 MG/DL (ref 65–99)
GLUCOSE BLD-MCNC: 144 MG/DL (ref 65–99)
GLUCOSE BLD-MCNC: 167 MG/DL (ref 65–99)
GLUCOSE BLD-MCNC: 174 MG/DL (ref 65–99)
GLUCOSE BLD-MCNC: 174 MG/DL (ref 65–99)
GLUCOSE BLD-MCNC: 181 MG/DL (ref 65–99)
GLUCOSE BLD-MCNC: 188 MG/DL (ref 65–99)

## 2018-06-02 PROCEDURE — 82962 GLUCOSE BLOOD TEST: CPT | Mod: 91

## 2018-06-02 ASSESSMENT — ENCOUNTER SYMPTOMS
FEVER: 0
SHORTNESS OF BREATH: 0
WEAKNESS: 1
HALLUCINATIONS: 1
BRUISES/BLEEDS EASILY: 0
CHILLS: 0
COUGH: 0
CONSTIPATION: 0
ABDOMINAL PAIN: 0
ORTHOPNEA: 0
WHEEZING: 0
DIAPHORESIS: 0
NAUSEA: 0

## 2018-06-02 NOTE — [facility]
Hospital Medicine Progress Note, Adult, Complex               Author: Za Isaac Date & Time created: 6/2/2018  8:54 AM     CC: multiple myeloma with debility after chemotherapy    Interval History:  Patient claims to still feel very weak and states she cannot walk after chemotherapy  Patient remains weak, she has visual hallucinations concerning her sister as she has been talking to her sister about them over the phone      Review of Systems:  Review of Systems   Constitutional: Negative for chills, diaphoresis and fever.   HENT: Negative for congestion.    Respiratory: Negative for cough, shortness of breath and wheezing.    Cardiovascular: Negative for chest pain, orthopnea and leg swelling.   Gastrointestinal: Negative for abdominal pain, constipation and nausea.   Genitourinary: Negative for dysuria, hematuria and urgency.   Musculoskeletal:        Left arm pain with movement   Neurological: Positive for weakness.   Endo/Heme/Allergies: Does not bruise/bleed easily.   Psychiatric/Behavioral: Positive for hallucinations.       Physical Exam:  Physical Exam   Constitutional: She is oriented to person, place, and time. No distress.   HENT:   Mouth/Throat: Oropharynx is clear and moist. No oropharyngeal exudate.   Eyes: Conjunctivae are normal. No scleral icterus.   Neck: Neck supple. No tracheal deviation present.   Cardiovascular: Normal rate, regular rhythm and intact distal pulses.  PMI is displaced.    No murmur heard.  Pulses:       Dorsalis pedis pulses are 2+ on the right side, and 2+ on the left side.   Pulmonary/Chest: Breath sounds normal. No respiratory distress. She has no rales.   Abdominal: Soft. Bowel sounds are normal. There is no tenderness.   Large abdominal pannus   Musculoskeletal: She exhibits no edema.   Neurological: She is alert and oriented to person, place, and time. Coordination normal.   Skin: Skin is warm and dry. There is pallor.   Psychiatric: Her behavior is normal.   Nursing  note and vitals reviewed.      Labs:        Invalid input(s): [id number]      Recent Labs      05/30/18   1205  05/31/18   0425  06/01/18   0400   SODIUM  129*  131*  132*   POTASSIUM  4.3  4.2  4.2   CHLORIDE  94*  97  99   CO2  28 31  30   BUN  13  12  13   CREATININE  0.83  0.88  0.85   CALCIUM  9.0  9.1  9.1     Recent Labs      05/30/18   1205  05/31/18   0425  06/01/18   0400   GLUCOSE  186*  137*  102*     Recent Labs      05/30/18   1312  05/31/18   0425  06/01/18   0400   RBC  2.84*  2.40*  2.71*   HEMOGLOBIN  8.6*  7.4*  8.2*   HEMATOCRIT  27.7*  23.2*  25.9*   PLATELETCT  118*  134*  157*     Recent Labs      05/30/18   1312  05/31/18   0425  06/01/18   0400   WBC  12.7*  8.1  6.0           Hemodynamics:   T97.7 /62 HR79 RR22 O2 sat 94% on room air     GI/Nutrition:  Orders Placed This Encounter   Procedures   • DIET ORDER     Standing Status:   Standing     Number of Occurrences:   1     Order Specific Question:   Diet:     Answer:   Diabetic [3]     Order Specific Question:   Texture/Fiber modifications:     Answer:   Dysphagia 1(Pureed)specify fluid consistency(question 6) [1]     Comments:   Cottage cheese okay      Order Specific Question:   Consistency/Fluid modifications:     Answer:   Thin Liquids [3]     Medical Decision Making, by Problem:  Multiple myeloma 1P deletion intermediate risk, IgG kappa.  Stage III based on ISS staging             treated with  2 cycles CyBorD, not tolerated well    Patient to resume oral therapy when/if approved by insurance, discussed with Dr. Sim and should have approval or denial next week    Neutropenia induced by chemotherapy,    Improved after granix    L pathologic humerus fx and B femur palliative radiation   Due to lytic lesions, physical therapy    Pain controlled with patient requiring no as needed medication    Acute metabolic encephalopathy, due to pain medication    Patient is having visual hallucinations   Will stop scheduled  oxycontin   Start tramadol for moderate pain as needed    Buttock pressure decub              Skin care and low air loss mattress  Bilateral lower extremity venous insufficiency, edema  Resolved with leg elevation   Had worsening edema previously on norvasc  Type II diabetes mellitus             NPH, metformin and sliding scale insulin    HTN   lisinopril  Morbid obesity, BMI over 60,    encourage weight loss after discharge    Metabolic alkalosis    diamox    DNR      Quality-Core Measures   Reviewed items::  Labs reviewed and Medications reviewed  Singh catheter::  Urinary Tract Retention or Urinary Tract Obstruction  DVT prophylaxis pharmacological::  Contraindicated - High bleeding risk  Ulcer Prophylaxis::  Not indicated  Assessed for rehabilitation services:  Patient was assess for and/or received rehabilitation services during this hospitalization

## 2018-06-03 LAB
ANION GAP SERPL CALC-SCNC: 5 MMOL/L (ref 0–11.9)
BUN SERPL-MCNC: 13 MG/DL (ref 8–22)
CALCIUM SERPL-MCNC: 9 MG/DL (ref 8.4–10.2)
CHLORIDE SERPL-SCNC: 98 MMOL/L (ref 96–112)
CO2 SERPL-SCNC: 28 MMOL/L (ref 20–33)
CREAT SERPL-MCNC: 0.7 MG/DL (ref 0.5–1.4)
ERYTHROCYTE [DISTWIDTH] IN BLOOD BY AUTOMATED COUNT: 57.4 FL (ref 35.9–50)
GLUCOSE BLD-MCNC: 158 MG/DL (ref 65–99)
GLUCOSE BLD-MCNC: 163 MG/DL (ref 65–99)
GLUCOSE BLD-MCNC: 163 MG/DL (ref 65–99)
GLUCOSE SERPL-MCNC: 121 MG/DL (ref 65–99)
HCT VFR BLD AUTO: 24.2 % (ref 37–47)
HGB BLD-MCNC: 7.6 G/DL (ref 12–16)
MCH RBC QN AUTO: 30 PG (ref 27–33)
MCHC RBC AUTO-ENTMCNC: 31.4 G/DL (ref 33.6–35)
MCV RBC AUTO: 95.7 FL (ref 81.4–97.8)
PLATELET # BLD AUTO: 184 K/UL (ref 164–446)
PMV BLD AUTO: 10.8 FL (ref 9–12.9)
POTASSIUM SERPL-SCNC: 4.1 MMOL/L (ref 3.6–5.5)
RBC # BLD AUTO: 2.53 M/UL (ref 4.2–5.4)
SODIUM SERPL-SCNC: 131 MMOL/L (ref 135–145)
WBC # BLD AUTO: 3.4 K/UL (ref 4.8–10.8)

## 2018-06-03 PROCEDURE — 85027 COMPLETE CBC AUTOMATED: CPT

## 2018-06-03 PROCEDURE — 82962 GLUCOSE BLOOD TEST: CPT

## 2018-06-03 PROCEDURE — 80048 BASIC METABOLIC PNL TOTAL CA: CPT

## 2018-06-03 ASSESSMENT — ENCOUNTER SYMPTOMS
DIAPHORESIS: 0
COUGH: 0
VOMITING: 0
BRUISES/BLEEDS EASILY: 0
NAUSEA: 0
FEVER: 0
SHORTNESS OF BREATH: 0
ABDOMINAL PAIN: 0
CONSTIPATION: 0
WEAKNESS: 1

## 2018-06-03 NOTE — [facility]
Hospital Medicine Progress Note, Adult, Complex               Author: Za Isaac Date & Time created: 6/3/2018  7:59 AM     CC: multiple myeloma with debility after chemotherapy    Interval History:  Patient claims to still feel very weak and states she cannot walk after chemotherapy  Patient remains weak, no visual hallucinations noted overnight      Review of Systems:  Review of Systems   Constitutional: Negative for diaphoresis and fever.   Respiratory: Negative for cough and shortness of breath.    Cardiovascular: Negative for chest pain and leg swelling.   Gastrointestinal: Negative for abdominal pain, constipation, nausea and vomiting.   Genitourinary: Negative for dysuria and hematuria.   Skin: Negative for rash.   Neurological: Positive for weakness.   Endo/Heme/Allergies: Does not bruise/bleed easily.       Physical Exam:  Physical Exam   Constitutional: She is oriented to person, place, and time. No distress.   HENT:   Mouth/Throat: No oropharyngeal exudate.   Eyes: No scleral icterus.   Neck: Neck supple. No tracheal deviation present.   Cardiovascular: Normal rate, regular rhythm and intact distal pulses.  PMI is displaced.    No murmur heard.  Pulses:       Dorsalis pedis pulses are 2+ on the right side, and 2+ on the left side.   Pulmonary/Chest: Breath sounds normal. She has no wheezes. She has no rales.   Abdominal: Soft. Bowel sounds are normal. She exhibits no distension.   Large abdominal pannus   Musculoskeletal: She exhibits no edema.   Neurological: She is alert and oriented to person, place, and time. Coordination normal.   Skin: Skin is warm. No rash noted. She is not diaphoretic. There is pallor.   Psychiatric: Her behavior is normal.   Nursing note and vitals reviewed.      Labs:        Invalid input(s): [id number]      Recent Labs      06/01/18   0400  06/03/18   0415   SODIUM  132*  131*   POTASSIUM  4.2  4.1   CHLORIDE  99  98   CO2  30  28   BUN  13  13   CREATININE  0.85  0.70    CALCIUM  9.1  9.0     Recent Labs      06/01/18   0400  06/03/18   0415   GLUCOSE  102*  121*     Recent Labs      06/01/18   0400  06/03/18   0415   RBC  2.71*  2.53*   HEMOGLOBIN  8.2*  7.6*   HEMATOCRIT  25.9*  24.2*   PLATELETCT  157*  184     Recent Labs      06/01/18   0400  06/03/18   0415   WBC  6.0  3.4*           Hemodynamics:   T97.7 /61 HR74 RR18 O2 sat 90% on room air     GI/Nutrition:  Orders Placed This Encounter   Procedures   • DIET ORDER     Standing Status:   Standing     Number of Occurrences:   1     Order Specific Question:   Diet:     Answer:   Diabetic [3]     Order Specific Question:   Texture/Fiber modifications:     Answer:   Dysphagia 1(Pureed)specify fluid consistency(question 6) [1]     Comments:   Cottage cheese okay      Order Specific Question:   Consistency/Fluid modifications:     Answer:   Thin Liquids [3]     Medical Decision Making, by Problem:  Multiple myeloma 1P deletion intermediate risk, IgG kappa.  Stage III based on ISS staging             treated with  2 cycles CyBorD, not tolerated well    Patient to resume oral therapy when/if approved by insurance, discussed with Dr. Sim and should have approval or denial next week, sister and patient aware and also aware that due to patient's weakness she may not be a candidate for therapy    Neutropenia induced by chemotherapy,    Improved after granix, white cell count trending down, will follow on labs    L pathologic humerus fx and B femur palliative radiation   Due to lytic lesions, physical therapy    Pain controlled with as needed medication    Acute metabolic encephalopathy, due to pain medication    Patient had visual hallucinations on scheduled narcotics   Improving today    Buttock pressure decub              Skin care and low air loss mattress  Bilateral lower extremity venous insufficiency, edema  Resolved with leg elevation   Had worsening edema previously on norvasc  Type II diabetes mellitus              NPH, metformin and sliding scale insulin    HTN   lisinopril  Morbid obesity, BMI over 60,    encourage weight loss after discharge    Metabolic alkalosis    resolved, stop diamox    DNR      Quality-Core Measures   Reviewed items::  Labs reviewed and Medications reviewed  Singh catheter::  Urinary Tract Retention or Urinary Tract Obstruction  DVT prophylaxis pharmacological::  Contraindicated - High bleeding risk  Ulcer Prophylaxis::  Not indicated  Assessed for rehabilitation services:  Patient was assess for and/or received rehabilitation services during this hospitalization

## 2018-06-04 LAB
AMMONIA PLAS-SCNC: 22 UMOL/L (ref 11–45)
ANION GAP SERPL CALC-SCNC: 1 MMOL/L (ref 0–11.9)
BASOPHILS # BLD AUTO: 0.3 % (ref 0–1.8)
BASOPHILS # BLD: 0.01 K/UL (ref 0–0.12)
BUN SERPL-MCNC: 12 MG/DL (ref 8–22)
CALCIUM SERPL-MCNC: 8.8 MG/DL (ref 8.4–10.2)
CHLORIDE SERPL-SCNC: 100 MMOL/L (ref 96–112)
CO2 SERPL-SCNC: 29 MMOL/L (ref 20–33)
CREAT SERPL-MCNC: 0.72 MG/DL (ref 0.5–1.4)
EOSINOPHIL # BLD AUTO: 0.03 K/UL (ref 0–0.51)
EOSINOPHIL NFR BLD: 0.9 % (ref 0–6.9)
ERYTHROCYTE [DISTWIDTH] IN BLOOD BY AUTOMATED COUNT: 55.9 FL (ref 35.9–50)
GLUCOSE BLD-MCNC: 150 MG/DL (ref 65–99)
GLUCOSE BLD-MCNC: 156 MG/DL (ref 65–99)
GLUCOSE SERPL-MCNC: 125 MG/DL (ref 65–99)
HCT VFR BLD AUTO: 23.6 % (ref 37–47)
HGB BLD-MCNC: 7.5 G/DL (ref 12–16)
IMM GRANULOCYTES # BLD AUTO: 0.14 K/UL (ref 0–0.11)
IMM GRANULOCYTES NFR BLD AUTO: 4.4 % (ref 0–0.9)
IRON SERPL-MCNC: 66 UG/DL (ref 40–170)
LYMPHOCYTES # BLD AUTO: 0.59 K/UL (ref 1–4.8)
LYMPHOCYTES NFR BLD: 18.7 % (ref 22–41)
MCH RBC QN AUTO: 30.1 PG (ref 27–33)
MCHC RBC AUTO-ENTMCNC: 31.8 G/DL (ref 33.6–35)
MCV RBC AUTO: 94.8 FL (ref 81.4–97.8)
MONOCYTES # BLD AUTO: 0.62 K/UL (ref 0–0.85)
MONOCYTES NFR BLD AUTO: 19.6 % (ref 0–13.4)
NEUTROPHILS # BLD AUTO: 1.77 K/UL (ref 2–7.15)
NEUTROPHILS NFR BLD: 56.1 % (ref 44–72)
NRBC # BLD AUTO: 0.03 K/UL
NRBC BLD-RTO: 0.9 /100 WBC
PLATELET # BLD AUTO: 199 K/UL (ref 164–446)
PMV BLD AUTO: 10.6 FL (ref 9–12.9)
POTASSIUM SERPL-SCNC: 3.8 MMOL/L (ref 3.6–5.5)
RBC # BLD AUTO: 2.49 M/UL (ref 4.2–5.4)
SODIUM SERPL-SCNC: 130 MMOL/L (ref 135–145)
WBC # BLD AUTO: 3.2 K/UL (ref 4.8–10.8)

## 2018-06-04 PROCEDURE — 80048 BASIC METABOLIC PNL TOTAL CA: CPT

## 2018-06-04 PROCEDURE — 82140 ASSAY OF AMMONIA: CPT

## 2018-06-04 PROCEDURE — 82962 GLUCOSE BLOOD TEST: CPT | Mod: 91

## 2018-06-04 PROCEDURE — 83540 ASSAY OF IRON: CPT

## 2018-06-04 PROCEDURE — 85025 COMPLETE CBC W/AUTO DIFF WBC: CPT

## 2018-06-04 ASSESSMENT — ENCOUNTER SYMPTOMS
VOMITING: 0
SHORTNESS OF BREATH: 0
WEAKNESS: 1
MYALGIAS: 0
CONSTIPATION: 0
COUGH: 0
ABDOMINAL PAIN: 0
HALLUCINATIONS: 1
PALPITATIONS: 0
BRUISES/BLEEDS EASILY: 0
HEARTBURN: 0
CHILLS: 0
FEVER: 0

## 2018-06-04 NOTE — [facility]
Hospital Medicine Progress Note, Adult, Complex               Author: Za Isaac Date & Time created: 6/4/2018  9:59 AM     CC: multiple myeloma with debility after chemotherapy    Interval History:  Patient claims to still feel very weak and states she cannot walk after chemotherapy  Overnight she was hallucinating and thought she was making a blanket and realized she was tearing all the tissues out of a box      Review of Systems:  Review of Systems   Constitutional: Negative for chills and fever.   Respiratory: Negative for cough and shortness of breath.    Cardiovascular: Negative for chest pain, palpitations and leg swelling.   Gastrointestinal: Negative for abdominal pain, constipation, heartburn and vomiting.   Genitourinary: Negative for dysuria.   Musculoskeletal: Negative for myalgias.   Neurological: Positive for weakness.   Endo/Heme/Allergies: Does not bruise/bleed easily.   Psychiatric/Behavioral: Positive for hallucinations.       Physical Exam:  Physical Exam   Constitutional: She is oriented to person, place, and time. No distress.   Eyes: Conjunctivae are normal.   Neck: Neck supple.   Cardiovascular: Normal rate, regular rhythm and intact distal pulses.  PMI is displaced.    No murmur heard.  Pulses:       Dorsalis pedis pulses are 2+ on the right side, and 2+ on the left side.   Pulmonary/Chest: Breath sounds normal. She has no wheezes. She has no rales.   Abdominal: Soft. Bowel sounds are normal. There is no tenderness.   Large abdominal pannus   Musculoskeletal: She exhibits no edema.   Neurological: She is alert and oriented to person, place, and time. Coordination normal.   Skin: No rash noted. No erythema. There is pallor.   Psychiatric: Her behavior is normal.   Nursing note and vitals reviewed.      Labs:        Invalid input(s): [id number]      Recent Labs      06/03/18   0415  06/04/18   0400   SODIUM  131*  130*   POTASSIUM  4.1  3.8   CHLORIDE  98  100   CO2  28  29   BUN  13   12   CREATININE  0.70  0.72   CALCIUM  9.0  8.8     Recent Labs      06/03/18   0415  06/04/18   0400   GLUCOSE  121*  125*     Recent Labs      06/03/18   0415  06/04/18   0400   RBC  2.53*  2.49*   HEMOGLOBIN  7.6*  7.5*   HEMATOCRIT  24.2*  23.6*   PLATELETCT  184  199   IRON   --   66     Recent Labs      06/03/18   0415  06/04/18   0400   WBC  3.4*  3.2*   NEUTSPOLYS   --   56.10   LYMPHOCYTES   --   18.70*   MONOCYTES   --   19.60*   EOSINOPHILS   --   0.90   BASOPHILS   --   0.30           Hemodynamics:   T98.2 /67 HR80 RR18 O2 sat 96% on room air     GI/Nutrition:  Orders Placed This Encounter   Procedures   • DIET ORDER     Standing Status:   Standing     Number of Occurrences:   1     Order Specific Question:   Diet:     Answer:   Diabetic [3]     Order Specific Question:   Texture/Fiber modifications:     Answer:   Dysphagia 1(Pureed)specify fluid consistency(question 6) [1]     Comments:   Cottage cheese okay      Order Specific Question:   Consistency/Fluid modifications:     Answer:   Thin Liquids [3]     Medical Decision Making, by Problem:  Multiple myeloma 1P deletion intermediate risk, IgG kappa.  Stage III based on ISS staging             treated with  2 cycles CyBorD, not tolerated well    Patient to resume oral therapy when/if approved by insurance, discussed with Dr. Sim and should have approval or denial next week, sister and patient aware and also aware that due to patient's weakness she may not be a candidate for therapy    Neutropenia induced by chemotherapy,    Improved after granix, white cell count trending down, will monitor labs    L pathologic humerus fx and B femur palliative radiation   Due to lytic lesions, physical therapy    Pain controlled, continue current medication    Acute metabolic encephalopathy, due to pain medication    Patient had visual hallucinations on scheduled narcotics, hallucinations are less but persist, calcium level is normal, will check ammonia  level    Buttock pressure decub              Skin care and low air loss mattress  Bilateral lower extremity venous insufficiency, edema  Resolved with leg elevation   Had worsening edema previously on norvasc  Type II diabetes mellitus             NPH, metformin and sliding scale insulin    HTN   lisinopril  Morbid obesity, BMI over 60,    encourage weight loss after discharge    Metabolic alkalosis    resolved    DNR      Quality-Core Measures   Reviewed items::  Labs reviewed and Medications reviewed  Singh catheter::  Urinary Tract Retention or Urinary Tract Obstruction  DVT prophylaxis pharmacological::  Contraindicated - High bleeding risk  Ulcer Prophylaxis::  Not indicated  Assessed for rehabilitation services:  Patient was assess for and/or received rehabilitation services during this hospitalization

## 2018-06-05 LAB
ALBUMIN SERPL BCP-MCNC: 2.4 G/DL (ref 3.2–4.9)
ALBUMIN/GLOB SERPL: 0.6 G/DL
ALP SERPL-CCNC: 51 U/L (ref 30–99)
ALT SERPL-CCNC: 7 U/L (ref 2–50)
ANION GAP SERPL CALC-SCNC: 3 MMOL/L (ref 0–11.9)
AST SERPL-CCNC: 17 U/L (ref 12–45)
BASOPHILS # BLD AUTO: 0.3 % (ref 0–1.8)
BASOPHILS # BLD: 0.01 K/UL (ref 0–0.12)
BILIRUB SERPL-MCNC: 0.6 MG/DL (ref 0.1–1.5)
BUN SERPL-MCNC: 12 MG/DL (ref 8–22)
CALCIUM SERPL-MCNC: 8.6 MG/DL (ref 8.4–10.2)
CHLORIDE SERPL-SCNC: 97 MMOL/L (ref 96–112)
CO2 SERPL-SCNC: 28 MMOL/L (ref 20–33)
CREAT SERPL-MCNC: 0.67 MG/DL (ref 0.5–1.4)
EOSINOPHIL # BLD AUTO: 0.02 K/UL (ref 0–0.51)
EOSINOPHIL NFR BLD: 0.6 % (ref 0–6.9)
ERYTHROCYTE [DISTWIDTH] IN BLOOD BY AUTOMATED COUNT: 56.8 FL (ref 35.9–50)
ERYTHROCYTE [DISTWIDTH] IN BLOOD BY AUTOMATED COUNT: 56.8 FL (ref 35.9–50)
GLOBULIN SER CALC-MCNC: 3.9 G/DL (ref 1.9–3.5)
GLUCOSE BLD-MCNC: 143 MG/DL (ref 65–99)
GLUCOSE SERPL-MCNC: 106 MG/DL (ref 65–99)
HCT VFR BLD AUTO: 22.7 % (ref 37–47)
HCT VFR BLD AUTO: 22.7 % (ref 37–47)
HGB BLD-MCNC: 7.4 G/DL (ref 12–16)
HGB BLD-MCNC: 7.4 G/DL (ref 12–16)
IMM GRANULOCYTES # BLD AUTO: 0.16 K/UL (ref 0–0.11)
IMM GRANULOCYTES NFR BLD AUTO: 4.6 % (ref 0–0.9)
LYMPHOCYTES # BLD AUTO: 0.73 K/UL (ref 1–4.8)
LYMPHOCYTES NFR BLD: 21 % (ref 22–41)
MCH RBC QN AUTO: 30.6 PG (ref 27–33)
MCH RBC QN AUTO: 30.6 PG (ref 27–33)
MCHC RBC AUTO-ENTMCNC: 32.6 G/DL (ref 33.6–35)
MCHC RBC AUTO-ENTMCNC: 32.6 G/DL (ref 33.6–35)
MCV RBC AUTO: 93.8 FL (ref 81.4–97.8)
MCV RBC AUTO: 93.8 FL (ref 81.4–97.8)
MONOCYTES # BLD AUTO: 0.73 K/UL (ref 0–0.85)
MONOCYTES NFR BLD AUTO: 21 % (ref 0–13.4)
NEUTROPHILS # BLD AUTO: 1.82 K/UL (ref 2–7.15)
NEUTROPHILS NFR BLD: 52.5 % (ref 44–72)
NRBC # BLD AUTO: 0.02 K/UL
NRBC BLD-RTO: 0.6 /100 WBC
PLATELET # BLD AUTO: 212 K/UL (ref 164–446)
PLATELET # BLD AUTO: 212 K/UL (ref 164–446)
PMV BLD AUTO: 10.4 FL (ref 9–12.9)
PMV BLD AUTO: 10.4 FL (ref 9–12.9)
POTASSIUM SERPL-SCNC: 3.7 MMOL/L (ref 3.6–5.5)
PROT SERPL-MCNC: 6.3 G/DL (ref 6–8.2)
RBC # BLD AUTO: 2.42 M/UL (ref 4.2–5.4)
RBC # BLD AUTO: 2.42 M/UL (ref 4.2–5.4)
SODIUM SERPL-SCNC: 128 MMOL/L (ref 135–145)
WBC # BLD AUTO: 3.5 K/UL (ref 4.8–10.8)
WBC # BLD AUTO: 3.5 K/UL (ref 4.8–10.8)

## 2018-06-05 PROCEDURE — 85025 COMPLETE CBC W/AUTO DIFF WBC: CPT

## 2018-06-05 PROCEDURE — 80053 COMPREHEN METABOLIC PANEL: CPT

## 2018-06-05 PROCEDURE — 82962 GLUCOSE BLOOD TEST: CPT

## 2018-06-05 ASSESSMENT — ENCOUNTER SYMPTOMS
SHORTNESS OF BREATH: 0
NAUSEA: 1
ABDOMINAL PAIN: 0
SORE THROAT: 1
COUGH: 0
DIARRHEA: 0
CONSTIPATION: 0
BACK PAIN: 0
EYE PAIN: 0
HEADACHES: 0
VOMITING: 0
INSOMNIA: 0
FEVER: 0

## 2018-06-05 NOTE — [facility]
Hospital Medicine Progress Note, Adult, Complex               Author: Georgina HEVER Muniz Date & Time created: 6/5/2018  8:35 AM     CC: aftercare, debility from prolonged hospitalization for MM    Interval History:  ANC adequate  c/o post nasal drip  Nausea better but persists  R leg pain persists    Review of Systems:  Review of Systems   Constitutional: Negative for fever.   HENT: Positive for congestion and sore throat.    Eyes: Negative for pain.   Respiratory: Negative for cough and shortness of breath.    Cardiovascular: Negative for chest pain.   Gastrointestinal: Positive for nausea. Negative for abdominal pain, constipation, diarrhea and vomiting.   Genitourinary: Negative for dysuria.   Musculoskeletal: Negative for back pain.        R leg hamstring pain and shin pain   Neurological: Negative for headaches.   Psychiatric/Behavioral: The patient does not have insomnia.        T 97.6 P 81 /58RR 20SaO2 94% 2L I/O 960/800 1BM  Physical Exam   Constitutional: She is oriented to person, place, and time. She appears well-developed. No distress.   Over-nourished   HENT:   Head: Normocephalic and atraumatic.   Mouth/Throat: No oropharyngeal exudate.   Eyes: Conjunctivae are normal. Right eye exhibits no discharge. Left eye exhibits no discharge. No scleral icterus.   Neck: Neck supple. No tracheal deviation present.   Cardiovascular: Normal rate, regular rhythm and intact distal pulses.    No murmur heard.  Pulmonary/Chest: Effort normal. No respiratory distress. She has no wheezes. She exhibits no tenderness.   diminished   Abdominal: Soft. Bowel sounds are normal. She exhibits no distension. There is no tenderness. There is no rebound.   Musculoskeletal: She exhibits edema (1+).   Neurological: She is alert and oriented to person, place, and time.   Skin: Skin is warm.   Vitals reviewed.      Labs:        Invalid input(s): [id number]      Recent Labs      06/03/18   0415  06/04/18   0400  06/05/18   0420    SODIUM  131*  130*  128*   POTASSIUM  4.1  3.8  3.7   CHLORIDE  98  100  97   CO2  28  29  28   BUN  13  12  12   CREATININE  0.70  0.72  0.67   CALCIUM  9.0  8.8  8.6     Recent Labs      06/03/18 0415 06/04/18 0400 06/05/18   0420   ALTSGPT   --    --   7   ASTSGOT   --    --   17   ALKPHOSPHAT   --    --   51   TBILIRUBIN   --    --   0.6   GLUCOSE  121*  125*  106*     Recent Labs      06/03/18 0415 06/04/18 0400 06/05/18   0420   RBC  2.53*  2.49*  2.42*  2.42*   HEMOGLOBIN  7.6*  7.5*  7.4*  7.4*   HEMATOCRIT  24.2*  23.6*  22.7*  22.7*   PLATELETCT  184  199  212  212   IRON   --   66   --      Recent Labs      06/03/18 0415 06/04/18 0400 06/05/18 0420   WBC  3.4*  3.2*  3.5*  3.5*   NEUTSPOLYS   --   56.10  52.50   LYMPHOCYTES   --   18.70*  21.00*   MONOCYTES   --   19.60*  21.00*   EOSINOPHILS   --   0.90  0.60   BASOPHILS   --   0.30  0.30   ASTSGOT   --    --   17   ALTSGPT   --    --   7   ALKPHOSPHAT   --    --   51   TBILIRUBIN   --    --   0.6          GI/Nutrition:  Orders Placed This Encounter   Procedures   • DIET ORDER     Standing Status:   Standing     Number of Occurrences:   1     Order Specific Question:   Diet:     Answer:   Diabetic [3]     Order Specific Question:   Texture/Fiber modifications:     Answer:   Dysphagia 1(Pureed)specify fluid consistency(question 6) [1]     Comments:   Cottage cheese okay      Order Specific Question:   Consistency/Fluid modifications:     Answer:   Thin Liquids [3]     Medical Decision Making, by Problem:  MM IgG kappa stage 3   -s/p 2 cycles CyBorD, not tolerated well per notes in epic   -oral regimen planned if insurance auth obtained   -anticipated poor prognosis  L pathologic humerus fx and B femur palliative radiation   -multiple lytic lesions on imaging  Tx RLL PNA PTA  Buttock pressure decub   -air loss mattress  B LE venous insufficiency with prior  cellulitis   -compression  Anemia/leukopenia/thrombocytopenia-improved   -secondary to chemo  DM2   -NPH   -continue metformin   -RISS  R leg pain   -unclear if lytic pain vs other  HTN   -lisinopril, metoprolol  Morbid obesity  Hyponatremia  Hypomagnesemia  Post nasal drip   -add flonase clariten  Debility   -PT/OT         Quality-Core Measures   Reviewed items::  Labs reviewed and Medications reviewed  Singh catheter::  Neurogenic Bladder  Central line in place:  Need for access and Chemotherapy  DVT prophylaxis pharmacological::  Enoxaparin (Lovenox)

## 2018-06-06 LAB
GLUCOSE BLD-MCNC: 118 MG/DL (ref 65–99)
GLUCOSE BLD-MCNC: 123 MG/DL (ref 65–99)
GLUCOSE BLD-MCNC: 124 MG/DL (ref 65–99)
GLUCOSE BLD-MCNC: 154 MG/DL (ref 65–99)
GLUCOSE BLD-MCNC: 166 MG/DL (ref 65–99)
GLUCOSE BLD-MCNC: 183 MG/DL (ref 65–99)

## 2018-06-06 PROCEDURE — 82962 GLUCOSE BLOOD TEST: CPT | Mod: 91

## 2018-06-06 ASSESSMENT — ENCOUNTER SYMPTOMS
EYE PAIN: 0
ABDOMINAL PAIN: 0
NAUSEA: 1
VOMITING: 0
SORE THROAT: 1
DIARRHEA: 1
HEADACHES: 0
BACK PAIN: 0
COUGH: 0
INSOMNIA: 0
FEVER: 0
SHORTNESS OF BREATH: 1
CONSTIPATION: 0

## 2018-06-06 NOTE — [facility]
Hospital Medicine Progress Note, Adult, Complex               Author: Georgina HEVER Muniz Date & Time created: 6/6/2018  8:17 AM     CC: aftercare, debility from prolonged hospitalization for MM    Interval History:  Congestion better with flonase and clariten  Stood a little better today with therapy  No events reported    Review of Systems:  Review of Systems   Constitutional: Negative for fever.   HENT: Positive for congestion (better) and sore throat.    Eyes: Negative for pain.   Respiratory: Positive for shortness of breath (on occasion). Negative for cough.    Cardiovascular: Negative for chest pain.   Gastrointestinal: Positive for diarrhea and nausea. Negative for abdominal pain, constipation and vomiting.   Genitourinary: Negative for dysuria.   Musculoskeletal: Negative for back pain.        Leg pain   Neurological: Negative for headaches.   Psychiatric/Behavioral: The patient does not have insomnia.        T 98.1P79 /64RR 24SaO2 94% 2L I/O 2.1/1.2 3BM  Physical Exam   Constitutional: She is oriented to person, place, and time. She appears well-developed. No distress.   Over-nourished   HENT:   Head: Normocephalic and atraumatic.   Mouth/Throat: No oropharyngeal exudate.   Eyes: Conjunctivae are normal. Right eye exhibits no discharge. Left eye exhibits no discharge. No scleral icterus.   Neck: Neck supple. No tracheal deviation present.   Cardiovascular: Normal rate, regular rhythm and intact distal pulses.    No murmur heard.  Pulmonary/Chest: Effort normal. No respiratory distress. She has no wheezes. She exhibits no tenderness.   diminished   Abdominal: Soft. Bowel sounds are normal. She exhibits no distension. There is no tenderness.   obese   Musculoskeletal: She exhibits edema (1+).   Neurological: She is alert and oriented to person, place, and time.   Skin: Skin is warm. There is erythema (mild shin erythema).   Vitals reviewed.      Labs:        Invalid input(s): [id number]      Recent  Labs      06/04/18   0400  06/05/18   0420   SODIUM  130*  128*   POTASSIUM  3.8  3.7   CHLORIDE  100  97   CO2  29  28   BUN  12  12   CREATININE  0.72  0.67   CALCIUM  8.8  8.6     Recent Labs      06/04/18 0400  06/05/18   0420   ALTSGPT   --   7   ASTSGOT   --   17   ALKPHOSPHAT   --   51   TBILIRUBIN   --   0.6   GLUCOSE  125*  106*     Recent Labs      06/04/18 0400  06/05/18   0420   RBC  2.49*  2.42*  2.42*   HEMOGLOBIN  7.5*  7.4*  7.4*   HEMATOCRIT  23.6*  22.7*  22.7*   PLATELETCT  199  212  212   IRON  66   --      Recent Labs      06/04/18 0400 06/05/18   0420   WBC  3.2*  3.5*  3.5*   NEUTSPOLYS  56.10  52.50   LYMPHOCYTES  18.70*  21.00*   MONOCYTES  19.60*  21.00*   EOSINOPHILS  0.90  0.60   BASOPHILS  0.30  0.30   ASTSGOT   --   17   ALTSGPT   --   7   ALKPHOSPHAT   --   51   TBILIRUBIN   --   0.6          GI/Nutrition:  Orders Placed This Encounter   Procedures   • DIET ORDER     Standing Status:   Standing     Number of Occurrences:   1     Order Specific Question:   Diet:     Answer:   Diabetic [3]     Order Specific Question:   Texture/Fiber modifications:     Answer:   Dysphagia 1(Pureed)specify fluid consistency(question 6) [1]     Comments:   Cottage cheese okay      Order Specific Question:   Consistency/Fluid modifications:     Answer:   Thin Liquids [3]     Medical Decision Making, by Problem:  MM IgG kappa stage 3   -s/p 2 cycles CyBorD, not tolerated well per notes in epic   -oral regimen planned if insurance auth obtained   -anticipated poor prognosis  L pathologic humerus fx and B femur palliative radiation   -multiple lytic lesions on imaging  Tx RLL PNA PTA  Buttock pressure decub   -air loss mattress  B LE venous insufficiency with prior cellulitis   -compression as tolerated  Anemia/leukopenia/thrombocytopenia-improved   -secondary to chemo  DM2   -NPH   -continue metformin   -RISS  R leg pain   -unclear if lytic pain vs other  HTN   -lisinopril, metoprolol  Morbid  obesity  Hyponatremia  Hypomagnesemia   -mag oxide  Intermittent diarrhea   -imodium prn  Post nasal drip   -flonase, clariten  Debility   -PT/OT         Quality-Core Measures   Reviewed items::  Medications reviewed  Singh catheter::  Neurogenic Bladder  Central line in place:  Need for access and Chemotherapy  DVT prophylaxis pharmacological::  Enoxaparin (Lovenox)

## 2018-06-07 LAB
ANION GAP SERPL CALC-SCNC: 4 MMOL/L (ref 0–11.9)
BASOPHILS # BLD AUTO: 0.4 % (ref 0–1.8)
BASOPHILS # BLD: 0.01 K/UL (ref 0–0.12)
BUN SERPL-MCNC: 12 MG/DL (ref 8–22)
CALCIUM SERPL-MCNC: 8.6 MG/DL (ref 8.4–10.2)
CHLORIDE SERPL-SCNC: 99 MMOL/L (ref 96–112)
CO2 SERPL-SCNC: 28 MMOL/L (ref 20–33)
CREAT SERPL-MCNC: 0.66 MG/DL (ref 0.5–1.4)
EOSINOPHIL # BLD AUTO: 0.01 K/UL (ref 0–0.51)
EOSINOPHIL NFR BLD: 0.4 % (ref 0–6.9)
ERYTHROCYTE [DISTWIDTH] IN BLOOD BY AUTOMATED COUNT: 59.8 FL (ref 35.9–50)
GLUCOSE BLD-MCNC: 129 MG/DL (ref 65–99)
GLUCOSE BLD-MCNC: 134 MG/DL (ref 65–99)
GLUCOSE BLD-MCNC: 149 MG/DL (ref 65–99)
GLUCOSE BLD-MCNC: 180 MG/DL (ref 65–99)
GLUCOSE BLD-MCNC: 191 MG/DL (ref 65–99)
GLUCOSE BLD-MCNC: 199 MG/DL (ref 65–99)
GLUCOSE SERPL-MCNC: 113 MG/DL (ref 65–99)
HCT VFR BLD AUTO: 30.4 % (ref 37–47)
HGB BLD-MCNC: 9.9 G/DL (ref 12–16)
IMM GRANULOCYTES # BLD AUTO: 0.06 K/UL (ref 0–0.11)
IMM GRANULOCYTES NFR BLD AUTO: 2.4 % (ref 0–0.9)
LYMPHOCYTES # BLD AUTO: 0.68 K/UL (ref 1–4.8)
LYMPHOCYTES NFR BLD: 27.6 % (ref 22–41)
MAGNESIUM SERPL-MCNC: 1.6 MG/DL (ref 1.5–2.5)
MCH RBC QN AUTO: 30.5 PG (ref 27–33)
MCHC RBC AUTO-ENTMCNC: 32.6 G/DL (ref 33.6–35)
MCV RBC AUTO: 93.5 FL (ref 81.4–97.8)
MONOCYTES # BLD AUTO: 0.42 K/UL (ref 0–0.85)
MONOCYTES NFR BLD AUTO: 17.1 % (ref 0–13.4)
NEUTROPHILS # BLD AUTO: 1.28 K/UL (ref 2–7.15)
NEUTROPHILS NFR BLD: 52.1 % (ref 44–72)
NRBC # BLD AUTO: 0 K/UL
NRBC BLD-RTO: 0 /100 WBC
PHOSPHATE SERPL-MCNC: 4.9 MG/DL (ref 2.5–4.5)
PLATELET # BLD AUTO: 186 K/UL (ref 164–446)
PMV BLD AUTO: 10.2 FL (ref 9–12.9)
POTASSIUM SERPL-SCNC: 3.6 MMOL/L (ref 3.6–5.5)
RBC # BLD AUTO: 3.25 M/UL (ref 4.2–5.4)
SODIUM SERPL-SCNC: 131 MMOL/L (ref 135–145)
WBC # BLD AUTO: 2.5 K/UL (ref 4.8–10.8)

## 2018-06-07 PROCEDURE — 83735 ASSAY OF MAGNESIUM: CPT

## 2018-06-07 PROCEDURE — 80048 BASIC METABOLIC PNL TOTAL CA: CPT

## 2018-06-07 PROCEDURE — 84100 ASSAY OF PHOSPHORUS: CPT

## 2018-06-07 PROCEDURE — 82962 GLUCOSE BLOOD TEST: CPT | Mod: 91

## 2018-06-07 PROCEDURE — 85025 COMPLETE CBC W/AUTO DIFF WBC: CPT

## 2018-06-07 ASSESSMENT — ENCOUNTER SYMPTOMS
INSOMNIA: 0
NAUSEA: 1
CONSTIPATION: 0
ABDOMINAL PAIN: 0
VOMITING: 0
EYE PAIN: 0
HEADACHES: 0
FEVER: 0
SHORTNESS OF BREATH: 0
COUGH: 0

## 2018-06-07 NOTE — [facility]
Hospital Medicine Progress Note, Adult, Complex               Author: Georgina Muniz Date & Time created: 6/7/2018  7:38 AM     CC: aftercare, debility from prolonged hospitalization for MM    Interval History:  Diarrhea better  Less nausea  Leg pain persists  Asking about chemo authorization    Review of Systems:  Review of Systems   Constitutional: Negative for fever.   Eyes: Negative for pain.   Respiratory: Negative for cough and shortness of breath.    Cardiovascular: Negative for chest pain.   Gastrointestinal: Positive for nausea (better). Negative for abdominal pain, constipation and vomiting.   Genitourinary: Negative for dysuria.   Musculoskeletal:        Leg pain   Neurological: Negative for headaches.   Psychiatric/Behavioral: The patient does not have insomnia.        T 97.9P98 /63RR 18SaO2 96% 2L I/O 1.6/1.2 1BM  Physical Exam   Constitutional: She is oriented to person, place, and time. She appears well-developed. No distress.   HENT:   Head: Normocephalic and atraumatic.   Mouth/Throat: No oropharyngeal exudate.   Eyes: Conjunctivae are normal. Right eye exhibits no discharge. Left eye exhibits no discharge. No scleral icterus.   Neck: Neck supple. No tracheal deviation present.   Cardiovascular: Normal rate, regular rhythm and intact distal pulses.    No murmur heard.  Pulmonary/Chest: Effort normal. No respiratory distress. She has no wheezes. She exhibits no tenderness.   diminished   Abdominal: Soft. Bowel sounds are normal. She exhibits no distension. There is no tenderness. There is no rebound.   obese   Musculoskeletal: She exhibits edema (tr).   Neurological: She is alert and oriented to person, place, and time.   Skin: Skin is warm.   Legs with compression hose   Vitals reviewed.      Labs:        Invalid input(s): [id number]      Recent Labs      06/05/18   0420  06/07/18   0330   SODIUM  128*  131*   POTASSIUM  3.7  3.6   CHLORIDE  97  99   CO2  28  28   BUN  12  12    CREATININE  0.67  0.66   MAGNESIUM   --   1.6   PHOSPHORUS   --   4.9*   CALCIUM  8.6  8.6     Recent Labs      06/05/18   0420  06/07/18   0330   ALTSGPT  7   --    ASTSGOT  17   --    ALKPHOSPHAT  51   --    TBILIRUBIN  0.6   --    GLUCOSE  106*  113*     Recent Labs      06/05/18   0420  06/07/18   0330   RBC  2.42*  2.42*  3.25*   HEMOGLOBIN  7.4*  7.4*  9.9*   HEMATOCRIT  22.7*  22.7*  30.4*   PLATELETCT  212  212  186     Recent Labs      06/05/18   0420  06/07/18   0330   WBC  3.5*  3.5*  2.5*   NEUTSPOLYS  52.50  52.10   LYMPHOCYTES  21.00*  27.60   MONOCYTES  21.00*  17.10*   EOSINOPHILS  0.60  0.40   BASOPHILS  0.30  0.40   ASTSGOT  17   --    ALTSGPT  7   --    ALKPHOSPHAT  51   --    TBILIRUBIN  0.6   --           GI/Nutrition:  Orders Placed This Encounter   Procedures   • DIET ORDER     Standing Status:   Standing     Number of Occurrences:   1     Order Specific Question:   Diet:     Answer:   Diabetic [3]     Comments:   chobani smoothie drinks inbetween meals 3x a day     Order Specific Question:   Texture/Fiber modifications:     Answer:   Dysphagia 1(Pureed)specify fluid consistency(question 6) [1]     Comments:   Cottage cheese okay      Order Specific Question:   Consistency/Fluid modifications:     Answer:   Thin Liquids [3]     Medical Decision Making, by Problem:  MM IgG kappa stage 3   -s/p 2 cycles CyBorD, not tolerated well per notes in epic   -oral regimen planned if insurance auth obtained   -anticipated poor prognosis  L pathologic humerus fx and B femur palliative radiation   -multiple lytic lesions on imaging  Tx RLL PNA PTA  Buttock pressure decub   -air loss mattress  B LE venous insufficiency with prior cellulitis   -compression as tolerated  Anemia/leukopenia   -secondary to chemo   -ANC adequate  DM2   -NPH   -continue metformin   -RISS  R leg pain   -unclear if lytic pain vs other  HTN   -lisinopril, metoprolol  Morbid obesity  Hyponatremia  Hypomagnesemia   -mag  oxide  Intermittent diarrhea   -imodium prn  Post nasal drip   -flonase, claritewill  Debility   -PT/OT         Quality-Core Measures   Reviewed items::  Medications reviewed and Labs reviewed  Singh catheter::  Neurogenic Bladder  Central line in place:  Need for access and Chemotherapy  DVT prophylaxis pharmacological::  Heparin

## 2018-06-08 LAB
BASOPHILS # BLD AUTO: 0.3 % (ref 0–1.8)
BASOPHILS # BLD: 0.01 K/UL (ref 0–0.12)
EOSINOPHIL # BLD AUTO: 0.02 K/UL (ref 0–0.51)
EOSINOPHIL NFR BLD: 0.5 % (ref 0–6.9)
ERYTHROCYTE [DISTWIDTH] IN BLOOD BY AUTOMATED COUNT: 60.5 FL (ref 35.9–50)
HCT VFR BLD AUTO: 24.3 % (ref 37–47)
HGB BLD-MCNC: 8 G/DL (ref 12–16)
IMM GRANULOCYTES # BLD AUTO: 0.04 K/UL (ref 0–0.11)
IMM GRANULOCYTES NFR BLD AUTO: 1.1 % (ref 0–0.9)
LYMPHOCYTES # BLD AUTO: 0.8 K/UL (ref 1–4.8)
LYMPHOCYTES NFR BLD: 21.9 % (ref 22–41)
MCH RBC QN AUTO: 30.3 PG (ref 27–33)
MCHC RBC AUTO-ENTMCNC: 31.7 G/DL (ref 33.6–35)
MCV RBC AUTO: 95.4 FL (ref 81.4–97.8)
MONOCYTES # BLD AUTO: 0.44 K/UL (ref 0–0.85)
MONOCYTES NFR BLD AUTO: 12 % (ref 0–13.4)
NEUTROPHILS # BLD AUTO: 2.35 K/UL (ref 2–7.15)
NEUTROPHILS NFR BLD: 64.2 % (ref 44–72)
NRBC # BLD AUTO: 0 K/UL
NRBC BLD-RTO: 0 /100 WBC
PLATELET # BLD AUTO: 200 K/UL (ref 164–446)
PMV BLD AUTO: 10.3 FL (ref 9–12.9)
RBC # BLD AUTO: 2.61 M/UL (ref 4.2–5.4)
WBC # BLD AUTO: 3.7 K/UL (ref 4.8–10.8)

## 2018-06-08 PROCEDURE — 85025 COMPLETE CBC W/AUTO DIFF WBC: CPT

## 2018-06-08 PROCEDURE — 82962 GLUCOSE BLOOD TEST: CPT

## 2018-06-08 NOTE — [facility]
Hospital Medicine Progress Note, Adult, Complex               Author: Georgina HEVER Muniz Date & Time created: 6/8/2018  8:59 AM     CC: aftercare, debility from prolonged hospitalization for MM    Interval History:  Sleeping currently  No events per staff  ANC remains adequate    Review of Systems:  Review of Systems   Unable to perform ROS: Other       T 97.8P77 /69RR 18SaO2 95% 2L I/O 1.2/975 1BM  Physical Exam   Constitutional: She appears well-developed.   HENT:   Head: Normocephalic and atraumatic.   Eyes: Right eye exhibits no discharge. Left eye exhibits no discharge.   Neck: Neck supple. No tracheal deviation present.   Cardiovascular: Normal rate, regular rhythm and intact distal pulses.    No murmur heard.  Pulmonary/Chest: Effort normal. No respiratory distress. She has no wheezes. She exhibits no tenderness.   diminished   Abdominal: Soft. Bowel sounds are normal. She exhibits no distension.   obese   Musculoskeletal: She exhibits edema (tr).   Neurological:   sleeping   Skin: Skin is warm.   Legs with compression hose   Vitals reviewed.      Labs:        Invalid input(s): [id number]      Recent Labs      06/07/18   0330   SODIUM  131*   POTASSIUM  3.6   CHLORIDE  99   CO2  28   BUN  12   CREATININE  0.66   MAGNESIUM  1.6   PHOSPHORUS  4.9*   CALCIUM  8.6     Recent Labs      06/07/18   0330   GLUCOSE  113*     Recent Labs      06/07/18   0330  06/08/18   0335   RBC  3.25*  2.61*   HEMOGLOBIN  9.9*  8.0*   HEMATOCRIT  30.4*  24.3*   PLATELETCT  186  200     Recent Labs      06/07/18   0330  06/08/18   0335   WBC  2.5*  3.7*   NEUTSPOLYS  52.10  64.20   LYMPHOCYTES  27.60  21.90*   MONOCYTES  17.10*  12.00   EOSINOPHILS  0.40  0.50   BASOPHILS  0.40  0.30          GI/Nutrition:  Orders Placed This Encounter   Procedures   • DIET ORDER     Standing Status:   Standing     Number of Occurrences:   1     Order Specific Question:   Diet:     Answer:   Diabetic [3]     Comments:   chobani smoothie  drinks inbetween meals 3x a day     Order Specific Question:   Texture/Fiber modifications:     Answer:   Dysphagia 1(Pureed)specify fluid consistency(question 6) [1]     Comments:   Cottage cheese okay      Order Specific Question:   Consistency/Fluid modifications:     Answer:   Thin Liquids [3]     Medical Decision Making, by Problem:  MM IgG kappa stage 3   -s/p 2 cycles CyBorD, not tolerated well per notes in epic   -oral regimen planned if insurance auth obtained, will f/u with onc today   -anticipated poor prognosis  L pathologic humerus fx and B femur palliative radiation   -multiple lytic lesions on imaging  Tx RLL PNA PTA  Buttock pressure decub   -air loss mattress  B LE venous insufficiency with prior cellulitis   -compression as tolerated  Anemia/leukopenia   -secondary to chemo   -ANC adequate  DM2   -NPH   -continue metformin   -RISS  R leg pain   -unclear if lytic pain vs other  HTN   -lisinopril, metoprolol  Morbid obesity  Hyponatremia  Hypomagnesemia   -mag oxide  Intermittent diarrhea   -imodium prn  Post nasal drip   -flonase clariten  Debility   -PT/OT         Quality-Core Measures   Reviewed items::  Medications reviewed and Labs reviewed  Singh catheter::  Neurogenic Bladder  Central line in place:  Need for access and Chemotherapy  DVT prophylaxis pharmacological::  Heparin

## 2018-06-09 ENCOUNTER — APPOINTMENT (OUTPATIENT)
Dept: RADIOLOGY | Facility: MEDICAL CENTER | Age: 68
End: 2018-06-09
Attending: HOSPITALIST
Payer: MEDICARE

## 2018-06-09 LAB
GLUCOSE BLD-MCNC: 110 MG/DL (ref 65–99)
GLUCOSE BLD-MCNC: 162 MG/DL (ref 65–99)
GLUCOSE BLD-MCNC: 167 MG/DL (ref 65–99)
GLUCOSE BLD-MCNC: 180 MG/DL (ref 65–99)

## 2018-06-09 PROCEDURE — 71045 X-RAY EXAM CHEST 1 VIEW: CPT

## 2018-06-09 PROCEDURE — 82962 GLUCOSE BLOOD TEST: CPT | Mod: 91

## 2018-06-09 ASSESSMENT — ENCOUNTER SYMPTOMS
FEVER: 0
ABDOMINAL PAIN: 0
SORE THROAT: 0
VOMITING: 0
HEADACHES: 0
COUGH: 0
PALPITATIONS: 0
DEPRESSION: 0
NAUSEA: 1
SHORTNESS OF BREATH: 1
DIARRHEA: 1

## 2018-06-09 NOTE — [facility]
Hospital Medicine Progress Note, Adult, Complex               Author: Georgina HEVER Muniz Date & Time created: 6/9/2018  10:19 AM     CC: aftercare, debility from prolonged hospitalization for MM    Interval History:  Oral chemo approved, to be mailed by drug company  Having some SOB today, feels she can't get a full breath, no chest pain    Review of Systems:  Review of Systems   Constitutional: Negative for fever.   HENT: Negative for sore throat.    Respiratory: Positive for shortness of breath. Negative for cough.    Cardiovascular: Negative for chest pain and palpitations.   Gastrointestinal: Positive for diarrhea and nausea. Negative for abdominal pain and vomiting.   Musculoskeletal:        Leg pain   Neurological: Negative for headaches.   Psychiatric/Behavioral: Negative for depression.       T 98.2P91BP 147/77RR 18SaO2 97% 2L I/O 1.7/1.4 1BM  Physical Exam   Constitutional: She is oriented to person, place, and time. She appears well-developed. No distress.   HENT:   Head: Normocephalic and atraumatic.   Mouth/Throat: No oropharyngeal exudate.   Eyes: Right eye exhibits no discharge. Left eye exhibits no discharge. No scleral icterus.   Neck: Neck supple. No tracheal deviation present.   Cardiovascular: Normal rate, regular rhythm and intact distal pulses.    No murmur heard.  Pulmonary/Chest: Effort normal. No respiratory distress. She has no wheezes. She exhibits no tenderness.   diminished   Abdominal: Soft. Bowel sounds are normal. She exhibits no distension. There is no tenderness.   obese   Musculoskeletal: She exhibits edema (tr).   Neurological: She is alert and oriented to person, place, and time.   sleeping   Skin: Skin is warm.   Legs with compression hose   Vitals reviewed.      Labs:        Invalid input(s): [id number]      Recent Labs      06/07/18   0330   SODIUM  131*   POTASSIUM  3.6   CHLORIDE  99   CO2  28   BUN  12   CREATININE  0.66   MAGNESIUM  1.6   PHOSPHORUS  4.9*   CALCIUM  8.6      Recent Labs      06/07/18   0330   GLUCOSE  113*     Recent Labs      06/07/18   0330  06/08/18   0335   RBC  3.25*  2.61*   HEMOGLOBIN  9.9*  8.0*   HEMATOCRIT  30.4*  24.3*   PLATELETCT  186  200     Recent Labs      06/07/18   0330  06/08/18   0335   WBC  2.5*  3.7*   NEUTSPOLYS  52.10  64.20   LYMPHOCYTES  27.60  21.90*   MONOCYTES  17.10*  12.00   EOSINOPHILS  0.40  0.50   BASOPHILS  0.40  0.30          GI/Nutrition:  Orders Placed This Encounter   Procedures   • DIET ORDER     Standing Status:   Standing     Number of Occurrences:   1     Order Specific Question:   Diet:     Answer:   Diabetic [3]     Comments:   chobani smoothie drinks inbetween meals 3x a day     Order Specific Question:   Texture/Fiber modifications:     Answer:   Dysphagia 1(Pureed)specify fluid consistency(question 6) [1]     Comments:   Cottage cheese okay      Order Specific Question:   Consistency/Fluid modifications:     Answer:   Thin Liquids [3]     Medical Decision Making, by Problem:  MM IgG kappa stage 3   -s/p 2 cycles CyBorD, not tolerated well per notes in epic   -oral regimen approved, await delivery   -anticipated poor prognosis  L pathologic humerus fx and B femur palliative radiation   -multiple lytic lesions on imaging  Tx RLL PNA PTA  Buttock pressure decub   -air loss mattress  B LE venous insufficiency with prior cellulitis   -compression as tolerated  Anemia/leukopenia   -secondary to chemo   -ANC adequate  DM2   -NPH   -continue metformin   -RISS  SOB   -add lasix, check CXR, trial duoneb  R leg pain   -unclear if lytic pain vs other  HTN   -lisinopril, metoprolol  Morbid obesity  Hyponatremia  Hypomagnesemia   -mag oxide  Intermittent diarrhea   -imodium prn  Post nasal drip   -flonase, clariten  Debility   -PT/OT         Quality-Core Measures   Reviewed items::  Medications reviewed  Singh catheter::  Neurogenic Bladder  Central line in place:  Need for access and Chemotherapy  DVT prophylaxis pharmacological::   Heparin

## 2018-06-10 LAB
ANION GAP SERPL CALC-SCNC: 6 MMOL/L (ref 0–11.9)
BUN SERPL-MCNC: 10 MG/DL (ref 8–22)
CALCIUM SERPL-MCNC: 8.6 MG/DL (ref 8.4–10.2)
CHLORIDE SERPL-SCNC: 96 MMOL/L (ref 96–112)
CO2 SERPL-SCNC: 30 MMOL/L (ref 20–33)
CREAT SERPL-MCNC: 0.58 MG/DL (ref 0.5–1.4)
GLUCOSE BLD-MCNC: 112 MG/DL (ref 65–99)
GLUCOSE BLD-MCNC: 145 MG/DL (ref 65–99)
GLUCOSE BLD-MCNC: 149 MG/DL (ref 65–99)
GLUCOSE BLD-MCNC: 157 MG/DL (ref 65–99)
GLUCOSE BLD-MCNC: 164 MG/DL (ref 65–99)
GLUCOSE BLD-MCNC: 170 MG/DL (ref 65–99)
GLUCOSE BLD-MCNC: 209 MG/DL (ref 65–99)
GLUCOSE SERPL-MCNC: 117 MG/DL (ref 65–99)
POTASSIUM SERPL-SCNC: 4.6 MMOL/L (ref 3.6–5.5)
SODIUM SERPL-SCNC: 132 MMOL/L (ref 135–145)

## 2018-06-10 PROCEDURE — 80048 BASIC METABOLIC PNL TOTAL CA: CPT

## 2018-06-10 PROCEDURE — 82962 GLUCOSE BLOOD TEST: CPT | Mod: 91

## 2018-06-10 ASSESSMENT — ENCOUNTER SYMPTOMS
SORE THROAT: 0
HEADACHES: 0
DIARRHEA: 1
PALPITATIONS: 0
COUGH: 0
SHORTNESS OF BREATH: 0
DEPRESSION: 0
FEVER: 0
VOMITING: 0
NAUSEA: 0
ABDOMINAL PAIN: 0

## 2018-06-10 NOTE — [facility]
Hospital Medicine Progress Note, Adult, Complex               Author: Georgina HEVER Barrazaen Date & Time created: 6/10/2018  9:30 AM     CC: aftercare, debility from prolonged hospitalization for MM    Interval History:  SOB resolved  Good diuresis with lasix dose  Has bladder spasm and leaking  Agrees to bladder training    Review of Systems:  Review of Systems   Constitutional: Negative for fever.   HENT: Negative for sore throat.    Respiratory: Negative for cough and shortness of breath.    Cardiovascular: Negative for chest pain and palpitations.   Gastrointestinal: Positive for diarrhea (yesterday). Negative for abdominal pain, nausea and vomiting.   Genitourinary:        Bladder spasm and leaking   Musculoskeletal:        Leg pain   Neurological: Negative for headaches.   Psychiatric/Behavioral: Negative for depression.       T 20A40YB 140/71RR 19SaO2 95% 2L I/O 1.4/4 2BM  Physical Exam   Constitutional: She is oriented to person, place, and time. She appears well-developed. No distress.   HENT:   Head: Normocephalic and atraumatic.   Mouth/Throat: No oropharyngeal exudate.   Eyes: Right eye exhibits no discharge. Left eye exhibits no discharge. No scleral icterus.   Neck: Neck supple. No tracheal deviation present.   Cardiovascular: Normal rate, regular rhythm and intact distal pulses.    No murmur heard.  Pulmonary/Chest: Effort normal. No respiratory distress. She has no wheezes. She exhibits no tenderness.   diminished   Abdominal: Soft. Bowel sounds are normal. She exhibits no distension. There is no tenderness.   obese   Musculoskeletal: She exhibits no edema.   Neurological: She is alert and oriented to person, place, and time.   sleeping   Skin: Skin is warm.   Legs with compression hose   Vitals reviewed.      Labs:        Invalid input(s): [id number]      Recent Labs      06/10/18   0340   SODIUM  132*   POTASSIUM  4.6   CHLORIDE  96   CO2  30   BUN  10   CREATININE  0.58   CALCIUM  8.6     Recent  Labs      06/10/18   0340   GLUCOSE  117*     Recent Labs      06/08/18   0335   RBC  2.61*   HEMOGLOBIN  8.0*   HEMATOCRIT  24.3*   PLATELETCT  200     Recent Labs      06/08/18   0335   WBC  3.7*   NEUTSPOLYS  64.20   LYMPHOCYTES  21.90*   MONOCYTES  12.00   EOSINOPHILS  0.50   BASOPHILS  0.30          GI/Nutrition:  Orders Placed This Encounter   Procedures   • DIET ORDER     Standing Status:   Standing     Number of Occurrences:   1     Order Specific Question:   Diet:     Answer:   Diabetic [3]     Comments:   chobani smoothie drinks inbetween meals 3x a day     Order Specific Question:   Texture/Fiber modifications:     Answer:   Dysphagia 1(Pureed)specify fluid consistency(question 6) [1]     Comments:   Cottage cheese okay      Order Specific Question:   Consistency/Fluid modifications:     Answer:   Thin Liquids [3]     Medical Decision Making, by Problem:  MM IgG kappa stage 3   -s/p 2 cycles CyBorD, not tolerated well per notes in epic   -oral regimen approved, await delivery   -anticipated poor prognosis  L pathologic humerus fx and B femur palliative radiation   -multiple lytic lesions on imaging  Tx RLL PNA PTA  Buttock pressure decub   -air loss mattress  B LE venous insufficiency with prior cellulitis   -compression as tolerated  Anemia/leukopenia   -secondary to chemo   -ANC adequate  DM2   -NPH   -continue metformin   -RISS  SOB   -resolved  R leg pain   -unclear if lytic pain vs other  HTN   -increase lisinopril, metoprolol  Morbid obesity  Hyponatremia  Hypomagnesemia   -mag oxide  Intermittent diarrhea   -imodium prn  Post nasal drip   -flonase, clariten  Bladder spasm   -trial urispas, bladder training for naav removal  Debility   -PT/OT         Quality-Core Measures   Reviewed items::  Medications reviewed, Labs reviewed and Radiology images reviewed  Nava catheter::  Neurogenic Bladder  Central line in place:  Need for access and Chemotherapy  DVT prophylaxis pharmacological::   Heparin

## 2018-06-11 PROCEDURE — 82962 GLUCOSE BLOOD TEST: CPT | Mod: 91

## 2018-06-11 ASSESSMENT — ENCOUNTER SYMPTOMS
DEPRESSION: 0
SHORTNESS OF BREATH: 0
HEADACHES: 0
FEVER: 0
COUGH: 0
SORE THROAT: 0
DIARRHEA: 0
NAUSEA: 0
ABDOMINAL PAIN: 0
VOMITING: 0

## 2018-06-11 NOTE — [facility]
Hospital Medicine Progress Note, Adult, Complex               Author: Georgina HEVER Barrazaen Date & Time created: 6/11/2018  12:03 PM     CC: aftercare, debility from prolonged hospitalization for MM    Interval History:  Singh out due to leaking after initiating bladder training, voiding OK at this time  Left message on voicemail for coordinator of oral chemo to send to [facility] and not patient's home    Review of Systems:  Review of Systems   Constitutional: Negative for fever.   HENT: Negative for sore throat.    Respiratory: Negative for cough and shortness of breath.    Cardiovascular: Negative for chest pain.   Gastrointestinal: Negative for abdominal pain, diarrhea, nausea and vomiting.   Genitourinary: Positive for frequency.   Musculoskeletal:        Leg pain   Neurological: Negative for headaches.   Psychiatric/Behavioral: Negative for depression.       T 98.5P82BP 131/12VZ79EtE5 92% 2L I/O1.6/1.1 1BM  Physical Exam   Constitutional: She is oriented to person, place, and time. She appears well-developed. No distress.   HENT:   Head: Normocephalic and atraumatic.   Mouth/Throat: No oropharyngeal exudate.   Eyes: Conjunctivae are normal. Right eye exhibits no discharge. Left eye exhibits no discharge. No scleral icterus.   Neck: Neck supple. No tracheal deviation present.   Cardiovascular: Normal rate, regular rhythm and intact distal pulses.    No murmur heard.  Pulmonary/Chest: Effort normal. No respiratory distress. She has no wheezes. She exhibits no tenderness.   diminished   Abdominal: Soft. Bowel sounds are normal. She exhibits no distension. There is no tenderness. There is no rebound.   obese   Musculoskeletal: She exhibits no edema.   Neurological: She is alert and oriented to person, place, and time.   sleeping   Skin: Skin is warm.   Legs with compression hose   Vitals reviewed.      Labs:        Invalid input(s): [id number]      Recent Labs      06/10/18   0340   SODIUM  132*   POTASSIUM  4.6    CHLORIDE  96   CO2  30   BUN  10   CREATININE  0.58   CALCIUM  8.6     Recent Labs      06/10/18   0340   GLUCOSE  117*     No results for input(s): RBC, HEMOGLOBIN, HEMATOCRIT, PLATELETCT, PROTHROMBTM, APTT, INR, IRON, FERRITIN, TOTIRONBC in the last 72 hours.           GI/Nutrition:  Orders Placed This Encounter   Procedures   • DIET ORDER     Standing Status:   Standing     Number of Occurrences:   1     Order Specific Question:   Diet:     Answer:   Diabetic [3]     Comments:   chobani smoothie drinks inbetween meals 3x a day     Order Specific Question:   Texture/Fiber modifications:     Answer:   Dysphagia 1(Pureed)specify fluid consistency(question 6) [1]     Comments:   Cottage cheese okay      Order Specific Question:   Consistency/Fluid modifications:     Answer:   Thin Liquids [3]     Medical Decision Making, by Problem:  MM IgG kappa stage 3   -s/p 2 cycles CyBorD, not tolerated well per notes in epic   -oral regimen approved, await delivery   -anticipated poor prognosis  L pathologic humerus fx and B femur palliative radiation   -multiple lytic lesions on imaging  Tx RLL PNA PTA  Buttock pressure decub   -air loss mattress  B LE venous insufficiency with prior cellulitis   -compression as tolerated  Anemia/leukopenia   -secondary to chemo   -ANC adequate on last check  DM2   -NPH   -continue metformin   -RISS  R leg pain   -unclear if lytic pain vs other  HTN   -lisinopril, metoprolol  Morbid obesity  Hyponatremia  Hypomagnesemia   -mag oxide  Intermittent diarrhea   -imodium prn  Post nasal drip   -flonase, clariten  Bladder spasm   -resolved with nava removal   -change urispas to prn  Debility   -PT/OT         Quality-Core Measures   Reviewed items::  Medications reviewed  Nava catheter::  No Nava  Central line in place:  Need for access and Chemotherapy  DVT prophylaxis pharmacological::  Heparin

## 2018-06-12 LAB
ALBUMIN SERPL BCP-MCNC: 2.5 G/DL (ref 3.2–4.9)
ALBUMIN/GLOB SERPL: 0.6 G/DL
ALP SERPL-CCNC: 50 U/L (ref 30–99)
ALT SERPL-CCNC: 13 U/L (ref 2–50)
ANION GAP SERPL CALC-SCNC: 5 MMOL/L (ref 0–11.9)
AST SERPL-CCNC: 23 U/L (ref 12–45)
BASOPHILS # BLD AUTO: 0.3 % (ref 0–1.8)
BASOPHILS # BLD: 0.01 K/UL (ref 0–0.12)
BILIRUB SERPL-MCNC: 0.5 MG/DL (ref 0.1–1.5)
BUN SERPL-MCNC: 9 MG/DL (ref 8–22)
CALCIUM SERPL-MCNC: 8.7 MG/DL (ref 8.4–10.2)
CHLORIDE SERPL-SCNC: 95 MMOL/L (ref 96–112)
CO2 SERPL-SCNC: 32 MMOL/L (ref 20–33)
CREAT SERPL-MCNC: 0.65 MG/DL (ref 0.5–1.4)
EOSINOPHIL # BLD AUTO: 0.03 K/UL (ref 0–0.51)
EOSINOPHIL NFR BLD: 0.9 % (ref 0–6.9)
ERYTHROCYTE [DISTWIDTH] IN BLOOD BY AUTOMATED COUNT: 64.6 FL (ref 35.9–50)
GLOBULIN SER CALC-MCNC: 4.1 G/DL (ref 1.9–3.5)
GLUCOSE BLD-MCNC: 106 MG/DL (ref 65–99)
GLUCOSE BLD-MCNC: 135 MG/DL (ref 65–99)
GLUCOSE BLD-MCNC: 161 MG/DL (ref 65–99)
GLUCOSE BLD-MCNC: 167 MG/DL (ref 65–99)
GLUCOSE BLD-MCNC: 173 MG/DL (ref 65–99)
GLUCOSE SERPL-MCNC: 136 MG/DL (ref 65–99)
HCT VFR BLD AUTO: 23.6 % (ref 37–47)
HGB BLD-MCNC: 7.5 G/DL (ref 12–16)
IMM GRANULOCYTES # BLD AUTO: 0.01 K/UL (ref 0–0.11)
IMM GRANULOCYTES NFR BLD AUTO: 0.3 % (ref 0–0.9)
LYMPHOCYTES # BLD AUTO: 0.82 K/UL (ref 1–4.8)
LYMPHOCYTES NFR BLD: 23.6 % (ref 22–41)
MCH RBC QN AUTO: 30.6 PG (ref 27–33)
MCHC RBC AUTO-ENTMCNC: 31.8 G/DL (ref 33.6–35)
MCV RBC AUTO: 96.3 FL (ref 81.4–97.8)
MONOCYTES # BLD AUTO: 0.37 K/UL (ref 0–0.85)
MONOCYTES NFR BLD AUTO: 10.7 % (ref 0–13.4)
NEUTROPHILS # BLD AUTO: 2.23 K/UL (ref 2–7.15)
NEUTROPHILS NFR BLD: 64.2 % (ref 44–72)
NRBC # BLD AUTO: 0 K/UL
NRBC BLD-RTO: 0 /100 WBC
PLATELET # BLD AUTO: 180 K/UL (ref 164–446)
PMV BLD AUTO: 10.4 FL (ref 9–12.9)
POTASSIUM SERPL-SCNC: 4.2 MMOL/L (ref 3.6–5.5)
PROT SERPL-MCNC: 6.6 G/DL (ref 6–8.2)
RBC # BLD AUTO: 2.45 M/UL (ref 4.2–5.4)
SODIUM SERPL-SCNC: 132 MMOL/L (ref 135–145)
WBC # BLD AUTO: 3.5 K/UL (ref 4.8–10.8)

## 2018-06-12 PROCEDURE — 85025 COMPLETE CBC W/AUTO DIFF WBC: CPT

## 2018-06-12 PROCEDURE — 80053 COMPREHEN METABOLIC PANEL: CPT

## 2018-06-12 PROCEDURE — 82962 GLUCOSE BLOOD TEST: CPT | Mod: 91

## 2018-06-12 ASSESSMENT — ENCOUNTER SYMPTOMS
NAUSEA: 0
FEVER: 0
SHORTNESS OF BREATH: 0
HALLUCINATIONS: 0
CONSTIPATION: 0
ABDOMINAL PAIN: 0
SPUTUM PRODUCTION: 0
HEARTBURN: 0
MYALGIAS: 0
COUGH: 0

## 2018-06-12 NOTE — [facility]
Hospital Medicine Progress Note, Adult, Complex               Author: Za Isaac Date & Time created: 6/12/2018  3:31 PM     CC: multiple myeloma with debility after chemotherapy    Interval History:  Patient is sitting in a chair in the hallway  She has some improved strength but is not able to ambulate due to weakness  Pain in left arm is minimal    Review of Systems:  Review of Systems   Constitutional: Negative for fever and malaise/fatigue.   Respiratory: Negative for cough, sputum production and shortness of breath.    Cardiovascular: Negative for chest pain.   Gastrointestinal: Negative for abdominal pain, constipation, heartburn and nausea.   Genitourinary: Negative for dysuria.   Musculoskeletal: Negative for myalgias.   Skin: Negative for itching and rash.   Psychiatric/Behavioral: Negative for hallucinations.       Physical Exam:  Physical Exam   Constitutional: She is oriented to person, place, and time. No distress.   HENT:   Mouth/Throat: Oropharynx is clear and moist.   Eyes: No scleral icterus.   Neck: Neck supple.   Cardiovascular: Normal rate, regular rhythm and intact distal pulses.  PMI is displaced.    No murmur heard.  Pulses:       Dorsalis pedis pulses are 2+ on the right side, and 2+ on the left side.   Pulmonary/Chest: Breath sounds normal. She has no wheezes. She has no rales.   Abdominal: Soft. There is no tenderness.   Large abdominal pannus   Musculoskeletal: She exhibits no edema.   Neurological: She is alert and oriented to person, place, and time. Coordination normal.   Skin: Skin is warm and dry. No rash noted. She is not diaphoretic.   Psychiatric: Her behavior is normal.   Nursing note and vitals reviewed.      Labs:        Invalid input(s): [id number]      Recent Labs      06/10/18   0340  06/12/18   0345   SODIUM  132*  132*   POTASSIUM  4.6  4.2   CHLORIDE  96  95*   CO2  30  32   BUN  10  9   CREATININE  0.58  0.65   CALCIUM  8.6  8.7     Recent Labs      06/10/18    0340  06/12/18   0345   ALTSGPT   --   13   ASTSGOT   --   23   ALKPHOSPHAT   --   50   TBILIRUBIN   --   0.5   GLUCOSE  117*  136*     Recent Labs      06/12/18   0345   RBC  2.45*   HEMOGLOBIN  7.5*   HEMATOCRIT  23.6*   PLATELETCT  180     Recent Labs      06/12/18   0345   WBC  3.5*   NEUTSPOLYS  64.20   LYMPHOCYTES  23.60   MONOCYTES  10.70   EOSINOPHILS  0.90   BASOPHILS  0.30   ASTSGOT  23   ALTSGPT  13   ALKPHOSPHAT  50   TBILIRUBIN  0.5           Hemodynamics:   T98.8 /81 HR95 RR23 O2 sat 94% on room air     GI/Nutrition:  Orders Placed This Encounter   Procedures   • DIET ORDER     Standing Status:   Standing     Number of Occurrences:   1     Order Specific Question:   Diet:     Answer:   Diabetic [3]     Comments:   chobani smoothie drinks inbetween meals 3x a day     Order Specific Question:   Texture/Fiber modifications:     Answer:   Dysphagia 1(Pureed)specify fluid consistency(question 6) [1]     Comments:   Cottage cheese okay      Order Specific Question:   Consistency/Fluid modifications:     Answer:   Thin Liquids [3]     Medical Decision Making, by Problem:  Multiple myeloma 1P deletion intermediate risk, IgG kappa.  Stage III based on ISS staging             treated with  2 cycles CyBorD   Patient to start oral therapy when delivered, she states she had one interview over the phone with the pharmacy and another one is pending    Neutropenia induced by chemotherapy,    Improved after granix, follow labs    Anemia, worse today with hemoglobin 7.5, will recheck labs    L pathologic humerus fx and B femur palliative radiation   Due to lytic lesions, physical therapy    Pain controlled, use tramadol as needed    Acute metabolic encephalopathy, due to pain medication    Patient had visual hallucinations on previous narcotics, mental status is now improved off these medications    Buttock pressure decubitus ulcer              Skin care   Bilateral lower extremity venous insufficiency, edema   improved   Had worsening edema previously on norvasc  Type II diabetes mellitus             NPH, metformin and sliding scale insulin    HTN   lisinopril  Morbid obesity, BMI over 60,    encourage weight loss after discharge    Metabolic alkalosis    resolved    DNR      Quality-Core Measures   Reviewed items::  Labs reviewed and Medications reviewed  Singh catheter::  Urinary Tract Retention or Urinary Tract Obstruction  DVT prophylaxis pharmacological::  Contraindicated - High bleeding risk  Ulcer Prophylaxis::  Not indicated  Assessed for rehabilitation services:  Patient was assess for and/or received rehabilitation services during this hospitalization

## 2018-06-13 LAB
GLUCOSE BLD-MCNC: 115 MG/DL (ref 65–99)
GLUCOSE BLD-MCNC: 140 MG/DL (ref 65–99)
GLUCOSE BLD-MCNC: 147 MG/DL (ref 65–99)
GLUCOSE BLD-MCNC: 152 MG/DL (ref 65–99)
GLUCOSE BLD-MCNC: 154 MG/DL (ref 65–99)
GLUCOSE BLD-MCNC: 165 MG/DL (ref 65–99)
GLUCOSE BLD-MCNC: 189 MG/DL (ref 65–99)
GLUCOSE BLD-MCNC: 190 MG/DL (ref 65–99)
GLUCOSE BLD-MCNC: 209 MG/DL (ref 65–99)

## 2018-06-13 PROCEDURE — 82962 GLUCOSE BLOOD TEST: CPT | Mod: 91

## 2018-06-13 ASSESSMENT — ENCOUNTER SYMPTOMS
CHILLS: 0
COUGH: 0
ABDOMINAL PAIN: 0
SHORTNESS OF BREATH: 0
HALLUCINATIONS: 0
WEAKNESS: 1
PALPITATIONS: 0
MYALGIAS: 0
FEVER: 0
CONSTIPATION: 0
HEARTBURN: 0
BRUISES/BLEEDS EASILY: 0

## 2018-06-13 NOTE — [facility]
Hospital Medicine Progress Note, Adult, Complex               Author: Za Isaac Date & Time created: 6/13/2018  1:34 PM     CC: multiple myeloma with debility after chemotherapy    Interval History:  Patient is sitting in a chair in the hallway  Physical therapy reports noticeable improvement in her strength  The patient states she was called by Dr. Deluna's office and her medication was delivered to his office    Review of Systems:  Review of Systems   Constitutional: Negative for chills and fever.        Poor appetite   Respiratory: Negative for cough and shortness of breath.    Cardiovascular: Negative for chest pain, palpitations and leg swelling.   Gastrointestinal: Negative for abdominal pain, constipation and heartburn.   Genitourinary: Negative for dysuria and urgency.   Musculoskeletal: Negative for myalgias.   Skin: Negative for rash.   Neurological: Positive for weakness.   Endo/Heme/Allergies: Does not bruise/bleed easily.   Psychiatric/Behavioral: Negative for hallucinations.       Physical Exam:  Physical Exam   Constitutional: She is oriented to person, place, and time. No distress.   HENT:   Mouth/Throat: No oropharyngeal exudate.   Eyes: Conjunctivae are normal. No scleral icterus.   Neck: Neck supple.   Cardiovascular: Normal rate, regular rhythm and intact distal pulses.  PMI is displaced.    No murmur heard.  Pulmonary/Chest: Effort normal and breath sounds normal. No respiratory distress.   Abdominal: Soft. Bowel sounds are normal. There is no tenderness.   Large abdominal pannus   Musculoskeletal: She exhibits no edema.   Neurological: She is alert and oriented to person, place, and time. Coordination normal.   Skin: No rash noted. She is not diaphoretic. No erythema. There is pallor.   Psychiatric: Her behavior is normal.   Nursing note and vitals reviewed.      Labs:        Invalid input(s): [id number]      Recent Labs      06/12/18   0345   SODIUM  132*   POTASSIUM  4.2   CHLORIDE   95*   CO2  32   BUN  9   CREATININE  0.65   CALCIUM  8.7     Recent Labs      06/12/18   0345   ALTSGPT  13   ASTSGOT  23   ALKPHOSPHAT  50   TBILIRUBIN  0.5   GLUCOSE  136*     Recent Labs      06/12/18   0345   RBC  2.45*   HEMOGLOBIN  7.5*   HEMATOCRIT  23.6*   PLATELETCT  180     Recent Labs      06/12/18   0345   WBC  3.5*   NEUTSPOLYS  64.20   LYMPHOCYTES  23.60   MONOCYTES  10.70   EOSINOPHILS  0.90   BASOPHILS  0.30   ASTSGOT  23   ALTSGPT  13   ALKPHOSPHAT  50   TBILIRUBIN  0.5           Hemodynamics:   T98.1 /66 HR91 RR21 O2 sat 93% on room air     GI/Nutrition:  Orders Placed This Encounter   Procedures   • DIET ORDER     Standing Status:   Standing     Number of Occurrences:   1     Order Specific Question:   Diet:     Answer:   Diabetic [3]     Comments:   chobani smoothie drinks inbetween meals 3x a day     Order Specific Question:   Texture/Fiber modifications:     Answer:   Dysphagia 1(Pureed)specify fluid consistency(question 6) [1]     Comments:   Cottage cheese okay      Order Specific Question:   Consistency/Fluid modifications:     Answer:   Thin Liquids [3]     Medical Decision Making, by Problem:  Multiple myeloma 1P deletion intermediate risk, IgG kappa.  Stage III based on ISS staging             treated with  2 cycles CyBorD   Patient to start oral therapy, discussed with pharmacy that medication is reportedly delivered to her oncologists office   Will add megace for her appetite    Neutropenia induced by chemotherapy,    Improved after granix,    Level lower  Now    Anemia, worse today with hemoglobin 7.5, start oral iron    L pathologic humerus fx and B femur palliative radiation   Due to lytic lesions, physical therapy    Pain controlled, with tramadol    Acute metabolic encephalopathy, due to pain medication    Patient had visual hallucinations on previous narcotics, mental status is now improved off narcotics    Buttock pressure decubitus ulcer              Skin care   Bilateral  lower extremity venous insufficiency, edema  improved   Had worsening edema previously on norvasc  Type II diabetes mellitus             NPH, metformin and sliding scale insulin    HTN   lisinopril  Morbid obesity, BMI over 60,    encourage weight loss after discharge    Metabolic alkalosis    resolved    DNR      Quality-Core Measures   Reviewed items::  Labs reviewed and Medications reviewed  Singh catheter::  Urinary Tract Retention or Urinary Tract Obstruction  DVT prophylaxis pharmacological::  Contraindicated - High bleeding risk  Ulcer Prophylaxis::  Not indicated  Assessed for rehabilitation services:  Patient was assess for and/or received rehabilitation services during this hospitalization

## 2018-06-14 LAB
ANION GAP SERPL CALC-SCNC: 4 MMOL/L (ref 0–11.9)
BUN SERPL-MCNC: 12 MG/DL (ref 8–22)
CALCIUM SERPL-MCNC: 8.5 MG/DL (ref 8.4–10.2)
CHLORIDE SERPL-SCNC: 98 MMOL/L (ref 96–112)
CO2 SERPL-SCNC: 31 MMOL/L (ref 20–33)
CREAT SERPL-MCNC: 0.73 MG/DL (ref 0.5–1.4)
ERYTHROCYTE [DISTWIDTH] IN BLOOD BY AUTOMATED COUNT: 66.2 FL (ref 35.9–50)
GLUCOSE SERPL-MCNC: 96 MG/DL (ref 65–99)
HCT VFR BLD AUTO: 23.5 % (ref 37–47)
HGB BLD-MCNC: 7.6 G/DL (ref 12–16)
MCH RBC QN AUTO: 31.3 PG (ref 27–33)
MCHC RBC AUTO-ENTMCNC: 32.3 G/DL (ref 33.6–35)
MCV RBC AUTO: 96.7 FL (ref 81.4–97.8)
PLATELET # BLD AUTO: 179 K/UL (ref 164–446)
PMV BLD AUTO: 9.9 FL (ref 9–12.9)
POTASSIUM SERPL-SCNC: 4.3 MMOL/L (ref 3.6–5.5)
RBC # BLD AUTO: 2.43 M/UL (ref 4.2–5.4)
SODIUM SERPL-SCNC: 133 MMOL/L (ref 135–145)
WBC # BLD AUTO: 4.1 K/UL (ref 4.8–10.8)

## 2018-06-14 PROCEDURE — 80048 BASIC METABOLIC PNL TOTAL CA: CPT

## 2018-06-14 PROCEDURE — 82962 GLUCOSE BLOOD TEST: CPT | Mod: 91

## 2018-06-14 PROCEDURE — 85027 COMPLETE CBC AUTOMATED: CPT

## 2018-06-14 ASSESSMENT — ENCOUNTER SYMPTOMS
NAUSEA: 0
DIAPHORESIS: 0
ABDOMINAL PAIN: 0
WEAKNESS: 1
FEVER: 0
BRUISES/BLEEDS EASILY: 0
HALLUCINATIONS: 0
CONSTIPATION: 0
COUGH: 0
HEARTBURN: 0
WHEEZING: 0
WEIGHT LOSS: 1
SHORTNESS OF BREATH: 0

## 2018-06-14 NOTE — [facility]
Hospital Medicine Progress Note, Adult, Complex               Author: Za Isaac Date & Time created: 6/14/2018  1:48 PM     CC: multiple myeloma with debility after chemotherapy    Interval History:  Patient is sitting in a chair in the hallway  She ate half her oatmeal this morning but states she still has a poor appetite, she does like the supplements with  meals    Review of Systems:  Review of Systems   Constitutional: Positive for weight loss. Negative for diaphoresis and fever.        Poor appetite   Respiratory: Negative for cough, shortness of breath and wheezing.    Cardiovascular: Negative for chest pain and leg swelling.   Gastrointestinal: Negative for abdominal pain, constipation, heartburn and nausea.   Genitourinary: Negative for dysuria and hematuria.   Neurological: Positive for weakness.   Endo/Heme/Allergies: Does not bruise/bleed easily.   Psychiatric/Behavioral: Negative for hallucinations.       Physical Exam:  Physical Exam   Constitutional: She is oriented to person, place, and time. No distress.   HENT:   Mouth/Throat: No oropharyngeal exudate.   Eyes: No scleral icterus.   Neck: Neck supple.   Cardiovascular: Normal rate, regular rhythm and intact distal pulses.  PMI is displaced.    No murmur heard.  Pulmonary/Chest: Effort normal and breath sounds normal. She has no wheezes.   Abdominal: Soft. There is no tenderness.   Large abdominal pannus   Musculoskeletal: She exhibits no edema.   Neurological: She is alert and oriented to person, place, and time. Coordination normal.   Skin: Skin is warm and dry. There is pallor.   Psychiatric: Her behavior is normal.   Nursing note and vitals reviewed.      Labs:        Invalid input(s): [id number]      Recent Labs      06/12/18   0345  06/14/18   0240   SODIUM  132*  133*   POTASSIUM  4.2  4.3   CHLORIDE  95*  98   CO2  32  31   BUN  9  12   CREATININE  0.65  0.73   CALCIUM  8.7  8.5     Recent Labs      06/12/18   0345  06/14/18   0240    ALTSGPT  13   --    ASTSGOT  23   --    ALKPHOSPHAT  50   --    TBILIRUBIN  0.5   --    GLUCOSE  136*  96     Recent Labs      06/12/18   0345  06/14/18   0240   RBC  2.45*  2.43*   HEMOGLOBIN  7.5*  7.6*   HEMATOCRIT  23.6*  23.5*   PLATELETCT  180  179     Recent Labs      06/12/18   0345  06/14/18   0240   WBC  3.5*  4.1*   NEUTSPOLYS  64.20   --    LYMPHOCYTES  23.60   --    MONOCYTES  10.70   --    EOSINOPHILS  0.90   --    BASOPHILS  0.30   --    ASTSGOT  23   --    ALTSGPT  13   --    ALKPHOSPHAT  50   --    TBILIRUBIN  0.5   --            Hemodynamics:   T97.8 /52 HR79 RR20 O2 sat 93% on room air     GI/Nutrition:  Orders Placed This Encounter   Procedures   • DIET ORDER     Standing Status:   Standing     Number of Occurrences:   1     Order Specific Question:   Diet:     Answer:   Diabetic [3]     Comments:   chobani smoothie drinks inbetween meals 3x a day     Order Specific Question:   Texture/Fiber modifications:     Answer:   Dysphagia 3(Mechanical Soft)specify fluid consistency(question 6) [3]     Comments:   Cottage cheese okay      Order Specific Question:   Consistency/Fluid modifications:     Answer:   Thin Liquids [3]     Medical Decision Making, by Problem:  Multiple myeloma 1P deletion intermediate risk, IgG kappa.  Stage III based on ISS staging             treated with  2 cycles CyBorD   Patient to start oral revlimid when delivered to this facility   continue megace for appetite stimulation    Neutropenia induced by chemotherapy,    Improved after granix,     Anemia, stable, continue oral iron    L pathologic humerus fx and B femur palliative radiation   Due to lytic lesions, physical therapy    Pain controlled with tramadol as needed    Acute metabolic encephalopathy, due to pain medication    Patient had visual hallucinations on previous narcotics, mental status is now improved off narcotics    Buttock pressure decubitus ulcer              Skin care   Bilateral lower extremity  venous insufficiency, edema  improved   Had worsening edema previously on norvasc  Type II diabetes mellitus             NPH, metformin and sliding scale insulin    HTN   lisinopril  Morbid obesity, BMI over 60, patient reports loosing almost 100 pounds over the last 6 months    encourage healthy weight loss after discharge    Metabolic alkalosis    resolved    DNR      Quality-Core Measures   Reviewed items::  Labs reviewed and Medications reviewed  Singh catheter::  Urinary Tract Retention or Urinary Tract Obstruction  DVT prophylaxis pharmacological::  Contraindicated - High bleeding risk  Ulcer Prophylaxis::  Not indicated  Assessed for rehabilitation services:  Patient was assess for and/or received rehabilitation services during this hospitalization

## 2018-06-15 LAB
ANION GAP SERPL CALC-SCNC: 2 MMOL/L (ref 0–11.9)
BUN SERPL-MCNC: 14 MG/DL (ref 8–22)
CALCIUM SERPL-MCNC: 8.8 MG/DL (ref 8.4–10.2)
CHLORIDE SERPL-SCNC: 98 MMOL/L (ref 96–112)
CO2 SERPL-SCNC: 32 MMOL/L (ref 20–33)
CREAT SERPL-MCNC: 0.73 MG/DL (ref 0.5–1.4)
ERYTHROCYTE [DISTWIDTH] IN BLOOD BY AUTOMATED COUNT: 66.4 FL (ref 35.9–50)
GLUCOSE BLD-MCNC: 172 MG/DL (ref 65–99)
GLUCOSE BLD-MCNC: 176 MG/DL (ref 65–99)
GLUCOSE BLD-MCNC: 204 MG/DL (ref 65–99)
GLUCOSE SERPL-MCNC: 163 MG/DL (ref 65–99)
HCT VFR BLD AUTO: 22.9 % (ref 37–47)
HGB BLD-MCNC: 7.5 G/DL (ref 12–16)
MCH RBC QN AUTO: 31.6 PG (ref 27–33)
MCHC RBC AUTO-ENTMCNC: 32.8 G/DL (ref 33.6–35)
MCV RBC AUTO: 96.6 FL (ref 81.4–97.8)
PLATELET # BLD AUTO: 192 K/UL (ref 164–446)
PMV BLD AUTO: 9.6 FL (ref 9–12.9)
POTASSIUM SERPL-SCNC: 4.4 MMOL/L (ref 3.6–5.5)
RBC # BLD AUTO: 2.37 M/UL (ref 4.2–5.4)
SODIUM SERPL-SCNC: 132 MMOL/L (ref 135–145)
WBC # BLD AUTO: 4.2 K/UL (ref 4.8–10.8)

## 2018-06-15 PROCEDURE — 80048 BASIC METABOLIC PNL TOTAL CA: CPT

## 2018-06-15 PROCEDURE — 302244 HCHG LTACH STAT

## 2018-06-15 PROCEDURE — 85027 COMPLETE CBC AUTOMATED: CPT

## 2018-06-15 PROCEDURE — 82962 GLUCOSE BLOOD TEST: CPT | Mod: 91

## 2018-06-15 ASSESSMENT — ENCOUNTER SYMPTOMS
CONSTIPATION: 0
PALPITATIONS: 0
COUGH: 0
HEARTBURN: 0
BRUISES/BLEEDS EASILY: 0
HALLUCINATIONS: 0
FEVER: 0
SHORTNESS OF BREATH: 0
ABDOMINAL PAIN: 0

## 2018-06-15 NOTE — [facility]
Hospital Medicine Progress Note, Adult, Complex               Author: Za Isaac Date & Time created: 6/15/2018  10:43 AM     CC: multiple myeloma with debility after chemotherapy    Interval History:  Patient is sitting in a chair in the hallway  She is eating part of her meals but feels full quickly and has poor taste sensation    Review of Systems:  Review of Systems   Constitutional: Negative for fever.        Poor appetite  Weakness improving   Respiratory: Negative for cough and shortness of breath.    Cardiovascular: Negative for chest pain and palpitations.   Gastrointestinal: Negative for abdominal pain, constipation and heartburn.   Genitourinary: Negative for hematuria and urgency.   Skin: Negative for rash.   Endo/Heme/Allergies: Does not bruise/bleed easily.   Psychiatric/Behavioral: Negative for hallucinations.       Physical Exam:  Physical Exam   Constitutional: She is oriented to person, place, and time. No distress.   HENT:   Mouth/Throat: Oropharynx is clear and moist. No oropharyngeal exudate.   Eyes: Conjunctivae are normal.   Neck: Neck supple.   Cardiovascular: Normal rate, regular rhythm and intact distal pulses.  PMI is displaced.    No murmur heard.  Pulmonary/Chest: Effort normal and breath sounds normal. She has no wheezes.   Abdominal: Soft. She exhibits no distension.   Large abdominal pannus   Musculoskeletal: She exhibits no edema.   Neurological: She is alert and oriented to person, place, and time. Coordination normal.   Skin: She is not diaphoretic. No erythema. There is pallor.   Psychiatric: Her behavior is normal.   Nursing note and vitals reviewed.      Labs:        Invalid input(s): [id number]      Recent Labs      06/14/18   0240  06/15/18   0410   SODIUM  133*  132*   POTASSIUM  4.3  4.4   CHLORIDE  98  98   CO2  31  32   BUN  12  14   CREATININE  0.73  0.73   CALCIUM  8.5  8.8     Recent Labs      06/14/18   0240  06/15/18   0410   GLUCOSE  96  163*     Recent Labs       06/14/18   0240  06/15/18   0410   RBC  2.43*  2.37*   HEMOGLOBIN  7.6*  7.5*   HEMATOCRIT  23.5*  22.9*   PLATELETCT  179  192     Recent Labs      06/14/18   0240  06/15/18   0410   WBC  4.1*  4.2*           Hemodynamics:   T98.2 /66 HR92 RR20 O2 sat 95% on room air     GI/Nutrition:  Orders Placed This Encounter   Procedures   • DIET ORDER     Standing Status:   Standing     Number of Occurrences:   1     Order Specific Question:   Diet:     Answer:   Diabetic [3]     Comments:   chobani smoothie drinks inbetween meals 3x a day     Order Specific Question:   Texture/Fiber modifications:     Answer:   Dysphagia 3(Mechanical Soft)specify fluid consistency(question 6) [3]     Comments:   Cottage cheese okay      Order Specific Question:   Consistency/Fluid modifications:     Answer:   Thin Liquids [3]     Medical Decision Making, by Problem:  Multiple myeloma 1P deletion intermediate risk, IgG kappa.  Stage III based on ISS staging             treated with  2 cycles CyBorD   Patient to start oral revlimid    continue megace for appetite and monitor caloric intake    Neutropenia induced by chemotherapy,    Improved after granix, follow labs   Level stable today    Anemia, stable on oral iron    L pathologic humerus fx and B femur palliative radiation   Due to lytic lesions, physical therapy    Pain controlled with tramadol as needed    Acute metabolic encephalopathy, due to pain medication    Patient had visual hallucinations on previous narcotics, mental status is now improved off narcotics    Buttock pressure decubitus ulcer              Skin care   Bilateral lower extremity venous insufficiency, edema  improved   Had worsening edema previously on norvasc  Type II diabetes mellitus             NPH, metformin and sliding scale insulin    HTN   lisinopril  Morbid obesity, BMI over 60, patient reports loosing almost 100 pounds over the last 6 months    encourage healthy weight loss after  discharge    Metabolic alkalosis    resolved    DNR      Quality-Core Measures   Reviewed items::  Labs reviewed and Medications reviewed  Singh catheter::  Urinary Tract Retention or Urinary Tract Obstruction  DVT prophylaxis pharmacological::  Contraindicated - High bleeding risk  Ulcer Prophylaxis::  Not indicated  Assessed for rehabilitation services:  Patient was assess for and/or received rehabilitation services during this hospitalization

## 2018-06-16 LAB
GLUCOSE BLD-MCNC: 162 MG/DL (ref 65–99)
GLUCOSE BLD-MCNC: 189 MG/DL (ref 65–99)
GLUCOSE BLD-MCNC: 196 MG/DL (ref 65–99)
GLUCOSE BLD-MCNC: 230 MG/DL (ref 65–99)

## 2018-06-16 PROCEDURE — 82962 GLUCOSE BLOOD TEST: CPT

## 2018-06-16 ASSESSMENT — ENCOUNTER SYMPTOMS
CHILLS: 0
FEVER: 0
ABDOMINAL PAIN: 0
DIAPHORESIS: 0
BRUISES/BLEEDS EASILY: 0
NAUSEA: 0
WHEEZING: 0
MYALGIAS: 0
PALPITATIONS: 0
HEARTBURN: 0
SHORTNESS OF BREATH: 0
CONSTIPATION: 0
HALLUCINATIONS: 0

## 2018-06-16 NOTE — [facility]
Hospital Medicine Progress Note, Adult, Complex               Author: Za Isaac Date & Time created: 6/16/2018  11:23 AM     CC: multiple myeloma with debility after chemotherapy    Interval History:  Patient is sitting in a chair more easily  She has nasal congestion consistent with her known seasonal allergies      Review of Systems:  Review of Systems   Constitutional: Negative for chills, diaphoresis and fever.        Poor appetite  Weakness improving   HENT: Positive for congestion.    Respiratory: Negative for shortness of breath and wheezing.         Patient feels she needs to cough up some phlegm but cannot get it up   Cardiovascular: Negative for chest pain and palpitations.   Gastrointestinal: Negative for abdominal pain, constipation, heartburn and nausea.   Genitourinary: Negative for hematuria.   Musculoskeletal: Negative for myalgias.   Endo/Heme/Allergies: Does not bruise/bleed easily.   Psychiatric/Behavioral: Negative for hallucinations.       Physical Exam:  Physical Exam   Constitutional: She is oriented to person, place, and time. No distress.   HENT:   Mouth/Throat: No oropharyngeal exudate.   Eyes: No scleral icterus.   Neck: Neck supple.   Cardiovascular: Normal rate, regular rhythm and intact distal pulses.  PMI is displaced.    No murmur heard.  Pulmonary/Chest: Effort normal and breath sounds normal. She has no wheezes.   Abdominal: Soft. She exhibits no distension. There is no tenderness.   Large abdominal pannus   Musculoskeletal: She exhibits no edema.   Neurological: She is alert and oriented to person, place, and time. Coordination normal.   Skin: She is not diaphoretic. There is pallor.   Psychiatric: Her behavior is normal.   Nursing note and vitals reviewed.      Labs:        Invalid input(s): [id number]      Recent Labs      06/14/18   0240  06/15/18   0410   SODIUM  133*  132*   POTASSIUM  4.3  4.4   CHLORIDE  98  98   CO2  31  32   BUN  12  14   CREATININE  0.73  0.73    CALCIUM  8.5  8.8     Recent Labs      06/14/18   0240  06/15/18   0410   GLUCOSE  96  163*     Recent Labs      06/14/18   0240  06/15/18   0410   RBC  2.43*  2.37*   HEMOGLOBIN  7.6*  7.5*   HEMATOCRIT  23.5*  22.9*   PLATELETCT  179  192     Recent Labs      06/14/18   0240  06/15/18   0410   WBC  4.1*  4.2*           Hemodynamics:   T98.5 /69 HR85 RR21 O2 sat 94% on room air     GI/Nutrition:  Orders Placed This Encounter   Procedures   • DIET ORDER     Standing Status:   Standing     Number of Occurrences:   1     Order Specific Question:   Diet:     Answer:   Diabetic [3]     Comments:   chobani smoothie drinks inbetween meals 3x a day     Order Specific Question:   Texture/Fiber modifications:     Answer:   Dysphagia 3(Mechanical Soft)specify fluid consistency(question 6) [3]     Comments:   Cottage cheese okay      Order Specific Question:   Consistency/Fluid modifications:     Answer:   Thin Liquids [3]     Medical Decision Making, by Problem:  Multiple myeloma 1P deletion intermediate risk, IgG kappa.  Stage III based on ISS staging             treated with  2 cycles CyBorD   Patient now on oral revlimid per oncology   continue megace for appetite stimulation    Neutropenia induced by chemotherapy,    Improved after granix, follow labs   Level stable     Anemia, oral iron    L pathologic humerus fx and B femur palliative radiation   Due to lytic lesions, physical therapy    Pain controlled     Acute metabolic encephalopathy, due to pain medication    Patient had visual hallucinations on previous narcotics, mental status is now improved off narcotics    Buttock pressure decubitus ulcer              Skin care   Bilateral lower extremity venous insufficiency, edema  improved   Had worsening edema previously on norvasc  Type II diabetes mellitus             NPH, metformin and sliding scale insulin    Seasonal allergies with congestion   flonase   Change claritin to zyrtec per patient  request    HTN   lisinopril  Morbid obesity, BMI over 60, patient reports loosing almost 100 pounds over the last 6 months    encourage healthy weight loss after discharge    Metabolic alkalosis    resolved    DNR      Quality-Core Measures   Reviewed items::  Labs reviewed and Medications reviewed  Singh catheter::  Urinary Tract Retention or Urinary Tract Obstruction  DVT prophylaxis pharmacological::  Contraindicated - High bleeding risk  Ulcer Prophylaxis::  Not indicated  Assessed for rehabilitation services:  Patient was assess for and/or received rehabilitation services during this hospitalization

## 2018-06-17 LAB
GLUCOSE BLD-MCNC: 101 MG/DL (ref 65–99)
GLUCOSE BLD-MCNC: 177 MG/DL (ref 65–99)
GLUCOSE BLD-MCNC: 179 MG/DL (ref 65–99)
GLUCOSE BLD-MCNC: 190 MG/DL (ref 65–99)

## 2018-06-17 PROCEDURE — 82962 GLUCOSE BLOOD TEST: CPT | Mod: 91

## 2018-06-17 ASSESSMENT — ENCOUNTER SYMPTOMS
NAUSEA: 0
HALLUCINATIONS: 0
COUGH: 0
SHORTNESS OF BREATH: 0
HEARTBURN: 0
MYALGIAS: 0
DIAPHORESIS: 0
FEVER: 0
BRUISES/BLEEDS EASILY: 0
CONSTIPATION: 0

## 2018-06-17 NOTE — [facility]
Hospital Medicine Progress Note, Adult, Complex               Author: Za Isaac Date & Time created: 6/17/2018  12:09 PM     CC: multiple myeloma with debility after chemotherapy    Interval History:  Congestion is improved on zyrtec    Review of Systems:  Review of Systems   Constitutional: Negative for diaphoresis and fever.        Poor appetite     HENT: Negative for congestion.    Respiratory: Negative for cough and shortness of breath.         Patient feels she needs to cough up some phlegm but cannot get it up   Cardiovascular: Negative for chest pain.   Gastrointestinal: Negative for constipation, heartburn and nausea.   Genitourinary: Negative for dysuria and hematuria.   Musculoskeletal: Negative for myalgias.   Endo/Heme/Allergies: Does not bruise/bleed easily.   Psychiatric/Behavioral: Negative for hallucinations.       Physical Exam:  Physical Exam   Constitutional: She is oriented to person, place, and time. No distress.   HENT:   Mouth/Throat: Oropharynx is clear and moist. No oropharyngeal exudate.   Eyes: Conjunctivae are normal.   Neck: Neck supple.   Cardiovascular: Normal rate, regular rhythm and intact distal pulses.  PMI is displaced.    No murmur heard.  Pulmonary/Chest: Effort normal and breath sounds normal. She has no wheezes.   Abdominal: Soft. Bowel sounds are normal. She exhibits no distension.   Large abdominal pannus   Musculoskeletal: She exhibits no edema.   Neurological: She is alert and oriented to person, place, and time.   Skin: She is not diaphoretic. There is pallor.   Psychiatric: Her behavior is normal.   Nursing note and vitals reviewed.      Labs:        Invalid input(s): [id number]      Recent Labs      06/15/18   0410   SODIUM  132*   POTASSIUM  4.4   CHLORIDE  98   CO2  32   BUN  14   CREATININE  0.73   CALCIUM  8.8     Recent Labs      06/15/18   0410   GLUCOSE  163*     Recent Labs      06/15/18   0410   RBC  2.37*   HEMOGLOBIN  7.5*   HEMATOCRIT  22.9*    PLATELETCT  192     Recent Labs      06/15/18   0410   WBC  4.2*           Hemodynamics:   T98.1 /38 HR89 RR20 O2 sat 91% on room air     GI/Nutrition:  Orders Placed This Encounter   Procedures   • DIET ORDER     Standing Status:   Standing     Number of Occurrences:   1     Order Specific Question:   Diet:     Answer:   Diabetic [3]     Comments:   chobani smoothie drinks inbetween meals 3x a day     Order Specific Question:   Texture/Fiber modifications:     Answer:   Dysphagia 3(Mechanical Soft)specify fluid consistency(question 6) [3]     Comments:   Cottage cheese okay      Order Specific Question:   Consistency/Fluid modifications:     Answer:   Thin Liquids [3]     Medical Decision Making, by Problem:  Multiple myeloma 1P deletion intermediate risk, IgG kappa.  Stage III based on ISS staging             treated with  2 cycles CyBorD   Patient now on oral revlimid per oncology   Megace to improve appetite    Neutropenia induced by chemotherapy,    Improved after granix, follow labs    Anemia, oral iron    L pathologic humerus fx and B femur palliative radiation   Due to lytic lesions, physical therapy    Pain controlled     Acute metabolic encephalopathy, due to pain medication    Patient had visual hallucinations on previous narcotics, mental status is now improved off narcotics    Buttock pressure decubitus ulcer              Skin care   Bilateral lower extremity venous insufficiency, edema  improved   Had worsening edema previously on norvasc  Type II diabetes mellitus             NPH, metformin and sliding scale insulin    Seasonal allergies with congestion   flonase   Change claritin to zyrtec per patient request    HTN   Lisinopril, will increase dose today and increase metoprolol    Morbid obesity, BMI over 60, patient reports loosing almost 100 pounds over the last 6 months    encourage healthy weight loss after discharge    Metabolic alkalosis    resolved    DNR      Quality-Core Measures    Reviewed items::  Labs reviewed and Medications reviewed  Singh catheter::  Urinary Tract Retention or Urinary Tract Obstruction  DVT prophylaxis pharmacological::  Contraindicated - High bleeding risk  Ulcer Prophylaxis::  Not indicated  Assessed for rehabilitation services:  Patient was assess for and/or received rehabilitation services during this hospitalization

## 2018-06-18 LAB
GLUCOSE BLD-MCNC: 170 MG/DL (ref 65–99)
GLUCOSE BLD-MCNC: 195 MG/DL (ref 65–99)
GLUCOSE BLD-MCNC: 203 MG/DL (ref 65–99)
GLUCOSE BLD-MCNC: 224 MG/DL (ref 65–99)
GLUCOSE BLD-MCNC: 232 MG/DL (ref 65–99)
GLUCOSE BLD-MCNC: 237 MG/DL (ref 65–99)

## 2018-06-18 PROCEDURE — 82962 GLUCOSE BLOOD TEST: CPT

## 2018-06-18 ASSESSMENT — ENCOUNTER SYMPTOMS
PALPITATIONS: 0
FEVER: 0
BRUISES/BLEEDS EASILY: 0
VOMITING: 0
CONSTIPATION: 0
HALLUCINATIONS: 0
NAUSEA: 0
SHORTNESS OF BREATH: 0
MYALGIAS: 0
HEARTBURN: 0

## 2018-06-18 NOTE — [facility]
Hospital Medicine Progress Note, Adult, Complex               Author: Zamary Isaac Date & Time created: 6/18/2018  1:07 PM     CC: multiple myeloma with debility after chemotherapy    Interval History:  She is sitting in a chair in the murphy and has improved strength but inability to care for herself  She dropped the remote control next to her eye and is bruised from this    Review of Systems:  Review of Systems   Constitutional: Negative for fever.        Poor appetite     HENT: Negative for congestion.    Respiratory: Negative for shortness of breath.    Cardiovascular: Negative for chest pain and palpitations.   Gastrointestinal: Negative for constipation, heartburn, nausea and vomiting.   Genitourinary: Negative for dysuria.   Musculoskeletal: Negative for myalgias.   Skin: Negative for itching and rash.   Endo/Heme/Allergies: Does not bruise/bleed easily.   Psychiatric/Behavioral: Negative for hallucinations.       Physical Exam:  Physical Exam   Constitutional: She is oriented to person, place, and time. No distress.   HENT:   Mouth/Throat: Oropharynx is clear and moist.   Eyes: Conjunctivae are normal. No scleral icterus.   Bruise lateral to left eye   Neck: Neck supple.   Cardiovascular: Normal rate, regular rhythm and intact distal pulses.  PMI is displaced.    No murmur heard.  Pulmonary/Chest: Effort normal and breath sounds normal. She has no wheezes.   Abdominal: Bowel sounds are normal. She exhibits no distension.   Large abdominal pannus   Musculoskeletal: She exhibits no edema.   Neurological: She is alert and oriented to person, place, and time. Coordination normal.   Skin: Skin is dry. She is not diaphoretic. No erythema. There is pallor.   Psychiatric: Her behavior is normal.   Nursing note and vitals reviewed.      Labs:        Invalid input(s): [id number]      No results for input(s): SODIUM, POTASSIUM, CHLORIDE, CO2, BUN, CREATININE, MAGNESIUM, PHOSPHORUS, CALCIUM in the last 72 hours.  No  results for input(s): ALTSGPT, ASTSGOT, ALKPHOSPHAT, TBILIRUBIN, DBILIRUBIN, GAMMAGT, AMYLASE, LIPASE, ALB, PREALBUMIN, GLUCOSE in the last 72 hours.  No results for input(s): RBC, HEMOGLOBIN, HEMATOCRIT, PLATELETCT, PROTHROMBTM, APTT, INR, IRON, FERRITIN, TOTIRONBC in the last 72 hours.            Hemodynamics:   T98.8 /66 HR81 RR17 O2 sat 93% on room air     GI/Nutrition:  Orders Placed This Encounter   Procedures   • DIET ORDER     Standing Status:   Standing     Number of Occurrences:   1     Order Specific Question:   Diet:     Answer:   Diabetic [3]     Comments:   chobani smoothie drinks inbetween meals 3x a day     Order Specific Question:   Texture/Fiber modifications:     Answer:   Dysphagia 3(Mechanical Soft)specify fluid consistency(question 6) [3]     Comments:   Cottage cheese okay      Order Specific Question:   Consistency/Fluid modifications:     Answer:   Thin Liquids [3]     Medical Decision Making, by Problem:  Multiple myeloma 1P deletion intermediate risk, IgG kappa.  Stage III based on ISS staging             treated with  2 cycles CyBorD   Patient now on oral revlimid per oncology   Continue megace for poor appetite    Neutropenia induced by chemotherapy,    Improved after granix, will monitor level    Anemia, oral iron    L pathologic humerus fx and B femur palliative radiation   Due to lytic lesions, physical therapy    Pain controlled     Acute metabolic encephalopathy, due to pain medication    Patient had visual hallucinations on previous narcotics, mental status is now improved off narcotics    Buttock pressure decubitus ulcer              Skin care   Bilateral lower extremity venous insufficiency, edema  improved   Had worsening edema previously on norvasc  Type II diabetes mellitus             NPH, metformin and sliding scale insulin    Seasonal allergies with congestion   flonase   zyrtec    HTN   Lisinopril, and metoprolol increased with better control    Morbid obesity, BMI  over 60, patient reports loosing almost 100 pounds over the last 6 months    encourage healthy weight loss after discharge    Metabolic alkalosis    resolved    DNR      Quality-Core Measures   Reviewed items::  Labs reviewed and Medications reviewed  Singh catheter::  Urinary Tract Retention or Urinary Tract Obstruction  DVT prophylaxis pharmacological::  Contraindicated - High bleeding risk  Ulcer Prophylaxis::  Not indicated  Assessed for rehabilitation services:  Patient was assess for and/or received rehabilitation services during this hospitalization

## 2018-06-19 ENCOUNTER — APPOINTMENT (OUTPATIENT)
Dept: RADIOLOGY | Facility: MEDICAL CENTER | Age: 68
End: 2018-06-19
Attending: HOSPITALIST
Payer: MEDICARE

## 2018-06-19 LAB
ANION GAP SERPL CALC-SCNC: 6 MMOL/L (ref 0–11.9)
BUN SERPL-MCNC: 15 MG/DL (ref 8–22)
CALCIUM SERPL-MCNC: 9.1 MG/DL (ref 8.4–10.2)
CHLORIDE SERPL-SCNC: 95 MMOL/L (ref 96–112)
CO2 SERPL-SCNC: 28 MMOL/L (ref 20–33)
CREAT SERPL-MCNC: 0.74 MG/DL (ref 0.5–1.4)
ERYTHROCYTE [DISTWIDTH] IN BLOOD BY AUTOMATED COUNT: 69.6 FL (ref 35.9–50)
GLUCOSE BLD-MCNC: 181 MG/DL (ref 65–99)
GLUCOSE BLD-MCNC: 225 MG/DL (ref 65–99)
GLUCOSE BLD-MCNC: 344 MG/DL (ref 65–99)
GLUCOSE SERPL-MCNC: 186 MG/DL (ref 65–99)
HCT VFR BLD AUTO: 23.4 % (ref 37–47)
HGB BLD-MCNC: 7.5 G/DL (ref 12–16)
MCH RBC QN AUTO: 31.3 PG (ref 27–33)
MCHC RBC AUTO-ENTMCNC: 32.1 G/DL (ref 33.6–35)
MCV RBC AUTO: 97.5 FL (ref 81.4–97.8)
PLATELET # BLD AUTO: 193 K/UL (ref 164–446)
PMV BLD AUTO: 9.3 FL (ref 9–12.9)
POTASSIUM SERPL-SCNC: 4.8 MMOL/L (ref 3.6–5.5)
RBC # BLD AUTO: 2.4 M/UL (ref 4.2–5.4)
SODIUM SERPL-SCNC: 129 MMOL/L (ref 135–145)
TROPONIN I SERPL-MCNC: <0.02 NG/ML (ref 0–0.04)
WBC # BLD AUTO: 4.5 K/UL (ref 4.8–10.8)

## 2018-06-19 PROCEDURE — 82962 GLUCOSE BLOOD TEST: CPT | Mod: 91

## 2018-06-19 PROCEDURE — 80048 BASIC METABOLIC PNL TOTAL CA: CPT

## 2018-06-19 PROCEDURE — 71045 X-RAY EXAM CHEST 1 VIEW: CPT

## 2018-06-19 PROCEDURE — 85027 COMPLETE CBC AUTOMATED: CPT

## 2018-06-19 PROCEDURE — 84484 ASSAY OF TROPONIN QUANT: CPT

## 2018-06-19 ASSESSMENT — ENCOUNTER SYMPTOMS
ABDOMINAL PAIN: 0
DIARRHEA: 0
COUGH: 0
HEADACHES: 0
DEPRESSION: 0
SHORTNESS OF BREATH: 1
FEVER: 0
VOMITING: 0
NAUSEA: 0
CONSTIPATION: 0
SORE THROAT: 0

## 2018-06-19 NOTE — [facility]
Hospital Medicine Progress Note, Adult, Complex               Author: Georgina HEVER Muniz Date & Time created: 6/19/2018  9:00 AM     CC: aftercare, debility from prolonged hospitalization for MM    Interval History:  Has control of bladder  No concerns, her oral chemo has not been started yet      Review of Systems:  Review of Systems   Constitutional: Negative for fever.   HENT: Negative for sore throat.    Respiratory: Positive for shortness of breath (resolves with duoneb). Negative for cough.    Cardiovascular: Negative for chest pain.   Gastrointestinal: Negative for abdominal pain, constipation, diarrhea, nausea and vomiting.   Genitourinary: Positive for frequency.   Musculoskeletal:        Leg pain   Neurological: Negative for headaches.   Psychiatric/Behavioral: Negative for depression.       T [id number] 142/32WD60KxU3 94% 2L I/O1.6/brp 3BM  Physical Exam   Constitutional: She is oriented to person, place, and time. She appears well-developed. No distress.   HENT:   Head: Normocephalic and atraumatic.   Mouth/Throat: No oropharyngeal exudate.   Eyes: Conjunctivae are normal. Right eye exhibits no discharge. Left eye exhibits no discharge. No scleral icterus.   Neck: Neck supple. No tracheal deviation present.   Cardiovascular: Normal rate, regular rhythm and intact distal pulses.    No murmur heard.  Pulmonary/Chest: Effort normal. No respiratory distress. She has no wheezes. She exhibits no tenderness.   diminished   Abdominal: Soft. Bowel sounds are normal. She exhibits no distension. There is no tenderness. There is no rebound.   obese   Musculoskeletal: She exhibits no edema.   Neurological: She is alert and oriented to person, place, and time.   Skin: Skin is warm.   Legs with compression hose   Vitals reviewed.      Labs:        Invalid input(s): [id number]      Recent Labs      06/19/18   0540   SODIUM  129*   POTASSIUM  4.8   CHLORIDE  95*   CO2  28   BUN  15   CREATININE  0.74   CALCIUM  9.1      Recent Labs      06/19/18   0540   GLUCOSE  186*     Recent Labs      06/19/18   0540   RBC  2.40*   HEMOGLOBIN  7.5*   HEMATOCRIT  23.4*   PLATELETCT  193     Recent Labs      06/19/18   0540   WBC  4.5*          GI/Nutrition:  Orders Placed This Encounter   Procedures   • DIET ORDER     Standing Status:   Standing     Number of Occurrences:   1     Order Specific Question:   Diet:     Answer:   Diabetic [3]     Comments:   chobani smoothie drinks inbetween meals 3x a day     Order Specific Question:   Texture/Fiber modifications:     Answer:   Dysphagia 3(Mechanical Soft)specify fluid consistency(question 6) [3]     Comments:   Cottage cheese okay      Order Specific Question:   Consistency/Fluid modifications:     Answer:   Thin Liquids [3]     Medical Decision Making, by Problem:  MM IgG kappa stage 3   -s/p 2 cycles CyBorD, not tolerated well per notes in epic   -oral regimen to begin, ninlaro, revilumid, dexamethasone   -anticipated poor prognosis  L pathologic humerus fx and B femur palliative radiation   -multiple lytic lesions on imaging  Tx RLL PNA PTA  Buttock pressure decub   -air loss mattress  B LE venous insufficiency with prior cellulitis   -compression as tolerated  Anemia/leukopenia   -secondary to chemo   -ANC adequate on last check  DM2   -NPH   -continue metformin   -RISS  R leg pain   -unclear if lytic pain vs other  HTN   -lisinopril, metoprolol  Morbid obesity  Hyponatremia, chronic  Hypomagnesemia   -mag oxide  Intermittent diarrhea   -imodium prn  Post nasal drip   -flonase, clariten  Debility   -PT/OT         Quality-Core Measures   Reviewed items::  Medications reviewed  Singh catheter::  No Singh  Central line in place:  Need for access and Chemotherapy  DVT prophylaxis pharmacological::  Heparin

## 2018-06-20 ENCOUNTER — APPOINTMENT (OUTPATIENT)
Dept: RADIOLOGY | Facility: MEDICAL CENTER | Age: 68
End: 2018-06-20
Attending: HOSPITALIST
Payer: MEDICARE

## 2018-06-20 LAB
GLUCOSE BLD-MCNC: 201 MG/DL (ref 65–99)
GLUCOSE BLD-MCNC: 397 MG/DL (ref 65–99)
TROPONIN I SERPL-MCNC: <0.02 NG/ML (ref 0–0.04)

## 2018-06-20 PROCEDURE — 82962 GLUCOSE BLOOD TEST: CPT

## 2018-06-20 PROCEDURE — 84484 ASSAY OF TROPONIN QUANT: CPT

## 2018-06-20 PROCEDURE — 700117 HCHG RX CONTRAST REV CODE 255: Performed by: HOSPITALIST

## 2018-06-20 PROCEDURE — 71275 CT ANGIOGRAPHY CHEST: CPT

## 2018-06-20 RX ADMIN — IOHEXOL 80 ML: 350 INJECTION, SOLUTION INTRAVENOUS at 16:15

## 2018-06-20 ASSESSMENT — ENCOUNTER SYMPTOMS
VOMITING: 0
COUGH: 0
DEPRESSION: 0
HEADACHES: 0
SHORTNESS OF BREATH: 1
FEVER: 0
ABDOMINAL PAIN: 0
NAUSEA: 0
DIARRHEA: 0
CONSTIPATION: 0

## 2018-06-20 NOTE — [facility]
Hospital Medicine Progress Note, Adult, Complex               Author: Georgina HEVER Barrazaen Date & Time created: 6/20/2018  8:03 AM     CC: aftercare, debility from prolonged hospitalization for MM    Interval History:  Had CP last pm, EKG and trop negative  Has pleuritic CP now at times, denies SOB  BG high from decadron        Review of Systems:  Review of Systems   Constitutional: Negative for fever.   Respiratory: Positive for shortness of breath (at times). Negative for cough.    Cardiovascular: Positive for chest pain. Negative for leg swelling.   Gastrointestinal: Negative for abdominal pain, constipation, diarrhea, nausea and vomiting.   Musculoskeletal:        Leg pain   Neurological: Negative for headaches.   Psychiatric/Behavioral: Negative for depression.       T 54W12PK 153/21RF60NnA8 92% 2L I/O2.3/brp noBM  Physical Exam   Constitutional: She is oriented to person, place, and time. She appears well-developed.   HENT:   Head: Normocephalic and atraumatic.   Eyes: Conjunctivae are normal. Right eye exhibits no discharge. Left eye exhibits no discharge. No scleral icterus.   Neck: Neck supple. No tracheal deviation present.   Cardiovascular: Normal rate, regular rhythm and intact distal pulses.    No murmur heard.  Pulmonary/Chest: Effort normal. No respiratory distress. She has no wheezes. She exhibits no tenderness.   diminished   Abdominal: Soft. Bowel sounds are normal. She exhibits no distension. There is no tenderness. There is no rebound.   obese   Musculoskeletal: She exhibits no edema.   Neurological: She is alert and oriented to person, place, and time.   Skin: Skin is warm.   Vitals reviewed.      Labs:        Invalid input(s): [id number]  Recent Labs      06/19/18   1744  06/20/18   0520   TROPONINI  <0.02  <0.02     Recent Labs      06/19/18   0540   SODIUM  129*   POTASSIUM  4.8   CHLORIDE  95*   CO2  28   BUN  15   CREATININE  0.74   CALCIUM  9.1     Recent Labs      06/19/18   0540    GLUCOSE  186*     Recent Labs      06/19/18   0540   RBC  2.40*   HEMOGLOBIN  7.5*   HEMATOCRIT  23.4*   PLATELETCT  193     Recent Labs      06/19/18   0540   WBC  4.5*          GI/Nutrition:  Orders Placed This Encounter   Procedures   • DIET ORDER     Standing Status:   Standing     Number of Occurrences:   1     Order Specific Question:   Diet:     Answer:   Diabetic [3]     Comments:   chobani smoothie drinks inbetween meals 3x a day     Order Specific Question:   Texture/Fiber modifications:     Answer:   Dysphagia 3(Mechanical Soft)specify fluid consistency(question 6) [3]     Comments:   Cottage cheese okay      Order Specific Question:   Consistency/Fluid modifications:     Answer:   Thin Liquids [3]     Medical Decision Making, by Problem:  MM IgG kappa stage 3   -s/p 2 cycles CyBorD, not tolerated well per notes in epic   -oral regimen ninlaro, revilumid, dexamethasone per onc   -anticipated poor prognosis  L pathologic humerus fx and B femur palliative radiation   -multiple lytic lesions on imaging  Pleuritic CP   -r/u PE with CT   -dose lasix with edema on cxr  Tx RLL PNA PTA  Buttock pressure decub   -air loss mattress  B LE venous insufficiency with prior cellulitis   -compression as tolerated  Anemia/leukopenia   -secondary to chemo   -ANC adequate on last check  DM2   -NPH   -DC metformin with contrast   -RISS  R leg pain   -unclear if lytic pain vs other  HTN   -lisinopril, metoprolol  Morbid obesity  Hyponatremia, chronic  Hypomagnesemia   -mag oxide  Intermittent diarrhea   -imodium prn  Post nasal drip   -flonase, clariten  Debility   -PT/OT         Quality-Core Measures   Reviewed items::  Medications reviewed, Radiology images reviewed and Labs reviewed  Singh catheter::  No Singh  Central line in place:  Need for access and Chemotherapy  DVT prophylaxis pharmacological::  Heparin

## 2018-06-21 LAB
ANION GAP SERPL CALC-SCNC: 5 MMOL/L (ref 0–11.9)
BASOPHILS # BLD AUTO: 0 % (ref 0–1.8)
BASOPHILS # BLD: 0 K/UL (ref 0–0.12)
BUN SERPL-MCNC: 22 MG/DL (ref 8–22)
CALCIUM SERPL-MCNC: 9.1 MG/DL (ref 8.4–10.2)
CHLORIDE SERPL-SCNC: 94 MMOL/L (ref 96–112)
CO2 SERPL-SCNC: 30 MMOL/L (ref 20–33)
COMMENT 1642: NORMAL
CREAT SERPL-MCNC: 0.69 MG/DL (ref 0.5–1.4)
EOSINOPHIL # BLD AUTO: 0.03 K/UL (ref 0–0.51)
EOSINOPHIL NFR BLD: 0.6 % (ref 0–6.9)
ERYTHROCYTE [DISTWIDTH] IN BLOOD BY AUTOMATED COUNT: 70.1 FL (ref 35.9–50)
GLUCOSE BLD-MCNC: 191 MG/DL (ref 65–99)
GLUCOSE BLD-MCNC: 212 MG/DL (ref 65–99)
GLUCOSE BLD-MCNC: 217 MG/DL (ref 65–99)
GLUCOSE BLD-MCNC: 218 MG/DL (ref 65–99)
GLUCOSE BLD-MCNC: 236 MG/DL (ref 65–99)
GLUCOSE BLD-MCNC: 252 MG/DL (ref 65–99)
GLUCOSE SERPL-MCNC: 205 MG/DL (ref 65–99)
HCT VFR BLD AUTO: 23.2 % (ref 37–47)
HGB BLD-MCNC: 7.7 G/DL (ref 12–16)
IMM GRANULOCYTES # BLD AUTO: 0.05 K/UL (ref 0–0.11)
IMM GRANULOCYTES NFR BLD AUTO: 1 % (ref 0–0.9)
LYMPHOCYTES # BLD AUTO: 0.87 K/UL (ref 1–4.8)
LYMPHOCYTES NFR BLD: 17.9 % (ref 22–41)
MCH RBC QN AUTO: 31.8 PG (ref 27–33)
MCHC RBC AUTO-ENTMCNC: 33.2 G/DL (ref 33.6–35)
MCV RBC AUTO: 95.9 FL (ref 81.4–97.8)
MONOCYTES # BLD AUTO: 0.57 K/UL (ref 0–0.85)
MONOCYTES NFR BLD AUTO: 11.7 % (ref 0–13.4)
NEUTROPHILS # BLD AUTO: 3.35 K/UL (ref 2–7.15)
NEUTROPHILS NFR BLD: 68.8 % (ref 44–72)
NRBC # BLD AUTO: 0 K/UL
NRBC BLD-RTO: 0 /100 WBC
PLATELET # BLD AUTO: 208 K/UL (ref 164–446)
PMV BLD AUTO: 9 FL (ref 9–12.9)
POTASSIUM SERPL-SCNC: 4.3 MMOL/L (ref 3.6–5.5)
RBC # BLD AUTO: 2.42 M/UL (ref 4.2–5.4)
SODIUM SERPL-SCNC: 129 MMOL/L (ref 135–145)
WBC # BLD AUTO: 4.9 K/UL (ref 4.8–10.8)

## 2018-06-21 PROCEDURE — 85025 COMPLETE CBC W/AUTO DIFF WBC: CPT

## 2018-06-21 PROCEDURE — 82962 GLUCOSE BLOOD TEST: CPT

## 2018-06-21 PROCEDURE — 80048 BASIC METABOLIC PNL TOTAL CA: CPT

## 2018-06-21 ASSESSMENT — ENCOUNTER SYMPTOMS
HEADACHES: 0
COUGH: 0
FEVER: 0
DEPRESSION: 0
VOMITING: 0
DIARRHEA: 0
CONSTIPATION: 0
NAUSEA: 0
SHORTNESS OF BREATH: 0
ABDOMINAL PAIN: 0

## 2018-06-21 NOTE — [facility]
Hospital Medicine Progress Note, Adult, Complex               Author: Georgina EHVER Muniz Date & Time created: 6/21/2018  7:50 AM     CC: aftercare, debility from prolonged hospitalization for MM    Interval History:  CP resolved  CT negative for PE  No concerns  BG high        Review of Systems:  Review of Systems   Constitutional: Negative for fever.   Respiratory: Negative for cough and shortness of breath.    Cardiovascular: Negative for chest pain and leg swelling.   Gastrointestinal: Negative for abdominal pain, constipation, diarrhea, nausea and vomiting.   Genitourinary: Negative for dysuria.   Neurological: Negative for headaches.   Psychiatric/Behavioral: Negative for depression.       T 98.9P71BP 138/60QK30LbX9 90% 2L I/O1.7/1.1 1BM  Physical Exam   Constitutional: She is oriented to person, place, and time. She appears well-developed.   HENT:   Head: Normocephalic and atraumatic.   Eyes: Conjunctivae are normal. Right eye exhibits no discharge. Left eye exhibits no discharge. No scleral icterus.   Neck: Neck supple. No tracheal deviation present.   Cardiovascular: Normal rate, regular rhythm and intact distal pulses.    No murmur heard.  Pulmonary/Chest: Effort normal. No respiratory distress. She has no wheezes. She exhibits no tenderness.   diminished   Abdominal: Soft. Bowel sounds are normal. She exhibits no distension. There is no tenderness. There is no rebound.   obese   Musculoskeletal: She exhibits no edema.   Neurological: She is alert and oriented to person, place, and time.   Skin: Skin is warm.   Vitals reviewed.      Labs:        Invalid input(s): [id number]  Recent Labs      06/19/18   1744  06/20/18   0520   TROPONINI  <0.02  <0.02     Recent Labs      06/19/18   0540  06/21/18   0354   SODIUM  129*  129*   POTASSIUM  4.8  4.3   CHLORIDE  95*  94*   CO2  28  30   BUN  15  22   CREATININE  0.74  0.69   CALCIUM  9.1  9.1     Recent Labs      06/19/18   0540  06/21/18   0354   GLUCOSE  186*   205*     Recent Labs      06/19/18   0540  06/21/18   0354   RBC  2.40*  2.42*   HEMOGLOBIN  7.5*  7.7*   HEMATOCRIT  23.4*  23.2*   PLATELETCT  193  208     Recent Labs      06/19/18   0540  06/21/18   0354   WBC  4.5*  4.9   NEUTSPOLYS   --   68.80   LYMPHOCYTES   --   17.90*   MONOCYTES   --   11.70   EOSINOPHILS   --   0.60   BASOPHILS   --   0.00          GI/Nutrition:  Orders Placed This Encounter   Procedures   • DIET ORDER     Standing Status:   Standing     Number of Occurrences:   1     Order Specific Question:   Diet:     Answer:   Diabetic [3]     Comments:   chobani smoothie drinks inbetween meals 3x a day     Order Specific Question:   Texture/Fiber modifications:     Answer:   Dysphagia 3(Mechanical Soft)specify fluid consistency(question 6) [3]     Comments:   Cottage cheese okay      Order Specific Question:   Consistency/Fluid modifications:     Answer:   Thin Liquids [3]     Medical Decision Making, by Problem:  MM IgG kappa stage 3   -s/p 2 cycles CyBorD, not tolerated well per notes in epic   -oral regimen ninlaro, revilumid, dexamethasone per onc began 6/19   -anticipated poor prognosis  L pathologic humerus fx and B femur palliative radiation   -multiple lytic lesions on imaging  Pleuritic CP, resolved   -negative PE  Tx RLL PNA PTA  Buttock pressure decub   -air loss mattress  B LE venous insufficiency with prior cellulitis   -compression as tolerated  Anemia/leukopenia   -secondary to chemo   -ANC adequate on last check  DM2   -increase NPH   -RISS  HTN   -lisinopril, metoprolol  Morbid obesity  Hyponatremia, chronic  Hypomagnesemia   -mag oxide  Intermittent diarrhea   -imodium prn  Post nasal drip   -flonase, clariten  Debility   -PT/OT         Quality-Core Measures   Reviewed items::  Medications reviewed, Radiology images reviewed and Labs reviewed  Singh catheter::  No Singh  Central line in place:  Need for access and Chemotherapy  DVT prophylaxis pharmacological::  Heparin

## 2018-06-22 LAB
GLUCOSE BLD-MCNC: 132 MG/DL (ref 65–99)
GLUCOSE BLD-MCNC: 168 MG/DL (ref 65–99)
GLUCOSE BLD-MCNC: 170 MG/DL (ref 65–99)
GLUCOSE BLD-MCNC: 191 MG/DL (ref 65–99)
GLUCOSE BLD-MCNC: 228 MG/DL (ref 65–99)
GLUCOSE BLD-MCNC: 257 MG/DL (ref 65–99)
GLUCOSE BLD-MCNC: 265 MG/DL (ref 65–99)
GLUCOSE BLD-MCNC: 337 MG/DL (ref 65–99)

## 2018-06-22 PROCEDURE — 82962 GLUCOSE BLOOD TEST: CPT | Mod: 91

## 2018-06-22 ASSESSMENT — ENCOUNTER SYMPTOMS
FEVER: 0
NAUSEA: 0
COUGH: 0
SORE THROAT: 0
SHORTNESS OF BREATH: 0
VOMITING: 0
ABDOMINAL PAIN: 0
DEPRESSION: 0
DIARRHEA: 0
HEADACHES: 0

## 2018-06-23 LAB
GLUCOSE BLD-MCNC: 188 MG/DL (ref 65–99)
GLUCOSE BLD-MCNC: 191 MG/DL (ref 65–99)
GLUCOSE BLD-MCNC: 257 MG/DL (ref 65–99)

## 2018-06-23 PROCEDURE — 82962 GLUCOSE BLOOD TEST: CPT | Mod: 91

## 2018-06-23 ASSESSMENT — ENCOUNTER SYMPTOMS
DEPRESSION: 0
CONSTIPATION: 0
NAUSEA: 0
SORE THROAT: 0
SHORTNESS OF BREATH: 0
FEVER: 0
COUGH: 0
ABDOMINAL PAIN: 0
DIARRHEA: 0
HEADACHES: 0
VOMITING: 0

## 2018-06-23 NOTE — [facility]
Hospital Medicine Progress Note, Adult, Complex               Author: Georgina HEVER Muniz Date & Time created: 6/23/2018  11:13 AM     CC: aftercare, debility from prolonged hospitalization for MM    Interval History:  Slept fine  Sister coming today  Told her she can go outside for fresh air    Review of Systems:  Review of Systems   Constitutional: Negative for fever.   HENT: Negative for sore throat.    Respiratory: Negative for cough and shortness of breath.    Cardiovascular: Negative for chest pain.   Gastrointestinal: Negative for abdominal pain, constipation, diarrhea, nausea and vomiting.   Genitourinary: Negative for dysuria.   Musculoskeletal:        R leg pain   Neurological: Negative for headaches.   Psychiatric/Behavioral: Negative for depression.       T 20D13VJ 141/25LM43MgX1 94% 2L I/O2/brp 1BM  Physical Exam   Constitutional: She is oriented to person, place, and time. She appears well-developed. No distress.   HENT:   Head: Normocephalic and atraumatic.   Eyes: Conjunctivae are normal. Right eye exhibits no discharge. Left eye exhibits no discharge. No scleral icterus.   Neck: Neck supple. No tracheal deviation present.   Cardiovascular: Normal rate, regular rhythm and intact distal pulses.    No murmur heard.  Pulmonary/Chest: Effort normal. No respiratory distress. She has no wheezes. She exhibits no tenderness.   diminished   Abdominal: Soft. Bowel sounds are normal. She exhibits no distension. There is no tenderness. There is no rebound.   obese   Musculoskeletal: She exhibits no edema.   Neurological: She is alert and oriented to person, place, and time.   Skin: Skin is warm. No rash noted.   Vitals reviewed.      Labs:        Invalid input(s): [id number]      Recent Labs      06/21/18   0354   SODIUM  129*   POTASSIUM  4.3   CHLORIDE  94*   CO2  30   BUN  22   CREATININE  0.69   CALCIUM  9.1     Recent Labs      06/21/18   0354   GLUCOSE  205*     Recent Labs      06/21/18   0354   RBC   2.42*   HEMOGLOBIN  7.7*   HEMATOCRIT  23.2*   PLATELETCT  208     Recent Labs      06/21/18   0354   WBC  4.9   NEUTSPOLYS  68.80   LYMPHOCYTES  17.90*   MONOCYTES  11.70   EOSINOPHILS  0.60   BASOPHILS  0.00          GI/Nutrition:  Orders Placed This Encounter   Procedures   • DIET ORDER     Standing Status:   Standing     Number of Occurrences:   1     Order Specific Question:   Diet:     Answer:   Diabetic [3]     Comments:   chobani smoothie drinks inbetween meals 3x a day     Order Specific Question:   Texture/Fiber modifications:     Answer:   Dysphagia 3(Mechanical Soft)specify fluid consistency(question 6) [3]     Comments:   Cottage cheese okay      Order Specific Question:   Consistency/Fluid modifications:     Answer:   Thin Liquids [3]     Medical Decision Making, by Problem:  MM IgG kappa stage 3   -s/p 2 cycles CyBorD, not tolerated well per notes in epic   -oral regimen ninlaro, revilumid, dexamethasone per onc began 6/19   -anticipated poor prognosis  L pathologic humerus fx and B femur palliative radiation   -multiple lytic lesions on imaging  Pleuritic CP, resolved   -negative PE  Tx RLL PNA PTA  Buttock pressure decub   -air loss mattress  B LE venous insufficiency with prior cellulitis   -compression as tolerated  Anemia/leukopenia   -secondary to chemo   -ANC adequate on last check  DM2   -increase NPH to 34 bid   -RISS  HTN   -lisinopril, metoprolol  Morbid obesity  Hyponatremia, chronic  Intermittent diarrhea   -imodium prn  Post nasal drip   -flonase, clariten  Debility   -PT/OT   Am labs      Quality-Core Measures   Reviewed items::  Medications reviewed  Singh catheter::  No Singh  Central line in place:  Need for access and Chemotherapy  DVT prophylaxis pharmacological::  Heparin

## 2018-06-24 LAB
ANION GAP SERPL CALC-SCNC: 6 MMOL/L (ref 0–11.9)
ANISOCYTOSIS BLD QL SMEAR: ABNORMAL
BASOPHILS # BLD AUTO: 0 % (ref 0–1.8)
BASOPHILS # BLD: 0 K/UL (ref 0–0.12)
BUN SERPL-MCNC: 18 MG/DL (ref 8–22)
CALCIUM SERPL-MCNC: 9.3 MG/DL (ref 8.4–10.2)
CHLORIDE SERPL-SCNC: 98 MMOL/L (ref 96–112)
CO2 SERPL-SCNC: 29 MMOL/L (ref 20–33)
COMMENT 1642: NORMAL
CREAT SERPL-MCNC: 0.67 MG/DL (ref 0.5–1.4)
EOSINOPHIL # BLD AUTO: 0.08 K/UL (ref 0–0.51)
EOSINOPHIL NFR BLD: 1.4 % (ref 0–6.9)
ERYTHROCYTE [DISTWIDTH] IN BLOOD BY AUTOMATED COUNT: 71.9 FL (ref 35.9–50)
GLUCOSE BLD-MCNC: 185 MG/DL (ref 65–99)
GLUCOSE BLD-MCNC: 191 MG/DL (ref 65–99)
GLUCOSE BLD-MCNC: 245 MG/DL (ref 65–99)
GLUCOSE BLD-MCNC: 295 MG/DL (ref 65–99)
GLUCOSE SERPL-MCNC: 156 MG/DL (ref 65–99)
HCT VFR BLD AUTO: 25.3 % (ref 37–47)
HGB BLD-MCNC: 8.3 G/DL (ref 12–16)
IMM GRANULOCYTES # BLD AUTO: 0.08 K/UL (ref 0–0.11)
IMM GRANULOCYTES NFR BLD AUTO: 1.4 % (ref 0–0.9)
LYMPHOCYTES # BLD AUTO: 1.44 K/UL (ref 1–4.8)
LYMPHOCYTES NFR BLD: 25.2 % (ref 22–41)
MACROCYTES BLD QL SMEAR: ABNORMAL
MAGNESIUM SERPL-MCNC: 1.8 MG/DL (ref 1.5–2.5)
MCH RBC QN AUTO: 31.9 PG (ref 27–33)
MCHC RBC AUTO-ENTMCNC: 32.8 G/DL (ref 33.6–35)
MCV RBC AUTO: 97.3 FL (ref 81.4–97.8)
MONOCYTES # BLD AUTO: 0.47 K/UL (ref 0–0.85)
MONOCYTES NFR BLD AUTO: 8.2 % (ref 0–13.4)
NEUTROPHILS # BLD AUTO: 3.65 K/UL (ref 2–7.15)
NEUTROPHILS NFR BLD: 63.8 % (ref 44–72)
NRBC # BLD AUTO: 0 K/UL
NRBC BLD-RTO: 0 /100 WBC
PLATELET # BLD AUTO: 239 K/UL (ref 164–446)
PLATELET BLD QL SMEAR: NORMAL
PMV BLD AUTO: 10.6 FL (ref 9–12.9)
POTASSIUM SERPL-SCNC: 4.3 MMOL/L (ref 3.6–5.5)
RBC # BLD AUTO: 2.6 M/UL (ref 4.2–5.4)
RBC BLD AUTO: PRESENT
SODIUM SERPL-SCNC: 133 MMOL/L (ref 135–145)
WBC # BLD AUTO: 5.7 K/UL (ref 4.8–10.8)

## 2018-06-24 PROCEDURE — 85025 COMPLETE CBC W/AUTO DIFF WBC: CPT

## 2018-06-24 PROCEDURE — 80048 BASIC METABOLIC PNL TOTAL CA: CPT

## 2018-06-24 PROCEDURE — 83735 ASSAY OF MAGNESIUM: CPT

## 2018-06-24 PROCEDURE — 82962 GLUCOSE BLOOD TEST: CPT

## 2018-06-24 ASSESSMENT — ENCOUNTER SYMPTOMS
ABDOMINAL PAIN: 0
FEVER: 0
EYE PAIN: 0
DEPRESSION: 0
SORE THROAT: 0
HEADACHES: 0
SHORTNESS OF BREATH: 0
VOMITING: 0
NAUSEA: 0
COUGH: 0

## 2018-06-24 NOTE — [facility]
Hospital Medicine Progress Note, Adult, Complex               Author: Georgina KATHLEEN [place] Date & Time created: 6/24/2018  11:34 AM     CC: aftercare, debility from prolonged hospitalization for MM    Interval History:  Got a new phone yesterday  Out in the hallway  No concerns  Worried about potential transfer to [facility]    Review of Systems:  Review of Systems   Constitutional: Negative for fever.   HENT: Negative for sore throat.    Eyes: Negative for pain.   Respiratory: Negative for cough and shortness of breath.    Cardiovascular: Negative for chest pain.   Gastrointestinal: Negative for abdominal pain, nausea and vomiting.   Genitourinary: Negative for dysuria.   Musculoskeletal:        R leg pain   Neurological: Negative for headaches.   Psychiatric/Behavioral: Negative for depression.       T 98.8P78BP 159/80BN98KbM9 95% 2L I/O2.2/brp 2BM  Physical Exam   Constitutional: She is oriented to person, place, and time. She appears well-developed. No distress.   HENT:   Head: Normocephalic and atraumatic.   Eyes: Conjunctivae are normal. Right eye exhibits no discharge. Left eye exhibits no discharge. No scleral icterus.   Neck: Neck supple. No tracheal deviation present.   Cardiovascular: Normal rate, regular rhythm and intact distal pulses.    No murmur heard.  Pulmonary/Chest: Effort normal. No respiratory distress. She has no wheezes. She exhibits no tenderness.   diminished   Abdominal: Soft. Bowel sounds are normal. She exhibits no distension. There is no tenderness. There is no rebound.   obese   Musculoskeletal: She exhibits no edema.   Neurological: She is alert and oriented to person, place, and time.   Skin: Skin is warm. No rash noted.   Vitals reviewed.      Labs:        Invalid input(s): [id number]      Recent Labs      06/24/18   0350   SODIUM  133*   POTASSIUM  4.3   CHLORIDE  98   CO2  29   BUN  18   CREATININE  0.67   MAGNESIUM  1.8   CALCIUM  9.3     Recent Labs      06/24/18   0350   GLUCOSE  156*      Recent Labs      06/24/18   0350   RBC  2.60*   HEMOGLOBIN  8.3*   HEMATOCRIT  25.3*   PLATELETCT  239     Recent Labs      06/24/18   0350   WBC  5.7   NEUTSPOLYS  63.80   LYMPHOCYTES  25.20   MONOCYTES  8.20   EOSINOPHILS  1.40   BASOPHILS  0.00          GI/Nutrition:  Orders Placed This Encounter   Procedures   • DIET ORDER     Standing Status:   Standing     Number of Occurrences:   1     Order Specific Question:   Diet:     Answer:   Diabetic [3]     Comments:   chobani smoothie drinks inbetween meals 3x a day     Order Specific Question:   Texture/Fiber modifications:     Answer:   Dysphagia 3(Mechanical Soft)specify fluid consistency(question 6) [3]     Comments:   Cottage cheese okay      Order Specific Question:   Consistency/Fluid modifications:     Answer:   Thin Liquids [3]     Medical Decision Making, by Problem:  MM IgG kappa stage 3   -s/p 2 cycles CyBorD, not tolerated well per notes in epic   -oral regimen ninlaro, revilumid, dexamethasone per onc began 6/19   -anticipated poor prognosis  L pathologic humerus fx and B femur palliative radiation   -multiple lytic lesions on imaging  Pleuritic CP, resolved   -negative PE  Tx RLL PNA PTA  Buttock pressure decub   -air loss mattress  B LE venous insufficiency with prior cellulitis   -compression as tolerated  Anemia of chronic illness  DM2   -increase NPH to 38 bid   -RISS  HTN   -lisinopril, metoprolol  Morbid obesity  Hyponatremia, chronic  Intermittent diarrhea   -imodium prn  Post nasal drip   -flonase, clariten  Debility   -PT/OT       Quality-Core Measures   Reviewed items::  Medications reviewed and Labs reviewed  Singh catheter::  No Singh  Central line in place:  Need for access and Chemotherapy  DVT prophylaxis pharmacological::  Heparin

## 2018-06-25 LAB
GLUCOSE BLD-MCNC: 137 MG/DL (ref 65–99)
GLUCOSE BLD-MCNC: 187 MG/DL (ref 65–99)
GLUCOSE BLD-MCNC: 200 MG/DL (ref 65–99)
GLUCOSE BLD-MCNC: 236 MG/DL (ref 65–99)
GLUCOSE BLD-MCNC: 242 MG/DL (ref 65–99)

## 2018-06-25 PROCEDURE — 82962 GLUCOSE BLOOD TEST: CPT

## 2018-06-25 ASSESSMENT — ENCOUNTER SYMPTOMS
ABDOMINAL PAIN: 0
NAUSEA: 0
CHILLS: 0
SHORTNESS OF BREATH: 0
BACK PAIN: 1
CONSTIPATION: 0
VOMITING: 0
HEADACHES: 0
FEVER: 0
SORE THROAT: 0
COUGH: 0
DEPRESSION: 0

## 2018-06-25 NOTE — [facility]
Hospital Medicine Progress Note, Adult, Complex               Author: Georgina KATHLEEN [place] Date & Time created: 6/25/2018  10:45 AM     CC: aftercare, debility from prolonged hospitalization for MM    Interval History:  L back pain today  No N/V/SOPB/CP  Did not get pain medication as requested overnight, nurse rubbed lotion on her legs as a substitute per patient    Review of Systems:  Review of Systems   Constitutional: Negative for chills and fever.   HENT: Negative for sore throat.    Respiratory: Negative for cough and shortness of breath.    Cardiovascular: Negative for chest pain.   Gastrointestinal: Negative for abdominal pain, constipation, nausea and vomiting.   Genitourinary: Negative for dysuria.   Musculoskeletal: Positive for back pain.        R leg pain   Neurological: Negative for headaches.   Psychiatric/Behavioral: Negative for depression.       T 98.3P73BP 154/22KZ33VuS0 96% 2L I/O1.6/brp 2BM  Physical Exam   Constitutional: She is oriented to person, place, and time. She appears well-developed.   HENT:   Head: Normocephalic and atraumatic.   Mouth/Throat: No oropharyngeal exudate.   Eyes: Conjunctivae are normal. Right eye exhibits no discharge. Left eye exhibits no discharge. No scleral icterus.   Neck: Neck supple. No tracheal deviation present.   Cardiovascular: Normal rate, regular rhythm and intact distal pulses.    No murmur heard.  Pulmonary/Chest: Effort normal. No respiratory distress. She exhibits no tenderness.   diminished   Abdominal: Soft. Bowel sounds are normal. She exhibits no distension. There is no tenderness.   obese   Musculoskeletal: She exhibits no edema.   L paraspinuous pain with palpation, no masses   Neurological: She is alert and oriented to person, place, and time.   Skin: Skin is warm. No rash noted.   Vitals reviewed.      Labs:        Invalid input(s): [id number]      Recent Labs      06/24/18   0350   SODIUM  133*   POTASSIUM  4.3   CHLORIDE  98   CO2  29   BUN   18   CREATININE  0.67   MAGNESIUM  1.8   CALCIUM  9.3     Recent Labs      06/24/18   0350   GLUCOSE  156*     Recent Labs      06/24/18   0350   RBC  2.60*   HEMOGLOBIN  8.3*   HEMATOCRIT  25.3*   PLATELETCT  239     Recent Labs      06/24/18   0350   WBC  5.7   NEUTSPOLYS  63.80   LYMPHOCYTES  25.20   MONOCYTES  8.20   EOSINOPHILS  1.40   BASOPHILS  0.00          GI/Nutrition:  Orders Placed This Encounter   Procedures   • Diet Order Diabetic     Standing Status:   Standing     Number of Occurrences:   1     Order Specific Question:   Diet:     Answer:   Diabetic [3]     Order Specific Question:   Texture/Fiber modifications:     Answer:   Dysphagia 3(Mechanical Soft)specify fluid consistency(question 6) [3]     Order Specific Question:   Consistency/Fluid modifications:     Answer:   Thin Liquids [3]     Medical Decision Making, by Problem:  MM IgG kappa stage 3   -s/p 2 cycles CyBorD, not tolerated well per notes in epic   -oral regimen ninlaro, revilumid, dexamethasone per onc began 6/19, dose dexamethasone and ninlaro tomorrow   -add prn morphine if develops CP after medication again   -anticipated poor prognosis  L pathologic humerus fx and B femur palliative radiation   -multiple lytic lesions on imaging  Pleuritic CP, resolved   -negative PE  Tx RLL PNA PTA  Buttock pressure decub   -air loss mattress  B LE venous insufficiency with prior cellulitis   -compression as tolerated  Anemia of chronic illness  DM2   -increased NPH to 38 bid   -RISS  HTN   -lisinopril, metoprolol   -add norvasc, follow fluid retention  Morbid obesity  Hyponatremia, chronic  Intermittent diarrhea   -imodium prn  Post nasal drip   -flobriana fuentesiten  Debility   -PT/OT       Quality-Core Measures   Reviewed items::  Medications reviewed  Singh catheter::  No Singh  Central line in place:  Need for access and Chemotherapy  DVT prophylaxis pharmacological::  Heparin

## 2018-06-26 LAB
GLUCOSE BLD-MCNC: 137 MG/DL (ref 65–99)
GLUCOSE BLD-MCNC: 213 MG/DL (ref 65–99)
GLUCOSE BLD-MCNC: 358 MG/DL (ref 65–99)
GLUCOSE BLD-MCNC: 423 MG/DL (ref 65–99)
GLUCOSE BLD-MCNC: 69 MG/DL (ref 65–99)

## 2018-06-26 PROCEDURE — 82962 GLUCOSE BLOOD TEST: CPT | Mod: 91

## 2018-06-26 ASSESSMENT — ENCOUNTER SYMPTOMS
WEAKNESS: 1
HEARTBURN: 0
FEVER: 0
VOMITING: 0
HALLUCINATIONS: 0
MYALGIAS: 0
BRUISES/BLEEDS EASILY: 0
CONSTIPATION: 0
CHILLS: 0
SHORTNESS OF BREATH: 0

## 2018-06-26 NOTE — [facility]
Hospital Medicine Progress Note, Adult, Complex               Author: Za Isaac Date & Time created: 6/26/2018  11:37 AM     CC: multiple myeloma with debility after chemotherapy    Interval History:  She is sitting in a chair in the murphy and has improved strength but still very weak  She is tolerating oral chemotherapy with no nausea or rash    Review of Systems:  Review of Systems   Constitutional: Negative for chills and fever.        Poor appetite     HENT: Negative for congestion.    Respiratory: Negative for shortness of breath.    Cardiovascular: Negative for chest pain and leg swelling.        Leg swelling improved after elevation   Gastrointestinal: Negative for constipation, heartburn and vomiting.   Genitourinary: Negative for dysuria.   Musculoskeletal: Negative for myalgias.   Skin: Negative for rash.   Neurological: Positive for weakness.   Endo/Heme/Allergies: Does not bruise/bleed easily.   Psychiatric/Behavioral: Negative for hallucinations.       Physical Exam:  Physical Exam   Constitutional: She is oriented to person, place, and time. No distress.   HENT:   Mouth/Throat: Oropharynx is clear and moist.   Eyes: No scleral icterus.   Neck: Neck supple.   Cardiovascular: Normal rate, regular rhythm and intact distal pulses.  PMI is displaced.    No murmur heard.  Pulmonary/Chest: Effort normal. She has no wheezes. She has no rales.   Abdominal: Soft. Bowel sounds are normal. She exhibits no distension.   Large abdominal pannus   Musculoskeletal: She exhibits no edema.   Neurological: She is alert and oriented to person, place, and time. Coordination normal.   Skin: Skin is dry. No rash noted. No erythema.   Psychiatric: Her behavior is normal.   Nursing note and vitals reviewed.      Labs:        Invalid input(s): [id number]      Recent Labs      06/24/18   0350   SODIUM  133*   POTASSIUM  4.3   CHLORIDE  98   CO2  29   BUN  18   CREATININE  0.67   MAGNESIUM  1.8   CALCIUM  9.3     Recent  Labs      06/24/18   0350   GLUCOSE  156*     Recent Labs      06/24/18   0350   RBC  2.60*   HEMOGLOBIN  8.3*   HEMATOCRIT  25.3*   PLATELETCT  239     Recent Labs      06/24/18   0350   WBC  5.7   NEUTSPOLYS  63.80   LYMPHOCYTES  25.20   MONOCYTES  8.20   EOSINOPHILS  1.40   BASOPHILS  0.00           Hemodynamics:   T99.1 /62 HR89 RR19 O2 sat 92% on room air     GI/Nutrition:  Orders Placed This Encounter   Procedures   • Diet Order Diabetic     Standing Status:   Standing     Number of Occurrences:   1     Order Specific Question:   Diet:     Answer:   Diabetic [3]     Order Specific Question:   Texture/Fiber modifications:     Answer:   Dysphagia 3(Mechanical Soft)specify fluid consistency(question 6) [3]     Order Specific Question:   Consistency/Fluid modifications:     Answer:   Thin Liquids [3]     Medical Decision Making, by Problem:  Multiple myeloma 1P deletion intermediate risk, IgG kappa.  Stage III based on ISS staging             treated with  2 cycles CyBorD., poorly tolerated   oral regimen ninlaro, revilumid, dexamethasone per oncology began 6/19   Continue physical therapy    Neutropenia induced by chemotherapy,    Improved after granix    Anemia, oral iron    L pathologic humerus fx and B femur palliative radiation   Due to lytic lesions, physical therapy    Pain controlled     Acute metabolic encephalopathy, due to pain medication    Now resolved    Buttock pressure decubitus ulcer              Skin care   Bilateral lower extremity venous insufficiency, edema  Improved, will start compression stockings as tolerated by patient   Had worsening edema previously on norvasc  Type II diabetes mellitus             NPH, metformin and sliding scale insulin    Seasonal allergies with congestion   flonase   zyrtec    HTN   Lisinopril, and metoprolol increased with better control    Morbid obesity, BMI over 60, patient reports loosing almost 100 pounds over the last 6 months    encourage healthy weight  loss after discharge    Metabolic alkalosis    resolved    DNR      Quality-Core Measures   Reviewed items::  Labs reviewed and Medications reviewed  Singh catheter::  Urinary Tract Retention or Urinary Tract Obstruction  DVT prophylaxis pharmacological::  Contraindicated - High bleeding risk  Ulcer Prophylaxis::  Not indicated  Assessed for rehabilitation services:  Patient was assess for and/or received rehabilitation services during this hospitalization

## 2018-06-27 LAB — GLUCOSE BLD-MCNC: 291 MG/DL (ref 65–99)

## 2018-06-27 PROCEDURE — 82962 GLUCOSE BLOOD TEST: CPT | Mod: 91

## 2018-06-27 ASSESSMENT — ENCOUNTER SYMPTOMS
MYALGIAS: 0
VOMITING: 0
BRUISES/BLEEDS EASILY: 0
HALLUCINATIONS: 0
FEVER: 0
WEAKNESS: 1
HEARTBURN: 0
NAUSEA: 0
SHORTNESS OF BREATH: 0
WHEEZING: 0
CONSTIPATION: 0
DIZZINESS: 0

## 2018-06-27 NOTE — [facility]
Hospital Medicine Progress Note, Adult, Complex               Author: Zamary Fuugh Date & Time created: 6/27/2018  2:43 PM     CC: multiple myeloma with debility after chemotherapy    Interval History:  She is sitting in a chair in the murphy and has improved strength but still very weak  She is tolerating oral chemotherapy   Today she is eating more calories and did ambulate 4 feet with physical therapy    Review of Systems:  Review of Systems   Constitutional: Negative for fever.        Appetite improving on megace     Respiratory: Negative for shortness of breath and wheezing.    Cardiovascular: Negative for chest pain and leg swelling.   Gastrointestinal: Negative for constipation, heartburn, nausea and vomiting.   Genitourinary: Negative for frequency and urgency.   Musculoskeletal: Negative for myalgias.   Neurological: Positive for weakness. Negative for dizziness.   Endo/Heme/Allergies: Does not bruise/bleed easily.   Psychiatric/Behavioral: Negative for hallucinations.       Physical Exam:  Physical Exam   Constitutional: She is oriented to person, place, and time. No distress.   Eyes: Conjunctivae are normal. No scleral icterus.   Neck: Neck supple.   Cardiovascular: Normal rate, regular rhythm and intact distal pulses.  PMI is displaced.    No murmur heard.  Pulmonary/Chest: Effort normal and breath sounds normal.   Abdominal: Soft. Bowel sounds are normal.   Large abdominal pannus   Musculoskeletal: She exhibits no edema.   Neurological: She is alert and oriented to person, place, and time. Coordination normal.   Skin: Skin is warm and dry. No rash noted. She is not diaphoretic.   Psychiatric: Her behavior is normal.   Nursing note and vitals reviewed.      Labs:        Invalid input(s): [id number]      No results for input(s): SODIUM, POTASSIUM, CHLORIDE, CO2, BUN, CREATININE, MAGNESIUM, PHOSPHORUS, CALCIUM in the last 72 hours.  No results for input(s): ALTSGPT, ASTSGOT, ALKPHOSPHAT, TBILIRUBIN,  DBILIRUBIN, GAMMAGT, AMYLASE, LIPASE, ALB, PREALBUMIN, GLUCOSE in the last 72 hours.  No results for input(s): RBC, HEMOGLOBIN, HEMATOCRIT, PLATELETCT, PROTHROMBTM, APTT, INR, IRON, FERRITIN, TOTIRONBC in the last 72 hours.            Hemodynamics:   T98.9 /77 HR78 RR19 O2 sat 93% on room air     GI/Nutrition:  Orders Placed This Encounter   Procedures   • Diet Order Diabetic     Standing Status:   Standing     Number of Occurrences:   1     Order Specific Question:   Diet:     Answer:   Diabetic [3]     Order Specific Question:   Texture/Fiber modifications:     Answer:   Dysphagia 3(Mechanical Soft)specify fluid consistency(question 6) [3]     Order Specific Question:   Consistency/Fluid modifications:     Answer:   Thin Liquids [3]     Medical Decision Making, by Problem:  Multiple myeloma 1P deletion intermediate risk, IgG kappa.  Stage III based on ISS staging             treated with  2 cycles CyBorD.   oral regimen ninlaro, revilumid, dexamethasone per oncology began 6/19   Continue physical therapy    Neutropenia induced by chemotherapy,    Improved follow level    Anemia, oral iron    L pathologic humerus fx and B femur palliative radiation   Due to lytic lesions, physical therapy    Pain controlled     Acute metabolic encephalopathy, due to pain medication     resolved    Buttock pressure decubitus ulcer              Skin care   Bilateral lower extremity venous insufficiency, edema  Improved, will start compression stockings as tolerated by patient   Had worsening edema previously on norvasc  Type II diabetes mellitus             NPH, metformin and sliding scale insulin   Blood sugars more elevated on decadron, will adjust insulin as needed    Seasonal allergies with congestion   flonase   zyrtec    HTN   Lisinopril, and metoprolol     Morbid obesity, BMI over 60, patient reports loosing almost 100 pounds over the last 6 months    encourage healthy weight loss after discharge    Metabolic alkalosis     resolved    DNR      Quality-Core Measures   Reviewed items::  Labs reviewed and Medications reviewed  Singh catheter::  Urinary Tract Retention or Urinary Tract Obstruction  DVT prophylaxis pharmacological::  Contraindicated - High bleeding risk  Ulcer Prophylaxis::  Not indicated  Assessed for rehabilitation services:  Patient was assess for and/or received rehabilitation services during this hospitalization

## 2018-06-28 LAB
GLUCOSE BLD-MCNC: 213 MG/DL (ref 65–99)
GLUCOSE BLD-MCNC: 250 MG/DL (ref 65–99)
GLUCOSE BLD-MCNC: 350 MG/DL (ref 65–99)

## 2018-06-28 PROCEDURE — 82962 GLUCOSE BLOOD TEST: CPT

## 2018-06-28 ASSESSMENT — ENCOUNTER SYMPTOMS
FEVER: 0
NERVOUS/ANXIOUS: 1
ABDOMINAL PAIN: 0
WEAKNESS: 1
CONSTIPATION: 0
DIZZINESS: 0
SHORTNESS OF BREATH: 0
BRUISES/BLEEDS EASILY: 0
PALPITATIONS: 0
CHILLS: 0
MYALGIAS: 0
HEARTBURN: 0
VOMITING: 0

## 2018-06-28 NOTE — [facility]
Hospital Medicine Progress Note, Adult, Complex               Author: Za Isaac Date & Time created: 6/28/2018  12:41 PM     CC: multiple myeloma with debility after chemotherapy    Interval History:  She is sitting in a chair in the murphy and has improved strength but still very weak  She is tolerating oral chemotherapy   Patient is nervous about going to another facility for rehab but is wanting to do more physical therapy    Review of Systems:  Review of Systems   Constitutional: Negative for chills and fever.        Appetite improving on megace     Respiratory: Negative for shortness of breath.    Cardiovascular: Negative for chest pain and palpitations.   Gastrointestinal: Negative for abdominal pain, constipation, heartburn and vomiting.   Genitourinary: Negative for dysuria, frequency and urgency.   Musculoskeletal: Negative for myalgias.   Neurological: Positive for weakness. Negative for dizziness.   Endo/Heme/Allergies: Does not bruise/bleed easily.   Psychiatric/Behavioral: The patient is nervous/anxious.        Physical Exam:  Physical Exam   Constitutional: She is oriented to person, place, and time. No distress.   HENT:   Mouth/Throat: Oropharynx is clear and moist.   Eyes: No scleral icterus.   Neck: Neck supple.   Cardiovascular: Normal rate, regular rhythm and intact distal pulses.  PMI is displaced.    No murmur heard.  Pulmonary/Chest: Effort normal and breath sounds normal.   Abdominal: Soft.   Large abdominal pannus   Musculoskeletal: She exhibits no edema.   Neurological: She is alert and oriented to person, place, and time. Coordination normal.   Skin: No rash noted. No erythema.   Psychiatric: Her behavior is normal.   Nursing note and vitals reviewed.      Labs:        Invalid input(s): [id number]      No results for input(s): SODIUM, POTASSIUM, CHLORIDE, CO2, BUN, CREATININE, MAGNESIUM, PHOSPHORUS, CALCIUM in the last 72 hours.  No results for input(s): ALTSGPT, ASTSGOT,  ALKPHOSPHAT, TBILIRUBIN, DBILIRUBIN, GAMMAGT, AMYLASE, LIPASE, ALB, PREALBUMIN, GLUCOSE in the last 72 hours.  No results for input(s): RBC, HEMOGLOBIN, HEMATOCRIT, PLATELETCT, PROTHROMBTM, APTT, INR, IRON, FERRITIN, TOTIRONBC in the last 72 hours.            Hemodynamics:   T98.7 /78 HR84 RR18 O2 sat 94% on room air     GI/Nutrition:  Orders Placed This Encounter   Procedures   • Diet Order Diabetic     Standing Status:   Standing     Number of Occurrences:   1     Order Specific Question:   Diet:     Answer:   Diabetic [3]     Order Specific Question:   Texture/Fiber modifications:     Answer:   Dysphagia 3(Mechanical Soft)specify fluid consistency(question 6) [3]     Order Specific Question:   Consistency/Fluid modifications:     Answer:   Thin Liquids [3]     Medical Decision Making, by Problem:  Multiple myeloma 1P deletion intermediate risk, IgG kappa.  Stage III based on ISS staging             treated with  2 cycles CyBorD.   oral regimen ninlaro, revilumid, dexamethasone per oncology began 6/19   Continue physical therapy, will need skilled nursing level of physical therapy and care    Neutropenia induced by chemotherapy,    Improved     Anemia, oral iron    L pathologic humerus fx and B femur palliative radiation   Due to lytic lesions, physical therapy    Pain controlled     Acute metabolic encephalopathy, due to pain medication     resolved    Buttock pressure decubitus ulcer              Skin care   Bilateral lower extremity venous insufficiency, edema  Improved, will start compression stockings as tolerated by patient   Had worsening edema previously on norvasc  Type II diabetes mellitus             NPH, metformin and sliding scale insulin   Blood sugars more elevated on decadron, insulin to cover these spikes in blood sugar    Seasonal allergies with congestion   flonase   zyrtec    HTN   Lisinopril, and metoprolol     Morbid obesity, BMI over 60, patient reports loosing almost 100 pounds over  the last 6 months    encourage healthy weight loss after discharge    Metabolic alkalosis    resolved    DNR      Quality-Core Measures   Reviewed items::  Labs reviewed and Medications reviewed  Singh catheter::  Urinary Tract Retention or Urinary Tract Obstruction  DVT prophylaxis pharmacological::  Contraindicated - High bleeding risk  Ulcer Prophylaxis::  Not indicated  Assessed for rehabilitation services:  Patient was assess for and/or received rehabilitation services during this hospitalization

## 2018-06-29 LAB
GLUCOSE BLD-MCNC: 184 MG/DL (ref 65–99)
GLUCOSE BLD-MCNC: 198 MG/DL (ref 65–99)
GLUCOSE BLD-MCNC: 214 MG/DL (ref 65–99)
GLUCOSE BLD-MCNC: 297 MG/DL (ref 65–99)

## 2018-06-29 PROCEDURE — 82962 GLUCOSE BLOOD TEST: CPT | Mod: 91

## 2018-06-29 ASSESSMENT — ENCOUNTER SYMPTOMS
ABDOMINAL PAIN: 0
MYALGIAS: 0
WHEEZING: 0
DIAPHORESIS: 0
CONSTIPATION: 0
BRUISES/BLEEDS EASILY: 0
DIZZINESS: 0
NERVOUS/ANXIOUS: 1
FEVER: 0
VOMITING: 0
SHORTNESS OF BREATH: 0
HEARTBURN: 0

## 2018-06-29 NOTE — [facility]
Hospital Medicine Progress Note, Adult, Complex               Author: Za Isaac Date & Time created: 6/29/2018  11:24 AM     CC: multiple myeloma with debility after chemotherapy    Interval History:  She is sitting in a chair in the murphy and has improved strength but still very weak  She is tolerating oral chemotherapy   Patient is colder today and has a blanket draped over her shoulders    Review of Systems:  Review of Systems   Constitutional: Negative for diaphoresis and fever.        Appetite improving on megace     Respiratory: Negative for shortness of breath and wheezing.    Cardiovascular: Negative for chest pain and leg swelling.   Gastrointestinal: Negative for abdominal pain, constipation, heartburn and vomiting.   Genitourinary: Negative for dysuria.   Musculoskeletal: Negative for myalgias.   Skin: Negative for itching.   Neurological: Negative for dizziness.   Endo/Heme/Allergies: Does not bruise/bleed easily.   Psychiatric/Behavioral: The patient is nervous/anxious.        Physical Exam:  Physical Exam   Constitutional: She is oriented to person, place, and time. No distress.   HENT:   Mouth/Throat: Oropharynx is clear and moist.   Eyes: No scleral icterus.   Neck: Neck supple.   Cardiovascular: Normal rate, regular rhythm and intact distal pulses.  PMI is displaced.    No murmur heard.  Pulmonary/Chest: Effort normal and breath sounds normal.   Abdominal: Soft.   Large abdominal pannus   Musculoskeletal: She exhibits no edema.   Neurological: She is alert and oriented to person, place, and time. Coordination normal.   Skin: No rash noted. No erythema.   Psychiatric: Her behavior is normal.   Nursing note and vitals reviewed.      Labs:        Invalid input(s): [id number]      No results for input(s): SODIUM, POTASSIUM, CHLORIDE, CO2, BUN, CREATININE, MAGNESIUM, PHOSPHORUS, CALCIUM in the last 72 hours.  No results for input(s): ALTSGPT, ASTSGOT, ALKPHOSPHAT, TBILIRUBIN, DBILIRUBIN, GAMMAGT,  AMYLASE, LIPASE, ALB, PREALBUMIN, GLUCOSE in the last 72 hours.  No results for input(s): RBC, HEMOGLOBIN, HEMATOCRIT, PLATELETCT, PROTHROMBTM, APTT, INR, IRON, FERRITIN, TOTIRONBC in the last 72 hours.            Hemodynamics:   T98.9 /67 HR75 RR19 O2 sat 95% on room air     GI/Nutrition:  Orders Placed This Encounter   Procedures   • Diet Order Diabetic     Standing Status:   Standing     Number of Occurrences:   1     Order Specific Question:   Diet:     Answer:   Diabetic [3]     Order Specific Question:   Texture/Fiber modifications:     Answer:   Dysphagia 3(Mechanical Soft)specify fluid consistency(question 6) [3]     Order Specific Question:   Consistency/Fluid modifications:     Answer:   Thin Liquids [3]     Medical Decision Making, by Problem:  Multiple myeloma 1P deletion intermediate risk, IgG kappa.  Stage III based on ISS staging             treated with  2 cycles CyBorD.   oral regimen ninlaro, revilumid, dexamethasone per oncology began 6/19   Continue physical therapy, referrals sent    Neutropenia induced by chemotherapy,    Improved , recheck labs    Anemia, oral iron    L pathologic humerus fx and B femur palliative radiation   Due to lytic lesions, physical therapy    Pain controlled     Acute metabolic encephalopathy, due to pain medication     resolved    Buttock pressure decubitus ulcer              Skin care   Bilateral lower extremity venous insufficiency, edema  Improved, will start compression stockings as tolerated by patient   Had worsening edema previously on norvasc  Type II diabetes mellitus             NPH, metformin and sliding scale insulin    Seasonal allergies with congestion   flonase   zyrtec    HTN   Lisinopril, and metoprolol     Morbid obesity, BMI over 60, patient reports loosing almost 100 pounds over the last 6 months    encourage healthy weight loss after discharge    Metabolic alkalosis    resolved    DNR      Quality-Core Measures   Reviewed items::  Labs  reviewed and Medications reviewed  Singh catheter::  Urinary Tract Retention or Urinary Tract Obstruction  DVT prophylaxis pharmacological::  Contraindicated - High bleeding risk  Ulcer Prophylaxis::  Not indicated  Assessed for rehabilitation services:  Patient was assess for and/or received rehabilitation services during this hospitalization

## 2018-06-30 LAB
ANION GAP SERPL CALC-SCNC: 5 MMOL/L (ref 0–11.9)
BUN SERPL-MCNC: 23 MG/DL (ref 8–22)
CALCIUM SERPL-MCNC: 9.5 MG/DL (ref 8.4–10.2)
CHLORIDE SERPL-SCNC: 98 MMOL/L (ref 96–112)
CO2 SERPL-SCNC: 28 MMOL/L (ref 20–33)
CREAT SERPL-MCNC: 0.69 MG/DL (ref 0.5–1.4)
ERYTHROCYTE [DISTWIDTH] IN BLOOD BY AUTOMATED COUNT: 72.7 FL (ref 35.9–50)
GLUCOSE BLD-MCNC: 139 MG/DL (ref 65–99)
GLUCOSE BLD-MCNC: 150 MG/DL (ref 65–99)
GLUCOSE BLD-MCNC: 186 MG/DL (ref 65–99)
GLUCOSE BLD-MCNC: 201 MG/DL (ref 65–99)
GLUCOSE BLD-MCNC: 211 MG/DL (ref 65–99)
GLUCOSE BLD-MCNC: 217 MG/DL (ref 65–99)
GLUCOSE BLD-MCNC: 236 MG/DL (ref 65–99)
GLUCOSE BLD-MCNC: 240 MG/DL (ref 65–99)
GLUCOSE SERPL-MCNC: 200 MG/DL (ref 65–99)
HCT VFR BLD AUTO: 25.7 % (ref 37–47)
HGB BLD-MCNC: 8.7 G/DL (ref 12–16)
MCH RBC QN AUTO: 33.9 PG (ref 27–33)
MCHC RBC AUTO-ENTMCNC: 33.9 G/DL (ref 33.6–35)
MCV RBC AUTO: 100 FL (ref 81.4–97.8)
PLATELET # BLD AUTO: 177 K/UL (ref 164–446)
PMV BLD AUTO: 10.7 FL (ref 9–12.9)
POTASSIUM SERPL-SCNC: 4 MMOL/L (ref 3.6–5.5)
RBC # BLD AUTO: 2.57 M/UL (ref 4.2–5.4)
SODIUM SERPL-SCNC: 131 MMOL/L (ref 135–145)
WBC # BLD AUTO: 7.5 K/UL (ref 4.8–10.8)

## 2018-06-30 PROCEDURE — 82962 GLUCOSE BLOOD TEST: CPT

## 2018-06-30 PROCEDURE — 80048 BASIC METABOLIC PNL TOTAL CA: CPT

## 2018-06-30 PROCEDURE — 85027 COMPLETE CBC AUTOMATED: CPT

## 2018-06-30 ASSESSMENT — ENCOUNTER SYMPTOMS
WHEEZING: 0
BRUISES/BLEEDS EASILY: 0
DIARRHEA: 0
CONSTIPATION: 0
SHORTNESS OF BREATH: 0
MYALGIAS: 0
FEVER: 0
HEARTBURN: 0
PALPITATIONS: 0
VOMITING: 0
DIZZINESS: 0

## 2018-06-30 NOTE — [facility]
Hospital Medicine Progress Note, Adult, Complex               Author: Zamary Fuugh Date & Time created: 6/30/2018  2:42 PM     CC: multiple myeloma with debility after chemotherapy    Interval History:  She is sitting in a chair in the murphy and has improved strength but still very weak  She is tolerating oral chemotherapy   She denies any nausea, rash or diarrhea    Review of Systems:  Review of Systems   Constitutional: Negative for fever.        Appetite improving on megace     Respiratory: Negative for shortness of breath and wheezing.    Cardiovascular: Negative for chest pain and palpitations.   Gastrointestinal: Negative for constipation, diarrhea, heartburn and vomiting.   Genitourinary: Negative for dysuria and urgency.   Musculoskeletal: Negative for myalgias.   Skin: Negative for rash.   Neurological: Negative for dizziness.   Endo/Heme/Allergies: Does not bruise/bleed easily.       Physical Exam:  Physical Exam   Constitutional: She is oriented to person, place, and time. No distress.   HENT:   Mouth/Throat: No oropharyngeal exudate.   Eyes: No scleral icterus.   Neck: Neck supple.   Cardiovascular: Normal rate, regular rhythm and intact distal pulses.  PMI is displaced.    No murmur heard.  Pulmonary/Chest: Effort normal and breath sounds normal. No respiratory distress.   Abdominal: Soft.   Large abdominal pannus   Musculoskeletal: She exhibits no edema.   Neurological: She is alert and oriented to person, place, and time. Coordination normal.   Skin: No rash noted. She is not diaphoretic.   Psychiatric: Her behavior is normal.   Nursing note and vitals reviewed.      Labs:        Invalid input(s): [id number]      Recent Labs      06/30/18   0405   SODIUM  131*   POTASSIUM  4.0   CHLORIDE  98   CO2  28   BUN  23*   CREATININE  0.69   CALCIUM  9.5     Recent Labs      06/30/18   0405   GLUCOSE  200*     Recent Labs      06/30/18   0001   RBC  2.57*   HEMOGLOBIN  8.7*   HEMATOCRIT  25.7*    PLATELETCT  177     Recent Labs      06/30/18   0001   WBC  7.5           Hemodynamics:   T99.2 /75 HR76 RR18 O2 sat 96% on room air     GI/Nutrition:  Orders Placed This Encounter   Procedures   • Diet Order Diabetic     Standing Status:   Standing     Number of Occurrences:   1     Order Specific Question:   Diet:     Answer:   Diabetic [3]     Order Specific Question:   Texture/Fiber modifications:     Answer:   Dysphagia 3(Mechanical Soft)specify fluid consistency(question 6) [3]     Order Specific Question:   Consistency/Fluid modifications:     Answer:   Thin Liquids [3]     Medical Decision Making, by Problem:  Multiple myeloma 1P deletion intermediate risk, IgG kappa.  Stage III based on ISS staging             treated with  2 cycles CyBorD   oral regimen ninlaro, revilumid, dexamethasone per oncology began 6/19   Continue physical therapy, referrals sent for rehab    Neutropenia induced by chemotherapy, resolved    Anemia, oral iron    L pathologic humerus fx and B femur palliative radiation   Due to lytic lesions, physical therapy    Pain controlled     Acute metabolic encephalopathy, due to pain medication     resolved    Buttock pressure decubitus ulcer              Skin care   Bilateral lower extremity venous insufficiency, edema  Improved, will start compression stockings as tolerated by patient   Had worsening edema previously on norvasc  Type II diabetes mellitus             NPH, metformin and sliding scale insulin    Seasonal allergies with congestion   flonase   zyrtec    HTN   Lisinopril, and metoprolol     Morbid obesity, BMI over 60, patient reports loosing almost 100 pounds over the last 6 months    encourage healthy weight loss after discharge    Metabolic alkalosis    resolved    DNR      Quality-Core Measures   Reviewed items::  Labs reviewed and Medications reviewed  Singh catheter::  Urinary Tract Retention or Urinary Tract Obstruction  DVT prophylaxis pharmacological::   Contraindicated - High bleeding risk  Ulcer Prophylaxis::  Not indicated  Assessed for rehabilitation services:  Patient was assess for and/or received rehabilitation services during this hospitalization

## 2018-07-01 PROCEDURE — 82962 GLUCOSE BLOOD TEST: CPT | Mod: 91

## 2018-07-01 ASSESSMENT — ENCOUNTER SYMPTOMS
NAUSEA: 0
HEARTBURN: 0
SHORTNESS OF BREATH: 0
BRUISES/BLEEDS EASILY: 0
CHILLS: 0
VOMITING: 0
COUGH: 0
DIAPHORESIS: 0
CONSTIPATION: 0
MYALGIAS: 0
DIARRHEA: 0
FEVER: 0
DIZZINESS: 0

## 2018-07-01 NOTE — [facility]
Hospital Medicine Progress Note, Adult, Complex               Author: Za Isaac Date & Time created: 7/1/2018  11:53 AM     CC: multiple myeloma with debility after chemotherapy    Interval History:  She is sitting in a chair in the murphy and has improved strength but still very weak  She is tolerating oral chemotherapy   Appetite is improved on megace  Today she is watching a fire on the hill outside her window    Review of Systems:  Review of Systems   Constitutional: Negative for chills, diaphoresis and fever.        Appetite improving on megace     Respiratory: Negative for cough and shortness of breath.    Cardiovascular: Positive for leg swelling. Negative for chest pain.        Increase in leg swelling after being up in a chair for over an hour   Gastrointestinal: Negative for constipation, diarrhea, heartburn, nausea and vomiting.   Genitourinary: Negative for dysuria.   Musculoskeletal: Negative for myalgias.   Neurological: Negative for dizziness.   Endo/Heme/Allergies: Does not bruise/bleed easily.       Physical Exam:  Physical Exam   Constitutional: She is oriented to person, place, and time.   HENT:   Mouth/Throat: Oropharynx is clear and moist. No oropharyngeal exudate.   Eyes: Conjunctivae are normal.   Neck: Neck supple.   Cardiovascular: Normal rate, regular rhythm and intact distal pulses.  PMI is displaced.    No murmur heard.  Pulmonary/Chest: Effort normal and breath sounds normal.   Abdominal: Soft. She exhibits no distension.   Large abdominal pannus   Musculoskeletal: She exhibits edema.   Neurological: She is alert and oriented to person, place, and time. Coordination normal.   Skin: Skin is warm and dry. She is not diaphoretic.   Psychiatric: Her behavior is normal.   Nursing note and vitals reviewed.      Labs:        Invalid input(s): [id number]      Recent Labs      06/30/18   0405   SODIUM  131*   POTASSIUM  4.0   CHLORIDE  98   CO2  28   BUN  23*   CREATININE  0.69   CALCIUM   9.5     Recent Labs      06/30/18   0405   GLUCOSE  200*     Recent Labs      06/30/18   0001   RBC  2.57*   HEMOGLOBIN  8.7*   HEMATOCRIT  25.7*   PLATELETCT  177     Recent Labs      06/30/18   0001   WBC  7.5           Hemodynamics:   T98.1 /58 HR67 RR17 O2 sat 94% on room air     GI/Nutrition:  Orders Placed This Encounter   Procedures   • Diet Order Diabetic     Standing Status:   Standing     Number of Occurrences:   1     Order Specific Question:   Diet:     Answer:   Diabetic [3]     Order Specific Question:   Texture/Fiber modifications:     Answer:   Dysphagia 3(Mechanical Soft)specify fluid consistency(question 6) [3]     Order Specific Question:   Consistency/Fluid modifications:     Answer:   Thin Liquids [3]     Medical Decision Making, by Problem:  Multiple myeloma 1P deletion intermediate risk, IgG kappa.  Stage III based on ISS staging             treated with  2 cycles CyBorD   oral regimen ninlaro, revilumid, dexamethasone per oncology began 6/19   Continue physical therapy, referrals sent for rehab    Neutropenia induced by chemotherapy, resolved    Anemia, oral iron, oral intake improved    L pathologic humerus fx and B femur palliative radiation   Due to lytic lesions, physical therapy    Pain controlled     Acute metabolic encephalopathy, due to pain medication     resolved    Buttock pressure decubitus ulcer              Skin care   Bilateral lower extremity venous insufficiency, edema  Improved, compression stockings as tolerated   Had worsening edema previously on norvasc  Type II diabetes mellitus             NPH, metformin and sliding scale insulin    Seasonal allergies with congestion   flonase   zyrtec    HTN   Lisinopril, and metoprolol     Morbid obesity, BMI over 60, patient reports loosing almost 100 pounds over the last 6 months    encourage healthy weight loss after discharge    Metabolic alkalosis    resolved    DNR      Quality-Core Measures   Reviewed items::  Labs  reviewed and Medications reviewed  Singh catheter::  Urinary Tract Retention or Urinary Tract Obstruction  DVT prophylaxis pharmacological::  Contraindicated - High bleeding risk  Ulcer Prophylaxis::  Not indicated  Assessed for rehabilitation services:  Patient was assess for and/or received rehabilitation services during this hospitalization

## 2018-07-02 LAB
GLUCOSE BLD-MCNC: 187 MG/DL (ref 65–99)
GLUCOSE BLD-MCNC: 215 MG/DL (ref 65–99)
GLUCOSE BLD-MCNC: 221 MG/DL (ref 65–99)
GLUCOSE BLD-MCNC: 234 MG/DL (ref 65–99)

## 2018-07-02 PROCEDURE — 82962 GLUCOSE BLOOD TEST: CPT

## 2018-07-02 ASSESSMENT — ENCOUNTER SYMPTOMS
VOMITING: 0
DIZZINESS: 0
SHORTNESS OF BREATH: 0
PALPITATIONS: 0
MYALGIAS: 0
BRUISES/BLEEDS EASILY: 0
ABDOMINAL PAIN: 0
DIARRHEA: 0
FEVER: 0
DIAPHORESIS: 0
CONSTIPATION: 0

## 2018-07-02 NOTE — [facility]
Hospital Medicine Progress Note, Adult, Complex               Author: Za Isaac Date & Time created: 7/2/2018  7:57 AM     CC: multiple myeloma with debility after chemotherapy    Interval History:  She is sitting in a chair in the murphy and has improved strength but still very weak  She is tolerating oral chemotherapy   Appetite is improved on megace  She is not requiring any iv morphine and has no other indications for an iv to be in place    Review of Systems:  Review of Systems   Constitutional: Negative for diaphoresis and fever.        Appetite improving on megace     Respiratory: Negative for shortness of breath.    Cardiovascular: Positive for leg swelling. Negative for chest pain and palpitations.        Increase in leg swelling after having legs down to gravity   Gastrointestinal: Negative for abdominal pain, constipation, diarrhea and vomiting.   Musculoskeletal: Negative for myalgias.   Skin: Negative for rash.   Neurological: Negative for dizziness.   Endo/Heme/Allergies: Does not bruise/bleed easily.       Physical Exam:  Physical Exam   Constitutional: She is oriented to person, place, and time.   HENT:   Mouth/Throat: No oropharyngeal exudate.   Eyes: Conjunctivae are normal. No scleral icterus.   Neck: Neck supple.   Cardiovascular: Normal rate, regular rhythm and intact distal pulses.  PMI is displaced.    No murmur heard.  Pulmonary/Chest: Effort normal and breath sounds normal. She has no wheezes.   Abdominal: Soft. Bowel sounds are normal. She exhibits no distension.   Large abdominal pannus   Musculoskeletal: She exhibits edema.   Neurological: She is alert and oriented to person, place, and time. Coordination normal.   Skin: No rash noted. She is not diaphoretic.   Psychiatric: Her behavior is normal.   Nursing note and vitals reviewed.      Labs:        Invalid input(s): [id number]      Recent Labs      06/30/18   0405   SODIUM  131*   POTASSIUM  4.0   CHLORIDE  98   CO2  28   BUN   23*   CREATININE  0.69   CALCIUM  9.5     Recent Labs      06/30/18   0405   GLUCOSE  200*     Recent Labs      06/30/18   0001   RBC  2.57*   HEMOGLOBIN  8.7*   HEMATOCRIT  25.7*   PLATELETCT  177     Recent Labs      06/30/18   0001   WBC  7.5           Hemodynamics:   T98 /60 HR68 RR18 O2 sat 95% on room air     GI/Nutrition:  Orders Placed This Encounter   Procedures   • Diet Order Diabetic     Standing Status:   Standing     Number of Occurrences:   1     Order Specific Question:   Diet:     Answer:   Diabetic [3]     Order Specific Question:   Texture/Fiber modifications:     Answer:   Dysphagia 3(Mechanical Soft)specify fluid consistency(question 6) [3]     Order Specific Question:   Consistency/Fluid modifications:     Answer:   Thin Liquids [3]     Medical Decision Making, by Problem:  Multiple myeloma 1P deletion intermediate risk, IgG kappa.  Stage III based on ISS staging             treated with  2 cycles CyBorD   oral regimen ninlaro, revilumid, dexamethasone per oncology began 6/19   Continue physical therapy, rehab referral in process    Neutropenia induced by chemotherapy, resolved    Anemia, iron replacement    L pathologic humerus fx and B femur palliative radiation   Due to lytic lesions, physical therapy    Pain controlled, no need for morphine, will discontinue    Acute metabolic encephalopathy, due to pain medication     resolved    Buttock pressure decubitus ulcer              Skin care   Bilateral lower extremity venous insufficiency, edema  Improved, compression stockings as tolerated   Had worsening edema previously on norvasc  Type II diabetes mellitus             NPH, metformin and sliding scale insulin    Seasonal allergies with congestion   flonase   zyrtec    HTN   Lisinopril, and metoprolol     Morbid obesity, BMI over 60, patient reports loosing almost 100 pounds over the last 6 months    encourage healthy weight loss after discharge    Metabolic alkalosis     resolved    DNR      Quality-Core Measures   Reviewed items::  Labs reviewed and Medications reviewed  Singh catheter::  Urinary Tract Retention or Urinary Tract Obstruction  DVT prophylaxis pharmacological::  Contraindicated - High bleeding risk  Ulcer Prophylaxis::  Not indicated  Assessed for rehabilitation services:  Patient was assess for and/or received rehabilitation services during this hospitalization

## 2018-07-03 LAB
GLUCOSE BLD-MCNC: 162 MG/DL (ref 65–99)
GLUCOSE BLD-MCNC: 240 MG/DL (ref 65–99)
GLUCOSE BLD-MCNC: 273 MG/DL (ref 65–99)
GLUCOSE BLD-MCNC: 274 MG/DL (ref 65–99)

## 2018-07-03 PROCEDURE — 82962 GLUCOSE BLOOD TEST: CPT | Mod: 91

## 2018-07-03 ASSESSMENT — ENCOUNTER SYMPTOMS
DIARRHEA: 0
VOMITING: 0
CONSTIPATION: 0
HEADACHES: 0
FEVER: 0
COUGH: 0
NAUSEA: 0
DEPRESSION: 0
CHILLS: 0
SHORTNESS OF BREATH: 0
BACK PAIN: 1
ABDOMINAL PAIN: 0
SORE THROAT: 0

## 2018-07-03 NOTE — [facility]
Hospital Medicine Progress Note, Adult, Complex               Author: Georgina KATHLEEN Cristy Date & Time created: 7/3/2018  8:50 AM     CC: aftercare, debility from prolonged hospitalization for MM    Interval History:  Having more bone pain  Up in chair in hallway  No nausea, abdominal pain now    Review of Systems:  Review of Systems   Constitutional: Negative for chills and fever.   HENT: Negative for sore throat.    Respiratory: Negative for cough and shortness of breath.    Cardiovascular: Negative for chest pain.   Gastrointestinal: Negative for abdominal pain, constipation, diarrhea, nausea and vomiting.   Genitourinary: Negative for dysuria.   Musculoskeletal: Positive for back pain.        Bone pain   Neurological: Negative for headaches.   Psychiatric/Behavioral: Negative for depression.       T [id number] 136/66KC83EaO4 96% 2L I/O1.2/brp 2BM  Physical Exam   Constitutional: She is oriented to person, place, and time. She appears well-developed. No distress.   HENT:   Head: Normocephalic and atraumatic.   Mouth/Throat: No oropharyngeal exudate.   Eyes: Conjunctivae are normal. Right eye exhibits no discharge. Left eye exhibits no discharge. No scleral icterus.   Neck: Neck supple. No tracheal deviation present.   Cardiovascular: Normal rate, regular rhythm and intact distal pulses.    No murmur heard.  Pulmonary/Chest: Effort normal. No respiratory distress. She has no wheezes. She exhibits no tenderness.   diminished   Abdominal: Soft. Bowel sounds are normal. She exhibits no distension. There is no tenderness.   obese   Musculoskeletal: She exhibits no edema.   L paraspinuous pain with palpation, no masses   Neurological: She is alert and oriented to person, place, and time.   Skin: Skin is warm. No rash noted.   Vitals reviewed.      Labs:        Invalid input(s): [id number]      No results for input(s): SODIUM, POTASSIUM, CHLORIDE, CO2, BUN, CREATININE, MAGNESIUM, PHOSPHORUS, CALCIUM in the last 72  hours.  No results for input(s): ALTSGPT, ASTSGOT, ALKPHOSPHAT, TBILIRUBIN, DBILIRUBIN, GAMMAGT, AMYLASE, LIPASE, ALB, PREALBUMIN, GLUCOSE in the last 72 hours.  No results for input(s): RBC, HEMOGLOBIN, HEMATOCRIT, PLATELETCT, PROTHROMBTM, APTT, INR, IRON, FERRITIN, TOTIRONBC in the last 72 hours.           GI/Nutrition:  Orders Placed This Encounter   Procedures   • Diet Order Diabetic     Standing Status:   Standing     Number of Occurrences:   1     Order Specific Question:   Diet:     Answer:   Diabetic [3]     Order Specific Question:   Texture/Fiber modifications:     Answer:   Dysphagia 3(Mechanical Soft)specify fluid consistency(question 6) [3]     Order Specific Question:   Consistency/Fluid modifications:     Answer:   Thin Liquids [3]     Medical Decision Making, by Problem:  MM IgG kappa stage 3   -s/p 2 cycles CyBorD, not tolerated well per notes in epic   -oral regimen ninlaro, revilumid, dexamethasone per onc began 6/19, dexamethasone, ninlaro today   -anticipated poor prognosis  L pathologic humerus fx and B femur palliative radiation   -multiple lytic lesions on imaging   -increase pain medication  Pleuritic CP, resolved   -negative PE  Tx RLL PNA PTA  Buttock pressure decub   -air loss mattress  B LE venous insufficiency with prior cellulitis   -compression as tolerated  Anemia of chronic illness  DM2   -increased NPH to 38 in am, 20 QHS   -add premeal insulin   -RISS  HTN   -lisinopril, metoprolol   -norvasc, follow fluid retention  Morbid obesity  Hyponatremia, chronic  Intermittent diarrhea   -imodium prn  Post nasal drip   -flonase, clariten  Debility   -PT/OT       Quality-Core Measures   Reviewed items::  Medications reviewed  Singh catheter::  No Singh  DVT prophylaxis pharmacological::  Heparin

## 2018-07-04 LAB
GLUCOSE BLD-MCNC: 155 MG/DL (ref 65–99)
GLUCOSE BLD-MCNC: 258 MG/DL (ref 65–99)
GLUCOSE BLD-MCNC: 261 MG/DL (ref 65–99)
GLUCOSE BLD-MCNC: 280 MG/DL (ref 65–99)

## 2018-07-04 PROCEDURE — 82962 GLUCOSE BLOOD TEST: CPT

## 2018-07-04 ASSESSMENT — ENCOUNTER SYMPTOMS
SHORTNESS OF BREATH: 0
ABDOMINAL PAIN: 0
HEADACHES: 0
PALPITATIONS: 0
COUGH: 0
FEVER: 0
NAUSEA: 0
SORE THROAT: 0
DEPRESSION: 0
DIARRHEA: 0
BACK PAIN: 1
CONSTIPATION: 0
VOMITING: 0

## 2018-07-04 NOTE — [facility]
Hospital Medicine Progress Note, Adult, Complex               Author: Georgina HEVER Muniz Date & Time created: 7/4/2018  10:56 AM     CC: aftercare, debility from prolonged hospitalization for MM    Interval History:  Tolerated chemo better yesterday without chest/abdominal pain  Has not tried oxycodone for bone pain  Reading in the hallway    Review of Systems:  Review of Systems   Constitutional: Negative for fever.   HENT: Negative for sore throat.    Respiratory: Negative for cough and shortness of breath.    Cardiovascular: Negative for chest pain and palpitations.   Gastrointestinal: Negative for abdominal pain, constipation, diarrhea, nausea and vomiting.   Genitourinary: Negative for dysuria.   Musculoskeletal: Positive for back pain.        Bone pain   Skin: Negative for rash.   Neurological: Negative for headaches.   Psychiatric/Behavioral: Negative for depression.       T 98.7P64BP 125/69AQ11OpF6 98% 2L I/O1.3/brp 4BM  Physical Exam   Constitutional: She is oriented to person, place, and time. She appears well-developed. No distress.   HENT:   Head: Normocephalic and atraumatic.   Mouth/Throat: No oropharyngeal exudate.   Eyes: Conjunctivae are normal. Right eye exhibits no discharge. Left eye exhibits no discharge. No scleral icterus.   Neck: Neck supple. No tracheal deviation present.   Cardiovascular: Normal rate, regular rhythm and intact distal pulses.    No murmur heard.  distant   Pulmonary/Chest: Effort normal. No respiratory distress. She has no wheezes. She exhibits no tenderness.   diminished   Abdominal: Soft. Bowel sounds are normal. She exhibits no distension. There is no tenderness. There is no rebound.   obese   Musculoskeletal: She exhibits no edema.   Neurological: She is alert and oriented to person, place, and time.   Skin: Skin is warm.   Vitals reviewed.      Labs:        Invalid input(s): [id number]      No results for input(s): SODIUM, POTASSIUM, CHLORIDE, CO2, BUN, CREATININE,  MAGNESIUM, PHOSPHORUS, CALCIUM in the last 72 hours.  No results for input(s): ALTSGPT, ASTSGOT, ALKPHOSPHAT, TBILIRUBIN, DBILIRUBIN, GAMMAGT, AMYLASE, LIPASE, ALB, PREALBUMIN, GLUCOSE in the last 72 hours.  No results for input(s): RBC, HEMOGLOBIN, HEMATOCRIT, PLATELETCT, PROTHROMBTM, APTT, INR, IRON, FERRITIN, TOTIRONBC in the last 72 hours.           GI/Nutrition:  Orders Placed This Encounter   Procedures   • Diet Order Diabetic     Standing Status:   Standing     Number of Occurrences:   1     Order Specific Question:   Diet:     Answer:   Diabetic [3]     Order Specific Question:   Texture/Fiber modifications:     Answer:   Dysphagia 3(Mechanical Soft)specify fluid consistency(question 6) [3]     Order Specific Question:   Consistency/Fluid modifications:     Answer:   Thin Liquids [3]     Medical Decision Making, by Problem:  MM IgG kappa stage 3   -s/p 2 cycles CyBorD, not tolerated well per notes in epic   -oral regimen ninlaro, revilumid, dexamethasone per onc began 6/19   -anticipated poor prognosis  L pathologic humerus fx and B femur palliative radiation   -multiple lytic lesions on imaging   -increase pain medication  Pleuritic CP, resolved   -negative PE  Tx RLL PNA PTA  Buttock pressure decub   -air loss mattress  B LE venous insufficiency with prior cellulitis   -compression as tolerated  Anemia of chronic illness  DM2   -increased NPH to 38 in am, 20 QHS   -premeal insulin   -RISS   -higher today due to high dose dexamethasone yesterday  HTN   -lisinopril, metoprolol   -norvasc, follow fluid retention  Morbid obesity  Hyponatremia, chronic  Intermittent diarrhea   -imodium prn  Post nasal drip   -flonasbriana snowiten  Debility   -PT/OT       Quality-Core Measures   Reviewed items::  Medications reviewed  Singh catheter::  No Singh  DVT prophylaxis pharmacological::  Heparin

## 2018-07-05 LAB
GLUCOSE BLD-MCNC: 158 MG/DL (ref 65–99)
GLUCOSE BLD-MCNC: 223 MG/DL (ref 65–99)
GLUCOSE BLD-MCNC: 226 MG/DL (ref 65–99)
GLUCOSE BLD-MCNC: 242 MG/DL (ref 65–99)

## 2018-07-05 PROCEDURE — 82962 GLUCOSE BLOOD TEST: CPT | Mod: 91

## 2018-07-05 ASSESSMENT — ENCOUNTER SYMPTOMS
HEADACHES: 0
ABDOMINAL PAIN: 0
ORTHOPNEA: 0
CONSTIPATION: 0
FEVER: 0
DEPRESSION: 0
NAUSEA: 0
SHORTNESS OF BREATH: 0
COUGH: 0
SORE THROAT: 0
DIARRHEA: 0
PALPITATIONS: 0
VOMITING: 0
CHILLS: 0

## 2018-07-05 NOTE — [facility]
Hospital Medicine Progress Note, Adult, Complex               Author: Georgina Muniz Date & Time created: 7/5/2018  11:22 AM     CC: aftercare, debility from prolonged hospitalization for MM    Interval History:  Slept well  Ambulated 40 feet  Yesterday  Tolerating chemo    Review of Systems:  Review of Systems   Constitutional: Negative for chills and fever.   HENT: Negative for sore throat.    Respiratory: Negative for cough and shortness of breath.    Cardiovascular: Negative for chest pain, palpitations and orthopnea.   Gastrointestinal: Negative for abdominal pain, constipation, diarrhea, nausea and vomiting.   Genitourinary: Negative for dysuria.   Musculoskeletal:        Bone pain   Skin: Negative for rash.   Neurological: Negative for headaches.   Psychiatric/Behavioral: Negative for depression.       T 98.3P69BP 170/49DI58BkD1 95% 2L I/O1.5/brp 2BM  Physical Exam   Constitutional: She is oriented to person, place, and time. She appears well-developed. No distress.   HENT:   Head: Normocephalic and atraumatic.   Mouth/Throat: No oropharyngeal exudate.   Eyes: Conjunctivae are normal. Right eye exhibits no discharge. Left eye exhibits no discharge. No scleral icterus.   Neck: Neck supple. No tracheal deviation present.   Cardiovascular: Normal rate, regular rhythm and intact distal pulses.    No murmur heard.  distant   Pulmonary/Chest: Effort normal. No respiratory distress. She has no wheezes. She exhibits no tenderness.   diminished   Abdominal: Soft. Bowel sounds are normal. She exhibits no distension. There is no tenderness.   obese   Musculoskeletal: She exhibits no edema.   Neurological: She is alert and oriented to person, place, and time.   Skin: Skin is warm.   Vitals reviewed.      Labs:        Invalid input(s): [id number]      No results for input(s): SODIUM, POTASSIUM, CHLORIDE, CO2, BUN, CREATININE, MAGNESIUM, PHOSPHORUS, CALCIUM in the last 72 hours.  No results for input(s): ALTSGPT,  ASTSGOT, ALKPHOSPHAT, TBILIRUBIN, DBILIRUBIN, GAMMAGT, AMYLASE, LIPASE, ALB, PREALBUMIN, GLUCOSE in the last 72 hours.  No results for input(s): RBC, HEMOGLOBIN, HEMATOCRIT, PLATELETCT, PROTHROMBTM, APTT, INR, IRON, FERRITIN, TOTIRONBC in the last 72 hours.           GI/Nutrition:  Orders Placed This Encounter   Procedures   • Diet Order Diabetic     Standing Status:   Standing     Number of Occurrences:   1     Order Specific Question:   Diet:     Answer:   Diabetic [3]     Order Specific Question:   Texture/Fiber modifications:     Answer:   Dysphagia 3(Mechanical Soft)specify fluid consistency(question 6) [3]     Order Specific Question:   Consistency/Fluid modifications:     Answer:   Thin Liquids [3]     Medical Decision Making, by Problem:  MM IgG kappa stage 3   -s/p 2 cycles CyBorD, not tolerated well per notes in epic   -oral regimen ninlaro, revilumid, dexamethasone per onc began 6/19   -anticipated poor prognosis  L pathologic humerus fx and B femur palliative radiation   -multiple lytic lesions on imaging  Pleuritic CP, resolved   -negative PE  Tx RLL PNA PTA  Buttock pressure decub   -air loss mattress  B LE venous insufficiency with prior cellulitis   -compression as tolerated  Anemia of chronic illness  DM2   -increased NPH to50 in am, 30 QHS   -premeal insulin   -RISS  HTN   -lisinopril, metoprolol   -norvasc, follow fluid retention   -repeat as isolated high today  Morbid obesity  Hyponatremia, chronic  Intermittent diarrhea   -imodium prn  Post nasal drip   -flonase, clariten  Debility   -PT/OT       Quality-Core Measures   Reviewed items::  Medications reviewed  Singh catheter::  No Singh  DVT prophylaxis pharmacological::  Heparin

## 2018-07-06 LAB
ALBUMIN SERPL BCP-MCNC: 2.9 G/DL (ref 3.2–4.9)
ALBUMIN/GLOB SERPL: 0.7 G/DL
ALP SERPL-CCNC: 44 U/L (ref 30–99)
ALT SERPL-CCNC: 14 U/L (ref 2–50)
ANION GAP SERPL CALC-SCNC: 7 MMOL/L (ref 0–11.9)
AST SERPL-CCNC: 17 U/L (ref 12–45)
BILIRUB SERPL-MCNC: 0.4 MG/DL (ref 0.1–1.5)
BUN SERPL-MCNC: 33 MG/DL (ref 8–22)
CALCIUM SERPL-MCNC: 10.3 MG/DL (ref 8.4–10.2)
CHLORIDE SERPL-SCNC: 100 MMOL/L (ref 96–112)
CO2 SERPL-SCNC: 26 MMOL/L (ref 20–33)
CREAT SERPL-MCNC: 0.95 MG/DL (ref 0.5–1.4)
ERYTHROCYTE [DISTWIDTH] IN BLOOD BY AUTOMATED COUNT: 72 FL (ref 35.9–50)
GLOBULIN SER CALC-MCNC: 3.9 G/DL (ref 1.9–3.5)
GLUCOSE BLD-MCNC: 103 MG/DL (ref 65–99)
GLUCOSE BLD-MCNC: 181 MG/DL (ref 65–99)
GLUCOSE BLD-MCNC: 188 MG/DL (ref 65–99)
GLUCOSE BLD-MCNC: 191 MG/DL (ref 65–99)
GLUCOSE BLD-MCNC: 205 MG/DL (ref 65–99)
GLUCOSE BLD-MCNC: 220 MG/DL (ref 65–99)
GLUCOSE BLD-MCNC: 230 MG/DL (ref 65–99)
GLUCOSE BLD-MCNC: 264 MG/DL (ref 65–99)
GLUCOSE SERPL-MCNC: 86 MG/DL (ref 65–99)
HCT VFR BLD AUTO: 27.2 % (ref 37–47)
HGB BLD-MCNC: 9.1 G/DL (ref 12–16)
MCH RBC QN AUTO: 33.6 PG (ref 27–33)
MCHC RBC AUTO-ENTMCNC: 33.5 G/DL (ref 33.6–35)
MCV RBC AUTO: 100.4 FL (ref 81.4–97.8)
PLATELET # BLD AUTO: 145 K/UL (ref 164–446)
PMV BLD AUTO: 12 FL (ref 9–12.9)
POTASSIUM SERPL-SCNC: 4.8 MMOL/L (ref 3.6–5.5)
PROT SERPL-MCNC: 6.8 G/DL (ref 6–8.2)
RBC # BLD AUTO: 2.71 M/UL (ref 4.2–5.4)
SODIUM SERPL-SCNC: 133 MMOL/L (ref 135–145)
WBC # BLD AUTO: 6 K/UL (ref 4.8–10.8)

## 2018-07-06 PROCEDURE — 82962 GLUCOSE BLOOD TEST: CPT

## 2018-07-06 PROCEDURE — 85027 COMPLETE CBC AUTOMATED: CPT

## 2018-07-06 PROCEDURE — 80053 COMPREHEN METABOLIC PANEL: CPT

## 2018-07-06 ASSESSMENT — ENCOUNTER SYMPTOMS
CONSTIPATION: 0
PALPITATIONS: 0
FEVER: 0
SHORTNESS OF BREATH: 0
DIARRHEA: 1
COUGH: 0
HEADACHES: 0
VOMITING: 0
SORE THROAT: 0
CHILLS: 0
ABDOMINAL PAIN: 0
NAUSEA: 0

## 2018-07-06 NOTE — [facility]
Hospital Medicine Progress Note, Adult, Complex               Author: Georgina Muniz Date & Time created: 7/6/2018  8:17 AM     CC: aftercare, debility from prolonged hospitalization for MM    Interval History:  Platelets a little low  Slept fine  BG much better  Did not have PT yesterday    Review of Systems:  Review of Systems   Constitutional: Negative for chills and fever.   HENT: Negative for sore throat.    Respiratory: Negative for cough and shortness of breath.    Cardiovascular: Negative for chest pain, palpitations and leg swelling.   Gastrointestinal: Positive for diarrhea (intermittent). Negative for abdominal pain, constipation, nausea and vomiting.   Genitourinary: Negative for dysuria.   Musculoskeletal:        Bone pain   Skin: Negative for rash.   Neurological: Negative for headaches.       T 99.2V03YT242/03YK60NjI1 96% 2L I/O1.7/brp noBM  Physical Exam   Constitutional: She is oriented to person, place, and time. She appears well-developed. No distress.   HENT:   Head: Normocephalic and atraumatic.   Mouth/Throat: No oropharyngeal exudate.   Eyes: Conjunctivae are normal. Right eye exhibits no discharge. Left eye exhibits no discharge. No scleral icterus.   Neck: Neck supple. No tracheal deviation present.   Cardiovascular: Normal rate, regular rhythm and intact distal pulses.    No murmur heard.  distant   Pulmonary/Chest: Effort normal. No respiratory distress. She has no wheezes. She exhibits no tenderness.   diminished   Abdominal: Soft. Bowel sounds are normal. She exhibits no distension. There is no tenderness.   obese   Musculoskeletal: She exhibits no edema.   Neurological: She is alert and oriented to person, place, and time.   Skin: Skin is warm.   Vitals reviewed.      Labs:        Invalid input(s): [id number]      Recent Labs      07/06/18   0330   SODIUM  133*   POTASSIUM  4.8   CHLORIDE  100   CO2  26   BUN  33*   CREATININE  0.95   CALCIUM  10.3*     Recent Labs      07/06/18    0330   ALTSGPT  14   ASTSGOT  17   ALKPHOSPHAT  44   TBILIRUBIN  0.4   GLUCOSE  86     Recent Labs      07/06/18   0330   RBC  2.71*   HEMOGLOBIN  9.1*   HEMATOCRIT  27.2*   PLATELETCT  145*     Recent Labs      07/06/18   0330   WBC  6.0   ASTSGOT  17   ALTSGPT  14   ALKPHOSPHAT  44   TBILIRUBIN  0.4          GI/Nutrition:  Orders Placed This Encounter   Procedures   • Diet Order Diabetic     Standing Status:   Standing     Number of Occurrences:   1     Order Specific Question:   Diet:     Answer:   Diabetic [3]     Order Specific Question:   Texture/Fiber modifications:     Answer:   Dysphagia 3(Mechanical Soft)specify fluid consistency(question 6) [3]     Order Specific Question:   Consistency/Fluid modifications:     Answer:   Thin Liquids [3]     Medical Decision Making, by Problem:  MM IgG kappa stage 3   -s/p 2 cycles CyBorD, not tolerated well per notes in epic   -oral regimen ninlaro, revilumid, dexamethasone per onc began 6/19  Mild thrombocytopenia   -secondary to chemo   -follow  L pathologic humerus fx and B femur palliative radiation   -multiple lytic lesions on imaging  Tx RLL PNA PTA  Buttock pressure decub-healed   -air loss mattress  B LE venous insufficiency with prior cellulitis   -compression as tolerated  Anemia of chronic illness  DM2   -NPH to50 in am, decrease to 25 QHS   -premeal insulin   -RISS  HTN   -lisinopril, metoprolol   -norvasc, follow fluid retention  Morbid obesity  Hyponatremia, chronic  Intermittent diarrhea   -imodium prn  Post nasal drip   -flonase, clariten  Debility   -PT/OT       Quality-Core Measures   Reviewed items::  Medications reviewed and Labs reviewed  Singh catheter::  No Singh  DVT prophylaxis pharmacological::  Heparin

## 2018-07-07 LAB
GLUCOSE BLD-MCNC: 176 MG/DL (ref 65–99)
GLUCOSE BLD-MCNC: 188 MG/DL (ref 65–99)
GLUCOSE BLD-MCNC: 258 MG/DL (ref 65–99)

## 2018-07-07 PROCEDURE — 82962 GLUCOSE BLOOD TEST: CPT | Mod: 91

## 2018-07-07 ASSESSMENT — ENCOUNTER SYMPTOMS
CONSTIPATION: 0
DIARRHEA: 0
SHORTNESS OF BREATH: 0
FEVER: 0
NAUSEA: 0
CHILLS: 0
ABDOMINAL PAIN: 0
SORE THROAT: 0
COUGH: 0
VOMITING: 0
HEADACHES: 0

## 2018-07-07 NOTE — [facility]
Hospital Medicine Progress Note, Adult, Complex               Author: Georgina HEVER Muniz Date & Time created: 7/7/2018  10:06 AM     CC: aftercare, debility from prolonged hospitalization for MM    Interval History:  Took pain medications overnight, slept well  No SOB, CP  Nausea  Last night, now resolved    Review of Systems:  Review of Systems   Constitutional: Negative for chills and fever.   HENT: Negative for sore throat.    Respiratory: Negative for cough and shortness of breath.    Cardiovascular: Negative for chest pain and leg swelling.   Gastrointestinal: Negative for abdominal pain, constipation, diarrhea, nausea and vomiting.   Genitourinary: Negative for dysuria.   Musculoskeletal:        Bone pain   Skin: Negative for rash.   Neurological: Negative for headaches.       T 97.6J76SY098/91YS70NgQ0 95% 2L I/O1.3/brp noBM  Physical Exam   Constitutional: She is oriented to person, place, and time. She appears well-developed. No distress.   HENT:   Head: Normocephalic and atraumatic.   Mouth/Throat: No oropharyngeal exudate.   Eyes: Conjunctivae are normal. Right eye exhibits no discharge. Left eye exhibits no discharge. No scleral icterus.   Neck: Neck supple. No tracheal deviation present.   Cardiovascular: Normal rate, regular rhythm and intact distal pulses.    No murmur heard.  distant   Pulmonary/Chest: Effort normal. No respiratory distress. She has no wheezes. She exhibits no tenderness.   diminished   Abdominal: Soft. Bowel sounds are normal. She exhibits no distension. There is no tenderness.   obese   Musculoskeletal: She exhibits no edema.   Neurological: She is alert and oriented to person, place, and time.   Skin: Skin is warm.   Vitals reviewed.      Labs:        Invalid input(s): [id number]      Recent Labs      07/06/18   0330   SODIUM  133*   POTASSIUM  4.8   CHLORIDE  100   CO2  26   BUN  33*   CREATININE  0.95   CALCIUM  10.3*     Recent Labs      07/06/18   0330   ALTSGPT  14   ASTSGOT   17   ALKPHOSPHAT  44   TBILIRUBIN  0.4   GLUCOSE  86     Recent Labs      07/06/18   0330   RBC  2.71*   HEMOGLOBIN  9.1*   HEMATOCRIT  27.2*   PLATELETCT  145*     Recent Labs      07/06/18   0330   WBC  6.0   ASTSGOT  17   ALTSGPT  14   ALKPHOSPHAT  44   TBILIRUBIN  0.4          GI/Nutrition:  Orders Placed This Encounter   Procedures   • Diet Order Diabetic     Standing Status:   Standing     Number of Occurrences:   1     Order Specific Question:   Diet:     Answer:   Diabetic [3]     Order Specific Question:   Texture/Fiber modifications:     Answer:   Dysphagia 3(Mechanical Soft)specify fluid consistency(question 6) [3]     Order Specific Question:   Consistency/Fluid modifications:     Answer:   Thin Liquids [3]     Medical Decision Making, by Problem:  MM IgG kappa stage 3   -s/p 2 cycles CyBorD, not tolerated well per notes in epic   -oral regimen ninlaro, revilumid, dexamethasone per onc began 6/19   -will f/u with Dr. Sim for monitoring requests  Mild thrombocytopenia   -secondary to chemo   -follow  L pathologic humerus fx and B femur palliative radiation   -multiple lytic lesions on imaging  Tx RLL PNA PTA  Buttock pressure decub-healed   -air loss mattress  B LE venous insufficiency with prior cellulitis   -compression as tolerated  Anemia of chronic illness  DM2   -NPH to50 in am, decrease to 25 QHS   -premeal insulin   -RISS  HTN   -lisinopril, metoprolol   -norvasc, follow fluid retention  Morbid obesity  Hyponatremia, chronic  Intermittent diarrhea   -imodium prn  Post nasal drip   -flonase, clariten  Debility   -PT/OT       Quality-Core Measures   Reviewed items::  Medications reviewed and Labs reviewed  Singh catheter::  No Singh  DVT prophylaxis pharmacological::  Heparin

## 2018-07-08 PROCEDURE — 82962 GLUCOSE BLOOD TEST: CPT | Mod: 91

## 2018-07-08 ASSESSMENT — ENCOUNTER SYMPTOMS
DIARRHEA: 0
FEVER: 0
VOMITING: 0
CONSTIPATION: 0
NAUSEA: 0
SHORTNESS OF BREATH: 0
ABDOMINAL PAIN: 0
SORE THROAT: 0
HEADACHES: 0
COUGH: 0

## 2018-07-08 NOTE — [facility]
Hospital Medicine Progress Note, Adult, Complex               Author: Georgina Muniz Date & Time created: 7/8/2018  9:42 AM     CC: aftercare, debility from prolonged hospitalization for MM    Interval History:  Bundled up, room is cold  No concerns  PT here today, hopeful for ambulation later    Review of Systems:  Review of Systems   Constitutional: Negative for fever.   HENT: Negative for sore throat.    Respiratory: Negative for cough and shortness of breath.    Cardiovascular: Negative for chest pain and leg swelling.   Gastrointestinal: Negative for abdominal pain, constipation, diarrhea, nausea and vomiting.   Genitourinary: Negative for dysuria.   Musculoskeletal:        Bone pain, better overnight   Skin: Negative for rash.   Neurological: Negative for headaches.       T 57A49KJ496/19XL68KeK9 94% 2L I/O1.3/brp 2BM  Physical Exam   Constitutional: She is oriented to person, place, and time. She appears well-developed. No distress.   HENT:   Head: Normocephalic and atraumatic.   Mouth/Throat: No oropharyngeal exudate.   Eyes: Conjunctivae are normal. Right eye exhibits no discharge. Left eye exhibits no discharge. No scleral icterus.   Neck: Neck supple. No tracheal deviation present.   Cardiovascular: Normal rate, regular rhythm and intact distal pulses.    No murmur heard.  distant   Pulmonary/Chest: Effort normal. No respiratory distress. She has no wheezes. She exhibits no tenderness.   diminished   Abdominal: Soft. Bowel sounds are normal. She exhibits no distension. There is no tenderness. There is no rebound.   obese   Musculoskeletal: She exhibits no edema.   Neurological: She is alert and oriented to person, place, and time.   Skin: Skin is warm.   Vitals reviewed.      Labs:        Invalid input(s): [id number]      Recent Labs      07/06/18   0330   SODIUM  133*   POTASSIUM  4.8   CHLORIDE  100   CO2  26   BUN  33*   CREATININE  0.95   CALCIUM  10.3*     Recent Labs      07/06/18   0330    ALTSGPT  14   ASTSGOT  17   ALKPHOSPHAT  44   TBILIRUBIN  0.4   GLUCOSE  86     Recent Labs      07/06/18   0330   RBC  2.71*   HEMOGLOBIN  9.1*   HEMATOCRIT  27.2*   PLATELETCT  145*     Recent Labs      07/06/18   0330   WBC  6.0   ASTSGOT  17   ALTSGPT  14   ALKPHOSPHAT  44   TBILIRUBIN  0.4          GI/Nutrition:  Orders Placed This Encounter   Procedures   • Diet Order Diabetic     Standing Status:   Standing     Number of Occurrences:   1     Order Specific Question:   Diet:     Answer:   Diabetic [3]     Order Specific Question:   Texture/Fiber modifications:     Answer:   Dysphagia 3(Mechanical Soft)specify fluid consistency(question 6) [3]     Order Specific Question:   Consistency/Fluid modifications:     Answer:   Thin Liquids [3]     Medical Decision Making, by Problem:  MM IgG kappa stage 3   -s/p 2 cycles CyBorD, not tolerated well per notes in epic   -oral regimen ninlaro, revilumid, dexamethasone per onc began 6/19   -will f/u with Dr. Sim for required monitoring  Mild thrombocytopenia   -suspect secondary to chemo   -follow up in am  L pathologic humerus fx and B femur palliative radiation   -multiple lytic lesions on imaging  Tx RLL PNA PTA  Buttock pressure decub-healed   -air loss mattress  B LE venous insufficiency with prior cellulitis   -compression as tolerated  Anemia of chronic illness  DM2   -NPH to55 in am, 25 QHS   -premeal insulin   -RISS  HTN   -lisinopril, metoprolol, norvasc  Morbid obesity  Hyponatremia, chronic  Intermittent diarrhea   -imodium prn  Post nasal drip   -flonase, clariten  Debility   -PT/OT       Quality-Core Measures   Reviewed items::  Medications reviewed  Singh catheter::  No Singh  DVT prophylaxis pharmacological::  Heparin

## 2018-07-09 LAB
ERYTHROCYTE [DISTWIDTH] IN BLOOD BY AUTOMATED COUNT: 71.2 FL (ref 35.9–50)
GLUCOSE BLD-MCNC: 170 MG/DL (ref 65–99)
GLUCOSE BLD-MCNC: 195 MG/DL (ref 65–99)
GLUCOSE BLD-MCNC: 250 MG/DL (ref 65–99)
GLUCOSE BLD-MCNC: 93 MG/DL (ref 65–99)
HCT VFR BLD AUTO: 26.7 % (ref 37–47)
HGB BLD-MCNC: 9 G/DL (ref 12–16)
MCH RBC QN AUTO: 34.5 PG (ref 27–33)
MCHC RBC AUTO-ENTMCNC: 33.7 G/DL (ref 33.6–35)
MCV RBC AUTO: 102.3 FL (ref 81.4–97.8)
PLATELET # BLD AUTO: 149 K/UL (ref 164–446)
PMV BLD AUTO: 12.6 FL (ref 9–12.9)
RBC # BLD AUTO: 2.61 M/UL (ref 4.2–5.4)
WBC # BLD AUTO: 5 K/UL (ref 4.8–10.8)

## 2018-07-09 PROCEDURE — 82962 GLUCOSE BLOOD TEST: CPT | Mod: 91

## 2018-07-09 PROCEDURE — 85027 COMPLETE CBC AUTOMATED: CPT

## 2018-07-09 ASSESSMENT — ENCOUNTER SYMPTOMS
HEADACHES: 0
SHORTNESS OF BREATH: 0
DIARRHEA: 0
NAUSEA: 0
PHOTOPHOBIA: 0
VOMITING: 0
SORE THROAT: 0
FEVER: 0
COUGH: 0
ABDOMINAL PAIN: 0

## 2018-07-09 NOTE — [facility]
Hospital Medicine Progress Note, Adult, Complex               Author: Georgina KATHLEEN Mustang Date & Time created: 7/9/2018  10:49 AM     CC: aftercare, debility from prolonged hospitalization for MM    Interval History:  Out in HW on tablet  Took last dose revilumid today, awaiting pharmacy to mail  Left message for Dr. Sim, awaiting return call    Review of Systems:  Review of Systems   Constitutional: Negative for fever.   HENT: Negative for sore throat.    Eyes: Negative for photophobia.   Respiratory: Negative for cough and shortness of breath.    Cardiovascular: Negative for chest pain.   Gastrointestinal: Negative for abdominal pain, diarrhea, nausea and vomiting.   Genitourinary: Negative for dysuria.   Musculoskeletal:        Bone pain   Skin: Negative for rash.   Neurological: Negative for headaches.       T [id number] 93% 2L I/O1.5/brp 2BM  Physical Exam   Constitutional: She is oriented to person, place, and time. She appears well-developed. No distress.   HENT:   Head: Normocephalic and atraumatic.   Mouth/Throat: No oropharyngeal exudate.   Eyes: Conjunctivae are normal. Right eye exhibits no discharge. Left eye exhibits no discharge. No scleral icterus.   Neck: Neck supple. No tracheal deviation present.   Cardiovascular: Normal rate, regular rhythm and intact distal pulses.    No murmur heard.  distant   Pulmonary/Chest: Effort normal. No respiratory distress. She exhibits no tenderness.   diminished   Abdominal: Soft. Bowel sounds are normal. She exhibits no distension. There is no tenderness.   obese   Musculoskeletal: She exhibits no edema.   Neurological: She is alert and oriented to person, place, and time.   Skin: Skin is warm.   Vitals reviewed.      Labs:        Invalid input(s): [id number]      No results for input(s): SODIUM, POTASSIUM, CHLORIDE, CO2, BUN, CREATININE, MAGNESIUM, PHOSPHORUS, CALCIUM in the last 72 hours.  No results for input(s): ALTSGPT, ASTSGOT, ALKPHOSPHAT,  TBILIRUBIN, DBILIRUBIN, GAMMAGT, AMYLASE, LIPASE, ALB, PREALBUMIN, GLUCOSE in the last 72 hours.  Recent Labs      07/09/18   0047   RBC  2.61*   HEMOGLOBIN  9.0*   HEMATOCRIT  26.7*   PLATELETCT  149*     Recent Labs      07/09/18   0047   WBC  5.0          GI/Nutrition:  Orders Placed This Encounter   Procedures   • Diet Order Diabetic     Standing Status:   Standing     Number of Occurrences:   1     Order Specific Question:   Diet:     Answer:   Diabetic [3]     Order Specific Question:   Texture/Fiber modifications:     Answer:   Dysphagia 3(Mechanical Soft)specify fluid consistency(question 6) [3]     Order Specific Question:   Consistency/Fluid modifications:     Answer:   Thin Liquids [3]     Medical Decision Making, by Problem:  MM IgG kappa stage 3   -s/p 2 cycles CyBorD, not tolerated well per notes in epic   -oral regimen ninlaro, revilumid, dexamethasone per onc began 6/19, need revilumid supply for ongoing use   -await discussion with Dr. Smi for required monitoring  Mild thrombocytopenia   -suspect secondary to chemo  L pathologic humerus fx and B femur palliative radiation   -multiple lytic lesions on imaging  Tx RLL PNA PTA  Buttock pressure decub-healed   -air loss mattress  B LE venous insufficiency with prior cellulitis   -compression as tolerated  Anemia of chronic illness  DM2   -NPH 55 in am, 25 QHS   -premeal insulin   -RISS  HTN   -lisinopril, metoprolol, norvasc  Morbid obesity  Hyponatremia, chronic  Intermittent diarrhea   -imodium prn  Post nasal drip   -flonase, clariten  Debility   -PT/OT       Quality-Core Measures   Reviewed items::  Medications reviewed and Labs reviewed  Singh catheter::  No Singh  DVT prophylaxis pharmacological::  Heparin

## 2018-07-10 LAB
GLUCOSE BLD-MCNC: 178 MG/DL (ref 65–99)
GLUCOSE BLD-MCNC: 185 MG/DL (ref 65–99)
GLUCOSE BLD-MCNC: 189 MG/DL (ref 65–99)
GLUCOSE BLD-MCNC: 222 MG/DL (ref 65–99)
GLUCOSE BLD-MCNC: 230 MG/DL (ref 65–99)
GLUCOSE BLD-MCNC: 301 MG/DL (ref 65–99)
GLUCOSE BLD-MCNC: 97 MG/DL (ref 65–99)
GLUCOSE BLD-MCNC: 99 MG/DL (ref 65–99)

## 2018-07-10 PROCEDURE — 82962 GLUCOSE BLOOD TEST: CPT

## 2018-07-10 ASSESSMENT — ENCOUNTER SYMPTOMS
ABDOMINAL PAIN: 0
FEVER: 0
COUGH: 0
SHORTNESS OF BREATH: 0
DIARRHEA: 0
DIZZINESS: 0
CONSTIPATION: 0
VOMITING: 0
BRUISES/BLEEDS EASILY: 0
CHILLS: 0

## 2018-07-10 NOTE — [facility]
Hospital Medicine Progress Note, Adult, Complex               Author: Zamary Isaac Date & Time created: 7/10/2018  9:02 AM     CC: multiple myeloma with debility after chemotherapy    Interval History:  She is sitting in a chair in the murphy and has improved strength but still very weak  She has tolerated revulimid and working to get a refill of the medication authorized through the pharmaceutical company      Review of Systems:  Review of Systems   Constitutional: Negative for chills and fever.        Appetite improved     Respiratory: Negative for cough and shortness of breath.    Cardiovascular: Positive for leg swelling. Negative for chest pain.   Gastrointestinal: Negative for abdominal pain, constipation, diarrhea and vomiting.   Genitourinary: Negative for dysuria and urgency.   Neurological: Negative for dizziness.   Endo/Heme/Allergies: Does not bruise/bleed easily.       Physical Exam:  Physical Exam   Constitutional: She is oriented to person, place, and time.   HENT:   Mouth/Throat: Oropharynx is clear and moist. No oropharyngeal exudate.   Eyes: No scleral icterus.   Neck: Neck supple.   Cardiovascular: Normal rate, regular rhythm and intact distal pulses.  PMI is displaced.    No murmur heard.  Pulmonary/Chest: Effort normal. She has no wheezes. She has no rales.   Abdominal: Soft. She exhibits no distension.   Large abdominal pannus   Musculoskeletal: She exhibits edema.   Neurological: She is alert and oriented to person, place, and time. Coordination normal.   Skin: Skin is dry. No rash noted. She is not diaphoretic.   Psychiatric: Her behavior is normal.   Nursing note and vitals reviewed.      Labs:        Invalid input(s): [id number]      No results for input(s): SODIUM, POTASSIUM, CHLORIDE, CO2, BUN, CREATININE, MAGNESIUM, PHOSPHORUS, CALCIUM in the last 72 hours.  No results for input(s): ALTSGPT, ASTSGOT, ALKPHOSPHAT, TBILIRUBIN, DBILIRUBIN, GAMMAGT, AMYLASE, LIPASE, ALB, PREALBUMIN,  GLUCOSE in the last 72 hours.  Recent Labs      07/09/18   0047   RBC  2.61*   HEMOGLOBIN  9.0*   HEMATOCRIT  26.7*   PLATELETCT  149*     Recent Labs      07/09/18   0047   WBC  5.0           Hemodynamics:   T98.5 /54 HR70 RR24 O2 sat 100% on room air     GI/Nutrition:  Orders Placed This Encounter   Procedures   • Diet Order Diabetic     Standing Status:   Standing     Number of Occurrences:   1     Order Specific Question:   Diet:     Answer:   Diabetic [3]     Order Specific Question:   Texture/Fiber modifications:     Answer:   Dysphagia 3(Mechanical Soft)specify fluid consistency(question 6) [3]     Order Specific Question:   Consistency/Fluid modifications:     Answer:   Thin Liquids [3]     Medical Decision Making, by Problem:  Multiple myeloma 1P deletion intermediate risk, IgG kappa.  Stage III based on ISS staging             treated with  2 cycles CyBorD   oral regimen ninlaro, revilumid, dexamethasone per oncology, revilumid refill pending   Continue physical therapy    Neutropenia induced by chemotherapy, resolved    Anemia, iron ordered    L pathologic humerus fx and B femur palliative radiation   Due to lytic lesions, physical therapy     Acute metabolic encephalopathy, due to pain medication     resolved    Buttock pressure decubitus ulcer              Skin care   Bilateral lower extremity venous insufficiency, compression stockings as tolerated   Had worsening edema previously on norvasc  Type II diabetes mellitus             NPH, metformin and sliding scale insulin    Seasonal allergies with congestion   flonase   zyrtec    HTN   Lisinopril, and metoprolol     Morbid obesity, BMI over 60, patient reports loosing almost 100 pounds over the last 6 months    encourage healthy weight loss after discharge    Metabolic alkalosis    resolved    DNR      Quality-Core Measures   Reviewed items::  Labs reviewed and Medications reviewed  Singh catheter::  Urinary Tract Retention or Urinary Tract  Obstruction  DVT prophylaxis pharmacological::  Contraindicated - High bleeding risk  Ulcer Prophylaxis::  Not indicated  Assessed for rehabilitation services:  Patient was assess for and/or received rehabilitation services during this hospitalization

## 2018-07-11 LAB
GLUCOSE BLD-MCNC: 164 MG/DL (ref 65–99)
GLUCOSE BLD-MCNC: 192 MG/DL (ref 65–99)
GLUCOSE BLD-MCNC: 215 MG/DL (ref 65–99)
GLUCOSE BLD-MCNC: 98 MG/DL (ref 65–99)

## 2018-07-11 PROCEDURE — 82962 GLUCOSE BLOOD TEST: CPT

## 2018-07-11 ASSESSMENT — ENCOUNTER SYMPTOMS
SHORTNESS OF BREATH: 0
CONSTIPATION: 0
DIARRHEA: 0
DIAPHORESIS: 0
VOMITING: 0
PALPITATIONS: 0
DIZZINESS: 0
FEVER: 0
NAUSEA: 0
BRUISES/BLEEDS EASILY: 0
COUGH: 0

## 2018-07-11 NOTE — [facility]
Hospital Medicine Progress Note, Adult, Complex               Author: Za Isaac Date & Time created: 7/11/2018  2:12 PM     CC: multiple myeloma with debility after chemotherapy    Interval History:  She is sitting in a chair in the murphy and has improved strength but still very weak  Patient has gotten approval for revlimid to be delivered and received a karissa to assist in the cost  She is feeling stronger and is able to do more with therapies      Review of Systems:  Review of Systems   Constitutional: Negative for diaphoresis and fever.        Appetite improved patient eating well  Strength improving     Respiratory: Negative for cough and shortness of breath.    Cardiovascular: Positive for leg swelling. Negative for chest pain and palpitations.   Gastrointestinal: Negative for constipation, diarrhea, nausea and vomiting.   Genitourinary: Negative for dysuria and hematuria.   Neurological: Negative for dizziness.   Endo/Heme/Allergies: Does not bruise/bleed easily.       Physical Exam:  Physical Exam   Constitutional: She is oriented to person, place, and time.   HENT:   Mouth/Throat: No oropharyngeal exudate.   Eyes: Conjunctivae are normal. No scleral icterus.   Neck: Neck supple.   Cardiovascular: Normal rate, regular rhythm and intact distal pulses.  PMI is displaced.    No murmur heard.  Pulmonary/Chest: Effort normal. She has no wheezes. She has no rales.   Abdominal: Soft. Bowel sounds are normal. She exhibits no distension.   Large abdominal pannus   Musculoskeletal: She exhibits edema.   Neurological: She is alert and oriented to person, place, and time. Coordination normal.   Skin: Skin is warm and dry. She is not diaphoretic.   Psychiatric: Her behavior is normal.   Nursing note and vitals reviewed.      Labs:        Invalid input(s): [id number]      No results for input(s): SODIUM, POTASSIUM, CHLORIDE, CO2, BUN, CREATININE, MAGNESIUM, PHOSPHORUS, CALCIUM in the last 72 hours.  No results for  input(s): ALTSGPT, ASTSGOT, ALKPHOSPHAT, TBILIRUBIN, DBILIRUBIN, GAMMAGT, AMYLASE, LIPASE, ALB, PREALBUMIN, GLUCOSE in the last 72 hours.  Recent Labs      07/09/18   0047   RBC  2.61*   HEMOGLOBIN  9.0*   HEMATOCRIT  26.7*   PLATELETCT  149*     Recent Labs      07/09/18   0047   WBC  5.0           Hemodynamics:   T97.9 /67 HR74 RR17 O2 sat 94% on room air     GI/Nutrition:  Orders Placed This Encounter   Procedures   • Diet Order Diabetic     Standing Status:   Standing     Number of Occurrences:   1     Order Specific Question:   Diet:     Answer:   Diabetic [3]     Order Specific Question:   Texture/Fiber modifications:     Answer:   Dysphagia 3(Mechanical Soft)specify fluid consistency(question 6) [3]     Order Specific Question:   Consistency/Fluid modifications:     Answer:   Thin Liquids [3]     Medical Decision Making, by Problem:  Multiple myeloma 1P deletion intermediate risk, IgG kappa.  Stage III based on ISS staging             treated with  2 cycles CyBorD   oral regimen ninlaro, revilumid, dexamethasone per oncology, revilumid to be delivered   Continue therapies   Check spep and B12 levels, will need urine light protein chain level and spep every 2 cycles to monitor disease response to suppressive therapy    Neutropenia induced by chemotherapy, resolved    Anemia, iron replacement    L pathologic humerus fx and B femur palliative radiation   Due to lytic lesions, physical therapy    Pain much improved, can improve mobility as tolerated    Acute metabolic encephalopathy, due to pain medication     resolved    Buttock pressure decubitus ulcer              Skin care   Bilateral lower extremity venous insufficiency, compression stockings   Type II diabetes mellitus             NPH, metformin and sliding scale insulin    Seasonal allergies with congestion   flonase   zyrtec    HTN   Lisinopril, and metoprolol     Morbid obesity, BMI over 60, patient reports loosing almost 100 pounds over the last 6  months    encourage healthy weight loss after discharge    Metabolic alkalosis    resolved    DNR      Quality-Core Measures   Reviewed items::  Labs reviewed and Medications reviewed  Singh catheter::  Urinary Tract Retention or Urinary Tract Obstruction  DVT prophylaxis pharmacological::  Contraindicated - High bleeding risk  Ulcer Prophylaxis::  Not indicated  Assessed for rehabilitation services:  Patient was assess for and/or received rehabilitation services during this hospitalization

## 2018-07-12 LAB
GLUCOSE BLD-MCNC: 183 MG/DL (ref 65–99)
GLUCOSE BLD-MCNC: 230 MG/DL (ref 65–99)
GLUCOSE BLD-MCNC: 237 MG/DL (ref 65–99)
GLUCOSE BLD-MCNC: 92 MG/DL (ref 65–99)
VIT B12 SERPL-MCNC: 172 PG/ML (ref 211–911)

## 2018-07-12 PROCEDURE — 82607 VITAMIN B-12: CPT

## 2018-07-12 PROCEDURE — 84160 ASSAY OF PROTEIN ANY SOURCE: CPT

## 2018-07-12 PROCEDURE — 84165 PROTEIN E-PHORESIS SERUM: CPT

## 2018-07-12 PROCEDURE — 82962 GLUCOSE BLOOD TEST: CPT | Mod: 91

## 2018-07-12 ASSESSMENT — ENCOUNTER SYMPTOMS
CHILLS: 0
BRUISES/BLEEDS EASILY: 0
CONSTIPATION: 0
NAUSEA: 0
DIZZINESS: 0
FEVER: 0
DIARRHEA: 0
COUGH: 0
WEAKNESS: 0
HEARTBURN: 0
SHORTNESS OF BREATH: 0

## 2018-07-12 NOTE — [facility]
Hospital Medicine Progress Note, Adult, Complex               Author: Za Isaac Date & Time created: 7/12/2018  8:11 AM     CC: multiple myeloma with debility after chemotherapy    Interval History:  She is sitting in a chair in the murphy and has improved strength but still very weak  revlimid delivery pending  The patient has improved strength and appetite      Review of Systems:  Review of Systems   Constitutional: Negative for chills and fever.        Appetite improved patient eating well  Strength improving     Respiratory: Negative for cough and shortness of breath.    Cardiovascular: Positive for leg swelling. Negative for chest pain.   Gastrointestinal: Negative for constipation, diarrhea, heartburn and nausea.   Genitourinary: Negative for frequency and urgency.   Skin: Negative for rash.   Neurological: Negative for dizziness and weakness.   Endo/Heme/Allergies: Does not bruise/bleed easily.       Physical Exam:  Physical Exam   Constitutional: She is oriented to person, place, and time.   HENT:   Mouth/Throat: Oropharynx is clear and moist. No oropharyngeal exudate.   Eyes: No scleral icterus.   Neck: Neck supple.   Cardiovascular: Normal rate, regular rhythm and intact distal pulses.  PMI is displaced.    No murmur heard.  Pulmonary/Chest: Effort normal and breath sounds normal.   Abdominal: Soft. She exhibits no distension.   Large abdominal pannus   Musculoskeletal: She exhibits edema.   Neurological: She is alert and oriented to person, place, and time. Coordination normal.   Skin: Skin is warm. No rash noted. She is not diaphoretic.   Psychiatric: Her behavior is normal.   Nursing note and vitals reviewed.      Labs:        Invalid input(s): [id number]      No results for input(s): SODIUM, POTASSIUM, CHLORIDE, CO2, BUN, CREATININE, MAGNESIUM, PHOSPHORUS, CALCIUM in the last 72 hours.  No results for input(s): ALTSGPT, ASTSGOT, ALKPHOSPHAT, TBILIRUBIN, DBILIRUBIN, GAMMAGT, AMYLASE, LIPASE, ALB,  PREALBUMIN, GLUCOSE in the last 72 hours.  No results for input(s): RBC, HEMOGLOBIN, HEMATOCRIT, PLATELETCT, PROTHROMBTM, APTT, INR, IRON, FERRITIN, TOTIRONBC in the last 72 hours.            Hemodynamics:   T98.9 /58 HR85 RR18 O2 sat 95% on room air     GI/Nutrition:  Orders Placed This Encounter   Procedures   • Diet Order Diabetic     Standing Status:   Standing     Number of Occurrences:   1     Order Specific Question:   Diet:     Answer:   Diabetic [3]     Order Specific Question:   Texture/Fiber modifications:     Answer:   Dysphagia 3(Mechanical Soft)specify fluid consistency(question 6) [3]     Order Specific Question:   Consistency/Fluid modifications:     Answer:   Thin Liquids [3]     Medical Decision Making, by Problem:  Multiple myeloma 1P deletion intermediate risk, IgG kappa.  Stage III based on ISS staging             treated with  2 cycles CyBorD   oral regimen ninlaro, revilumid, dexamethasone per oncology   Continue therapies   Check spep and B12 levels, will need urine light protein chain level and spep every 2 cycles to monitor disease response to suppressive therapy per Dr. Sim recommendations    Neutropenia induced by chemotherapy, resolved    Anemia, iron replacement    L pathologic humerus fx and B femur palliative radiation   Due to lytic lesions, physical therapy    Pain much improved, mobilize as tolerated    Acute metabolic encephalopathy, due to pain medication     resolved    Buttock pressure decubitus ulcer              Skin care   Bilateral lower extremity venous insufficiency, compression stockings   Type II diabetes mellitus             NPH, metformin and sliding scale insulin    Seasonal allergies with congestion   flonase   zyrtec    HTN   Lisinopril, and metoprolol     Morbid obesity, BMI over 60, patient reports loosing almost 100 pounds over the last 6 months    encourage healthy weight loss after discharge    Metabolic alkalosis    resolved    DNR      Quality-Core  Measures   Reviewed items::  Labs reviewed and Medications reviewed  Singh catheter::  Urinary Tract Retention or Urinary Tract Obstruction  DVT prophylaxis pharmacological::  Contraindicated - High bleeding risk  Ulcer Prophylaxis::  Not indicated  Assessed for rehabilitation services:  Patient was assess for and/or received rehabilitation services during this hospitalization       Alert and oriented to person, place, time/situation. normal mood and affect. no apparent risk to self or others.

## 2018-07-13 PROCEDURE — 99239 HOSP IP/OBS DSCHRG MGMT >30: CPT | Performed by: INTERNAL MEDICINE

## 2018-07-13 PROCEDURE — 82962 GLUCOSE BLOOD TEST: CPT

## 2018-07-14 LAB
ALBUMIN SERPL-MCNC: 2.78 G/DL (ref 3.75–5.01)
ALPHA1 GLOB SERPL ELPH-MCNC: 0.34 G/DL (ref 0.19–0.46)
ALPHA2 GLOB SERPL ELPH-MCNC: 1.06 G/DL (ref 0.48–1.05)
B-GLOBULIN SERPL ELPH-MCNC: 0.56 G/DL (ref 0.48–1.1)
EER PROT ELECT SER Q1092: ABNORMAL
GAMMA GLOB SERPL ELPH-MCNC: 1.37 G/DL (ref 0.62–1.51)
GLUCOSE BLD-MCNC: 178 MG/DL (ref 65–99)
GLUCOSE BLD-MCNC: 183 MG/DL (ref 65–99)
INTERPRETATION SERPL IFE-IMP: ABNORMAL
PROT SERPL-MCNC: 6.1 G/DL (ref 6–8.3)

## 2018-07-16 PROCEDURE — 302244 HCHG LTACH STAT

## 2018-08-16 ENCOUNTER — HOSPITAL ENCOUNTER (INPATIENT)
Facility: MEDICAL CENTER | Age: 68
LOS: 6 days | DRG: 841 | End: 2018-08-23
Attending: EMERGENCY MEDICINE | Admitting: HOSPITALIST
Payer: MEDICARE

## 2018-08-16 DIAGNOSIS — C90.00 MULTIPLE MYELOMA NOT HAVING ACHIEVED REMISSION (HCC): ICD-10-CM

## 2018-08-16 DIAGNOSIS — E66.01 MORBID OBESITY (HCC): ICD-10-CM

## 2018-08-16 DIAGNOSIS — D64.9 ANEMIA, UNSPECIFIED TYPE: ICD-10-CM

## 2018-08-16 PROBLEM — I82.409 DVT (DEEP VENOUS THROMBOSIS) (HCC): Status: ACTIVE | Noted: 2018-08-16

## 2018-08-16 LAB
ABO GROUP BLD: NORMAL
ALBUMIN SERPL BCP-MCNC: 1.9 G/DL (ref 3.2–4.9)
ALBUMIN/GLOB SERPL: 0.4 G/DL
ALP SERPL-CCNC: 64 U/L (ref 30–99)
ALT SERPL-CCNC: 10 U/L (ref 2–50)
ANION GAP SERPL CALC-SCNC: 12 MMOL/L (ref 0–11.9)
ANISOCYTOSIS BLD QL SMEAR: ABNORMAL
AST SERPL-CCNC: 12 U/L (ref 12–45)
BARCODED ABORH UBTYP: 6200
BARCODED PRD CODE UBPRD: NORMAL
BARCODED UNIT NUM UBUNT: NORMAL
BASO STIPL BLD QL SMEAR: NORMAL
BASOPHILS # BLD AUTO: 0 % (ref 0–1.8)
BASOPHILS # BLD: 0 K/UL (ref 0–0.12)
BILIRUB SERPL-MCNC: 0.9 MG/DL (ref 0.1–1.5)
BLD GP AB SCN SERPL QL: NORMAL
BUN SERPL-MCNC: 26 MG/DL (ref 8–22)
CALCIUM SERPL-MCNC: 9.4 MG/DL (ref 8.4–10.2)
CHLORIDE SERPL-SCNC: 101 MMOL/L (ref 96–112)
CO2 SERPL-SCNC: 20 MMOL/L (ref 20–33)
COMPONENT R 8504R: NORMAL
COMPONENT R 8504R: NORMAL
CREAT SERPL-MCNC: 1.36 MG/DL (ref 0.5–1.4)
EOSINOPHIL # BLD AUTO: 0.03 K/UL (ref 0–0.51)
EOSINOPHIL NFR BLD: 1 % (ref 0–6.9)
ERYTHROCYTE [DISTWIDTH] IN BLOOD BY AUTOMATED COUNT: 55 FL (ref 35.9–50)
GLOBULIN SER CALC-MCNC: 5 G/DL (ref 1.9–3.5)
GLUCOSE BLD-MCNC: 290 MG/DL (ref 65–99)
GLUCOSE BLD-MCNC: 349 MG/DL (ref 65–99)
GLUCOSE SERPL-MCNC: 261 MG/DL (ref 65–99)
HCT VFR BLD AUTO: 20.4 % (ref 37–47)
HGB BLD-MCNC: 6.7 G/DL (ref 12–16)
INR PPP: 1.23 (ref 0.87–1.13)
LYMPHOCYTES # BLD AUTO: 0.53 K/UL (ref 1–4.8)
LYMPHOCYTES NFR BLD: 16 % (ref 22–41)
MACROCYTES BLD QL SMEAR: ABNORMAL
MAGNESIUM SERPL-MCNC: 1.5 MG/DL (ref 1.5–2.5)
MANUAL DIFF BLD: ABNORMAL
MCH RBC QN AUTO: 34.5 PG (ref 27–33)
MCHC RBC AUTO-ENTMCNC: 32.8 G/DL (ref 33.6–35)
MCV RBC AUTO: 105.2 FL (ref 81.4–97.8)
METAMYELOCYTES NFR BLD MANUAL: 3 %
MONOCYTES # BLD AUTO: 0.26 K/UL (ref 0–0.85)
MONOCYTES NFR BLD AUTO: 8 % (ref 0–13.4)
NEUTROPHILS # BLD AUTO: 2.38 K/UL (ref 2–7.15)
NEUTROPHILS NFR BLD: 52 % (ref 44–72)
NEUTS BAND NFR BLD MANUAL: 20 % (ref 0–10)
NRBC # BLD AUTO: 0 K/UL
NRBC BLD-RTO: 0 /100 WBC
PLATELET # BLD AUTO: 165 K/UL (ref 164–446)
PLATELET BLD QL SMEAR: NORMAL
PMV BLD AUTO: 10.4 FL (ref 9–12.9)
POLYCHROMASIA BLD QL SMEAR: NORMAL
POTASSIUM SERPL-SCNC: 4.9 MMOL/L (ref 3.6–5.5)
PRODUCT TYPE UPROD: NORMAL
PROT SERPL-MCNC: 6.9 G/DL (ref 6–8.2)
PROTHROMBIN TIME: 15.4 SEC (ref 12–14.6)
RBC # BLD AUTO: 1.94 M/UL (ref 4.2–5.4)
RBC BLD AUTO: PRESENT
RH BLD: NORMAL
ROULEAUX BLD QL SMEAR: NORMAL
SODIUM SERPL-SCNC: 133 MMOL/L (ref 135–145)
UNIT STATUS USTAT: NORMAL
WBC # BLD AUTO: 3.3 K/UL (ref 4.8–10.8)

## 2018-08-16 PROCEDURE — 84156 ASSAY OF PROTEIN URINE: CPT

## 2018-08-16 PROCEDURE — 84160 ASSAY OF PROTEIN ANY SOURCE: CPT

## 2018-08-16 PROCEDURE — 86900 BLOOD TYPING SEROLOGIC ABO: CPT

## 2018-08-16 PROCEDURE — 85027 COMPLETE CBC AUTOMATED: CPT

## 2018-08-16 PROCEDURE — 86644 CMV ANTIBODY: CPT

## 2018-08-16 PROCEDURE — 85007 BL SMEAR W/DIFF WBC COUNT: CPT

## 2018-08-16 PROCEDURE — 302255 BARRIER CREAM MOISTURE BAZA PROTECT (ZINC) 5OZ: Performed by: HOSPITALIST

## 2018-08-16 PROCEDURE — 36430 TRANSFUSION BLD/BLD COMPNT: CPT

## 2018-08-16 PROCEDURE — 82784 ASSAY IGA/IGD/IGG/IGM EACH: CPT

## 2018-08-16 PROCEDURE — 700102 HCHG RX REV CODE 250 W/ 637 OVERRIDE(OP): Performed by: HOSPITALIST

## 2018-08-16 PROCEDURE — 82272 OCCULT BLD FECES 1-3 TESTS: CPT

## 2018-08-16 PROCEDURE — 30233N1 TRANSFUSION OF NONAUTOLOGOUS RED BLOOD CELLS INTO PERIPHERAL VEIN, PERCUTANEOUS APPROACH: ICD-10-PCS | Performed by: HOSPITALIST

## 2018-08-16 PROCEDURE — 700111 HCHG RX REV CODE 636 W/ 250 OVERRIDE (IP): Performed by: HOSPITALIST

## 2018-08-16 PROCEDURE — 700105 HCHG RX REV CODE 258: Performed by: HOSPITALIST

## 2018-08-16 PROCEDURE — G0378 HOSPITAL OBSERVATION PER HR: HCPCS

## 2018-08-16 PROCEDURE — 86334 IMMUNOFIX E-PHORESIS SERUM: CPT

## 2018-08-16 PROCEDURE — 86901 BLOOD TYPING SEROLOGIC RH(D): CPT

## 2018-08-16 PROCEDURE — 99285 EMERGENCY DEPT VISIT HI MDM: CPT

## 2018-08-16 PROCEDURE — A9270 NON-COVERED ITEM OR SERVICE: HCPCS | Performed by: HOSPITALIST

## 2018-08-16 PROCEDURE — P9016 RBC LEUKOCYTES REDUCED: HCPCS

## 2018-08-16 PROCEDURE — 86850 RBC ANTIBODY SCREEN: CPT

## 2018-08-16 PROCEDURE — 99220 PR INITIAL OBSERVATION CARE,LEVL III: CPT | Performed by: HOSPITALIST

## 2018-08-16 PROCEDURE — 86923 COMPATIBILITY TEST ELECTRIC: CPT | Mod: 91

## 2018-08-16 PROCEDURE — 85610 PROTHROMBIN TIME: CPT

## 2018-08-16 PROCEDURE — 84165 PROTEIN E-PHORESIS SERUM: CPT

## 2018-08-16 PROCEDURE — 83735 ASSAY OF MAGNESIUM: CPT

## 2018-08-16 PROCEDURE — 304561 HCHG STAT O2

## 2018-08-16 PROCEDURE — 82962 GLUCOSE BLOOD TEST: CPT | Mod: 91

## 2018-08-16 PROCEDURE — 80053 COMPREHEN METABOLIC PANEL: CPT

## 2018-08-16 RX ORDER — ASCORBIC ACID 500 MG
500 TABLET ORAL DAILY
Status: DISCONTINUED | OUTPATIENT
Start: 2018-08-16 | End: 2018-08-23 | Stop reason: HOSPADM

## 2018-08-16 RX ORDER — DULOXETIN HYDROCHLORIDE 20 MG/1
40 CAPSULE, DELAYED RELEASE ORAL DAILY
COMMUNITY

## 2018-08-16 RX ORDER — CETIRIZINE HYDROCHLORIDE 10 MG/1
10 TABLET ORAL DAILY
Status: DISCONTINUED | OUTPATIENT
Start: 2018-08-16 | End: 2018-08-23 | Stop reason: HOSPADM

## 2018-08-16 RX ORDER — LEVOFLOXACIN 750 MG/1
750 TABLET, FILM COATED ORAL
Status: SHIPPED | COMMUNITY
Start: 2018-08-15 | End: 2018-08-16

## 2018-08-16 RX ORDER — OMEPRAZOLE 20 MG/1
20 CAPSULE, DELAYED RELEASE ORAL DAILY
Status: DISCONTINUED | OUTPATIENT
Start: 2018-08-17 | End: 2018-08-23 | Stop reason: HOSPADM

## 2018-08-16 RX ORDER — DEXAMETHASONE 4 MG/1
2 TABLET ORAL
Status: ON HOLD | COMMUNITY
End: 2018-08-18

## 2018-08-16 RX ORDER — LENALIDOMIDE 25 MG/1
25 CAPSULE ORAL DAILY
Status: DISCONTINUED | OUTPATIENT
Start: 2018-08-16 | End: 2018-08-16

## 2018-08-16 RX ORDER — WARFARIN SODIUM 5 MG/1
5 TABLET ORAL DAILY
Status: DISCONTINUED | OUTPATIENT
Start: 2018-08-16 | End: 2018-08-16

## 2018-08-16 RX ORDER — FLUTICASONE PROPIONATE 50 MCG
2 SPRAY, SUSPENSION (ML) NASAL DAILY
Status: DISCONTINUED | OUTPATIENT
Start: 2018-08-16 | End: 2018-08-23 | Stop reason: HOSPADM

## 2018-08-16 RX ORDER — ASCORBIC ACID 500 MG
500 TABLET ORAL DAILY
COMMUNITY

## 2018-08-16 RX ORDER — OMEPRAZOLE 20 MG/1
20 CAPSULE, DELAYED RELEASE ORAL DAILY
COMMUNITY

## 2018-08-16 RX ORDER — SUCRALFATE 1 G/1
1 TABLET ORAL
COMMUNITY

## 2018-08-16 RX ORDER — DULOXETIN HYDROCHLORIDE 20 MG/1
40 CAPSULE, DELAYED RELEASE ORAL DAILY
Status: DISCONTINUED | OUTPATIENT
Start: 2018-08-16 | End: 2018-08-23 | Stop reason: HOSPADM

## 2018-08-16 RX ORDER — LENALIDOMIDE 25 MG/1
25 CAPSULE ORAL SEE ADMIN INSTRUCTIONS
Status: ON HOLD | COMMUNITY
End: 2018-10-07

## 2018-08-16 RX ORDER — WARFARIN SODIUM 3 MG/1
3 TABLET ORAL DAILY
COMMUNITY

## 2018-08-16 RX ORDER — CALCIUM CARBONATE 500 MG/1
1000 TABLET, CHEWABLE ORAL
COMMUNITY

## 2018-08-16 RX ORDER — DEXTROSE MONOHYDRATE 25 G/50ML
25 INJECTION, SOLUTION INTRAVENOUS
Status: DISCONTINUED | OUTPATIENT
Start: 2018-08-16 | End: 2018-08-23 | Stop reason: HOSPADM

## 2018-08-16 RX ORDER — ACETAMINOPHEN 325 MG/1
650 TABLET ORAL EVERY 6 HOURS PRN
Status: DISCONTINUED | OUTPATIENT
Start: 2018-08-16 | End: 2018-08-23 | Stop reason: HOSPADM

## 2018-08-16 RX ORDER — M-VIT,TX,IRON,MINS/CALC/FOLIC 27MG-0.4MG
1 TABLET ORAL DAILY
COMMUNITY

## 2018-08-16 RX ORDER — LORAZEPAM 0.5 MG/1
0.5 TABLET ORAL 2 TIMES DAILY
COMMUNITY

## 2018-08-16 RX ORDER — CETIRIZINE HYDROCHLORIDE 10 MG/1
10 TABLET ORAL DAILY
COMMUNITY

## 2018-08-16 RX ORDER — ACYCLOVIR 200 MG/1
400 CAPSULE ORAL 2 TIMES DAILY
Status: DISCONTINUED | OUTPATIENT
Start: 2018-08-16 | End: 2018-08-23 | Stop reason: HOSPADM

## 2018-08-16 RX ORDER — SODIUM CHLORIDE 9 MG/ML
INJECTION, SOLUTION INTRAVENOUS CONTINUOUS
Status: ACTIVE | OUTPATIENT
Start: 2018-08-16 | End: 2018-08-17

## 2018-08-16 RX ORDER — MAGNESIUM SULFATE HEPTAHYDRATE 40 MG/ML
2 INJECTION, SOLUTION INTRAVENOUS ONCE
Status: COMPLETED | OUTPATIENT
Start: 2018-08-16 | End: 2018-08-16

## 2018-08-16 RX ORDER — SUCRALFATE 1 G/1
1 TABLET ORAL
Status: DISCONTINUED | OUTPATIENT
Start: 2018-08-21 | End: 2018-08-23 | Stop reason: HOSPADM

## 2018-08-16 RX ORDER — WARFARIN SODIUM 5 MG/1
5 TABLET ORAL
Status: COMPLETED | OUTPATIENT
Start: 2018-08-16 | End: 2018-08-16

## 2018-08-16 RX ORDER — M-VIT,TX,IRON,MINS/CALC/FOLIC 27MG-0.4MG
1 TABLET ORAL DAILY
Status: DISCONTINUED | OUTPATIENT
Start: 2018-08-16 | End: 2018-08-23 | Stop reason: HOSPADM

## 2018-08-16 RX ORDER — PRAVASTATIN SODIUM 20 MG
40 TABLET ORAL DAILY
Status: DISCONTINUED | OUTPATIENT
Start: 2018-08-16 | End: 2018-08-23 | Stop reason: HOSPADM

## 2018-08-16 RX ORDER — FLUTICASONE PROPIONATE 50 MCG
2 SPRAY, SUSPENSION (ML) NASAL DAILY
COMMUNITY

## 2018-08-16 RX ORDER — CALCITRIOL 0.25 UG/1
0.25 CAPSULE, LIQUID FILLED ORAL DAILY
Status: DISCONTINUED | OUTPATIENT
Start: 2018-08-16 | End: 2018-08-23 | Stop reason: HOSPADM

## 2018-08-16 RX ADMIN — MULTIPLE VITAMINS W/ MINERALS TAB 1 TABLET: TAB at 16:54

## 2018-08-16 RX ADMIN — ACYCLOVIR 400 MG: 200 CAPSULE ORAL at 17:29

## 2018-08-16 RX ADMIN — CALCITRIOL 0.25 MCG: 0.25 CAPSULE, LIQUID FILLED ORAL at 16:53

## 2018-08-16 RX ADMIN — CETIRIZINE HYDROCHLORIDE 10 MG: 10 TABLET, FILM COATED ORAL at 16:54

## 2018-08-16 RX ADMIN — FLUTICASONE PROPIONATE 100 MCG: 50 SPRAY, METERED NASAL at 16:54

## 2018-08-16 RX ADMIN — ENOXAPARIN SODIUM 120 MG: 150 INJECTION SUBCUTANEOUS at 17:29

## 2018-08-16 RX ADMIN — SODIUM CHLORIDE: 9 INJECTION, SOLUTION INTRAVENOUS at 19:58

## 2018-08-16 RX ADMIN — INSULIN HUMAN 5 UNITS: 100 INJECTION, SOLUTION PARENTERAL at 17:25

## 2018-08-16 RX ADMIN — MAGNESIUM SULFATE HEPTAHYDRATE 2 G: 40 INJECTION, SOLUTION INTRAVENOUS at 19:54

## 2018-08-16 RX ADMIN — INSULIN HUMAN 6 UNITS: 100 INJECTION, SOLUTION PARENTERAL at 20:56

## 2018-08-16 RX ADMIN — DULOXETINE HYDROCHLORIDE 40 MG: 20 CAPSULE, DELAYED RELEASE ORAL at 16:54

## 2018-08-16 RX ADMIN — WARFARIN SODIUM 5 MG: 5 TABLET ORAL at 17:29

## 2018-08-16 RX ADMIN — METOPROLOL TARTRATE 25 MG: 25 TABLET ORAL at 17:29

## 2018-08-16 RX ADMIN — PRAVASTATIN SODIUM 40 MG: 20 TABLET ORAL at 16:54

## 2018-08-16 ASSESSMENT — COPD QUESTIONNAIRES
DO YOU EVER COUGH UP ANY MUCUS OR PHLEGM?: NO/ONLY WITH OCCASIONAL COLDS OR INFECTIONS
HAVE YOU SMOKED AT LEAST 100 CIGARETTES IN YOUR ENTIRE LIFE: NO/DON'T KNOW
COPD SCREENING SCORE: 2
DURING THE PAST 4 WEEKS HOW MUCH DID YOU FEEL SHORT OF BREATH: NONE/LITTLE OF THE TIME
IN THE PAST 12 MONTHS DO YOU DO LESS THAN YOU USED TO BECAUSE OF YOUR BREATHING PROBLEMS: DISAGREE/UNSURE

## 2018-08-16 ASSESSMENT — COGNITIVE AND FUNCTIONAL STATUS - GENERAL
STANDING UP FROM CHAIR USING ARMS: A LOT
PERSONAL GROOMING: A LITTLE
TOILETING: A LITTLE
CLIMB 3 TO 5 STEPS WITH RAILING: A LOT
MOVING FROM LYING ON BACK TO SITTING ON SIDE OF FLAT BED: A LITTLE
SUGGESTED CMS G CODE MODIFIER DAILY ACTIVITY: CK
DRESSING REGULAR UPPER BODY CLOTHING: A LITTLE
WALKING IN HOSPITAL ROOM: A LOT
MOVING TO AND FROM BED TO CHAIR: A LITTLE
HELP NEEDED FOR BATHING: A LOT
MOBILITY SCORE: 15
TURNING FROM BACK TO SIDE WHILE IN FLAT BAD: A LITTLE
SUGGESTED CMS G CODE MODIFIER MOBILITY: CK
DRESSING REGULAR LOWER BODY CLOTHING: A LITTLE
DAILY ACTIVITIY SCORE: 18

## 2018-08-16 ASSESSMENT — ENCOUNTER SYMPTOMS
COUGH: 0
BLOOD IN STOOL: 0
NAUSEA: 0
CHILLS: 0
PHOTOPHOBIA: 0
NECK PAIN: 0
CLAUDICATION: 0
EYE PAIN: 0
BACK PAIN: 0
PALPITATIONS: 0
HEMOPTYSIS: 0
DEPRESSION: 0
SORE THROAT: 0
SPEECH CHANGE: 0
CONSTIPATION: 0
SENSORY CHANGE: 0
HEADACHES: 0
TINGLING: 0
WEAKNESS: 1
PND: 0
SHORTNESS OF BREATH: 0
DIZZINESS: 0
MYALGIAS: 0
MEMORY LOSS: 0
TREMORS: 0
VOMITING: 0
SPUTUM PRODUCTION: 0
DOUBLE VISION: 0
NERVOUS/ANXIOUS: 1
STRIDOR: 0
HEARTBURN: 0
BLURRED VISION: 0
ORTHOPNEA: 0
FEVER: 0

## 2018-08-16 ASSESSMENT — LIFESTYLE VARIABLES
EVER_SMOKED: NEVER
ALCOHOL_USE: NO

## 2018-08-16 ASSESSMENT — PAIN SCALES - GENERAL
PAINLEVEL_OUTOF10: 5
PAINLEVEL_OUTOF10: 0
PAINLEVEL_OUTOF10: 7
PAINLEVEL_OUTOF10: 0

## 2018-08-16 ASSESSMENT — PATIENT HEALTH QUESTIONNAIRE - PHQ9
1. LITTLE INTEREST OR PLEASURE IN DOING THINGS: NOT AT ALL
SUM OF ALL RESPONSES TO PHQ9 QUESTIONS 1 AND 2: 0

## 2018-08-16 NOTE — PROGRESS NOTES
Inpatient Anticoagulation Service Note    Date: 8/16/2018  Reason for Anticoagulation: Deep Vein Thrombosis        Hemoglobin Value: (!) 6.7  Hematocrit Value: (!) 20.4  Lab Platelet Value: 165  Target INR: 2.0 to 3.0    INR from last 7 days     Date/Time INR Value    08/16/18 1135 (!)  1.23        Dose from last 7 days     Date/Time Dose (mg)    08/16/18 1400  5        Significant Interactions: Proton Pump Inhibitor, Statin (If less than 5 days and overlap therapy discontinued -- document reason (i.e. Bleed Risk))    (If still on overlap therapy, if No -- document reason (i.e. Bleed Risk))         Plan:  Will give warfarin 5 mg po tonight for INR of 1.23  Bridge with lovenox 120 mg SQ bid.  Education Material Provided?: No  Pharmacist suggested discharge dosing: To be determined.     Rick Patrick [facility]

## 2018-08-16 NOTE — ASSESSMENT & PLAN NOTE
Hgb 6.7 on admission  secondary to multiple myeloma  2 units PRBCs given on admission, hemoglobin remains stable improved now up to 9.  Continue to monitor, follow up with oncology after discharge.

## 2018-08-16 NOTE — H&P
Hospital Medicine History and Physical    Date of Service  8/16/2018    Chief Complaint  Chief Complaint   Patient presents with   • Abnormal Labs     K+ 6.0, Hgb 7.3.  Chronic anemia.  Denies black/bloody stools.    • Sent by MD     From Health system       History of Presenting Illness  67 y.o. female with a past medical history of Multiple myeloma , patient has been on chemotherapy CyBorD 2 cycles in the past,presented 8/16/2018 with 3 year for evaluation of abnormal labs including hyperkalemia with potassium of 6.0 in addition to a hemoglobin of 7.3. Patient otherwise denies having any chest pain abdominal pain denies having any melena or hematochezia. She was in [facility] last month and then was transferred to [facility], thus patient was transferred from [facility] today. At this point we discussed case with  who recommended transfusion as well as to perform further diagnostic studies and terms of finding her status of her multiple myeloma.       Primary Care Physician  Charli Worthington D.O.    Consultants  [facility]    Code Status  Code: Full code    Review of Systems  Review of Systems   Constitutional: Positive for malaise/fatigue. Negative for chills and fever.   HENT: Negative for congestion, hearing loss, sore throat and tinnitus.    Eyes: Negative for blurred vision, double vision, photophobia and pain.   Respiratory: Negative for cough, hemoptysis, sputum production, shortness of breath and stridor.    Cardiovascular: Negative for chest pain, palpitations, orthopnea, claudication and PND.   Gastrointestinal: Negative for blood in stool, constipation, heartburn, melena, nausea and vomiting.   Genitourinary: Negative for dysuria, frequency and urgency.   Musculoskeletal: Negative for back pain, myalgias and neck pain.   Neurological: Positive for weakness. Negative for dizziness, tingling, tremors, sensory change, speech change and headaches.   Psychiatric/Behavioral: Negative for depression,  memory loss and suicidal ideas. The patient is nervous/anxious.           Past Medical History  Past Medical History:   Diagnosis Date   • Diabetes (HCC) 03/12/2018    States not good at checking glucose. Avg AM glucose .   • Arthritis 03/12/2018    To fingers and right knee.   • Bowel habit changes 03/12/2018    diarrhea related to diet.   • Heart burn 03/12/2018    Related to diet. Tums PRN.   • Pain 03/12/2018    Right knee and shin. Left arm.   • Cellulitis 02/18/2018    Bilateral lower extremities   • UTI (urinary tract infection) 02/2018    Took biaxin.   • Anemia 02/2018   • Cancer (HCC) 12/12/2017    Myeloma in bone marrow after lesions found in right knee.   • Cataract 2017    Bilateral phaco with IOL   • Essential hypertension, benign 7/26/2013   • Other and unspecified hyperlipidemia 7/26/2013   • Pneumonia 2000   • Bronchitis 12/21/17-3/6/18    x3. 3/12/18-States has productive cough that has been improving. CXR done @ [facility].  HX of chronic bronchitis.    • High cholesterol    • Multiple myeloma (HCC)    • Urinary bladder disorder 12/2017-1/2017    Had nava catheter        Surgical History  Past Surgical History:   Procedure Laterality Date   • BONE BIOPSY Left 3/13/2018    Procedure: BONE BIOPSY - PROXIMAL HUMERUS;  Surgeon: Hany Forbes M.D.;  Location: SURGERY [facility];  Service: Orthopedics   • JOINT INJECTION DIAGNOSTIC Right 3/13/2018    Procedure: JOINT INJECTION DIAGNOSTIC - STEROID, KNEE INTRA-ARTICULAR;  Surgeon: Hany Forbes M.D.;  Location: SURGERY [facility];  Service: Orthopedics   • CATARACT PHACO WITH IOL Bilateral 2017   • COLONOSCOPY  1990'S       Medications  No current facility-administered medications on file prior to encounter.      Current Outpatient Prescriptions on File Prior to Encounter   Medication Sig Dispense Refill   • acyclovir (ZOVIRAX) 200 MG Cap Take 2 Caps by mouth 2 Times a Day.  0   • calcitRIOL (ROCALTROL) 0.25 MCG Cap Take 1 Cap by  mouth every day. 30 Cap    • pravastatin (PRAVACHOL) 40 MG tablet Take 1 Tab by mouth every day. 90 Tab 3       Family History  History reviewed. No pertinent family history.    Social History  Social History   Substance Use Topics   • Smoking status: Never Smoker   • Smokeless tobacco: Never Used   • Alcohol use No       Allergies  Allergies   Allergen Reactions   • Actos [Pioglitazone Hydrochloride] Hives   • Advil [Ibuprofen Micronized] Hives   • Cephalosporins Hives     Unsure if she tolerates penicillin     • [place] Hives        Physical Exam  Laboratory   Hemodynamics  No data recorded.      Pulse  Av  Min: 116  Max: 118    Blood Pressure : (!) 161/75, NIBP: 141/60      Respiratory      Respiration: 20, Pulse Oximetry: 96 %     Work Of Breathing / Effort: Mild  RUL Breath Sounds: Diminished, RML Breath Sounds: Diminished, RLL Breath Sounds: Diminished, [place] Breath Sounds: Diminished    Physical Exam   Constitutional: She is oriented to person, place, and time. She appears well-developed and well-nourished. No distress.   HENT:   Head: Normocephalic and atraumatic.   Mouth/Throat: No oropharyngeal exudate.   Eyes: Pupils are equal, round, and reactive to light. Conjunctivae are normal. Right eye exhibits no discharge. No scleral icterus.   Neck: Neck supple. No JVD present. No thyromegaly present.   Cardiovascular: Intact distal pulses.    No murmur heard.  Pulmonary/Chest: Effort normal and breath sounds normal. No stridor. No respiratory distress. She has no wheezes. She has no rales.   Abdominal: Soft. Bowel sounds are normal. She exhibits no distension. There is no tenderness. There is no rebound.   Musculoskeletal: Normal range of motion. She exhibits edema (+2 pitting edema b/l L/E).   Neurological: She is alert and oriented to person, place, and time. No cranial nerve deficit.   Skin: Skin is warm. She is not diaphoretic. There is erythema (Mild redness left lower extremity diffuse, non cellulitic  appearing).   Psychiatric: She has a normal mood and affect. Her behavior is normal. Thought content normal.         Assessment/Plan  * Anemia- (present on admission)   Assessment & Plan    Hgb 6.7  Most likely secondary to multiple myeloma  Stool guaiac negative  2 units PRBCs ordered  Repeat CBC afterwards and closely monitor        Multiple myeloma (HCC)- (present on admission)   Assessment & Plan    Hx of MM, has undergone 2 rounds of chemotherapy now on a newer agent.   Follows outpatient.   GUALBERTO, PE Serum UR protein  Transfuse 2U PRBcs given low hemoglobin.  Depending on findings from GUALBERTO may consult heme/onc, discussed case with         Hypomagnesemia- (present on admission)   Assessment & Plan    Replenish lytes with 2g of Magnesium Sulfate  Recheck magnesium levels in the am for changes.           DVT (deep venous thrombosis) ([facility])   Assessment & Plan    Left LE DVT  Continue warfarin per pharmacy        Vitamin D deficiency- (present on admission)   Assessment & Plan    Resume vitamin D.         DM type 2 (diabetes mellitus, type 2) (CMS-[facility])- (present on admission)   Assessment & Plan    Patient is on home dose of NPH as well as regular insulin  A1c 8.5 4 months ago  Continue Insulin-sliding scale, accu-checks and hypoglycemia protocol.                I anticipate this patient is appropriate for observation status at this time.    Prophylaxis: SCDs    Recent Labs      08/16/18   1135   WBC  3.3*   RBC  1.94*   HEMOGLOBIN  6.7*   HEMATOCRIT  20.4*   MCV  105.2*   MCH  34.5*   MCHC  32.8*   RDW  55.0*   PLATELETCT  165   MPV  10.4     Recent Labs      08/16/18   1135   SODIUM  133*   POTASSIUM  4.9   CHLORIDE  101   CO2  20   GLUCOSE  261*   BUN  26*   CREATININE  1.36   CALCIUM  9.4     Recent Labs      08/16/18   1135   ALTSGPT  10   ASTSGOT  12   ALKPHOSPHAT  64   TBILIRUBIN  0.9   GLUCOSE  261*     Recent Labs      08/16/18   1135   INR  1.23*             Lab Results   Component Value Date     TROPONINI <0.02 06/20/2018       Imaging  No orders to display

## 2018-08-16 NOTE — ASSESSMENT & PLAN NOTE
Hx of MM, has undergone 2 rounds of chemotherapy now on a newer agent.   Follows outpatient with Dr. Sim.   GUALBERTO, PE Serum UR protein noted, will need to fu with oncology outpatient

## 2018-08-16 NOTE — ED PROVIDER NOTES
"ED Provider Note    CHIEF COMPLAINT  Chief Complaint   Patient presents with   • Abnormal Labs     K+ 6.0, Hgb 7.3.  Chronic anemia.  Denies black/bloody stools.    • Sent by MD     From [facility]  Aleida Freida Pagan is a 67 y.o. female who presents to the Emergency Department with Type 2 DM, Multiple myeloma,  abnormal labs, high potassium and low rbc count, she was sent from [facility].  She states she feels fatigued but no bleeding from stool, no fever, cough history of renal failure, poor oral intake or other acute changes.       From chart patient has \"Multiple myeloma 1P deletion intermediate risk, IgG kappa.  Stage III based on ISS staging  treated with  2 cycles CyBor, oral regimen ninlaro, revilumid, dexamethasone per oncology, Anemia, iron replacement,   Buttock pressure decubitus ulcer, L pathologic humerus fx and B femur palliative radiation             Seen by Dr. Aldridge for oncology needs          REVIEW OF SYSTEMS  Positive for abnormal labs, fatigue, Negative for fever, shorness of breath, cough, rash.  As above all other systems are negative.    PAST MEDICAL HISTORY   has a past medical history of Anemia (02/2018); Arthritis (03/12/2018); Bowel habit changes (03/12/2018); Bronchitis (12/21/17-3/6/18); Cancer ([facility]) (12/12/2017); Cataract (2017); Cellulitis (02/18/2018); Diabetes ([facility]) (03/12/2018); Essential hypertension, benign (7/26/2013); Heart burn (03/12/2018); High cholesterol; Multiple myeloma ([facility]); Other and unspecified hyperlipidemia (7/26/2013); Pain (03/12/2018); Pneumonia (2000); Urinary bladder disorder (12/2017-1/2017); and UTI (urinary tract infection) (02/2018).    FAMILY HISTORY  History reviewed. No pertinent family history.     SOCIAL HISTORY  Social History     Social History Main Topics   • Smoking status: Never Smoker   • Smokeless tobacco: Never Used   • Alcohol use No   • Drug use: No   • Sexual activity: Not on file       SURGICAL HISTORY   has a past " surgical history that includes cataract phaco with iol (Bilateral, 2017); colonoscopy (1990'S); bone biopsy (Left, 3/13/2018); and joint injection diagnostic (Right, 3/13/2018).    CURRENT MEDICATIONS  Reviewed.  See Encounter Summary.  Include   Current Facility-Administered Medications:   •  acyclovir (ZOVIRAX) capsule 400 mg, 400 mg, Oral, BID, Garrett Dukes M.D.  •  ascorbic acid tablet 500 mg, 500 mg, Oral, DAILY, Garrett Dukes M.D.  •  calcitRIOL (ROCALTROL) capsule 0.25 mcg, 0.25 mcg, Oral, DAILY, Garrett Dukes M.D.  •  cetirizine (ZYRTEC) tablet 10 mg, 10 mg, Oral, DAILY, Garrett Dukes M.D.  •  Cholecalciferol CAPS 2,000 Units, 2,000 Units, Oral, DAILY, Garrett Dukes M.D.  •  DULoxetine (CYMBALTA) capsule 40 mg, 40 mg, Oral, DAILY, Garrett Dukes M.D.  •  fluticasone (FLONASE) nasal spray 100 mcg, 2 Spray, Nasal, DAILY, Garrett Dukes M.D.  •  insulin NPH (HUMULIN,NOVOLIN) injection  Units,  Units, Subcutaneous, BID, Garrett Dukes M.D.  •  lenalidomide CAPS 25 mg, 25 mg, Oral, DAILY, Garrett Dukes M.D.  •  metoprolol (LOPRESSOR) tablet 25 mg, 25 mg, Oral, BID, Garrett Dukes M.D.  •  omeprazole (PRILOSEC) capsule 20 mg, 20 mg, Oral, DAILY, Garrett Dukes M.D.  •  pravastatin (PRAVACHOL) tablet 40 mg, 40 mg, Oral, DAILY, Garrett Dukes M.D.  •  [START ON 8/21/2018] sucralfate (CARAFATE) tablet 1 g, 1 g, Oral, Q TUESDAY, Garrett Dukes M.D.  •  warfarin (COUMADIN) tablet 5 mg, 5 mg, Oral, DAILY, Garrett Dukes M.D.  •  therapeutic multivitamin-minerals (THERAGRAN-M) tablet 1 Tab, 1 Tab, Oral, DAILY, Garrett Dukes M.D.  •  acetaminophen (TYLENOL) tablet 650 mg, 650 mg, Oral, Q6HRS PRN, Garrett Dukes M.D.  •  NS infusion, , Intravenous, Continuous, Garrett Dukes M.D.  •  magnesium sulfate IVPB premix 2 g, 2 g, Intravenous, Once, Garrett Dukes M.D.  •  insulin regular (HUMULIN R) injection 2-9 Units, 2-9 Units,  Subcutaneous, 4X/DAY ACHS **AND** Accu-Chek ACHS, , , Q AC AND BEDTIME(S) **AND** NOTIFY MD and PharmD, , , Once **AND** glucose 4 g chewable tablet 16 g, 16 g, Oral, Q15 MIN PRN **AND** dextrose 50% (D50W) injection 25 mL, 25 mL, Intravenous, Q15 MIN PRN, Garrett Dukes M.D.  •  MD ALERT... warfarin (COUMADIN) per pharmacy protocol, , Other, pharmacy to dose, Garrett Dukes M.D.    Current Outpatient Prescriptions:   •  warfarin (COUMADIN) 5 MG Tab, Take 5 mg by mouth every day., Disp: , Rfl:   •  LORazepam (ATIVAN) 0.5 MG Tab, Take 0.5 mg by mouth 2 Times a Day., Disp: , Rfl:   •  omeprazole (PRILOSEC) 20 MG delayed-release capsule, Take 20 mg by mouth every day., Disp: , Rfl:   •  metoprolol (LOPRESSOR) 25 MG Tab, Take 25 mg by mouth 2 times a day., Disp: , Rfl:   •  insulin regular (HUMULIN R) 100 Unit/mL Solution, Inject 4-10 Units as instructed 3 times a day before meals. 200-250 4 units 251-300 6 units 301-350 8 units 351-400 10 units, Disp: , Rfl:   •  insulin NPH (HUMULIN,NOVOLIN) 100 UNIT/ML Suspension, Inject  Units as instructed 2 Times a Day. 55 units QAM 25 units QPM, Disp: , Rfl:   •  Cholecalciferol 2000 UNIT Cap, Take 2,000 Units by mouth every day., Disp: , Rfl:   •  cetirizine (ZYRTEC) 10 MG Tab, Take 10 mg by mouth every day., Disp: , Rfl:   •  fluticasone (FLONASE) 50 MCG/ACT nasal spray, Spray 2 Sprays in nose every day., Disp: , Rfl:   •  therapeutic multivitamin-minerals (THERAGRAN-M) Tab, Take 1 Tab by mouth every day., Disp: , Rfl:   •  lenalidomide (REVLIMID) 25 MG Cap, Take 25 mg by mouth every day., Disp: , Rfl:   •  ascorbic acid (VITAMIN C) 500 MG tablet, Take 500 mg by mouth every day., Disp: , Rfl:   •  dexamethasone (DECADRON) 4 MG Tab, Take 2 mg by mouth every Tuesday., Disp: , Rfl:   •  Ixazomib Citrate 4 MG Cap, Take 4 mg by mouth every Tuesday., Disp: , Rfl:   •  LACTOBACILLUS PO, Take 1 Cap by mouth every evening., Disp: , Rfl:   •  DULoxetine (CYMBALTA) 20 MG Cap  "DR Particles, Take 40 mg by mouth every day., Disp: , Rfl:   •  calcium carbonate (TUMS) 500 MG Chew Tab, Take 1,000 mg by mouth 3 times a day before meals., Disp: , Rfl:   •  sucralfate (CARAFATE) 1 GM Tab, Take 1 g by mouth every Tuesday., Disp: , Rfl:   •  acyclovir (ZOVIRAX) 200 MG Cap, Take 2 Caps by mouth 2 Times a Day., Disp: , Rfl: 0  •  calcitRIOL (ROCALTROL) 0.25 MCG Cap, Take 1 Cap by mouth every day., Disp: 30 Cap, Rfl:   •  pravastatin (PRAVACHOL) 40 MG tablet, Take 1 Tab by mouth every day., Disp: 90 Tab, Rfl: 3      ALLERGIES  Allergies   Allergen Reactions   • Actos [Pioglitazone Hydrochloride] Hives   • Advil [Ibuprofen Micronized] Hives   • Cephalosporins Hives     Unsure if she tolerates penicillin     • [place] Hives       PHYSICAL EXAM  VITAL SIGNS: BP (!) 161/75   Pulse (!) 116   Resp (!) 31   Ht 1.6 m (5' 3\")   Wt (!) 127 kg (279 lb 15.8 oz)   LMP 04/11/1994   SpO2 98%   BMI 49.60 kg/m²   Constitutional:  Alert, able to answer questions  HENT: Nose is normal in appearance, external ears are normal,  moist mucous membranes, pale, facial hair  Eyes: Anicteric,  pupils are equal round and reactive, there is  conjunctival  pallor   Neck: The trachea is midline, there is no obvious mass or meningeal signs  Cardiovascular: Good perfusion, rapid regular rate and rhythm without murmurs gallops or rubs  Thorax & Lungs: Respiratory rate and effort are normal. There is normal chest excursion with respiration.  No wheezes rhonchi or rales noted.  Abdomen:  obses normal bowel sounds, no pain with cough, buttock pressure ulcer, heme negative stool, brown  :   No CVA tenderness to palpation  Musculoskeletal: No deformities noted in all 4 extremities.   Skin: Visualized skin is warm without rash.  Neurologic:  Cranial nerves II through XII are intact there is no focal abnormality noted.  Psychiatric: Normal mood and mentation    RADIOLOGY/PROCEDURES  Imaging Studies:    No orders to display "         Pertinent Labs   Results for orders placed or performed during the hospital encounter of 08/16/18   CBC WITH DIFFERENTIAL   Result Value Ref Range    WBC 3.3 (L) 4.8 - 10.8 K/uL    RBC 1.94 (L) 4.20 - 5.40 M/uL    Hemoglobin 6.7 (L) 12.0 - 16.0 g/dL    Hematocrit 20.4 (L) 37.0 - 47.0 %    .2 (H) 81.4 - 97.8 fL    MCH 34.5 (H) 27.0 - 33.0 pg    MCHC 32.8 (L) 33.6 - 35.0 g/dL    RDW 55.0 (H) 35.9 - 50.0 fL    Platelet Count 165 164 - 446 K/uL    MPV 10.4 9.0 - 12.9 fL    Nucleated RBC 0.00 /100 WBC    NRBC (Absolute) 0.00 K/uL    Neutrophils-Polys 52.00 44.00 - 72.00 %    Lymphocytes 16.00 (L) 22.00 - 41.00 %    Monocytes 8.00 0.00 - 13.40 %    Eosinophils 1.00 0.00 - 6.90 %    Basophils 0.00 0.00 - 1.80 %    Neutrophils (Absolute) 2.38 2.00 - 7.15 K/uL    Lymphs (Absolute) 0.53 (L) 1.00 - 4.80 K/uL    Monos (Absolute) 0.26 0.00 - 0.85 K/uL    Eos (Absolute) 0.03 0.00 - 0.51 K/uL    Baso (Absolute) 0.00 0.00 - 0.12 K/uL    Anisocytosis 1+     Macrocytosis 1+    COMP METABOLIC PANEL   Result Value Ref Range    Sodium 133 (L) 135 - 145 mmol/L    Potassium 4.9 3.6 - 5.5 mmol/L    Chloride 101 96 - 112 mmol/L    Co2 20 20 - 33 mmol/L    Anion Gap 12.0 (H) 0.0 - 11.9    Glucose 261 (H) 65 - 99 mg/dL    Bun 26 (H) 8 - 22 mg/dL    Creatinine 1.36 0.50 - 1.40 mg/dL    Calcium 9.4 8.4 - 10.2 mg/dL    AST(SGOT) 12 12 - 45 U/L    ALT(SGPT) 10 2 - 50 U/L    Alkaline Phosphatase 64 30 - 99 U/L    Total Bilirubin 0.9 0.1 - 1.5 mg/dL    Albumin 1.9 (L) 3.2 - 4.9 g/dL    Total Protein 6.9 6.0 - 8.2 g/dL    Globulin 5.0 (H) 1.9 - 3.5 g/dL    A-G Ratio 0.4 g/dL   PT/INR   Result Value Ref Range    PT 15.4 (H) 12.0 - 14.6 sec    INR 1.23 (H) 0.87 - 1.13   ESTIMATED GFR   Result Value Ref Range    GFR If  47 (A) >60 mL/min/1.73 m 2    GFR If Non  39 (A) >60 mL/min/1.73 m 2   COD (ADULT)   Result Value Ref Range    Antibody Screen-Cod NEG     ABO Grouping Only A     Rh Grouping Only POS      Component R       R3                  Red Blood Cells3    [id number]   released     08/16/18   13:56     DIFFERENTIAL MANUAL   Result Value Ref Range    Bands-Stabs 20.00 (H) 0.00 - 10.00 %    Metamyelocytes 3.00 %    Manual Diff Status PERFORMED    PLATELET ESTIMATE   Result Value Ref Range    Plt Estimation Normal    MORPHOLOGY   Result Value Ref Range    RBC Morphology Present     Polychromia 1+     Rouleaux Moderate     Basophilic Stippling Few    Magnesium   Result Value Ref Range    Magnesium 1.5 1.5 - 2.5 mg/dL           COURSE & MEDICAL DECISION MAKING  Nursing notes and vital signs were reviewed. (See chart for details)  The patients  records were reviewed, history was obtained from the patient and ;     Patient was evaluated, abnormal H/H confirmed with repeat draw.  Rectal was performed to rule out GI source, as she is severely anemic, patient had COD requested and call placed to Dr. Sim her oncologist  Patient needs transfusion, may need to be admitted and discussed the care with Dr. Sim who would like her admitted for restaging and repeat evaluation of her multiple myeloma and bleeding source.    FINAL IMPRESSION  1. Severe Anemia  2. Multiple Myeloma       DISPOSITION  Admit    Electronically signed by: Eden Lott, 8/16/2018 11:20 AM

## 2018-08-16 NOTE — ED NOTES
The Medication Reconciliation process has been completed by interviewing the patient and placing the meds on her profile from the [facility].    Allergies have been reviewed  Antibiotic use in 30 days - Levaquin supposed to have started 8/15/18, not charted

## 2018-08-16 NOTE — ASSESSMENT & PLAN NOTE
Patient is on home dose of NPH as well as regular insulin  A1c 8.5 4 months ago  Continue Insulin-sliding scale, accu-checks and hypoglycemia protocol.

## 2018-08-16 NOTE — ED NOTES
Aleida Pagan 67 y.o. female   BIB [facility]    Chief Complaint   Patient presents with   • Abnormal Labs     K+ 6.0, Hgb 7.3.  Chronic anemia.  Denies black/bloody stools.    • Sent by MD     From Health system      Currently being treated for Multiple Myeloma.  Chart placed for ERP eval.

## 2018-08-16 NOTE — ED NOTES
[place] fall assessment completed. Pt is high risk for fall. Interventions complete. Pt placed in yellow non slip socks, fall wrist band placed, yellow sign on door. Call light and belongings within reach, bed locked and in lowest position. Explained importance of calling before getting gout of bed and pt verbalizes understanding. Hourly rounding in place.

## 2018-08-16 NOTE — PROGRESS NOTES
2 RN skin assessment complete, skin not WDL - excoriation to L and R buttocks, skin tear to sacrum PTA. See wound flowsheet.

## 2018-08-17 PROBLEM — D61.818 PANCYTOPENIA (HCC): Status: ACTIVE | Noted: 2018-08-17

## 2018-08-17 PROBLEM — E66.01 MORBID OBESITY (HCC): Status: ACTIVE | Noted: 2018-08-17

## 2018-08-17 LAB
ALBUMIN SERPL BCP-MCNC: 1.9 G/DL (ref 3.2–4.9)
ALBUMIN/GLOB SERPL: 0.4 G/DL
ALP SERPL-CCNC: 60 U/L (ref 30–99)
ALT SERPL-CCNC: 11 U/L (ref 2–50)
ANION GAP SERPL CALC-SCNC: 7 MMOL/L (ref 0–11.9)
AST SERPL-CCNC: 11 U/L (ref 12–45)
BASOPHILS # BLD AUTO: 0 % (ref 0–1.8)
BASOPHILS # BLD: 0 K/UL (ref 0–0.12)
BILIRUB SERPL-MCNC: 0.5 MG/DL (ref 0.1–1.5)
BUN SERPL-MCNC: 21 MG/DL (ref 8–22)
CALCIUM SERPL-MCNC: 9.4 MG/DL (ref 8.4–10.2)
CHLORIDE SERPL-SCNC: 103 MMOL/L (ref 96–112)
CO2 SERPL-SCNC: 23 MMOL/L (ref 20–33)
CREAT SERPL-MCNC: 1.08 MG/DL (ref 0.5–1.4)
EOSINOPHIL # BLD AUTO: 0.02 K/UL (ref 0–0.51)
EOSINOPHIL NFR BLD: 0.7 % (ref 0–6.9)
ERYTHROCYTE [DISTWIDTH] IN BLOOD BY AUTOMATED COUNT: 59.6 FL (ref 35.9–50)
GLOBULIN SER CALC-MCNC: 4.5 G/DL (ref 1.9–3.5)
GLUCOSE BLD-MCNC: 234 MG/DL (ref 65–99)
GLUCOSE BLD-MCNC: 280 MG/DL (ref 65–99)
GLUCOSE BLD-MCNC: 292 MG/DL (ref 65–99)
GLUCOSE SERPL-MCNC: 219 MG/DL (ref 65–99)
HCT VFR BLD AUTO: 22.4 % (ref 37–47)
HGB BLD-MCNC: 7.4 G/DL (ref 12–16)
IMM GRANULOCYTES # BLD AUTO: 0.13 K/UL (ref 0–0.11)
IMM GRANULOCYTES NFR BLD AUTO: 4.9 % (ref 0–0.9)
INR PPP: 1.39 (ref 0.87–1.13)
LYMPHOCYTES # BLD AUTO: 0.42 K/UL (ref 1–4.8)
LYMPHOCYTES NFR BLD: 15.7 % (ref 22–41)
MCH RBC QN AUTO: 33.8 PG (ref 27–33)
MCHC RBC AUTO-ENTMCNC: 33 G/DL (ref 33.6–35)
MCV RBC AUTO: 102.3 FL (ref 81.4–97.8)
MONOCYTES # BLD AUTO: 0.42 K/UL (ref 0–0.85)
MONOCYTES NFR BLD AUTO: 15.7 % (ref 0–13.4)
NEUTROPHILS # BLD AUTO: 1.69 K/UL (ref 2–7.15)
NEUTROPHILS NFR BLD: 63 % (ref 44–72)
NRBC # BLD AUTO: 0.02 K/UL
NRBC BLD-RTO: 0.7 /100 WBC
PLATELET # BLD AUTO: 128 K/UL (ref 164–446)
PMV BLD AUTO: 10.1 FL (ref 9–12.9)
POTASSIUM SERPL-SCNC: 4.5 MMOL/L (ref 3.6–5.5)
PROT SERPL-MCNC: 6.4 G/DL (ref 6–8.2)
PROTHROMBIN TIME: 16.9 SEC (ref 12–14.6)
RBC # BLD AUTO: 2.19 M/UL (ref 4.2–5.4)
SODIUM SERPL-SCNC: 133 MMOL/L (ref 135–145)
WBC # BLD AUTO: 2.7 K/UL (ref 4.8–10.8)

## 2018-08-17 PROCEDURE — 99233 SBSQ HOSP IP/OBS HIGH 50: CPT | Performed by: HOSPITALIST

## 2018-08-17 PROCEDURE — 36415 COLL VENOUS BLD VENIPUNCTURE: CPT

## 2018-08-17 PROCEDURE — 85610 PROTHROMBIN TIME: CPT

## 2018-08-17 PROCEDURE — 700111 HCHG RX REV CODE 636 W/ 250 OVERRIDE (IP): Performed by: HOSPITALIST

## 2018-08-17 PROCEDURE — 82962 GLUCOSE BLOOD TEST: CPT

## 2018-08-17 PROCEDURE — 700102 HCHG RX REV CODE 250 W/ 637 OVERRIDE(OP): Performed by: HOSPITALIST

## 2018-08-17 PROCEDURE — 85025 COMPLETE CBC W/AUTO DIFF WBC: CPT

## 2018-08-17 PROCEDURE — A9270 NON-COVERED ITEM OR SERVICE: HCPCS | Performed by: HOSPITALIST

## 2018-08-17 PROCEDURE — 770006 HCHG ROOM/CARE - MED/SURG/GYN SEMI*

## 2018-08-17 PROCEDURE — 80053 COMPREHEN METABOLIC PANEL: CPT

## 2018-08-17 RX ORDER — WARFARIN SODIUM 5 MG/1
5 TABLET ORAL
Status: COMPLETED | OUTPATIENT
Start: 2018-08-17 | End: 2018-08-17

## 2018-08-17 RX ADMIN — CALCITRIOL 0.25 MCG: 0.25 CAPSULE, LIQUID FILLED ORAL at 06:16

## 2018-08-17 RX ADMIN — ACETAMINOPHEN 650 MG: 325 TABLET, FILM COATED ORAL at 23:50

## 2018-08-17 RX ADMIN — FLUTICASONE PROPIONATE 100 MCG: 50 SPRAY, METERED NASAL at 06:13

## 2018-08-17 RX ADMIN — INSULIN HUMAN 3 UNITS: 100 INJECTION, SOLUTION PARENTERAL at 06:11

## 2018-08-17 RX ADMIN — ACYCLOVIR 400 MG: 200 CAPSULE ORAL at 06:16

## 2018-08-17 RX ADMIN — ENOXAPARIN SODIUM 120 MG: 150 INJECTION SUBCUTANEOUS at 17:03

## 2018-08-17 RX ADMIN — INSULIN HUMAN 5 UNITS: 100 INJECTION, SOLUTION PARENTERAL at 16:59

## 2018-08-17 RX ADMIN — VITAMIN D, TAB 1000IU (100/BT) 2000 UNITS: 25 TAB at 06:15

## 2018-08-17 RX ADMIN — MULTIPLE VITAMINS W/ MINERALS TAB 1 TABLET: TAB at 06:16

## 2018-08-17 RX ADMIN — OMEPRAZOLE 20 MG: 20 CAPSULE, DELAYED RELEASE ORAL at 06:16

## 2018-08-17 RX ADMIN — ENOXAPARIN SODIUM 120 MG: 150 INJECTION SUBCUTANEOUS at 06:13

## 2018-08-17 RX ADMIN — DULOXETINE HYDROCHLORIDE 40 MG: 20 CAPSULE, DELAYED RELEASE ORAL at 06:16

## 2018-08-17 RX ADMIN — METOPROLOL TARTRATE 25 MG: 25 TABLET ORAL at 06:16

## 2018-08-17 RX ADMIN — INSULIN HUMAN 5 UNITS: 100 INJECTION, SOLUTION PARENTERAL at 20:18

## 2018-08-17 RX ADMIN — ACYCLOVIR 400 MG: 200 CAPSULE ORAL at 17:04

## 2018-08-17 RX ADMIN — OXYCODONE HYDROCHLORIDE AND ACETAMINOPHEN 500 MG: 500 TABLET ORAL at 06:15

## 2018-08-17 RX ADMIN — CETIRIZINE HYDROCHLORIDE 10 MG: 10 TABLET, FILM COATED ORAL at 06:16

## 2018-08-17 RX ADMIN — WARFARIN SODIUM 5 MG: 5 TABLET ORAL at 17:04

## 2018-08-17 RX ADMIN — METOPROLOL TARTRATE 25 MG: 25 TABLET ORAL at 17:04

## 2018-08-17 RX ADMIN — PRAVASTATIN SODIUM 40 MG: 20 TABLET ORAL at 06:13

## 2018-08-17 ASSESSMENT — PATIENT HEALTH QUESTIONNAIRE - PHQ9
1. LITTLE INTEREST OR PLEASURE IN DOING THINGS: NOT AT ALL
SUM OF ALL RESPONSES TO PHQ9 QUESTIONS 1 AND 2: 0
2. FEELING DOWN, DEPRESSED, IRRITABLE, OR HOPELESS: NOT AT ALL
1. LITTLE INTEREST OR PLEASURE IN DOING THINGS: NOT AT ALL
2. FEELING DOWN, DEPRESSED, IRRITABLE, OR HOPELESS: NOT AT ALL
SUM OF ALL RESPONSES TO PHQ9 QUESTIONS 1 AND 2: 0

## 2018-08-17 ASSESSMENT — ENCOUNTER SYMPTOMS
DEPRESSION: 0
NAUSEA: 1
DOUBLE VISION: 0
COUGH: 0
NERVOUS/ANXIOUS: 1
WEAKNESS: 1
MYALGIAS: 1
FEVER: 0
ABDOMINAL PAIN: 0
BACK PAIN: 1
DIZZINESS: 0
VOMITING: 0
SHORTNESS OF BREATH: 0
HEADACHES: 0
BLURRED VISION: 0

## 2018-08-17 ASSESSMENT — PAIN SCALES - GENERAL
PAINLEVEL_OUTOF10: 3
PAINLEVEL_OUTOF10: 7

## 2018-08-17 NOTE — WOUND TEAM
"[facility]  Inpatient Services  Initial Wound and Skin Care Evaluation    Admission Date:  8/16/2018   HPI, PMH, SH: Reviewed  Unit where seen by Wound Team: 2212/01    WOUND CONSULT RELATED TO:pressure injury mgmt    SUBJECTIVE:   \"I can't not pee when I try to poop.\"    Self Report / Pain Level: no c/o pain      OBJECTIVE: on pressure redistribution mattress, in process of getting off bedpan  WOUND TYPE, LOCATION, CHARACTERISTICS (Pressure ulcers: location, stage, POA or date identified) Wound Skin Tear Buttock Left  Periwound Skin: Discolored  Drainage : None      Tissue Type and %:    100% red/pink  Wound Edges:    open    Odor:     None   Exposed structure(s):   No   Signs and Symptoms of Infection: None     Measurements: taken 8/16/18  Length (cm): 25  Width (cm): 19  Depth (cm): 0.1    Tracts/undermining:   None     Vascular:  Wounds not r/t vascular problem    Lab Values:    WBC:       WBC   Date/Time Value Ref Range Status   08/16/2018 11:35 AM 3.3 (L) 4.8 - 10.8 K/uL Final     AIC:      Lab Results   Component Value Date/Time    HBA1C 8.5 (H) 04/10/2018 04:32 PM        INTERVENTIONS BY WOUND TEAM: able to view and palpate wounds with pt turned to R side. Left TWIN. Zinc barrier paste ordered and placed @ bs. Nsg to apply next turn.  Dressing Options: Open to Air (zinc barrier paste)    Interdisciplinary consultation:   With Nursing;With Patient    EVALUATION: pt has partial thickness wounds to buttocks, sacrococcygeal area with purple area on L IT 3x3 cm that is blanching. When pt used bedpan she soiled the drsg in place so DC'd and barrier cream to be used.     Factors affecting wound healing:obesity, DM, anemia, myeloma  Goals:  Decreased wound area and depth weekly       NURSING PLAN OF CARE ORDERS (X):    Dressing changes: See Dressing Care orders:     Skin care: See Skin Care orders: x  Rectal tube care: See Rectal Tube Care orders:   Other orders:    RSKIN: CURRENT (X) ORDERED (O)  Q " shift Mike:  X  Q shift pressure point assessments:  X  Pressure redistribution mattress   X with trapeze     Waffle overlay  ALESSANDRO      Bariatric ALESSANDRO      Bariatric foam        Heel float boots       Heels floated off pillows o     Barrier wipes  x    Barrier Cream      Barrier paste   x   Sacral silicone dressing    DC'd  Silicone O2 tubing   x   Anchorfast      Trach with Optifoam split foam       Waffle cushion      Rectal tube or BMS      Antifungal tx    Turn q 2 hours   x  Up to chair     Ambulate   PT/OT     Dietician      PO  x   TF   TPN   NPO   # days   Other         WOUND TEAM PLAN OF CARE (X):   NPWT change 3 x week:        Dressing changes by wound team:       Follow up as needed: x if wound worsens      Other (explain):    Anticipated discharge plans (X):  SNF:           Home Care:           Outpatient Wound Center:            Self Care:            Other:  tbd

## 2018-08-17 NOTE — CARE PLAN
Problem: Safety  Goal: Will remain free from falls  Outcome: PROGRESSING AS EXPECTED    Intervention: Implement fall precautions  Patient educated and understands the fall precautions in place to prevent falls.  Bed alarm is off, patient calls appropriately, IV pole closest to bathroom, treaded slipper socks on, and bedrails closest to bathroom down.  Patient also educated and understands the use of the call button for any assistance with mobility.      Problem: Venous Thromboembolism (VTW)/Deep Vein Thrombosis (DVT) Prevention:  Goal: Patient will participate in Venous Thrombosis (VTE)/Deep Vein Thrombosis (DVT)Prevention Measures  Outcome: PROGRESSING AS EXPECTED    Intervention: Ensure patient wears graduated elastic stockings (DI hose) and/or SCDs, if ordered, when in bed or chair (Remove at least once per shift for skin check)  Patient has SCDs, they are currently off, patient needed a break from them, will place back on.  Also receiving LMWH for DVT prophylaxis.

## 2018-08-17 NOTE — PROGRESS NOTES
Received report from Nilda SWEET, assumed pt care. Pt in bed, states no needs at this time. VSS. Biatain dressing placed on small skin tear on L buttock. Updated pt on POC. Educated to call for assistance.

## 2018-08-17 NOTE — ASSESSMENT & PLAN NOTE
Secondary to multiple myeloma, condition has stabilized.  Given transfusion on admission now stable.

## 2018-08-17 NOTE — PROGRESS NOTES
Bedside report completed.  Assumed pt care. Patient in bed, resting, in no apparent distress.  Safety precautions in place. Call light & personal belongings within reach.  Plan of care discussed.  Patient is on 2.5L o2, this is her home baseline.  IV running NS @ 50 mL/hour, saline locked per MAR.  Reports 7/10 pain in hip/back, states this is baseline and tolerable.  Patient repositioned to her left side, heels floated.  No other needs at this time.  Will continue to monitor.

## 2018-08-17 NOTE — PROGRESS NOTES
2 RN skin assessment done; skin not WDL. See Wound flowsheet. Sacral excoriation, tear and redness.

## 2018-08-17 NOTE — DISCHARGE PLANNING
Admitted for abnormal labs from [facility], Oncology was consulted and labs drawn and pending. Pt to return back to [facility].

## 2018-08-17 NOTE — DIETARY
NUTRITION SERVICES: BMI - Pt with BMI >40 (=49.6). Weight loss counseling not appropriate in acute care setting.     RECOMMEND - Referral to outpatient nutrition services for weight management after D/C.

## 2018-08-17 NOTE — PROGRESS NOTES
Inpatient Anticoagulation Service Note    Date: 2018  Reason for Anticoagulation: Deep Vein Thrombosis   Hemoglobin Value: (!) 7.4  Hematocrit Value: (!) 22.4  Lab Platelet Value: (!) 128  Target INR: 2.0 to 3.0    INR from last 7 days     Date/Time INR Value    18 0643 (!)  1.39    18 1135 (!)  1.23        Dose from last 7 days     Date/Time Dose (mg)    18 1000  5    18 1400  5        Significant Interactions: Proton Pump Inhibitor, Statin  Bridge Therapy: Yes, Lovenox 120 mg SQ Q12H  Bridge Therapy Start Date: 18  Days of Overlap Therapy: 1       Plan:  Coumadin 5 mg PO tonight for INR 1.39.  Continue Lovenox until INR > 2 for two consecutive days.  Education Material Provided?: No  Pharmacist suggested discharge dosin mg daily     Hailey FloresD

## 2018-08-17 NOTE — PROGRESS NOTES
[facility] Hospitalist Progress Note    Date of Service: 2018    Chief Complaint  67 y.o. female admitted 2018 with hx of MM, presenting with abnormal labs, K 6.0, hgb 7.3    Interval Problem Update  Patient seen and examined, coming from [facility], feeling tired and fatigued, no pain, no further blood in stool.     Consultants/Specialty  OncologyDr. veras over the phone    Disposition  tbd        Review of Systems   Constitutional: Positive for malaise/fatigue. Negative for fever.   HENT: Negative for congestion, hearing loss and nosebleeds.    Eyes: Negative for blurred vision and double vision.   Respiratory: Negative for cough and shortness of breath.    Cardiovascular: Negative for chest pain and leg swelling.   Gastrointestinal: Positive for melena and nausea. Negative for abdominal pain and vomiting.   Genitourinary: Negative for dysuria, frequency and urgency.   Musculoskeletal: Positive for back pain and myalgias. Negative for joint pain.   Skin: Negative for itching and rash.   Neurological: Positive for weakness. Negative for dizziness and headaches.   Psychiatric/Behavioral: Negative for depression and suicidal ideas. The patient is nervous/anxious.       Physical Exam  Laboratory/Imaging   Hemodynamics  Temp (24hrs), Av.7 °C (98.1 °F), Min:36.4 °C (97.5 °F), Max:36.9 °C (98.5 °F)   Temperature: 36.4 °C (97.5 °F)  Pulse  Av.5  Min: 83  Max: 118 Heart Rate (Monitored): (!) 112  Blood Pressure : 141/54, NIBP: 146/57      Respiratory      Respiration: 18, Pulse Oximetry: 94 %     Work Of Breathing / Effort: Mild  RUL Breath Sounds: Diminished, RML Breath Sounds: Diminished, RLL Breath Sounds: Diminished, CHARLOTTE Breath Sounds: Diminished    Fluids    Intake/Output Summary (Last 24 hours) at 18 1252  Last data filed at 18 0900   Gross per 24 hour   Intake              540 ml   Output              100 ml   Net              440 ml       Nutrition  Orders Placed This Encounter    Procedures   • Diet Order Diabetic     Standing Status:   Standing     Number of Occurrences:   1     Order Specific Question:   Diet:     Answer:   Diabetic [3]     Physical Exam   Constitutional: She is oriented to person, place, and time. She appears well-developed and well-nourished. No distress.   Morbidly obese   HENT:   Head: Normocephalic and atraumatic.   Mouth/Throat: No oropharyngeal exudate.   Eyes: Pupils are equal, round, and reactive to light. Conjunctivae and EOM are normal. No scleral icterus.   Neck: Normal range of motion. Neck supple. No JVD present. No thyromegaly present.   Cardiovascular: Normal rate, regular rhythm and intact distal pulses.  Exam reveals no friction rub.    No murmur heard.  Pulmonary/Chest: Effort normal and breath sounds normal. No respiratory distress. She has no wheezes.   Abdominal: Soft. Bowel sounds are normal. She exhibits no distension. There is no tenderness.   Musculoskeletal: Normal range of motion. She exhibits edema. She exhibits no tenderness.   Lymphadenopathy:     She has no cervical adenopathy.   Neurological: She is alert and oriented to person, place, and time. She exhibits normal muscle tone.   Skin: Skin is warm and dry. No rash noted. No erythema.   Psychiatric: She has a normal mood and affect. Her behavior is normal.       Recent Labs      08/16/18   1135  08/17/18   0643   WBC  3.3*  2.7*   RBC  1.94*  2.19*   HEMOGLOBIN  6.7*  7.4*   HEMATOCRIT  20.4*  22.4*   MCV  105.2*  102.3*   MCH  34.5*  33.8*   MCHC  32.8*  33.0*   RDW  55.0*  59.6*   PLATELETCT  165  128*   MPV  10.4  10.1     Recent Labs      08/16/18   1135  08/17/18   0643   SODIUM  133*  133*   POTASSIUM  4.9  4.5   CHLORIDE  101  103   CO2  20  23   GLUCOSE  261*  219*   BUN  26*  21   CREATININE  1.36  1.08   CALCIUM  9.4  9.4     Recent Labs      08/16/18   1135  08/17/18   0643   INR  1.23*  1.39*                  Assessment/Plan     * Anemia- (present on admission)   Assessment &  Plan    Hgb 6.7 on admission  Most likely secondary to multiple myeloma  Stool guaiac negative  2 units PRBCs ordered on admission, hgb now stable >7  H/h q6h and closely monitor        Multiple myeloma (HCC)- (present on admission)   Assessment & Plan    Hx of MM, has undergone 2 rounds of chemotherapy now on a newer agent.   Follows outpatient.   GUALBERTO, PE Serum UR protein pending  Transfuse 2U PRBcs given low hemoglobin on admission, monitor h/h  Depending on findings from GUALBERTO may consult heme/onc, discussed case with         Hypomagnesemia- (present on admission)   Assessment & Plan    Improving, replace and monitor          Pancytopenia (HCC)   Assessment & Plan    All blood lines are low, likely 2/2 MM  Monitor  No s/s of active bleeding  Stool guaic neg  H/h q6hour  S/p 2u PRBC        Morbid obesity (HCC)   Assessment & Plan    BMI of 49.4  Nutrition consulted  Counseled about diet and lifestyle modification  Will need outpatient resources        DVT (deep venous thrombosis) ([facility])   Assessment & Plan    Left LE DVT  Continue warfarin per pharmacy        Vitamin D deficiency- (present on admission)   Assessment & Plan    Resume vitamin D.         DM type 2 (diabetes mellitus, type 2) (CMS-[facility])- (present on admission)   Assessment & Plan    Patient is on home dose of NPH as well as regular insulin  A1c 8.5 4 months ago  Continue Insulin-sliding scale, accu-checks and hypoglycemia protocol.              Quality-Core Measures   Reviewed items::  Labs reviewed, Medications reviewed and Radiology images reviewed  Singh catheter::  No Singh  DVT prophylaxis pharmacological::  Warfarin (Coumadin)          I spent a total of 38 minutes of time during this clinical encounter of which > 50% was devoted to counseling and coordinating care including review of records, pertinent lab data and studies, as well as discussing diagnostic evaluation and work up, planned therapeutic interventions and future disposition of  care. Where indicated, the assessment and plan reflect discussion of patient with consultants, other healthcare providers, family members, and additional research needed to obtain further information in formulating the plan of care of this patient. This time includes no procedures or overlap with other providers.

## 2018-08-18 PROBLEM — E83.42 HYPOMAGNESEMIA: Status: RESOLVED | Noted: 2018-04-18 | Resolved: 2018-08-18

## 2018-08-18 LAB
GLUCOSE BLD-MCNC: 222 MG/DL (ref 65–99)
GLUCOSE BLD-MCNC: 261 MG/DL (ref 65–99)
GLUCOSE BLD-MCNC: 275 MG/DL (ref 65–99)
GLUCOSE BLD-MCNC: 286 MG/DL (ref 65–99)
HCT VFR BLD AUTO: 24.2 % (ref 37–47)
HGB BLD-MCNC: 8 G/DL (ref 12–16)
INR PPP: 1.74 (ref 0.87–1.13)
PROTHROMBIN TIME: 20.1 SEC (ref 12–14.6)

## 2018-08-18 PROCEDURE — G8988 SELF CARE GOAL STATUS: HCPCS | Mod: CK

## 2018-08-18 PROCEDURE — 99231 SBSQ HOSP IP/OBS SF/LOW 25: CPT | Performed by: HOSPITALIST

## 2018-08-18 PROCEDURE — A9270 NON-COVERED ITEM OR SERVICE: HCPCS | Performed by: HOSPITALIST

## 2018-08-18 PROCEDURE — 700102 HCHG RX REV CODE 250 W/ 637 OVERRIDE(OP): Performed by: HOSPITALIST

## 2018-08-18 PROCEDURE — 36415 COLL VENOUS BLD VENIPUNCTURE: CPT

## 2018-08-18 PROCEDURE — G8978 MOBILITY CURRENT STATUS: HCPCS | Mod: CM

## 2018-08-18 PROCEDURE — 700111 HCHG RX REV CODE 636 W/ 250 OVERRIDE (IP): Performed by: HOSPITALIST

## 2018-08-18 PROCEDURE — 97162 PT EVAL MOD COMPLEX 30 MIN: CPT

## 2018-08-18 PROCEDURE — 85610 PROTHROMBIN TIME: CPT

## 2018-08-18 PROCEDURE — G8987 SELF CARE CURRENT STATUS: HCPCS | Mod: CL

## 2018-08-18 PROCEDURE — G8979 MOBILITY GOAL STATUS: HCPCS | Mod: CK

## 2018-08-18 PROCEDURE — 85018 HEMOGLOBIN: CPT

## 2018-08-18 PROCEDURE — 85014 HEMATOCRIT: CPT

## 2018-08-18 PROCEDURE — 82962 GLUCOSE BLOOD TEST: CPT | Mod: 91

## 2018-08-18 PROCEDURE — 770006 HCHG ROOM/CARE - MED/SURG/GYN SEMI*

## 2018-08-18 PROCEDURE — 97535 SELF CARE MNGMENT TRAINING: CPT

## 2018-08-18 PROCEDURE — 97165 OT EVAL LOW COMPLEX 30 MIN: CPT

## 2018-08-18 RX ORDER — WARFARIN SODIUM 5 MG/1
5 TABLET ORAL
Status: COMPLETED | OUTPATIENT
Start: 2018-08-18 | End: 2018-08-18

## 2018-08-18 RX ORDER — TRAMADOL HYDROCHLORIDE 50 MG/1
50 TABLET ORAL ONCE
Status: COMPLETED | OUTPATIENT
Start: 2018-08-18 | End: 2018-08-18

## 2018-08-18 RX ORDER — ONDANSETRON 4 MG/1
4 TABLET, ORALLY DISINTEGRATING ORAL EVERY 4 HOURS PRN
Status: DISCONTINUED | OUTPATIENT
Start: 2018-08-18 | End: 2018-08-23 | Stop reason: HOSPADM

## 2018-08-18 RX ADMIN — METOPROLOL TARTRATE 25 MG: 25 TABLET ORAL at 17:57

## 2018-08-18 RX ADMIN — FLUTICASONE PROPIONATE 100 MCG: 50 SPRAY, METERED NASAL at 05:17

## 2018-08-18 RX ADMIN — OXYCODONE HYDROCHLORIDE AND ACETAMINOPHEN 500 MG: 500 TABLET ORAL at 05:18

## 2018-08-18 RX ADMIN — INSULIN HUMAN 5 UNITS: 100 INJECTION, SOLUTION PARENTERAL at 11:52

## 2018-08-18 RX ADMIN — ACYCLOVIR 400 MG: 200 CAPSULE ORAL at 05:18

## 2018-08-18 RX ADMIN — ENOXAPARIN SODIUM 120 MG: 150 INJECTION SUBCUTANEOUS at 05:17

## 2018-08-18 RX ADMIN — METOPROLOL TARTRATE 25 MG: 25 TABLET ORAL at 05:18

## 2018-08-18 RX ADMIN — ACETAMINOPHEN 650 MG: 325 TABLET, FILM COATED ORAL at 20:44

## 2018-08-18 RX ADMIN — INSULIN HUMAN 5 UNITS: 100 INJECTION, SOLUTION PARENTERAL at 18:07

## 2018-08-18 RX ADMIN — PRAVASTATIN SODIUM 40 MG: 20 TABLET ORAL at 05:18

## 2018-08-18 RX ADMIN — ONDANSETRON 4 MG: 4 TABLET, ORALLY DISINTEGRATING ORAL at 09:47

## 2018-08-18 RX ADMIN — CETIRIZINE HYDROCHLORIDE 10 MG: 10 TABLET, FILM COATED ORAL at 05:18

## 2018-08-18 RX ADMIN — DULOXETINE HYDROCHLORIDE 40 MG: 20 CAPSULE, DELAYED RELEASE ORAL at 05:18

## 2018-08-18 RX ADMIN — CALCITRIOL 0.25 MCG: 0.25 CAPSULE, LIQUID FILLED ORAL at 05:17

## 2018-08-18 RX ADMIN — INSULIN HUMAN 3 UNITS: 100 INJECTION, SOLUTION PARENTERAL at 05:24

## 2018-08-18 RX ADMIN — ACYCLOVIR 400 MG: 200 CAPSULE ORAL at 17:57

## 2018-08-18 RX ADMIN — TRAMADOL HYDROCHLORIDE 50 MG: 50 TABLET, COATED ORAL at 23:29

## 2018-08-18 RX ADMIN — MULTIPLE VITAMINS W/ MINERALS TAB 1 TABLET: TAB at 05:19

## 2018-08-18 RX ADMIN — WARFARIN SODIUM 5 MG: 5 TABLET ORAL at 17:57

## 2018-08-18 RX ADMIN — OMEPRAZOLE 20 MG: 20 CAPSULE, DELAYED RELEASE ORAL at 05:19

## 2018-08-18 RX ADMIN — INSULIN HUMAN 5 UNITS: 100 INJECTION, SOLUTION PARENTERAL at 20:48

## 2018-08-18 RX ADMIN — VITAMIN D, TAB 1000IU (100/BT) 2000 UNITS: 25 TAB at 05:19

## 2018-08-18 RX ADMIN — ENOXAPARIN SODIUM 120 MG: 150 INJECTION SUBCUTANEOUS at 17:56

## 2018-08-18 ASSESSMENT — ENCOUNTER SYMPTOMS
DOUBLE VISION: 0
DEPRESSION: 0
FEVER: 0
VOMITING: 0
BLURRED VISION: 0
COUGH: 0
BACK PAIN: 1
NAUSEA: 1
NERVOUS/ANXIOUS: 0
DIZZINESS: 0
WEAKNESS: 1
ABDOMINAL PAIN: 0
HEADACHES: 0
SHORTNESS OF BREATH: 0
MYALGIAS: 1

## 2018-08-18 ASSESSMENT — GAIT ASSESSMENTS: GAIT LEVEL OF ASSIST: UNABLE TO PARTICIPATE

## 2018-08-18 ASSESSMENT — ACTIVITIES OF DAILY LIVING (ADL): TOILETING: REQUIRES ASSIST

## 2018-08-18 ASSESSMENT — PAIN SCALES - GENERAL
PAINLEVEL_OUTOF10: 6
PAINLEVEL_OUTOF10: 3

## 2018-08-18 ASSESSMENT — PATIENT HEALTH QUESTIONNAIRE - PHQ9
1. LITTLE INTEREST OR PLEASURE IN DOING THINGS: NOT AT ALL
2. FEELING DOWN, DEPRESSED, IRRITABLE, OR HOPELESS: NOT AT ALL
SUM OF ALL RESPONSES TO PHQ9 QUESTIONS 1 AND 2: 0

## 2018-08-18 NOTE — DISCHARGE PLANNING
Anticipated Discharge Disposition: [facility]    Action: LSW spoke with Jason at [facility] who reports patient can go to either [facility] or [facility]. LSW spoke with patient who reports they had been in [facility] before and would like to go to [facility]. Choice signed and faxed to [employer]. LSW requesting WC van transportation form faxed to [employer].    ENRRIQUE signed. Patients sister bedside who has belongings and will have them brought to [facility].     PASRR: On file     Barriers to Discharge: Previous SNF patient was at could not accomodate patient until Tuesday 8/21/18    Plan: f/u for confirmation of transfer time for  van.

## 2018-08-18 NOTE — PROGRESS NOTES
Assessment completed. Pt noted to have swelling to left lower extremity, known DVT on that leg. Pt turned to right side. SCDs on. Oxygen at 2L.

## 2018-08-18 NOTE — DISCHARGE PLANNING
Agency/Facility Name: [facility]  Spoke To: LM  Outcome: Awaiting call back on status of referral.

## 2018-08-18 NOTE — DISCHARGE PLANNING
Agency/Facility Name: [facility]  Outcome: According to the note by LSW Leyla Harmon and [facility] has accepted. Updated choice form requested. [facility]. LM for a call back from [facility] to confirm bed availability and transport.

## 2018-08-18 NOTE — CARE PLAN
Problem: Bowel/Gastric:  Goal: Normal bowel function is maintained or improved  Outcome: PROGRESSING AS EXPECTED  Patient had a normal BM last night

## 2018-08-18 NOTE — CARE PLAN
Problem: Safety  Goal: Will remain free from injury  Outcome: PROGRESSING AS EXPECTED  Patient calls for assistance appropriately

## 2018-08-18 NOTE — PROGRESS NOTES
Pt complaining of nausea this morning, no antinausea medication on MAR. MD paged for orders. Patient has no other complaints at this time.

## 2018-08-18 NOTE — PROGRESS NOTES
Inpatient Anticoagulation Service Note    Date: 8/18/2018  Reason for Anticoagulation: Deep Vein Thrombosis        Hemoglobin Value: (!) 8  Hematocrit Value: (!) 24.2  Lab Platelet Value: (!) 128  Target INR: 2.0 to 3.0    INR from last 7 days     Date/Time INR Value    08/18/18 0522 (!)  1.74    08/17/18 0643 (!)  1.39    08/16/18 1135 (!)  1.23        Dose from last 7 days     Date/Time Dose (mg)    08/18/18 0522  5    08/17/18 1000  5    08/16/18 1400  5        Significant Interactions: Proton Pump Inhibitor, Statin  Bridge Therapy: Yes (Lovenox 120 mg SQ Q12H)  Bridge Therapy Start Date: 08/16/18  Days of Overlap Therapy: 1 (If less than 5 days and overlap therapy discontinued -- document reason (i.e. Bleed Risk))    (If still on overlap therapy, if No -- document reason (i.e. Bleed Risk))         Plan:  Give Coumadin 5 mg tonight for INR 1.74  Education Material Provided?: No  Pharmacist suggested discharge dosing: Resume home dosing     Freida Caraballo

## 2018-08-18 NOTE — THERAPY
"Occupational Therapy Evaluation completed.   Functional Status:  A&Ox4. O2@1L NC. Performs Sup to sit with CGA, sit to sup with Macarena; both times with use of trapeze as well. EOB~20\". BUE AROM-WFL. Strength, 4-/5 throughout BUE's. Seated grooming with SBA. STS with FWW, bed raised, MaxAx2. 3 stands, ~30 secs each. Unsafe to transfer to chair at this time.     Plan of Care: Will benefit from Occupational Therapy 3 times per week  Discharge Recommendations:  Equipment: No Equipment Needed. Post-acute therapy Discharge to a transitional care facility for continued skilled therapy services. Pt was recently at [facility]. Lives alone.   See \"Rehab Therapy-Acute\" Patient Summary Report for complete documentation.    "

## 2018-08-18 NOTE — PROGRESS NOTES
2 RN skin check complete and no signs or symptoms of scabies noted. PT denies any itchiness. No bugbites or rash noted.

## 2018-08-18 NOTE — DISCHARGE PLANNING
Anticipated Discharge Disposition: SNF    Action: LSW spoke with [facility] who report patient does not have medicare part A and has [facility] as a secondary. [facility] does not currently take Home [facility]. LSW spoke with patient about other facilities and asked LSW to send referral to [facility] but not [facility].     Barriers to Discharge: placement    Plan: Choice faxed to [facility]. F/u with Cl[place]kanie[facility]oe Mckenzie.

## 2018-08-18 NOTE — DISCHARGE PLANNING
Received Choice form at 2:396 pm  Agency/Facility Name: [facility]  SNF Referral already sent by JAMAAL Harmon at  11:27 am today.

## 2018-08-18 NOTE — DISCHARGE SUMMARY
Discharge Summary    CHIEF COMPLAINT ON ADMISSION  Chief Complaint   Patient presents with   • Abnormal Labs     K+ 6.0, Hgb 7.3.  Chronic anemia.  Denies black/bloody stools.    • Sent by MD     From [facility]       Reason for Admission  Abnormal Labs     Admission Date  8/16/2018    CODE STATUS  Full Code    HPI & HOSPITAL COURSE  67 y.o. female with a past medical history of Multiple myeloma , patient has been on chemotherapy CyBorD 2 cycles in the past,presented 8/16/2018 for evaluation of abnormal labs including hyperkalemia with potassium of 6.0 in addition to a hemoglobin of 7.3. Patient otherwise denies having any chest pain abdominal pain denies having any melena or hematochezia. She was in [facility] last month and then was transferred to [facility], thus patient was transferred from [facility] .at this point we discussed case with  who recommended transfusion as well as to perform further diagnostic studies and terms of finding her status of her multiple myeloma.GUALBERTO, PE Serum UR protein were obtained. Patient was transfused with 2u PRBC and her hemoglobins were monitored. Hemoglobin improved and remained stable. Stool guaic was negative. No s/s active bleeding, likely from acute chemotherapy given all blood lines are low. Hx of MM as well. Labs were sent. Vitals remained stable. Hgb at time of dc was 8. BS were monitored and insulin SS was given in the hospital. She was continued on her warfarin for her hx of DVT.    at this time patient wanting to be discharged, she is medically stable, and will be discharged to [facility] and fu on her labs with oncology outpatient. Patient understood and agreed with the above plan.          Patient going to [facility] given [facility] had a scabies outbreak and state has shut it down till next week. SW assisted with above dc planning  Therefore, she is discharged in fair and stable condition to [facility]    The patient met 2-midnight criteria  for an inpatient stay at the time of discharge.    Discharge Date  8/18/18    FOLLOW UP ITEMS POST DISCHARGE  pcp  Oncology- fu on her labs;GUALBERTO, PE Serum UR protein    DISCHARGE DIAGNOSES  Principal Problem:    Anemia POA: Yes  Active Problems:    Multiple myeloma (HCC) POA: Yes    DM type 2 (diabetes mellitus, type 2) (CMS-HCC) POA: Yes    Vitamin D deficiency POA: Yes      Overview: ICD-10 transition    DVT (deep venous thrombosis) ([facility]) POA: Unknown    Morbid obesity (HCC) POA: Unknown    Pancytopenia (HCC) POA: Unknown  Resolved Problems:    Hypomagnesemia POA: Yes      FOLLOW UP  No future appointments.  No follow-up provider specified.    MEDICATIONS ON DISCHARGE     Medication List      CONTINUE taking these medications      Instructions   acyclovir 200 MG Caps  Commonly known as:  ZOVIRAX   Take 2 Caps by mouth 2 Times a Day.  Dose:  400 mg     ascorbic acid 500 MG tablet  Commonly known as:  VITAMIN C   Take 500 mg by mouth every day.  Dose:  500 mg     calcitRIOL 0.25 MCG Caps  Commonly known as:  ROCALTROL   Take 1 Cap by mouth every day.  Dose:  0.25 mcg     calcium carbonate 500 MG Chew  Commonly known as:  TUMS   Take 1,000 mg by mouth 3 times a day before meals.  Dose:  1000 mg     cetirizine 10 MG Tabs  Commonly known as:  ZYRTEC   Take 10 mg by mouth every day.  Dose:  10 mg     Cholecalciferol 2000 UNIT Caps   Take 2,000 Units by mouth every day.  Dose:  2000 Units     DULoxetine 20 MG Cpep  Commonly known as:  CYMBALTA   Take 40 mg by mouth every day.  Dose:  40 mg     fluticasone 50 MCG/ACT nasal spray  Commonly known as:  FLONASE   Spray 2 Sprays in nose every day.  Dose:  2 Spray     insulin  UNIT/ML Susp  Commonly known as:  HUMULIN,NOVOLIN   Inject  Units as instructed 2 Times a Day. 55 units QAM 25 units QPM  Dose:   Units     insulin regular 100 Unit/mL Soln  Commonly known as:  HUMULIN R   Inject 4-10 Units as instructed 3 times a day before meals. 200-250 4 units  251-300 6 units 301-350 8 units 351-400 10 units  Dose:  4-10 Units     Ixazomib Citrate 4 MG Caps   Take 4 mg by mouth every Tuesday.  Dose:  4 mg     LACTOBACILLUS PO   Take 1 Cap by mouth every evening.  Dose:  1 Cap     LORazepam 0.5 MG Tabs  Commonly known as:  ATIVAN   Take 0.5 mg by mouth 2 Times a Day.  Dose:  0.5 mg     metoprolol 25 MG Tabs  Commonly known as:  LOPRESSOR   Take 25 mg by mouth 2 times a day.  Dose:  25 mg     omeprazole 20 MG delayed-release capsule  Commonly known as:  PRILOSEC   Take 20 mg by mouth every day.  Dose:  20 mg     pravastatin 40 MG tablet  Commonly known as:  PRAVACHOL   Take 1 Tab by mouth every day.  Dose:  40 mg     REVLIMID 25 MG Caps  Generic drug:  lenalidomide   Take 25 mg by mouth every day.  Dose:  25 mg     sucralfate 1 GM Tabs  Commonly known as:  CARAFATE   Take 1 g by mouth every Tuesday.  Dose:  1 g     therapeutic multivitamin-minerals Tabs   Take 1 Tab by mouth every day.  Dose:  1 Tab     warfarin 5 MG Tabs  Commonly known as:  COUMADIN   Take 5 mg by mouth every day.  Dose:  5 mg        STOP taking these medications    dexamethasone 4 MG Tabs  Commonly known as:  DECADRON            Allergies  Allergies   Allergen Reactions   • Actos [Pioglitazone Hydrochloride] Hives   • Advil [Ibuprofen Micronized] Hives   • Cephalosporins Hives     Unsure if she tolerates penicillin     • McLean Hives       DIET  Orders Placed This Encounter   Procedures   • Diet Order Diabetic     Standing Status:   Standing     Number of Occurrences:   1     Order Specific Question:   Diet:     Answer:   Diabetic [3]       ACTIVITY  As tolerated.  Weight bearing as tolerated    CONSULTATIONS  Oncology over the phone    PROCEDURES  none    LABORATORY  Lab Results   Component Value Date    SODIUM 133 (L) 08/17/2018    POTASSIUM 4.5 08/17/2018    CHLORIDE 103 08/17/2018    CO2 23 08/17/2018    GLUCOSE 219 (H) 08/17/2018    BUN 21 08/17/2018    CREATININE 1.08 08/17/2018    CREATININE 1.10  08/12/2014        Lab Results   Component Value Date    WBC 2.7 (L) 08/17/2018    HEMOGLOBIN 8.0 (L) 08/18/2018    HEMATOCRIT 24.2 (L) 08/18/2018    PLATELETCT 128 (L) 08/17/2018        Total time of the discharge process exceeds 40 minutes.

## 2018-08-18 NOTE — DISCHARGE PLANNING
Anticipated Discharge Disposition: SNF    Action: LSW met with patient bedside about going back to [facility]. Patient agreeable. Patient and bedside RN report patient is safe to go by EDMOND owens. Choice form for [facility] faxed to [facility].     Barriers to Discharge: SNF order still needed.    Plan: f/u for acceptance/ denial /transport .

## 2018-08-18 NOTE — DISCHARGE INSTRUCTIONS
Discharge Instructions    Discharged to other by medical transportation with self. Discharged via wheelchair, hospital escort: Yes.  Special equipment needed: Walker    Be sure to schedule a follow-up appointment with your primary care doctor or any specialists as instructed.     Discharge Plan:   Influenza Vaccine Indication: Not indicated: Previously immunized this influenza season and > 8 years of age    I understand that a diet low in cholesterol, fat, and sodium is recommended for good health. Unless I have been given specific instructions below for another diet, I accept this instruction as my diet prescription.   Other diet: Diabetic    Special Instructions: None    · Is patient discharged on Warfarin / Coumadin?   No     Depression / Suicide Risk    As you are discharged from this [facility] facility, it is important to learn how to keep safe from harming yourself.    Recognize the warning signs:  · Abrupt changes in personality, positive or negative- including increase in energy   · Giving away possessions  · Change in eating patterns- significant weight changes-  positive or negative  · Change in sleeping patterns- unable to sleep or sleeping all the time   · Unwillingness or inability to communicate  · Depression  · Unusual sadness, discouragement and loneliness  · Talk of wanting to die  · Neglect of personal appearance   · Rebelliousness- reckless behavior  · Withdrawal from people/activities they love  · Confusion- inability to concentrate     If you or a loved one observes any of these behaviors or has concerns about self-harm, here's what you can do:  · Talk about it- your feelings and reasons for harming yourself  · Remove any means that you might use to hurt yourself (examples: pills, rope, extension cords, firearm)  · Get professional help from the community (Mental Health, Substance Abuse, psychological counseling)  · Do not be alone:Call your Safe Contact- someone whom you trust who will be  there for you.  · Call your local CRISIS HOTLINE 891-7200 or 290-524-9167  · Call your local Children's Mobile Crisis Response Team [place] (132) 391-6462 or www.[employer]  · Call the toll free National Suicide Prevention Hotlines   · National Suicide Prevention Lifeline 812-744-GKYD (5559)  · National DepoMed Line Network 800-SUICIDE (060-3238)

## 2018-08-18 NOTE — PROGRESS NOTES
Received report from Sanjana SWEET. Assumed pt care. Pt states no needs at this time. Q2 turns in place. Educated to call for assistance.

## 2018-08-18 NOTE — THERAPY
"Physical Therapy Evaluation completed.   Bed Mobility:  Supine to Sit: Contact Guard Assist  Transfers: Sit to Stand: Maximal Assist (x2)  Gait: Level Of Assist: Unable to Participate with Front-Wheel Walker, stood with max assist of 2 x 3 reps from high bed       Plan of Care: Will benefit from Physical Therapy 3 times per week  Discharge Recommendations: Equipment: Will Continue to Assess for Equipment Needs. Post-acute therapy Discharge to a transitional care facility for continued skilled therapy services.    See \"Rehab Therapy-Acute\" Patient Summary Report for complete documentation.   Pt is a 67 y.o.f. referred to Pt secondary to weakness and functional decline.  The pt has been in and out of the hospital since march of this year.  She completed her cancer treatment in the snf.  Pt was participating in PT at snf and progressed to ambulating 160 ft.  Pt presents with significant LE weakness.  Pt requires assist of 2 for sit to stand, and she is not able to wt shift or step secondary to weakness.  Pt requires high bed to complete sit to stand.  Pt is demonstrating a decline in function and would benefit from cont skilled PT in acute care setting.  Pt would benefit from cont skilled PT in snf following d/c to further increase strength and mobility.  "

## 2018-08-18 NOTE — DISCHARGE PLANNING
Received Choice form at 2:28 PM  [facility]  Referral sent per Choice form @ 2:28 pm

## 2018-08-18 NOTE — DISCHARGE PLANNING
Anticipated Discharge Disposition: [facility]    Action: LSW spoke with [facility] who reports they can accept patient back but not until Tuesday 8/21/18 due to scabies outbreak. LSW informed charge and hospitalist. LSW escalated issue to leadership who is looking into the situation. For now patient to stay.     Barriers to Discharge: placement     Plan: f/u with [facility] and social work leadership.

## 2018-08-18 NOTE — CARE PLAN
Problem: Pain Management  Goal: Pain level will decrease to patient's comfort goal  Outcome: PROGRESSING AS EXPECTED   08/17/18 1900   OTHER   Pain Scale 0 - 10  3   Comfort Goal Comfort at Rest;Comfort with Movement

## 2018-08-19 LAB
ANION GAP SERPL CALC-SCNC: 7 MMOL/L (ref 0–11.9)
BASOPHILS # BLD AUTO: 0.6 % (ref 0–1.8)
BASOPHILS # BLD: 0.02 K/UL (ref 0–0.12)
BUN SERPL-MCNC: 17 MG/DL (ref 8–22)
CALCIUM SERPL-MCNC: 9.1 MG/DL (ref 8.4–10.2)
CHLORIDE SERPL-SCNC: 102 MMOL/L (ref 96–112)
CO2 SERPL-SCNC: 26 MMOL/L (ref 20–33)
CREAT SERPL-MCNC: 0.97 MG/DL (ref 0.5–1.4)
EOSINOPHIL # BLD AUTO: 0.02 K/UL (ref 0–0.51)
EOSINOPHIL NFR BLD: 0.6 % (ref 0–6.9)
ERYTHROCYTE [DISTWIDTH] IN BLOOD BY AUTOMATED COUNT: 57 FL (ref 35.9–50)
GLUCOSE BLD-MCNC: 229 MG/DL (ref 65–99)
GLUCOSE BLD-MCNC: 254 MG/DL (ref 65–99)
GLUCOSE BLD-MCNC: 259 MG/DL (ref 65–99)
GLUCOSE BLD-MCNC: 299 MG/DL (ref 65–99)
GLUCOSE SERPL-MCNC: 243 MG/DL (ref 65–99)
HCT VFR BLD AUTO: 24.7 % (ref 37–47)
HGB BLD-MCNC: 8.1 G/DL (ref 12–16)
IMM GRANULOCYTES # BLD AUTO: 0.12 K/UL (ref 0–0.11)
IMM GRANULOCYTES NFR BLD AUTO: 3.8 % (ref 0–0.9)
INR PPP: 1.97 (ref 0.87–1.13)
LYMPHOCYTES # BLD AUTO: 0.53 K/UL (ref 1–4.8)
LYMPHOCYTES NFR BLD: 16.7 % (ref 22–41)
MCH RBC QN AUTO: 33.5 PG (ref 27–33)
MCHC RBC AUTO-ENTMCNC: 32.8 G/DL (ref 33.6–35)
MCV RBC AUTO: 102.1 FL (ref 81.4–97.8)
MONOCYTES # BLD AUTO: 0.43 K/UL (ref 0–0.85)
MONOCYTES NFR BLD AUTO: 13.5 % (ref 0–13.4)
NEUTROPHILS # BLD AUTO: 2.06 K/UL (ref 2–7.15)
NEUTROPHILS NFR BLD: 64.8 % (ref 44–72)
NRBC # BLD AUTO: 0 K/UL
NRBC BLD-RTO: 0 /100 WBC
PLATELET # BLD AUTO: 112 K/UL (ref 164–446)
PMV BLD AUTO: 11.6 FL (ref 9–12.9)
POTASSIUM SERPL-SCNC: 4.4 MMOL/L (ref 3.6–5.5)
PROTHROMBIN TIME: 22.2 SEC (ref 12–14.6)
RBC # BLD AUTO: 2.42 M/UL (ref 4.2–5.4)
SODIUM SERPL-SCNC: 135 MMOL/L (ref 135–145)
WBC # BLD AUTO: 3.2 K/UL (ref 4.8–10.8)

## 2018-08-19 PROCEDURE — A9270 NON-COVERED ITEM OR SERVICE: HCPCS | Performed by: HOSPITALIST

## 2018-08-19 PROCEDURE — 80048 BASIC METABOLIC PNL TOTAL CA: CPT

## 2018-08-19 PROCEDURE — 770006 HCHG ROOM/CARE - MED/SURG/GYN SEMI*

## 2018-08-19 PROCEDURE — 99231 SBSQ HOSP IP/OBS SF/LOW 25: CPT | Performed by: HOSPITALIST

## 2018-08-19 PROCEDURE — 85025 COMPLETE CBC W/AUTO DIFF WBC: CPT

## 2018-08-19 PROCEDURE — 85610 PROTHROMBIN TIME: CPT

## 2018-08-19 PROCEDURE — 700102 HCHG RX REV CODE 250 W/ 637 OVERRIDE(OP): Performed by: HOSPITALIST

## 2018-08-19 PROCEDURE — 700111 HCHG RX REV CODE 636 W/ 250 OVERRIDE (IP): Performed by: HOSPITALIST

## 2018-08-19 PROCEDURE — 36415 COLL VENOUS BLD VENIPUNCTURE: CPT

## 2018-08-19 PROCEDURE — 82962 GLUCOSE BLOOD TEST: CPT

## 2018-08-19 RX ORDER — WARFARIN SODIUM 5 MG/1
5 TABLET ORAL
Status: COMPLETED | OUTPATIENT
Start: 2018-08-19 | End: 2018-08-19

## 2018-08-19 RX ADMIN — WARFARIN SODIUM 5 MG: 5 TABLET ORAL at 17:26

## 2018-08-19 RX ADMIN — INSULIN HUMAN 5 UNITS: 100 INJECTION, SOLUTION PARENTERAL at 17:29

## 2018-08-19 RX ADMIN — FLUTICASONE PROPIONATE 100 MCG: 50 SPRAY, METERED NASAL at 05:32

## 2018-08-19 RX ADMIN — INSULIN HUMAN 5 UNITS: 100 INJECTION, SOLUTION PARENTERAL at 12:01

## 2018-08-19 RX ADMIN — ENOXAPARIN SODIUM 120 MG: 150 INJECTION SUBCUTANEOUS at 05:32

## 2018-08-19 RX ADMIN — ACETAMINOPHEN 650 MG: 325 TABLET, FILM COATED ORAL at 17:34

## 2018-08-19 RX ADMIN — OXYCODONE HYDROCHLORIDE AND ACETAMINOPHEN 500 MG: 500 TABLET ORAL at 05:31

## 2018-08-19 RX ADMIN — ACETAMINOPHEN 650 MG: 325 TABLET, FILM COATED ORAL at 23:32

## 2018-08-19 RX ADMIN — ENOXAPARIN SODIUM 120 MG: 150 INJECTION SUBCUTANEOUS at 17:25

## 2018-08-19 RX ADMIN — VITAMIN D, TAB 1000IU (100/BT) 2000 UNITS: 25 TAB at 05:30

## 2018-08-19 RX ADMIN — ACYCLOVIR 400 MG: 200 CAPSULE ORAL at 05:30

## 2018-08-19 RX ADMIN — CETIRIZINE HYDROCHLORIDE 10 MG: 10 TABLET, FILM COATED ORAL at 05:31

## 2018-08-19 RX ADMIN — METOPROLOL TARTRATE 25 MG: 25 TABLET ORAL at 17:26

## 2018-08-19 RX ADMIN — DULOXETINE HYDROCHLORIDE 40 MG: 20 CAPSULE, DELAYED RELEASE ORAL at 05:30

## 2018-08-19 RX ADMIN — MULTIPLE VITAMINS W/ MINERALS TAB 1 TABLET: TAB at 05:30

## 2018-08-19 RX ADMIN — PRAVASTATIN SODIUM 40 MG: 20 TABLET ORAL at 05:31

## 2018-08-19 RX ADMIN — OMEPRAZOLE 20 MG: 20 CAPSULE, DELAYED RELEASE ORAL at 05:30

## 2018-08-19 RX ADMIN — INSULIN HUMAN 3 UNITS: 100 INJECTION, SOLUTION PARENTERAL at 21:47

## 2018-08-19 RX ADMIN — INSULIN HUMAN 5 UNITS: 100 INJECTION, SOLUTION PARENTERAL at 05:37

## 2018-08-19 RX ADMIN — ACYCLOVIR 400 MG: 200 CAPSULE ORAL at 17:25

## 2018-08-19 RX ADMIN — METOPROLOL TARTRATE 25 MG: 25 TABLET ORAL at 05:30

## 2018-08-19 RX ADMIN — CALCITRIOL 0.25 MCG: 0.25 CAPSULE, LIQUID FILLED ORAL at 05:30

## 2018-08-19 ASSESSMENT — ENCOUNTER SYMPTOMS
VOMITING: 0
DOUBLE VISION: 0
COUGH: 0
SHORTNESS OF BREATH: 0
HEADACHES: 0
DEPRESSION: 0
FEVER: 0
WEAKNESS: 1
NERVOUS/ANXIOUS: 0
DIZZINESS: 0
MYALGIAS: 1
FALLS: 1
BLURRED VISION: 0
ABDOMINAL PAIN: 0
NAUSEA: 0
BACK PAIN: 1

## 2018-08-19 ASSESSMENT — PATIENT HEALTH QUESTIONNAIRE - PHQ9
SUM OF ALL RESPONSES TO PHQ9 QUESTIONS 1 AND 2: 0
2. FEELING DOWN, DEPRESSED, IRRITABLE, OR HOPELESS: NOT AT ALL
1. LITTLE INTEREST OR PLEASURE IN DOING THINGS: NOT AT ALL

## 2018-08-19 ASSESSMENT — PAIN SCALES - GENERAL
PAINLEVEL_OUTOF10: 4
PAINLEVEL_OUTOF10: 8

## 2018-08-19 NOTE — PROGRESS NOTES
Report received. Pt assisted to bedpan to urinate. Pt states leg was aching overnight but medications helped. Pt denies any other needs at this time.

## 2018-08-19 NOTE — DISCHARGE PLANNING
Anticipated Discharge Disposition: SNF    Action: Called [facility] and spoke with Erica to inquire about pt's denial, as per therapy notes pt was progressing at SNF ambulating 160 ft and is now requiring max assist. Erica states they will look over referral again.    Barriers to Discharge: SNF acceptance    Plan: F/U with SNF. SW will meet with pt to expand referrals

## 2018-08-19 NOTE — PROGRESS NOTES
Pt complaining of pain and states tylenol doesn't work. Hospitalist paged. Awaiting return call from Dr. Paige.

## 2018-08-19 NOTE — PROGRESS NOTES
[facility] Hospitalist Progress Note    Date of Service: 2018    Chief Complaint  67 y.o. female admitted 2018 with hx of MM, presenting with abnormal labs, K 6.0, hgb 7.3    Interval Problem Update  Patient seen this morning, sleeping in bed, complaining of b/l leg pain, has hx of chronic hip pain s/p fall. Otherwise feeling tired , wants to sleep. No nausea or vomiting,     Consultants/Specialty  OncologyDr. johnsons over the phone    Disposition  Patient coming from [facility] however state shut them down 2/2 scabies outbreak till   sw assisting      Review of Systems   Constitutional: Positive for malaise/fatigue. Negative for fever.   HENT: Negative for congestion, hearing loss and nosebleeds.    Eyes: Negative for blurred vision and double vision.   Respiratory: Negative for cough and shortness of breath.    Cardiovascular: Negative for chest pain and leg swelling.   Gastrointestinal: Negative for abdominal pain, melena, nausea and vomiting.   Genitourinary: Negative for dysuria, frequency and urgency.   Musculoskeletal: Positive for back pain, falls, joint pain and myalgias.   Skin: Negative for itching and rash.   Neurological: Positive for weakness. Negative for dizziness and headaches.   Psychiatric/Behavioral: Negative for depression and suicidal ideas. The patient is not nervous/anxious.       Physical Exam  Laboratory/Imaging   Hemodynamics  Temp (24hrs), Av.6 °C (97.9 °F), Min:36.4 °C (97.6 °F), Max:37 °C (98.6 °F)   Temperature: 36.5 °C (97.7 °F)  Pulse  Av.8  Min: 83  Max: 120   Blood Pressure : 151/72      Respiratory      Respiration: 18, Pulse Oximetry: 95 %        RUL Breath Sounds: Clear, RML Breath Sounds: Diminished, RLL Breath Sounds: Diminished, CHARLOTTE Breath Sounds: Clear    Fluids    Intake/Output Summary (Last 24 hours) at 18 1342  Last data filed at 18 0900   Gross per 24 hour   Intake              720 ml   Output                0 ml   Net              720         Nutrition  Orders Placed This Encounter   Procedures   • Diet Order Diabetic     Standing Status:   Standing     Number of Occurrences:   1     Order Specific Question:   Diet:     Answer:   Diabetic [3]     Physical Exam   Constitutional: She is oriented to person, place, and time. She appears well-developed and well-nourished. No distress.   Morbidly obese   HENT:   Head: Normocephalic and atraumatic.   Mouth/Throat: No oropharyngeal exudate.   Eyes: Pupils are equal, round, and reactive to light. Conjunctivae and EOM are normal. No scleral icterus.   Neck: Normal range of motion. Neck supple. No JVD present. No thyromegaly present.   Cardiovascular: Normal rate, regular rhythm and intact distal pulses.  Exam reveals no friction rub.    No murmur heard.  Pulmonary/Chest: Effort normal and breath sounds normal. No respiratory distress. She has no wheezes.   Abdominal: Soft. Bowel sounds are normal. She exhibits no distension. There is no tenderness.   Musculoskeletal: Normal range of motion. She exhibits edema. She exhibits no tenderness.   Lymphadenopathy:     She has no cervical adenopathy.   Neurological: She is alert and oriented to person, place, and time. She exhibits normal muscle tone.   Skin: Skin is warm and dry. No rash noted. No erythema.   Psychiatric: She has a normal mood and affect. Her behavior is normal.       Recent Labs      08/17/18   0643  08/18/18   0522  08/19/18   0455   WBC  2.7*   --   3.2*   RBC  2.19*   --   2.42*   HEMOGLOBIN  7.4*  8.0*  8.1*   HEMATOCRIT  22.4*  24.2*  24.7*   MCV  102.3*   --   102.1*   MCH  33.8*   --   33.5*   MCHC  33.0*   --   32.8*   RDW  59.6*   --   57.0*   PLATELETCT  128*   --   112*   MPV  10.1   --   11.6     Recent Labs      08/17/18   0643  08/19/18   0455   SODIUM  133*  135   POTASSIUM  4.5  4.4   CHLORIDE  103  102   CO2  23  26   GLUCOSE  219*  243*   BUN  21  17   CREATININE  1.08  0.97   CALCIUM  9.4  9.1     Recent Labs      08/17/18    0643  08/18/18   0522  08/19/18   0455   INR  1.39*  1.74*  1.97*                  Assessment/Plan     * Anemia- (present on admission)   Assessment & Plan    Hgb 6.7 on admission  Most likely secondary to multiple myeloma  Stool guaiac negative  2 units PRBCs ordered on admission,hgb this morning 8.1, stable  monitor        Multiple myeloma (HCC)- (present on admission)   Assessment & Plan    Hx of MM, has undergone 2 rounds of chemotherapy now on a newer agent.   Follows outpatient.   GUALBERTO, PE Serum UR protein noted, will need to fu with oncology outpatient          Pancytopenia ([facility])   Assessment & Plan    All blood lines are low, likely 2/2 MM  Monitor  No s/s of active bleeding  Stool guaic neg  H/h q6hour  S/p 2u PRBC        Morbid obesity ([facility])   Assessment & Plan    BMI of 49.4  Nutrition consulted  Counseled about diet and lifestyle modification  Will need outpatient resources        DVT (deep venous thrombosis) ([facility])   Assessment & Plan    Left LE DVT  Continue warfarin per pharmacy        Vitamin D deficiency- (present on admission)   Assessment & Plan    Resume vitamin D.         DM type 2 (diabetes mellitus, type 2) (CMS-[facility])- (present on admission)   Assessment & Plan    Patient is on home dose of NPH as well as regular insulin  A1c 8.5 4 months ago  Continue Insulin-sliding scale, accu-checks and hypoglycemia protocol.              Quality-Core Measures   Reviewed items::  Labs reviewed, Medications reviewed and Radiology images reviewed  Singh catheter::  No Singh  DVT prophylaxis pharmacological::  Warfarin (Coumadin)        Discussed plan of care with patient, nursing and SW. Patient unable to go back to [facility] 2/2 scabies outbreak, SW expanding referrals  SW assisting with this matter

## 2018-08-19 NOTE — PROGRESS NOTES
Inpatient Anticoagulation Service Note    Date: 8/19/2018  Reason for Anticoagulation: Deep Vein Thrombosis        Hemoglobin Value: (!) 8.1  Hematocrit Value: (!) 24.7  Lab Platelet Value: (!) 112  Target INR: 2.0 to 3.0    INR from last 7 days     Date/Time INR Value    08/19/18 0455 (!)  1.97    08/18/18 0522 (!)  1.74    08/17/18 0643 (!)  1.39    08/16/18 1135 (!)  1.23        Dose from last 7 days     Date/Time Dose (mg)    08/19/18 0455  5    08/18/18 0522  5    08/17/18 1000  5    08/16/18 1400  5        Significant Interactions: Proton Pump Inhibitor, Statin  Bridge Therapy: Yes (Lovenox 120 mg SQ Q12H)  Bridge Therapy Start Date: 08/16/18  Days of Overlap Therapy: 1 (If less than 5 days and overlap therapy discontinued -- document reason (i.e. Bleed Risk))    (If still on overlap therapy, if No -- document reason (i.e. Bleed Risk))         Plan:  Give Coumadin 5 mg tonight for INR 1.97  Education Material Provided?: No    Pharmacist suggested discharge dosing: Resume home dosing     Freida Caraballo

## 2018-08-19 NOTE — CARE PLAN
Problem: Knowledge Deficit  Goal: Knowledge of disease process/condition, treatment plan, diagnostic tests, and medications will improve  Outcome: PROGRESSING AS EXPECTED  Patient educated on plan of care

## 2018-08-19 NOTE — CARE PLAN
Problem: Communication  Goal: The ability to communicate needs accurately and effectively will improve  Outcome: PROGRESSING AS EXPECTED  Pt can state her needs appropriately

## 2018-08-20 LAB
ALBUMIN SERPL-MCNC: 2.4 G/DL (ref 3.75–5.01)
ALPHA1 GLOB SERPL ELPH-MCNC: 0.79 G/DL (ref 0.19–0.46)
ALPHA2 GLOB SERPL ELPH-MCNC: 1.63 G/DL (ref 0.48–1.05)
ANION GAP SERPL CALC-SCNC: 6 MMOL/L (ref 0–11.9)
B-GLOBULIN SERPL ELPH-MCNC: 0.71 G/DL (ref 0.48–1.1)
BASOPHILS # BLD AUTO: 0.4 % (ref 0–1.8)
BASOPHILS # BLD: 0.02 K/UL (ref 0–0.12)
BUN SERPL-MCNC: 16 MG/DL (ref 8–22)
CALCIUM SERPL-MCNC: 9.2 MG/DL (ref 8.4–10.2)
CHLORIDE SERPL-SCNC: 101 MMOL/L (ref 96–112)
CO2 SERPL-SCNC: 25 MMOL/L (ref 20–33)
CREAT SERPL-MCNC: 0.84 MG/DL (ref 0.5–1.4)
EOSINOPHIL # BLD AUTO: 0.01 K/UL (ref 0–0.51)
EOSINOPHIL NFR BLD: 0.2 % (ref 0–6.9)
ERYTHROCYTE [DISTWIDTH] IN BLOOD BY AUTOMATED COUNT: 56.1 FL (ref 35.9–50)
GAMMA GLOB SERPL ELPH-MCNC: 1.57 G/DL (ref 0.62–1.51)
GLUCOSE BLD-MCNC: 230 MG/DL (ref 65–99)
GLUCOSE BLD-MCNC: 234 MG/DL (ref 65–99)
GLUCOSE BLD-MCNC: 239 MG/DL (ref 65–99)
GLUCOSE BLD-MCNC: 281 MG/DL (ref 65–99)
GLUCOSE SERPL-MCNC: 236 MG/DL (ref 65–99)
HCT VFR BLD AUTO: 26.1 % (ref 37–47)
HGB BLD-MCNC: 8.6 G/DL (ref 12–16)
IGA SERPL-MCNC: 26 MG/DL (ref 68–408)
IGG SERPL-MCNC: 1430 MG/DL (ref 768–1632)
IGM SERPL-MCNC: 7 MG/DL (ref 35–263)
IMM GRANULOCYTES # BLD AUTO: 0.14 K/UL (ref 0–0.11)
IMM GRANULOCYTES NFR BLD AUTO: 2.7 % (ref 0–0.9)
INR PPP: 2.52 (ref 0.87–1.13)
INR PPP: 2.64 (ref 0.87–1.13)
INTERPRETATION SERPL IFE-IMP: ABNORMAL
INTERPRETATION SERPL IFE-IMP: ABNORMAL
LYMPHOCYTES # BLD AUTO: 0.82 K/UL (ref 1–4.8)
LYMPHOCYTES NFR BLD: 15.8 % (ref 22–41)
MCH RBC QN AUTO: 33.7 PG (ref 27–33)
MCHC RBC AUTO-ENTMCNC: 33 G/DL (ref 33.6–35)
MCV RBC AUTO: 102.4 FL (ref 81.4–97.8)
MONOCYTES # BLD AUTO: 0.48 K/UL (ref 0–0.85)
MONOCYTES NFR BLD AUTO: 9.2 % (ref 0–13.4)
NEUTROPHILS # BLD AUTO: 3.72 K/UL (ref 2–7.15)
NEUTROPHILS NFR BLD: 71.7 % (ref 44–72)
NRBC # BLD AUTO: 0 K/UL
NRBC BLD-RTO: 0 /100 WBC
PATHOLOGY STUDY: ABNORMAL
PLATELET # BLD AUTO: 109 K/UL (ref 164–446)
PMV BLD AUTO: 12.3 FL (ref 9–12.9)
POTASSIUM SERPL-SCNC: 4.2 MMOL/L (ref 3.6–5.5)
PROT SERPL-MCNC: 7.1 G/DL (ref 6–8.3)
PROT UR-MCNC: 43.2 MG/DL (ref 0–15)
PROTHROMBIN TIME: 26.8 SEC (ref 12–14.6)
PROTHROMBIN TIME: 27.8 SEC (ref 12–14.6)
RBC # BLD AUTO: 2.55 M/UL (ref 4.2–5.4)
SODIUM SERPL-SCNC: 132 MMOL/L (ref 135–145)
WBC # BLD AUTO: 5.2 K/UL (ref 4.8–10.8)

## 2018-08-20 PROCEDURE — A6250 SKIN SEAL PROTECT MOISTURIZR: HCPCS | Performed by: HOSPITALIST

## 2018-08-20 PROCEDURE — 700102 HCHG RX REV CODE 250 W/ 637 OVERRIDE(OP): Performed by: HOSPITALIST

## 2018-08-20 PROCEDURE — 700102 HCHG RX REV CODE 250 W/ 637 OVERRIDE(OP)

## 2018-08-20 PROCEDURE — 85610 PROTHROMBIN TIME: CPT | Mod: 91

## 2018-08-20 PROCEDURE — 99231 SBSQ HOSP IP/OBS SF/LOW 25: CPT | Performed by: HOSPITALIST

## 2018-08-20 PROCEDURE — 36415 COLL VENOUS BLD VENIPUNCTURE: CPT

## 2018-08-20 PROCEDURE — 85025 COMPLETE CBC W/AUTO DIFF WBC: CPT

## 2018-08-20 PROCEDURE — A9270 NON-COVERED ITEM OR SERVICE: HCPCS | Performed by: HOSPITALIST

## 2018-08-20 PROCEDURE — 82962 GLUCOSE BLOOD TEST: CPT | Mod: 91

## 2018-08-20 PROCEDURE — A9270 NON-COVERED ITEM OR SERVICE: HCPCS

## 2018-08-20 PROCEDURE — 700111 HCHG RX REV CODE 636 W/ 250 OVERRIDE (IP): Performed by: HOSPITALIST

## 2018-08-20 PROCEDURE — 770006 HCHG ROOM/CARE - MED/SURG/GYN SEMI*

## 2018-08-20 PROCEDURE — 80048 BASIC METABOLIC PNL TOTAL CA: CPT

## 2018-08-20 RX ORDER — TRAMADOL HYDROCHLORIDE 50 MG/1
50 TABLET ORAL EVERY 6 HOURS PRN
Status: DISCONTINUED | OUTPATIENT
Start: 2018-08-20 | End: 2018-08-23 | Stop reason: HOSPADM

## 2018-08-20 RX ORDER — WARFARIN SODIUM 5 MG/1
5 TABLET ORAL
Status: DISCONTINUED | OUTPATIENT
Start: 2018-08-20 | End: 2018-08-22

## 2018-08-20 RX ORDER — TRAMADOL HYDROCHLORIDE 50 MG/1
TABLET ORAL
Status: COMPLETED
Start: 2018-08-20 | End: 2018-08-20

## 2018-08-20 RX ADMIN — VITAMIN D, TAB 1000IU (100/BT) 2000 UNITS: 25 TAB at 05:47

## 2018-08-20 RX ADMIN — FLUTICASONE PROPIONATE 100 MCG: 50 SPRAY, METERED NASAL at 05:51

## 2018-08-20 RX ADMIN — WARFARIN SODIUM 5 MG: 5 TABLET ORAL at 17:55

## 2018-08-20 RX ADMIN — METOPROLOL TARTRATE 25 MG: 25 TABLET ORAL at 05:48

## 2018-08-20 RX ADMIN — CETIRIZINE HYDROCHLORIDE 10 MG: 10 TABLET, FILM COATED ORAL at 05:48

## 2018-08-20 RX ADMIN — ONDANSETRON 4 MG: 4 TABLET, ORALLY DISINTEGRATING ORAL at 16:29

## 2018-08-20 RX ADMIN — METOPROLOL TARTRATE 25 MG: 25 TABLET ORAL at 17:54

## 2018-08-20 RX ADMIN — ACYCLOVIR 400 MG: 200 CAPSULE ORAL at 17:54

## 2018-08-20 RX ADMIN — ENOXAPARIN SODIUM 120 MG: 150 INJECTION SUBCUTANEOUS at 05:45

## 2018-08-20 RX ADMIN — INSULIN HUMAN 5 UNITS: 100 INJECTION, SOLUTION PARENTERAL at 21:09

## 2018-08-20 RX ADMIN — ACETAMINOPHEN 650 MG: 325 TABLET, FILM COATED ORAL at 12:04

## 2018-08-20 RX ADMIN — INSULIN HUMAN 3 UNITS: 100 INJECTION, SOLUTION PARENTERAL at 12:06

## 2018-08-20 RX ADMIN — INSULIN HUMAN 3 UNITS: 100 INJECTION, SOLUTION PARENTERAL at 17:57

## 2018-08-20 RX ADMIN — ACETAMINOPHEN 650 MG: 325 TABLET, FILM COATED ORAL at 18:15

## 2018-08-20 RX ADMIN — OXYCODONE HYDROCHLORIDE AND ACETAMINOPHEN 500 MG: 500 TABLET ORAL at 05:46

## 2018-08-20 RX ADMIN — INSULIN HUMAN 3 UNITS: 100 INJECTION, SOLUTION PARENTERAL at 05:52

## 2018-08-20 RX ADMIN — ACETAMINOPHEN 650 MG: 325 TABLET, FILM COATED ORAL at 05:48

## 2018-08-20 RX ADMIN — DULOXETINE HYDROCHLORIDE 40 MG: 20 CAPSULE, DELAYED RELEASE ORAL at 05:47

## 2018-08-20 RX ADMIN — PRAVASTATIN SODIUM 40 MG: 20 TABLET ORAL at 05:46

## 2018-08-20 RX ADMIN — ONDANSETRON 4 MG: 4 TABLET, ORALLY DISINTEGRATING ORAL at 09:05

## 2018-08-20 RX ADMIN — ACYCLOVIR 400 MG: 200 CAPSULE ORAL at 05:47

## 2018-08-20 RX ADMIN — MULTIPLE VITAMINS W/ MINERALS TAB 1 TABLET: TAB at 05:47

## 2018-08-20 RX ADMIN — CALCITRIOL 0.25 MCG: 0.25 CAPSULE, LIQUID FILLED ORAL at 05:47

## 2018-08-20 RX ADMIN — OMEPRAZOLE 20 MG: 20 CAPSULE, DELAYED RELEASE ORAL at 05:47

## 2018-08-20 RX ADMIN — TRAMADOL HYDROCHLORIDE 50 MG: 50 TABLET, COATED ORAL at 18:16

## 2018-08-20 ASSESSMENT — ENCOUNTER SYMPTOMS
FALLS: 1
BACK PAIN: 1
MYALGIAS: 1
SHORTNESS OF BREATH: 0
FEVER: 0
NAUSEA: 0
DOUBLE VISION: 0
DIZZINESS: 0
NERVOUS/ANXIOUS: 0
COUGH: 0
DEPRESSION: 0
BLURRED VISION: 0
HEADACHES: 0
ABDOMINAL PAIN: 0
VOMITING: 0
WEAKNESS: 1

## 2018-08-20 ASSESSMENT — PAIN SCALES - GENERAL: PAINLEVEL_OUTOF10: 7

## 2018-08-20 NOTE — PROGRESS NOTES
[facility] Hospitalist Progress Note    Date of Service: 2018    Chief Complaint  67 y.o. female admitted 2018 with hx of MM, presenting with abnormal labs, K 6.0, hgb 7.3    Interval Problem Update  Patient seen this morning,laying in bed, states she is nauseous, she just had breakfast.  has hx of chronic hip pain s/p fall. Otherwise feeling tired     Consultants/Specialty  Oncology Dr. veras over the phone    Disposition  Patient coming from [facility] however state shut them down 2/2 scabies outbreak till   sw assisting      Review of Systems   Constitutional: Positive for malaise/fatigue. Negative for fever.   HENT: Negative for congestion, hearing loss and nosebleeds.    Eyes: Negative for blurred vision and double vision.   Respiratory: Negative for cough and shortness of breath.    Cardiovascular: Negative for chest pain and leg swelling.   Gastrointestinal: Negative for abdominal pain, melena, nausea and vomiting.   Genitourinary: Negative for dysuria, frequency and urgency.   Musculoskeletal: Positive for back pain, falls, joint pain and myalgias.   Skin: Negative for itching and rash.   Neurological: Positive for weakness. Negative for dizziness and headaches.   Psychiatric/Behavioral: Negative for depression and suicidal ideas. The patient is not nervous/anxious.       Physical Exam  Laboratory/Imaging   Hemodynamics  Temp (24hrs), Av.8 °C (98.2 °F), Min:36.6 °C (97.8 °F), Max:37.1 °C (98.8 °F)   Temperature: 36.6 °C (97.8 °F)  Pulse  Av.3  Min: 81  Max: 120   Blood Pressure : 146/60      Respiratory      Respiration: 18, Pulse Oximetry: 97 %     Work Of Breathing / Effort: Mild  RUL Breath Sounds: Clear, RML Breath Sounds: Diminished, RLL Breath Sounds: Diminished, CHARLOTTE Breath Sounds: Clear, LLL Breath Sounds: Diminished    Fluids    Intake/Output Summary (Last 24 hours) at 18 1136  Last data filed at 18 0900   Gross per 24 hour   Intake              360 ml   Output               200 ml   Net              160 ml       Nutrition  Orders Placed This Encounter   Procedures   • Diet Order Diabetic     Standing Status:   Standing     Number of Occurrences:   1     Order Specific Question:   Diet:     Answer:   Diabetic [3]     Physical Exam   Constitutional: She is oriented to person, place, and time. She appears well-developed and well-nourished. No distress.   Morbidly obese   HENT:   Head: Normocephalic and atraumatic.   Mouth/Throat: No oropharyngeal exudate.   Eyes: Pupils are equal, round, and reactive to light. Conjunctivae and EOM are normal. No scleral icterus.   Neck: Normal range of motion. Neck supple. No JVD present. No thyromegaly present.   Cardiovascular: Normal rate, regular rhythm and intact distal pulses.  Exam reveals no friction rub.    No murmur heard.  Pulmonary/Chest: Effort normal and breath sounds normal. No respiratory distress. She has no wheezes.   Abdominal: Soft. Bowel sounds are normal. She exhibits no distension. There is no tenderness.   Musculoskeletal: Normal range of motion. She exhibits edema. She exhibits no tenderness.   Lymphadenopathy:     She has no cervical adenopathy.   Neurological: She is alert and oriented to person, place, and time. She exhibits normal muscle tone.   Skin: Skin is warm and dry. No rash noted. No erythema.   Psychiatric: She has a normal mood and affect. Her behavior is normal.       Recent Labs      08/18/18   0522  08/19/18   0455  08/20/18   0905   WBC   --   3.2*  5.2   RBC   --   2.42*  2.55*   HEMOGLOBIN  8.0*  8.1*  8.6*   HEMATOCRIT  24.2*  24.7*  26.1*   MCV   --   102.1*  102.4*   MCH   --   33.5*  33.7*   MCHC   --   32.8*  33.0*   RDW   --   57.0*  56.1*   PLATELETCT   --   112*  109*   MPV   --   11.6  12.3     Recent Labs      08/19/18   0455  08/20/18   0906   SODIUM  135  132*   POTASSIUM  4.4  4.2   CHLORIDE  102  101   CO2  26  25   GLUCOSE  243*  236*   BUN  17  16   CREATININE  0.97  0.84   CALCIUM  9.1   9.2     Recent Labs      08/18/18   0522  08/19/18   0455  08/20/18   0906   INR  1.74*  1.97*  2.52*                  Assessment/Plan     * Anemia- (present on admission)   Assessment & Plan    Hgb 6.7 on admission  Most likely secondary to multiple myeloma  Stool guaiac negative  2 units PRBCs ordered on admission,hgb this morning 8.6, stable  monitor        Multiple myeloma (HCC)- (present on admission)   Assessment & Plan    Hx of MM, has undergone 2 rounds of chemotherapy now on a newer agent.   Follows outpatient.   GUALBERTO, PE Serum UR protein noted, will need to fu with oncology outpatient          Pancytopenia ([facility])   Assessment & Plan    All blood lines are low, likely 2/2 MM  Monitor  No s/s of active bleeding  Stool guaic neg  H/h q6hour  S/p 2u PRBC        Morbid obesity ([facility])   Assessment & Plan    BMI of 49.4  Nutrition consulted  Counseled about diet and lifestyle modification  Will need outpatient resources        DVT (deep venous thrombosis) ([facility])   Assessment & Plan    Left LE DVT  Continue warfarin per pharmacy        Vitamin D deficiency- (present on admission)   Assessment & Plan    Resume vitamin D.         DM type 2 (diabetes mellitus, type 2) (CMS-HCC)- (present on admission)   Assessment & Plan    Patient is on home dose of NPH as well as regular insulin  A1c 8.5 4 months ago  Continue Insulin-sliding scale, accu-checks and hypoglycemia protocol.              Quality-Core Measures   Reviewed items::  Labs reviewed, Medications reviewed and Radiology images reviewed  Singh catheter::  No Singh  DVT prophylaxis pharmacological::  Warfarin (Coumadin)        Discussed plan of care with patient, nursing and SW. Patient unable to go back to [facility] 2/2 scabies outbreak, SW expanding referrals, declined so far  Patient to dc back to [facility] on tuesday  SW assisting with this matter

## 2018-08-20 NOTE — DISCHARGE PLANNING
Anticipated Discharge Disposition: SNF    Action: Met with pt at bedside. Pt agreeable to expanding referrals to remaining SNF's expect [facility]. Pt states [facility] declined her during previous visit d/t chemo medications    Barriers to Discharge: SNF acceptance    Plan: F/U with SNF

## 2018-08-20 NOTE — CARE PLAN
Problem: Skin Integrity  Goal: Risk for impaired skin integrity will decrease    Intervention: Assess risk factors for impaired skin integrity and/or pressure ulcers  Patient with moisture from incontinence, frequent toileting provided  Intervention: Implement precautions to protect skin integrity in collaboration with the interdisciplinary team  Q 2 turns continued, barrier cream applied to buttocks

## 2018-08-20 NOTE — PROGRESS NOTES
Pt aao, vss, discussed poc for shift regarding achs fingersticks, q 2 turns and no acute needs at this time, safety precautions in place, will continue rounding

## 2018-08-20 NOTE — PROGRESS NOTES
Inpatient Anticoagulation Service Note    Date: 8/20/2018  Reason for Anticoagulation: Deep Vein Thrombosis        Hemoglobin Value: (!) 8.6  Hematocrit Value: (!) 26.1  Lab Platelet Value: (!) 109  Target INR: 2.0 to 3.0    INR from last 7 days     Date/Time INR Value    08/20/18 0906 (!)  2.52    08/19/18 0455 (!)  1.97    08/18/18 0522 (!)  1.74    08/17/18 0643 (!)  1.39    08/16/18 1135 (!)  1.23        Dose from last 7 days     Date/Time Dose (mg)    08/20/18 0906  5    08/19/18 0455  5    08/18/18 0522  5    08/17/18 1000  5    08/16/18 1400  5        Average Dose (mg): 5 (Per Med Rec)  Significant Interactions: Proton Pump Inhibitor, Statin  Bridge Therapy: Yes (Enoxaparin 120 mg SQ Q12H)  Bridge Therapy Start Date: 08/16/18       Plan:  Continue on outpatient dose of warfarin 5 mg daily.  Evaluate INR in AM.  Education Material Provided?: No (Receiving outpatient prior to admission)  Pharmacist suggested discharge dosing: Resume outpatient dose of 5 mg daily.       Donavon Mendes, SandraD BCPS

## 2018-08-20 NOTE — PROGRESS NOTES
A&Ox4, flat affect, seems down, misses being home. Redness to buttocks, mobilizing in bed, tolerating fluids, voiding.

## 2018-08-21 PROBLEM — R45.89 DEPRESSED MOOD: Status: ACTIVE | Noted: 2018-08-21

## 2018-08-21 LAB
ANION GAP SERPL CALC-SCNC: 5 MMOL/L (ref 0–11.9)
BASOPHILS # BLD AUTO: 0.6 % (ref 0–1.8)
BASOPHILS # BLD: 0.03 K/UL (ref 0–0.12)
BUN SERPL-MCNC: 17 MG/DL (ref 8–22)
CALCIUM SERPL-MCNC: 8.8 MG/DL (ref 8.4–10.2)
CHLORIDE SERPL-SCNC: 101 MMOL/L (ref 96–112)
CO2 SERPL-SCNC: 25 MMOL/L (ref 20–33)
CREAT SERPL-MCNC: 0.91 MG/DL (ref 0.5–1.4)
EOSINOPHIL # BLD AUTO: 0.01 K/UL (ref 0–0.51)
EOSINOPHIL NFR BLD: 0.2 % (ref 0–6.9)
ERYTHROCYTE [DISTWIDTH] IN BLOOD BY AUTOMATED COUNT: 57.1 FL (ref 35.9–50)
GLUCOSE BLD-MCNC: 232 MG/DL (ref 65–99)
GLUCOSE BLD-MCNC: 239 MG/DL (ref 65–99)
GLUCOSE BLD-MCNC: 247 MG/DL (ref 65–99)
GLUCOSE BLD-MCNC: 283 MG/DL (ref 65–99)
GLUCOSE SERPL-MCNC: 241 MG/DL (ref 65–99)
HCT VFR BLD AUTO: 25.7 % (ref 37–47)
HGB BLD-MCNC: 8.5 G/DL (ref 12–16)
IMM GRANULOCYTES # BLD AUTO: 0.09 K/UL (ref 0–0.11)
IMM GRANULOCYTES NFR BLD AUTO: 1.7 % (ref 0–0.9)
INR PPP: 2.68 (ref 0.87–1.13)
LYMPHOCYTES # BLD AUTO: 0.87 K/UL (ref 1–4.8)
LYMPHOCYTES NFR BLD: 16.1 % (ref 22–41)
MCH RBC QN AUTO: 34 PG (ref 27–33)
MCHC RBC AUTO-ENTMCNC: 33.1 G/DL (ref 33.6–35)
MCV RBC AUTO: 102.8 FL (ref 81.4–97.8)
MONOCYTES # BLD AUTO: 0.65 K/UL (ref 0–0.85)
MONOCYTES NFR BLD AUTO: 12.1 % (ref 0–13.4)
NEUTROPHILS # BLD AUTO: 3.74 K/UL (ref 2–7.15)
NEUTROPHILS NFR BLD: 69.3 % (ref 44–72)
NRBC # BLD AUTO: 0 K/UL
NRBC BLD-RTO: 0 /100 WBC
PLATELET # BLD AUTO: 120 K/UL (ref 164–446)
PMV BLD AUTO: 11.5 FL (ref 9–12.9)
POTASSIUM SERPL-SCNC: 4.4 MMOL/L (ref 3.6–5.5)
PROTHROMBIN TIME: 28.1 SEC (ref 12–14.6)
RBC # BLD AUTO: 2.5 M/UL (ref 4.2–5.4)
SODIUM SERPL-SCNC: 131 MMOL/L (ref 135–145)
WBC # BLD AUTO: 5.4 K/UL (ref 4.8–10.8)

## 2018-08-21 PROCEDURE — 85610 PROTHROMBIN TIME: CPT

## 2018-08-21 PROCEDURE — 80048 BASIC METABOLIC PNL TOTAL CA: CPT

## 2018-08-21 PROCEDURE — 82962 GLUCOSE BLOOD TEST: CPT

## 2018-08-21 PROCEDURE — 99231 SBSQ HOSP IP/OBS SF/LOW 25: CPT | Performed by: HOSPITALIST

## 2018-08-21 PROCEDURE — 700102 HCHG RX REV CODE 250 W/ 637 OVERRIDE(OP): Performed by: HOSPITALIST

## 2018-08-21 PROCEDURE — A9270 NON-COVERED ITEM OR SERVICE: HCPCS | Performed by: HOSPITALIST

## 2018-08-21 PROCEDURE — 36415 COLL VENOUS BLD VENIPUNCTURE: CPT

## 2018-08-21 PROCEDURE — 85025 COMPLETE CBC W/AUTO DIFF WBC: CPT

## 2018-08-21 PROCEDURE — 770006 HCHG ROOM/CARE - MED/SURG/GYN SEMI*

## 2018-08-21 RX ORDER — DOCUSATE SODIUM 100 MG/1
100 CAPSULE, LIQUID FILLED ORAL 2 TIMES DAILY
Status: DISCONTINUED | OUTPATIENT
Start: 2018-08-21 | End: 2018-08-22

## 2018-08-21 RX ORDER — HYDROCODONE BITARTRATE AND ACETAMINOPHEN 5; 325 MG/1; MG/1
1 TABLET ORAL EVERY 4 HOURS PRN
Status: DISCONTINUED | OUTPATIENT
Start: 2018-08-21 | End: 2018-08-23 | Stop reason: HOSPADM

## 2018-08-21 RX ADMIN — TRAMADOL HYDROCHLORIDE 50 MG: 50 TABLET, COATED ORAL at 20:31

## 2018-08-21 RX ADMIN — INSULIN HUMAN 3 UNITS: 100 INJECTION, SOLUTION PARENTERAL at 06:22

## 2018-08-21 RX ADMIN — METOPROLOL TARTRATE 25 MG: 25 TABLET ORAL at 06:22

## 2018-08-21 RX ADMIN — PRAVASTATIN SODIUM 40 MG: 20 TABLET ORAL at 06:21

## 2018-08-21 RX ADMIN — OXYCODONE HYDROCHLORIDE AND ACETAMINOPHEN 500 MG: 500 TABLET ORAL at 06:20

## 2018-08-21 RX ADMIN — OMEPRAZOLE 20 MG: 20 CAPSULE, DELAYED RELEASE ORAL at 06:22

## 2018-08-21 RX ADMIN — FLUTICASONE PROPIONATE 100 MCG: 50 SPRAY, METERED NASAL at 06:45

## 2018-08-21 RX ADMIN — METOPROLOL TARTRATE 25 MG: 25 TABLET ORAL at 17:14

## 2018-08-21 RX ADMIN — SUCRALFATE 1 G: 1 TABLET ORAL at 09:17

## 2018-08-21 RX ADMIN — DULOXETINE HYDROCHLORIDE 40 MG: 20 CAPSULE, DELAYED RELEASE ORAL at 06:21

## 2018-08-21 RX ADMIN — TRAMADOL HYDROCHLORIDE 50 MG: 50 TABLET, COATED ORAL at 01:50

## 2018-08-21 RX ADMIN — MULTIPLE VITAMINS W/ MINERALS TAB 1 TABLET: TAB at 06:21

## 2018-08-21 RX ADMIN — VITAMIN D, TAB 1000IU (100/BT) 2000 UNITS: 25 TAB at 06:20

## 2018-08-21 RX ADMIN — ACYCLOVIR 400 MG: 200 CAPSULE ORAL at 17:11

## 2018-08-21 RX ADMIN — WARFARIN SODIUM 5 MG: 5 TABLET ORAL at 17:11

## 2018-08-21 RX ADMIN — CETIRIZINE HYDROCHLORIDE 10 MG: 10 TABLET, FILM COATED ORAL at 06:20

## 2018-08-21 RX ADMIN — INSULIN HUMAN 5 UNITS: 100 INJECTION, SOLUTION PARENTERAL at 10:41

## 2018-08-21 RX ADMIN — ACYCLOVIR 400 MG: 200 CAPSULE ORAL at 06:21

## 2018-08-21 RX ADMIN — INSULIN HUMAN 3 UNITS: 100 INJECTION, SOLUTION PARENTERAL at 17:08

## 2018-08-21 RX ADMIN — INSULIN HUMAN 3 UNITS: 100 INJECTION, SOLUTION PARENTERAL at 20:34

## 2018-08-21 RX ADMIN — CALCITRIOL 0.25 MCG: 0.25 CAPSULE, LIQUID FILLED ORAL at 06:22

## 2018-08-21 RX ADMIN — TRAMADOL HYDROCHLORIDE 50 MG: 50 TABLET, COATED ORAL at 09:19

## 2018-08-21 ASSESSMENT — ENCOUNTER SYMPTOMS
WEIGHT LOSS: 0
LOSS OF CONSCIOUSNESS: 0
CONSTITUTIONAL NEGATIVE: 1
CARDIOVASCULAR NEGATIVE: 1
WEAKNESS: 0
RESPIRATORY NEGATIVE: 1
NAUSEA: 0
NERVOUS/ANXIOUS: 0
MYALGIAS: 0
VOMITING: 0
NEUROLOGICAL NEGATIVE: 1
GASTROINTESTINAL NEGATIVE: 1
DEPRESSION: 1
MUSCULOSKELETAL NEGATIVE: 1
DIZZINESS: 0
FEVER: 0
BACK PAIN: 0
BRUISES/BLEEDS EASILY: 0
CHILLS: 0
FLANK PAIN: 0
COUGH: 0
ABDOMINAL PAIN: 0
SHORTNESS OF BREATH: 0

## 2018-08-21 ASSESSMENT — PAIN SCALES - GENERAL
PAINLEVEL_OUTOF10: 6
PAINLEVEL_OUTOF10: 6
PAINLEVEL_OUTOF10: 7

## 2018-08-21 NOTE — ASSESSMENT & PLAN NOTE
Patient does not appear to be clinically depressed, she does feel down and having a depressed mood secondary to her prolonged illness and needing to be cared for in a nursing facility.   She states she feels a little better today, supportive care and emotional support given

## 2018-08-21 NOTE — DISCHARGE PLANNING
NEO spoke to Roxana with [facility], she still has to call the state to see if the facility can take pt's today.  SW will f/u with her this afternoon.     UPDATE:    NEO spoke to Edwin with [facility], he stated that the facility is still not cleared to take pts.  NEO will f/u tomorrow.

## 2018-08-21 NOTE — PROGRESS NOTES
[facility] Medicine Daily Progress Note    Date of Service  8/21/2018    Chief Complaint  67 y.o. female admitted 8/16/2018 with anemia secondary to multiple myeloma.    Hospital Course    67 y.o. female with a past medical history of Multiple myeloma , patient has been on chemotherapy CyBorD 2 cycles in the past,presented 8/16/2018 with 3 year for evaluation of abnormal labs including hyperkalemia with potassium of 6.0 in addition to a hemoglobin of 7.3. Patient otherwise denies having any chest pain abdominal pain denies having any melena or hematochezia. She was in [facility] last month and then was transferred to [facility], thus patient was transferred from [facility] today. At this point we discussed case with  who recommended transfusion as well as to perform further diagnostic studies and terms of finding her status of her multiple myeloma.  She was given 2 units of packed red blood cells on admission, this has improved her anemia.  She has been unable to go back to her skilled nursing facility yet because they have an outbreak of scabies going on.      Interval Problem Update  She complains of some depressed feeling today because she has been out of her home for over 8 months due to her illness.  She lives alone and does not have family in the area to care for her.  She worries about her legs getting cellulitis as well.    Consultants/Specialty  None    Disposition  Back to skilled nursing facility according to physical and Occupational Therapy recommendations.    Review of Systems  Review of Systems   Constitutional: Negative.  Negative for chills, fever, malaise/fatigue and weight loss.   HENT: Negative.    Respiratory: Negative.  Negative for cough and shortness of breath.    Cardiovascular: Negative.  Negative for chest pain and leg swelling.   Gastrointestinal: Negative.  Negative for abdominal pain, nausea and vomiting.   Genitourinary: Negative.  Negative for dysuria and flank pain.    Musculoskeletal: Negative.  Negative for back pain and myalgias.   Neurological: Negative.  Negative for dizziness, loss of consciousness and weakness.   Endo/Heme/Allergies: Negative.  Does not bruise/bleed easily.   Psychiatric/Behavioral: Positive for depression. The patient is not nervous/anxious.    All other systems reviewed and are negative.       Physical Exam  Blood Pressure : 143/62   Temperature: 36.6 °C (97.8 °F)   Pulse: 96   Respiration: 18   Pulse Oximetry: 95 %     Physical Exam   Constitutional: She appears well-developed and well-nourished. No distress.   Patient seen and examined by me.   HENT:   Nose: Nose normal.   Mouth/Throat: Oropharynx is clear and moist. No oropharyngeal exudate.   Eyes: Conjunctivae are normal. Right eye exhibits no discharge. Left eye exhibits no discharge. No scleral icterus.   Neck: No JVD present. No tracheal deviation present.   Cardiovascular: Normal rate, regular rhythm and normal heart sounds.    Pulmonary/Chest: Effort normal and breath sounds normal. No stridor. No respiratory distress. She has no wheezes. She has no rales. She exhibits no tenderness.   Abdominal: Soft. Bowel sounds are normal. She exhibits no distension. There is no tenderness.   Musculoskeletal: She exhibits edema (Firm chronic appearing edema no pitting, very small area of redness at the anterior portion of the left ankle.  Not tender). She exhibits no tenderness.   Neurological: She is alert. No cranial nerve deficit. She exhibits normal muscle tone.   Skin: Skin is warm and dry. She is not diaphoretic. There is pallor.   Psychiatric: She has a normal mood and affect. Her behavior is normal.   Nursing note and vitals reviewed.      Fluids    Intake/Output Summary (Last 24 hours) at 08/21/18 1540  Last data filed at 08/21/18 0900   Gross per 24 hour   Intake              590 ml   Output              450 ml   Net              140 ml       Laboratory  Recent Labs      08/19/18   8800   08/20/18   0905  08/21/18   0936   WBC  3.2*  5.2  5.4   RBC  2.42*  2.55*  2.50*   HEMOGLOBIN  8.1*  8.6*  8.5*   HEMATOCRIT  24.7*  26.1*  25.7*   MCV  102.1*  102.4*  102.8*   MCH  33.5*  33.7*  34.0*   MCHC  32.8*  33.0*  33.1*   RDW  57.0*  56.1*  57.1*   PLATELETCT  112*  109*  120*   MPV  11.6  12.3  11.5     Recent Labs      08/19/18   0455  08/20/18   0906  08/21/18   0936   SODIUM  135  132*  131*   POTASSIUM  4.4  4.2  4.4   CHLORIDE  102  101  101   CO2  26  25  25   GLUCOSE  243*  236*  241*   BUN  17  16  17   CREATININE  0.97  0.84  0.91   CALCIUM  9.1  9.2  8.8     Recent Labs      08/20/18   0906  08/20/18   1054  08/21/18   0936   INR  2.52*  2.64*  2.68*               Imaging  No orders to display        Assessment/Plan  * Anemia- (present on admission)   Assessment & Plan    Hgb 6.7 on admission  secondary to multiple myeloma  Stool guaiac negative  2 units PRBCs ordered on admission, hemoglobin remains stable in the 8-8.6 range.  Continue to monitor periodically.        Multiple myeloma (HCC)- (present on admission)   Assessment & Plan    Hx of MM, has undergone 2 rounds of chemotherapy now on a newer agent.   Follows outpatient.   GUALBERTO, PE Serum UR protein noted, will need to fu with oncology outpatient          Depressed mood   Assessment & Plan    Patient does not appear to be clinically depressed, she does feel down and having a depressed mood secondary to her prolonged illness and needing to be cared for in a nursing facility.  I gave her reassurance and told her we would speak about this going on and I did recommend that she work hard with physical and Occupational Therapy to try to get back some of her mobility.        Pancytopenia (HCC)- (present on admission)   Assessment & Plan    Secondary to multiple myeloma, condition has stabilized.  Given transfusion on admission now stable.        Morbid obesity (HCC)- (present on admission)   Assessment & Plan    BMI of 49.4  Nutrition  consulted          DVT (deep venous thrombosis) ([facility])- (present on admission)   Assessment & Plan    Left LE DVT  Continue warfarin per pharmacy  INR is therapeutic at 2.68.        Vitamin D deficiency- (present on admission)   Assessment & Plan    Resume vitamin D.         DM type 2 (diabetes mellitus, type 2) ([facility])- (present on admission)   Assessment & Plan    Patient is on home dose of NPH as well as regular insulin  A1c 8.5 4 months ago  Continue Insulin-sliding scale, accu-checks and hypoglycemia protocol.

## 2018-08-21 NOTE — CARE PLAN
Problem: Pain Management  Goal: Pain level will decrease to patient's comfort goal    Intervention: Follow pain managment plan developed in collaboration with patient and Interdisciplinary Team  Prn pain medication administered per md order

## 2018-08-21 NOTE — PROGRESS NOTES
Patient having complaints of R thigh/hip pain, no longer controlled with tylenol and heat packs.  Call placed to Dr. Barrera.  Call received back, orders for tramadol.

## 2018-08-21 NOTE — PROGRESS NOTES
Inpatient Anticoagulation Service Note    Date: 8/21/2018  Reason for Anticoagulation: Deep Vein Thrombosis        Hemoglobin Value: (!) 8.5  Hematocrit Value: (!) 25.7  Lab Platelet Value: (!) 120  Target INR: 2.0 to 3.0    INR from last 7 days     Date/Time INR Value    08/21/18 0936 (!)  2.68    08/20/18 1054 (!)  2.64    08/20/18 0906 (!)  2.52    08/19/18 0455 (!)  1.97    08/18/18 0522 (!)  1.74    08/17/18 0643 (!)  1.39    08/16/18 1135 (!)  1.23        Dose from last 7 days     Date/Time Dose (mg)    08/21/18 1000  5    08/20/18 0906  5    08/19/18 0455  5    08/18/18 0522  5    08/17/18 1000  5    08/16/18 1400  5        Average Dose (mg): 5 (Per Med Rec)  Significant Interactions: Proton Pump Inhibitor, Statin  Bridge Therapy: Yes (Enoxaparin 120 mg SQ Q12H)  Bridge Therapy Start Date: 08/16/18  Days of Overlap Therapy: 1 (If less than 5 days and overlap therapy discontinued -- document reason (i.e. Bleed Risk))    (If still on overlap therapy, if No -- document reason (i.e. Bleed Risk))         Plan:  Will give warfarin 5 mg po tonight for INR of 2.68  Education Material Provided?: No (Receiving outpatient prior to admission)  Pharmacist suggested discharge dosing: Resume home regimen.     Rick Patrick [facility]

## 2018-08-21 NOTE — CARE PLAN
Problem: Venous Thromboembolism (VTW)/Deep Vein Thrombosis (DVT) Prevention:  Goal: Patient will participate in Venous Thrombosis (VTE)/Deep Vein Thrombosis (DVT)Prevention Measures  Outcome: PROGRESSING AS EXPECTED  Educated pt on high risk for DVT.     Problem: Discharge Barriers/Planning  Goal: Patient's continuum of care needs will be met  Outcome: PROGRESSING AS EXPECTED  Updated pt on POC, all questions answered.

## 2018-08-21 NOTE — PROGRESS NOTES
"Received report from NOC RN; assumed pt care. Pt states 6/10 pain to BLE. Asked pt about ambulating, pt states \"the bed is too tall for me to get up, it too difficulty.\" Informed pt that we have tools to help assist her out of bed, pt states \"I don't want to use the tools, they scare me.\" Educated pt on risk of DVT. Pt notified to call for assistance.   "

## 2018-08-21 NOTE — PROGRESS NOTES
Plan of care reviewed with pt. Pt is alert and oriented times 3. Pt refused to turn at times, educated regarding developing pressure ulcers, pt still refused. No acute events this shift. See flow sheets for further information.

## 2018-08-21 NOTE — CARE PLAN
Problem: Skin Integrity  Goal: Risk for impaired skin integrity will decrease    Intervention: Assess risk factors for impaired skin integrity and/or pressure ulcers  Barrier cream applied. Turned and repositioned frequently this shift

## 2018-08-22 LAB
ANION GAP SERPL CALC-SCNC: 6 MMOL/L (ref 0–11.9)
BASOPHILS # BLD AUTO: 0.7 % (ref 0–1.8)
BASOPHILS # BLD: 0.03 K/UL (ref 0–0.12)
BUN SERPL-MCNC: 16 MG/DL (ref 8–22)
CALCIUM SERPL-MCNC: 8.9 MG/DL (ref 8.4–10.2)
CHLORIDE SERPL-SCNC: 100 MMOL/L (ref 96–112)
CO2 SERPL-SCNC: 26 MMOL/L (ref 20–33)
CREAT SERPL-MCNC: 0.96 MG/DL (ref 0.5–1.4)
EOSINOPHIL # BLD AUTO: 0.02 K/UL (ref 0–0.51)
EOSINOPHIL NFR BLD: 0.5 % (ref 0–6.9)
ERYTHROCYTE [DISTWIDTH] IN BLOOD BY AUTOMATED COUNT: 56.8 FL (ref 35.9–50)
GLUCOSE BLD-MCNC: 225 MG/DL (ref 65–99)
GLUCOSE BLD-MCNC: 245 MG/DL (ref 65–99)
GLUCOSE BLD-MCNC: 276 MG/DL (ref 65–99)
GLUCOSE BLD-MCNC: 311 MG/DL (ref 65–99)
GLUCOSE SERPL-MCNC: 250 MG/DL (ref 65–99)
HCT VFR BLD AUTO: 27.3 % (ref 37–47)
HGB BLD-MCNC: 9 G/DL (ref 12–16)
IMM GRANULOCYTES # BLD AUTO: 0.06 K/UL (ref 0–0.11)
IMM GRANULOCYTES NFR BLD AUTO: 1.4 % (ref 0–0.9)
INR PPP: 2.87 (ref 0.87–1.13)
LYMPHOCYTES # BLD AUTO: 0.71 K/UL (ref 1–4.8)
LYMPHOCYTES NFR BLD: 16.4 % (ref 22–41)
MCH RBC QN AUTO: 33.6 PG (ref 27–33)
MCHC RBC AUTO-ENTMCNC: 33 G/DL (ref 33.6–35)
MCV RBC AUTO: 101.9 FL (ref 81.4–97.8)
MONOCYTES # BLD AUTO: 0.49 K/UL (ref 0–0.85)
MONOCYTES NFR BLD AUTO: 11.3 % (ref 0–13.4)
NEUTROPHILS # BLD AUTO: 3.02 K/UL (ref 2–7.15)
NEUTROPHILS NFR BLD: 69.7 % (ref 44–72)
NRBC # BLD AUTO: 0 K/UL
NRBC BLD-RTO: 0 /100 WBC
PLATELET # BLD AUTO: 152 K/UL (ref 164–446)
PMV BLD AUTO: 12.5 FL (ref 9–12.9)
POTASSIUM SERPL-SCNC: 4.1 MMOL/L (ref 3.6–5.5)
PROTHROMBIN TIME: 29.7 SEC (ref 12–14.6)
RBC # BLD AUTO: 2.68 M/UL (ref 4.2–5.4)
SODIUM SERPL-SCNC: 132 MMOL/L (ref 135–145)
WBC # BLD AUTO: 4.3 K/UL (ref 4.8–10.8)

## 2018-08-22 PROCEDURE — 700102 HCHG RX REV CODE 250 W/ 637 OVERRIDE(OP): Performed by: FAMILY MEDICINE

## 2018-08-22 PROCEDURE — A9270 NON-COVERED ITEM OR SERVICE: HCPCS | Performed by: FAMILY MEDICINE

## 2018-08-22 PROCEDURE — 700102 HCHG RX REV CODE 250 W/ 637 OVERRIDE(OP): Performed by: HOSPITALIST

## 2018-08-22 PROCEDURE — 36415 COLL VENOUS BLD VENIPUNCTURE: CPT

## 2018-08-22 PROCEDURE — 99231 SBSQ HOSP IP/OBS SF/LOW 25: CPT | Performed by: HOSPITALIST

## 2018-08-22 PROCEDURE — 700112 HCHG RX REV CODE 229: Performed by: FAMILY MEDICINE

## 2018-08-22 PROCEDURE — 80048 BASIC METABOLIC PNL TOTAL CA: CPT

## 2018-08-22 PROCEDURE — 85610 PROTHROMBIN TIME: CPT

## 2018-08-22 PROCEDURE — 770006 HCHG ROOM/CARE - MED/SURG/GYN SEMI*

## 2018-08-22 PROCEDURE — 82962 GLUCOSE BLOOD TEST: CPT

## 2018-08-22 PROCEDURE — A9270 NON-COVERED ITEM OR SERVICE: HCPCS | Performed by: HOSPITALIST

## 2018-08-22 PROCEDURE — 85025 COMPLETE CBC W/AUTO DIFF WBC: CPT

## 2018-08-22 RX ORDER — BISACODYL 10 MG
10 SUPPOSITORY, RECTAL RECTAL DAILY
Status: DISCONTINUED | OUTPATIENT
Start: 2018-08-22 | End: 2018-08-23 | Stop reason: HOSPADM

## 2018-08-22 RX ORDER — AMOXICILLIN 250 MG
2 CAPSULE ORAL 2 TIMES DAILY
Status: DISCONTINUED | OUTPATIENT
Start: 2018-08-22 | End: 2018-08-23 | Stop reason: HOSPADM

## 2018-08-22 RX ORDER — POLYETHYLENE GLYCOL 3350 17 G/17G
1 POWDER, FOR SOLUTION ORAL
Status: DISCONTINUED | OUTPATIENT
Start: 2018-08-22 | End: 2018-08-23 | Stop reason: HOSPADM

## 2018-08-22 RX ORDER — WARFARIN SODIUM 2 MG/1
4 TABLET ORAL
Status: COMPLETED | OUTPATIENT
Start: 2018-08-22 | End: 2018-08-22

## 2018-08-22 RX ADMIN — CALCITRIOL 0.25 MCG: 0.25 CAPSULE, LIQUID FILLED ORAL at 06:09

## 2018-08-22 RX ADMIN — OXYCODONE HYDROCHLORIDE AND ACETAMINOPHEN 500 MG: 500 TABLET ORAL at 06:07

## 2018-08-22 RX ADMIN — CETIRIZINE HYDROCHLORIDE 10 MG: 10 TABLET, FILM COATED ORAL at 06:06

## 2018-08-22 RX ADMIN — HYDROCODONE BITARTRATE AND ACETAMINOPHEN 1 TABLET: 5; 325 TABLET ORAL at 16:37

## 2018-08-22 RX ADMIN — VITAMIN D, TAB 1000IU (100/BT) 2000 UNITS: 25 TAB at 06:07

## 2018-08-22 RX ADMIN — INSULIN HUMAN 3 UNITS: 100 INJECTION, SOLUTION PARENTERAL at 06:09

## 2018-08-22 RX ADMIN — FLUTICASONE PROPIONATE 100 MCG: 50 SPRAY, METERED NASAL at 06:13

## 2018-08-22 RX ADMIN — ACYCLOVIR 400 MG: 200 CAPSULE ORAL at 16:37

## 2018-08-22 RX ADMIN — DOCUSATE SODIUM 100 MG: 100 CAPSULE, LIQUID FILLED ORAL at 00:07

## 2018-08-22 RX ADMIN — DOCUSATE SODIUM 100 MG: 100 CAPSULE, LIQUID FILLED ORAL at 06:06

## 2018-08-22 RX ADMIN — STANDARDIZED SENNA CONCENTRATE AND DOCUSATE SODIUM 2 TABLET: 8.6; 5 TABLET, FILM COATED ORAL at 16:37

## 2018-08-22 RX ADMIN — STANDARDIZED SENNA CONCENTRATE AND DOCUSATE SODIUM 2 TABLET: 8.6; 5 TABLET, FILM COATED ORAL at 11:26

## 2018-08-22 RX ADMIN — METOPROLOL TARTRATE 25 MG: 25 TABLET ORAL at 16:37

## 2018-08-22 RX ADMIN — OMEPRAZOLE 20 MG: 20 CAPSULE, DELAYED RELEASE ORAL at 06:08

## 2018-08-22 RX ADMIN — DULOXETINE HYDROCHLORIDE 40 MG: 20 CAPSULE, DELAYED RELEASE ORAL at 06:06

## 2018-08-22 RX ADMIN — HYDROCODONE BITARTRATE AND ACETAMINOPHEN 1 TABLET: 5; 325 TABLET ORAL at 00:07

## 2018-08-22 RX ADMIN — BISACODYL 10 MG: 10 SUPPOSITORY RECTAL at 16:39

## 2018-08-22 RX ADMIN — INSULIN HUMAN 5 UNITS: 100 INJECTION, SOLUTION PARENTERAL at 20:31

## 2018-08-22 RX ADMIN — WARFARIN SODIUM 4 MG: 2 TABLET ORAL at 16:37

## 2018-08-22 RX ADMIN — HYDROCODONE BITARTRATE AND ACETAMINOPHEN 1 TABLET: 5; 325 TABLET ORAL at 09:39

## 2018-08-22 RX ADMIN — HYDROCODONE BITARTRATE AND ACETAMINOPHEN 1 TABLET: 5; 325 TABLET ORAL at 23:42

## 2018-08-22 RX ADMIN — METOPROLOL TARTRATE 25 MG: 25 TABLET ORAL at 06:06

## 2018-08-22 RX ADMIN — INSULIN HUMAN 6 UNITS: 100 INJECTION, SOLUTION PARENTERAL at 11:26

## 2018-08-22 RX ADMIN — INSULIN HUMAN 3 UNITS: 100 INJECTION, SOLUTION PARENTERAL at 16:40

## 2018-08-22 RX ADMIN — MULTIPLE VITAMINS W/ MINERALS TAB 1 TABLET: TAB at 06:08

## 2018-08-22 RX ADMIN — PRAVASTATIN SODIUM 40 MG: 20 TABLET ORAL at 06:07

## 2018-08-22 RX ADMIN — ACYCLOVIR 400 MG: 200 CAPSULE ORAL at 06:08

## 2018-08-22 ASSESSMENT — ENCOUNTER SYMPTOMS
NAUSEA: 0
DEPRESSION: 1
BACK PAIN: 0
CHILLS: 0
FLANK PAIN: 0
MYALGIAS: 0
MUSCULOSKELETAL NEGATIVE: 1
GASTROINTESTINAL NEGATIVE: 1
NERVOUS/ANXIOUS: 0
VOMITING: 0
WEIGHT LOSS: 0
CARDIOVASCULAR NEGATIVE: 1
COUGH: 0
RESPIRATORY NEGATIVE: 1
DIZZINESS: 0
FEVER: 0
SHORTNESS OF BREATH: 0
BRUISES/BLEEDS EASILY: 0
WEAKNESS: 1
ABDOMINAL PAIN: 0
LOSS OF CONSCIOUSNESS: 0

## 2018-08-22 ASSESSMENT — PAIN SCALES - GENERAL
PAINLEVEL_OUTOF10: 6
PAINLEVEL_OUTOF10: 1
PAINLEVEL_OUTOF10: 6

## 2018-08-22 ASSESSMENT — PATIENT HEALTH QUESTIONNAIRE - PHQ9
2. FEELING DOWN, DEPRESSED, IRRITABLE, OR HOPELESS: NOT AT ALL
SUM OF ALL RESPONSES TO PHQ9 QUESTIONS 1 AND 2: 0
1. LITTLE INTEREST OR PLEASURE IN DOING THINGS: NOT AT ALL

## 2018-08-22 NOTE — PROGRESS NOTES
Pt states Tramadol isn't helping. Pt asking for something else. Hospitalist paged. Awaiting return call Sofiya.

## 2018-08-22 NOTE — CARE PLAN
Problem: Venous Thromboembolism (VTW)/Deep Vein Thrombosis (DVT) Prevention:  Goal: Patient will participate in Venous Thrombosis (VTE)/Deep Vein Thrombosis (DVT)Prevention Measures  Outcome: PROGRESSING AS EXPECTED   08/21/18 2100   OTHER   Risk Assessment Score 2   VTE RISK Moderate   Pharmacologic Prophylaxis Used LMWH: Enoxaparin(Lovenox)   Mechanical/VTE Prophylaxis   Mechanical Prophylaxis  SCDs, Sequential Compression Device   SCDs, Sequential Compression Device Refused       Problem: Pain Management  Goal: Pain level will decrease to patient's comfort goal  Outcome: PROGRESSING AS EXPECTED   08/22/18 0157   OTHER   Non Verbal Scale  Calm;Sleeping;Unlabored Breathing   Pt educated to call for pain medication when needed

## 2018-08-22 NOTE — PROGRESS NOTES
Inpatient Anticoagulation Service Note    Date: 8/22/2018  Reason for Anticoagulation: Deep Vein Thrombosis        Hemoglobin Value: (!) 9  Hematocrit Value: (!) 27.3  Lab Platelet Value: (!) 152  Target INR: 2.0 to 3.0    INR from last 7 days     Date/Time INR Value    08/22/18 0502 (!)  2.87    08/21/18 0936 (!)  2.68    08/20/18 1054 (!)  2.64    08/20/18 0906 (!)  2.52    08/19/18 0455 (!)  1.97    08/18/18 0522 (!)  1.74    08/17/18 0643 (!)  1.39    08/16/18 1135 (!)  1.23        Dose from last 7 days     Date/Time Dose (mg)    08/22/18 1000  4    08/21/18 1000  5    08/20/18 0906  5    08/19/18 0455  5    08/18/18 0522  5    08/17/18 1000  5    08/16/18 1400  5        Average Dose (mg): 5 (Per Med Rec)  Significant Interactions: Proton Pump Inhibitor, Statin  Bridge Therapy: Yes (Enoxaparin 120 mg SQ Q12H)  Bridge Therapy Start Date: 08/16/18  Days of Overlap Therapy: 1 (If less than 5 days and overlap therapy discontinued -- document reason (i.e. Bleed Risk))    (If still on overlap therapy, if No -- document reason (i.e. Bleed Risk))         Plan: Will give warfarin 4 mg po tonight for INR of  2.87  Education Material Provided?: No (Receiving outpatient prior to admission)  Pharmacist suggested discharge dosing: Resume home regimen with close initial monitoring.     Rick Patrick [facility]

## 2018-08-22 NOTE — PROGRESS NOTES
[facility] Medicine Daily Progress Note    Date of Service  8/22/2018    Chief Complaint  67 y.o. female admitted 8/16/2018 with anemia secondary to multiple myeloma.    Hospital Course    67 y.o. female with a past medical history of Multiple myeloma , patient has been on chemotherapy CyBorD 2 cycles in the past,presented 8/16/2018 with 3 year for evaluation of abnormal labs including hyperkalemia with potassium of 6.0 in addition to a hemoglobin of 7.3. Patient otherwise denies having any chest pain abdominal pain denies having any melena or hematochezia. She was in [facility] last month and then was transferred to [facility], thus patient was transferred from [facility] today. At this point we discussed case with  who recommended transfusion as well as to perform further diagnostic studies and terms of finding her status of her multiple myeloma.  She was given 2 units of packed red blood cells on admission, this has improved her anemia.  She has been unable to go back to her skilled nursing facility yet because they have an outbreak of scabies going on.  Hemoglobin has remained stable throughout her admission      Interval Problem Update  No worsening of cellulitis type concerns, she thinks her legs look better.  She had an appointment with oncology today and was able to cancel it.  Patient reassured that her blood counts have improved.  She has weakness and fatigue with impaired mobilization, unchanged.  She says her mood is a little better.    Consultants/Specialty  None    Disposition  Back to skilled nursing facility according to physical and Occupational Therapy recommendations.    Review of Systems  Review of Systems   Constitutional: Positive for malaise/fatigue. Negative for chills, fever and weight loss.   HENT: Negative.    Respiratory: Negative.  Negative for cough and shortness of breath.    Cardiovascular: Negative.  Negative for chest pain and leg swelling.   Gastrointestinal: Negative.   Negative for abdominal pain, nausea and vomiting.   Genitourinary: Negative.  Negative for dysuria and flank pain.   Musculoskeletal: Negative.  Negative for back pain and myalgias.   Neurological: Positive for weakness. Negative for dizziness and loss of consciousness.   Endo/Heme/Allergies: Negative.  Does not bruise/bleed easily.   Psychiatric/Behavioral: Positive for depression. The patient is not nervous/anxious.    All other systems reviewed and are negative.       Physical Exam  Blood Pressure : 143/62   Temperature: 36.6 °C (97.8 °F)   Pulse: 96   Respiration: 18   Pulse Oximetry: 95 %     Physical Exam   Constitutional: She appears well-developed and well-nourished. No distress.   Patient seen and examined by me.   HENT:   Nose: Nose normal.   Mouth/Throat: Oropharynx is clear and moist. No oropharyngeal exudate.   Eyes: Conjunctivae are normal. Right eye exhibits no discharge. Left eye exhibits no discharge. No scleral icterus.   Neck: No JVD present. No tracheal deviation present.   Cardiovascular: Normal rate, regular rhythm and normal heart sounds.    Pulmonary/Chest: Effort normal and breath sounds normal. No stridor. No respiratory distress. She has no wheezes. She has no rales. She exhibits no tenderness.   Abdominal: Soft. Bowel sounds are normal. She exhibits no distension. There is no tenderness.   Musculoskeletal: She exhibits edema (Firm chronic appearing edema no pitting, very small area of redness at the anterior portion of the left ankle.  Not tender). She exhibits no tenderness.   Neurological: She is alert. No cranial nerve deficit. She exhibits normal muscle tone.   Skin: Skin is warm and dry. She is not diaphoretic. There is pallor.   Psychiatric: She has a normal mood and affect. Her behavior is normal.   Nursing note and vitals reviewed.      Fluids    Intake/Output Summary (Last 24 hours) at 08/22/18 1610  Last data filed at 08/22/18 1554   Gross per 24 hour   Intake              340  ml   Output              650 ml   Net             -310 ml       Laboratory  Recent Labs      08/20/18   0905  08/21/18   0936  08/22/18   0502   WBC  5.2  5.4  4.3*   RBC  2.55*  2.50*  2.68*   HEMOGLOBIN  8.6*  8.5*  9.0*   HEMATOCRIT  26.1*  25.7*  27.3*   MCV  102.4*  102.8*  101.9*   MCH  33.7*  34.0*  33.6*   MCHC  33.0*  33.1*  33.0*   RDW  56.1*  57.1*  56.8*   PLATELETCT  109*  120*  152*   MPV  12.3  11.5  12.5     Recent Labs      08/20/18   0906  08/21/18   0936  08/22/18   0502   SODIUM  132*  131*  132*   POTASSIUM  4.2  4.4  4.1   CHLORIDE  101  101  100   CO2  25  25  26   GLUCOSE  236*  241*  250*   BUN  16  17  16   CREATININE  0.84  0.91  0.96   CALCIUM  9.2  8.8  8.9     Recent Labs      08/20/18   1054  08/21/18   0936  08/22/18   0502   INR  2.64*  2.68*  2.87*               Imaging  No orders to display        Assessment/Plan  * Anemia- (present on admission)   Assessment & Plan    Hgb 6.7 on admission  secondary to multiple myeloma  2 units PRBCs given on admission, hemoglobin remains stable improved now up to 9.  Continue to monitor, follow up with oncology after discharge.        Multiple myeloma (HCC)- (present on admission)   Assessment & Plan    Hx of MM, has undergone 2 rounds of chemotherapy now on a newer agent.   Follows outpatient with Dr. Sim.   ANGELLA BURCIAGA Serum UR protein noted, will need to fu with oncology outpatient          Depressed mood   Assessment & Plan    Patient does not appear to be clinically depressed, she does feel down and having a depressed mood secondary to her prolonged illness and needing to be cared for in a nursing facility.   She states she feels a little better today, supportive care and emotional support given        Pancytopenia (HCC)- (present on admission)   Assessment & Plan    Secondary to multiple myeloma, condition has stabilized.  Given transfusion on admission now stable.        Morbid obesity (HCC)- (present on admission)   Assessment & Plan     BMI of 49.4  Nutrition consulted          DVT (deep venous thrombosis) ([facility])- (present on admission)   Assessment & Plan    Left LE DVT  Continue warfarin per pharmacy  INR is therapeutic at 2.68.        Vitamin D deficiency- (present on admission)   Assessment & Plan    Resume vitamin D.         DM type 2 (diabetes mellitus, type 2) ([facility])- (present on admission)   Assessment & Plan    Patient is on home dose of NPH as well as regular insulin  A1c 8.5 4 months ago  Continue Insulin-sliding scale, accu-checks and hypoglycemia protocol.

## 2018-08-23 VITALS
BODY MASS INDEX: 49.61 KG/M2 | WEIGHT: 279.98 LBS | DIASTOLIC BLOOD PRESSURE: 67 MMHG | HEART RATE: 104 BPM | TEMPERATURE: 98.4 F | RESPIRATION RATE: 18 BRPM | OXYGEN SATURATION: 98 % | HEIGHT: 63 IN | SYSTOLIC BLOOD PRESSURE: 148 MMHG

## 2018-08-23 LAB
ANION GAP SERPL CALC-SCNC: 6 MMOL/L (ref 0–11.9)
BASOPHILS # BLD AUTO: 0.6 % (ref 0–1.8)
BASOPHILS # BLD: 0.03 K/UL (ref 0–0.12)
BUN SERPL-MCNC: 16 MG/DL (ref 8–22)
CALCIUM SERPL-MCNC: 9.1 MG/DL (ref 8.4–10.2)
CHLORIDE SERPL-SCNC: 100 MMOL/L (ref 96–112)
CO2 SERPL-SCNC: 27 MMOL/L (ref 20–33)
CREAT SERPL-MCNC: 0.84 MG/DL (ref 0.5–1.4)
EOSINOPHIL # BLD AUTO: 0.03 K/UL (ref 0–0.51)
EOSINOPHIL NFR BLD: 0.6 % (ref 0–6.9)
ERYTHROCYTE [DISTWIDTH] IN BLOOD BY AUTOMATED COUNT: 56.1 FL (ref 35.9–50)
GLUCOSE BLD-MCNC: 239 MG/DL (ref 65–99)
GLUCOSE BLD-MCNC: 277 MG/DL (ref 65–99)
GLUCOSE SERPL-MCNC: 216 MG/DL (ref 65–99)
HCT VFR BLD AUTO: 27.5 % (ref 37–47)
HGB BLD-MCNC: 8.9 G/DL (ref 12–16)
IMM GRANULOCYTES # BLD AUTO: 0.05 K/UL (ref 0–0.11)
IMM GRANULOCYTES NFR BLD AUTO: 0.9 % (ref 0–0.9)
INR PPP: 2.94 (ref 0.87–1.13)
LYMPHOCYTES # BLD AUTO: 0.83 K/UL (ref 1–4.8)
LYMPHOCYTES NFR BLD: 15.7 % (ref 22–41)
MCH RBC QN AUTO: 33.1 PG (ref 27–33)
MCHC RBC AUTO-ENTMCNC: 32.4 G/DL (ref 33.6–35)
MCV RBC AUTO: 102.2 FL (ref 81.4–97.8)
MONOCYTES # BLD AUTO: 0.52 K/UL (ref 0–0.85)
MONOCYTES NFR BLD AUTO: 9.8 % (ref 0–13.4)
NEUTROPHILS # BLD AUTO: 3.83 K/UL (ref 2–7.15)
NEUTROPHILS NFR BLD: 72.4 % (ref 44–72)
NRBC # BLD AUTO: 0 K/UL
NRBC BLD-RTO: 0 /100 WBC
PLATELET # BLD AUTO: 180 K/UL (ref 164–446)
PMV BLD AUTO: 12.5 FL (ref 9–12.9)
POTASSIUM SERPL-SCNC: 4.1 MMOL/L (ref 3.6–5.5)
PROTHROMBIN TIME: 30.2 SEC (ref 12–14.6)
RBC # BLD AUTO: 2.69 M/UL (ref 4.2–5.4)
SODIUM SERPL-SCNC: 133 MMOL/L (ref 135–145)
WBC # BLD AUTO: 5.3 K/UL (ref 4.8–10.8)

## 2018-08-23 PROCEDURE — 80048 BASIC METABOLIC PNL TOTAL CA: CPT

## 2018-08-23 PROCEDURE — 85025 COMPLETE CBC W/AUTO DIFF WBC: CPT

## 2018-08-23 PROCEDURE — 700102 HCHG RX REV CODE 250 W/ 637 OVERRIDE(OP): Performed by: HOSPITALIST

## 2018-08-23 PROCEDURE — 85610 PROTHROMBIN TIME: CPT

## 2018-08-23 PROCEDURE — 82962 GLUCOSE BLOOD TEST: CPT | Mod: 91

## 2018-08-23 PROCEDURE — 99239 HOSP IP/OBS DSCHRG MGMT >30: CPT | Performed by: HOSPITALIST

## 2018-08-23 PROCEDURE — A9270 NON-COVERED ITEM OR SERVICE: HCPCS | Performed by: HOSPITALIST

## 2018-08-23 PROCEDURE — 36415 COLL VENOUS BLD VENIPUNCTURE: CPT

## 2018-08-23 RX ORDER — WARFARIN SODIUM 2.5 MG/1
2.5 TABLET ORAL
Status: DISCONTINUED | OUTPATIENT
Start: 2018-08-23 | End: 2018-08-23 | Stop reason: HOSPADM

## 2018-08-23 RX ADMIN — ACYCLOVIR 400 MG: 200 CAPSULE ORAL at 06:11

## 2018-08-23 RX ADMIN — INSULIN HUMAN 5 UNITS: 100 INJECTION, SOLUTION PARENTERAL at 11:38

## 2018-08-23 RX ADMIN — MULTIPLE VITAMINS W/ MINERALS TAB 1 TABLET: TAB at 06:02

## 2018-08-23 RX ADMIN — FLUTICASONE PROPIONATE 100 MCG: 50 SPRAY, METERED NASAL at 06:03

## 2018-08-23 RX ADMIN — METOPROLOL TARTRATE 25 MG: 25 TABLET ORAL at 06:02

## 2018-08-23 RX ADMIN — DULOXETINE HYDROCHLORIDE 40 MG: 20 CAPSULE, DELAYED RELEASE ORAL at 06:02

## 2018-08-23 RX ADMIN — INSULIN HUMAN 3 UNITS: 100 INJECTION, SOLUTION PARENTERAL at 06:03

## 2018-08-23 RX ADMIN — VITAMIN D, TAB 1000IU (100/BT) 2000 UNITS: 25 TAB at 06:02

## 2018-08-23 RX ADMIN — CETIRIZINE HYDROCHLORIDE 10 MG: 10 TABLET, FILM COATED ORAL at 06:02

## 2018-08-23 RX ADMIN — PRAVASTATIN SODIUM 40 MG: 20 TABLET ORAL at 06:02

## 2018-08-23 RX ADMIN — OXYCODONE HYDROCHLORIDE AND ACETAMINOPHEN 500 MG: 500 TABLET ORAL at 06:02

## 2018-08-23 RX ADMIN — OMEPRAZOLE 20 MG: 20 CAPSULE, DELAYED RELEASE ORAL at 06:02

## 2018-08-23 RX ADMIN — CALCITRIOL 0.25 MCG: 0.25 CAPSULE, LIQUID FILLED ORAL at 06:02

## 2018-08-23 ASSESSMENT — PAIN SCALES - GENERAL: PAINLEVEL_OUTOF10: 4

## 2018-08-23 NOTE — CARE PLAN
Problem: Knowledge Deficit  Goal: Knowledge of disease process/condition, treatment plan, diagnostic tests, and medications will improve  Outcome: PROGRESSING AS EXPECTED  POC reviewed with pt. Pt verbalized understanding.    Problem: Pain Management  Goal: Pain level will decrease to patient's comfort goal  Outcome: PROGRESSING AS EXPECTED

## 2018-08-23 NOTE — DISCHARGE PLANNING
COBRA form completed.  Patient made aware of discharge time and she stated that she would call and let her family know that she was discharging back to [facility].  SW gave completed COBRA form to Floor RN.

## 2018-08-23 NOTE — PROGRESS NOTES
Inpatient Anticoagulation Service Note    Date: 8/23/2018  Reason for Anticoagulation: Deep Vein Thrombosis        Hemoglobin Value: (!) 8.9  Hematocrit Value: (!) 27.5  Lab Platelet Value: 180  Target INR: 2.0 to 3.0    INR from last 7 days     Date/Time INR Value    08/23/18 0423 (!)  2.94    08/22/18 0502 (!)  2.87    08/21/18 0936 (!)  2.68    08/20/18 1054 (!)  2.64    08/20/18 0906 (!)  2.52    08/19/18 0455 (!)  1.97    08/18/18 0522 (!)  1.74    08/17/18 0643 (!)  1.39    08/16/18 1135 (!)  1.23        Dose from last 7 days     Date/Time Dose (mg)    08/23/18 0900  2.5    08/22/18 1000  4    08/21/18 1000  5    08/20/18 0906  5    08/19/18 0455  5    08/18/18 0522  5    08/17/18 1000  5    08/16/18 1400  5        Average Dose (mg): 5 (Per Med Rec)  Significant Interactions: Proton Pump Inhibitor, Statin  Bridge Therapy: Yes (Enoxaparin 120 mg SQ Q12H)  Bridge Therapy Start Date: 08/16/18  Days of Overlap Therapy: 1 (If less than 5 days and overlap therapy discontinued -- document reason (i.e. Bleed Risk))    (If still on overlap therapy, if No -- document reason (i.e. Bleed Risk))         Plan:  Will give warfarin 2.5 mg po tonight for INR of 2.94  Education Material Provided?: No (Receiving outpatient prior to admission)  Pharmacist suggested discharge dosing: Close monitoring initially lower dosing of warfarin due to increase INR.     Rick Patrick [facility]

## 2018-08-23 NOTE — PROGRESS NOTES
Report received from Day RN, assumed care of pt at this time. POC and medications reviewed with pt. Pt verbalized understanding. Safety measures in place. Will continue to monitor.

## 2018-08-23 NOTE — DISCHARGE PLANNING
Agency/Facility Name: [facility]   Spoke To: Bhavin  Outcome: Transportation is schedule for 8/23 at 1600 via [facility].   JAMAAL Trinidad notified of transport time.

## 2018-08-23 NOTE — DISCHARGE SUMMARY
Discharge Summary    CHIEF COMPLAINT ON ADMISSION  Chief Complaint   Patient presents with   • Abnormal Labs     K+ 6.0, Hgb 7.3.  Chronic anemia.  Denies black/bloody stools.    • Sent by MD     From [facility]       Reason for Admission  Abnormal Labs     CODE STATUS  Full Code    HPI & HOSPITAL COURSE      67 y.o. female with a past medical history of Multiple myeloma , patient has been on chemotherapy CyBorD 2 cycles in the past,presented 8/16/2018 with 3 year for evaluation of abnormal labs including hyperkalemia with potassium of 6.0 in addition to a hemoglobin of 7.3. Patient otherwise denies having any chest pain abdominal pain denies having any melena or hematochezia. She was in [facility] last month and then was transferred to [facility], thus patient was transferred from [facility] today. At this point we discussed case with  who recommended transfusion as well as to perform further diagnostic studies and terms of finding her status of her multiple myeloma.  She was given 2 units of packed red blood cells on admission, this has improved her anemia.  She has been unable to go back to her skilled nursing facility yet because they have an outbreak of scabies going on.  Hemoglobin has remained stable throughout her admission     Therefore, she is discharged in guarded and stable condition to home with close outpatient follow-up.    The patient met 2-midnight criteria for an inpatient stay at the time of discharge.      FOLLOW UP ITEMS POST DISCHARGE  Follow up with oncology, Dr. Sim, need to make another appointment.    DISCHARGE DIAGNOSES  Principal Problem:    Anemia POA: Yes  Active Problems:    Multiple myeloma (HCC) POA: Yes    DM type 2 (diabetes mellitus, type 2) (CMS-HCC) POA: Yes    Vitamin D deficiency POA: Yes      Overview: ICD-10 transition    DVT (deep venous thrombosis) ([facility]) POA: Yes    Morbid obesity ([facility]) POA: Yes    Pancytopenia ([facility]) POA: Yes    Depressed mood POA:  Unknown  Resolved Problems:    Hypomagnesemia POA: Yes      FOLLOW UP  No future appointments.  No follow-up provider specified.    MEDICATIONS ON DISCHARGE     Medication List      CONTINUE taking these medications      Instructions   acyclovir 200 MG Caps  Commonly known as:  ZOVIRAX   Take 2 Caps by mouth 2 Times a Day.  Dose:  400 mg     ascorbic acid 500 MG tablet  Commonly known as:  VITAMIN C   Take 500 mg by mouth every day.  Dose:  500 mg     calcitRIOL 0.25 MCG Caps  Commonly known as:  ROCALTROL   Take 1 Cap by mouth every day.  Dose:  0.25 mcg     calcium carbonate 500 MG Chew  Commonly known as:  TUMS   Take 1,000 mg by mouth 3 times a day before meals.  Dose:  1000 mg     cetirizine 10 MG Tabs  Commonly known as:  ZYRTEC   Take 10 mg by mouth every day.  Dose:  10 mg     Cholecalciferol 2000 UNIT Caps   Take 2,000 Units by mouth every day.  Dose:  2000 Units     DULoxetine 20 MG Cpep  Commonly known as:  CYMBALTA   Take 40 mg by mouth every day.  Dose:  40 mg     fluticasone 50 MCG/ACT nasal spray  Commonly known as:  FLONASE   Spray 2 Sprays in nose every day.  Dose:  2 Spray     insulin  UNIT/ML Susp  Commonly known as:  HUMULIN,NOVOLIN   Inject  Units as instructed 2 Times a Day. 55 units QAM 25 units QPM  Dose:   Units     insulin regular 100 Unit/mL Soln  Commonly known as:  HUMULIN R   Inject 4-10 Units as instructed 3 times a day before meals. 200-250 4 units 251-300 6 units 301-350 8 units 351-400 10 units  Dose:  4-10 Units     Ixazomib Citrate 4 MG Caps   Take 4 mg by mouth every Tuesday.  Dose:  4 mg     LACTOBACILLUS PO   Take 1 Cap by mouth every evening.  Dose:  1 Cap     LORazepam 0.5 MG Tabs  Commonly known as:  ATIVAN   Take 0.5 mg by mouth 2 Times a Day.  Dose:  0.5 mg     metoprolol 25 MG Tabs  Commonly known as:  LOPRESSOR   Take 25 mg by mouth 2 times a day.  Dose:  25 mg     omeprazole 20 MG delayed-release capsule  Commonly known as:  PRILOSEC   Take 20 mg by  mouth every day.  Dose:  20 mg     pravastatin 40 MG tablet  Commonly known as:  PRAVACHOL   Take 1 Tab by mouth every day.  Dose:  40 mg     REVLIMID 25 MG Caps  Generic drug:  lenalidomide   Take 25 mg by mouth every day.  Dose:  25 mg     sucralfate 1 GM Tabs  Commonly known as:  CARAFATE   Take 1 g by mouth every Tuesday.  Dose:  1 g     therapeutic multivitamin-minerals Tabs   Take 1 Tab by mouth every day.  Dose:  1 Tab     warfarin 5 MG Tabs  Commonly known as:  COUMADIN   Take 5 mg by mouth every day.  Dose:  5 mg        STOP taking these medications    dexamethasone 4 MG Tabs  Commonly known as:  DECADRON            Allergies  Allergies   Allergen Reactions   • Actos [Pioglitazone Hydrochloride] Hives   • Advil [Ibuprofen Micronized] Hives   • Cephalosporins Hives     Unsure if she tolerates penicillin     • [place] Hives       DIET  Orders Placed This Encounter   Procedures   • Diet Order Diabetic     Standing Status:   Standing     Number of Occurrences:   1     Order Specific Question:   Diet:     Answer:   Diabetic [3]       ACTIVITY  As tolerated and directed by skilled nursing.  Weight bearing as tolerated    LINES, DRAINS, AND WOUNDS  This is an automated list. Peripheral IVs will be removed prior to discharge.  Central Line Group 1 (A) Right;Basilic Dual Lumen;PICC 5 (Active)     Urinary Catheter Indwelling Catheter 16 (Active)      Wound Skin Tear Buttock Left (Active)   Wound Bed [place] 8/22/2018  8:00 PM   Drainage  None 8/22/2018  8:00 PM   Periwound Skin Pink 8/22/2018  8:00 PM   Cleansing Not Applicable 8/22/2018  8:00 PM   Dressing Options Open to Air 8/22/2018  8:00 PM   Dressing Status / Change Not Applicable 8/22/2018  8:00 PM   Dressing Cleansing/Solutions Not Applicable 8/22/2018  8:00 PM   Periwound Protectant Barrier Paste 8/22/2018  8:00 PM   Length (cm) 25 8/16/2018  9:00 PM   Width (cm) 19 8/16/2018  9:00 PM   Depth (cm) 0.1 8/16/2018  9:00 PM   Weekly Photo (Inpatient Only) 08/16/18  8/16/2018  9:00 PM   Dressing Change Frequency Every Shift 8/19/2018  8:00 AM   Protocol Orders Initiated Yes 8/16/2018  9:00 PM   Time Spent with Patient (mins) 35 8/16/2018  9:00 PM       Wound Skin Tear Buttock Right (Active)       Wound Other (comment) Abdomen Left (Active)              Urinary Catheter Indwelling Catheter 16 (Active)        MENTAL STATUS ON TRANSFER  Level of Consciousness: Alert  Orientation : Oriented x 4  Speech: Speech Clear    CONSULTATIONS  None.    PROCEDURES  None.    LABORATORY  Lab Results   Component Value Date    SODIUM 133 (L) 08/23/2018    POTASSIUM 4.1 08/23/2018    CHLORIDE 100 08/23/2018    CO2 27 08/23/2018    GLUCOSE 216 (H) 08/23/2018    BUN 16 08/23/2018    CREATININE 0.84 08/23/2018    CREATININE 1.10 08/12/2014        Lab Results   Component Value Date    WBC 5.3 08/23/2018    HEMOGLOBIN 8.9 (L) 08/23/2018    HEMATOCRIT 27.5 (L) 08/23/2018    PLATELETCT 180 08/23/2018        Total time of the discharge process exceeds 32 minutes.

## 2018-08-23 NOTE — DISCHARGE PLANNING
Agency/Facility Name: [facility]   Outcome: D/C summary sent to [facility] via RightFax.

## 2018-09-25 NOTE — PROGRESS NOTES
Chemotherapy Verification - SECONDARY RN       Height = 160cm  Weight = 172.5kg  BSA = 2.77m2       Medication: Velcade  Dose: 1.5mg/m2  Calculated Dose: 4.155mg (Ordered Dose: 4.15mg)                            (In mg/m2, AUC, mg/kg)       I confirm that this process was performed independently.    CONSTITUTIONAL: Well-appearing; well-nourished; in no apparent distress.   EYES: PERRL; EOM intact.   ENT: normal nose; no rhinorrhea and epistaxis; normal pharynx with no tonsillar hypertrophy. TM nml b/l. no facial bone tenderness.   CARDIOVASCULAR: Normal S1, S2; no murmurs, rubs, or gallops.   RESPIRATORY: Normal chest excursion with respiration; breath sounds clear and equal bilaterally; no wheezes, rhonchi, or rales.  GI/: Normal bowel sounds; non-distended; non-tender; no palpable organomegaly.   MS: No midline tenderness to neck/back. No evidence of trauma or deformity. Non-tender to palpation. Normal ROM in all four extremities; non-tender to palpation; distal pulses are normal.   SKIN: + mild ecchymosis/swelling with point tenderness to right cheek  NEURO/PSYCH: A & O x 4; grossly unremarkable. mood and manner are appropriate. ambulate with nml and steady gait. behavior appropriate to his age

## 2018-10-03 ENCOUNTER — HOSPITAL ENCOUNTER (INPATIENT)
Facility: MEDICAL CENTER | Age: 68
LOS: 7 days | DRG: 606 | End: 2018-10-10
Attending: EMERGENCY MEDICINE | Admitting: HOSPITALIST
Payer: MEDICARE

## 2018-10-03 DIAGNOSIS — D70.1 CHEMOTHERAPY-INDUCED NEUTROPENIA (HCC): ICD-10-CM

## 2018-10-03 DIAGNOSIS — C90.00 MULTIPLE MYELOMA NOT HAVING ACHIEVED REMISSION (HCC): ICD-10-CM

## 2018-10-03 DIAGNOSIS — T45.1X5A CHEMOTHERAPY-INDUCED NEUTROPENIA (HCC): ICD-10-CM

## 2018-10-03 PROBLEM — R21 RASH: Status: ACTIVE | Noted: 2018-10-03

## 2018-10-03 PROBLEM — E83.42 HYPOMAGNESEMIA: Status: ACTIVE | Noted: 2018-10-03

## 2018-10-03 PROBLEM — N17.9 ACUTE KIDNEY INJURY (HCC): Status: ACTIVE | Noted: 2018-10-03

## 2018-10-03 LAB
ALBUMIN SERPL BCP-MCNC: 2.4 G/DL (ref 3.2–4.9)
ALBUMIN/GLOB SERPL: 0.8 G/DL
ALP SERPL-CCNC: 62 U/L (ref 30–99)
ALT SERPL-CCNC: 8 U/L (ref 2–50)
ANION GAP SERPL CALC-SCNC: 6 MMOL/L (ref 0–11.9)
AST SERPL-CCNC: 24 U/L (ref 12–45)
BASOPHILS # BLD AUTO: 0 % (ref 0–1.8)
BASOPHILS # BLD: 0 K/UL (ref 0–0.12)
BILIRUB SERPL-MCNC: 0.3 MG/DL (ref 0.1–1.5)
BUN SERPL-MCNC: 21 MG/DL (ref 8–22)
CALCIUM SERPL-MCNC: 8.7 MG/DL (ref 8.5–10.5)
CHLORIDE SERPL-SCNC: 98 MMOL/L (ref 96–112)
CO2 SERPL-SCNC: 29 MMOL/L (ref 20–33)
COMMENT 1642: NORMAL
CREAT SERPL-MCNC: 1.22 MG/DL (ref 0.5–1.4)
CRP SERPL HS-MCNC: 4.45 MG/DL (ref 0–0.75)
EOSINOPHIL # BLD AUTO: 0.04 K/UL (ref 0–0.51)
EOSINOPHIL NFR BLD: 3.1 % (ref 0–6.9)
ERYTHROCYTE [DISTWIDTH] IN BLOOD BY AUTOMATED COUNT: 56.6 FL (ref 35.9–50)
ERYTHROCYTE [SEDIMENTATION RATE] IN BLOOD BY WESTERGREN METHOD: 98 MM/HOUR (ref 0–30)
EST. AVERAGE GLUCOSE BLD GHB EST-MCNC: 146 MG/DL
GLOBULIN SER CALC-MCNC: 3 G/DL (ref 1.9–3.5)
GLUCOSE BLD-MCNC: 131 MG/DL (ref 65–99)
GLUCOSE SERPL-MCNC: 134 MG/DL (ref 65–99)
HBA1C MFR BLD: 6.7 % (ref 0–5.6)
HCT VFR BLD AUTO: 22.9 % (ref 37–47)
HGB BLD-MCNC: 7.5 G/DL (ref 12–16)
IMM GRANULOCYTES # BLD AUTO: 0.01 K/UL (ref 0–0.11)
IMM GRANULOCYTES NFR BLD AUTO: 0.8 % (ref 0–0.9)
INR PPP: 2.93 (ref 0.87–1.13)
LYMPHOCYTES # BLD AUTO: 0.25 K/UL (ref 1–4.8)
LYMPHOCYTES NFR BLD: 19.1 % (ref 22–41)
MAGNESIUM SERPL-MCNC: 1.3 MG/DL (ref 1.5–2.5)
MCH RBC QN AUTO: 32.9 PG (ref 27–33)
MCHC RBC AUTO-ENTMCNC: 32.8 G/DL (ref 33.6–35)
MCV RBC AUTO: 100.4 FL (ref 81.4–97.8)
MONOCYTES # BLD AUTO: 0.17 K/UL (ref 0–0.85)
MONOCYTES NFR BLD AUTO: 13 % (ref 0–13.4)
MORPHOLOGY BLD-IMP: NORMAL
NEUTROPHILS # BLD AUTO: 0.84 K/UL (ref 2–7.15)
NEUTROPHILS NFR BLD: 64 % (ref 44–72)
NRBC # BLD AUTO: 0 K/UL
NRBC BLD-RTO: 0 /100 WBC
PLATELET # BLD AUTO: 56 K/UL (ref 164–446)
POTASSIUM SERPL-SCNC: 4 MMOL/L (ref 3.6–5.5)
PROT SERPL-MCNC: 5.4 G/DL (ref 6–8.2)
PROTHROMBIN TIME: 30.6 SEC (ref 12–14.6)
RBC # BLD AUTO: 2.28 M/UL (ref 4.2–5.4)
SODIUM SERPL-SCNC: 133 MMOL/L (ref 135–145)
TSH SERPL DL<=0.005 MIU/L-ACNC: 1.25 UIU/ML (ref 0.38–5.33)
WBC # BLD AUTO: 1.5 K/UL (ref 4.8–10.8)

## 2018-10-03 PROCEDURE — 83036 HEMOGLOBIN GLYCOSYLATED A1C: CPT

## 2018-10-03 PROCEDURE — A9270 NON-COVERED ITEM OR SERVICE: HCPCS | Performed by: HOSPITALIST

## 2018-10-03 PROCEDURE — 83735 ASSAY OF MAGNESIUM: CPT

## 2018-10-03 PROCEDURE — 85025 COMPLETE CBC W/AUTO DIFF WBC: CPT

## 2018-10-03 PROCEDURE — 80053 COMPREHEN METABOLIC PANEL: CPT

## 2018-10-03 PROCEDURE — 99223 1ST HOSP IP/OBS HIGH 75: CPT | Mod: AI | Performed by: HOSPITALIST

## 2018-10-03 PROCEDURE — 770009 HCHG ROOM/CARE - ONCOLOGY SEMI PRI*

## 2018-10-03 PROCEDURE — 82962 GLUCOSE BLOOD TEST: CPT

## 2018-10-03 PROCEDURE — 85610 PROTHROMBIN TIME: CPT

## 2018-10-03 PROCEDURE — 700102 HCHG RX REV CODE 250 W/ 637 OVERRIDE(OP): Performed by: HOSPITALIST

## 2018-10-03 PROCEDURE — 99285 EMERGENCY DEPT VISIT HI MDM: CPT

## 2018-10-03 PROCEDURE — 84443 ASSAY THYROID STIM HORMONE: CPT

## 2018-10-03 PROCEDURE — 36415 COLL VENOUS BLD VENIPUNCTURE: CPT

## 2018-10-03 PROCEDURE — 86140 C-REACTIVE PROTEIN: CPT

## 2018-10-03 PROCEDURE — 85652 RBC SED RATE AUTOMATED: CPT

## 2018-10-03 PROCEDURE — 700105 HCHG RX REV CODE 258: Performed by: HOSPITALIST

## 2018-10-03 PROCEDURE — 700111 HCHG RX REV CODE 636 W/ 250 OVERRIDE (IP): Performed by: HOSPITALIST

## 2018-10-03 RX ORDER — DIPHENHYDRAMINE HCL 25 MG
25 TABLET ORAL 3 TIMES DAILY PRN
Status: ON HOLD | COMMUNITY
End: 2018-10-07

## 2018-10-03 RX ORDER — HYDROMORPHONE HYDROCHLORIDE 2 MG/ML
1 INJECTION, SOLUTION INTRAMUSCULAR; INTRAVENOUS; SUBCUTANEOUS
Status: DISCONTINUED | OUTPATIENT
Start: 2018-10-03 | End: 2018-10-11 | Stop reason: HOSPADM

## 2018-10-03 RX ORDER — ACETAMINOPHEN 325 MG/1
650 TABLET ORAL EVERY 6 HOURS PRN
Status: DISCONTINUED | OUTPATIENT
Start: 2018-10-03 | End: 2018-10-11 | Stop reason: HOSPADM

## 2018-10-03 RX ORDER — OMEPRAZOLE 20 MG/1
20 CAPSULE, DELAYED RELEASE ORAL DAILY
Status: DISCONTINUED | OUTPATIENT
Start: 2018-10-04 | End: 2018-10-11 | Stop reason: HOSPADM

## 2018-10-03 RX ORDER — CALCIUM CARBONATE 500 MG/1
1000 TABLET, CHEWABLE ORAL
Status: DISCONTINUED | OUTPATIENT
Start: 2018-10-04 | End: 2018-10-11 | Stop reason: HOSPADM

## 2018-10-03 RX ORDER — M-VIT,TX,IRON,MINS/CALC/FOLIC 27MG-0.4MG
1 TABLET ORAL DAILY
Status: DISCONTINUED | OUTPATIENT
Start: 2018-10-04 | End: 2018-10-11 | Stop reason: HOSPADM

## 2018-10-03 RX ORDER — DULOXETIN HYDROCHLORIDE 20 MG/1
40 CAPSULE, DELAYED RELEASE ORAL DAILY
Status: DISCONTINUED | OUTPATIENT
Start: 2018-10-04 | End: 2018-10-11 | Stop reason: HOSPADM

## 2018-10-03 RX ORDER — ONDANSETRON 2 MG/ML
4 INJECTION INTRAMUSCULAR; INTRAVENOUS EVERY 4 HOURS PRN
Status: DISCONTINUED | OUTPATIENT
Start: 2018-10-03 | End: 2018-10-11 | Stop reason: HOSPADM

## 2018-10-03 RX ORDER — SUCRALFATE 1 G/1
1 TABLET ORAL
Status: DISCONTINUED | OUTPATIENT
Start: 2018-10-09 | End: 2018-10-11 | Stop reason: HOSPADM

## 2018-10-03 RX ORDER — CETIRIZINE HYDROCHLORIDE 10 MG/1
10 TABLET ORAL DAILY
Status: DISCONTINUED | OUTPATIENT
Start: 2018-10-04 | End: 2018-10-11 | Stop reason: HOSPADM

## 2018-10-03 RX ORDER — PREDNISONE 20 MG/1
40 TABLET ORAL DAILY
Status: DISCONTINUED | OUTPATIENT
Start: 2018-10-03 | End: 2018-10-04

## 2018-10-03 RX ORDER — AMOXICILLIN 250 MG
2 CAPSULE ORAL 2 TIMES DAILY
Status: DISCONTINUED | OUTPATIENT
Start: 2018-10-04 | End: 2018-10-04

## 2018-10-03 RX ORDER — MAGNESIUM SULFATE HEPTAHYDRATE 40 MG/ML
2 INJECTION, SOLUTION INTRAVENOUS ONCE
Status: COMPLETED | OUTPATIENT
Start: 2018-10-03 | End: 2018-10-04

## 2018-10-03 RX ORDER — DEXTROSE MONOHYDRATE 25 G/50ML
25 INJECTION, SOLUTION INTRAVENOUS
Status: DISCONTINUED | OUTPATIENT
Start: 2018-10-03 | End: 2018-10-05

## 2018-10-03 RX ORDER — LORAZEPAM 1 MG/1
0.5 TABLET ORAL 2 TIMES DAILY
Status: DISCONTINUED | OUTPATIENT
Start: 2018-10-03 | End: 2018-10-11 | Stop reason: HOSPADM

## 2018-10-03 RX ORDER — WARFARIN SODIUM 3 MG/1
3 TABLET ORAL
Status: DISCONTINUED | OUTPATIENT
Start: 2018-10-03 | End: 2018-10-11 | Stop reason: HOSPADM

## 2018-10-03 RX ORDER — SODIUM CHLORIDE 9 MG/ML
INJECTION, SOLUTION INTRAVENOUS CONTINUOUS
Status: DISCONTINUED | OUTPATIENT
Start: 2018-10-03 | End: 2018-10-05

## 2018-10-03 RX ORDER — DIPHENHYDRAMINE HCL 25 MG
25 TABLET ORAL EVERY 6 HOURS PRN
Status: DISCONTINUED | OUTPATIENT
Start: 2018-10-03 | End: 2018-10-06

## 2018-10-03 RX ORDER — ASCORBIC ACID 500 MG
500 TABLET ORAL DAILY
Status: DISCONTINUED | OUTPATIENT
Start: 2018-10-04 | End: 2018-10-11 | Stop reason: HOSPADM

## 2018-10-03 RX ORDER — FLUTICASONE PROPIONATE 50 MCG
2 SPRAY, SUSPENSION (ML) NASAL DAILY
Status: DISCONTINUED | OUTPATIENT
Start: 2018-10-04 | End: 2018-10-11 | Stop reason: HOSPADM

## 2018-10-03 RX ORDER — ONDANSETRON 4 MG/1
4 TABLET, ORALLY DISINTEGRATING ORAL EVERY 4 HOURS PRN
Status: DISCONTINUED | OUTPATIENT
Start: 2018-10-03 | End: 2018-10-11 | Stop reason: HOSPADM

## 2018-10-03 RX ORDER — CALCITRIOL 0.25 UG/1
0.25 CAPSULE, LIQUID FILLED ORAL DAILY
Status: DISCONTINUED | OUTPATIENT
Start: 2018-10-04 | End: 2018-10-11 | Stop reason: HOSPADM

## 2018-10-03 RX ORDER — BISACODYL 10 MG
10 SUPPOSITORY, RECTAL RECTAL
Status: DISCONTINUED | OUTPATIENT
Start: 2018-10-03 | End: 2018-10-04

## 2018-10-03 RX ORDER — PRAVASTATIN SODIUM 20 MG
40 TABLET ORAL EVERY EVENING
Status: DISCONTINUED | OUTPATIENT
Start: 2018-10-04 | End: 2018-10-11 | Stop reason: HOSPADM

## 2018-10-03 RX ORDER — AMOXICILLIN 250 MG
1 CAPSULE ORAL DAILY
COMMUNITY

## 2018-10-03 RX ORDER — POLYETHYLENE GLYCOL 3350 17 G/17G
1 POWDER, FOR SOLUTION ORAL
Status: DISCONTINUED | OUTPATIENT
Start: 2018-10-03 | End: 2018-10-04

## 2018-10-03 RX ADMIN — LORAZEPAM 0.5 MG: 1 TABLET ORAL at 20:55

## 2018-10-03 RX ADMIN — SODIUM CHLORIDE: 9 INJECTION, SOLUTION INTRAVENOUS at 21:05

## 2018-10-03 RX ADMIN — WARFARIN SODIUM 3 MG: 3 TABLET ORAL at 21:37

## 2018-10-03 RX ADMIN — PREDNISONE 40 MG: 20 TABLET ORAL at 20:55

## 2018-10-03 RX ADMIN — Medication: at 20:43

## 2018-10-03 RX ADMIN — MAGNESIUM SULFATE IN WATER 2 G: 40 INJECTION, SOLUTION INTRAVENOUS at 23:25

## 2018-10-03 RX ADMIN — INSULIN HUMAN 15 UNITS: 100 INJECTION, SUSPENSION SUBCUTANEOUS at 21:06

## 2018-10-03 ASSESSMENT — ENCOUNTER SYMPTOMS
NAUSEA: 0
DEPRESSION: 0
WEAKNESS: 0
SINUS PAIN: 0
HEMOPTYSIS: 0
DIZZINESS: 0
NECK PAIN: 0
BLURRED VISION: 0
HEADACHES: 0
FEVER: 0
PALPITATIONS: 0
MYALGIAS: 0
DOUBLE VISION: 0
TINGLING: 0
BRUISES/BLEEDS EASILY: 0
CHILLS: 1
COUGH: 0
HEARTBURN: 0

## 2018-10-03 ASSESSMENT — PAIN SCALES - GENERAL
PAINLEVEL_OUTOF10: 0

## 2018-10-03 ASSESSMENT — PATIENT HEALTH QUESTIONNAIRE - PHQ9
SUM OF ALL RESPONSES TO PHQ9 QUESTIONS 1 AND 2: 1
2. FEELING DOWN, DEPRESSED, IRRITABLE, OR HOPELESS: SEVERAL DAYS
1. LITTLE INTEREST OR PLEASURE IN DOING THINGS: NOT AT ALL

## 2018-10-03 ASSESSMENT — LIFESTYLE VARIABLES
DO YOU DRINK ALCOHOL: NO
EVER_SMOKED: NEVER

## 2018-10-03 NOTE — ED NOTES
Med Rec complete per MAR from [facility]  Allergies Reviewed via MAR      Pt takes REVLIMID 25 mg  25 mg every day for 21 days  Hold for 7 days  Pt is supposed to start taking REVLIMID on 10/04    Pt takes WARFARIN 3 mg every day of the week

## 2018-10-03 NOTE — ED NOTES
Pt made aware prn pain meds ordered, but she declines at this time. Pain controlled at present. Ongoing monitoring

## 2018-10-03 NOTE — ED TRIAGE NOTES
ARVIND BRAUN from [facility]. Hx multiple myeloma receiving chemo. Increased chemo dose 2 weeks ago and shortly after infusion, noted red spots to arms. Quickly spread and now generalized raised, itchy, hives all over entire body. No airway involvement/symptoms. No relief from Benadryl. On air mattress for transfer -- pt denies any decubitus ulcers. No weeping or open wounds noted to areas of rash.

## 2018-10-03 NOTE — ED NOTES
Assumed care of pt from EMS - see triage note by this RN for details. Changed to gown, hooked to monitor- VSS.  PIV placed with blood draw. Fall precautions in place. Call bell in reach. Awaiting MD eval/orders. Ongoing monitoring.

## 2018-10-03 NOTE — ED PROVIDER NOTES
ED Provider Note    CHIEF COMPLAINT  Chief Complaint   Patient presents with   • Rash       HPI  Aleida Pagan is a 67 y.o. female who presents for evaluation of a rash.  Patient states she has had a rash for at least a week that is generalized, it is characterized by being erythematous, she has developed itching and appears to have hives all over her body.  She is had no relief from Benadryl.  She is taking steroids for her chemotherapy secondary to multiple myeloma.  She believes she has had a change or her medication 2 weeks ago with an increase of dosage of 1 of her chemotherapy medications.  She denies any fever chills cough shortness of breath throat swelling tongue swelling.  Nothing makes her symptoms worse or better.  She has multiple medical problems which are reviewed below she has no chest pain, shortness of breath.  She denies any dysuria, headache.  Denies polyuria or polydipsia    REVIEW OF SYSTEMS  See HPI for further details.  All other systems are reviewed and negative except as noted above    PAST MEDICAL HISTORY  Past Medical History:   Diagnosis Date   • Anemia 02/2018   • Arthritis 03/12/2018    To fingers and right knee.   • Bowel habit changes 03/12/2018    diarrhea related to diet.   • Bronchitis 12/21/17-3/6/18    x3. 3/12/18-States has productive cough that has been improving. CXR done @ [facility].  HX of chronic bronchitis.    • Cancer (HCC) 12/12/2017    Myeloma in bone marrow after lesions found in right knee.   • Cataract 2017    Bilateral phaco with IOL   • Cellulitis 02/18/2018    Bilateral lower extremities   • Diabetes (HCC) 03/12/2018    States not good at checking glucose. Avg AM glucose .   • Essential hypertension, benign 7/26/2013   • Heart burn 03/12/2018    Related to diet. Tums PRN.   • High cholesterol    • Multiple myeloma (HCC)    • Other and unspecified hyperlipidemia 7/26/2013   • Pain 03/12/2018    Right knee and shin. Left arm.   • Pneumonia 2000   •  Urinary bladder disorder 12/2017-1/2017    Had nava catheter    • UTI (urinary tract infection) 02/2018    Took biaxin.       FAMILY HISTORY  No family history on file.    SOCIAL HISTORY  Social History     Social History   • Marital status: Single     Spouse name: N/A   • Number of children: N/A   • Years of education: N/A     Social History Main Topics   • Smoking status: Never Smoker   • Smokeless tobacco: Never Used   • Alcohol use No   • Drug use: No   • Sexual activity: Not on file     Other Topics Concern   • Not on file     Social History Narrative   • No narrative on file       SURGICAL HISTORY  Past Surgical History:   Procedure Laterality Date   • BONE BIOPSY Left 3/13/2018    Procedure: BONE BIOPSY - PROXIMAL HUMERUS;  Surgeon: Hany Forbes M.D.;  Location: SURGERY [facility];  Service: Orthopedics   • JOINT INJECTION DIAGNOSTIC Right 3/13/2018    Procedure: JOINT INJECTION DIAGNOSTIC - STEROID, KNEE INTRA-ARTICULAR;  Surgeon: Hany Forbes M.D.;  Location: SURGERY [facility];  Service: Orthopedics   • CATARACT PHACO WITH IOL Bilateral 2017   • COLONOSCOPY  1990'S       CURRENT MEDICATIONS  Home Medications     Reviewed by Bianca Chen R.N. (Registered Nurse) on 10/03/18 at 1132  Med List Status: Partial   Medication Last Dose Status   acyclovir (ZOVIRAX) 200 MG Cap 8/15/2018 Active   ascorbic acid (VITAMIN C) 500 MG tablet 8/15/2018 Active   calcitRIOL (ROCALTROL) 0.25 MCG Cap 8/15/2018 Active   calcium carbonate (TUMS) 500 MG Chew Tab 8/15/2018 Active   cetirizine (ZYRTEC) 10 MG Tab 8/15/2018 Active   Cholecalciferol 2000 UNIT Cap 8/15/2018 Active   DULoxetine (CYMBALTA) 20 MG Cap DR Particles 8/15/2018 Active   fluticasone (FLONASE) 50 MCG/ACT nasal spray 8/15/2018 Active   insulin NPH (HUMULIN,NOVOLIN) 100 UNIT/ML Suspension 8/15/2018 Active   insulin regular (HUMULIN R) 100 Unit/mL Solution 8/15/2018 Active   Ixazomib Citrate 4 MG Cap 8/7/2018 Active   LACTOBACILLUS PO 8/15/2018  "Active   lenalidomide (REVLIMID) 25 MG Cap 8/6/2018 Active   LORazepam (ATIVAN) 0.5 MG Tab 8/15/2018 Active   metoprolol (LOPRESSOR) 25 MG Tab 8/15/2018 Active   omeprazole (PRILOSEC) 20 MG delayed-release capsule 8/16/2018 Active   pravastatin (PRAVACHOL) 40 MG tablet 8/15/2018 Active   sucralfate (CARAFATE) 1 GM Tab 8/15/2018 Active   therapeutic multivitamin-minerals (THERAGRAN-M) Tab 8/15/2018 Active   warfarin (COUMADIN) 5 MG Tab 8/15/2018 Active                 ALLERGIES  Allergies   Allergen Reactions   • Actos [Pioglitazone Hydrochloride] Hives   • Advil [Ibuprofen Micronized] Hives   • Cephalosporins Hives     Unsure if she tolerates penicillin     • [place] Hives       PHYSICAL EXAM  VITAL SIGNS: /49   Pulse 80   Temp 36.8 °C (98.2 °F)   Resp 18   Ht 1.6 m (5' 3\")   Wt (!) 126.6 kg (279 lb)   LMP 04/11/1994   SpO2 100%   BMI 49.42 kg/m²   Constitutional :  Well developed, Well nourished, who appears uncomfortable with a diffuse rash  HENT: Head is atraumatic normocephalic moist mucous membranes  Eyes: Normal-appearing nonicteric  Neck: Normal range of motion, No tenderness, Supple, No stridor.   Lymphatic: No cervical adenopathy.   Cardiovascular: Normal heart rate, Normal rhythm, No murmurs, No rubs, No gallops.   Thorax & Lungs: Equal breath sounds are noted bilaterally without rales rhonchi  Skin: She appears to have a diffuse urticarial rash involving trunk extremities face no evidence of purpuric lesions are noted no sloughing of skin no evidence of cellulitis  Abdomen is soft nontender no distention  Neurologically she is awake she is alert no focal neurological findings are noted    Results for orders placed or performed during the hospital encounter of 10/03/18   CBC WITH DIFFERENTIAL   Result Value Ref Range    WBC 1.5 (LL) 4.8 - 10.8 K/uL    RBC 2.28 (L) 4.20 - 5.40 M/uL    Hemoglobin 7.5 (L) 12.0 - 16.0 g/dL    Hematocrit 22.9 (L) 37.0 - 47.0 %    .4 (H) 81.4 - 97.8 fL    MCH " 32.9 27.0 - 33.0 pg    MCHC 32.8 (L) 33.6 - 35.0 g/dL    RDW 56.6 (H) 35.9 - 50.0 fL    Platelet Count 56 (L) 164 - 446 K/uL    Neutrophils-Polys 64.00 44.00 - 72.00 %    Lymphocytes 19.10 (L) 22.00 - 41.00 %    Monocytes 13.00 0.00 - 13.40 %    Eosinophils 3.10 0.00 - 6.90 %    Basophils 0.00 0.00 - 1.80 %    Immature Granulocytes 0.80 0.00 - 0.90 %    Nucleated RBC 0.00 /100 WBC    Neutrophils (Absolute) 0.84 (L) 2.00 - 7.15 K/uL    Lymphs (Absolute) 0.25 (L) 1.00 - 4.80 K/uL    Monos (Absolute) 0.17 0.00 - 0.85 K/uL    Eos (Absolute) 0.04 0.00 - 0.51 K/uL    Baso (Absolute) 0.00 0.00 - 0.12 K/uL    Immature Granulocytes (abs) 0.01 0.00 - 0.11 K/uL    NRBC (Absolute) 0.00 K/uL   COMP METABOLIC PANEL   Result Value Ref Range    Sodium 133 (L) 135 - 145 mmol/L    Potassium 4.0 3.6 - 5.5 mmol/L    Chloride 98 96 - 112 mmol/L    Co2 29 20 - 33 mmol/L    Anion Gap 6.0 0.0 - 11.9    Glucose 134 (H) 65 - 99 mg/dL    Bun 21 8 - 22 mg/dL    Creatinine 1.22 0.50 - 1.40 mg/dL    Calcium 8.7 8.5 - 10.5 mg/dL    AST(SGOT) 24 12 - 45 U/L    ALT(SGPT) 8 2 - 50 U/L    Alkaline Phosphatase 62 30 - 99 U/L    Total Bilirubin 0.3 0.1 - 1.5 mg/dL    Albumin 2.4 (L) 3.2 - 4.9 g/dL    Total Protein 5.4 (L) 6.0 - 8.2 g/dL    Globulin 3.0 1.9 - 3.5 g/dL    A-G Ratio 0.8 g/dL   WESTERGREN SED RATE   Result Value Ref Range    Sed Rate Westergren 98 (H) 0 - 30 mm/hour   CRP QUANTITIVE (NON-CARDIAC)   Result Value Ref Range    Stat C-Reactive Protein 4.45 (H) 0.00 - 0.75 mg/dL   ESTIMATED GFR   Result Value Ref Range    GFR If  53 (A) >60 mL/min/1.73 m 2    GFR If Non  44 (A) >60 mL/min/1.73 m 2   PERIPHERAL SMEAR REVIEW   Result Value Ref Range    Peripheral Smear Review see below    DIFFERENTIAL COMMENT   Result Value Ref Range    Comments-Diff see below      No orders to display         COURSE & MEDICAL DECISION MAKING  Pertinent Labs & Imaging studies reviewed. (See chart for details)  The patient is  presenting for evaluation of what appear to be hives.  She has a generalized urticarial reaction.  I discussed the findings with her oncologist Dr. Sim.  He is asked that we discontinue her REVLI MID E.  He feels this may be contributing to her neutropenia.  This rash appears to be probable drug eruption.  The patient is currently taking steroids but may benefit from increased dose.  The findings were discussed with the hospitalist who will be admitting.  She is in otherwise stable condition she has no evidence of anaphylaxis    FINAL IMPRESSION  1.  Neutropenia  2.  Urticarial reaction  3.      Electronically signed by: Warren Venegas, 10/3/2018

## 2018-10-03 NOTE — ED NOTES
from Lab called with critical result of WBC of 1.5 at 1220. Critical lab result read back to .  ERP notified of critical lab result at 1221.  Critical lab result read back by ERP.

## 2018-10-03 NOTE — ED NOTES
Pt resting in stretcher. Resp even and unlabored. NAD. VSS on monitor. Awaiting results. Fall precautions in place. Call bell in reach. Ongoing monitoring.

## 2018-10-04 ENCOUNTER — APPOINTMENT (OUTPATIENT)
Dept: RADIOLOGY | Facility: MEDICAL CENTER | Age: 68
DRG: 606 | End: 2018-10-04
Attending: INTERNAL MEDICINE
Payer: MEDICARE

## 2018-10-04 LAB
ANION GAP SERPL CALC-SCNC: 6 MMOL/L (ref 0–11.9)
BUN SERPL-MCNC: 25 MG/DL (ref 8–22)
CALCIUM SERPL-MCNC: 8.5 MG/DL (ref 8.5–10.5)
CHLORIDE SERPL-SCNC: 97 MMOL/L (ref 96–112)
CO2 SERPL-SCNC: 28 MMOL/L (ref 20–33)
CREAT SERPL-MCNC: 1.08 MG/DL (ref 0.5–1.4)
ERYTHROCYTE [DISTWIDTH] IN BLOOD BY AUTOMATED COUNT: 54.7 FL (ref 35.9–50)
GLUCOSE BLD-MCNC: 159 MG/DL (ref 65–99)
GLUCOSE BLD-MCNC: 187 MG/DL (ref 65–99)
GLUCOSE BLD-MCNC: 232 MG/DL (ref 65–99)
GLUCOSE BLD-MCNC: 297 MG/DL (ref 65–99)
GLUCOSE SERPL-MCNC: 192 MG/DL (ref 65–99)
HCT VFR BLD AUTO: 23.2 % (ref 37–47)
HGB BLD-MCNC: 7.6 G/DL (ref 12–16)
INR PPP: 2.67 (ref 0.87–1.13)
MAGNESIUM SERPL-MCNC: 2 MG/DL (ref 1.5–2.5)
MCH RBC QN AUTO: 32.3 PG (ref 27–33)
MCHC RBC AUTO-ENTMCNC: 32.8 G/DL (ref 33.6–35)
MCV RBC AUTO: 98.7 FL (ref 81.4–97.8)
PLATELET # BLD AUTO: 55 K/UL (ref 164–446)
PMV BLD AUTO: 13.7 FL (ref 9–12.9)
POTASSIUM SERPL-SCNC: 4 MMOL/L (ref 3.6–5.5)
PROTHROMBIN TIME: 28.4 SEC (ref 12–14.6)
RBC # BLD AUTO: 2.35 M/UL (ref 4.2–5.4)
SODIUM SERPL-SCNC: 131 MMOL/L (ref 135–145)
WBC # BLD AUTO: 1.8 K/UL (ref 4.8–10.8)

## 2018-10-04 PROCEDURE — A9270 NON-COVERED ITEM OR SERVICE: HCPCS | Performed by: HOSPITALIST

## 2018-10-04 PROCEDURE — 80048 BASIC METABOLIC PNL TOTAL CA: CPT

## 2018-10-04 PROCEDURE — 85610 PROTHROMBIN TIME: CPT

## 2018-10-04 PROCEDURE — 82962 GLUCOSE BLOOD TEST: CPT | Mod: 91

## 2018-10-04 PROCEDURE — 770004 HCHG ROOM/CARE - ONCOLOGY PRIVATE *

## 2018-10-04 PROCEDURE — 700111 HCHG RX REV CODE 636 W/ 250 OVERRIDE (IP): Performed by: INTERNAL MEDICINE

## 2018-10-04 PROCEDURE — 36415 COLL VENOUS BLD VENIPUNCTURE: CPT

## 2018-10-04 PROCEDURE — 700102 HCHG RX REV CODE 250 W/ 637 OVERRIDE(OP): Performed by: HOSPITALIST

## 2018-10-04 PROCEDURE — 36569 INSJ PICC 5 YR+ W/O IMAGING: CPT

## 2018-10-04 PROCEDURE — 700111 HCHG RX REV CODE 636 W/ 250 OVERRIDE (IP): Mod: JG | Performed by: INTERNAL MEDICINE

## 2018-10-04 PROCEDURE — 700105 HCHG RX REV CODE 258: Performed by: HOSPITALIST

## 2018-10-04 PROCEDURE — 700111 HCHG RX REV CODE 636 W/ 250 OVERRIDE (IP): Performed by: HOSPITALIST

## 2018-10-04 PROCEDURE — 85027 COMPLETE CBC AUTOMATED: CPT

## 2018-10-04 PROCEDURE — 83735 ASSAY OF MAGNESIUM: CPT

## 2018-10-04 PROCEDURE — 99232 SBSQ HOSP IP/OBS MODERATE 35: CPT | Performed by: INTERNAL MEDICINE

## 2018-10-04 RX ORDER — METHYLPREDNISOLONE SODIUM SUCCINATE 125 MG/2ML
62.5 INJECTION, POWDER, LYOPHILIZED, FOR SOLUTION INTRAMUSCULAR; INTRAVENOUS EVERY 8 HOURS
Status: DISCONTINUED | OUTPATIENT
Start: 2018-10-04 | End: 2018-10-07

## 2018-10-04 RX ADMIN — INSULIN HUMAN 2 UNITS: 100 INJECTION, SOLUTION PARENTERAL at 18:08

## 2018-10-04 RX ADMIN — ANTACID TABLETS 1000 MG: 500 TABLET, CHEWABLE ORAL at 18:11

## 2018-10-04 RX ADMIN — INSULIN HUMAN 1 UNITS: 100 INJECTION, SOLUTION PARENTERAL at 08:24

## 2018-10-04 RX ADMIN — METOPROLOL TARTRATE 25 MG: 25 TABLET, FILM COATED ORAL at 06:45

## 2018-10-04 RX ADMIN — FLUTICASONE PROPIONATE 100 MCG: 50 SPRAY, METERED NASAL at 06:31

## 2018-10-04 RX ADMIN — WARFARIN SODIUM 3 MG: 3 TABLET ORAL at 18:10

## 2018-10-04 RX ADMIN — LORAZEPAM 0.5 MG: 1 TABLET ORAL at 18:11

## 2018-10-04 RX ADMIN — Medication: at 18:11

## 2018-10-04 RX ADMIN — ANTACID TABLETS 1000 MG: 500 TABLET, CHEWABLE ORAL at 13:29

## 2018-10-04 RX ADMIN — TBO-FILGRASTIM 480 MCG: 480 INJECTION, SOLUTION SUBCUTANEOUS at 13:28

## 2018-10-04 RX ADMIN — PREDNISONE 40 MG: 20 TABLET ORAL at 06:33

## 2018-10-04 RX ADMIN — Medication: at 06:31

## 2018-10-04 RX ADMIN — INSULIN HUMAN 15 UNITS: 100 INJECTION, SUSPENSION SUBCUTANEOUS at 21:19

## 2018-10-04 RX ADMIN — LORAZEPAM 0.5 MG: 1 TABLET ORAL at 06:39

## 2018-10-04 RX ADMIN — CALCITRIOL 0.25 MCG: 0.25 CAPSULE, LIQUID FILLED ORAL at 06:30

## 2018-10-04 RX ADMIN — ANTACID TABLETS 1000 MG: 500 TABLET, CHEWABLE ORAL at 06:30

## 2018-10-04 RX ADMIN — INSULIN HUMAN 1 UNITS: 100 INJECTION, SOLUTION PARENTERAL at 13:27

## 2018-10-04 RX ADMIN — PRAVASTATIN SODIUM 40 MG: 20 TABLET ORAL at 18:11

## 2018-10-04 RX ADMIN — MULTIPLE VITAMINS W/ MINERALS TAB 1 TABLET: TAB at 06:33

## 2018-10-04 RX ADMIN — METHYLPREDNISOLONE SODIUM SUCCINATE 62.5 MG: 125 INJECTION, POWDER, FOR SOLUTION INTRAMUSCULAR; INTRAVENOUS at 23:26

## 2018-10-04 RX ADMIN — CETIRIZINE HYDROCHLORIDE 10 MG: 10 TABLET, FILM COATED ORAL at 06:31

## 2018-10-04 RX ADMIN — OMEPRAZOLE 20 MG: 20 CAPSULE, DELAYED RELEASE ORAL at 06:32

## 2018-10-04 RX ADMIN — METHYLPREDNISOLONE SODIUM SUCCINATE 62.5 MG: 125 INJECTION, POWDER, FOR SOLUTION INTRAMUSCULAR; INTRAVENOUS at 13:29

## 2018-10-04 RX ADMIN — SODIUM CHLORIDE: 9 INJECTION, SOLUTION INTRAVENOUS at 21:27

## 2018-10-04 RX ADMIN — OXYCODONE HYDROCHLORIDE AND ACETAMINOPHEN 500 MG: 500 TABLET ORAL at 06:30

## 2018-10-04 RX ADMIN — DULOXETINE HYDROCHLORIDE 40 MG: 20 CAPSULE, DELAYED RELEASE ORAL at 06:31

## 2018-10-04 RX ADMIN — VITAMIN D, TAB 1000IU (100/BT) 2000 UNITS: 25 TAB at 06:34

## 2018-10-04 RX ADMIN — INSULIN HUMAN 3 UNITS: 100 INJECTION, SOLUTION PARENTERAL at 21:17

## 2018-10-04 RX ADMIN — METOPROLOL TARTRATE 25 MG: 25 TABLET, FILM COATED ORAL at 18:11

## 2018-10-04 ASSESSMENT — PATIENT HEALTH QUESTIONNAIRE - PHQ9
2. FEELING DOWN, DEPRESSED, IRRITABLE, OR HOPELESS: SEVERAL DAYS
2. FEELING DOWN, DEPRESSED, IRRITABLE, OR HOPELESS: SEVERAL DAYS
1. LITTLE INTEREST OR PLEASURE IN DOING THINGS: NOT AT ALL
SUM OF ALL RESPONSES TO PHQ9 QUESTIONS 1 AND 2: 1
SUM OF ALL RESPONSES TO PHQ9 QUESTIONS 1 AND 2: 1
1. LITTLE INTEREST OR PLEASURE IN DOING THINGS: NOT AT ALL

## 2018-10-04 ASSESSMENT — LIFESTYLE VARIABLES: SUBSTANCE_ABUSE: 0

## 2018-10-04 ASSESSMENT — ENCOUNTER SYMPTOMS
DIZZINESS: 0
BRUISES/BLEEDS EASILY: 1
CHILLS: 1
DOUBLE VISION: 0
HEARTBURN: 0
MYALGIAS: 0
COUGH: 0
BACK PAIN: 1
POLYDIPSIA: 0
NAUSEA: 1
BLURRED VISION: 0
DIARRHEA: 1
TINGLING: 0
SPUTUM PRODUCTION: 0
HEMOPTYSIS: 0
HEADACHES: 0
ORTHOPNEA: 0
WEIGHT LOSS: 0
PALPITATIONS: 0
DEPRESSION: 0
FEVER: 0
VOMITING: 1

## 2018-10-04 ASSESSMENT — PAIN SCALES - GENERAL
PAINLEVEL_OUTOF10: 0
PAINLEVEL_OUTOF10: 0

## 2018-10-04 NOTE — PROGRESS NOTES
Assumed care of patient @ 2000. Patient is A&Ox4, Up w/2 assist, on 2L via N/C, VSS, no c/o pain at this time, c/o itch all over. Fall precautions in place, personal belongings and call light w/i reach, will continue to round.

## 2018-10-04 NOTE — ASSESSMENT & PLAN NOTE
IgG kappa multiple myeloma.    improvement of the monoclonal protein  kidney and calcium levels WNL   Revlimid on hold d/t rash

## 2018-10-04 NOTE — PROGRESS NOTES
Hallie from Lab called with critical result of WBC at 1.8. Critical lab result read back to Hallie.   This critical lab result is within parameters established by [facility] for this patient.

## 2018-10-04 NOTE — PROGRESS NOTES
Inpatient Anticoagulation Service Note    Date: 10/3/2018  Reason for Anticoagulation: Deep Vein Thrombosis     Hemoglobin Value: (!) 7.5  Hematocrit Value: (!) 22.9  Lab Platelet Value: (!) 56  Target INR: 2.0 to 3.0    INR from last 7 days     Date/Time INR Value    10/03/18 1125 (!)  2.93        Dose from last 7 days     Date/Time Dose (mg)    10/03/18 2000  3        Average Dose (mg): 3 (Per [facility] MAR)  Significant Interactions: Proton Pump Inhibitor  Bridge Therapy: No     Reversal Agent Administered: Not Applicable    Comments: Patient on chronic warfarin for history of DVT admitted with therapeutic INR. Patient on warfarin 3 mg PO daily per MAR from transferring SNF. Patient's warfarin drug interactions stable fomr PTA and she is tolerating oral diet. Patient's H&H low compared to prior data but no reported s/s of bleeding have been reported. Will continue patient's home warfarin regimen and plan INR checks three times weekly to guide dose adjustments as indicated.     Plan:  Warfarin 3 mg PO today. INR due 10/5.    Education Material Provided?: No (Established warfarin patient)    Pharmacist suggested discharge dosing: Continue prior home warfarin regimen of 3 mg PO daily with follow-up INR within 96 hours of discharge     Pharmacy will continue to follow.     Abbi Le, PharmD

## 2018-10-04 NOTE — CONSULTS
DATE OF SERVICE:  10/04/2018    CONSULT REQUESTED BY:  Rhett Magallanes MD    REASON FOR CONSULTATION:  The patient with multiple myeloma, on Revlimid,   Ninlaro and dexamethasone, coming with severe skin rash and bicytopenia.    HISTORY OF PRESENT ILLNESS:  She is a 67-year-old woman who lives in [place], [place].  This patient was found to have extensive lytic lesions with   pathologic fractures due to multiple myeloma since late 12/2017.    Subsequently, the patient was admitted here to [facility] due to multiple   comorbidities including bone fractures on 04/18, she was diagnosed with   multiple myeloma, IgG kappa with 1p deletion, stage III based on ISS staging.    She did receive palliative radiation treatment to the left humerus during   last admission.  She was started on CyBorD for cycle #1, then she was   discharged on LTAC and her treatment was switched to oral with Revlimid,   Ninlaro, and dexamethasone on 04/18.  She has been taking that, then   subsequently she was discharged and sent to [place].  She is now wheelchair   bound.  Unfortunately, she has not been following up with Dr. Sim after   April.  She did follow with Dr. Sim 1 week ago.  She was found to have   beginning of a skin rash.  She has been compliant with her medication for   multiple myeloma.  She also was just finishing a course of antibiotic due to   urinary tract infection.  In the interim, she had significant issues requiring   multiple blood transfusions, admitted for infections to a local hospital   around [place].    Now, she is coming with extensive maculopapular pruritic rash involving the   trunk, upper and lower extremities.  Mouth is not affected.  She just finished   a course of antibiotic for UTI around 10 days ago.  A few days later, the   rash started.  Also, she has been taking Revlimid and Ninlaro, which can cause   skin rash as well.  She did not report fever, chills.  She was found to have    pancytopenia with a WBC of 1.8, hemoglobin 7.6, and platelet count 55.  The   patient is wheelchair bound, having other multiple comorbidities.    REVIEW OF SYSTEMS:  As stated above.    PAST MEDICAL HISTORY:  Hypertension, morbid obesity, hyperlipidemia,   osteoarthritis, diabetes, multiple myeloma, pathologic bone fracture on   humerus.    SOCIAL HISTORY:  Nonsmoker, nondrinker.  She is single.  She lives alone in   [place], has no children.    FAMILY HISTORY:  Negative except for heart disease.    PHYSICAL EXAMINATION:  VITAL SIGNS:  Blood pressure 112/73, temperature 36.9, pulse 82, respiratory   rate 20, 95% on 2 liters.  GENERAL:  The patient is in distress due to skin rash and pruritus.  HEENT:  Mucous membranes are moist.  NECK:  No lymphadenopathy.  LUNGS:  Clear to auscultation.  CARDIOVASCULAR:  Regular rhythm and rate.  ABDOMEN:  Soft, nontender.  EXTREMITIES:  1+ pitting edema.  NEUROLOGIC:  A and O x3.  Cranial nerves II-XII grossly intact.  SKIN:  There is maculopapular rash involving trunk, upper and lower   extremities, and back, face and scalp are also affected.  All negative for any   lesions.    LABORATORY DATA:  WBC 1.8, hemoglobin 7.6, platelets 55, ANC 0.84.  Chemistry:    Creatinine 1.08, sodium 131, calcium 8.5.  Liver function test with normal   liver enzymes, total bilirubin 0.3, total protein 5.4, albumin at 2.4.    ASSESSMENT AND PLAN:  1.  Severe maculopapula  skin rash,  involving the whole body.  Mucosal areas not   affected.  This can be definitely related to Revlimid, less likely Ninlaro.    Revlimid has been stopped, last time she took Revlimid was 2 weeks ago.  In   the meantime, I will recommend Solu-Medrol 60 mg b.i.d.  It does not look   infected, she is also on antihistamines to relieve the pruritus.  She has been   taking an antibiotic for urinary tract infection, which also can be   contributing to the rash.  She did stop this medication around 10 days ago.  It does not  look infected.  2.  IgG kappa multiple myeloma.  She has been getting much better with   improvement of the monoclonal protein, kidney and calcium levels within normal   limits. Probably, she can continue on  Ninlaro and dexamethasone as an outpatient.  Revlimid should not be resumed given grade 4 skin rash  3.  Pancytopenia, which can be related to therapy of multiple myeloma with   Revlimid and Ninlaro, these medications will be on hold.  No need for blood   product transfusions, I will recommend to start Granix given neutropenia with   extensive skin lesions.     Will keep following patient       ____________________________________     DIPIKA Booth / DALIA    DD:  10/04/2018 10:51:49  DT:  10/04/2018 12:26:10    D#:  [id number]  Job#:  [id number]

## 2018-10-04 NOTE — ASSESSMENT & PLAN NOTE
Continue insulin NPH, and insulin sliding scale.    Monitor for hyperglycemia, exacerbated by steroids.  Adjust insulin doses as needed

## 2018-10-04 NOTE — PROGRESS NOTES
Inpatient Anticoagulation Service Note    Date: 10/4/2018  Reason for Anticoagulation: Deep Vein Thrombosis      Hemoglobin Value: (!) 7.6  Hematocrit Value: (!) 23.2  Lab Platelet Value: (!) 55  Target INR: 2.0 to 3.0    INR from last 7 days     Date/Time INR Value    10/03/18 1125 (!)  2.93        Dose from last 7 days     Date/Time Dose (mg)    10/04/18 1200  3    10/03/18 2000  3        Average Dose (mg): 3 ([facility])  Significant Interactions: Corticosteroids, Proton Pump Inhibitor, Statin, Other (Comments) (Duloxetine)  Bridge Therapy: No   Reversal Agent Administered: Not Applicable    A/P: No new INR collected today, however INR therapeutic on admit. Patient anemic, however there are no s/s of bleeding noted. Started on solumedrol for possible chemo induced rash, there are no new drug interactions present otherwise. Will continue with home dose of warfarin 3 mg po daily and recheck INR with AM labs.     Education Material Provided?: No (Established warfarin patient)  Pharmacist suggested discharge dosing: Warfarin 3 mg po daily; repeat INR within 48-72 hours of discharge     Dilma Zhu, PharmD, BCOP

## 2018-10-04 NOTE — PROGRESS NOTES
Pt alert and oriented x 4. Denies pain or nausea. Complains of generalized itching which she states is well controlled at this time. PIV to R AC dislodged and not flushing. PIV dc'd. Attempted IV placement x 2 but unsuccessful. Order obtained for US guided PIV placement. Pt updated on plan. Resting comfortably at this time. Call light within reach. Hourly rounding in place.

## 2018-10-04 NOTE — ED NOTES
Pt sleeping in stretcher. Resp even and unlabored. NAD. VSS. Pt to be admitted - aware of plan of care. Fall precautions in place. Call bell in reach. Ongoing monitoring.

## 2018-10-04 NOTE — H&P
[facility] Medicine History & Physical Note    Date of Service  10/3/2018    Primary Care Physician  Pcp Pt States None    Consultants  None    Code Status  Full code    Chief Complaint  Generalized body rash and itching    History of Presenting Illness  67 y.o. female who presented 10/3/2018 with past medical history of multiple myeloma, obesity, diabetes mellitus, is coming today complaining of generalized rash started around 1 week ago, patient states that her rash started on her left arm and then spread all over her body, at this time patient has rash maculopapular, hives, erythematous in her upper lower extremities thorax and abdominal area and face, patient states that she has itching, she has chills, but no fever, denies any flulike symptoms, patient stated that she was on antibiotics and finished last week, patient has been using Benadryl without much relief, patient is getting chemotherapy for her multiple myeloma, ER physician consulted with oncology and as per oncology patient's rash probably related to her Revlimid and is recommended to stop this medication for now and recommending to start steroids, patient denies any chest pain shortness of breath, no abdominal pain, no dysuria no nausea vomiting diarrhea or constipation, denies any flulike symptoms, denies any numbness tingling or focal weakness, patient is going to be started on p.o. and topical Benadryl, will start prednisone, oncology will see patient in the morning, gentle hydration for her mild acute kidney injury, will get blood cultures, continue monitoring her CBC due to her pancytopenia, patient expressed understanding of her plan of care and agree with it.  All questions answered.    Review of Systems  Review of Systems   Constitutional: Positive for chills. Negative for fever.   HENT: Negative for congestion and sinus pain.    Eyes: Negative for blurred vision and double vision.   Respiratory: Negative for cough and hemoptysis.     Cardiovascular: Negative for chest pain and palpitations.   Gastrointestinal: Negative for heartburn and nausea.   Genitourinary: Negative for dysuria and urgency.   Musculoskeletal: Negative for myalgias and neck pain.   Skin: Positive for itching and rash.   Neurological: Negative for dizziness, tingling, weakness and headaches.   Endo/Heme/Allergies: Does not bruise/bleed easily.   Psychiatric/Behavioral: Negative for depression and suicidal ideas.       Past Medical History   has a past medical history of Anemia (02/2018); Arthritis (03/12/2018); Bowel habit changes (03/12/2018); Bronchitis (12/21/17-3/6/18); Cancer ([facility]) (12/12/2017); Cataract (2017); Cellulitis (02/18/2018); Diabetes ([facility]) (03/12/2018); Essential hypertension, benign (7/26/2013); Heart burn (03/12/2018); High cholesterol; Multiple myeloma ([facility]); Other and unspecified hyperlipidemia (7/26/2013); Pain (03/12/2018); Pneumonia (2000); Urinary bladder disorder (12/2017-1/2017); and UTI (urinary tract infection) (02/2018).    Surgical History   has a past surgical history that includes cataract phaco with iol (Bilateral, 2017); colonoscopy (1990'S); bone biopsy (Left, 3/13/2018); and joint injection diagnostic (Right, 3/13/2018).     Family History  Past family history reviewed, no contributory to the case.    Social History   reports that she has never smoked. She has never used smokeless tobacco. She reports that she does not drink alcohol or use drugs.    Allergies  Allergies   Allergen Reactions   • Actos [Pioglitazone Hydrochloride] Hives   • Advil [Ibuprofen Micronized] Hives   • Cephalosporins Hives     Unsure if she tolerates penicillin     • [place] Hives       Medications  Prior to Admission Medications   Prescriptions Last Dose Informant Patient Reported? Taking?   Cholecalciferol 2000 UNIT Cap 10/3/2018 at 0900 MAR from Other Facility Yes No   Sig: Take 2,000 Units by mouth every day.   DULoxetine (CYMBALTA) 20 MG Cap DR Particles  10/3/2018 at 0900 MAR from Other Facility Yes No   Sig: Take 40 mg by mouth every day.   Ixazomib Citrate 4 MG Cap 10/1/2018 at 0900 MAR from Other Facility Yes No   Sig: Take 4 mg by mouth every Monday.   LORazepam (ATIVAN) 0.5 MG Tab 10/3/2018 at 0900 MAR from Other Facility Yes No   Sig: Take 0.5 mg by mouth 2 Times a Day.   Lactobacillus (ACIDOPHILUS PO) 10/2/2018 at 2100 MAR from Other Facility Yes Yes   Sig: Take 1 Tab by mouth every day.   ascorbic acid (VITAMIN C) 500 MG tablet 10/3/2018 at 0900 MAR from Other Facility Yes No   Sig: Take 500 mg by mouth every day.   calcitRIOL (ROCALTROL) 0.25 MCG Cap 10/3/2018 at 0900 MAR from Other Facility No No   Sig: Take 1 Cap by mouth every day.   calcium carbonate (TUMS) 500 MG Chew Tab 10/3/2018 at 0800 MAR from Other Facility Yes No   Sig: Take 1,000 mg by mouth 3 times a day before meals.   cetirizine (ZYRTEC) 10 MG Tab 10/3/2018 at 0900 MAR from Other Facility Yes No   Sig: Take 10 mg by mouth every day.   diphenhydrAMINE (BENADRYL) 25 MG Tab 10/2/2018 at AM MAR from Other Facility Yes Yes   Sig: Take 25 mg by mouth 3 times a day as needed for Itching.   fluticasone (FLONASE) 50 MCG/ACT nasal spray 10/3/2018 at 0900 MAR from Other Facility Yes No   Sig: Spray 2 Sprays in nose every day.   insulin NPH (HUMULIN,NOVOLIN) 100 UNIT/ML Suspension 10/3/2018 at 0700 MAR from Other Facility Yes No   Sig: Inject 15-25 Units as instructed 2 Times a Day. 25 units every morning   15 units every night   lenalidomide (REVLIMID) 25 MG Cap 9/26/2018 at 0900 MAR from Other Facility Yes No   Sig: Take 25 mg by mouth See Admin Instructions. 25 mg every day for 21 days  Hold for 7 days   metoprolol (LOPRESSOR) 25 MG Tab 10/3/2018 at 0900 MAR from Other Facility Yes No   Sig: Take 25 mg by mouth 2 times a day.   omeprazole (PRILOSEC) 20 MG delayed-release capsule 10/3/2018 at 0900 MAR from Other Facility Yes No   Sig: Take 20 mg by mouth every day.   pravastatin (PRAVACHOL) 40 MG  tablet 10/3/2018 at 0900 MAR from Other Facility No No   Sig: Take 1 Tab by mouth every day.   senna-docusate (PERICOLACE OR SENOKOT S) 8.6-50 MG Tab 10/3/2018 at 0900 MAR from Other Facility Yes Yes   Sig: Take 1 Tab by mouth every day.   sucralfate (CARAFATE) 1 GM Tab 10/2/2018 at 0900 MAR from Other Facility Yes No   Sig: Take 1 g by mouth every Tuesday.   therapeutic multivitamin-minerals (THERAGRAN-M) Tab 10/3/2018 at 0900 MAR from Other Facility Yes No   Sig: Take 1 Tab by mouth every day.   warfarin (COUMADIN) 3 MG Tab 10/2/2018 at 1600 MAR from Other Facility Yes No   Sig: Take 3 mg by mouth every day.      Facility-Administered Medications: None       Physical Exam  Temp:  [36.8 °C (98.2 °F)] 36.8 °C (98.2 °F)  Pulse:  [62-80] 77  Resp:  [18] 18  BP: (131)/(49) 131/49    Physical Exam   Constitutional: She is oriented to person, place, and time. She appears well-nourished. No distress.   Obese   HENT:   Head: Normocephalic and atraumatic.   Mouth/Throat: No oropharyngeal exudate.   Eyes: Conjunctivae are normal. Right eye exhibits no discharge. Left eye exhibits no discharge. No scleral icterus.   Neck: Normal range of motion. Neck supple. No JVD present.   Cardiovascular: Regular rhythm and normal heart sounds.  Exam reveals no gallop.    Pulmonary/Chest: Effort normal and breath sounds normal. No respiratory distress. She has no wheezes. She has no rales.   Abdominal: Soft. Bowel sounds are normal. She exhibits no distension. There is no tenderness. There is no rebound.   Musculoskeletal: Normal range of motion. She exhibits no edema.   Lymphadenopathy:     She has no cervical adenopathy.   Neurological: She is alert and oriented to person, place, and time. She exhibits normal muscle tone.   Skin: Skin is warm. Rash (Generalized, maculopapular, blanching to palpation) noted.   Nursing note and vitals reviewed.      Laboratory:  Recent Labs      10/03/18   1125   WBC  1.5*   RBC  2.28*   HEMOGLOBIN  7.5*    HEMATOCRIT  22.9*   MCV  100.4*   MCH  32.9   MCHC  32.8*   RDW  56.6*   PLATELETCT  56*     Recent Labs      10/03/18   1125   SODIUM  133*   POTASSIUM  4.0   CHLORIDE  98   CO2  29   GLUCOSE  134*   BUN  21   CREATININE  1.22   CALCIUM  8.7     Recent Labs      10/03/18   1125   ALTSGPT  8   ASTSGOT  24   ALKPHOSPHAT  62   TBILIRUBIN  0.3   GLUCOSE  134*                 No results for input(s): TROPONINI in the last 72 hours.    Urinalysis:    No results found     Imaging:  No orders to display         Assessment/Plan:  I anticipate this patient will require at least two midnights for appropriate medical management, necessitating inpatient admission.    * Rash   Assessment & Plan    Most likely drug induced rash, as per oncology Dr Sim probably related due to Revlimid for her multiple myeloma, stop this medication, continue supportive treatment, will get blood cultures, there is no fever, continue monitoring for signs of infection        Multiple myeloma (HCC)- (present on admission)   Assessment & Plan    Follows with Dr. Sim as outpatient will need oncology consult in the morning.        Acute kidney injury (HCC)- (present on admission)   Assessment & Plan    Creatinine 1.22 up from baseline 0.8, continue gentle hydration        Hypomagnesemia   Assessment & Plan    Replacing will follow up in the morning.        Pancytopenia (HCC)- (present on admission)   Assessment & Plan    Due to chemotherapy for multiple myeloma, continue monitoring daily CBC        DVT (deep venous thrombosis) ([facility])- (present on admission)   Assessment & Plan    Continue Coumadin per protocol        Morbid obesity with BMI of 60.0-69.9, adult (CMS-HCC)- (present on admission)   Assessment & Plan    Body mass index is 49.42 kg/m².  Needs to lose weight        DM type 2 (diabetes mellitus, type 2) (CMS-HCC)- (present on admission)   Assessment & Plan    Continue insulin NPH, and insulin sliding scale.  Watch for hypoglycemic  episodes            VTE prophylaxis: Warfarin

## 2018-10-04 NOTE — PROCEDURES
Vascular Access Team    Date of Insertion: 10/4/2018  Arm Circumference: n/a  Line Length: 10cm  Line Size: 20g  Vein Occupancy %: 34  Reason for Midline: Access    Orders confirmed, vessel patency confirmed with ultrasound. Risks and benefits of procedure explained to patient and education regarding line associated bloodstream infections provided. Questions answered.     PowerGlide Midline placed in RUE per MD order with ultrasound guidance. 20g, 10 cm line placed in forearm after 1 attempt(s).  Catheter inserted with good blood return. Secured with 0cm external from insertion site.  Flushed without resistance with 10 mL 0.9% normal saline. Midline secured with Biopatch and Tegaderm.     Midline placement is confirmed by nurse using ultrasound and ability to flush and draw blood. Midline is appropriate for use at this time.  No X-ray is needed for placement confirmation. Pt tolerated procedure well.  Patient condition relayed to unit RN or ordering physician via this post procedure note in the EMR.      BARD PowerGlide Midline ref # [id number], Lot # [id number]

## 2018-10-04 NOTE — PROGRESS NOTES
[facility] Medicine Daily Progress Note    Date of Service  10/4/2018    Chief Complaint  67 y.o. female who presented 10/3/2018 with past medical history of multiple myeloma, obesity, diabetes mellitus, is coming today complaining of generalized rash started around 1 week ago, patient states that her rash started on her left arm and then spread all over her body, at this time patient has rash maculopapular, hives, erythematous in her upper lower extremities thorax and abdominal area and face    Interval Problem Update  Patient admitted last night with widespread maculopapular pruritic rash, which started 1 week ago a few days after completion of antibiotic course for UTI.  Oncology consulted Dr Gutiérrez related this rash to Revlimid.  Reaction to antibiotics is also possible.  It is unclear what kind antibiotic patient was taken.  Benadryl was not helpful so far.  I ordered Solu-Medrol 62.5 every 8 hours after discussion with Dr. Gutiérrez      Consultants/Specialty  Oncology    Code Status  Full code    Disposition  To be discussed    Review of Systems  Review of Systems   Constitutional: Positive for chills. Negative for fever and weight loss.   HENT: Negative for ear pain, hearing loss and tinnitus.    Eyes: Negative for blurred vision and double vision.   Respiratory: Negative for cough, hemoptysis and sputum production.    Cardiovascular: Negative for chest pain, palpitations and orthopnea.   Gastrointestinal: Positive for diarrhea, nausea and vomiting. Negative for heartburn.   Genitourinary: Negative for dysuria, frequency and urgency.   Musculoskeletal: Positive for back pain and joint pain. Negative for myalgias.   Skin: Positive for itching and rash.   Neurological: Negative for dizziness, tingling and headaches.   Endo/Heme/Allergies: Negative for environmental allergies and polydipsia. Bruises/bleeds easily.   Psychiatric/Behavioral: Negative for depression, substance abuse and suicidal ideas.         Physical Exam  Temp:  [36.9 °C (98.4 °F)-37.6 °C (99.6 °F)] 36.9 °C (98.4 °F)  Pulse:  [77-87] 82  Resp:  [18-21] 20  BP: (109-112)/(40-53) 112/53    Physical Exam  Constitutional: She is oriented to person, place, and time. She appears well-nourished. No distress.   Obese   HENT:   Head: Normocephalic and atraumatic.   Mouth/Throat: No oropharyngeal exudate.   Eyes: Conjunctivae are normal. Right eye exhibits no discharge. Left eye exhibits no discharge. No scleral icterus.   Neck: Normal range of motion. Neck supple. No JVD present.   Cardiovascular: Regular rhythm and normal heart sounds.  Exam reveals no gallop.    Pulmonary/Chest: Effort normal and breath sounds normal. No respiratory distress. She has no wheezes. She has no rales.   Abdominal: Soft. Bowel sounds are normal. She exhibits no distension. There is no tenderness. There is no rebound.   Musculoskeletal: Normal range of motion. She exhibits no edema.   Lymphadenopathy:     She has no cervical adenopathy.   Neurological: She is alert and oriented to person, place, and time. She exhibits normal muscle tone.   Skin: Skin is warm. Rash (Generalized, maculopapular, blanching to palpation) noted.   Nursing note and vitals reviewed.       Fluids    Intake/Output Summary (Last 24 hours) at 10/04/18 1624  Last data filed at 10/04/18 0823   Gross per 24 hour   Intake              200 ml   Output                0 ml   Net              200 ml       Laboratory  Recent Labs      10/03/18   1125  10/04/18   0219   WBC  1.5*  1.8*   RBC  2.28*  2.35*   HEMOGLOBIN  7.5*  7.6*   HEMATOCRIT  22.9*  23.2*   MCV  100.4*  98.7*   MCH  32.9  32.3   MCHC  32.8*  32.8*   RDW  56.6*  54.7*   PLATELETCT  56*  55*   MPV   --   13.7*     Recent Labs      10/03/18   1125  10/04/18   0219   SODIUM  133*  131*   POTASSIUM  4.0  4.0   CHLORIDE  98  97   CO2  29  28   GLUCOSE  134*  192*   BUN  21  25*   CREATININE  1.22  1.08   CALCIUM  8.7  8.5     Recent Labs      10/03/18    1125  10/04/18   1254   INR  2.93*  2.67*               Imaging  IR-US GUIDED PIV   Final Result    Ultrasound-guided PERIPHERAL IV INSERTION performed by    qualified nursing staff as above.                 Assessment/Plan  * Rash   Assessment & Plan    Most likely drug induced rash   probably related due to Revlimid for  multiple myeloma (last dose about 2 weeks ago according the patient)  And another possibility is reaction to antibiotics which she finished 3 days or so before rash appears  Revlimid stopped.  Started on prednisone on admission, we will switch to Solu-Medrol  Continue [place]  Continue clinical observation    Unlikely patient has infectious rash per  history and clinical picture            Multiple myeloma (HCC)- (present on admission)   Assessment & Plan     IgG kappa multiple myeloma.    improvement of the monoclonal protein  kidney and calcium levels WNL   Consulted by oncologist : stop Ninlaro and Revlimid  As a possible culprit          Acute kidney injury (HCC)- (present on admission)   Assessment & Plan    Creatinine improved with IV hydration  Continue IV fluids, monitor        Hypomagnesemia   Assessment & Plan    Replacing will follow up in the morning.        Pancytopenia (HCC)- (present on admission)   Assessment & Plan    Stable  Due to chemotherapy for multiple myeloma, continue monitoring daily CBC        DVT (deep venous thrombosis) ([facility])- (present on admission)   Assessment & Plan    Continue Coumadin per protocol  Patient stated she started on Coumadin about 3 weeks ago, with Lovenox bridging  Rash does not look like skin necrosis        Morbid obesity with BMI of 60.0-69.9, adult (CMS-HCC)- (present on admission)   Assessment & Plan    Body mass index is 49.42 kg/m².  Needs to lose weight        DM type 2 (diabetes mellitus, type 2) (CMS-HCC)- (present on admission)   Assessment & Plan    Continue insulin NPH, and insulin sliding scale.    Monitor for hyperglycemia, exacerbated  by steroids.  Adjust insulin doses as needed             VTE prophylaxis: Warfarin

## 2018-10-04 NOTE — CARE PLAN
Problem: Safety  Goal: Will remain free from falls    Intervention: Implement fall precautions  Fall precautions in  Place.      Problem: Skin Integrity  Goal: Risk for impaired skin integrity will decrease    Intervention: Assess and monitor skin integrity, appearance and/or temperature  Skin integrity assessed and monitored for breakdown.

## 2018-10-04 NOTE — ASSESSMENT & PLAN NOTE
Due to chemotherapy for multiple myeloma, continue monitoring daily CBC  WBC Improved after Granix  Platelets trending back up  Transfuse RBC as needed

## 2018-10-04 NOTE — ASSESSMENT & PLAN NOTE
Most likely drug induced rash   probably related due to Revlimid for  multiple myeloma (last dose about 2 weeks ago according the patient)  Revlimid stopped, improving with continued steroids - taper per Dr Gutiérrez (40 x 5d, 20 x 5d, 10 x 5d, 5 x 5d then dc)  Unlikely patient has infectious rash per  history and clinical picture

## 2018-10-05 LAB
ANION GAP SERPL CALC-SCNC: 5 MMOL/L (ref 0–11.9)
BASOPHILS # BLD AUTO: 0.1 % (ref 0–1.8)
BASOPHILS # BLD: 0.01 K/UL (ref 0–0.12)
BUN SERPL-MCNC: 27 MG/DL (ref 8–22)
CALCIUM SERPL-MCNC: 8.2 MG/DL (ref 8.5–10.5)
CHLORIDE SERPL-SCNC: 97 MMOL/L (ref 96–112)
CO2 SERPL-SCNC: 27 MMOL/L (ref 20–33)
CREAT SERPL-MCNC: 1.04 MG/DL (ref 0.5–1.4)
EOSINOPHIL # BLD AUTO: 0 K/UL (ref 0–0.51)
EOSINOPHIL NFR BLD: 0 % (ref 0–6.9)
ERYTHROCYTE [DISTWIDTH] IN BLOOD BY AUTOMATED COUNT: 53.1 FL (ref 35.9–50)
GLUCOSE BLD-MCNC: 242 MG/DL (ref 65–99)
GLUCOSE BLD-MCNC: 278 MG/DL (ref 65–99)
GLUCOSE BLD-MCNC: 284 MG/DL (ref 65–99)
GLUCOSE BLD-MCNC: 328 MG/DL (ref 65–99)
GLUCOSE SERPL-MCNC: 300 MG/DL (ref 65–99)
HCT VFR BLD AUTO: 22.7 % (ref 37–47)
HGB BLD-MCNC: 7.7 G/DL (ref 12–16)
IMM GRANULOCYTES # BLD AUTO: 0.07 K/UL (ref 0–0.11)
IMM GRANULOCYTES NFR BLD AUTO: 0.8 % (ref 0–0.9)
INR PPP: 2.6 (ref 0.87–1.13)
LYMPHOCYTES # BLD AUTO: 0.55 K/UL (ref 1–4.8)
LYMPHOCYTES NFR BLD: 6.3 % (ref 22–41)
MCH RBC QN AUTO: 33 PG (ref 27–33)
MCHC RBC AUTO-ENTMCNC: 33.9 G/DL (ref 33.6–35)
MCV RBC AUTO: 97.4 FL (ref 81.4–97.8)
MONOCYTES # BLD AUTO: 0.96 K/UL (ref 0–0.85)
MONOCYTES NFR BLD AUTO: 11 % (ref 0–13.4)
NEUTROPHILS # BLD AUTO: 7.12 K/UL (ref 2–7.15)
NEUTROPHILS NFR BLD: 81.8 % (ref 44–72)
NRBC # BLD AUTO: 0 K/UL
NRBC BLD-RTO: 0 /100 WBC
PLATELET # BLD AUTO: 61 K/UL (ref 164–446)
PMV BLD AUTO: 13.7 FL (ref 9–12.9)
POTASSIUM SERPL-SCNC: 4.2 MMOL/L (ref 3.6–5.5)
PROTHROMBIN TIME: 27.9 SEC (ref 12–14.6)
RBC # BLD AUTO: 2.33 M/UL (ref 4.2–5.4)
SODIUM SERPL-SCNC: 129 MMOL/L (ref 135–145)
WBC # BLD AUTO: 8.7 K/UL (ref 4.8–10.8)

## 2018-10-05 PROCEDURE — 700102 HCHG RX REV CODE 250 W/ 637 OVERRIDE(OP): Performed by: HOSPITALIST

## 2018-10-05 PROCEDURE — 99233 SBSQ HOSP IP/OBS HIGH 50: CPT | Performed by: INTERNAL MEDICINE

## 2018-10-05 PROCEDURE — 80048 BASIC METABOLIC PNL TOTAL CA: CPT

## 2018-10-05 PROCEDURE — 36415 COLL VENOUS BLD VENIPUNCTURE: CPT

## 2018-10-05 PROCEDURE — 700111 HCHG RX REV CODE 636 W/ 250 OVERRIDE (IP): Performed by: INTERNAL MEDICINE

## 2018-10-05 PROCEDURE — 85610 PROTHROMBIN TIME: CPT

## 2018-10-05 PROCEDURE — 82962 GLUCOSE BLOOD TEST: CPT | Mod: 91

## 2018-10-05 PROCEDURE — A9270 NON-COVERED ITEM OR SERVICE: HCPCS | Performed by: HOSPITALIST

## 2018-10-05 PROCEDURE — 700102 HCHG RX REV CODE 250 W/ 637 OVERRIDE(OP): Performed by: INTERNAL MEDICINE

## 2018-10-05 PROCEDURE — 770004 HCHG ROOM/CARE - ONCOLOGY PRIVATE *

## 2018-10-05 PROCEDURE — 85025 COMPLETE CBC W/AUTO DIFF WBC: CPT

## 2018-10-05 PROCEDURE — 302255 BARRIER CREAM MOISTURE BAZA PROTECT (ZINC) 5OZ: Performed by: HOSPITALIST

## 2018-10-05 RX ORDER — DEXTROSE MONOHYDRATE 25 G/50ML
25 INJECTION, SOLUTION INTRAVENOUS
Status: DISCONTINUED | OUTPATIENT
Start: 2018-10-05 | End: 2018-10-11 | Stop reason: HOSPADM

## 2018-10-05 RX ADMIN — WARFARIN SODIUM 3 MG: 3 TABLET ORAL at 17:46

## 2018-10-05 RX ADMIN — PRAVASTATIN SODIUM 40 MG: 20 TABLET ORAL at 17:47

## 2018-10-05 RX ADMIN — INSULIN HUMAN 15 UNITS: 100 INJECTION, SUSPENSION SUBCUTANEOUS at 21:49

## 2018-10-05 RX ADMIN — LORAZEPAM 0.5 MG: 1 TABLET ORAL at 17:46

## 2018-10-05 RX ADMIN — OMEPRAZOLE 20 MG: 20 CAPSULE, DELAYED RELEASE ORAL at 06:20

## 2018-10-05 RX ADMIN — METHYLPREDNISOLONE SODIUM SUCCINATE 62.5 MG: 125 INJECTION, POWDER, FOR SOLUTION INTRAMUSCULAR; INTRAVENOUS at 21:58

## 2018-10-05 RX ADMIN — METOPROLOL TARTRATE 25 MG: 25 TABLET, FILM COATED ORAL at 17:47

## 2018-10-05 RX ADMIN — METHYLPREDNISOLONE SODIUM SUCCINATE 62.5 MG: 125 INJECTION, POWDER, FOR SOLUTION INTRAMUSCULAR; INTRAVENOUS at 06:23

## 2018-10-05 RX ADMIN — INSULIN HUMAN 2 UNITS: 100 INJECTION, SOLUTION PARENTERAL at 08:19

## 2018-10-05 RX ADMIN — Medication: at 06:24

## 2018-10-05 RX ADMIN — ACETAMINOPHEN 650 MG: 325 TABLET, FILM COATED ORAL at 02:29

## 2018-10-05 RX ADMIN — METOPROLOL TARTRATE 25 MG: 25 TABLET, FILM COATED ORAL at 06:21

## 2018-10-05 RX ADMIN — Medication: at 17:55

## 2018-10-05 RX ADMIN — DULOXETINE HYDROCHLORIDE 40 MG: 20 CAPSULE, DELAYED RELEASE ORAL at 06:20

## 2018-10-05 RX ADMIN — OXYCODONE HYDROCHLORIDE AND ACETAMINOPHEN 500 MG: 500 TABLET ORAL at 06:23

## 2018-10-05 RX ADMIN — ANTACID TABLETS 1000 MG: 500 TABLET, CHEWABLE ORAL at 17:46

## 2018-10-05 RX ADMIN — FLUTICASONE PROPIONATE 100 MCG: 50 SPRAY, METERED NASAL at 06:24

## 2018-10-05 RX ADMIN — CETIRIZINE HYDROCHLORIDE 10 MG: 10 TABLET, FILM COATED ORAL at 06:23

## 2018-10-05 RX ADMIN — VITAMIN D, TAB 1000IU (100/BT) 2000 UNITS: 25 TAB at 06:23

## 2018-10-05 RX ADMIN — INSULIN HUMAN 3 UNITS: 100 INJECTION, SOLUTION PARENTERAL at 13:01

## 2018-10-05 RX ADMIN — INSULIN HUMAN 6 UNITS: 100 INJECTION, SOLUTION PARENTERAL at 17:47

## 2018-10-05 RX ADMIN — LORAZEPAM 0.5 MG: 1 TABLET ORAL at 06:21

## 2018-10-05 RX ADMIN — ANTACID TABLETS 1000 MG: 500 TABLET, CHEWABLE ORAL at 06:20

## 2018-10-05 RX ADMIN — INSULIN HUMAN 4 UNITS: 100 INJECTION, SOLUTION PARENTERAL at 21:45

## 2018-10-05 RX ADMIN — MULTIPLE VITAMINS W/ MINERALS TAB 1 TABLET: TAB at 06:23

## 2018-10-05 RX ADMIN — ANTACID TABLETS 1000 MG: 500 TABLET, CHEWABLE ORAL at 13:00

## 2018-10-05 RX ADMIN — CALCITRIOL 0.25 MCG: 0.25 CAPSULE, LIQUID FILLED ORAL at 06:20

## 2018-10-05 RX ADMIN — METHYLPREDNISOLONE SODIUM SUCCINATE 62.5 MG: 125 INJECTION, POWDER, FOR SOLUTION INTRAMUSCULAR; INTRAVENOUS at 13:00

## 2018-10-05 ASSESSMENT — ENCOUNTER SYMPTOMS
BACK PAIN: 1
BACK PAIN: 0
HEADACHES: 0
COUGH: 0
CHILLS: 0
ORTHOPNEA: 0
HEMOPTYSIS: 0
SPUTUM PRODUCTION: 0
WEIGHT LOSS: 0
CLAUDICATION: 0
MYALGIAS: 0
DIZZINESS: 0
POLYDIPSIA: 0
FEVER: 0
WEAKNESS: 1
HEARTBURN: 0
NECK PAIN: 0
TINGLING: 0
DIARRHEA: 0
DOUBLE VISION: 0
DEPRESSION: 0
VOMITING: 0
ABDOMINAL PAIN: 0
CHILLS: 1
PALPITATIONS: 0
BRUISES/BLEEDS EASILY: 1
NAUSEA: 0
BLURRED VISION: 0

## 2018-10-05 ASSESSMENT — LIFESTYLE VARIABLES: SUBSTANCE_ABUSE: 0

## 2018-10-05 ASSESSMENT — PAIN SCALES - GENERAL
PAINLEVEL_OUTOF10: 0

## 2018-10-05 NOTE — PROGRESS NOTES
Oncology/Hematology Progress Note               Author: Dyllan Bryantkade BAR Date & Time created: 10/5/2018  11:28 AM   Patient with history of multiple myeloma on Ninlaro, Revlimid and dexamethasone admitted with extensive skin rash and pancytopenia  Interval History:  Skin rash is improving today.  Is less pronounced.  Itchiness is also improving.  There is no fever, chills she does have resolution of neutropenia after 1 dose of Granix.  Hemoglobin is stable, platelet count improving    Review of Systems:  Review of Systems   Constitutional: Positive for malaise/fatigue. Negative for chills, fever and weight loss.   HENT: Negative for ear pain, hearing loss and tinnitus.    Eyes: Negative for blurred vision and double vision.   Respiratory: Negative for cough and hemoptysis.    Cardiovascular: Negative for chest pain, palpitations, orthopnea and claudication.   Gastrointestinal: Negative for abdominal pain, diarrhea, heartburn, nausea and vomiting.   Genitourinary: Negative for dysuria.   Musculoskeletal: Negative for back pain, myalgias and neck pain.   Skin: Positive for itching and rash.   Neurological: Positive for weakness. Negative for dizziness, tingling and headaches.   Psychiatric/Behavioral: Negative for depression, substance abuse and suicidal ideas.       Physical Exam:  Physical Exam   Constitutional: She appears well-developed.   HENT:   Head: Normocephalic.   Eyes: Pupils are equal, round, and reactive to light. Conjunctivae are normal.   Neck: Normal range of motion.   Cardiovascular: Normal rate.    Pulmonary/Chest: Effort normal.   Abdominal: Soft. Bowel sounds are normal.   Skin: Rash noted. There is erythema. There is pallor.       Labs:        Invalid input(s): [id number]      Recent Labs      10/03/18   1125  10/04/18   0219  10/05/18   0121   SODIUM  133*  131*  129*   POTASSIUM  4.0  4.0  4.2   CHLORIDE  98  97  97   CO2  29  28  27   BUN  21  25*  27*   CREATININE  1.22  1.08  1.04    MAGNESIUM  1.3*  2.0   --    CALCIUM  8.7  8.5  8.2*     Recent Labs      10/03/18   1125  10/04/18   0219  10/05/18   0121   ALTSGPT  8   --    --    ASTSGOT  24   --    --    ALKPHOSPHAT  62   --    --    TBILIRUBIN  0.3   --    --    GLUCOSE  134*  192*  300*     Recent Labs      10/03/18   1125  10/04/18   0219  10/04/18   1254  10/05/18   012   RBC  2.28*  2.35*   --   2.33*   HEMOGLOBIN  7.5*  7.6*   --   7.7*   HEMATOCRIT  22.9*  23.2*   --   22.7*   PLATELETCT  56*  55*   --   61*   PROTHROMBTM  30.6*   --   28.4*  27.9*   INR  2.93*   --   2.67*  2.60*     Recent Labs      10/03/18   1125  10/04/18   0219  10/05/18   012   WBC  1.5*  1.8*  8.7   NEUTSPOLYS  64.00   --   81.80*   LYMPHOCYTES  19.10*   --   6.30*   MONOCYTES  13.00   --   11.00   EOSINOPHILS  3.10   --   0.00   BASOPHILS  0.00   --   0.10   ASTSGOT  24   --    --    ALTSGPT  8   --    --    ALKPHOSPHAT  62   --    --    TBILIRUBIN  0.3   --    --      Recent Labs      10/03/18   1125  10/04/18   0219  10/05/18   012   SODIUM  133*  131*  129*   POTASSIUM  4.0  4.0  4.2   CHLORIDE  98  97  97   CO2  29  28  27   GLUCOSE  134*  192*  300*   BUN  21  25*  27*   CREATININE  1.22  1.08  1.04   CALCIUM  8.7  8.5  8.2*     Hemodynamics:  Temp (24hrs), Av.7 °C (98 °F), Min:36.2 °C (97.1 °F), Max:36.8 °C (98.3 °F)  Temperature: 36.2 °C (97.1 °F)  Pulse  Av.6  Min: 61  Max: 87   Blood Pressure : 142/65     Respiratory:    Respiration: 18, Pulse Oximetry: 97 %        RUL Breath Sounds: Clear, RML Breath Sounds: Clear, RLL Breath Sounds: Diminished, [place] Breath Sounds: Clear, LLL Breath Sounds: Diminished  Fluids:    Intake/Output Summary (Last 24 hours) at 10/05/18 1128  Last data filed at 10/05/18 1045   Gross per 24 hour   Intake             1103 ml   Output                0 ml   Net             1103 ml       GI/Nutrition:  Orders Placed This Encounter   Procedures   • Diet Order Regular     Standing Status:   Standing     Number of  Occurrences:   1     Order Specific Question:   Diet:     Answer:   Regular [1]     Medical Decision Making, by Problem:  Active Hospital Problems    Diagnosis   • *Rash [R21]   • Multiple myeloma (HCC) [C90.00]   • Hypomagnesemia [E83.42]   • Acute kidney injury (HCC) [N17.9]   • Pancytopenia ([facility]) [D61.818]   • DVT (deep venous thrombosis) ([facility]) [I82.409]   • Morbid obesity with BMI of 60.0-69.9, adult (CMS-HCC) [E66.01, Z68.44]   • DM type 2 (diabetes mellitus, type 2) (CMS-HCC) [E11.9]       Plan:  1.  Severe maculopapular skin rash, likely related to Revlimid.  Medications for multiple myeloma discontinued for now.  -She has been on Solu-Medrol 60 mg twice daily now with improvement of skin lesions.     2.  IgG kappa multiple myeloma was on Ninlaro, Revlimid and dexamethasone.  Medication start for now given side effects with significant skin rash and pancytopenia  -Hold multiple myeloma medications until improvement of symptoms then probably she can continue on dexamethasone and Ninlaro.  Revlimid can now be restarted given  Grade 4 skin rash  -She is having a good response to treatment so far.    3.  Pancytopenia, probably related to  therapy plus minus multiple myeloma  -Neutropenia resolved after 1 dose of Granix.  Hemoglobin stable and platelet count with mild improvement    Prognosis is guarded.   High complexity, complex decision making    Quality-Core Measures   Reviewed items::  Labs reviewed and Medications reviewed

## 2018-10-05 NOTE — PROGRESS NOTES
[facility] Medicine Daily Progress Note    Date of Service  10/5/2018    Chief Complaint  67 y.o. female who presented 10/3/2018 with past medical history of multiple myeloma, obesity, diabetes mellitus, is coming today complaining of generalized rash started around 1 week ago, patient states that her rash started on her left arm and then spread all over her body, at this time patient has rash maculopapular, hives, erythematous in her upper lower extremities thorax and abdominal area and face    Interval Problem Update  Patient admitted last night with widespread maculopapular pruritic rash, which started 1 week ago a few days after completion of antibiotic course for UTI.  Oncology consulted Dr Gutiérrez related this rash to Revlimid.  Reaction to antibiotics is also possible.  It is unclear what kind antibiotic patient was taken.  Benadryl was not helpful so far.  I ordered Solu-Medrol 62.5 every 8 hours after discussion with Dr. Gutiérrez    10/5  Widespread maculopapular rash appears to be improving, less bright today.  Itching improved.  On IV steroids.  Diabetes: Blood sugar is not controlled due to steroid treatment.  Insulin sliding scale on board.  NPH dose increased to 25.  Acute kidney injury: Creatinine is improved  Hyponatremia: Mild.  Monitor  Pancytopenia: Stable.  ANC up from 0.84->7.12 today  Discussed POC with the patient and on multidisciplinary rounds        Consultants/Specialty  Oncology    Code Status  Full code    Disposition  To be discussed    Review of Systems  Review of Systems   Constitutional: Positive for chills. Negative for fever and weight loss.   HENT: Negative for ear pain, hearing loss and tinnitus.    Eyes: Negative for blurred vision and double vision.   Respiratory: Negative for cough, hemoptysis and sputum production.    Cardiovascular: Negative for chest pain, palpitations and orthopnea.   Gastrointestinal: Negative for diarrhea, heartburn, nausea and vomiting.   Genitourinary:  Negative for dysuria, frequency and urgency.   Musculoskeletal: Positive for back pain and joint pain. Negative for myalgias.   Skin: Positive for itching and rash.   Neurological: Negative for dizziness, tingling and headaches.   Endo/Heme/Allergies: Negative for environmental allergies and polydipsia. Bruises/bleeds easily.   Psychiatric/Behavioral: Negative for depression, substance abuse and suicidal ideas.        Physical Exam  Temp:  [36.2 °C (97.1 °F)-36.8 °C (98.3 °F)] 36.8 °C (98.3 °F)  Pulse:  [61-76] 68  Resp:  [16-19] 18  BP: (126-152)/(58-67) 152/67    Physical Exam  Constitutional: She is oriented to person, place, and time. She appears well-nourished. No distress.   Obese   HENT:   Head: Normocephalic and atraumatic.   Mouth/Throat: No oropharyngeal exudate.   Eyes: Conjunctivae are normal. Right eye exhibits no discharge. Left eye exhibits no discharge. No scleral icterus.   Neck: Normal range of motion. Neck supple. No JVD present.   Cardiovascular: Regular rhythm and normal heart sounds.  Exam reveals no gallop.    Pulmonary/Chest: Effort normal and breath sounds normal. No respiratory distress. She has no wheezes. She has no rales.   Abdominal: Soft. Bowel sounds are normal. She exhibits no distension. There is no tenderness. There is no rebound.   Musculoskeletal: Normal range of motion. She exhibits no edema.   Lymphadenopathy:     She has no cervical adenopathy.   Neurological: She is alert and oriented to person, place, and time. She exhibits normal muscle tone.   Skin: Skin is warm. Rash (Generalized, maculopapular, blanching to palpation) noted.   Nursing note and vitals reviewed.       Fluids    Intake/Output Summary (Last 24 hours) at 10/05/18 1537  Last data filed at 10/05/18 1045   Gross per 24 hour   Intake             1103 ml   Output                0 ml   Net             1103 ml       Laboratory  Recent Labs      10/03/18   1125  10/04/18   0219  10/05/18   0121   WBC  1.5*  1.8*  8.7    RBC  2.28*  2.35*  2.33*   HEMOGLOBIN  7.5*  7.6*  7.7*   HEMATOCRIT  22.9*  23.2*  22.7*   MCV  100.4*  98.7*  97.4   MCH  32.9  32.3  33.0   MCHC  32.8*  32.8*  33.9   RDW  56.6*  54.7*  53.1*   PLATELETCT  56*  55*  61*   MPV   --   13.7*  13.7*     Recent Labs      10/03/18   1125  10/04/18   0219  10/05/18   0121   SODIUM  133*  131*  129*   POTASSIUM  4.0  4.0  4.2   CHLORIDE  98  97  97   CO2  29  28  27   GLUCOSE  134*  192*  300*   BUN  21  25*  27*   CREATININE  1.22  1.08  1.04   CALCIUM  8.7  8.5  8.2*     Recent Labs      10/03/18   1125  10/04/18   1254  10/05/18   0121   INR  2.93*  2.67*  2.60*               Imaging  IR-US GUIDED PIV   Final Result    Ultrasound-guided PERIPHERAL IV INSERTION performed by    qualified nursing staff as above.                 Assessment/Plan  * Rash   Assessment & Plan    Most likely drug induced rash   probably related due to Revlimid for  multiple myeloma (last dose about 2 weeks ago according the patient)  And another possibility is reaction to antibiotics which she finished 3 days or so before rash appears  Revlimid stopped.  Started on prednisone on admission, we will switch to Solu-Medrol  Continue [place]  Continue clinical observation    Rash is improved    Unlikely patient has infectious rash per  history and clinical picture            Multiple myeloma (HCC)- (present on admission)   Assessment & Plan     IgG kappa multiple myeloma.    improvement of the monoclonal protein  kidney and calcium levels WNL   Consulted by oncologist : stop Ninlaro and Revlimid  As a possible culprit          Acute kidney injury (HCC)- (present on admission)   Assessment & Plan    Creatinine improved with IV hydration  Continue IV fluids, monitor        Hypomagnesemia   Assessment & Plan    Replacing will follow up in the morning.        Pancytopenia (HCC)- (present on admission)   Assessment & Plan    Stable  Due to chemotherapy for multiple myeloma, continue monitoring daily  CBC        DVT (deep venous thrombosis) ([facility])- (present on admission)   Assessment & Plan    Continue Coumadin per protocol  Patient stated she started on Coumadin about 3 weeks ago, with Lovenox bridging  Rash does not look like skin necrosis        Morbid obesity with BMI of 60.0-69.9, adult (CMS-HCC)- (present on admission)   Assessment & Plan    Body mass index is 49.42 kg/m².  Needs to lose weight        DM type 2 (diabetes mellitus, type 2) (CMS-HCC)- (present on admission)   Assessment & Plan    Continue insulin NPH, and insulin sliding scale.    Monitor for hyperglycemia, exacerbated by steroids.  Adjust insulin doses as needed             VTE prophylaxis: Warfarin

## 2018-10-05 NOTE — PROGRESS NOTES
Inpatient Anticoagulation Service Note    Date: 10/5/2018  Reason for Anticoagulation: Deep Vein Thrombosis      Hemoglobin Value: (!) 7.7  Hematocrit Value: (!) 22.7  Lab Platelet Value: (!) 61  Target INR: 2.0 to 3.0    INR from last 7 days     Date/Time INR Value    10/05/18 0121 (!)  2.6    10/04/18 1254 (!)  2.67    10/03/18 1125 (!)  2.93        Dose from last 7 days     Date/Time Dose (mg)    10/05/18 1100  3    10/04/18 1200  3    10/03/18 2000  3        Average Dose (mg): 3 (Per [facility] MAR)  Significant Interactions: Corticosteroids, Proton Pump Inhibitor, Statin, Other (Comments) (Duloxetine)  Bridge Therapy: No   Reversal Agent Administered: Not Applicable    A/P: INR remains therapeutic today on home dose of warfarin. H/H and platelets stable as well, there are no documented s/s of bleeding noted. Drug interactions noted above; improvement in rash noted on methylprednisolone and diphenhydramine. Will continue with warfarin 3 mg po daily. Check INR three times per week.     Education Material Provided?: No (Established warfarin patient)  Pharmacist suggested discharge dosing: Warfarin 3 mg po daily; repeat INR within 48-72 hours of discharge     Dilma Zhu, PharmD, BCOP

## 2018-10-05 NOTE — PROGRESS NOTES
Pt alert and oriented x 4. Denies pain or nausea. Generalized rash seems slightly improved today. Much less red. Midline to R forearm flushing well with positive blood return. Fluids infusing. Pt with no bowel movement since yesterday AM. Dr Gutiérrez notified. New orders received to dc rule out C diff and precautions. Pt resting comfortably. Call light within reach. Hourly rounding in place.

## 2018-10-05 NOTE — CARE PLAN
Problem: Safety  Goal: Will remain free from falls    Intervention: Implement fall precautions  Fall precautions in place.       Problem: Skin Integrity  Goal: Risk for impaired skin integrity will decrease    Intervention: Assess risk factors for impaired skin integrity and/or pressure ulcers  Pt assessed for skin breakdown, barrier cream applied to sacral areas.

## 2018-10-05 NOTE — PROGRESS NOTES
Patient is A&Ox4, VSS on 2L via NC, Maculopapular rash covering entire body, Midline IV patent & infusing IVF, Pt is incontinent of stool and has made no attempt to ambulate during dayshift. Call light and personal belongings w/i reach, will continue to round.

## 2018-10-06 PROBLEM — D72.829 LEUKOCYTOSIS: Status: ACTIVE | Noted: 2018-10-06

## 2018-10-06 PROBLEM — R09.02 HYPOXIA: Status: ACTIVE | Noted: 2018-10-06

## 2018-10-06 LAB
ANION GAP SERPL CALC-SCNC: 7 MMOL/L (ref 0–11.9)
ANISOCYTOSIS BLD QL SMEAR: ABNORMAL
BASOPHILS # BLD AUTO: 0 % (ref 0–1.8)
BASOPHILS # BLD: 0 K/UL (ref 0–0.12)
BUN SERPL-MCNC: 27 MG/DL (ref 8–22)
CALCIUM SERPL-MCNC: 8.4 MG/DL (ref 8.5–10.5)
CHLORIDE SERPL-SCNC: 97 MMOL/L (ref 96–112)
CO2 SERPL-SCNC: 26 MMOL/L (ref 20–33)
CREAT SERPL-MCNC: 0.98 MG/DL (ref 0.5–1.4)
EOSINOPHIL # BLD AUTO: 0 K/UL (ref 0–0.51)
EOSINOPHIL NFR BLD: 0 % (ref 0–6.9)
ERYTHROCYTE [DISTWIDTH] IN BLOOD BY AUTOMATED COUNT: 56.1 FL (ref 35.9–50)
GLUCOSE BLD-MCNC: 240 MG/DL (ref 65–99)
GLUCOSE BLD-MCNC: 255 MG/DL (ref 65–99)
GLUCOSE BLD-MCNC: 269 MG/DL (ref 65–99)
GLUCOSE BLD-MCNC: 368 MG/DL (ref 65–99)
GLUCOSE SERPL-MCNC: 333 MG/DL (ref 65–99)
HCT VFR BLD AUTO: 24.6 % (ref 37–47)
HGB BLD-MCNC: 8.1 G/DL (ref 12–16)
HYPOCHROMIA BLD QL SMEAR: ABNORMAL
INR PPP: 2.59 (ref 0.87–1.13)
LYMPHOCYTES # BLD AUTO: 0.12 K/UL (ref 1–4.8)
LYMPHOCYTES NFR BLD: 0.9 % (ref 22–41)
MACROCYTES BLD QL SMEAR: ABNORMAL
MANUAL DIFF BLD: NORMAL
MCH RBC QN AUTO: 32.9 PG (ref 27–33)
MCHC RBC AUTO-ENTMCNC: 32.9 G/DL (ref 33.6–35)
MCV RBC AUTO: 100 FL (ref 81.4–97.8)
METAMYELOCYTES NFR BLD MANUAL: 3.5 %
MONOCYTES # BLD AUTO: 0.92 K/UL (ref 0–0.85)
MONOCYTES NFR BLD AUTO: 7 % (ref 0–13.4)
MORPHOLOGY BLD-IMP: NORMAL
NEUTROPHILS # BLD AUTO: 11.7 K/UL (ref 2–7.15)
NEUTROPHILS NFR BLD: 81.6 % (ref 44–72)
NEUTS BAND NFR BLD MANUAL: 7 % (ref 0–10)
NRBC # BLD AUTO: 0 K/UL
NRBC BLD-RTO: 0 /100 WBC
PLATELET # BLD AUTO: 70 K/UL (ref 164–446)
PLATELET BLD QL SMEAR: NORMAL
POTASSIUM SERPL-SCNC: 4.4 MMOL/L (ref 3.6–5.5)
PROTHROMBIN TIME: 27.8 SEC (ref 12–14.6)
RBC # BLD AUTO: 2.46 M/UL (ref 4.2–5.4)
RBC BLD AUTO: PRESENT
SODIUM SERPL-SCNC: 130 MMOL/L (ref 135–145)
WBC # BLD AUTO: 13.2 K/UL (ref 4.8–10.8)

## 2018-10-06 PROCEDURE — 80048 BASIC METABOLIC PNL TOTAL CA: CPT

## 2018-10-06 PROCEDURE — 87040 BLOOD CULTURE FOR BACTERIA: CPT

## 2018-10-06 PROCEDURE — A9270 NON-COVERED ITEM OR SERVICE: HCPCS | Performed by: HOSPITALIST

## 2018-10-06 PROCEDURE — 700111 HCHG RX REV CODE 636 W/ 250 OVERRIDE (IP): Performed by: INTERNAL MEDICINE

## 2018-10-06 PROCEDURE — 36415 COLL VENOUS BLD VENIPUNCTURE: CPT

## 2018-10-06 PROCEDURE — 82962 GLUCOSE BLOOD TEST: CPT

## 2018-10-06 PROCEDURE — 85610 PROTHROMBIN TIME: CPT

## 2018-10-06 PROCEDURE — 770004 HCHG ROOM/CARE - ONCOLOGY PRIVATE *

## 2018-10-06 PROCEDURE — 700102 HCHG RX REV CODE 250 W/ 637 OVERRIDE(OP): Performed by: HOSPITALIST

## 2018-10-06 PROCEDURE — 700111 HCHG RX REV CODE 636 W/ 250 OVERRIDE (IP): Performed by: HOSPITALIST

## 2018-10-06 PROCEDURE — 85007 BL SMEAR W/DIFF WBC COUNT: CPT

## 2018-10-06 PROCEDURE — 85027 COMPLETE CBC AUTOMATED: CPT

## 2018-10-06 PROCEDURE — 99233 SBSQ HOSP IP/OBS HIGH 50: CPT | Performed by: INTERNAL MEDICINE

## 2018-10-06 PROCEDURE — 700102 HCHG RX REV CODE 250 W/ 637 OVERRIDE(OP): Performed by: INTERNAL MEDICINE

## 2018-10-06 RX ADMIN — METHYLPREDNISOLONE SODIUM SUCCINATE 62.5 MG: 125 INJECTION, POWDER, FOR SOLUTION INTRAMUSCULAR; INTRAVENOUS at 21:57

## 2018-10-06 RX ADMIN — OXYCODONE HYDROCHLORIDE AND ACETAMINOPHEN 500 MG: 500 TABLET ORAL at 04:50

## 2018-10-06 RX ADMIN — INSULIN HUMAN 17 UNITS: 100 INJECTION, SUSPENSION SUBCUTANEOUS at 21:44

## 2018-10-06 RX ADMIN — METHYLPREDNISOLONE SODIUM SUCCINATE 62.5 MG: 125 INJECTION, POWDER, FOR SOLUTION INTRAMUSCULAR; INTRAVENOUS at 06:00

## 2018-10-06 RX ADMIN — OMEPRAZOLE 20 MG: 20 CAPSULE, DELAYED RELEASE ORAL at 04:52

## 2018-10-06 RX ADMIN — METOPROLOL TARTRATE 25 MG: 25 TABLET, FILM COATED ORAL at 18:11

## 2018-10-06 RX ADMIN — ONDANSETRON HYDROCHLORIDE 4 MG: 2 SOLUTION INTRAMUSCULAR; INTRAVENOUS at 16:27

## 2018-10-06 RX ADMIN — INSULIN HUMAN 8 UNITS: 100 INJECTION, SOLUTION PARENTERAL at 12:01

## 2018-10-06 RX ADMIN — INSULIN HUMAN 4 UNITS: 100 INJECTION, SOLUTION PARENTERAL at 18:13

## 2018-10-06 RX ADMIN — VITAMIN D, TAB 1000IU (100/BT) 2000 UNITS: 25 TAB at 04:55

## 2018-10-06 RX ADMIN — ANTACID TABLETS 1000 MG: 500 TABLET, CHEWABLE ORAL at 16:27

## 2018-10-06 RX ADMIN — METOPROLOL TARTRATE 25 MG: 25 TABLET, FILM COATED ORAL at 04:52

## 2018-10-06 RX ADMIN — INSULIN HUMAN 6 UNITS: 100 INJECTION, SOLUTION PARENTERAL at 21:54

## 2018-10-06 RX ADMIN — CETIRIZINE HYDROCHLORIDE 10 MG: 10 TABLET, FILM COATED ORAL at 06:03

## 2018-10-06 RX ADMIN — Medication: at 18:10

## 2018-10-06 RX ADMIN — PRAVASTATIN SODIUM 40 MG: 20 TABLET ORAL at 18:11

## 2018-10-06 RX ADMIN — MULTIPLE VITAMINS W/ MINERALS TAB 1 TABLET: TAB at 04:50

## 2018-10-06 RX ADMIN — FLUTICASONE PROPIONATE 100 MCG: 50 SPRAY, METERED NASAL at 05:09

## 2018-10-06 RX ADMIN — ANTACID TABLETS 1000 MG: 500 TABLET, CHEWABLE ORAL at 08:43

## 2018-10-06 RX ADMIN — Medication: at 05:10

## 2018-10-06 RX ADMIN — LORAZEPAM 0.5 MG: 1 TABLET ORAL at 04:54

## 2018-10-06 RX ADMIN — METHYLPREDNISOLONE SODIUM SUCCINATE 62.5 MG: 125 INJECTION, POWDER, FOR SOLUTION INTRAMUSCULAR; INTRAVENOUS at 13:45

## 2018-10-06 RX ADMIN — CALCITRIOL 0.25 MCG: 0.25 CAPSULE, LIQUID FILLED ORAL at 04:53

## 2018-10-06 RX ADMIN — WARFARIN SODIUM 3 MG: 3 TABLET ORAL at 18:10

## 2018-10-06 RX ADMIN — ACETAMINOPHEN 650 MG: 325 TABLET, FILM COATED ORAL at 04:49

## 2018-10-06 RX ADMIN — ACETAMINOPHEN 650 MG: 325 TABLET, FILM COATED ORAL at 13:48

## 2018-10-06 RX ADMIN — ANTACID TABLETS 1000 MG: 500 TABLET, CHEWABLE ORAL at 11:59

## 2018-10-06 RX ADMIN — INSULIN HUMAN 6 UNITS: 100 INJECTION, SOLUTION PARENTERAL at 08:44

## 2018-10-06 RX ADMIN — ACETAMINOPHEN 650 MG: 325 TABLET, FILM COATED ORAL at 22:07

## 2018-10-06 RX ADMIN — LORAZEPAM 0.5 MG: 1 TABLET ORAL at 18:11

## 2018-10-06 RX ADMIN — DULOXETINE HYDROCHLORIDE 40 MG: 20 CAPSULE, DELAYED RELEASE ORAL at 04:50

## 2018-10-06 ASSESSMENT — PAIN SCALES - GENERAL
PAINLEVEL_OUTOF10: 7
PAINLEVEL_OUTOF10: 5
PAINLEVEL_OUTOF10: 6
PAINLEVEL_OUTOF10: 3

## 2018-10-06 ASSESSMENT — ENCOUNTER SYMPTOMS
NECK PAIN: 0
CLAUDICATION: 0
DIARRHEA: 0
POLYDIPSIA: 0
VOMITING: 0
ORTHOPNEA: 0
WEAKNESS: 1
SPUTUM PRODUCTION: 0
DEPRESSION: 0
PALPITATIONS: 0
CHILLS: 1
FEVER: 0
BACK PAIN: 0
BRUISES/BLEEDS EASILY: 1
MYALGIAS: 0
CHILLS: 0
PHOTOPHOBIA: 0
DOUBLE VISION: 0
BLURRED VISION: 0
WEIGHT LOSS: 0
HEARTBURN: 0
HEADACHES: 0
TINGLING: 0
ABDOMINAL PAIN: 0
HEMOPTYSIS: 0
COUGH: 0
BACK PAIN: 1
NAUSEA: 0
DIZZINESS: 0

## 2018-10-06 ASSESSMENT — COGNITIVE AND FUNCTIONAL STATUS - GENERAL
MOBILITY SCORE: 14
CLIMB 3 TO 5 STEPS WITH RAILING: A LOT
DAILY ACTIVITIY SCORE: 16
TURNING FROM BACK TO SIDE WHILE IN FLAT BAD: A LITTLE
HELP NEEDED FOR BATHING: A LOT
SUGGESTED CMS G CODE MODIFIER MOBILITY: CL
MOVING TO AND FROM BED TO CHAIR: A LITTLE
DRESSING REGULAR LOWER BODY CLOTHING: A LOT
PERSONAL GROOMING: A LITTLE
MOVING FROM LYING ON BACK TO SITTING ON SIDE OF FLAT BED: A LOT
DRESSING REGULAR UPPER BODY CLOTHING: A LITTLE
TOILETING: A LOT
SUGGESTED CMS G CODE MODIFIER DAILY ACTIVITY: CK
WALKING IN HOSPITAL ROOM: A LOT
STANDING UP FROM CHAIR USING ARMS: A LOT

## 2018-10-06 ASSESSMENT — LIFESTYLE VARIABLES: SUBSTANCE_ABUSE: 0

## 2018-10-06 NOTE — PROGRESS NOTES
Inpatient Anticoagulation Service Note    Date: 10/6/2018  Reason for Anticoagulation: Deep Vein Thrombosis        Hemoglobin Value: (!) 8.1  Hematocrit Value: (!) 24.6  Lab Platelet Value: (!) 70 (Results confirmed by repeat testing.)  Target INR: 2.0 to 3.0    INR from last 7 days     Date/Time INR Value    10/05/18 0121 (!)  2.6    10/04/18 1254 (!)  2.67    10/03/18 1125 (!)  2.93        Dose from last 7 days     Date/Time Dose (mg)    10/05/18 1100  3    10/04/18 1200  3    10/03/18 2000  3        Average Dose (mg): 3 (Per [facility] MAR)  Significant Interactions: Corticosteroids, Proton Pump Inhibitor, Statin, Other (Comments) (Duloxetine)  Bridge Therapy: No   Reversal Agent Administered: Not Applicable    A/P: No new INR collected today as it has been therapeutic on home dose of warfarin since admission. H/H and platelets are stable; no documented s/s of bleeding noted. There are no new drug interactions present at this time. Patient remains on solumedrol for whole body rash. PO intake appears consistent when documented. Will continue with warfarin 3 mg po daily and repeat INR with AM labs. INR check three times weekly while in the hospital.     Education Material Provided?: No (Established warfarin patient)  Pharmacist suggested discharge dosing: Warfarin 3 mg po daily; repeat INR within 48-72 hours of discharge     Dilma Zhu, PharmD, BCOP

## 2018-10-06 NOTE — CARE PLAN
Problem: Communication  Goal: The ability to communicate needs accurately and effectively will improve  Outcome: PROGRESSING AS EXPECTED  Pt uses call light effectively    Problem: Skin Integrity  Goal: Risk for impaired skin integrity will decrease  Outcome: PROGRESSING SLOWER THAN EXPECTED  Pt incontinent and not getting out of bed at this point

## 2018-10-06 NOTE — PROGRESS NOTES
Oncology/Hematology Progress Note               Author: Lamberto Annettekade BAR Date & Time created: 10/6/2018  10:51 AM   Patient with history of multiple myeloma on Ninlaro, Revlimid and dexamethasone admitted with extensive skin rash and pancytopenia  Interval History:  Severe skin rash is now improving slowly.  Hemoglobin and platelet count improving.  Neutropenia has resolved.  There is no fever or chills.  Review of Systems:  Review of Systems   Constitutional: Positive for malaise/fatigue. Negative for chills, fever and weight loss.   HENT: Negative for ear pain, hearing loss and tinnitus.    Eyes: Negative for blurred vision and double vision.   Respiratory: Negative for cough and hemoptysis.    Cardiovascular: Negative for chest pain, palpitations, orthopnea and claudication.   Gastrointestinal: Negative for abdominal pain, diarrhea, heartburn, nausea and vomiting.   Genitourinary: Negative for dysuria.   Musculoskeletal: Negative for back pain, myalgias and neck pain.   Skin: Positive for itching and rash.   Neurological: Positive for weakness. Negative for dizziness, tingling and headaches.   Psychiatric/Behavioral: Negative for depression, substance abuse and suicidal ideas.       Physical Exam:  Physical Exam   Constitutional: She appears well-developed.   HENT:   Head: Normocephalic.   Eyes: Pupils are equal, round, and reactive to light. Conjunctivae are normal.   Neck: Normal range of motion.   Cardiovascular: Normal rate.    Pulmonary/Chest: Effort normal.   Abdominal: Soft. Bowel sounds are normal.   Musculoskeletal: Normal range of motion.   Skin: Rash noted. There is erythema. There is pallor.       Labs:        Invalid input(s): [id number]      Recent Labs      10/03/18   1125  10/04/18   0219  10/05/18   0121  10/06/18   0038   SODIUM  133*  131*  129*  130*   POTASSIUM  4.0  4.0  4.2  4.4   CHLORIDE  98  97  97  97   CO2  29  28  27  26   BUN  21  25*  27*  27*   CREATININE  1.22  1.08  1.04   0.98   MAGNESIUM  1.3*  2.0   --    --    CALCIUM  8.7  8.5  8.2*  8.4*     Recent Labs      10/03/18   1125  10/04/18   0219  10/05/18   0121  10/06/18   0038   ALTSGPT  8   --    --    --    ASTSGOT  24   --    --    --    ALKPHOSPHAT  62   --    --    --    TBILIRUBIN  0.3   --    --    --    GLUCOSE  134*  192*  300*  333*     Recent Labs      10/04/18   0219  10/04/18   1254  10/05/18   0121  10/06/18   0037  10/06/18   0900   RBC  2.35*   --   2.33*  2.46*   --    HEMOGLOBIN  7.6*   --   7.7*  8.1*   --    HEMATOCRIT  23.2*   --   22.7*  24.6*   --    PLATELETCT  55*   --   61*  70*   --    PROTHROMBTM   --   28.4*  27.9*   --   27.8*   INR   --   2.67*  2.60*   --   2.59*     Recent Labs      10/03/18   1125  10/04/18   0219  10/05/18   0121  10/06/18   0037   WBC  1.5*  1.8*  8.7  13.2*   NEUTSPOLYS  64.00   --   81.80*  81.60*   LYMPHOCYTES  19.10*   --   6.30*  0.90*   MONOCYTES  13.00   --   11.00  7.00   EOSINOPHILS  3.10   --   0.00  0.00   BASOPHILS  0.00   --   0.10  0.00   ASTSGOT  24   --    --    --    ALTSGPT  8   --    --    --    ALKPHOSPHAT  62   --    --    --    TBILIRUBIN  0.3   --    --    --      Recent Labs      10/04/18   0219  10/05/18   0121  10/06/18   0038   SODIUM  131*  129*  130*   POTASSIUM  4.0  4.2  4.4   CHLORIDE  97  97  97   CO2  28  27  26   GLUCOSE  192*  300*  333*   BUN  25*  27*  27*   CREATININE  1.08  1.04  0.98   CALCIUM  8.5  8.2*  8.4*     Hemodynamics:  Temp (24hrs), Av.7 °C (98.1 °F), Min:36.6 °C (97.8 °F), Max:36.9 °C (98.4 °F)  Temperature: 36.8 °C (98.2 °F)  Pulse  Av.9  Min: 61  Max: 87   Blood Pressure : 145/75     Respiratory:    Respiration: 18, Pulse Oximetry: 95 %        RUL Breath Sounds: Clear, RML Breath Sounds: Clear, RLL Breath Sounds: Diminished, [place] Breath Sounds: Clear, LLL Breath Sounds: Diminished  Fluids:    Intake/Output Summary (Last 24 hours) at 10/05/18 1128  Last data filed at 10/05/18 1045   Gross per 24 hour   Intake              1103 ml   Output                0 ml   Net             1103 ml     Weight: 123.9 kg (273 lb 2.4 oz)  GI/Nutrition:  Orders Placed This Encounter   Procedures   • Diet Order Regular     Standing Status:   Standing     Number of Occurrences:   1     Order Specific Question:   Diet:     Answer:   Regular [1]     Medical Decision Making, by Problem:  Active Hospital Problems    Diagnosis   • *Rash [R21]   • Multiple myeloma ([facility]) [C90.00]   • Hypomagnesemia [E83.42]   • Acute kidney injury ([facility]) [N17.9]   • Pancytopenia ([facility]) [D61.818]   • DVT (deep venous thrombosis) ([facility]) [I82.409]   • Morbid obesity with BMI of 60.0-69.9, adult (CMS-HCC) [E66.01, Z68.44]   • DM type 2 (diabetes mellitus, type 2) (CMS-HCC) [E11.9]       Plan:  1.  Severe maculopapular skin rash, likely related to Revlimid.  Medications for multiple myeloma discontinued for now.  -10/4/ on Solu-Medrol 60 mg twice daily now with improvement of skin lesions.     2.  IgG kappa multiple myeloma was on Ninlaro, Revlimid and dexamethasone.  Medication start for now given side effects with significant skin rash and pancytopenia  -Hold multiple myeloma medications until improvement of symptoms then probably she can continue on dexamethasone and Ninlaro.  Revlimid can now be restarted given  Grade 4 skin rash  -She is having a good response to treatment so far.    3.  Pancytopenia, probably related to  therapy plus minus multiple myeloma  -Neutropenia resolved after 1 dose of Granix.  -Hemoglobin and platelet counts slowly improving    Plan  -Continue on Solu-Medrol same dose for now.  Generalized skin rash is improving slowly.  Leukocytosis related to steroids, no signs of infection  -Strongly encouraged the patient to get out of bed  -Keep monitoring    Prognosis is guarded.   High complexity, complex decision making    Quality-Core Measures   Reviewed items::  Labs reviewed and Medications reviewed

## 2018-10-06 NOTE — PROGRESS NOTES
Bedside Report received   Assumed care of Ms Pagan at 0700.    Pt is A&O x 4.  Pain 6/10. Declines medication at this time. Requesting to wait until Tylenol is due.  Nausea denied  Tolerating Diet   Red rash to entire body. Pt describes rash as itchy especially on her back, buttocks and arms. Otherwise, skin is pale with cap refill > 3 sec.   + Urine output  + BM on 10/5/2018   + Flatus  Up x 2 with front wheel walker. Pt educated on need to increase mobilization. Pt verbalizes understanding and is up to the chair at this time.   Bed in lowest position and locked.  Bed alarm refused. Pt demonstrates appropriate use of call light to call for assistance.   Pt resting comfortably now.  Review plan of care with patient  Call light within reach  Hourly rounds in place  All needs met at this time

## 2018-10-06 NOTE — PROGRESS NOTES
Blood cultures never collected from 10/3, this RN instructed by lab to put in orders again so lab can have correct labels

## 2018-10-06 NOTE — PROGRESS NOTES
[facility] Medicine Daily Progress Note    Date of Service  10/6/2018    Chief Complaint  67 y.o. female who presented 10/3/2018 with past medical history of multiple myeloma, obesity, diabetes mellitus, is coming today complaining of generalized rash started around 1 week ago, patient states that her rash started on her left arm and then spread all over her body, at this time patient has rash maculopapular, hives, erythematous in her upper lower extremities thorax and abdominal area and face    Interval Problem Update  Patient admitted last night with widespread maculopapular pruritic rash, which started 1 week ago a few days after completion of antibiotic course for UTI.  Oncology consulted Dr Gutiérrez related this rash to Revlimid.  Reaction to antibiotics is also possible.  It is unclear what kind antibiotic patient was taken.  Benadryl was not helpful so far.  I ordered Solu-Medrol 62.5 every 8 hours after discussion with Dr. Gutiérrez    10/5  Widespread maculopapular rash appears to be improving, less bright today.  Itching improved.  On IV steroids.  Diabetes: Blood sugar is not controlled due to steroid treatment.  Insulin sliding scale on board.  NPH dose increased to 25.  Acute kidney injury: Creatinine is improved  Hyponatremia: Mild.  Monitor  Pancytopenia: Stable.  ANC up from 0.84->7.12 today  Discussed POC with the patient and on multidisciplinary rounds    10/6  Maculopapular rash is improving.  Transformation to hemorrhagic elements noted on the skin of lower extremities.  Patient states she feels somewhat drowsy today.  No Benadryl was given according MA  R.  Noted to be on 2 L nasal cannula.  According the patient she did not need oxygen before.  Will check chest x-ray.  Diabetes: Poorly controlled due to steroids.  Increased dose of Lantus, upgraded sliding scale  Pancytopenia: Improving    Consultants/Specialty  Oncology    Code Status  Full code    Disposition  To be discussed    Review of  Systems  Review of Systems   Constitutional: Positive for chills. Negative for fever and weight loss.   HENT: Negative for ear pain, hearing loss and tinnitus.    Eyes: Negative for blurred vision, double vision and photophobia.   Respiratory: Negative for cough, hemoptysis and sputum production.    Cardiovascular: Negative for chest pain, palpitations and orthopnea.   Gastrointestinal: Negative for diarrhea, heartburn, nausea and vomiting.   Genitourinary: Negative for dysuria, frequency and urgency.   Musculoskeletal: Positive for back pain and joint pain. Negative for myalgias.   Skin: Positive for itching and rash.   Neurological: Negative for dizziness, tingling and headaches.   Endo/Heme/Allergies: Negative for environmental allergies and polydipsia. Bruises/bleeds easily.   Psychiatric/Behavioral: Negative for depression, substance abuse and suicidal ideas.        Physical Exam  Temp:  [36.6 °C (97.8 °F)-36.9 °C (98.4 °F)] 36.8 °C (98.2 °F)  Pulse:  [64-73] 71  Resp:  [16-20] 18  BP: (132-150)/(68-78) 145/75    Physical Exam  Constitutional: She is oriented to person, place, and time. She appears well-nourished. No distress.   Obese   HENT:   Head: Normocephalic and atraumatic.   Mouth/Throat: No oropharyngeal exudate.   Eyes: Conjunctivae are normal. Right eye exhibits no discharge. Left eye exhibits no discharge. No scleral icterus.   Neck: Normal range of motion. Neck supple. No JVD present.   Cardiovascular: Regular rhythm and normal heart sounds.  Exam reveals no gallop.    Pulmonary/Chest: Effort normal and breath sounds normal. No respiratory distress. She has no wheezes. She has no rales.   Abdominal: Soft. Bowel sounds are normal. She exhibits no distension. There is no tenderness. There is no rebound.   Musculoskeletal: Normal range of motion. She exhibits no edema.   Lymphadenopathy:     She has no cervical adenopathy.   Neurological: She is alert and oriented to person, place, and time. She  exhibits normal muscle tone.   Skin: Skin is warm. Rash (Generalized, maculopapular, blanching to palpation) noted.  Transformation to hemorrhagic elements noted over the skin of the lower extremities  Nursing note and vitals reviewed.       Fluids    Intake/Output Summary (Last 24 hours) at 10/06/18 1231  Last data filed at 10/06/18 0800   Gross per 24 hour   Intake             1501 ml   Output              200 ml   Net             1301 ml       Laboratory  Recent Labs      10/04/18   0219  10/05/18   0121  10/06/18   0037   WBC  1.8*  8.7  13.2*   RBC  2.35*  2.33*  2.46*   HEMOGLOBIN  7.6*  7.7*  8.1*   HEMATOCRIT  23.2*  22.7*  24.6*   MCV  98.7*  97.4  100.0*   MCH  32.3  33.0  32.9   MCHC  32.8*  33.9  32.9*   RDW  54.7*  53.1*  56.1*   PLATELETCT  55*  61*  70*   MPV  13.7*  13.7*   --      Recent Labs      10/04/18   0219  10/05/18   0121  10/06/18   0038   SODIUM  131*  129*  130*   POTASSIUM  4.0  4.2  4.4   CHLORIDE  97  97  97   CO2  28  27  26   GLUCOSE  192*  300*  333*   BUN  25*  27*  27*   CREATININE  1.08  1.04  0.98   CALCIUM  8.5  8.2*  8.4*     Recent Labs      10/04/18   1254  10/05/18   0121  10/06/18   0900   INR  2.67*  2.60*  2.59*               Imaging  IR-US GUIDED PIV   Final Result    Ultrasound-guided PERIPHERAL IV INSERTION performed by    qualified nursing staff as above.                 Assessment/Plan  * Rash   Assessment & Plan    Most likely drug induced rash   probably related due to Revlimid for  multiple myeloma (last dose about 2 weeks ago according the patient)  And another possibility is reaction to antibiotics which she finished 3 days or so before rash appears  Revlimid stopped.  Started on prednisone on admission, we  switched to Solu-Medrol  Continue [place]  Continue clinical observation    Rash is improving  Continue Solu-Medrol    Unlikely patient has infectious rash per  history and clinical picture            Multiple myeloma (HCC)- (present on admission)    Assessment & Plan     IgG kappa multiple myeloma.    improvement of the monoclonal protein  kidney and calcium levels WNL   Consulted by oncologist : stop Ninlaro and Revlimid  As a possible culprit          Hypoxia   Assessment & Plan    Needs oxygen.  Denies cough or shortness of breath.  Apparently new  Will check chest x-ray.  TTE  Continue monitoring  We will try to wean off oxygen        Acute kidney injury (HCC)- (present on admission)   Assessment & Plan    Creatinine improved with IV hydration  Continue IV fluids, monitor        Hypomagnesemia   Assessment & Plan    Replacing will follow up in the morning.        Pancytopenia (HCC)- (present on admission)   Assessment & Plan    Stable  Due to chemotherapy for multiple myeloma, continue monitoring daily CBC  Improved after Granix        DVT (deep venous thrombosis) ([facility])- (present on admission)   Assessment & Plan    Continue Coumadin per protocol  Patient stated she started on Coumadin about 3 weeks ago, with Lovenox bridging  Rash does not look like skin necrosis        Morbid obesity with BMI of 60.0-69.9, adult (CMS-HCC)- (present on admission)   Assessment & Plan    Body mass index is 49.42 kg/m².  Needs to lose weight        DM type 2 (diabetes mellitus, type 2) (CMS-HCC)- (present on admission)   Assessment & Plan    Continue insulin NPH, and insulin sliding scale.    Monitor for hyperglycemia, exacerbated by steroids.  Adjust insulin doses as needed             VTE prophylaxis: Warfarin

## 2018-10-06 NOTE — PROGRESS NOTES
Report Received from Day RN, assumed care @1900  A/O x 4  VSS  Pain- denies  O2-2L NC  IV- R FA Midline running tko  Diet-Reg  Void- incontinent  BM-incontinet  Wound-Rash covering entire body  Bed Locked in Lowest Position  Call light in reach

## 2018-10-06 NOTE — ASSESSMENT & PLAN NOTE
Needs oxygen.  Denies cough or shortness of breath.  Apparently new  Chest x-ray showed no pneumonia.  Right hemidiaphragm elevation.  .  TTE  Continue monitoring  Incentive spirometry.  Up to chair.  We will try to wean off oxygen

## 2018-10-06 NOTE — CARE PLAN
Problem: Safety  Goal: Will remain free from falls  Outcome: PROGRESSING AS EXPECTED  Assess gait during ambulation. Educate pt not to get oob without RN/CNA present.     Problem: Respiratory:  Goal: Respiratory status will improve  Outcome: PROGRESSING AS EXPECTED  Encourage IS at bedside. Auscultate lungs Q shift and PRN. Monitor CXR results.

## 2018-10-07 ENCOUNTER — APPOINTMENT (OUTPATIENT)
Dept: CARDIOLOGY | Facility: MEDICAL CENTER | Age: 68
DRG: 606 | End: 2018-10-07
Attending: INTERNAL MEDICINE
Payer: MEDICARE

## 2018-10-07 ENCOUNTER — APPOINTMENT (OUTPATIENT)
Dept: RADIOLOGY | Facility: MEDICAL CENTER | Age: 68
DRG: 606 | End: 2018-10-07
Attending: INTERNAL MEDICINE
Payer: MEDICARE

## 2018-10-07 LAB
ANION GAP SERPL CALC-SCNC: 6 MMOL/L (ref 0–11.9)
ANISOCYTOSIS BLD QL SMEAR: ABNORMAL
BASOPHILS # BLD AUTO: 0 % (ref 0–1.8)
BASOPHILS # BLD: 0 K/UL (ref 0–0.12)
BUN SERPL-MCNC: 28 MG/DL (ref 8–22)
CALCIUM SERPL-MCNC: 8.4 MG/DL (ref 8.5–10.5)
CHLORIDE SERPL-SCNC: 98 MMOL/L (ref 96–112)
CO2 SERPL-SCNC: 27 MMOL/L (ref 20–33)
CREAT SERPL-MCNC: 0.86 MG/DL (ref 0.5–1.4)
EOSINOPHIL # BLD AUTO: 0 K/UL (ref 0–0.51)
EOSINOPHIL NFR BLD: 0 % (ref 0–6.9)
ERYTHROCYTE [DISTWIDTH] IN BLOOD BY AUTOMATED COUNT: 54.5 FL (ref 35.9–50)
GLUCOSE BLD-MCNC: 187 MG/DL (ref 65–99)
GLUCOSE BLD-MCNC: 196 MG/DL (ref 65–99)
GLUCOSE BLD-MCNC: 237 MG/DL (ref 65–99)
GLUCOSE BLD-MCNC: 302 MG/DL (ref 65–99)
GLUCOSE SERPL-MCNC: 279 MG/DL (ref 65–99)
HCT VFR BLD AUTO: 24.2 % (ref 37–47)
HGB BLD-MCNC: 8.1 G/DL (ref 12–16)
INR PPP: 2.58 (ref 0.87–1.13)
LV EJECT FRACT  99904: 60
LV EJECT FRACT MOD 2C 99903: 65.11
LV EJECT FRACT MOD 4C 99902: 61.94
LV EJECT FRACT MOD BP 99901: 65.25
LYMPHOCYTES # BLD AUTO: 0.59 K/UL (ref 1–4.8)
LYMPHOCYTES NFR BLD: 4.3 % (ref 22–41)
MACROCYTES BLD QL SMEAR: ABNORMAL
MANUAL DIFF BLD: NORMAL
MCH RBC QN AUTO: 32.3 PG (ref 27–33)
MCHC RBC AUTO-ENTMCNC: 33.5 G/DL (ref 33.6–35)
MCV RBC AUTO: 96.4 FL (ref 81.4–97.8)
MONOCYTES # BLD AUTO: 1.42 K/UL (ref 0–0.85)
MONOCYTES NFR BLD AUTO: 10.3 % (ref 0–13.4)
MORPHOLOGY BLD-IMP: NORMAL
NEUTROPHILS # BLD AUTO: 11.79 K/UL (ref 2–7.15)
NEUTROPHILS NFR BLD: 81.9 % (ref 44–72)
NEUTS BAND NFR BLD MANUAL: 3.5 % (ref 0–10)
NRBC # BLD AUTO: 0 K/UL
NRBC BLD-RTO: 0 /100 WBC
PLATELET # BLD AUTO: 71 K/UL (ref 164–446)
PLATELET BLD QL SMEAR: NORMAL
PMV BLD AUTO: 13.8 FL (ref 9–12.9)
POTASSIUM SERPL-SCNC: 4.5 MMOL/L (ref 3.6–5.5)
PROTHROMBIN TIME: 27.7 SEC (ref 12–14.6)
RBC # BLD AUTO: 2.51 M/UL (ref 4.2–5.4)
RBC BLD AUTO: PRESENT
SODIUM SERPL-SCNC: 131 MMOL/L (ref 135–145)
WBC # BLD AUTO: 13.8 K/UL (ref 4.8–10.8)

## 2018-10-07 PROCEDURE — 85027 COMPLETE CBC AUTOMATED: CPT

## 2018-10-07 PROCEDURE — 90471 IMMUNIZATION ADMIN: CPT

## 2018-10-07 PROCEDURE — 82962 GLUCOSE BLOOD TEST: CPT | Mod: 91

## 2018-10-07 PROCEDURE — 71045 X-RAY EXAM CHEST 1 VIEW: CPT

## 2018-10-07 PROCEDURE — 85610 PROTHROMBIN TIME: CPT

## 2018-10-07 PROCEDURE — A9270 NON-COVERED ITEM OR SERVICE: HCPCS | Performed by: HOSPITALIST

## 2018-10-07 PROCEDURE — 80048 BASIC METABOLIC PNL TOTAL CA: CPT

## 2018-10-07 PROCEDURE — 700102 HCHG RX REV CODE 250 W/ 637 OVERRIDE(OP): Performed by: HOSPITALIST

## 2018-10-07 PROCEDURE — 90662 IIV NO PRSV INCREASED AG IM: CPT | Performed by: INTERNAL MEDICINE

## 2018-10-07 PROCEDURE — 99233 SBSQ HOSP IP/OBS HIGH 50: CPT | Performed by: INTERNAL MEDICINE

## 2018-10-07 PROCEDURE — 90670 PCV13 VACCINE IM: CPT | Performed by: INTERNAL MEDICINE

## 2018-10-07 PROCEDURE — 93306 TTE W/DOPPLER COMPLETE: CPT

## 2018-10-07 PROCEDURE — 770004 HCHG ROOM/CARE - ONCOLOGY PRIVATE *

## 2018-10-07 PROCEDURE — 93306 TTE W/DOPPLER COMPLETE: CPT | Mod: 26 | Performed by: INTERNAL MEDICINE

## 2018-10-07 PROCEDURE — 85007 BL SMEAR W/DIFF WBC COUNT: CPT

## 2018-10-07 PROCEDURE — 3E0234Z INTRODUCTION OF SERUM, TOXOID AND VACCINE INTO MUSCLE, PERCUTANEOUS APPROACH: ICD-10-PCS | Performed by: INTERNAL MEDICINE

## 2018-10-07 PROCEDURE — 700111 HCHG RX REV CODE 636 W/ 250 OVERRIDE (IP): Performed by: INTERNAL MEDICINE

## 2018-10-07 RX ORDER — PREDNISONE 50 MG/1
50 TABLET ORAL DAILY
Status: DISCONTINUED | OUTPATIENT
Start: 2018-10-08 | End: 2018-10-09

## 2018-10-07 RX ORDER — PREDNISONE 10 MG/1
TABLET ORAL
Qty: 64 TAB | Refills: 0 | Status: SHIPPED | OUTPATIENT
Start: 2018-10-07 | End: 2018-10-10

## 2018-10-07 RX ADMIN — METOPROLOL TARTRATE 25 MG: 25 TABLET, FILM COATED ORAL at 06:18

## 2018-10-07 RX ADMIN — INSULIN HUMAN 4 UNITS: 100 INJECTION, SOLUTION PARENTERAL at 13:27

## 2018-10-07 RX ADMIN — Medication: at 17:29

## 2018-10-07 RX ADMIN — INSULIN HUMAN 2 UNITS: 100 INJECTION, SOLUTION PARENTERAL at 17:34

## 2018-10-07 RX ADMIN — LORAZEPAM 0.5 MG: 1 TABLET ORAL at 17:29

## 2018-10-07 RX ADMIN — WARFARIN SODIUM 3 MG: 3 TABLET ORAL at 17:28

## 2018-10-07 RX ADMIN — LORAZEPAM 0.5 MG: 1 TABLET ORAL at 06:19

## 2018-10-07 RX ADMIN — PRAVASTATIN SODIUM 40 MG: 20 TABLET ORAL at 17:28

## 2018-10-07 RX ADMIN — DULOXETINE HYDROCHLORIDE 40 MG: 20 CAPSULE, DELAYED RELEASE ORAL at 06:19

## 2018-10-07 RX ADMIN — ANTACID TABLETS 1000 MG: 500 TABLET, CHEWABLE ORAL at 11:43

## 2018-10-07 RX ADMIN — METOPROLOL TARTRATE 25 MG: 25 TABLET, FILM COATED ORAL at 17:29

## 2018-10-07 RX ADMIN — ANTACID TABLETS 1000 MG: 500 TABLET, CHEWABLE ORAL at 06:17

## 2018-10-07 RX ADMIN — MULTIPLE VITAMINS W/ MINERALS TAB 1 TABLET: TAB at 06:19

## 2018-10-07 RX ADMIN — INSULIN HUMAN 2 UNITS: 100 INJECTION, SOLUTION PARENTERAL at 09:08

## 2018-10-07 RX ADMIN — FLUTICASONE PROPIONATE 100 MCG: 50 SPRAY, METERED NASAL at 06:19

## 2018-10-07 RX ADMIN — OMEPRAZOLE 20 MG: 20 CAPSULE, DELAYED RELEASE ORAL at 06:17

## 2018-10-07 RX ADMIN — ANTACID TABLETS 1000 MG: 500 TABLET, CHEWABLE ORAL at 17:28

## 2018-10-07 RX ADMIN — OXYCODONE HYDROCHLORIDE AND ACETAMINOPHEN 500 MG: 500 TABLET ORAL at 06:19

## 2018-10-07 RX ADMIN — PNEUMOCOCCAL 13-VALENT CONJUGATE VACCINE 0.5 ML: 2.2; 2.2; 2.2; 2.2; 2.2; 4.4; 2.2; 2.2; 2.2; 2.2; 2.2; 2.2; 2.2 INJECTION, SUSPENSION INTRAMUSCULAR at 14:12

## 2018-10-07 RX ADMIN — METHYLPREDNISOLONE SODIUM SUCCINATE 62.5 MG: 125 INJECTION, POWDER, FOR SOLUTION INTRAMUSCULAR; INTRAVENOUS at 06:20

## 2018-10-07 RX ADMIN — Medication: at 08:14

## 2018-10-07 RX ADMIN — INFLUENZA A VIRUS A/MICHIGAN/45/2015 X-275 (H1N1) ANTIGEN (FORMALDEHYDE INACTIVATED), INFLUENZA A VIRUS A/SINGAPORE/INFIMH-16-0019/2016 IVR-186 (H3N2) ANTIGEN (FORMALDEHYDE INACTIVATED), AND INFLUENZA B VIRUS B/MARYLAND/15/2016 BX-69A (A B/COLORADO/6/2017-LIKE VIRUS) ANTIGEN (FORMALDEHYDE INACTIVATED) 0.5 ML: 60; 60; 60 INJECTION, SUSPENSION INTRAMUSCULAR at 11:43

## 2018-10-07 RX ADMIN — INSULIN HUMAN 4 UNITS: 100 INJECTION, SOLUTION PARENTERAL at 21:23

## 2018-10-07 RX ADMIN — CALCITRIOL 0.25 MCG: 0.25 CAPSULE, LIQUID FILLED ORAL at 06:17

## 2018-10-07 RX ADMIN — VITAMIN D, TAB 1000IU (100/BT) 2000 UNITS: 25 TAB at 06:18

## 2018-10-07 RX ADMIN — CETIRIZINE HYDROCHLORIDE 10 MG: 10 TABLET, FILM COATED ORAL at 06:18

## 2018-10-07 ASSESSMENT — PAIN SCALES - GENERAL
PAINLEVEL_OUTOF10: 5
PAINLEVEL_OUTOF10: 0
PAINLEVEL_OUTOF10: 5
PAINLEVEL_OUTOF10: 5

## 2018-10-07 ASSESSMENT — ENCOUNTER SYMPTOMS
VOMITING: 0
DEPRESSION: 0
POLYDIPSIA: 0
BACK PAIN: 0
WEAKNESS: 1
MYALGIAS: 0
BRUISES/BLEEDS EASILY: 1
ABDOMINAL PAIN: 0
DIZZINESS: 0
HEMOPTYSIS: 0
PALPITATIONS: 0
CHILLS: 0
BLURRED VISION: 0
COUGH: 0
NECK PAIN: 0
CLAUDICATION: 0
BACK PAIN: 1
DOUBLE VISION: 0
HEARTBURN: 0
CHILLS: 1
DIARRHEA: 0
NAUSEA: 0
FEVER: 0
WEIGHT LOSS: 0
TINGLING: 0
HEADACHES: 0
ORTHOPNEA: 0
PHOTOPHOBIA: 0
SPUTUM PRODUCTION: 0

## 2018-10-07 ASSESSMENT — LIFESTYLE VARIABLES: SUBSTANCE_ABUSE: 0

## 2018-10-07 NOTE — PROGRESS NOTES
[facility] Medicine Daily Progress Note    Date of Service  10/7/2018    Chief Complaint  67 y.o. female who presented 10/3/2018 with past medical history of multiple myeloma, obesity, diabetes mellitus, is coming today complaining of generalized rash started around 1 week ago, patient states that her rash started on her left arm and then spread all over her body, at this time patient has rash maculopapular, hives, erythematous in her upper lower extremities thorax and abdominal area and face    Interval Problem Update  Patient admitted last night with widespread maculopapular pruritic rash, which started 1 week ago a few days after completion of antibiotic course for UTI.  Oncology consulted Dr Gutiérrez related this rash to Revlimid.  Reaction to antibiotics is also possible.  It is unclear what kind antibiotic patient was taken.  Benadryl was not helpful so far.  I ordered Solu-Medrol 62.5 every 8 hours after discussion with Dr. Gutiérrez    10/5  Widespread maculopapular rash appears to be improving, less bright today.  Itching improved.  On IV steroids.  Diabetes: Blood sugar is not controlled due to steroid treatment.  Insulin sliding scale on board.  NPH dose increased to 25.  Acute kidney injury: Creatinine is improved  Hyponatremia: Mild.  Monitor  Pancytopenia: Stable.  ANC up from 0.84->7.12 today  Discussed POC with the patient and on multidisciplinary rounds    10/6  Maculopapular rash is improving.  Transformation to hemorrhagic elements noted on the skin of lower extremities.  Patient states she feels somewhat drowsy today.  No Benadryl was given according MA  R.  Noted to be on 2 L nasal cannula.  According the patient she did not need oxygen before.  Will check chest x-ray.  Diabetes: Poorly controlled due to steroids.  Increased dose of NPH, upgraded sliding scale  Pancytopenia: Improving    10/7  Widespread rash  continues to improve.  Itching improves.  Chest x-ray clear from infiltrates.  Showed right  hemidiaphragm elevation.  Ordered incentive spirometer  Encouraged mobilizing, up to chair.  Switched steroids to prednisone.  Diabetes: Still poorly controlled.  Keeps increasing her NPH dose for better blood sugar control  Pancytopenia: Improving  Leukocytosis: Likely secondary to steroids.  No signs of infection.      Consultants/Specialty  Oncology    Code Status  Full code    Disposition  To be discussed    Review of Systems  Review of Systems   Constitutional: Positive for chills. Negative for fever and weight loss.   HENT: Negative for ear pain, hearing loss and tinnitus.    Eyes: Negative for blurred vision, double vision and photophobia.   Respiratory: Negative for cough, hemoptysis and sputum production.    Cardiovascular: Negative for chest pain, palpitations and orthopnea.   Gastrointestinal: Negative for diarrhea, heartburn, nausea and vomiting.   Genitourinary: Negative for dysuria, frequency and urgency.   Musculoskeletal: Positive for back pain and joint pain. Negative for myalgias.   Skin: Positive for itching and rash.   Neurological: Negative for dizziness, tingling and headaches.   Endo/Heme/Allergies: Negative for environmental allergies and polydipsia. Bruises/bleeds easily.   Psychiatric/Behavioral: Negative for depression, substance abuse and suicidal ideas.        Physical Exam  Temp:  [36.1 °C (97 °F)-36.4 °C (97.5 °F)] 36.1 °C (97 °F)  Pulse:  [62-65] 62  Resp:  [16-18] 16  BP: (144-158)/(62-76) 158/76    Physical Exam  Constitutional: She is oriented to person, place, and time. She appears well-nourished. No distress.   Obese   HENT:   Head: Normocephalic and atraumatic.   Mouth/Throat: No oropharyngeal exudate.   Eyes: Conjunctivae are normal. Right eye exhibits no discharge. Left eye exhibits no discharge. No scleral icterus.   Neck: Normal range of motion. Neck supple. No JVD present.   Cardiovascular: Regular rhythm and normal heart sounds.  Exam reveals no gallop.    Pulmonary/Chest:  Effort normal and breath sounds normal. No respiratory distress. She has no wheezes. She has no rales.   Abdominal: Soft. Bowel sounds are normal. She exhibits no distension. There is no tenderness. There is no rebound.   Musculoskeletal: Normal range of motion. She exhibits no edema.   Lymphadenopathy:     She has no cervical adenopathy.   Neurological: She is alert and oriented to person, place, and time. She exhibits normal muscle tone.   Skin: Skin is warm. Rash (Generalized, maculopapular, blanching to palpation) noted.  Transformation to hemorrhagic elements noted over the skin of the lower extremities  Nursing note and vitals reviewed.       Fluids    Intake/Output Summary (Last 24 hours) at 10/07/18 1118  Last data filed at 10/07/18 0616   Gross per 24 hour   Intake              717 ml   Output                0 ml   Net              717 ml       Laboratory  Recent Labs      10/05/18   0121  10/06/18   0037  10/07/18   0145   WBC  8.7  13.2*  13.8*   RBC  2.33*  2.46*  2.51*   HEMOGLOBIN  7.7*  8.1*  8.1*   HEMATOCRIT  22.7*  24.6*  24.2*   MCV  97.4  100.0*  96.4   MCH  33.0  32.9  32.3   MCHC  33.9  32.9*  33.5*   RDW  53.1*  56.1*  54.5*   PLATELETCT  61*  70*  71*   MPV  13.7*   --   13.8*     Recent Labs      10/05/18   0121  10/06/18   0038  10/07/18   0034   SODIUM  129*  130*  131*   POTASSIUM  4.2  4.4  4.5   CHLORIDE  97  97  98   CO2  27  26  27   GLUCOSE  300*  333*  279*   BUN  27*  27*  28*   CREATININE  1.04  0.98  0.86   CALCIUM  8.2*  8.4*  8.4*     Recent Labs      10/05/18   0121  10/06/18   0900  10/07/18   0034   INR  2.60*  2.59*  2.58*               Imaging  DX-CHEST-LIMITED (1 VIEW)   Final Result         No acute cardiac or pulmonary abnormality is identified.      Persistent elevation right hemidiaphragm with right basilar atelectasis or scarring.      Extensive lytic bone lesions once again noted consistent with patient's history of multiple myeloma.      IR-US GUIDED PIV   Final  Result    Ultrasound-guided PERIPHERAL IV INSERTION performed by    qualified nursing staff as above.            EC-ECHOCARDIOGRAM COMPLETE W/ CONT    (Results Pending)        Assessment/Plan  * Rash   Assessment & Plan    Most likely drug induced rash   probably related due to Revlimid for  multiple myeloma (last dose about 2 weeks ago according the patient)  And another possibility is reaction to antibiotics which she finished 3 days or so before rash appears  Revlimid stopped.  Started on prednisone on admission, we  switched to Solu-Medrol  Unlikely patient has infectious rash per  history and clinical picture    Rash is improving  Solu-Medrol will switch to prednisone                Multiple myeloma (HCC)- (present on admission)   Assessment & Plan     IgG kappa multiple myeloma.    improvement of the monoclonal protein  kidney and calcium levels WNL   Consulted by oncologist : stop Ninlaro and Revlimid  As a possible culprit          Leukocytosis   Assessment & Plan    Denies any fever, cough, dysuria  Probably secondary to steroids        Hypoxia   Assessment & Plan    Needs oxygen.  Denies cough or shortness of breath.  Apparently new  Chest x-ray showed no pneumonia.  Right hemidiaphragm elevation.  .  TTE  Continue monitoring  Incentive spirometry.  Up to chair.  We will try to wean off oxygen        Acute kidney injury (HCC)- (present on admission)   Assessment & Plan    Creatinine improved with IV hydration  Continue IV fluids, monitor        Hypomagnesemia   Assessment & Plan    Replaced.        Pancytopenia (HCC)- (present on admission)   Assessment & Plan    Stable  Due to chemotherapy for multiple myeloma, continue monitoring daily CBC  Improved after Granix        DVT (deep venous thrombosis) (HCC)- (present on admission)   Assessment & Plan    Continue Coumadin per protocol  Patient stated she started on Coumadin about 3 weeks ago, with Lovenox bridging  Rash does not look like skin necrosis         Morbid obesity with BMI of 60.0-69.9, adult (CMS-HCC)- (present on admission)   Assessment & Plan    Body mass index is 49.42 kg/m².  Needs to lose weight        DM type 2 (diabetes mellitus, type 2) (CMS-HCC)- (present on admission)   Assessment & Plan    Continue insulin NPH, and insulin sliding scale.    Monitor for hyperglycemia, exacerbated by steroids.  Adjust insulin doses as needed             VTE prophylaxis: Warfarin

## 2018-10-07 NOTE — PROGRESS NOTES
Inpatient Anticoagulation Service Note    Date: 10/7/2018  Reason for Anticoagulation: Deep Vein Thrombosis        Hemoglobin Value: (!) 8.1  Hematocrit Value: (!) 24.2  Lab Platelet Value: (!) 71  Target INR: 2.0 to 3.0    INR from last 7 days     Date/Time INR Value    10/07/18 0034 (!)  2.58    10/06/18 0900 (!)  2.59    10/05/18 0121 (!)  2.6    10/04/18 1254 (!)  2.67    10/03/18 1125 (!)  2.93        Dose from last 7 days     Date/Time Dose (mg)    10/07/18 0830  3    10/05/18 1100  3    10/04/18 1200  3    10/03/18 2000  3        Average Dose (mg): 3 (Per [facility] MAR)  Significant Interactions: Corticosteroids, Proton Pump Inhibitor, Statin, Other (Comments) (Duloxetine)  Bridge Therapy: No    Reversal Agent Administered: Not Applicable  Comments: H/H/Platelets low but stable. Therapeutic INR today.  Ordered for MWF.  Steroid changed to Prednisone 50 mg /day. No new DDI. Continue home dose of 3 mg warfarin daily.    Plan: 3 mg warfarin daily. INR MWF  Education Material Provided?: No (Established warfarin patient)  Pharmacist suggested discharge dosing: 3 mg daily, INR 48-72 hours after discharge.     Hoang David, PharmD

## 2018-10-07 NOTE — PROGRESS NOTES
Aleida sat up in the chair for approx 6hrs today. She continues to be weak. BG's elevated. NPH added for bedtime. O2 titrated down to 1L. Encouraged IS at bedside. Plan for ECHO re: SOA. Will continue to monitor.

## 2018-10-07 NOTE — PROGRESS NOTES
Received bedside shift report from Anna SWEET at 1000. Pt AOx4. Pt denies pain at this time. Does call appropriately. Bed alarm is on. Pt is resting in bed. Midline assessed and is patent with blood return. Pt is on .5 L NC attempting to wean her down. POC discussed as well as unit routine, comfort, and safety. [facility] Discussed the goal for today: mobility, blood sugar control, and immunization. Reviewed orders, notes, labs, and test results. Hourly rounding in place with RN rounding on odd hours and CNA on even hours. 12 hour chart check completed.

## 2018-10-07 NOTE — CARE PLAN
Problem: Discharge Barriers/Planning  Goal: Patient's continuum of care needs will be met  Outcome: PROGRESSING SLOWER THAN EXPECTED  Pt is from SNF and wishes to go back    Problem: Pain Management  Goal: Pain level will decrease to patient's comfort goal  Outcome: PROGRESSING AS EXPECTED  Pt denies pain at this time

## 2018-10-07 NOTE — PROGRESS NOTES
Received change of shift report from day RN. Assumed pt. Care at 1900. Pt. Lying in bed, aox4, no signs of distress. Pt. Toileted with bedpan. Pt. Has no complaints or requests at this time. Bed in lowest position with wheels locked. Call light within reach.

## 2018-10-07 NOTE — PROGRESS NOTES
Oncology/Hematology Progress Note               Author: Dyllan Bryantkade BAR Date & Time created: 10/7/2018  9:47 AM   Patient with history of multiple myeloma on Ninlaro, Revlimid and dexamethasone admitted with extensive skin rash and pancytopenia  Interval History:  Skin rash continue to slowly improve.  The itchiness is better as well.  She does have leukocytosis due to steroids.  Hemoglobin and platelets slightly better than admission.  Review of Systems:  Review of Systems   Constitutional: Positive for malaise/fatigue. Negative for chills, fever and weight loss.   HENT: Negative for ear pain, hearing loss and tinnitus.    Eyes: Negative for blurred vision and double vision.   Respiratory: Negative for cough and hemoptysis.    Cardiovascular: Negative for chest pain, palpitations, orthopnea and claudication.   Gastrointestinal: Negative for abdominal pain, diarrhea, heartburn, nausea and vomiting.   Genitourinary: Negative for dysuria.   Musculoskeletal: Negative for back pain, myalgias and neck pain.   Skin: Positive for itching and rash.   Neurological: Positive for weakness. Negative for dizziness, tingling and headaches.   Psychiatric/Behavioral: Negative for depression, substance abuse and suicidal ideas.       Physical Exam:  Physical Exam   Constitutional: She appears well-developed.   HENT:   Head: Normocephalic.   Eyes: Pupils are equal, round, and reactive to light. Conjunctivae are normal.   Neck: Normal range of motion.   Cardiovascular: Normal rate.    Pulmonary/Chest: Effort normal.   Abdominal: Soft. Bowel sounds are normal.   Musculoskeletal: Normal range of motion.   Skin: Rash noted. There is erythema. There is pallor.       Labs:        Invalid input(s): [id number]      Recent Labs      10/05/18   0121  10/06/18   0038  10/07/18   0034   SODIUM  129*  130*  131*   POTASSIUM  4.2  4.4  4.5   CHLORIDE  97  97  98   CO2  27  26  27   BUN  27*  27*  28*   CREATININE  1.04  0.98  0.86   CALCIUM   8.2*  8.4*  8.4*     Recent Labs      10/05/18   0121  10/06/18   0038  10/07/18   0034   GLUCOSE  300*  333*  279*     Recent Labs      10/05/18   0121  10/06/18   0037  10/06/18   0900  10/07/18   0034  10/07/18   0145   RBC  2.33*  2.46*   --    --   2.51*   HEMOGLOBIN  7.7*  8.1*   --    --   8.1*   HEMATOCRIT  22.7*  24.6*   --    --   24.2*   PLATELETCT  61*  70*   --    --   71*   PROTHROMBTM  27.9*   --   27.8*  27.7*   --    INR  2.60*   --   2.59*  2.58*   --      Recent Labs      10/05/18   0121  10/06/18   0037  10/07/18   014   WBC  8.7  13.2*  13.8*   NEUTSPOLYS  81.80*  81.60*  81.90*   LYMPHOCYTES  6.30*  0.90*  4.30*   MONOCYTES  11.00  7.00  10.30   EOSINOPHILS  0.00  0.00  0.00   BASOPHILS  0.10  0.00  0.00     Recent Labs      10/05/18   0121  10/06/18   0038  10/07/18   0034   SODIUM  129*  130*  131*   POTASSIUM  4.2  4.4  4.5   CHLORIDE  97  97  98   CO2  27  26  27   GLUCOSE  300*  333*  279*   BUN  27*  27*  28*   CREATININE  1.04  0.98  0.86   CALCIUM  8.2*  8.4*  8.4*     Hemodynamics:  Temp (24hrs), Av.3 °C (97.3 °F), Min:36.1 °C (97 °F), Max:36.4 °C (97.5 °F)  Temperature: 36.1 °C (97 °F)  Pulse  Av.6  Min: 61  Max: 87   Blood Pressure : 158/76     Respiratory:    Respiration: 16, Pulse Oximetry: 95 %        RUL Breath Sounds: Clear, RML Breath Sounds: Clear, RLL Breath Sounds: Diminished, [place] Breath Sounds: Clear, LLL Breath Sounds: Diminished  Fluids:    Intake/Output Summary (Last 24 hours) at 10/05/18 1128  Last data filed at 10/05/18 1045   Gross per 24 hour   Intake             1103 ml   Output                0 ml   Net             1103 ml     Weight: 123.9 kg (273 lb 2.4 oz)  GI/Nutrition:  Orders Placed This Encounter   Procedures   • Diet Order Regular     Standing Status:   Standing     Number of Occurrences:   1     Order Specific Question:   Diet:     Answer:   Regular [1]     Medical Decision Making, by Problem:  Active Hospital Problems    Diagnosis   • *Rash  [R21]   • Multiple myeloma ([facility]) [C90.00]   • Hypomagnesemia [E83.42]   • Acute kidney injury ([facility]) [N17.9]   • Pancytopenia ([facility]) [D61.818]   • DVT (deep venous thrombosis) ([facility]) [I82.409]   • Morbid obesity with BMI of 60.0-69.9, adult (CMS-HCC) [E66.01, Z68.44]   • DM type 2 (diabetes mellitus, type 2) (CMS-HCC) [E11.9]       Plan:  1.  Severe maculopapular skin rash, likely related to Revlimid.  Medications for multiple myeloma discontinued for now.  -10/4/ on Solu-Medrol 60 mg twice daily now with improvement of skin lesions.     2.  IgG kappa multiple myeloma was on Ninlaro, Revlimid and dexamethasone.  Medication start for now given side effects with significant skin rash and pancytopenia  -Hold multiple myeloma medications until improvement of symptoms then probably she can continue on dexamethasone and Ninlaro.  Revlimid can now be restarted given  Grade 4 skin rash  -She is having a good response to treatment so far.    3.  Pancytopenia, probably related to  therapy plus minus multiple myeloma  -Neutropenia resolved after 1 dose of Granix.  -Hemoglobin and platelet counts slowly improving    Plan  -She continues to have an improvement in severe skin rash.  Okay to taper steroids.  I will put her on prednisone 40 mg daily for 5 days then 20 mg for 5 more days then 10 mg for 5 more days then 5 mg for 5 more days and then stop.  -Patient will follow up with my partner Dr. Sim as an outpatient.  She does have an upcoming appointment on 10/23/2018.  In the meantime I will hold on myeloma therapy  -Discharge planning by primary team  -We will sign off please call back if any questions    Prognosis is guarded.   High complexity, complex decision making    Quality-Core Measures   Reviewed items::  Labs reviewed and Medications reviewed

## 2018-10-08 LAB
ANION GAP SERPL CALC-SCNC: 5 MMOL/L (ref 0–11.9)
ANISOCYTOSIS BLD QL SMEAR: ABNORMAL
BASO STIPL BLD QL SMEAR: NORMAL
BASOPHILS # BLD AUTO: 0 % (ref 0–1.8)
BASOPHILS # BLD: 0 K/UL (ref 0–0.12)
BUN SERPL-MCNC: 28 MG/DL (ref 8–22)
CALCIUM SERPL-MCNC: 8.5 MG/DL (ref 8.5–10.5)
CHLORIDE SERPL-SCNC: 97 MMOL/L (ref 96–112)
CO2 SERPL-SCNC: 30 MMOL/L (ref 20–33)
CREAT SERPL-MCNC: 0.77 MG/DL (ref 0.5–1.4)
DACRYOCYTES BLD QL SMEAR: NORMAL
EOSINOPHIL # BLD AUTO: 0 K/UL (ref 0–0.51)
EOSINOPHIL NFR BLD: 0 % (ref 0–6.9)
ERYTHROCYTE [DISTWIDTH] IN BLOOD BY AUTOMATED COUNT: 55.1 FL (ref 35.9–50)
GLUCOSE BLD-MCNC: 155 MG/DL (ref 65–99)
GLUCOSE BLD-MCNC: 169 MG/DL (ref 65–99)
GLUCOSE BLD-MCNC: 212 MG/DL (ref 65–99)
GLUCOSE BLD-MCNC: 226 MG/DL (ref 65–99)
GLUCOSE SERPL-MCNC: 177 MG/DL (ref 65–99)
HCT VFR BLD AUTO: 24.1 % (ref 37–47)
HGB BLD-MCNC: 7.9 G/DL (ref 12–16)
HYPOCHROMIA BLD QL SMEAR: ABNORMAL
INR PPP: 2.96 (ref 0.87–1.13)
LG PLATELETS BLD QL SMEAR: NORMAL
LYMPHOCYTES # BLD AUTO: 0.22 K/UL (ref 1–4.8)
LYMPHOCYTES NFR BLD: 1.8 % (ref 22–41)
MACROCYTES BLD QL SMEAR: ABNORMAL
MANUAL DIFF BLD: NORMAL
MCH RBC QN AUTO: 32.2 PG (ref 27–33)
MCHC RBC AUTO-ENTMCNC: 32.8 G/DL (ref 33.6–35)
MCV RBC AUTO: 98.4 FL (ref 81.4–97.8)
MONOCYTES # BLD AUTO: 0.73 K/UL (ref 0–0.85)
MONOCYTES NFR BLD AUTO: 6.1 % (ref 0–13.4)
MORPHOLOGY BLD-IMP: NORMAL
MYELOCYTES NFR BLD MANUAL: 0.9 %
NEUTROPHILS # BLD AUTO: 10.94 K/UL (ref 2–7.15)
NEUTROPHILS NFR BLD: 91.2 % (ref 44–72)
NRBC # BLD AUTO: 0.03 K/UL
NRBC BLD-RTO: 0.3 /100 WBC
OVALOCYTES BLD QL SMEAR: NORMAL
PLATELET # BLD AUTO: 87 K/UL (ref 164–446)
PLATELET BLD QL SMEAR: NORMAL
POTASSIUM SERPL-SCNC: 3.9 MMOL/L (ref 3.6–5.5)
PROTHROMBIN TIME: 30.8 SEC (ref 12–14.6)
RBC # BLD AUTO: 2.45 M/UL (ref 4.2–5.4)
RBC BLD AUTO: PRESENT
SODIUM SERPL-SCNC: 132 MMOL/L (ref 135–145)
WBC # BLD AUTO: 12 K/UL (ref 4.8–10.8)

## 2018-10-08 PROCEDURE — 770004 HCHG ROOM/CARE - ONCOLOGY PRIVATE *

## 2018-10-08 PROCEDURE — 700102 HCHG RX REV CODE 250 W/ 637 OVERRIDE(OP): Performed by: HOSPITALIST

## 2018-10-08 PROCEDURE — 80048 BASIC METABOLIC PNL TOTAL CA: CPT

## 2018-10-08 PROCEDURE — 99233 SBSQ HOSP IP/OBS HIGH 50: CPT | Performed by: INTERNAL MEDICINE

## 2018-10-08 PROCEDURE — 700111 HCHG RX REV CODE 636 W/ 250 OVERRIDE (IP): Performed by: INTERNAL MEDICINE

## 2018-10-08 PROCEDURE — A9270 NON-COVERED ITEM OR SERVICE: HCPCS | Performed by: HOSPITALIST

## 2018-10-08 PROCEDURE — G8979 MOBILITY GOAL STATUS: HCPCS | Mod: CI

## 2018-10-08 PROCEDURE — 85007 BL SMEAR W/DIFF WBC COUNT: CPT

## 2018-10-08 PROCEDURE — 36415 COLL VENOUS BLD VENIPUNCTURE: CPT

## 2018-10-08 PROCEDURE — G8978 MOBILITY CURRENT STATUS: HCPCS | Mod: CK

## 2018-10-08 PROCEDURE — 85027 COMPLETE CBC AUTOMATED: CPT

## 2018-10-08 PROCEDURE — 85610 PROTHROMBIN TIME: CPT

## 2018-10-08 PROCEDURE — 97161 PT EVAL LOW COMPLEX 20 MIN: CPT

## 2018-10-08 PROCEDURE — 82962 GLUCOSE BLOOD TEST: CPT | Mod: 91

## 2018-10-08 RX ADMIN — OXYCODONE HYDROCHLORIDE AND ACETAMINOPHEN 500 MG: 500 TABLET ORAL at 05:38

## 2018-10-08 RX ADMIN — PREDNISONE 50 MG: 50 TABLET ORAL at 05:38

## 2018-10-08 RX ADMIN — LORAZEPAM 0.5 MG: 1 TABLET ORAL at 05:38

## 2018-10-08 RX ADMIN — METOPROLOL TARTRATE 25 MG: 25 TABLET, FILM COATED ORAL at 18:30

## 2018-10-08 RX ADMIN — METOPROLOL TARTRATE 25 MG: 25 TABLET, FILM COATED ORAL at 05:38

## 2018-10-08 RX ADMIN — MULTIPLE VITAMINS W/ MINERALS TAB 1 TABLET: TAB at 05:38

## 2018-10-08 RX ADMIN — INSULIN HUMAN 2 UNITS: 100 INJECTION, SOLUTION PARENTERAL at 14:21

## 2018-10-08 RX ADMIN — INSULIN HUMAN 2 UNITS: 100 INJECTION, SOLUTION PARENTERAL at 18:32

## 2018-10-08 RX ADMIN — Medication: at 05:37

## 2018-10-08 RX ADMIN — INSULIN HUMAN 4 UNITS: 100 INJECTION, SOLUTION PARENTERAL at 20:47

## 2018-10-08 RX ADMIN — DULOXETINE HYDROCHLORIDE 40 MG: 20 CAPSULE, DELAYED RELEASE ORAL at 05:38

## 2018-10-08 RX ADMIN — ACETAMINOPHEN 650 MG: 325 TABLET, FILM COATED ORAL at 22:40

## 2018-10-08 RX ADMIN — OMEPRAZOLE 20 MG: 20 CAPSULE, DELAYED RELEASE ORAL at 05:38

## 2018-10-08 RX ADMIN — FLUTICASONE PROPIONATE 100 MCG: 50 SPRAY, METERED NASAL at 05:37

## 2018-10-08 RX ADMIN — WARFARIN SODIUM 3 MG: 3 TABLET ORAL at 18:37

## 2018-10-08 RX ADMIN — INSULIN HUMAN 4 UNITS: 100 INJECTION, SOLUTION PARENTERAL at 10:00

## 2018-10-08 RX ADMIN — ANTACID TABLETS 1000 MG: 500 TABLET, CHEWABLE ORAL at 07:42

## 2018-10-08 RX ADMIN — CETIRIZINE HYDROCHLORIDE 10 MG: 10 TABLET, FILM COATED ORAL at 05:38

## 2018-10-08 RX ADMIN — PRAVASTATIN SODIUM 40 MG: 20 TABLET ORAL at 18:29

## 2018-10-08 RX ADMIN — ACETAMINOPHEN 650 MG: 325 TABLET, FILM COATED ORAL at 03:30

## 2018-10-08 RX ADMIN — ANTACID TABLETS 1000 MG: 500 TABLET, CHEWABLE ORAL at 18:29

## 2018-10-08 RX ADMIN — CALCITRIOL 0.25 MCG: 0.25 CAPSULE, LIQUID FILLED ORAL at 05:37

## 2018-10-08 RX ADMIN — VITAMIN D, TAB 1000IU (100/BT) 2000 UNITS: 25 TAB at 05:37

## 2018-10-08 RX ADMIN — LORAZEPAM 0.5 MG: 1 TABLET ORAL at 18:29

## 2018-10-08 ASSESSMENT — COGNITIVE AND FUNCTIONAL STATUS - GENERAL
MOVING FROM LYING ON BACK TO SITTING ON SIDE OF FLAT BED: A LITTLE
MOVING TO AND FROM BED TO CHAIR: A LITTLE
CLIMB 3 TO 5 STEPS WITH RAILING: TOTAL
SUGGESTED CMS G CODE MODIFIER MOBILITY: CK
MOBILITY SCORE: 16
TURNING FROM BACK TO SIDE WHILE IN FLAT BAD: A LITTLE
WALKING IN HOSPITAL ROOM: A LITTLE
STANDING UP FROM CHAIR USING ARMS: A LITTLE

## 2018-10-08 ASSESSMENT — ENCOUNTER SYMPTOMS
BACK PAIN: 1
DIZZINESS: 0
VOMITING: 0
DOUBLE VISION: 0
HEARTBURN: 0
BRUISES/BLEEDS EASILY: 1
TINGLING: 0
NAUSEA: 0
PALPITATIONS: 0
HEMOPTYSIS: 0
SPUTUM PRODUCTION: 0
ORTHOPNEA: 0
HEADACHES: 0
DEPRESSION: 0
PHOTOPHOBIA: 0
FEVER: 0
MYALGIAS: 0
POLYDIPSIA: 0
CHILLS: 1
WEIGHT LOSS: 0
DIARRHEA: 0
COUGH: 0
BLURRED VISION: 0

## 2018-10-08 ASSESSMENT — PAIN SCALES - GENERAL
PAINLEVEL_OUTOF10: 2
PAINLEVEL_OUTOF10: 3
PAINLEVEL_OUTOF10: 6

## 2018-10-08 ASSESSMENT — LIFESTYLE VARIABLES: SUBSTANCE_ABUSE: 0

## 2018-10-08 ASSESSMENT — GAIT ASSESSMENTS
GAIT LEVEL OF ASSIST: STAND BY ASSIST
DISTANCE (FEET): 5
ASSISTIVE DEVICE: FRONT WHEEL WALKER

## 2018-10-08 NOTE — PROGRESS NOTES
[facility] Medicine Daily Progress Note    Date of Service  10/8/2018    Chief Complaint  67 y.o. female who presented 10/3/2018 with past medical history of multiple myeloma, obesity, diabetes mellitus, is coming today complaining of generalized rash started around 1 week ago, patient states that her rash started on her left arm and then spread all over her body, at this time patient has rash maculopapular, hives, erythematous in her upper lower extremities thorax and abdominal area and face    Interval Problem Update  Patient admitted last night with widespread maculopapular pruritic rash, which started 1 week ago a few days after completion of antibiotic course for UTI.  Oncology consulted Dr Gutiérrez related this rash to Revlimid.  Reaction to antibiotics is also possible.  It is unclear what kind antibiotic patient was taken.  Benadryl was not helpful so far.  I ordered Solu-Medrol 62.5 every 8 hours after discussion with Dr. Gutiérrez    10/5  Widespread maculopapular rash appears to be improving, less bright today.  Itching improved.  On IV steroids.  Diabetes: Blood sugar is not controlled due to steroid treatment.  Insulin sliding scale on board.  NPH dose increased to 25.  Acute kidney injury: Creatinine is improved  Hyponatremia: Mild.  Monitor  Pancytopenia: Stable.  ANC up from 0.84->7.12 today  Discussed POC with the patient and on multidisciplinary rounds    10/6  Maculopapular rash is improving.  Transformation to hemorrhagic elements noted on the skin of lower extremities.  Patient states she feels somewhat drowsy today.  No Benadryl was given according MA  R.  Noted to be on 2 L nasal cannula.  According the patient she did not need oxygen before.  Will check chest x-ray.  Diabetes: Poorly controlled due to steroids.  Increased dose of NPH, upgraded sliding scale  Pancytopenia: Improving    10/7  Widespread rash  continues to improve.  Itching improves.  Chest x-ray clear from infiltrates.  Showed right  hemidiaphragm elevation.  Ordered incentive spirometer  Encouraged mobilizing, up to chair.  Switched steroids to prednisone.  Diabetes: Still poorly controlled.  Keeps increasing her NPH dose for better blood sugar control  Pancytopenia: Improving  Leukocytosis: Likely secondary to steroids.  No signs of infection.    10/8  Rash is improving.  On prednisone 50 mg.  Today.  After discharge  Diabetes: Blood sugar is improving.  Keep adjusting NPH doses  Hypoxemia: Still on 1 L of oxygen.  Encourage incentive spirometer  Pancytopenia hemoglobin 7.9, stable.  Platelets improving 87.  ANC within normal limits.  Status post Granix  Leukocytosis: Persist.  Secondary to steroid  Disposition plan: Skilled nursing facility?  PT evaluation pending.        Consultants/Specialty  Oncology    Code Status  Full code    Disposition  Skilled nursing facility most likely  PT pending    Review of Systems  Review of Systems   Constitutional: Positive for chills. Negative for fever and weight loss.   HENT: Negative for ear pain, hearing loss and tinnitus.    Eyes: Negative for blurred vision, double vision and photophobia.   Respiratory: Negative for cough, hemoptysis and sputum production.    Cardiovascular: Negative for chest pain, palpitations and orthopnea.   Gastrointestinal: Negative for diarrhea, heartburn, nausea and vomiting.   Genitourinary: Negative for dysuria, frequency and urgency.   Musculoskeletal: Positive for back pain and joint pain. Negative for myalgias.   Skin: Positive for itching and rash.   Neurological: Negative for dizziness, tingling and headaches.   Endo/Heme/Allergies: Negative for environmental allergies and polydipsia. Bruises/bleeds easily.   Psychiatric/Behavioral: Negative for depression, substance abuse and suicidal ideas.        Physical Exam  Temp:  [36.2 °C (97.1 °F)-36.8 °C (98.3 °F)] 36.8 °C (98.3 °F)  Pulse:  [64-68] 64  Resp:  [16-20] 19  BP: (127-153)/(62-81) 153/73    Physical  Exam  Constitutional: She is oriented to person, place, and time. She appears well-nourished. No distress.   Obese   HENT:   Head: Normocephalic and atraumatic.   Mouth/Throat: No oropharyngeal exudate.   Eyes: Conjunctivae are normal. Right eye exhibits no discharge. Left eye exhibits no discharge. No scleral icterus.   Neck: Normal range of motion. Neck supple. No JVD present.   Cardiovascular: Regular rhythm and normal heart sounds.  Exam reveals no gallop.    Pulmonary/Chest: Effort normal and breath sounds normal. No respiratory distress. She has no wheezes. She has no rales.   Abdominal: Soft. Bowel sounds are normal. She exhibits no distension. There is no tenderness. There is no rebound.   Musculoskeletal: Normal range of motion. She exhibits no edema.   Lymphadenopathy:     She has no cervical adenopathy.   Neurological: She is alert and oriented to person, place, and time. She exhibits normal muscle tone.   Skin: Skin is warm. Rash (Generalized, maculopapular, blanching to palpation) noted.  Transformation to hemorrhagic elements noted over the skin of the lower extremities  Nursing note and vitals reviewed.       Fluids    Intake/Output Summary (Last 24 hours) at 10/08/18 1353  Last data filed at 10/08/18 0800   Gross per 24 hour   Intake              120 ml   Output                0 ml   Net              120 ml       Laboratory  Recent Labs      10/06/18   0037  10/07/18   0145  10/08/18   0359   WBC  13.2*  13.8*  12.0*   RBC  2.46*  2.51*  2.45*   HEMOGLOBIN  8.1*  8.1*  7.9*   HEMATOCRIT  24.6*  24.2*  24.1*   MCV  100.0*  96.4  98.4*   MCH  32.9  32.3  32.2   MCHC  32.9*  33.5*  32.8*   RDW  56.1*  54.5*  55.1*   PLATELETCT  70*  71*  87*   MPV   --   13.8*   --      Recent Labs      10/06/18   0038  10/07/18   0034  10/08/18   0359   SODIUM  130*  131*  132*   POTASSIUM  4.4  4.5  3.9   CHLORIDE  97  98  97   CO2  26  27  30   GLUCOSE  333*  279*  177*   BUN  27*  28*  28*   CREATININE  0.98  0.86   0.77   CALCIUM  8.4*  8.4*  8.5     Recent Labs      10/06/18   0900  10/07/18   0034  10/08/18   0359   INR  2.59*  2.58*  2.96*               Imaging  EC-ECHOCARDIOGRAM COMPLETE W/O CONT   Final Result      DX-CHEST-LIMITED (1 VIEW)   Final Result         No acute cardiac or pulmonary abnormality is identified.      Persistent elevation right hemidiaphragm with right basilar atelectasis or scarring.      Extensive lytic bone lesions once again noted consistent with patient's history of multiple myeloma.      IR-US GUIDED PIV   Final Result    Ultrasound-guided PERIPHERAL IV INSERTION performed by    qualified nursing staff as above.                 Assessment/Plan  * Rash   Assessment & Plan    Most likely drug induced rash   probably related due to Revlimid for  multiple myeloma (last dose about 2 weeks ago according the patient)  And another possibility is reaction to antibiotics which she finished 3 days or so before rash appears  Revlimid stopped.  Started on prednisone on admission, we  switched to Solu-Medrol  Unlikely patient has infectious rash per  history and clinical picture    Rash is improving  Solu-Medrol switched to prednisone, to continue for 10 days then taper off                Multiple myeloma (HCC)- (present on admission)   Assessment & Plan     IgG kappa multiple myeloma.    improvement of the monoclonal protein  kidney and calcium levels WNL   Consulted by oncologist : stop Ninlaro and Revlimid  As a possible culprit          Leukocytosis   Assessment & Plan    Denies any fever, cough, dysuria  Probably secondary to steroids        Hypoxia   Assessment & Plan    Needs oxygen.  Denies cough or shortness of breath.  Apparently new  Chest x-ray showed no pneumonia.  Right hemidiaphragm elevation.  .  TTE  Continue monitoring  Incentive spirometry.  Up to chair.  We will try to wean off oxygen        Acute kidney injury (HCC)- (present on admission)   Assessment & Plan    Creatinine improved with  IV hydration  Continue IV fluids, monitor        Hypomagnesemia   Assessment & Plan    Replaced.        Pancytopenia (HCC)- (present on admission)   Assessment & Plan    Stable  Due to chemotherapy for multiple myeloma, continue monitoring daily CBC  Improved after Granix        DVT (deep venous thrombosis) ([facility])- (present on admission)   Assessment & Plan    Continue Coumadin per protocol  Patient stated she started on Coumadin about 3 weeks ago, with Lovenox bridging  Rash does not look like skin necrosis        Morbid obesity with BMI of 60.0-69.9, adult (CMS-HCC)- (present on admission)   Assessment & Plan    Body mass index is 49.42 kg/m².  Needs to lose weight        DM type 2 (diabetes mellitus, type 2) (CMS-HCC)- (present on admission)   Assessment & Plan    Continue insulin NPH, and insulin sliding scale.    Monitor for hyperglycemia, exacerbated by steroids.  Adjust insulin doses as needed             VTE prophylaxis: Warfarin

## 2018-10-08 NOTE — CARE PLAN
Problem: Safety  Goal: Will remain free from injury    Intervention: Provide assistance with mobility  Fall precautions in place  Bed alarm on  Pt calling for assist.

## 2018-10-08 NOTE — THERAPY
"68 y/o female adm for rash, drug reaction. pt with multiple myeloma. Pt presents with difficulty with bed mobility, transfers, gait with fww, alterd balance and decreased tolerance to activity.  Acute PT to address these problems for pt to acieve higher level of function and facilitate a safe DC.    Physical Therapy Evaluation completed.   Bed Mobility:  Supine to Sit: Stand by Assist  Transfers: Sit to Stand: Contact Guard Assist  Gait: Level Of Assist: Stand by Assist with Front-Wheel Walker       Plan of Care: Will benefit from Physical Therapy 3 times per week  Discharge Recommendations: Equipment: Will Continue to Assess for Equipment Needs. Post-acute therapy Discharge to a transitional care facility for continued skilled therapy services.    See \"Rehab Therapy-Acute\" Patient Summary Report for complete documentation.     "

## 2018-10-08 NOTE — PROGRESS NOTES
Received shift report from day shift RN. Pt is AO4 and reports some discomfort in her back, heat pack applied. Discussed POC. Assessed and administered evening medications. Bed in lowest position, call light and personal belongings within reach, educated to call if in need of assistance, non skid socks, all needs met at this time.

## 2018-10-08 NOTE — PROGRESS NOTES
Inpatient Anticoagulation Service Note    Date: 10/8/2018  Reason for Anticoagulation: Deep Vein Thrombosis      Hemoglobin Value: (!) 7.9  Hematocrit Value: (!) 24.1  Lab Platelet Value: (!) 87  Target INR: 2.0 to 3.0    INR from last 7 days     Date/Time INR Value    10/08/18 0359 (!)  2.96    10/07/18 0034 (!)  2.58    10/06/18 0900 (!)  2.59    10/05/18 0121 (!)  2.6    10/04/18 1254 (!)  2.67    10/03/18 1125 (!)  2.93        Dose from last 7 days     Date/Time Dose (mg)    10/08/18 0837  3    10/07/18 0830  3    10/06/18 1800  3    10/05/18 1100  3    10/04/18 1200  3    10/03/18 2000  3        Average Dose (mg): 3 (Per [facility])  Significant Interactions: Corticosteroids, Proton Pump Inhibitor, Statin, Other (Comments) (Duloxetine)  Bridge Therapy: No    Reversal Agent Administered: Not Applicable  Comments: H/H/Platelets low but stable. Therapeutic INR today.  Steroid = Prednisone 50 mg /day. May be prolonging INR. Continue home dose of 3 mg warfarin daily.    Plan:  3 mg warfarin daily. INR tomorrow.   Education Material Provided?: No (Established warfarin patient)  Pharmacist suggested discharge dosing: approximately 3 mg daily. INR within 48-72 hours after discharge.     Hoang David, PharmD

## 2018-10-08 NOTE — CARE PLAN
Problem: Communication  Goal: The ability to communicate needs accurately and effectively will improve  Outcome: PROGRESSING AS EXPECTED  Pt involved in POC. Addressed questions/concerns    Problem: Skin Integrity  Goal: Risk for impaired skin integrity will decrease  Outcome: PROGRESSING AS EXPECTED  Benadryl cream, steroids, educated to turn side to side, and 2 assist FWW

## 2018-10-08 NOTE — PROGRESS NOTES
Assumed pt care - bedside report received.  Pt is aaox4-fatigued.  Reports chronic back pain 3/10 - heat packs applied - denies need for pain med at this hour.  Pt states the rash has greatly improved - red rash over body and face.  Up with x2 assist to chair and bedside commode.  PT worked with patient today.  Good po intake - bm today.  Voids without difficulty.  1L 02 - placed extension on oxygen - reports dry mouth.  Midline that doesn't draw blood - good flush/tko.  Pt makes needs known - will continue to round

## 2018-10-09 LAB
ANION GAP SERPL CALC-SCNC: 6 MMOL/L (ref 0–11.9)
BASOPHILS # BLD AUTO: 0.2 % (ref 0–1.8)
BASOPHILS # BLD: 0.02 K/UL (ref 0–0.12)
BUN SERPL-MCNC: 31 MG/DL (ref 8–22)
CALCIUM SERPL-MCNC: 8.2 MG/DL (ref 8.5–10.5)
CHLORIDE SERPL-SCNC: 97 MMOL/L (ref 96–112)
CO2 SERPL-SCNC: 30 MMOL/L (ref 20–33)
CREAT SERPL-MCNC: 0.73 MG/DL (ref 0.5–1.4)
EOSINOPHIL # BLD AUTO: 0.02 K/UL (ref 0–0.51)
EOSINOPHIL NFR BLD: 0.2 % (ref 0–6.9)
ERYTHROCYTE [DISTWIDTH] IN BLOOD BY AUTOMATED COUNT: 54.7 FL (ref 35.9–50)
GLUCOSE BLD-MCNC: 122 MG/DL (ref 65–99)
GLUCOSE BLD-MCNC: 222 MG/DL (ref 65–99)
GLUCOSE BLD-MCNC: 252 MG/DL (ref 65–99)
GLUCOSE BLD-MCNC: 264 MG/DL (ref 65–99)
GLUCOSE SERPL-MCNC: 154 MG/DL (ref 65–99)
HCT VFR BLD AUTO: 22.5 % (ref 37–47)
HGB BLD-MCNC: 7.5 G/DL (ref 12–16)
IMM GRANULOCYTES # BLD AUTO: 0.82 K/UL (ref 0–0.11)
IMM GRANULOCYTES NFR BLD AUTO: 7.9 % (ref 0–0.9)
INR PPP: 2.44 (ref 0.87–1.13)
LYMPHOCYTES # BLD AUTO: 0.61 K/UL (ref 1–4.8)
LYMPHOCYTES NFR BLD: 5.9 % (ref 22–41)
MCH RBC QN AUTO: 32.5 PG (ref 27–33)
MCHC RBC AUTO-ENTMCNC: 33.3 G/DL (ref 33.6–35)
MCV RBC AUTO: 97.4 FL (ref 81.4–97.8)
MONOCYTES # BLD AUTO: 1.13 K/UL (ref 0–0.85)
MONOCYTES NFR BLD AUTO: 10.9 % (ref 0–13.4)
NEUTROPHILS # BLD AUTO: 7.81 K/UL (ref 2–7.15)
NEUTROPHILS NFR BLD: 74.9 % (ref 44–72)
NRBC # BLD AUTO: 0.04 K/UL
NRBC BLD-RTO: 0.4 /100 WBC
PLATELET # BLD AUTO: 90 K/UL (ref 164–446)
POTASSIUM SERPL-SCNC: 4.1 MMOL/L (ref 3.6–5.5)
PROTHROMBIN TIME: 26.6 SEC (ref 12–14.6)
RBC # BLD AUTO: 2.31 M/UL (ref 4.2–5.4)
SODIUM SERPL-SCNC: 133 MMOL/L (ref 135–145)
WBC # BLD AUTO: 10.4 K/UL (ref 4.8–10.8)

## 2018-10-09 PROCEDURE — 85610 PROTHROMBIN TIME: CPT

## 2018-10-09 PROCEDURE — 700102 HCHG RX REV CODE 250 W/ 637 OVERRIDE(OP): Performed by: HOSPITALIST

## 2018-10-09 PROCEDURE — A9270 NON-COVERED ITEM OR SERVICE: HCPCS | Performed by: HOSPITALIST

## 2018-10-09 PROCEDURE — 99233 SBSQ HOSP IP/OBS HIGH 50: CPT | Performed by: INTERNAL MEDICINE

## 2018-10-09 PROCEDURE — 36415 COLL VENOUS BLD VENIPUNCTURE: CPT

## 2018-10-09 PROCEDURE — 80048 BASIC METABOLIC PNL TOTAL CA: CPT

## 2018-10-09 PROCEDURE — 85025 COMPLETE CBC W/AUTO DIFF WBC: CPT

## 2018-10-09 PROCEDURE — 700111 HCHG RX REV CODE 636 W/ 250 OVERRIDE (IP): Performed by: INTERNAL MEDICINE

## 2018-10-09 PROCEDURE — 82962 GLUCOSE BLOOD TEST: CPT | Mod: 91

## 2018-10-09 PROCEDURE — 770004 HCHG ROOM/CARE - ONCOLOGY PRIVATE *

## 2018-10-09 RX ORDER — PREDNISONE 20 MG/1
40 TABLET ORAL DAILY
Status: DISCONTINUED | OUTPATIENT
Start: 2018-10-10 | End: 2018-10-11 | Stop reason: HOSPADM

## 2018-10-09 RX ADMIN — CALCITRIOL 0.25 MCG: 0.25 CAPSULE, LIQUID FILLED ORAL at 05:12

## 2018-10-09 RX ADMIN — PRAVASTATIN SODIUM 40 MG: 20 TABLET ORAL at 17:44

## 2018-10-09 RX ADMIN — INSULIN HUMAN 6 UNITS: 100 INJECTION, SOLUTION PARENTERAL at 13:50

## 2018-10-09 RX ADMIN — PREDNISONE 50 MG: 50 TABLET ORAL at 05:12

## 2018-10-09 RX ADMIN — CETIRIZINE HYDROCHLORIDE 10 MG: 10 TABLET, FILM COATED ORAL at 05:16

## 2018-10-09 RX ADMIN — LORAZEPAM 0.5 MG: 1 TABLET ORAL at 17:44

## 2018-10-09 RX ADMIN — DULOXETINE HYDROCHLORIDE 40 MG: 20 CAPSULE, DELAYED RELEASE ORAL at 05:12

## 2018-10-09 RX ADMIN — MULTIPLE VITAMINS W/ MINERALS TAB 1 TABLET: TAB at 05:12

## 2018-10-09 RX ADMIN — INSULIN HUMAN 4 UNITS: 100 INJECTION, SOLUTION PARENTERAL at 18:40

## 2018-10-09 RX ADMIN — LORAZEPAM 0.5 MG: 1 TABLET ORAL at 05:12

## 2018-10-09 RX ADMIN — OXYCODONE HYDROCHLORIDE AND ACETAMINOPHEN 500 MG: 500 TABLET ORAL at 05:11

## 2018-10-09 RX ADMIN — ANTACID TABLETS 1000 MG: 500 TABLET, CHEWABLE ORAL at 17:44

## 2018-10-09 RX ADMIN — OMEPRAZOLE 20 MG: 20 CAPSULE, DELAYED RELEASE ORAL at 05:12

## 2018-10-09 RX ADMIN — WARFARIN SODIUM 3 MG: 3 TABLET ORAL at 17:49

## 2018-10-09 RX ADMIN — FLUTICASONE PROPIONATE 100 MCG: 50 SPRAY, METERED NASAL at 05:16

## 2018-10-09 RX ADMIN — ACETAMINOPHEN 650 MG: 325 TABLET, FILM COATED ORAL at 05:11

## 2018-10-09 RX ADMIN — ANTACID TABLETS 1000 MG: 500 TABLET, CHEWABLE ORAL at 10:01

## 2018-10-09 RX ADMIN — ACETAMINOPHEN 650 MG: 325 TABLET, FILM COATED ORAL at 18:59

## 2018-10-09 RX ADMIN — VITAMIN D, TAB 1000IU (100/BT) 2000 UNITS: 25 TAB at 05:12

## 2018-10-09 RX ADMIN — SUCRALFATE 1 G: 1 TABLET ORAL at 10:00

## 2018-10-09 RX ADMIN — METOPROLOL TARTRATE 25 MG: 25 TABLET, FILM COATED ORAL at 17:44

## 2018-10-09 RX ADMIN — INSULIN HUMAN 6 UNITS: 100 INJECTION, SOLUTION PARENTERAL at 21:06

## 2018-10-09 RX ADMIN — METOPROLOL TARTRATE 25 MG: 25 TABLET, FILM COATED ORAL at 05:12

## 2018-10-09 ASSESSMENT — ENCOUNTER SYMPTOMS
DIZZINESS: 0
CONSTIPATION: 0
NERVOUS/ANXIOUS: 0
SHORTNESS OF BREATH: 0
HEADACHES: 0
DIARRHEA: 0
BLURRED VISION: 0
FEVER: 0
WEAKNESS: 0
ABDOMINAL PAIN: 0
CHILLS: 0
SORE THROAT: 0
COUGH: 0
INSOMNIA: 0
SENSORY CHANGE: 0
SPEECH CHANGE: 0
DEPRESSION: 0
HEARTBURN: 0
MYALGIAS: 0
CLAUDICATION: 0
PHOTOPHOBIA: 0
VOMITING: 0

## 2018-10-09 ASSESSMENT — PAIN SCALES - GENERAL
PAINLEVEL_OUTOF10: 7
PAINLEVEL_OUTOF10: 1
PAINLEVEL_OUTOF10: 3
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
## 2018-10-09 NOTE — PROGRESS NOTES
Patient alert and oriented. Mobilizing to bedside commode with minimal assistance. Reports pain in back. Tylenol given PRN for pain. Patient denies itching from generalized rash. Calls for assistance as needed. Midline to right wrist saline locked.

## 2018-10-09 NOTE — CARE PLAN
Problem: Bowel/Gastric:  Goal: Normal bowel function is maintained or improved  Outcome: PROGRESSING AS EXPECTED  Patient up to bedside commode. BM this shift.     Problem: Pain Management  Goal: Pain level will decrease to patient's comfort goal  Outcome: PROGRESSING AS EXPECTED  Patient reports aching in back and knees. Tylenol given PRN. Heat packs in use PRN. Repositioned patient to lie on side.

## 2018-10-09 NOTE — DISCHARGE PLANNING
Agency/Facility Name: [facility]  Outcome: no answer. Left voicemail.   Judy (BELLAW) notified.

## 2018-10-09 NOTE — DISCHARGE PLANNING
Received Choice form at 0935 from Judy (JAMAAL)  Agency/Facility Name: [facility]  Referral sent per Choice form @ 1445

## 2018-10-09 NOTE — PROGRESS NOTES
Inpatient Anticoagulation Service Note    Date: 10/9/2018  Reason for Anticoagulation: Deep Vein Thrombosis     Hemoglobin Value: (!) 7.5  Hematocrit Value: (!) 22.5  Lab Platelet Value: (!) 90  Target INR: 2.0 to 3.0    INR from last 7 days     Date/Time INR Value    10/09/18 0039 (!)  2.44    10/08/18 0359 (!)  2.96    10/07/18 0034 (!)  2.58    10/06/18 0900 (!)  2.59    10/05/18 0121 (!)  2.6    10/04/18 1254 (!)  2.67    10/03/18 1125 (!)  2.93        Dose from last 7 days     Date/Time Dose (mg)    10/09/18 1212  3    10/08/18 0837  3    10/07/18 0830  3    10/06/18 1800  3    10/05/18 1100  3    10/04/18 1200  3    10/03/18 2000  3        Average Dose (mg): 3 (Per [facility])  Significant Interactions: Corticosteroids, Proton Pump Inhibitor, Statin, Other (Comments) (Duloxetine)  Bridge Therapy: No    Reversal Agent Administered: Not Applicable  Comments: H/H/Platelets low but stable. Therapeutic INR today.  Steroid = Prednisone 50 mg /day. Continue home dose of 3 mg warfarin daily. INR MWF.     Plan:  3 mg warfarin daily  Education Material Provided?: No (Established warfarin patient)  Pharmacist suggested discharge dosing: warfarin 3 mg PO daily, INR 48-72 hours after discharge.      Hoang David, PharmD

## 2018-10-10 VITALS
TEMPERATURE: 97.8 F | SYSTOLIC BLOOD PRESSURE: 145 MMHG | WEIGHT: 273.15 LBS | BODY MASS INDEX: 48.4 KG/M2 | RESPIRATION RATE: 16 BRPM | HEART RATE: 82 BPM | DIASTOLIC BLOOD PRESSURE: 57 MMHG | HEIGHT: 63 IN | OXYGEN SATURATION: 97 %

## 2018-10-10 LAB
ANION GAP SERPL CALC-SCNC: 7 MMOL/L (ref 0–11.9)
ANISOCYTOSIS BLD QL SMEAR: ABNORMAL
BASOPHILS # BLD AUTO: 0.9 % (ref 0–1.8)
BASOPHILS # BLD: 0.07 K/UL (ref 0–0.12)
BUN SERPL-MCNC: 27 MG/DL (ref 8–22)
CALCIUM SERPL-MCNC: 8.6 MG/DL (ref 8.5–10.5)
CHLORIDE SERPL-SCNC: 100 MMOL/L (ref 96–112)
CO2 SERPL-SCNC: 27 MMOL/L (ref 20–33)
CREAT SERPL-MCNC: 0.81 MG/DL (ref 0.5–1.4)
EOSINOPHIL # BLD AUTO: 0 K/UL (ref 0–0.51)
EOSINOPHIL NFR BLD: 0 % (ref 0–6.9)
ERYTHROCYTE [DISTWIDTH] IN BLOOD BY AUTOMATED COUNT: 53.5 FL (ref 35.9–50)
GLUCOSE BLD-MCNC: 282 MG/DL (ref 65–99)
GLUCOSE SERPL-MCNC: 174 MG/DL (ref 65–99)
HCT VFR BLD AUTO: 22.6 % (ref 37–47)
HGB BLD-MCNC: 7.6 G/DL (ref 12–16)
INR PPP: 2.44 (ref 0.87–1.13)
LYMPHOCYTES # BLD AUTO: 0.7 K/UL (ref 1–4.8)
LYMPHOCYTES NFR BLD: 8.9 % (ref 22–41)
MACROCYTES BLD QL SMEAR: ABNORMAL
MANUAL DIFF BLD: NORMAL
MCH RBC QN AUTO: 32.5 PG (ref 27–33)
MCHC RBC AUTO-ENTMCNC: 33.6 G/DL (ref 33.6–35)
MCV RBC AUTO: 96.6 FL (ref 81.4–97.8)
METAMYELOCYTES NFR BLD MANUAL: 1.8 %
MONOCYTES # BLD AUTO: 0.63 K/UL (ref 0–0.85)
MONOCYTES NFR BLD AUTO: 8 % (ref 0–13.4)
MORPHOLOGY BLD-IMP: NORMAL
NEUTROPHILS # BLD AUTO: 6.35 K/UL (ref 2–7.15)
NEUTROPHILS NFR BLD: 78.6 % (ref 44–72)
NEUTS BAND NFR BLD MANUAL: 1.8 % (ref 0–10)
NRBC # BLD AUTO: 0.04 K/UL
NRBC BLD-RTO: 0.5 /100 WBC
PLATELET # BLD AUTO: 110 K/UL (ref 164–446)
PLATELET BLD QL SMEAR: NORMAL
POTASSIUM SERPL-SCNC: 4.1 MMOL/L (ref 3.6–5.5)
PROTHROMBIN TIME: 26.6 SEC (ref 12–14.6)
RBC # BLD AUTO: 2.34 M/UL (ref 4.2–5.4)
RBC BLD AUTO: PRESENT
SODIUM SERPL-SCNC: 134 MMOL/L (ref 135–145)
TOXIC GRANULES BLD QL SMEAR: SLIGHT
WBC # BLD AUTO: 7.9 K/UL (ref 4.8–10.8)

## 2018-10-10 PROCEDURE — 700111 HCHG RX REV CODE 636 W/ 250 OVERRIDE (IP): Performed by: INTERNAL MEDICINE

## 2018-10-10 PROCEDURE — 85007 BL SMEAR W/DIFF WBC COUNT: CPT

## 2018-10-10 PROCEDURE — 85027 COMPLETE CBC AUTOMATED: CPT

## 2018-10-10 PROCEDURE — 99239 HOSP IP/OBS DSCHRG MGMT >30: CPT | Performed by: INTERNAL MEDICINE

## 2018-10-10 PROCEDURE — 82962 GLUCOSE BLOOD TEST: CPT

## 2018-10-10 PROCEDURE — 36415 COLL VENOUS BLD VENIPUNCTURE: CPT

## 2018-10-10 PROCEDURE — 85610 PROTHROMBIN TIME: CPT

## 2018-10-10 PROCEDURE — A9270 NON-COVERED ITEM OR SERVICE: HCPCS | Performed by: HOSPITALIST

## 2018-10-10 PROCEDURE — 80048 BASIC METABOLIC PNL TOTAL CA: CPT

## 2018-10-10 PROCEDURE — 700102 HCHG RX REV CODE 250 W/ 637 OVERRIDE(OP): Performed by: HOSPITALIST

## 2018-10-10 RX ORDER — PREDNISONE 20 MG/1
TABLET ORAL
Qty: 30 TAB | Refills: 0 | Status: SHIPPED | OUTPATIENT
Start: 2018-10-11

## 2018-10-10 RX ADMIN — ANTACID TABLETS 1000 MG: 500 TABLET, CHEWABLE ORAL at 13:11

## 2018-10-10 RX ADMIN — MULTIPLE VITAMINS W/ MINERALS TAB 1 TABLET: TAB at 05:19

## 2018-10-10 RX ADMIN — DULOXETINE HYDROCHLORIDE 40 MG: 20 CAPSULE, DELAYED RELEASE ORAL at 05:19

## 2018-10-10 RX ADMIN — PREDNISONE 40 MG: 20 TABLET ORAL at 05:19

## 2018-10-10 RX ADMIN — LORAZEPAM 0.5 MG: 1 TABLET ORAL at 05:20

## 2018-10-10 RX ADMIN — OXYCODONE HYDROCHLORIDE AND ACETAMINOPHEN 500 MG: 500 TABLET ORAL at 05:19

## 2018-10-10 RX ADMIN — VITAMIN D, TAB 1000IU (100/BT) 2000 UNITS: 25 TAB at 05:19

## 2018-10-10 RX ADMIN — ANTACID TABLETS 1000 MG: 500 TABLET, CHEWABLE ORAL at 05:18

## 2018-10-10 RX ADMIN — CALCITRIOL 0.25 MCG: 0.25 CAPSULE, LIQUID FILLED ORAL at 05:18

## 2018-10-10 RX ADMIN — INSULIN HUMAN 6 UNITS: 100 INJECTION, SOLUTION PARENTERAL at 13:18

## 2018-10-10 RX ADMIN — OMEPRAZOLE 20 MG: 20 CAPSULE, DELAYED RELEASE ORAL at 05:18

## 2018-10-10 RX ADMIN — METOPROLOL TARTRATE 25 MG: 25 TABLET, FILM COATED ORAL at 05:19

## 2018-10-10 RX ADMIN — FLUTICASONE PROPIONATE 100 MCG: 50 SPRAY, METERED NASAL at 05:18

## 2018-10-10 RX ADMIN — CETIRIZINE HYDROCHLORIDE 10 MG: 10 TABLET, FILM COATED ORAL at 05:19

## 2018-10-10 ASSESSMENT — PAIN SCALES - GENERAL
PAINLEVEL_OUTOF10: 1
PAINLEVEL_OUTOF10: ASSUMED PAIN PRESENT

## 2018-10-10 NOTE — PROGRESS NOTES
[facility] Medicine Daily Progress Note    Date of Service  10/9/2018    Chief Complaint  67 y.o. female admitted 10/3/2018 with rash following chemotherapy for multiple myeloma.    Hospital Course    Patient had steroids and benadryl and her chemotherapy held with concern of rash as reaction to Revlimid per oncology.  She has had improvement of the rash and is recommended to be on a 20 day taper of prednisone with follow up with oncology following completion of the taper so decision can be made regarding further treatments.      Interval Problem Update  Patient states she is feeling better and the rash is improving, the spots are fading and not itching anymore.  She is much more mobile than when I discharged her months ago and she has lost >100 lbs since that time.    Consultants/Specialty  (oncology)    Code Status  full    Disposition  Return to SNF tomorrow.    Review of Systems  Review of Systems   Constitutional: Negative for chills and fever.   HENT: Negative for congestion and sore throat.    Eyes: Negative for blurred vision and photophobia.   Respiratory: Negative for cough and shortness of breath.    Cardiovascular: Negative for chest pain, claudication and leg swelling.   Gastrointestinal: Negative for abdominal pain, constipation, diarrhea, heartburn and vomiting.   Genitourinary: Negative for dysuria and hematuria.   Musculoskeletal: Negative for joint pain and myalgias.   Skin: Positive for rash. Negative for itching.   Neurological: Negative for dizziness, sensory change, speech change, weakness and headaches.   Psychiatric/Behavioral: Negative for depression. The patient is not nervous/anxious and does not have insomnia.         Physical Exam  Temp:  [36.4 °C (97.6 °F)-36.7 °C (98 °F)] 36.5 °C (97.7 °F)  Pulse:  [69-74] 73  Resp:  [18-20] 18  BP: (142-161)/(50-75) 142/51    Physical Exam   Constitutional: She is oriented to person, place, and time. She appears well-developed and well-nourished. No  distress.   HENT:   Head: Normocephalic and atraumatic.   Eyes: Conjunctivae are normal. No scleral icterus.   Neck: Neck supple. No JVD present.   Cardiovascular: Normal rate, regular rhythm and normal heart sounds.  Exam reveals no gallop and no friction rub.    No murmur heard.  Pulmonary/Chest: Effort normal and breath sounds normal. No respiratory distress. She exhibits no tenderness.   Abdominal: Soft. Bowel sounds are normal. She exhibits no distension. There is no guarding.   Musculoskeletal: She exhibits no edema or tenderness.   Lymphadenopathy:     She has no cervical adenopathy.   Neurological: She is alert and oriented to person, place, and time. No cranial nerve deficit.   Skin: Skin is warm and dry. Rash noted. She is not diaphoretic. No erythema. No pallor.   Psychiatric: She has a normal mood and affect. Her behavior is normal.   Nursing note and vitals reviewed.      Fluids    Intake/Output Summary (Last 24 hours) at 10/09/18 1710  Last data filed at 10/08/18 1900   Gross per 24 hour   Intake              480 ml   Output                0 ml   Net              480 ml       Laboratory  Recent Labs      10/07/18   0145  10/08/18   0359  10/09/18   0039   WBC  13.8*  12.0*  10.4   RBC  2.51*  2.45*  2.31*   HEMOGLOBIN  8.1*  7.9*  7.5*   HEMATOCRIT  24.2*  24.1*  22.5*   MCV  96.4  98.4*  97.4   MCH  32.3  32.2  32.5   MCHC  33.5*  32.8*  33.3*   RDW  54.5*  55.1*  54.7*   PLATELETCT  71*  87*  90*   MPV  13.8*   --    --      Recent Labs      10/07/18   0034  10/08/18   0359  10/09/18   0039   SODIUM  131*  132*  133*   POTASSIUM  4.5  3.9  4.1   CHLORIDE  98  97  97   CO2  27  30  30   GLUCOSE  279*  177*  154*   BUN  28*  28*  31*   CREATININE  0.86  0.77  0.73   CALCIUM  8.4*  8.5  8.2*     Recent Labs      10/07/18   0034  10/08/18   0359  10/09/18   0039   INR  2.58*  2.96*  2.44*               Imaging  EC-ECHOCARDIOGRAM COMPLETE W/O CONT   Final Result      DX-CHEST-LIMITED (1 VIEW)   Final  Result         No acute cardiac or pulmonary abnormality is identified.      Persistent elevation right hemidiaphragm with right basilar atelectasis or scarring.      Extensive lytic bone lesions once again noted consistent with patient's history of multiple myeloma.      IR-US GUIDED PIV   Final Result    Ultrasound-guided PERIPHERAL IV INSERTION performed by    qualified nursing staff as above.                 Assessment/Plan  * Rash   Assessment & Plan    Most likely drug induced rash   probably related due to Revlimid for  multiple myeloma (last dose about 2 weeks ago according the patient)  Revlimid stopped, improving with continued steroids - taper per Dr Gutiérrez (40 x 5d, 20 x 5d, 10 x 5d, 5 x 5d then dc)  Unlikely patient has infectious rash per  history and clinical picture                Multiple myeloma (HCC)- (present on admission)   Assessment & Plan     IgG kappa multiple myeloma.    improvement of the monoclonal protein  kidney and calcium levels WNL   Revlimid on hold d/t rash          Leukocytosis   Assessment & Plan    Denies any fever, cough, dysuria  Probably secondary to steroids        Hypoxia   Assessment & Plan    Needs oxygen.  Denies cough or shortness of breath.  Apparently new  Chest x-ray showed no pneumonia.  Right hemidiaphragm elevation.  .  TTE  Continue monitoring  Incentive spirometry.  Up to chair.  We will try to wean off oxygen        Acute kidney injury (HCC)- (present on admission)   Assessment & Plan    Creatinine improved with IV hydration  Continue IV fluids, monitor        Hypomagnesemia   Assessment & Plan    Replaced.        Pancytopenia (HCC)- (present on admission)   Assessment & Plan    Due to chemotherapy for multiple myeloma, continue monitoring daily CBC  WBC Improved after Granix  Platelets trending back up  Transfuse RBC as needed        DVT (deep venous thrombosis) (HCC)- (present on admission)   Assessment & Plan    Continue Coumadin per protocol  Patient  stated she started on Coumadin about 3 weeks ago        Morbid obesity with BMI of 60.0-69.9, adult (CMS-HCC)- (present on admission)   Assessment & Plan    Body mass index is 49.42 kg/m².  Patient has lost >100lbs since 4/2018        DM type 2 (diabetes mellitus, type 2) (CMS-HCC)- (present on admission)   Assessment & Plan    Continue insulin NPH, and insulin sliding scale.    Monitor for hyperglycemia, exacerbated by steroids.  Adjust insulin doses as needed             VTE prophylaxis: coumadin therapeutic

## 2018-10-10 NOTE — CARE PLAN
Problem: Safety  Goal: Will remain free from falls    Intervention: Implement fall precautions  Fall precautions in place.       Problem: Knowledge Deficit  Goal: Knowledge of disease process/condition, treatment plan, diagnostic tests, and medications will improve    Intervention: Explain information regarding disease process/condition, treatment plan, diagnostic tests, and medications and document in education  Plan of care discussed, questions regarding discharge today answered, all patient needs met at this time.

## 2018-10-10 NOTE — CARE PLAN
Problem: Communication  Goal: The ability to communicate needs accurately and effectively will improve  Outcome: PROGRESSING AS EXPECTED  Discussed POC with pt. All questions answered at this time.     Problem: Safety  Goal: Will remain free from injury  Outcome: PROGRESSING AS EXPECTED  Pt uses call light appropriately. Pt is a 2 person assist up to commode.

## 2018-10-10 NOTE — PROGRESS NOTES
Assumed patient care @ 0700, report received from NOC RN. Pt is A&Ox4, VSS, on 1L O2 via N/C. Pt assessed, FSBG 137 - no coverage required. Generalized whole body rash improved, R- Midline IV patent and saline locked. Patient has discharge orders today. Call light and personal belongings w/i reach, will continue to round hourly.

## 2018-10-10 NOTE — DISCHARGE PLANNING
Agency/Facility Name: [facility]  Spoke To: Roxana (Admissions)  Outcome: PT accepted.    Judy (LSW) notified.

## 2018-10-10 NOTE — DISCHARGE PLANNING
Received Transport Form @ 1119 from Judy ([facility])  Spoke to Bhavin @ [facility]    Transport is scheduled for 10-10 @1630 going to [address] [id number].   Transport is being provided by [facility].     Judy ([facility]) notified of transport time.

## 2018-10-10 NOTE — PROGRESS NOTES
A/ox4, 1L O2 - weaning off, up with 2 assist to commode.   Diabetic diet, FSBS sliding scale, accuchecks.  Tylenol PRN for pain.   Pt denies itching on general rash at this time.   Midline R wrist SL.   Rounding in place.

## 2018-10-10 NOTE — DISCHARGE PLANNING
Agency/Facility Name: [facility]  Outcome: trying to set up transport. No answer. Left message.     Judy (LSW) notified.

## 2018-10-10 NOTE — CARE PLAN
Problem: Safety  Goal: Will remain free from falls  Outcome: PROGRESSING AS EXPECTED  Patient calls for assistance when getting out of bed, non skid socks in use, rolling walker available at bedside. No falls this shift.     Problem: Infection  Goal: Will remain free from infection  Outcome: PROGRESSING AS EXPECTED  Patient educated on hand hygiene, and skin care. Rash over body continues to improve with benadryl cream.

## 2018-10-10 NOTE — DISCHARGE INSTRUCTIONS
Discharge Instructions    Discharged to group home by medical transportation with escort. Discharged via wheelchair, hospital escort: Yes.  Special equipment needed: Wheelchair    Be sure to schedule a follow-up appointment with your primary care doctor or any specialists as instructed.     Discharge Plan:   Diet Plan: Discussed  Activity Level: Discussed  Confirmed Follow up Appointment: Patient to Call and Schedule Appointment  Confirmed Symptoms Management: Discussed  Medication Reconciliation Updated: Yes  Pneumococcal Vaccine Administered/Refused: Given (See MAR)  Influenza Vaccine Indication: Indicated: 65 years and older  Influenza Vaccine Given - only chart on this line when given: Influenza Vaccine Given (See MAR)    I understand that a diet low in cholesterol, fat, and sodium is recommended for good health. Unless I have been given specific instructions below for another diet, I accept this instruction as my diet prescription.   Other diet: Diabetic    Special Instructions: None    · Is patient discharged on Warfarin / Coumadin?   Yes    You are receiving the drug warfarin. Please understand the importance of monitoring warfarin with scheduled PT/INR blood draws.  Follow-up with the [facility] in one week for INR lab..    IMPORTANT: HOW TO USE THIS INFORMATION:  This is a summary and does NOT have all possible information about this product. This information does not assure that this product is safe, effective, or appropriate for you. This information is not individual medical advice and does not substitute for the advice of your health care professional. Always ask your health care professional for complete information about this product and your specific health needs.      WARFARIN - ORAL (WARF-uh-rin)      COMMON BRAND NAME(S): Coumadin      WARNING:  Warfarin can cause very serious (possibly fatal) bleeding. This is more likely to occur when you first start taking this medication or if you take  "too much warfarin. To decrease your risk for bleeding, your doctor or other health care provider will monitor you closely and check your lab results (INR test) to make sure you are not taking too much warfarin. Keep all medical and laboratory appointments. Tell your doctor right away if you notice any signs of serious bleeding. See also Side Effects section.      USES:  This medication is used to treat blood clots (such as in deep vein thrombosis-DVT or pulmonary embolus-PE) and/or to prevent new clots from forming in your body. Preventing harmful blood clots helps to reduce the risk of a stroke or heart attack. Conditions that increase your risk of developing blood clots include a certain type of irregular heart rhythm (atrial fibrillation), heart valve replacement, recent heart attack, and certain surgeries (such as hip/knee replacement). Warfarin is commonly called a \"blood thinner,\" but the more correct term is \"anticoagulant.\" It helps to keep blood flowing smoothly in your body by decreasing the amount of certain substances (clotting proteins) in your blood.      HOW TO USE:  Read the Medication Guide provided by your pharmacist before you start taking warfarin and each time you get a refill. If you have any questions, ask your doctor or pharmacist. Take this medication by mouth with or without food as directed by your doctor or other health care professional, usually once a day. It is very important to take it exactly as directed. Do not increase the dose, take it more frequently, or stop using it unless directed by your doctor. Dosage is based on your medical condition, laboratory tests (such as INR), and response to treatment. Your doctor or other health care provider will monitor you closely while you are taking this medication to determine the right dose for you. Use this medication regularly to get the most benefit from it. To help you remember, take it at the same time each day. It is important to eat a " balanced, consistent diet while taking warfarin. Some foods can affect how warfarin works in your body and may affect your treatment and dose. Avoid sudden large increases or decreases in your intake of foods high in vitamin K (such as broccoli, cauliflower, cabbage, brussels sprouts, kale, spinach, and other green leafy vegetables, liver, green tea, certain vitamin supplements). If you are trying to lose weight, check with your doctor before you try to go on a diet. Cranberry products may also affect how your warfarin works. Limit the amount of cranberry juice (16 ounces/480 milliliters a day) or other cranberry products you may drink or eat.      SIDE EFFECTS:  Nausea, loss of appetite, or stomach/abdominal pain may occur. If any of these effects persist or worsen, tell your doctor or pharmacist promptly. Remember that your doctor has prescribed this medication because he or she has judged that the benefit to you is greater than the risk of side effects. Many people using this medication do not have serious side effects. This medication can cause serious bleeding if it affects your blood clotting proteins too much (shown by unusually high INR lab results). Even if your doctor stops your medication, this risk of bleeding can continue for up to a week. Tell your doctor right away if you have any signs of serious bleeding, including: unusual pain/swelling/discomfort, unusual/easy bruising, prolonged bleeding from cuts or gums, persistent/frequent nosebleeds, unusually heavy/prolonged menstrual flow, pink/dark urine, coughing up blood, vomit that is bloody or looks like coffee grounds, severe headache, dizziness/fainting, unusual or persistent tiredness/weakness, bloody/black/tarry stools, chest pain, shortness of breath, difficulty swallowing. Tell your doctor right away if any of these unlikely but serious side effects occur: persistent nausea/vomiting, severe stomach/abdominal pain, yellowing eyes/skin. This drug  rarely has caused very serious (possibly fatal) problems if its effects lead to small blood clots (usually at the beginning of treatment). This can lead to severe skin/tissue damage that may require surgery or amputation if left untreated. Patients with certain blood conditions (protein C or S deficiency) may be at greater risk. Get medical help right away if any of these rare but serious side effects occur: painful/red/purplish patches on the skin (such as on the toe, breast, abdomen), change in the amount of urine, vision changes, confusion, slurred speech, weakness on one side of the body. A very serious allergic reaction to this drug is rare. However, get medical help right away if you notice any symptoms of a serious allergic reaction, including: rash, itching/swelling (especially of the face/tongue/throat), severe dizziness, trouble breathing. This is not a complete list of possible side effects. If you notice other effects not listed above, contact your doctor or pharmacist. In the [place] - Call your doctor for medical advice about side effects. You may report side effects to FDA at 7-701-IFO-9646. In [place] - Call your doctor for medical advice about side effects. You may report side effects to Health [place] at 1-579.343.2801.      PRECAUTIONS:  Before taking warfarin, tell your doctor or pharmacist if you are allergic to it; or if you have any other allergies. This product may contain inactive ingredients, which can cause allergic reactions or other problems. Talk to your pharmacist for more details. Before using this medication, tell your doctor or pharmacist your medical history, especially of: blood disorders (such as anemia, hemophilia), bleeding problems (such as bleeding of the stomach/intestines, bleeding in the brain), blood vessel disorders (such as aneurysms), recent major injury/surgery, liver disease, alcohol use, mental/mood disorders (including memory problems), frequent falls/injuries. It is  important that all your doctors and dentists know that you take warfarin. Before having surgery or any medical/dental procedures, tell your doctor or dentist that you are taking this medication and about all the products you use (including prescription drugs, nonprescription drugs, and herbal products). Avoid getting injections into the muscles. If you must have an injection into a muscle (for example, a flu shot), it should be given in the arm. This way, it will be easier to check for bleeding and/or apply pressure bandages. This medication may cause stomach bleeding. Daily use of alcohol while using this medicine will increase your risk for stomach bleeding and may also affect how this medication works. Limit or avoid alcoholic beverages. If you have not been eating well, if you have an illness or infection that causes fever, vomiting, or diarrhea for more than 2 days, or if you start using any antibiotic medications, contact your doctor or pharmacist immediately because these conditions can affect how warfarin works. This medication can cause heavy bleeding. To lower the chance of getting cut, bruised, or injured, use great caution with sharp objects like safety razors and nail cutters. Use an electric razor when shaving and a soft toothbrush when brushing your teeth. Avoid activities such as contact sports. If you fall or injure yourself, especially if you hit your head, call your doctor immediately. Your doctor may need to check you. The Food & Drug Administration has stated that generic warfarin products are interchangeable. However, consult your doctor or pharmacist before switching warfarin products. Be careful not to take more than one medication that contains warfarin unless specifically directed by the doctor or health care provider who is monitoring your warfarin treatment. Older adults may be at greater risk for bleeding while using this drug. This medication is not recommended for use during pregnancy  "because of serious (possibly fatal) harm to an unborn baby. Discuss the use of reliable forms of birth control with your doctor. If you become pregnant or think you may be pregnant, tell your doctor immediately. If you are planning pregnancy, discuss a plan for managing your condition with your doctor before you become pregnant. Your doctor may switch the type of medication you use during pregnancy. Very small amounts of this medication may pass into breast milk but is unlikely to harm a nursing infant. Consult your doctor before breast-feeding.      DRUG INTERACTIONS:  Drug interactions may change how your medications work or increase your risk for serious side effects. This document does not contain all possible drug interactions. Keep a list of all the products you use (including prescription/nonprescription drugs and herbal products) and share it with your doctor and pharmacist. Do not start, stop, or change the dosage of any medicines without your doctor's approval. Warfarin interacts with many prescription, nonprescription, vitamin, and herbal products. This includes medications that are applied to the skin or inside the vagina or rectum. The interactions with warfarin usually result in an increase or decrease in the \"blood-thinning\" (anticoagulant) effect. Your doctor or other health care professional should closely monitor you to prevent serious bleeding or clotting problems. While taking warfarin, it is very important to tell your doctor or pharmacist of any changes in medications, vitamins, or herbal products that you are taking. Some products that may interact with this drug include: capecitabine, imatinib, mifepristone. Aspirin, aspirin-like drugs (salicylates), and nonsteroidal anti-inflammatory drugs (NSAIDs such as ibuprofen, naproxen, celecoxib) may have effects similar to warfarin. These drugs may increase the risk of bleeding problems if taken during treatment with warfarin. Carefully check all " prescription/nonprescription product labels (including drugs applied to the skin such as pain-relieving creams) since the products may contain NSAIDs or salicylates. Talk to your doctor about using a different medication (such as acetaminophen) to treat pain/fever. Low-dose aspirin and related drugs (such as clopidogrel, ticlopidine) should be continued if prescribed by your doctor for specific medical reasons such as heart attack or stroke prevention. Consult your doctor or pharmacist for more details. Many herbal products interact with warfarin. Tell your doctor before taking any herbal products, especially bromelains, coenzyme Q10, cranberry, danshen, dong quai, fenugreek, garlic, ginkgo biloba, ginseng, and [place]'s wort, among others. This medication may interfere with a certain laboratory test to measure theophylline levels, possibly causing false test results. Make sure laboratory personnel and all your doctors know you use this drug.      OVERDOSE:  If overdose is suspected, contact a poison control center or emergency room immediately. [place] residents can call the [place] National Poison Hotline at 1-706.609.8729. [place] residents can call a provincial poison control center. Symptoms of overdose may include: bloody/black/tarry stools, pink/dark urine, unusual/prolonged bleeding.      NOTES:  Do not share this medication with others. Laboratory and/or medical tests (such as INR, complete blood count) must be performed periodically to monitor your progress or check for side effects. Consult your doctor for more details.      MISSED DOSE:  For the best possible benefit, do not miss any doses. If you do miss a dose and remember on the same day, take it as soon as you remember. If you remember on the next day, skip the missed dose and resume your usual dosing schedule. Do not double the dose to catch up because this could increase your risk for bleeding. Keep a record of missed doses to give to your doctor or  pharmacist. Contact your doctor or pharmacist if you miss 2 or more doses in a row.      STORAGE:  Store at room temperature away from light and moisture. Do not store in the bathroom. Keep all medications away from children and pets. Do not flush medications down the toilet or pour them into a drain unless instructed to do so. Properly discard this product when it is  or no longer needed. Consult your pharmacist or local waste disposal company for more details about how to safely discard your product.      MEDICAL ALERT:  Your condition and medication can cause complications in a medical emergency. For information about enrolling in MedicAlert, call 1-456.998.5276 ([place]) or 1-570.102.8886 ([place]).      Information last revised 2010 Copyright(c) 2010 [employer]             Depression / Suicide Risk    As you are discharged from this [facility] facility, it is important to learn how to keep safe from harming yourself.    Recognize the warning signs:  · Abrupt changes in personality, positive or negative- including increase in energy   · Giving away possessions  · Change in eating patterns- significant weight changes-  positive or negative  · Change in sleeping patterns- unable to sleep or sleeping all the time   · Unwillingness or inability to communicate  · Depression  · Unusual sadness, discouragement and loneliness  · Talk of wanting to die  · Neglect of personal appearance   · Rebelliousness- reckless behavior  · Withdrawal from people/activities they love  · Confusion- inability to concentrate     If you or a loved one observes any of these behaviors or has concerns about self-harm, here's what you can do:  · Talk about it- your feelings and reasons for harming yourself  · Remove any means that you might use to hurt yourself (examples: pills, rope, extension cords, firearm)  · Get professional help from the community (Mental Health, Substance Abuse, psychological counseling)  · Do not  be alone:Call your Safe Contact- someone whom you trust who will be there for you.  · Call your local CRISIS HOTLINE 942-3688 or 857-282-8706  · Call your local Children's Mobile Crisis Response Team [place] (994) 080-7034 or www.Endovention  · Call the toll free National Suicide Prevention Hotlines   · National Suicide Prevention Lifeline 202-577-TTQI (2142)  · National [employer] Line Network 800-SUICIDE (821-4959)

## 2018-10-10 NOTE — DISCHARGE PLANNING
Anticipated Discharge Disposition: SNF    Action: Pt is medically clear to transfer and has been accepted to [facility]. Transportation has been arranged for 1630 today. LSW met w/ pt at bedside and pt agreeable to transfer and pt signed COBRA. Transfer packet and COBRA placed on pt's chart. Charge RN and bedside RN aware of transport time.    Barriers to Discharge: none    Plan: Pt to transfer to [facility] today at 1630.

## 2018-10-10 NOTE — DISCHARGE PLANNING
Anticipated Discharge Disposition: [facility]    PASRR: [id number]    Action: LSW notified by [facility] that pt was accepted to [facility]. LSW completed Transport Communication Form and faxed to [facility] ([phone]).    Barriers to Discharge: none identified at this time.    Plan: Awaiting transfer time.

## 2018-10-10 NOTE — DISCHARGE SUMMARY
Discharge Summary    CHIEF COMPLAINT ON ADMISSION  Chief Complaint   Patient presents with   • Rash       Reason for Admission  Rash     CODE STATUS  Full Code    HPI & HOSPITAL COURSE  This is a 67 y.o. female here with full body rash that did not improve with PO benadryl at SNF so she was transferred to [facility] for further evaluation.  Oncology was consulted and felt that she had a reaction to revlimid and this was discontinued and since that time her rash has been improving.     Patient recommended to be on a 20 day taper of prednisone with follow up with oncology following completion of the taper so decision can be made regarding further treatments. She has an appointment with Dr Sim later this month.    Therefore, she is discharged in good and stable condition to skilled nursing facility.    The patient met 2-midnight criteria for an inpatient stay at the time of discharge.      FOLLOW UP ITEMS POST DISCHARGE  Oncology appointment 10/23/18    DISCHARGE DIAGNOSES  Principal Problem:    Rash POA: Unknown  Active Problems:    Multiple myeloma (HCC) POA: Yes    DM type 2 (diabetes mellitus, type 2) ([facility]) POA: Yes    Morbid obesity with BMI of 60.0-69.9, adult ([facility]) POA: Yes    DVT (deep venous thrombosis) ([facility]) POA: Yes    Pancytopenia ([facility]) POA: Yes    Hypomagnesemia POA: Unknown    Acute kidney injury ([facility]) POA: Yes    Hypoxia POA: Unknown    Leukocytosis POA: Unknown  Resolved Problems:    * No resolved hospital problems. *      FOLLOW UP  No future appointments.  No follow-up provider specified.    MEDICATIONS ON DISCHARGE     Medication List      START taking these medications      Instructions   predniSONE 20 MG Tabs  Commonly known as:  DELTASONE   40 mg qd x 4 days, 20 mg qd x 5 days, 10mg qd x 5 days, 5mg q day x 5 days then dc.        CHANGE how you take these medications      Instructions   * insulin  UNIT/ML Susp  What changed:  · how much to take  · when to take this  · additional  instructions  Commonly known as:  HUMULIN,NOVOLIN   Inject 20 Units as instructed every bedtime.  Dose:  20 Units     * insulin  UNIT/ML Susp  What changed:  You were already taking a medication with the same name, and this prescription was added. Make sure you understand how and when to take each.  Commonly known as:  HUMULIN,NOVOLIN   Inject 32 Units as instructed every morning.  Dose:  32 Units        * This list has 2 medication(s) that are the same as other medications prescribed for you. Read the directions carefully, and ask your doctor or other care provider to review them with you.            CONTINUE taking these medications      Instructions   ACIDOPHILUS PO   Take 1 Tab by mouth every day.  Dose:  1 Tab     ascorbic acid 500 MG tablet  Commonly known as:  VITAMIN C   Take 500 mg by mouth every day.  Dose:  500 mg     calcitRIOL 0.25 MCG Caps  Commonly known as:  ROCALTROL   Take 1 Cap by mouth every day.  Dose:  0.25 mcg     calcium carbonate 500 MG Chew  Commonly known as:  TUMS   Take 1,000 mg by mouth 3 times a day before meals.  Dose:  1000 mg     cetirizine 10 MG Tabs  Commonly known as:  ZYRTEC   Take 10 mg by mouth every day.  Dose:  10 mg     Cholecalciferol 2000 UNIT Caps   Take 2,000 Units by mouth every day.  Dose:  2000 Units     DULoxetine 20 MG Cpep  Commonly known as:  CYMBALTA   Take 40 mg by mouth every day.  Dose:  40 mg     fluticasone 50 MCG/ACT nasal spray  Commonly known as:  FLONASE   Spray 2 Sprays in nose every day.  Dose:  2 Spray     Ixazomib Citrate 4 MG Caps   Take 4 mg by mouth every Monday.  Dose:  4 mg     LORazepam 0.5 MG Tabs  Commonly known as:  ATIVAN   Take 0.5 mg by mouth 2 Times a Day.  Dose:  0.5 mg     metoprolol 25 MG Tabs  Commonly known as:  LOPRESSOR   Take 25 mg by mouth 2 times a day.  Dose:  25 mg     omeprazole 20 MG delayed-release capsule  Commonly known as:  PRILOSEC   Take 20 mg by mouth every day.  Dose:  20 mg     pravastatin 40 MG  tablet  Commonly known as:  PRAVACHOL   Take 1 Tab by mouth every day.  Dose:  40 mg     senna-docusate 8.6-50 MG Tabs  Commonly known as:  PERICOLACE or SENOKOT S   Take 1 Tab by mouth every day.  Dose:  1 Tab     sucralfate 1 GM Tabs  Commonly known as:  CARAFATE   Take 1 g by mouth every Tuesday.  Dose:  1 g     therapeutic multivitamin-minerals Tabs   Take 1 Tab by mouth every day.  Dose:  1 Tab     warfarin 3 MG Tabs  Commonly known as:  COUMADIN   Take 3 mg by mouth every day.  Dose:  3 mg        STOP taking these medications    diphenhydrAMINE 25 MG Tabs  Commonly known as:  BENADRYL     REVLIMID 25 MG Caps  Generic drug:  lenalidomide            Allergies  Allergies   Allergen Reactions   • Actos [Pioglitazone Hydrochloride] Hives   • Advil [Ibuprofen Micronized] Hives   • Cephalosporins Hives     Unsure if she tolerates penicillin     • [place] Hives       DIET  Orders Placed This Encounter   Procedures   • Diet Order Regular     Standing Status:   Standing     Number of Occurrences:   1     Order Specific Question:   Diet:     Answer:   Regular [1]       ACTIVITY  As tolerated.  Weight bearing as tolerated    LINES, DRAINS, AND WOUNDS  This is an automated list. Peripheral IVs will be removed prior to discharge.  Midline IV 10/04/18 Right Forearm (Active)   Site Assessment Clean;Dry;Intact 10/9/2018  9:02 PM   Dressing Type Biopatch;Occlusive;Skin barrier;Transparent 10/9/2018  9:02 PM   Line Status No blood return;Flushed 10/9/2018  9:02 PM   Dressing Status Clean;Dry;Intact 10/9/2018  9:02 PM   Dressing Intervention N/A 10/9/2018  9:02 PM   Dressing Change Due 10/11/18 10/9/2018  9:02 PM   Date Primary Tubing Changed 10/07/18 10/8/2018  9:00 PM   NEXT Primary Tubing Change  10/09/18 10/8/2018  9:00 PM   Infiltration Grading ([facility]) 0 10/9/2018  9:02 PM   Phlebitis Scale ([facility] Only) 0 10/9/2018  9:02 PM                     MENTAL STATUS ON TRANSFER  Level of Consciousness: Alert  Orientation :  Oriented x 4  Speech: Speech Clear    CONSULTATIONS  Oncology - [place]      PROCEDURES  none    LABORATORY  Lab Results   Component Value Date    SODIUM 133 (L) 10/09/2018    POTASSIUM 4.1 10/09/2018    CHLORIDE 97 10/09/2018    CO2 30 10/09/2018    GLUCOSE 154 (H) 10/09/2018    BUN 31 (H) 10/09/2018    CREATININE 0.73 10/09/2018    CREATININE 1.10 08/12/2014        Lab Results   Component Value Date    WBC 7.9 10/10/2018    HEMOGLOBIN 7.6 (L) 10/10/2018    HEMATOCRIT 22.6 (L) 10/10/2018    PLATELETCT 110 (L) 10/10/2018        Total time of the discharge process exceeds 34 minutes.

## 2018-10-11 LAB
BACTERIA BLD CULT: NORMAL
BACTERIA BLD CULT: NORMAL
GLUCOSE BLD-MCNC: 137 MG/DL (ref 65–99)
SIGNIFICANT IND 70042: NORMAL
SIGNIFICANT IND 70042: NORMAL
SITE SITE: NORMAL
SITE SITE: NORMAL
SOURCE SOURCE: NORMAL
SOURCE SOURCE: NORMAL

## 2018-10-23 ENCOUNTER — HOSPITAL ENCOUNTER (OUTPATIENT)
Dept: LAB | Facility: MEDICAL CENTER | Age: 68
End: 2018-10-23
Attending: INTERNAL MEDICINE
Payer: MEDICARE

## 2018-10-23 LAB — AMBIGUOUS DTTM AMBI4: NORMAL

## 2018-10-23 PROCEDURE — 80053 COMPREHEN METABOLIC PANEL: CPT

## 2018-10-23 PROCEDURE — 83883 ASSAY NEPHELOMETRY NOT SPEC: CPT

## 2018-10-23 PROCEDURE — 84160 ASSAY OF PROTEIN ANY SOURCE: CPT

## 2018-10-23 PROCEDURE — 84165 PROTEIN E-PHORESIS SERUM: CPT

## 2018-10-24 LAB
ALBUMIN SERPL BCP-MCNC: 3.3 G/DL (ref 3.2–4.9)
ALBUMIN/GLOB SERPL: 1.3 G/DL
ALP SERPL-CCNC: 73 U/L (ref 30–99)
ALT SERPL-CCNC: 13 U/L (ref 2–50)
ANION GAP SERPL CALC-SCNC: 9 MMOL/L (ref 0–11.9)
AST SERPL-CCNC: 19 U/L (ref 12–45)
BILIRUB SERPL-MCNC: 0.4 MG/DL (ref 0.1–1.5)
BUN SERPL-MCNC: 19 MG/DL (ref 8–22)
CALCIUM SERPL-MCNC: 8.9 MG/DL (ref 8.5–10.5)
CHLORIDE SERPL-SCNC: 95 MMOL/L (ref 96–112)
CO2 SERPL-SCNC: 31 MMOL/L (ref 20–33)
CREAT SERPL-MCNC: 0.68 MG/DL (ref 0.5–1.4)
GLOBULIN SER CALC-MCNC: 2.6 G/DL (ref 1.9–3.5)
GLUCOSE SERPL-MCNC: 284 MG/DL (ref 65–99)
POTASSIUM SERPL-SCNC: 3.8 MMOL/L (ref 3.6–5.5)
PROT SERPL-MCNC: 5.9 G/DL (ref 6–8.2)
SODIUM SERPL-SCNC: 135 MMOL/L (ref 135–145)

## 2018-10-26 LAB
ALBUMIN SERPL-MCNC: 3.44 G/DL (ref 3.75–5.01)
ALPHA1 GLOB SERPL ELPH-MCNC: 0.41 G/DL (ref 0.19–0.46)
ALPHA2 GLOB SERPL ELPH-MCNC: 0.95 G/DL (ref 0.48–1.05)
B-GLOBULIN SERPL ELPH-MCNC: 0.66 G/DL (ref 0.48–1.1)
EER PROT ELECT SER Q1092: ABNORMAL
GAMMA GLOB SERPL ELPH-MCNC: 0.95 G/DL (ref 0.62–1.51)
INTERPRETATION SERPL IFE-IMP: ABNORMAL
KAPPA LC FREE SER-MCNC: 3.4 MG/DL (ref 0.33–1.94)
KAPPA LC FREE/LAMBDA FREE SER NEPH: 4.36 {RATIO} (ref 0.26–1.65)
LAMBDA LC FREE SERPL-MCNC: 0.78 MG/DL (ref 0.57–2.63)
PROT SERPL-MCNC: 6.4 G/DL (ref 6–8.3)

## 2020-05-08 NOTE — DIETARY
NUTRITION SERVICES: BMI - Pt with BMI >40 (=49.42). Weight loss counseling not appropriate in acute care setting. RECOMMEND - Referral to outpatient nutrition services for weight management after D/C.      Tramadol from Dr Clemente Pickard to refill others  Establish with Dr. Dick
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
## 2022-02-23 NOTE — CARE PLAN
Problem: Knowledge Deficit  Goal: Knowledge of disease process/condition, treatment plan, diagnostic tests, and medications will improve  Outcome: PROGRESSING SLOWER THAN EXPECTED    Intervention: Explain information regarding disease process/condition, treatment plan, diagnostic tests, and medications and document in education  Patient updated about medication regimen and venous duplex for increased leg pain and swelling. Patient verbalized understanding, all questions answered      Problem: Skin Integrity  Goal: Risk for impaired skin integrity will decrease  Outcome: PROGRESSING AS EXPECTED  Q 2 hr turns in place, barrier wipes and barrier cream used on sacrum and buttocks for skin tears and incontinence. Interdry in place under pannus. Benadryl cream applied to BLE.         RX for 2 months sent to pharmacy  Thanks Shanika Chow FNP-BC

## 2022-03-09 NOTE — PROGRESS NOTES
Chest xray and nebulizer treatment ordered.    TRANSFER - OUT REPORT:    Verbal report given to DARRELL Flores on Jayla Hadley  being transferred to  for routine progression of care       Report consisted of patients Situation, Background, Assessment and   Recommendations(SBAR). Information from the following report(s) SBAR was reviewed with the receiving nurse. Lines:   Peripheral IV 03/08/22 Anterior;Left;Superior Antecubital (Active)        Opportunity for questions and clarification was provided.       Patient transported with:   Registered Nurse

## 2022-10-25 NOTE — PROGRESS NOTES
[facility] Hospitalist Progress Note    Date of Service: 5/10/2018    Chief Complaint  67 y.o. female admitted 4/10/2018 with multiple myeloma, left humerus, bilateral femur radiation completed.    Interval Problem Update  5/8 Patient very debilitated, she is off antibiotics since I saw her last, still very tearful and depressed and lonely as family and friends have difficulty coming to visit d/t [place].  She feels horrible and is concerned as her left arm is hurting more than prior - Xrays repeated today and no fracture or new lytic lesion seen.  I asked her if she wanted to try another round of chemotherapy as she was already feeling so poorly.  She states she doesn't feel she has a choice, she is not ready to give up trying even if it means feeling worse than she already does.  5/9 Patient feeling slightly less depressed today, she is having less pain in the left arm.  Plan to proceed with chemo tomorrow but at a lesser dose d/t poor functional status since the last dose.  5/10 Patient with nasal congestion and showed me what has been coming out of her nose, likely dried mucus with continued oxygen supplementation, will start ocean nasal saline and phenylephrine for congestion.  She resumes chemotherapy today, starting cycle 2.    Consultants/Specialty  Oncology - [facility]    Disposition  Difficult discharge.        Review of Systems   Constitutional: Negative for chills and fever.   HENT: Negative for congestion and sore throat.    Eyes: Negative for blurred vision and photophobia.   Respiratory: Negative for cough and shortness of breath.    Cardiovascular: Negative for chest pain, claudication and leg swelling.   Gastrointestinal: Negative for abdominal pain, constipation, diarrhea, heartburn and vomiting.   Genitourinary: Negative for dysuria and hematuria.   Musculoskeletal: Positive for myalgias (globally). Negative for joint pain.   Skin: Negative for itching and rash.   Neurological: Negative for  dizziness, sensory change, speech change, weakness and headaches.   Psychiatric/Behavioral: Positive for depression. The patient is not nervous/anxious and does not have insomnia.       Physical Exam  Laboratory/Imaging   Hemodynamics  Temp (24hrs), Av.1 °C (98.8 °F), Min:36.7 °C (98 °F), Max:37.4 °C (99.4 °F)   Temperature: 37 °C (98.6 °F)  Pulse  Av.3  Min: 38  Max: 128    Blood Pressure : 139/51      Respiratory      Respiration: 18, Pulse Oximetry: 98 %, O2 Daily Delivery Respiratory : Silicone Nasal Cannula     Work Of Breathing / Effort: Mild  RUL Breath Sounds: Clear, RML Breath Sounds: Diminished, RLL Breath Sounds: Diminished, [place]TE Breath Sounds: Clear, LLL Breath Sounds: Diminished    Fluids    Intake/Output Summary (Last 24 hours) at 05/10/18 9759  Last data filed at 05/10/18 1748   Gross per 24 hour   Intake             2200 ml   Output             4250 ml   Net            -2050 ml       Nutrition  Orders Placed This Encounter   Procedures   • Diet Order     Standing Status:   Standing     Number of Occurrences:   1     Order Specific Question:   Diet:     Answer:   Diabetic [3]     Physical Exam   Constitutional: She is oriented to person, place, and time. She appears well-developed and well-nourished. No distress.   HENT:   Head: Normocephalic and atraumatic.   Eyes: Conjunctivae are normal. No scleral icterus.   Neck: Neck supple. No JVD present.   Cardiovascular: Normal rate, regular rhythm and normal heart sounds.  Exam reveals no gallop and no friction rub.    No murmur heard.  Pulmonary/Chest: Effort normal and breath sounds normal. No respiratory distress. She exhibits no tenderness.   Abdominal: Soft. Bowel sounds are normal. She exhibits no distension. There is no guarding.   Musculoskeletal: She exhibits no edema or tenderness.   Neurological: She is alert and oriented to person, place, and time. No cranial nerve deficit.   Skin: Skin is warm and dry. She is not diaphoretic. No  erythema. No pallor.   Erythema bilateral LE/shins - improved from prior.   Psychiatric: She has a normal mood and affect. Her behavior is normal.   Nursing note and vitals reviewed.      Recent Labs      05/08/18 0132 05/09/18   0354  05/10/18   0220   WBC  3.5*  3.3*  3.8*   RBC  2.80*  2.74*  2.90*   HEMOGLOBIN  8.5*  8.3*  8.6*   HEMATOCRIT  27.7*  26.8*  28.6*   MCV  98.9*  97.8  98.6*   MCH  30.4  30.3  29.7   MCHC  30.7*  31.0*  30.1*   RDW  56.8*  56.1*  57.1*   PLATELETCT  107*  137*  137*   MPV  10.8  10.1  10.2     Recent Labs      05/08/18 0132 05/09/18   0354  05/10/18   0048   SODIUM  137  139  139   POTASSIUM  3.6  3.6  3.8   CHLORIDE  99  100  101   CO2  35*  35*  35*   GLUCOSE  144*  123*  120*   BUN  12  12  11   CREATININE  0.70  0.60  0.62   CALCIUM  6.6*  6.8*  6.9*                      Assessment/Plan     * Pathologic fracture- (present on admission)   Assessment & Plan    -Left humerus  -widespread lytic disease  -completed radiation to humerus  -pain control             Multiple myeloma (HCC)- (present on admission)   Assessment & Plan    CyBorD chemo - cycle 1 started 4/19/18; cycle 2 start on 5/10  -recent diagnosis, appears aggressive, now with innumerable lytic lesions  -Dr Sim and Oksana consulted for assistance in management  - IV dilaudid PRN, oxy prn. DC maintenance steroids as these were causing confusion  -oxycontin CR 10 mg BID  -skeletal survey done - spine obscured by body habitus, but multiple other lesions found  XRT to right and left femurs completed  -Palliative care consulted for advanced care planning            Diarrhea   Assessment & Plan    Resolved  C diff negative  Imodium PRN          Hypocalcemia   Assessment & Plan    - Repleting as needed   due to recent bisphosphonate use; replete Mg  Corrected calcium  normal - low secondary to hypoalbuminemia          Hypomagnesemia   Assessment & Plan    - Repleting as needed        Nasal congestion   Assessment &  Plan    Ocean nasal saline  Phenylephrine PRN congestion.          Decubitus ulcer of buttock   Assessment & Plan    Wound care; rotate frequently in bed; air mattress.        Debility- (present on admission)   Assessment & Plan    Due to current illnesses; not able to walk since early april;  Out of SNF days per SW; cont PT/OT here  Poor prognosis given current diagnoses/morbid obesity.  LTAC declined d/t active chemotherapy.        Cellulitis   Assessment & Plan    Bilateral LE  improving; venous stasis component  Changed to po Augmentin per ID, completed 5/5/18          DNR (do not resuscitate)   Assessment & Plan    Palliative care consulted, patient wants to change code status to DNR - done, completed Polst with APN.          Chemotherapy-induced thrombocytopenia   Assessment & Plan    stable; monitor; no bleeding now          Leg edema   Assessment & Plan      -PO to IV Lasix scheduled bid, doing well and renal function stable.            Chronic venous stasis dermatitis of both lower extremities- (present on admission)   Assessment & Plan    -wound care, oral abx        Anemia- (present on admission)   Assessment & Plan    Stable cbc; monitor        Morbid obesity with BMI of 60.0-69.9, adult (CMS-HCC)- (present on admission)   Assessment & Plan    -encourage weight loss  Body mass index is 67.38 kg/m².        DM type 2 (diabetes mellitus, type 2) (CMS-HCC)- (present on admission)   Assessment & Plan    Fair control; adjust meds for good control        Essential hypertension, benign- (present on admission)   Assessment & Plan    Adjust med for good control.          Quality-Core Measures   Reviewed items::  Labs reviewed, Medications reviewed and Radiology images reviewed  Singh catheter::  Urinary Tract Retention or Urinary Tract Obstruction  DVT prophylaxis pharmacological::  Heparin  Assessed for rehabilitation services:  Patient was assess for and/or received rehabilitation services during this  hospitalization         History/Exam/Medical Decision Making

## 2023-01-11 NOTE — DISCHARGE PLANNING
Agency/Facility Name: [facility]  Outcome: OT notes rightfaxed to [facility].      PATIENT:   Subjective   Patient ID: Hanane is a 16 year old female.    CHIEF COMPLAINT:  Chief Complaint   Patient presents with   • Nausea     Headache vomiting       HPI:  Viral Syndrome  This is a new problem. The current episode started yesterday. The problem occurs intermittently. The problem has been gradually improving. Associated symptoms include fatigue, headaches and nausea. Pertinent negatives include no abdominal pain, anorexia, arthralgias, change in bowel habit, chest pain, chills, congestion, coughing, diaphoresis, fever, joint swelling, myalgias, neck pain, numbness, rash, sore throat, swollen glands, urinary symptoms, vertigo, visual change, vomiting or weakness. The symptoms are aggravated by stress. She has tried nothing for the symptoms. The treatment provided significant relief.   Headache  Headache pattern:  Headache sometimes there, sometimes not at all  Initial event:  None  Frequency:  Less than 1 per month  Do headaches wake patient from sleep?: No    Headaches last more than three days?: No    Aggravating factors:  Stress  Allodynia triggers:  None  Changes in thinking and mood:  None  Changes in vision:  None  Bilateral symptoms:  None  Unilateral symptoms:  None  Stomach/GI changes:  Nausea  Changes in sensation:  Lightheadedness  Abortive medications tried:  None  Vitamins and supplements tried:  None  Patient is a 16 old female who presents with mom today.  Reports headache yesterday mostly over her forehead felt nauseous and lightheaded with symptoms.  Denies any history of trauma no recent illnesses.  Last menstrual period 12/26/2022.  No vomiting.  Denies any vision complaints.  Reports no history of headaches.  Reports no vision complaints.  Ate a sandwich yesterday no further eating had some water this morning reports feeling better.  When discussing symptoms child reports that she has been stressed with I ready testing at school.  States that she does go to school mostly A[employer]  however she gets nervous during this testing.  Mom was worried about illness.  No known sick exposures.  Energy levels acceptable.  Sleeping typical amount.  Child is fluent in English and Georgian mom speaks primarily  was used today for assistance with interpretation.    ROS:  Review of Systems   Constitutional: Positive for appetite change and fatigue. Negative for activity change, chills, diaphoresis and fever.   HENT: Negative for congestion, dental problem, drooling, ear discharge, ear pain, facial swelling, hearing loss, mouth sores, nosebleeds, postnasal drip, rhinorrhea, sinus pressure, sinus pain, sneezing, sore throat, tinnitus, trouble swallowing and voice change.    Eyes: Negative for photophobia, pain, discharge, redness, itching and visual disturbance.   Respiratory: Negative for apnea, cough, choking, chest tightness, shortness of breath, wheezing and stridor.    Cardiovascular: Negative for chest pain, palpitations and leg swelling.   Gastrointestinal: Positive for nausea. Negative for abdominal distention, abdominal pain, anorexia, blood in stool, change in bowel habit, constipation, diarrhea and vomiting.   Genitourinary: Negative for decreased urine volume, difficulty urinating, dysuria, frequency, menstrual problem, pelvic pain and urgency.   Musculoskeletal: Negative for arthralgias, gait problem, joint swelling, myalgias, neck pain and neck stiffness.   Skin: Negative for color change, pallor, rash and wound.   Neurological: Positive for headaches. Negative for dizziness, vertigo, tremors, seizures, syncope, facial asymmetry, speech difficulty, weakness, light-headedness and numbness.   Psychiatric/Behavioral: Negative for agitation, behavioral problems, confusion, decreased concentration, dysphoric mood, hallucinations, self-injury, sleep disturbance and suicidal ideas. The patient is not nervous/anxious and is not hyperactive.        PROBLEM LIST:  There is no problem list  on file for this patient.       MEDICAL HISTORY:  No past medical history on file.     SURGICAL HISTORY:   has no past surgical history on file.     FAMILY MEDICAL HISTORY:  family history includes Diabetes in an other family member.    SOCIAL HISTORY:  Social History     Tobacco Use   • Smoking status: Never   • Smokeless tobacco: Never   Vaping Use   • Vaping Use: never used   Substance Use Topics   • Alcohol use: Never   • Drug use: Never       MEDICATIONS:  No current outpatient medications on file.     No current facility-administered medications for this visit.       ALLERGIES:  has No Known Allergies.    LAB RESULTS  Please enter a base name.  Lab Services on 09/13/2022   Component Date Value Ref Range Status   • Fasting Status 09/13/2022 12  0 - 999 Hours Final   • Sodium 09/13/2022 141  135 - 145 mmol/L Final   • Potassium 09/13/2022 4.5  3.4 - 5.1 mmol/L Final   • Chloride 09/13/2022 108  97 - 110 mmol/L Final   • Carbon Dioxide 09/13/2022 26  21 - 32 mmol/L Final   • Anion Gap 09/13/2022 12  7 - 19 mmol/L Final   • Glucose 09/13/2022 83  70 - 99 mg/dL Final   • BUN 09/13/2022 10  5 - 18 mg/dL Final   • Creatinine 09/13/2022 0.73  0.39 - 0.90 mg/dL Final   • Glomerular Filtration Rate 09/13/2022    Final    GFR not calculated for age less than 18 years   • BUN/ Creatinine Ratio 09/13/2022 14  7 - 25 Final   • Calcium 09/13/2022 9.2  8.0 - 11.0 mg/dL Final   • Bilirubin, Total 09/13/2022 0.7  0.2 - 1.0 mg/dL Final   • GOT/AST 09/13/2022 16  10 - 45 Units/L Final   • GPT/ALT 09/13/2022 29  6 - 35 Units/L Final   • Alkaline Phosphatase 09/13/2022 130  90 - 360 Units/L Final   • Albumin 09/13/2022 3.7  3.6 - 5.1 g/dL Final   • Protein, Total 09/13/2022 7.5  6.0 - 8.0 g/dL Final   • Globulin 09/13/2022 3.8  2.0 - 4.0 g/dL Final   • A/G Ratio 09/13/2022 1.0  1.0 - 2.4 Final   • TSH 09/13/2022 1.709  0.463 - 4.130 mcUnits/mL Final    In pregnancy (if applicable):  First Trimester                   0.1 - 4.0  mU/L  Second Trimester                  0.2 - 4.0 mU/L  Third Trimester                   0.3 - 4.5 mU/L   • T4, Free 09/13/2022 1.2  0.8 - 1.3 ng/dL Final   • Fasting Status 09/13/2022 12  0 - 999 Hours Final   • Cholesterol 09/13/2022 164  <=169 mg/dL Final    Desirable         <170  Borderline High   170 to 199  High              >=200   • Triglycerides 09/13/2022 100 (A)  <=89 mg/dL Final    Desirable         <90  Borderline High   90 to 129  High              >=130   • HDL 09/13/2022 47  >=46 mg/dL Final    Low              <40  Borderline Low   40 to 45  Optimal          >45   • LDL 09/13/2022 97  <=109 mg/dL Final    Desirable         <110  Borderline High   110 to 129  High              >=130   • Non-HDL Cholesterol 09/13/2022 117  <=119 mg/dL Final    Desirable         <120  Borderline High   120 to 144  High              >=145   • Cholesterol/ HDL Ratio 09/13/2022 3.5  <=4.4 Final   • Hemoglobin A1C 09/13/2022 5.0  4.5 - 5.6 % Final      Diabetic Screening  Non Diabetic:             <5.7%  Increased Risk:           5.7-6.4%  Diagnostic For Diabetes:  >6.4%    Diabetic Control  A1C%       eAG mg/dL  6.0            126  6.5            140  7.0            154  7.5            169  8.0            183  8.5            197  9.0            212  9.5            226  10.0           240   • WBC 09/13/2022 10.2  4.2 - 11.0 K/mcL Final   • RBC 09/13/2022 4.94  3.90 - 5.30 mil/mcL Final   • HGB 09/13/2022 13.8  12.0 - 15.5 g/dL Final   • HCT 09/13/2022 42.8  36.0 - 46.5 % Final   • MCV 09/13/2022 86.6  78.0 - 100.0 fl Final   • MCH 09/13/2022 27.9  26.0 - 34.0 pg Final   • MCHC 09/13/2022 32.2  32.0 - 36.5 g/dL Final   • RDW-CV 09/13/2022 12.1  11.0 - 15.0 % Final   • RDW-SD 09/13/2022 38.3  35.0 - 47.0 fL Final   • PLT 09/13/2022 382  140 - 450 K/mcL Final   • NRBC 09/13/2022 0  <=0 /100 WBC Final   • Neutrophil, Percent 09/13/2022 59  % Final   • Lymphocytes, Percent 09/13/2022 30  % Final   • Mono, Percent 09/13/2022 7   % Final   • Eosinophils, Percent 09/13/2022 3  % Final   • Basophils, Percent 09/13/2022 1  % Final   • Immature Granulocytes 09/13/2022 0  % Final   • Absolute Neutrophils 09/13/2022 6.1  1.8 - 8.0 K/mcL Final   • Absolute Lymphocytes 09/13/2022 3.1  1.5 - 6.5 K/mcL Final   • Absolute Monocytes 09/13/2022 0.7  0.0 - 0.8 K/mcL Final   • Absolute Eosinophils  09/13/2022 0.3  0.0 - 0.5 K/mcL Final   • Absolute Basophils 09/13/2022 0.1  0.0 - 0.2 K/mcL Final   • Absolute Immmature Granulocytes 09/13/2022 0.0  0.0 - 0.2 K/mcL Final       VITAL SIGNS THIS VISIT:  Visit Vitals  /69   Pulse 90   Temp 98.1 °F (36.7 °C)   Resp 18   Ht 5' 3.5\" (1.613 m)   Wt (!) 109.3 kg (241 lb)   LMP 08/29/2022 (Approximate)   SpO2 98%   BMI 42.02 kg/m²        PHYSICAL EXAM:  Objective    Physical Exam  Vitals and nursing note reviewed.   Constitutional:       General: She is not in acute distress.     Appearance: She is well-developed. She is obese. She is not diaphoretic.      Comments: Pleasant and cooperative.  Speaking clearly.   HENT:      Head: Normocephalic and atraumatic.      Right Ear: Tympanic membrane, ear canal and external ear normal.      Left Ear: Tympanic membrane, ear canal and external ear normal.      Nose: Nose normal.      Mouth/Throat:      Mouth: Mucous membranes are moist.      Pharynx: No oropharyngeal exudate.      Comments: Airway patent  Eyes:      General:         Right eye: No discharge.         Left eye: No discharge.      Extraocular Movements: Extraocular movements intact.      Conjunctiva/sclera: Conjunctivae normal.      Pupils: Pupils are equal, round, and reactive to light.   Cardiovascular:      Rate and Rhythm: Normal rate and regular rhythm.      Pulses: Normal pulses.      Heart sounds: Normal heart sounds. No murmur heard.  Pulmonary:      Effort: Pulmonary effort is normal. No respiratory distress.      Breath sounds: Normal breath sounds. No wheezing or rales.      Comments: No cough  noted on exam  Chest:      Chest wall: No tenderness.   Abdominal:      Palpations: Abdomen is soft.      Tenderness: There is no abdominal tenderness. There is no guarding or rebound.   Musculoskeletal:         General: No tenderness.      Cervical back: Normal range of motion and neck supple. No rigidity.   Lymphadenopathy:      Cervical: No cervical adenopathy.   Skin:     General: Skin is warm and dry.      Capillary Refill: Capillary refill takes less than 2 seconds.      Coloration: Skin is not jaundiced or pale.      Findings: No bruising, erythema, lesion or rash.   Neurological:      Mental Status: She is alert and oriented to person, place, and time.      Cranial Nerves: No cranial nerve deficit.      Sensory: No sensory deficit.      Motor: No weakness or abnormal muscle tone.      Coordination: Coordination normal.      Gait: Gait normal.   Psychiatric:         Mood and Affect: Mood normal.         Behavior: Behavior normal.         Thought Content: Thought content normal.         Judgment: Judgment normal.         IMMUNIZATIONS:  Immunization History   Administered Date(s) Administered   • DTaP(INFANRIX) 01/12/2010, 10/24/2012   • DTaP/HIB/IPV 01/09/2009, 03/11/2009, 05/11/2009   • HIB, Unspecified Formulation 01/12/2010   • HPV 9-Valent 11/09/2021, 09/12/2022   • Hep A, Pediatric, Unspecified Formulation 10/14/2009, 04/15/2010   • Hep B, adolescent or pediatric 2008, 2008, 05/11/2009   • Influenza, intranasal, quadrivalent, live (FLUMIST) 10/15/2013   • Influenza, quadrivalent, preserve-free 11/09/2021   • MMR 10/14/2009, 10/24/2012   • Meningococcal Conjugate MCV4P (Menactra) 08/21/2020   • Pneumococcal Conjugate 7 Valent 01/09/2009, 03/11/2009, 05/11/2009, 10/14/2009   • Polio, INACTIVATED 10/24/2012   • Rotavirus - pentavalent 01/09/2009, 03/11/2009   • Tdap 08/21/2020   • Varicella 10/14/2009, 10/24/2012        ORDERS:  Orders Placed This Encounter   • COVID/Flu/RSV panel   • POCT Urine  Dip Non-Auto   • POCT Urine pregnancy   • POCT SARS-COV-2 Antigen via Veritor         ASSESSMENT:  Problem List Items Addressed This Visit    None  Visit Diagnoses     Stress    -  Primary    Relevant Orders    POCT URINE DIP NON-AUTO (Completed)    POCT URINE PREGNANCY (Completed)    COVID/FLU/RSV PANEL    POCT SARS-COV-2 ANTIGEN (Completed)    Episodic cluster headache, not intractable        Relevant Orders    COVID/FLU/RSV PANEL    POCT SARS-COV-2 ANTIGEN (Completed)          PLAN:    Rapid COVID test ordered and completed today undetected    Urine dip and urine pregnancy ordered and completed today and reviewed    Migraine Headache:  -Take medications exactly as prescribed.  -Take Acetaminophen 1000 mg every 6 hours as needed for additional relief. Do not exceed 3000 mg in 1 day.  -Stay hydrated with water / Pedialyte.   -Avoid behaviors that may contribute to your headaches.  -If you know your headache triggers, avoid them.  -If not, create a headache journal to attempt to discover your triggers.   --When you develop a headache, write the date, time, pain level (0-10, 10 is worst).   ---Write what you have tried and if it works or does not work.  ----Write any foods, stressors, events or anything that you think may have contributed to this headache.  -----Bring this journal to your follow up appointment with your primary care provider.    Migraines and Cluster Headaches  Migraines and cluster headaches cause intense, throbbing pain on one side of the head. With a migraine, you may have nausea and vomiting and be sensitive to light and sound. You may also have warning signs, such as flashing lights or loss of parts of your vision, before the pain starts. This is called an aura. Migraines are 3 times more common in women than men. This may be due to hormonal changes during menstruation. Typical migraines may last for 4 to 72 hours untreated.  Cluster headaches recur in groups for days, weeks, or months. The pain  is centered around or behind one eye. The eye may also become red or teary, or the eyelid may droop. Migraines and cluster headaches can have many triggers.    Preventing migraines and cluster headaches  Try the following steps:  Don't eat aged cheeses, nuts, beans, chocolate, red wine, or foods that contain caffeine, alcohol, tobacco, nitrates, and MSG.  Try not to skip meals.  Don’t work in poor lighting.  Reduce stress as much as you can.  Get plenty of sleep each night.  Exercise regularly under your doctor’s guidance.  Don't take headache medicines for more than 3 days, because of the risk of rebound headaches.  Don't take certain prescription medicines that are known to cause rebound headaches.  Relieving the pain  Try these suggestions:  Stay quiet and rest.  Use cold to numb the pain. Wrap ice or a cold can of soda in a cloth. Hold it against the site of pain for 10 minutes. Repeat every 20 minutes.  Stay out of the light. Wear dark glasses, turn out lights, and close the curtains. When outdoors, wear a brimmed hat.  Drink lots of fluids. Sip caffeine-free flat soda to help relieve nausea.  See your doctor if you get migraines or cluster headaches often. There are effective medicines to help treat or prevent them.  Hormone therapy. This may help women whose migraines are related to hormonal changes during menstruation.    Chrystal the importance of self distressing mechanisms.  Adequate rest good nutrition.  Monitor complaints.  Follow-up with PCP for any persisting complaints cleared to return to school tomorrow.  Follow-up with ER for any worrisome complaints as needed    History exam and treatment plan assisted with interpretation by Ifeanyi ID number [id number]

## 2023-09-12 NOTE — PROGRESS NOTES
Patient was very tearful during the day, feeling like she shouldn't fight anymore and apologizing. RN spent considerable time with patient, consoling her and educating her on her options and looking at a [facility] facility as a way to gain strength before transitioning to home. Patient feels like a burden and doesnt fully understand her options, feels very overwhelmed. RN provided support, education, and counseling to patient.   Ileal conduit red, budded, measuring 1 1/8 inches, functioning with mariama colored urine. Stents and tube removed from ileal conduit. Peristomal de-roofed blisters and denuded skin healing.   Patient spouse changed the pouching system with WOCN coaching. Applied adhesive remover to remove pouching system, cleansed peristomal skin with cleansing wipe, applied stoma powder to denuded skin and healing de-roofed blisters,  sealed powder with skin prep, applied skin prep to peristomal skin, measured stoma, cut skin barrier to accommodate stoma, applied ostomy ring around the stoma, then applied a [place] 2 piece flat, cut to fit, urostomy, attached pouching system to bedside drainage, Patient demonstrated ability to empty pouch.  Completed [place] [employer] enrollment form. Provided patient with ostomy supplies pending ordering by the home care nurse.  Ileal conduit red, budded, measuring 1 1/8 inches, functioning with mariama colored urine. Stents and tube removed from ileal conduit. Peristomal de-roofed blisters and denuded skin healing.   Patient spouse changed the pouching system with WOCN coaching. Applied adhesive remover to remove pouching system, cleansed peristomal skin with cleansing wipe, applied stoma powder to denuded skin and healing de-roofed blisters,  sealed powder with skin prep, applied skin prep to peristomal skin, measured stoma, cut skin barrier to accommodate stoma, applied ostomy ring around the stoma, then applied a [place] 1 3/4 inch, 2 piece flat, cut to fit, urostomy, attached pouching system to bedside drainage, Patient demonstrated ability to empty pouch.  Completed [place] [employer] enrollment form. Provided patient with ostomy supplies pending ordering by the home care nurse.

## 2024-06-03 NOTE — PROGRESS NOTES
Elida,   Migraines   For prevention - take magnesium 400mg + riboflavin 400mg every day   For acute treatment - I have prescribed Nurtec. Take at onset of headache.   I have ordered blood and/or urine tests for you to do today. The lab can be found on this floor (2nd floor) next to the pharmacy across from the elevators.    Followup with gastroenterology   Mammogram is ordered for August   Referral to our behavioral therapist, Rosey. She should be reaching out to you.  Followup 3 months    [facility]ist Progress Note    Date of Service: 2018    Chief Complaint  67 y.o. female admitted 2018 with hx of MM, presenting with abnormal labs, K 6.0, hgb 7.3    Interval Problem Update  Patient seen and examined, coming from [facility], feeling tired and fatigued, no pain, no further blood in stool. Nausea this morning, otherwise no complaints    Consultants/Specialty  OncologyDr. veras over the phone    Disposition  Patient coming from [facility] however state shut them down 2/2 scabies outbreak till   sw assisting      Review of Systems   Constitutional: Positive for malaise/fatigue. Negative for fever.   HENT: Negative for congestion, hearing loss and nosebleeds.    Eyes: Negative for blurred vision and double vision.   Respiratory: Negative for cough and shortness of breath.    Cardiovascular: Negative for chest pain and leg swelling.   Gastrointestinal: Positive for nausea. Negative for abdominal pain, melena and vomiting.   Genitourinary: Negative for dysuria, frequency and urgency.   Musculoskeletal: Positive for back pain and myalgias. Negative for joint pain.   Skin: Negative for itching and rash.   Neurological: Positive for weakness. Negative for dizziness and headaches.   Psychiatric/Behavioral: Negative for depression and suicidal ideas. The patient is not nervous/anxious.       Physical Exam  Laboratory/Imaging   Hemodynamics  Temp (24hrs), Av.8 °C (98.2 °F), Min:36.6 °C (97.8 °F), Max:37.1 °C (98.8 °F)   Temperature: 37.1 °C (98.8 °F)  Pulse  Av.8  Min: 83  Max: 118   Blood Pressure : 150/58      Respiratory      Respiration: 18, Pulse Oximetry: 93 %        RUL Breath Sounds: Diminished, RML Breath Sounds: Diminished, RLL Breath Sounds: Diminished, [place]TE Breath Sounds: Diminished    Fluids    Intake/Output Summary (Last 24 hours) at 18 1143  Last data filed at 18 1000   Gross per 24 hour   Intake              920 ml   Output                0 ml   Net               920 ml       Nutrition  Orders Placed This Encounter   Procedures   • Diet Order Diabetic     Standing Status:   Standing     Number of Occurrences:   1     Order Specific Question:   Diet:     Answer:   Diabetic [3]     Physical Exam   Constitutional: She is oriented to person, place, and time. She appears well-developed and well-nourished. No distress.   Morbidly obese   HENT:   Head: Normocephalic and atraumatic.   Mouth/Throat: No oropharyngeal exudate.   Eyes: Pupils are equal, round, and reactive to light. Conjunctivae and EOM are normal. No scleral icterus.   Neck: Normal range of motion. Neck supple. No JVD present. No thyromegaly present.   Cardiovascular: Normal rate, regular rhythm and intact distal pulses.  Exam reveals no friction rub.    No murmur heard.  Pulmonary/Chest: Effort normal and breath sounds normal. No respiratory distress. She has no wheezes.   Abdominal: Soft. Bowel sounds are normal. She exhibits no distension. There is no tenderness.   Musculoskeletal: Normal range of motion. She exhibits edema. She exhibits no tenderness.   Lymphadenopathy:     She has no cervical adenopathy.   Neurological: She is alert and oriented to person, place, and time. She exhibits normal muscle tone.   Skin: Skin is warm and dry. No rash noted. No erythema.   Psychiatric: She has a normal mood and affect. Her behavior is normal.       Recent Labs      08/16/18   1135  08/17/18   0643  08/18/18   0522   WBC  3.3*  2.7*   --    RBC  1.94*  2.19*   --    HEMOGLOBIN  6.7*  7.4*  8.0*   HEMATOCRIT  20.4*  22.4*  24.2*   MCV  105.2*  102.3*   --    MCH  34.5*  33.8*   --    MCHC  32.8*  33.0*   --    RDW  55.0*  59.6*   --    PLATELETCT  165  128*   --    MPV  10.4  10.1   --      Recent Labs      08/16/18   1135  08/17/18   0643   SODIUM  133*  133*   POTASSIUM  4.9  4.5   CHLORIDE  101  103   CO2  20  23   GLUCOSE  261*  219*   BUN  26*  21   CREATININE  1.36  1.08   CALCIUM  9.4  9.4     Recent Labs       08/16/18   1135  08/17/18   0643  08/18/18   0522   INR  1.23*  1.39*  1.74*                  Assessment/Plan     * Anemia- (present on admission)   Assessment & Plan    Hgb 6.7 on admission  Most likely secondary to multiple myeloma  Stool guaiac negative  2 units PRBCs ordered on admission,hgb this morning 8, stable  monitor        Multiple myeloma (HCC)- (present on admission)   Assessment & Plan    Hx of MM, has undergone 2 rounds of chemotherapy now on a newer agent.   Follows outpatient.   GUALBERTO, PE Serum UR protein pending  Transfuse 2U PRBcs given low hemoglobin on admission, monitor h/h, hgb 8 now  Depending on findings from GUALBERTO may consult heme/onc, discussed case with  vs outpateint workup        Pancytopenia (HCC)   Assessment & Plan    All blood lines are low, likely 2/2 MM  Monitor  No s/s of active bleeding  Stool guaic neg  H/h q6hour  S/p 2u PRBC        Morbid obesity (HCC)   Assessment & Plan    BMI of 49.4  Nutrition consulted  Counseled about diet and lifestyle modification  Will need outpatient resources        DVT (deep venous thrombosis) ([facility])   Assessment & Plan    Left LE DVT  Continue warfarin per pharmacy        Vitamin D deficiency- (present on admission)   Assessment & Plan    Resume vitamin D.         DM type 2 (diabetes mellitus, type 2) (CMS-[facility])- (present on admission)   Assessment & Plan    Patient is on home dose of NPH as well as regular insulin  A1c 8.5 4 months ago  Continue Insulin-sliding scale, accu-checks and hypoglycemia protocol.              Quality-Core Measures   Reviewed items::  Labs reviewed, Medications reviewed and Radiology images reviewed  Singh catheter::  No Singh  DVT prophylaxis pharmacological::  Warfarin (Coumadin)        Discussed plan of care with patient, nursing and SW. Patient unable to go back to [facility] 2/2 scabies outbreak  SW assisting with this matter

## 2025-04-01 NOTE — ASSESSMENT & PLAN NOTE
4/1/2025social work transition of care planning  Pt plan is to inpatient psych,once medically cleared. Salena zamudio following.  Electronically signed by CELINA Wade on 4/1/2025 at 8:30 AM     Wound care; rotate frequently in bed; air mattress.

## (undated) DEVICE — SODIUM CHL IRRIGATION 0.9% 1000ML (12EA/CA)

## (undated) DEVICE — SYRINGE DISP. 60 CC LL - (30/BX, 12BX/CA)**WHEN THESE ARE GONE ORDER #500206**

## (undated) DEVICE — SYRINGE DISP. 12 CC LL - (100/BX)

## (undated) DEVICE — WATER IRRIGATION STERILE 1000ML (12EA/CA)

## (undated) DEVICE — GLOVE BIOGEL SZ 6.5 SURGICAL PF LTX (50PR/BX 4BX/CA)

## (undated) DEVICE — DRAPE STRLE REG TOWEL 18X24 - (10/BX 4BX/CA)"

## (undated) DEVICE — MASK ANESTHESIA ADULT  - (100/CA)

## (undated) DEVICE — GLOVE BIOGEL SZ 8 SURGICAL PF LTX - (50PR/BX 4BX/CA)

## (undated) DEVICE — SUCTION INSTRUMENT YANKAUER BULBOUS TIP W/O VENT (50EA/CA)

## (undated) DEVICE — ELECTRODE DUAL RETURN W/ CORD - (50/PK)

## (undated) DEVICE — WRAP COBAN SELF-ADHERENT 6 IN X  5YDS STERILE TAN (12/CA)

## (undated) DEVICE — PAD PREP 24 X 48 CUFFED - (100/CA)

## (undated) DEVICE — GOWN WARMING STANDARD FLEX - (30/CA)

## (undated) DEVICE — KIT ANESTHESIA W/CIRCUIT & 3/LT BAG W/FILTER (20EA/CA)

## (undated) DEVICE — NEPTUNE 4 PORT MANIFOLD - (20/PK)

## (undated) DEVICE — HEAD HOLDER JUNIOR/ADULT

## (undated) DEVICE — ELECTRODE 850 FOAM ADHESIVE - HYDROGEL RADIOTRNSPRNT (50/PK)

## (undated) DEVICE — SUTURE 3-0 ETHILON FS-1 - (36/BX) 30 INCH

## (undated) DEVICE — STOCKINET TUBULAR 6IN STERILE - 6 X 48YDS (25/CA)

## (undated) DEVICE — LACTATED RINGERS INJ 1000 ML - (14EA/CA 60CA/PF)

## (undated) DEVICE — BLADE SURGICAL #15 - (50/BX 3BX/CA)

## (undated) DEVICE — GLOVE, LITE (PAIR)

## (undated) DEVICE — KIT ROOM DECONTAMINATION

## (undated) DEVICE — DRAPE LARGE 3 QUARTER - (20/CA)

## (undated) DEVICE — BANDAID SHEER STRIP 3/4 IN (100EA/BX 12BX/CA)

## (undated) DEVICE — SPONGE GAUZESTER 4 X 4 4PLY - (128PK/CA)

## (undated) DEVICE — BLOCK

## (undated) DEVICE — SENSOR SPO2 NEO LNCS ADHESIVE (20/BX) SEE USER NOTES

## (undated) DEVICE — PACK MINOR BASIN - (2EA/CA)

## (undated) DEVICE — NEEDLE SPINAL NON-SAFETY 18 GA X 3 IN (25EA/BX)

## (undated) DEVICE — TOWELS CLOTH SURGICAL - (4/PK 20PK/CA)

## (undated) DEVICE — BAG, SPONGE COUNT 50600

## (undated) DEVICE — DRAPE SURGICAL U 77X120 - (10/CA)

## (undated) DEVICE — HUMID-VENT HEAT AND MOISTURE EXCHANGE- (50/BX)

## (undated) DEVICE — TUBE CONNECTING SUCTION - CLEAR PLASTIC STERILE 72 IN (50EA/CA)

## (undated) DEVICE — GLOVE BIOGEL PI INDICATOR SZ 6.5 SURGICAL PF LF - (50/BX 4BX/CA)

## (undated) DEVICE — SYRINGE 10 ML CONTROL LL (25EA/BX 4BX/CA)

## (undated) DEVICE — GLOVE BIOGEL SZ 7 SURGICAL PF LTX - (50PR/BX 4BX/CA)

## (undated) DEVICE — DRAPE C-ARM LARGE 41IN X 74 IN - (10/BX 2BX/CA)

## (undated) DEVICE — GLOVE BIOGEL PI ORTHO SZ 7.5 PF LF (40PR/BX)

## (undated) DEVICE — PROTECTOR ULNA NERVE - (36PR/CA)

## (undated) DEVICE — SYRINGE 30 ML LL (56/BX)

## (undated) DEVICE — SUTURE GENERAL

## (undated) DEVICE — CANISTER SUCTION RIGID RED 1500CC (40EA/CA)